# Patient Record
Sex: MALE | Race: WHITE | Employment: OTHER | URBAN - METROPOLITAN AREA
[De-identification: names, ages, dates, MRNs, and addresses within clinical notes are randomized per-mention and may not be internally consistent; named-entity substitution may affect disease eponyms.]

---

## 2017-12-24 ENCOUNTER — APPOINTMENT (OUTPATIENT)
Dept: GENERAL RADIOLOGY | Age: 72
DRG: 637 | End: 2017-12-24
Attending: EMERGENCY MEDICINE
Payer: MEDICARE

## 2017-12-24 ENCOUNTER — APPOINTMENT (OUTPATIENT)
Dept: CT IMAGING | Age: 72
DRG: 637 | End: 2017-12-24
Attending: EMERGENCY MEDICINE
Payer: MEDICARE

## 2017-12-24 ENCOUNTER — HOSPITAL ENCOUNTER (INPATIENT)
Age: 72
LOS: 1 days | Discharge: HOME | DRG: 637 | End: 2017-12-24
Attending: EMERGENCY MEDICINE | Admitting: INTERNAL MEDICINE
Payer: MEDICARE

## 2017-12-24 VITALS
TEMPERATURE: 97.8 F | DIASTOLIC BLOOD PRESSURE: 61 MMHG | OXYGEN SATURATION: 98 % | SYSTOLIC BLOOD PRESSURE: 129 MMHG | HEIGHT: 71 IN | HEART RATE: 81 BPM | BODY MASS INDEX: 28.7 KG/M2 | WEIGHT: 205 LBS | RESPIRATION RATE: 18 BRPM

## 2017-12-24 DIAGNOSIS — E16.2 HYPOGLYCEMIA: Primary | ICD-10-CM
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
## 2017-12-24 PROBLEM — N18.30 CKD (CHRONIC KIDNEY DISEASE) STAGE 3, GFR 30-59 ML/MIN (HCC): Status: ACTIVE | Noted: 2017-12-24

## 2017-12-24 LAB
ALBUMIN SERPL-MCNC: 3.5 G/DL (ref 3.5–5)
ALBUMIN/GLOB SERPL: 0.9 {RATIO} (ref 1.1–2.2)
ALP SERPL-CCNC: 59 U/L (ref 45–117)
ALT SERPL-CCNC: 20 U/L (ref 12–78)
AMPHET UR QL SCN: NEGATIVE
ANION GAP SERPL CALC-SCNC: 10 MMOL/L (ref 5–15)
APPEARANCE UR: CLEAR
AST SERPL-CCNC: 18 U/L (ref 15–37)
ATRIAL RATE: 67 BPM
BACTERIA URNS QL MICRO: NEGATIVE /HPF
BARBITURATES UR QL SCN: NEGATIVE
BASOPHILS # BLD: 0 K/UL (ref 0–0.1)
BASOPHILS NFR BLD: 0 % (ref 0–1)
BENZODIAZ UR QL: NEGATIVE
BILIRUB SERPL-MCNC: 0.5 MG/DL (ref 0.2–1)
BILIRUB UR QL: NEGATIVE
BUN SERPL-MCNC: 17 MG/DL (ref 6–20)
BUN/CREAT SERPL: 12 (ref 12–20)
CALCIUM SERPL-MCNC: 8.6 MG/DL (ref 8.5–10.1)
CALCULATED P AXIS, ECG09: 46 DEGREES
CALCULATED R AXIS, ECG10: 11 DEGREES
CALCULATED T AXIS, ECG11: 65 DEGREES
CANNABINOIDS UR QL SCN: NEGATIVE
CHLORIDE SERPL-SCNC: 108 MMOL/L (ref 97–108)
CO2 SERPL-SCNC: 23 MMOL/L (ref 21–32)
COCAINE UR QL SCN: NEGATIVE
COLOR UR: ABNORMAL
CREAT SERPL-MCNC: 1.39 MG/DL (ref 0.7–1.3)
DIAGNOSIS, 93000: NORMAL
DRUG SCRN COMMENT,DRGCM: NORMAL
EOSINOPHIL # BLD: 0.1 K/UL (ref 0–0.4)
EOSINOPHIL NFR BLD: 1 % (ref 0–7)
EPITH CASTS URNS QL MICRO: ABNORMAL /LPF
ERYTHROCYTE [DISTWIDTH] IN BLOOD BY AUTOMATED COUNT: 12.9 % (ref 11.5–14.5)
GLOBULIN SER CALC-MCNC: 3.9 G/DL (ref 2–4)
GLUCOSE BLD STRIP.AUTO-MCNC: 100 MG/DL (ref 65–100)
GLUCOSE BLD STRIP.AUTO-MCNC: 168 MG/DL (ref 65–100)
GLUCOSE BLD STRIP.AUTO-MCNC: 212 MG/DL (ref 65–100)
GLUCOSE BLD STRIP.AUTO-MCNC: 216 MG/DL (ref 65–100)
GLUCOSE BLD STRIP.AUTO-MCNC: 231 MG/DL (ref 65–100)
GLUCOSE BLD STRIP.AUTO-MCNC: 238 MG/DL (ref 65–100)
GLUCOSE BLD STRIP.AUTO-MCNC: 61 MG/DL (ref 65–100)
GLUCOSE BLD STRIP.AUTO-MCNC: 64 MG/DL (ref 65–100)
GLUCOSE BLD STRIP.AUTO-MCNC: 87 MG/DL (ref 65–100)
GLUCOSE SERPL-MCNC: 117 MG/DL (ref 65–100)
GLUCOSE UR STRIP.AUTO-MCNC: 100 MG/DL
HCT VFR BLD AUTO: 39.5 % (ref 36.6–50.3)
HGB BLD-MCNC: 13.6 G/DL (ref 12.1–17)
HGB UR QL STRIP: NEGATIVE
HYALINE CASTS URNS QL MICRO: ABNORMAL /LPF (ref 0–5)
KETONES UR QL STRIP.AUTO: NEGATIVE MG/DL
LEUKOCYTE ESTERASE UR QL STRIP.AUTO: NEGATIVE
LYMPHOCYTES # BLD: 1.5 K/UL (ref 0.8–3.5)
LYMPHOCYTES NFR BLD: 14 % (ref 12–49)
MCH RBC QN AUTO: 34.3 PG (ref 26–34)
MCHC RBC AUTO-ENTMCNC: 34.4 G/DL (ref 30–36.5)
MCV RBC AUTO: 99.5 FL (ref 80–99)
METHADONE UR QL: NEGATIVE
MONOCYTES # BLD: 0.7 K/UL (ref 0–1)
MONOCYTES NFR BLD: 7 % (ref 5–13)
NEUTS SEG # BLD: 8.1 K/UL (ref 1.8–8)
NEUTS SEG NFR BLD: 78 % (ref 32–75)
NITRITE UR QL STRIP.AUTO: NEGATIVE
OPIATES UR QL: NEGATIVE
P-R INTERVAL, ECG05: 194 MS
PCP UR QL: NEGATIVE
PH UR STRIP: 6.5 [PH] (ref 5–8)
PLATELET # BLD AUTO: 249 K/UL (ref 150–400)
POTASSIUM SERPL-SCNC: 3.1 MMOL/L (ref 3.5–5.1)
PROT SERPL-MCNC: 7.4 G/DL (ref 6.4–8.2)
PROT UR STRIP-MCNC: ABNORMAL MG/DL
Q-T INTERVAL, ECG07: 440 MS
QRS DURATION, ECG06: 82 MS
QTC CALCULATION (BEZET), ECG08: 464 MS
RBC # BLD AUTO: 3.97 M/UL (ref 4.1–5.7)
RBC #/AREA URNS HPF: ABNORMAL /HPF (ref 0–5)
SERVICE CMNT-IMP: ABNORMAL
SERVICE CMNT-IMP: NORMAL
SERVICE CMNT-IMP: NORMAL
SODIUM SERPL-SCNC: 141 MMOL/L (ref 136–145)
SP GR UR REFRACTOMETRY: 1.02 (ref 1–1.03)
TROPONIN I SERPL-MCNC: <0.04 NG/ML
UA: UC IF INDICATED,UAUC: ABNORMAL
UROBILINOGEN UR QL STRIP.AUTO: 2 EU/DL (ref 0.2–1)
VENTRICULAR RATE, ECG03: 67 BPM
WBC # BLD AUTO: 10.5 K/UL (ref 4.1–11.1)
WBC URNS QL MICRO: ABNORMAL /HPF (ref 0–4)

## 2017-12-24 PROCEDURE — 84484 ASSAY OF TROPONIN QUANT: CPT | Performed by: EMERGENCY MEDICINE

## 2017-12-24 PROCEDURE — 94762 N-INVAS EAR/PLS OXIMTRY CONT: CPT

## 2017-12-24 PROCEDURE — 81001 URINALYSIS AUTO W/SCOPE: CPT | Performed by: EMERGENCY MEDICINE

## 2017-12-24 PROCEDURE — 71010 XR CHEST PORT: CPT

## 2017-12-24 PROCEDURE — 82962 GLUCOSE BLOOD TEST: CPT

## 2017-12-24 PROCEDURE — 36415 COLL VENOUS BLD VENIPUNCTURE: CPT | Performed by: EMERGENCY MEDICINE

## 2017-12-24 PROCEDURE — 65660000000 HC RM CCU STEPDOWN

## 2017-12-24 PROCEDURE — 99285 EMERGENCY DEPT VISIT HI MDM: CPT

## 2017-12-24 PROCEDURE — 74011250637 HC RX REV CODE- 250/637: Performed by: INTERNAL MEDICINE

## 2017-12-24 PROCEDURE — 72125 CT NECK SPINE W/O DYE: CPT

## 2017-12-24 PROCEDURE — 70450 CT HEAD/BRAIN W/O DYE: CPT

## 2017-12-24 PROCEDURE — 80053 COMPREHEN METABOLIC PANEL: CPT | Performed by: EMERGENCY MEDICINE

## 2017-12-24 PROCEDURE — 74011250636 HC RX REV CODE- 250/636: Performed by: INTERNAL MEDICINE

## 2017-12-24 PROCEDURE — 85025 COMPLETE CBC W/AUTO DIFF WBC: CPT | Performed by: EMERGENCY MEDICINE

## 2017-12-24 PROCEDURE — 74011636637 HC RX REV CODE- 636/637: Performed by: INTERNAL MEDICINE

## 2017-12-24 PROCEDURE — 74011000250 HC RX REV CODE- 250: Performed by: EMERGENCY MEDICINE

## 2017-12-24 PROCEDURE — 74011000258 HC RX REV CODE- 258: Performed by: INTERNAL MEDICINE

## 2017-12-24 PROCEDURE — 96374 THER/PROPH/DIAG INJ IV PUSH: CPT

## 2017-12-24 PROCEDURE — 93005 ELECTROCARDIOGRAM TRACING: CPT

## 2017-12-24 PROCEDURE — 80307 DRUG TEST PRSMV CHEM ANLYZR: CPT | Performed by: EMERGENCY MEDICINE

## 2017-12-24 RX ORDER — FENOFIBRATE 145 MG/1
145 TABLET, COATED ORAL DAILY
Status: DISCONTINUED | OUTPATIENT
Start: 2017-12-24 | End: 2017-12-24 | Stop reason: HOSPADM

## 2017-12-24 RX ORDER — POTASSIUM CHLORIDE 750 MG/1
40 TABLET, FILM COATED, EXTENDED RELEASE ORAL
Status: COMPLETED | OUTPATIENT
Start: 2017-12-24 | End: 2017-12-24

## 2017-12-24 RX ORDER — ACETAMINOPHEN 325 MG/1
650 TABLET ORAL
Status: DISCONTINUED | OUTPATIENT
Start: 2017-12-24 | End: 2017-12-24 | Stop reason: HOSPADM

## 2017-12-24 RX ORDER — SODIUM CHLORIDE 0.9 % (FLUSH) 0.9 %
5-10 SYRINGE (ML) INJECTION AS NEEDED
Status: DISCONTINUED | OUTPATIENT
Start: 2017-12-24 | End: 2017-12-24 | Stop reason: HOSPADM

## 2017-12-24 RX ORDER — INSULIN ASPART 100 [IU]/ML
30 INJECTION, SUSPENSION SUBCUTANEOUS
Qty: 1 PEN | Refills: 1 | Status: SHIPPED | OUTPATIENT
Start: 2017-12-24 | End: 2021-05-18

## 2017-12-24 RX ORDER — MAGNESIUM SULFATE 100 %
4 CRYSTALS MISCELLANEOUS AS NEEDED
Status: DISCONTINUED | OUTPATIENT
Start: 2017-12-24 | End: 2017-12-24 | Stop reason: HOSPADM

## 2017-12-24 RX ORDER — ENOXAPARIN SODIUM 100 MG/ML
40 INJECTION SUBCUTANEOUS EVERY 24 HOURS
Status: DISCONTINUED | OUTPATIENT
Start: 2017-12-24 | End: 2017-12-24 | Stop reason: HOSPADM

## 2017-12-24 RX ORDER — METFORMIN HYDROCHLORIDE 500 MG/1
1000 TABLET, EXTENDED RELEASE ORAL
COMMUNITY
End: 2017-12-24

## 2017-12-24 RX ORDER — ONDANSETRON 2 MG/ML
4 INJECTION INTRAMUSCULAR; INTRAVENOUS
Status: DISCONTINUED | OUTPATIENT
Start: 2017-12-24 | End: 2017-12-24 | Stop reason: HOSPADM

## 2017-12-24 RX ORDER — BENZONATATE 100 MG/1
200 CAPSULE ORAL
Status: DISCONTINUED | OUTPATIENT
Start: 2017-12-24 | End: 2017-12-24 | Stop reason: HOSPADM

## 2017-12-24 RX ORDER — DEXTROSE 50 % IN WATER (D50W) INTRAVENOUS SYRINGE
50 AS NEEDED
Status: DISCONTINUED | OUTPATIENT
Start: 2017-12-24 | End: 2017-12-24 | Stop reason: HOSPADM

## 2017-12-24 RX ORDER — DEXTROSE 50 % IN WATER (D50W) INTRAVENOUS SYRINGE
12.5-25 AS NEEDED
Status: DISCONTINUED | OUTPATIENT
Start: 2017-12-24 | End: 2017-12-24 | Stop reason: HOSPADM

## 2017-12-24 RX ORDER — SODIUM CHLORIDE 0.9 % (FLUSH) 0.9 %
5-10 SYRINGE (ML) INJECTION EVERY 8 HOURS
Status: DISCONTINUED | OUTPATIENT
Start: 2017-12-24 | End: 2017-12-24 | Stop reason: HOSPADM

## 2017-12-24 RX ORDER — INSULIN LISPRO 100 [IU]/ML
INJECTION, SOLUTION INTRAVENOUS; SUBCUTANEOUS
Status: DISCONTINUED | OUTPATIENT
Start: 2017-12-24 | End: 2017-12-24 | Stop reason: HOSPADM

## 2017-12-24 RX ORDER — GUAIFENESIN 600 MG/1
600 TABLET, EXTENDED RELEASE ORAL EVERY 12 HOURS
Status: DISCONTINUED | OUTPATIENT
Start: 2017-12-24 | End: 2017-12-24 | Stop reason: HOSPADM

## 2017-12-24 RX ADMIN — SODIUM CHLORIDE: 234 INJECTION, SOLUTION, CONCENTRATE INTRAVENOUS; SUBCUTANEOUS at 06:31

## 2017-12-24 RX ADMIN — Medication 10 ML: at 09:53

## 2017-12-24 RX ADMIN — BENZONATATE 200 MG: 100 CAPSULE ORAL at 09:53

## 2017-12-24 RX ADMIN — GUAIFENESIN 600 MG: 600 TABLET, EXTENDED RELEASE ORAL at 09:53

## 2017-12-24 RX ADMIN — INSULIN LISPRO 2 UNITS: 100 INJECTION, SOLUTION INTRAVENOUS; SUBCUTANEOUS at 12:23

## 2017-12-24 RX ADMIN — DEXTROSE MONOHYDRATE 25 G: 500 INJECTION PARENTERAL at 06:23

## 2017-12-24 RX ADMIN — FENOFIBRATE 145 MG: 145 TABLET ORAL at 09:53

## 2017-12-24 RX ADMIN — ENOXAPARIN SODIUM 40 MG: 40 INJECTION SUBCUTANEOUS at 09:53

## 2017-12-24 RX ADMIN — Medication 10 ML: at 10:34

## 2017-12-24 RX ADMIN — POTASSIUM CHLORIDE 40 MEQ: 750 TABLET, FILM COATED, EXTENDED RELEASE ORAL at 06:45

## 2017-12-24 NOTE — ROUTINE PROCESS
TRANSFER - OUT REPORT:    Verbal report given to Ana M RN(name) on Mirian Dus  being transferred to [facility]) for routine progression of care       Report consisted of patients Situation, Background, Assessment and   Recommendations(SBAR). Information from the following report(s) SBAR, Kardex, ED Summary, [facility] and Recent Results was reviewed with the receiving nurse. Lines:   Peripheral IV 12/24/17 Left Antecubital (Active)   Site Assessment Clean, dry, & intact 12/24/2017  3:21 AM   Phlebitis Assessment 0 12/24/2017  3:21 AM   Infiltration Assessment 0 12/24/2017  3:21 AM   Dressing Status Clean, dry, & intact 12/24/2017  3:21 AM   Dressing Type Tape;Transparent 12/24/2017  3:21 AM   Hub Color/Line Status Pink;Flushed;Patent 12/24/2017  3:21 AM        Opportunity for questions and clarification was provided.

## 2017-12-24 NOTE — PROGRESS NOTES
Problem: Falls - Risk of  Goal: *Absence of Falls  Document Araceli Fall Risk and appropriate interventions in the flowsheet.   Outcome: Progressing Towards Goal  Fall Risk Interventions:  Mobility Interventions: Utilize walker, cane, or other assitive device         Medication Interventions: Teach patient to arise slowly

## 2017-12-24 NOTE — DISCHARGE SUMMARY
Hospitalist Discharge Summary     Patient ID:  Telma Hernandez  [id number]  71 y.o.  1945    PCP on record: Pierce Vale MD    Admit date: 12/24/2017  Discharge date and time: 12/24/2017      DISCHARGE DIAGNOSIS:  Hypoglycemia with encephalopathy POA, now resolved and back at baseline  In settings of DM (diabetes mellitus) type 2   Mild non productive for 2 weeks  Hyperlipidemia   CKD (chronic kidney disease) stage 3, GFR 30-59 ml/min     CONSULTATIONS:  None    Excerpted HPI from H&P of Breanne Clancy MD:  Natalio Preston is a 71 y.o.  male who presents after the episode of unresponsiveness at home. Pt doesn't remember what happened at home. As per family, pt fell from bed when they found him unresponsive and called EMS who found him hypoglycemia, he was treated and brought to the ED, Pt remain hypoglycemic despite aggressive treatment in ED. Pt mental stats came back to baseline. Pt claims to have mild non productive cough for past couple of weeks. Pt also reports his blood sugars had been running low in morning for past several days and he is been taking his 70/30 60 units at night and nothing in morning. His blood sugar runs in 200s during the day.   ______________________________________________________________________  DISCHARGE SUMMARY/HOSPITAL COURSE:  for full details see H&P, daily progress notes, labs, consult notes. Hypoglycemia with encephalopathy POA, now resolved and back at baseline  In settings of DM (diabetes mellitus) type 2   -Pt was initially admitted to [facility] and placed on D10 drip. FS were monitored q1hr and D10 was slowly titrated off  -Pt's mental status continued to improve and with family at bedside, confirmed that pt is back at baseline  -Had extensive discussion with pt and his son Senthil Lira in regards to insulin dosing. It appears pt has been hypoglycemic on a near daily basis with his current regimen of Novolog 70/30 60 units qHs.  Pt states that he frequently has readings in the 40's and believes that \"97\" is a high reading  -Educated both the pt and his son in regards to DM management and told them the goal should be approx 120-150.  -Advised pt to take his Novolog in the AM instead as opposed to the PM, therefore hypogylcemia would be easier to avert. Also advised to only take 30 units for now till he sees his PCP. Family to record FS in a log to bring with them to PCP office.  -Also advised to discontinue Metformin due to CKD history.  -Novolog, Metformin and Januvia half life's have already passed - therefore no more concern for hypogylcemia moving forward - and most recent FS is 231  -CT Head was negative for acute changes     Mild non productive for 2 weeks  CXR negative, no wheezing  F/UP with PCP     Hyperlipidemia   Continue Tricor     CKD (chronic kidney disease) stage 3, GFR 30-59 ml/min   Cr seems stable      _______________________________________________________________________  Patient seen and examined by me on discharge day. Pertinent Findings:  Gen:    Not in distress  Chest: Clear lungs  CVS:   Regular rhythm. No edema  Abd:  Soft, not distended, not tender  Neuro:  Alert, oriented x3  _______________________________________________________________________  DISCHARGE MEDICATIONS:   Current Discharge Medication List      CONTINUE these medications which have CHANGED    Details   insulin aspart protamine/insulin aspart (NOVOLOG MIX 70-30 FLEXPEN) 100 unit/mL (70-30) inpn 30 Units by SubCUTAneous route every morning. Indications: Take in the morning with breakfast - not necessarily strictly at 7am.  Qty: 1 Pen, Refills: 1         CONTINUE these medications which have NOT CHANGED    Details   SITagliptin (JANUVIA) 50 mg tablet Take 50 mg by mouth daily. fenofibrate (TRICOR) 160 mg tablet Take 160 mg by mouth daily.          STOP taking these medications       metFORMIN ER (GLUCOPHAGE XR) 500 mg tablet Comments:   Reason for Stopping: insulin (NOVOLIN 70/30) 100 unit/mL (70-30) injection Comments:   Reason for Stopping:               My Recommended Diet, Activity, Wound Care, and follow-up labs are listed in the patient's Discharge Insturctions which I have personally completed and reviewed.     ______________________________________________________________________    Risk of deterioration: Low    Condition at Discharge:  Stable  ______________________________________________________________________    Disposition  Home with family, no needs  ______________________________________________________________________    Care Plan discussed with:   Patient, Family, RN, Care Manager    Comment:   ______________________________________________________________________    Code Status: Full Code  ______________________________________________________________________      Follow up with:   PCP : Nitin Horton MD  Follow-up Information     Follow up With Details Comments Contact [address] [facility] [address] [place] [address]  [facility]  [phone]                Total time in minutes spent coordinating this discharge (includes going over instructions, follow-up, prescriptions, and preparing report for sign off to her PCP) :  45 minutes    Signed:  Patsy Carolina MD

## 2017-12-24 NOTE — IP AVS SNAPSHOT
[address] [facility] 
[phone] Patient: Christina Morales MRN: [id number] :1945 About your hospitalization You were admitted on:  2017 You last received care in the:  [facility] 2 PROGRESSIVE CARE You were discharged on:  2017 Why you were hospitalized Your primary diagnosis was:  Not on File Your diagnoses also included:  Hypoglycemia, Hyperlipidemia, Dm (Diabetes Mellitus) (Hcc), Ckd (Chronic Kidney Disease) Stage 3, Gfr 30-59 Ml/Min Things You Need To Do (next 8 weeks) Follow up with Clifm Libman, MD  
  
Phone:  [phone] Where:  [address] [place] [address], [address] [phone] Discharge Orders None A check la indicates which time of day the medication should be taken. My Medications STOP taking these medications   
 insulin NPH/insulin regular 100 unit/mL (70-30) injection Commonly known as:  NovoLIN 70/30  
   
  
 metFORMIN  mg tablet Commonly known as:  GLUCOPHAGE XR  
   
  
  
TAKE these medications as instructed Instructions Each Dose to Equal  
 Morning Noon Evening Bedtime  
 fenofibrate 160 mg tablet Commonly known as:  LOFIBRA Take 160 mg by mouth daily. 160 mg  
    
   
   
   
  
  
 insulin aspart protamine/insulin aspart 100 unit/mL (70-30) Inpn Commonly known as:  NovoLOG Mix 70-30 FlexPen 30 Units by SubCUTAneous route every morning. Indications: Take in the morning with breakfast - not necessarily strictly at 7am.  
 30 Units JANUVIA 50 mg tablet Generic drug:  SITagliptin Take 50 mg by mouth daily. 50 mg Where to Get Your Medications These medications were sent to [facility], [address] [place] [address] Junie Richards AT [facility] [address] [facility], [address] [facility] Hours:  24-hours Phone:  [phone] insulin aspart protamine/insulin aspart 100 unit/mL (70-30) Inpn Discharge Instructions Learning About Low Blood Sugar (Hypoglycemia) in Diabetes What is low blood sugar (hypoglycemia)? Hypoglycemia means that your blood sugar is low and your body (especially your brain) is not getting enough fuel. If you have diabetes, your blood sugar can go too low if you take too much of some diabetes medicines. It can also go too low if you miss a meal. And it can happen if you exercise too hard without eating enough food. Some medicines used to treat other health problems can cause low blood sugar too. What are the symptoms? Symptoms of low blood sugar can start quickly. It may take just 10 to 15 minutes. If you have had diabetes for many years, you may not realize that your blood sugar is low until it drops very low. · If your blood sugar level drops below 70 (mild low blood sugar), you may feel tired, anxious, dizzy, weak, shaky, or sweaty. You may have a fast heartbeat or blurry vision. · If your blood sugar level continues to drop (usually below 40), your behavior may change. You may feel more irritable. You may find it hard to concentrate or talk. And you may feel unsteady when you stand or walk. You may become too weak or confused to eat something with sugar to raise your blood sugar level. · If your blood sugar level drops very low (usually below 20), you may pass out (lose consciousness). Or you may have a seizure or stroke. If you have symptoms of severe low blood sugar, you need to get medical care right away. If you had a low blood sugar level during the night, you may wake up tired or with a headache. Or you may sweat so much during the night that your pajamas or sheets are damp when you wake up. How is low blood sugar treated? You can treat low blood sugar by eating or drinking something that has 15 grams of carbohydrate. These should be quick-sugar foods. Check your blood sugar level again 15 minutes after having a quick-sugar food to make sure your level is getting back to your target range. Here are examples of quick-sugar foods that have 15 grams of carbohydrate: · 3 to 4 glucose tablets · 1 tube of glucose gel · Hard candy (such as 3 Jolly Ranchers or 5 to [employer]) · 1 tablespoon honey · 2 tablespoons of raisins · ½ cup to ¾ cup (4 to 6 ounces) of fruit juice or regular (not diet) soda · 1 tablespoon of sugar · 1 cup of fat-free milk If you have problems with severe low blood sugar, someone else may have to give you a shot of glucagon. This is a hormone that raises blood sugar levels quickly. How can you prevent low blood sugar? You can take steps to prevent low blood sugar. · Follow your treatment plan. Take your insulin or other diabetes medicine exactly as your doctor prescribed it. Talk with your doctor if you're having low blood sugar often. Your medicine may need to be adjusted if it's causing your low blood sugar. · Check your blood sugar levels often. This helps you find early changes before an emergency happens. · Keep a quick-sugar food with you in case your blood sugar level drops low. · Eat small meals more often so that you don't get too hungry between meals. Don't skip meals. · Balance extra exercise with eating more. Check your blood sugar and learn how it changes after exercise. If your blood sugar stays at a normal level, you may not need to eat after you exercise. · Limit how much alcohol you drink. Alcohol can make low blood sugar go even lower. Don't drink alcohol if you have problems recognizing the early signs of low blood sugar. · Keep a diary of your symptoms. This helps you learn when changes in your body may signal low blood sugar.  And keep track of how often you have low blood sugar, including when you last ate and what you ate. This will help you learn what causes your blood sugar to drop. · Learn about diabetes and low blood sugar. Support groups or a diabetes education center can help you understand how medicines, diet, and exercise affect your blood sugar levels. Since low blood sugar levels can quickly become an emergency, be sure to wear medical alert jewelry, such as a medical alert bracelet. This is to let people know you have diabetes so they can get help for you. You can buy this at most drugstores. And make sure your family, friends, and coworkers know the symptoms of low blood sugar. Teach them what to do to get your sugar level up. Follow-up care is a key part of your treatment and safety. Be sure to make and go to all appointments, and call your doctor if you are having problems. It's also a good idea to know your test results and keep a list of the medicines you take. Where can you learn more? Go to http://shashank-maurilio.info/. Enter S529 in the search box to learn more about \"Learning About Low Blood Sugar (Hypoglycemia) in Diabetes. \" Current as of: March 13, 2017 Content Version: 11.4 © 2199-0380 [employer]. Care instructions adapted under license by [employer] (which disclaims liability or warranty for this information). If you have questions about a medical condition or this instruction, always ask your healthcare professional. [place] [address] any warranty or liability for your use of this information. [employer] Announcement We are excited to announce that we are making your provider's discharge notes available to you in [employer]. You will see these notes when they are completed and signed by the physician that discharged you from your recent hospital stay.   If you have any questions or concerns about any information you see in [employer], please call the Before the Call Department where you were seen or reach out to your Primary Care Provider for more information about your plan of care. Introducing [facility]! [employer] introduces "[employer] patient portal. Now you can access parts of your medical record, email your doctor's office, and request medication refills online. 1. In your internet browser, go to [email or web address] 2. Click on the First Time User? Click Here link in the Sign In box. You will see the New Member Sign Up page. 3. Enter your "[employer] Access Code exactly as it appears below. You will not need to use this code after youve completed the sign-up process. If you do not sign up before the expiration date, you must request a new code. · "[employer] Access Code: [email or web address] Expires: 3/24/2018  2:38 PM 
 
4. Enter the last four digits of your Social Security Number (xxxx) and Date of Birth (mm/dd/yyyy) as indicated and click Submit. You will be taken to the next sign-up page. 5. Create a "[employer] ID. This will be your "[employer] login ID and cannot be changed, so think of one that is secure and easy to remember. 6. Create a "[employer] password. You can change your password at any time. 7. Enter your Password Reset Question and Answer. This can be used at a later time if you forget your password. 8. Enter your e-mail address. You will receive e-mail notification when new information is available in [address] 9. Click Sign Up. You can now view and download portions of your medical record. 10. Click the Download Summary menu link to download a portable copy of your medical information. If you have questions, please visit the Frequently Asked Questions section of the "[employer] website. Remember, "[employer] is NOT to be used for urgent needs. For medical emergencies, dial 911. Now available from your iPhone and Android! Providers Seen During Your Hospitalization Provider Specialty Primary office phone Mirna Tong MD Emergency Medicine [phone] Anna Cesar MD Internal Medicine [phone] Your Primary Care Physician (PCP) Primary Care Physician Office Phone Office Fax Lina Smith [phone] You are allergic to the following No active allergies Recent Documentation Height Weight BMI Smoking Status 1.803 m 93 kg 28.59 kg/m2 Current Every Day Smoker Emergency Contacts Name Discharge Info Relation Home Work Mobile Selena Chen DISCHARGE CAREGIVER [3] Spouse [3] [phone] Patient Belongings The following personal items are in your possession at time of discharge: 
  Dental Appliances: None  Visual Aid: None      Home Medications: None      Clothing: None Please provide this summary of care documentation to your next provider. Signatures-by signing, you are acknowledging that this After Visit Summary has been reviewed with you and you have received a copy. Patient Signature:  ____________________________________________________________ Date:  ____________________________________________________________  
  
[employer] Provider Signature:  ____________________________________________________________ Date:  ____________________________________________________________

## 2017-12-24 NOTE — PROGRESS NOTES
Pharmacy Medication History    The patient and patient's son Lucy Asif were  interviewed regarding current PTA medication list.    Recommendations/Findings: The following amendments were made to the patient's active medication list on file at [facility]:     1)  Additions:       none    2)  Deletions:       Cyanocobalamin    3)  Changes:       Metformin ER changed to 1000mg daily with breakfast       Novolog 70/30: 60 Units SC qhs      Prior to Admission Medications   Prescriptions Last Dose Informant Patient Reported? Taking? INSULIN ASPART PROT/INSULN ASP (NOVOLOG MIX 70-30 FLEXPEN SC)   Yes Yes   Si Units by SubCUTAneous route nightly. SITagliptin (JANUVIA) 50 mg tablet 2017 at 0730  Yes Yes   Sig: Take 50 mg by mouth daily. fenofibrate (TRICOR) 160 mg tablet 2017 at 0730  Yes Yes   Sig: Take 160 mg by mouth daily. metFORMIN ER (GLUCOPHAGE XR) 500 mg tablet   Yes Yes   Sig: Take 1,000 mg by mouth daily (with breakfast).       Facility-Administered Medications: None          Thank you,  Jaden Kennedy, [facility]
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
## 2017-12-24 NOTE — ED TRIAGE NOTES
Patient arrived via EMS with complaints of generalized illness. Per EMS, patient was called for an unresponsive patient, and EMS treated hypoglycemia with D50 leading to patient waking up. Patient is confused, but EMS unsure of whether it is baseline for patient. Per EMS, patient's blood sugar has been in the 130's since treating patient. Patient has no complaints at this time. Awaiting family to arrive.

## 2017-12-24 NOTE — PROGRESS NOTES
PCU SHIFT NURSING NOTE          Shift Summary:   0715 Report received from [facility] EMMANUEL FLORES. SBAR, Kardex, Intake/Output, MAR, Recent Results or Cardiac Rhythm NSR were discussed. Dilia Hill RN will assume care of the pt upon adm. [id number] received from ED with son at bedside. IVF stopped r/t gluc >200 x2.  1030 Dr Thurman Holes in to see pt with new orders received. Plans to d/c to home after 2pm if pt remains stable. Son at bedside. 1530 pt remains stable. Reviewed d/c and f/u instructions in great detail. Pt and both sons voice good understanding and compliance. Aware to check glucose more often, at the very least bid before breakfast and bedtime. Pt aware to take insulin in the am, not at bedtime. Reviewed medication changes- no metformin anymore. Pt and family voice good understanding and compliance. Pt d/c'd to home with his family    Admission Date 12/24/2017   Admission Diagnosis Hypoglycemia   Consults None        Consults   []PT   []OT   []Speech   []Case Management      [] Palliative      Cardiac Monitoring Order   [x]Yes   []No     IV drips   []Yes    Drip:                            Dose:  Drip:                            Dose:  Drip:                            Dose:   [x]No     GI Prophylaxis   []Yes   []No         DVT Prophylaxis   SCDs:             Giovanni stockings:         [x] Medication   []Contraindicated   []None      Activity Level Activity Level: Up with Assistance     Activity Assistance: Partial (one person)   Purposeful Rounding every 1-2 hour? [x]Yes   Barron Score  Total Score: 3   Bed Alarm (If score 3 or >)   [x]Yes   [] Refused (See signed refusal form in chart)   Dwayne Score  Dwayne Score: 19   Dwayne Score (if score 14 or less)   []PMT consult   []Wound Care consult      []Specialty bed   [] Nutrition consult          Needs prior to discharge:   Home O2 required:    []Yes   [x]No    If yes, how much O2 required?     Other:    Last Bowel Movement: Last Bowel Movement Date: 12/24/17      Influenza Vaccine Received Flu Vaccine for Current Season (usually Sept-March): Yes        Pneumonia Vaccine           Diet Active Orders   Diet    DIET DIABETIC CONSISTENT CARB Regular      LDAs                                 Urinary Catheter      Intake & Output        Readmission Risk Assessment Tool Score Medium Risk            17       Total Score        3 Has Seen PCP in Last 6 Months (Yes=3, No=0)    5 Pt. Coverage (Medicare=5 , Medicaid, or Self-Pay=4)    9 Charlson Comorbidity Score (Age + Comorbid Conditions)        Criteria that do not apply:    . Living with Significant Other. Assisted Living. LTAC. SNF.  or   Rehab    Patient Length of Stay (>5 days = 3)    IP Visits Last 12 Months (1-3=4, 4=9, >4=11)       Expected Length of Stay - - -   Actual Length of Stay 0              Cierra Sanders RN
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
## 2017-12-24 NOTE — DISCHARGE INSTRUCTIONS
Learning About Low Blood Sugar (Hypoglycemia) in Diabetes  What is low blood sugar (hypoglycemia)? Hypoglycemia means that your blood sugar is low and your body (especially your brain) is not getting enough fuel. If you have diabetes, your blood sugar can go too low if you take too much of some diabetes medicines. It can also go too low if you miss a meal. And it can happen if you exercise too hard without eating enough food. Some medicines used to treat other health problems can cause low blood sugar too. What are the symptoms? Symptoms of low blood sugar can start quickly. It may take just 10 to 15 minutes. If you have had diabetes for many years, you may not realize that your blood sugar is low until it drops very low. · If your blood sugar level drops below 70 (mild low blood sugar), you may feel tired, anxious, dizzy, weak, shaky, or sweaty. You may have a fast heartbeat or blurry vision. · If your blood sugar level continues to drop (usually below 40), your behavior may change. You may feel more irritable. You may find it hard to concentrate or talk. And you may feel unsteady when you stand or walk. You may become too weak or confused to eat something with sugar to raise your blood sugar level. · If your blood sugar level drops very low (usually below 20), you may pass out (lose consciousness). Or you may have a seizure or stroke. If you have symptoms of severe low blood sugar, you need to get medical care right away. If you had a low blood sugar level during the night, you may wake up tired or with a headache. Or you may sweat so much during the night that your pajamas or sheets are damp when you wake up. How is low blood sugar treated? You can treat low blood sugar by eating or drinking something that has 15 grams of carbohydrate. These should be quick-sugar foods.  Check your blood sugar level again 15 minutes after having a quick-sugar food to make sure your level is getting back to your target range. Here are examples of quick-sugar foods that have 15 grams of carbohydrate:  · 3 to 4 glucose tablets  · 1 tube of glucose gel  · Hard candy (such as 3 Jolly Ranchers or 5 to 7 Life Savers)  · 1 tablespoon honey  · 2 tablespoons of raisins  · ½ cup to ¾ cup (4 to 6 ounces) of fruit juice or regular (not diet) soda  · 1 tablespoon of sugar  · 1 cup of fat-free milk  If you have problems with severe low blood sugar, someone else may have to give you a shot of glucagon. This is a hormone that raises blood sugar levels quickly. How can you prevent low blood sugar? You can take steps to prevent low blood sugar. · Follow your treatment plan. Take your insulin or other diabetes medicine exactly as your doctor prescribed it. Talk with your doctor if you're having low blood sugar often. Your medicine may need to be adjusted if it's causing your low blood sugar. · Check your blood sugar levels often. This helps you find early changes before an emergency happens. · Keep a quick-sugar food with you in case your blood sugar level drops low. · Eat small meals more often so that you don't get too hungry between meals. Don't skip meals. · Balance extra exercise with eating more. Check your blood sugar and learn how it changes after exercise. If your blood sugar stays at a normal level, you may not need to eat after you exercise. · Limit how much alcohol you drink. Alcohol can make low blood sugar go even lower. Don't drink alcohol if you have problems recognizing the early signs of low blood sugar. · Keep a diary of your symptoms. This helps you learn when changes in your body may signal low blood sugar. And keep track of how often you have low blood sugar, including when you last ate and what you ate. This will help you learn what causes your blood sugar to drop. · Learn about diabetes and low blood sugar.  Support groups or a diabetes education center can help you understand how medicines, diet, and exercise affect your blood sugar levels. Since low blood sugar levels can quickly become an emergency, be sure to wear medical alert jewelry, such as a medical alert bracelet. This is to let people know you have diabetes so they can get help for you. You can buy this at most drugstores. And make sure your family, friends, and coworkers know the symptoms of low blood sugar. Teach them what to do to get your sugar level up. Follow-up care is a key part of your treatment and safety. Be sure to make and go to all appointments, and call your doctor if you are having problems. It's also a good idea to know your test results and keep a list of the medicines you take. Where can you learn more? Go to http://shashank-maurilio.info/. Enter O404 in the search box to learn more about \"Learning About Low Blood Sugar (Hypoglycemia) in Diabetes. \"  Current as of: March 13, 2017  Content Version: 11.4  © 0191-3482 [employer]. Care instructions adapted under license by [employer] (which disclaims liability or warranty for this information). If you have questions about a medical condition or this instruction, always ask your healthcare professional. [address] any warranty or liability for your use of this information.

## 2021-05-18 ENCOUNTER — HOSPITAL ENCOUNTER (INPATIENT)
Age: 76
LOS: 8 days | Discharge: NURSING, REHAB OR LONG-TERM CARE | DRG: 871 | End: 2021-05-26
Attending: EMERGENCY MEDICINE | Admitting: STUDENT IN AN ORGANIZED HEALTH CARE EDUCATION/TRAINING PROGRAM
Payer: MEDICARE

## 2021-05-18 ENCOUNTER — APPOINTMENT (OUTPATIENT)
Dept: CT IMAGING | Age: 76
DRG: 871 | End: 2021-05-18
Attending: EMERGENCY MEDICINE
Payer: MEDICARE

## 2021-05-18 ENCOUNTER — APPOINTMENT (OUTPATIENT)
Dept: GENERAL RADIOLOGY | Age: 76
DRG: 871 | End: 2021-05-18
Attending: EMERGENCY MEDICINE
Payer: MEDICARE

## 2021-05-18 DIAGNOSIS — E87.20 LACTIC ACIDOSIS: Primary | ICD-10-CM

## 2021-05-18 DIAGNOSIS — R65.10 SIRS (SYSTEMIC INFLAMMATORY RESPONSE SYNDROME) (HCC): ICD-10-CM

## 2021-05-18 DIAGNOSIS — N17.9 ACUTE KIDNEY INJURY (HCC): ICD-10-CM

## 2021-05-18 PROBLEM — J96.01 ACUTE HYPOXEMIC RESPIRATORY FAILURE (HCC): Status: ACTIVE | Noted: 2021-05-18

## 2021-05-18 PROBLEM — A41.9 SEPSIS (HCC): Status: ACTIVE | Noted: 2021-05-18

## 2021-05-18 LAB
ALBUMIN SERPL-MCNC: 3.9 G/DL (ref 3.5–5)
ALBUMIN/GLOB SERPL: 1 {RATIO} (ref 1.1–2.2)
ALP SERPL-CCNC: 50 U/L (ref 45–117)
ALT SERPL-CCNC: 41 U/L (ref 12–78)
ANION GAP SERPL CALC-SCNC: 8 MMOL/L (ref 5–15)
ANION GAP SERPL CALC-SCNC: 8 MMOL/L (ref 5–15)
APPEARANCE UR: CLEAR
AST SERPL-CCNC: 34 U/L (ref 15–37)
BACTERIA URNS QL MICRO: NEGATIVE /HPF
BASE DEFICIT BLD-SCNC: 2.8 MMOL/L
BASOPHILS # BLD: 0 K/UL (ref 0–0.1)
BASOPHILS NFR BLD: 0 % (ref 0–1)
BILIRUB SERPL-MCNC: 1 MG/DL (ref 0.2–1)
BILIRUB UR QL: NEGATIVE
BNP SERPL-MCNC: 177 PG/ML
BUN SERPL-MCNC: 39 MG/DL (ref 6–20)
BUN SERPL-MCNC: 41 MG/DL (ref 6–20)
BUN/CREAT SERPL: 21 (ref 12–20)
BUN/CREAT SERPL: 23 (ref 12–20)
CALCIUM SERPL-MCNC: 7.7 MG/DL (ref 8.5–10.1)
CALCIUM SERPL-MCNC: 8.8 MG/DL (ref 8.5–10.1)
CHLORIDE SERPL-SCNC: 105 MMOL/L (ref 97–108)
CHLORIDE SERPL-SCNC: 107 MMOL/L (ref 97–108)
CO2 SERPL-SCNC: 22 MMOL/L (ref 21–32)
CO2 SERPL-SCNC: 22 MMOL/L (ref 21–32)
COLOR UR: ABNORMAL
COMMENT, HOLDF: NORMAL
CREAT SERPL-MCNC: 1.72 MG/DL (ref 0.7–1.3)
CREAT SERPL-MCNC: 1.98 MG/DL (ref 0.7–1.3)
DIFFERENTIAL METHOD BLD: ABNORMAL
EOSINOPHIL # BLD: 0 K/UL (ref 0–0.4)
EOSINOPHIL NFR BLD: 0 % (ref 0–7)
EPITH CASTS URNS QL MICRO: ABNORMAL /LPF
ERYTHROCYTE [DISTWIDTH] IN BLOOD BY AUTOMATED COUNT: 12.9 % (ref 11.5–14.5)
GLOBULIN SER CALC-MCNC: 3.9 G/DL (ref 2–4)
GLUCOSE BLD STRIP.AUTO-MCNC: 418 MG/DL (ref 65–117)
GLUCOSE SERPL-MCNC: 361 MG/DL (ref 65–100)
GLUCOSE SERPL-MCNC: 399 MG/DL (ref 65–100)
GLUCOSE UR STRIP.AUTO-MCNC: >1000 MG/DL
HCO3 BLD-SCNC: 22.9 MMOL/L (ref 22–26)
HCT VFR BLD AUTO: 45.4 % (ref 36.6–50.3)
HGB BLD-MCNC: 15 G/DL (ref 12.1–17)
HGB UR QL STRIP: ABNORMAL
HYALINE CASTS URNS QL MICRO: ABNORMAL /LPF (ref 0–5)
IMM GRANULOCYTES # BLD AUTO: 0 K/UL (ref 0–0.04)
IMM GRANULOCYTES NFR BLD AUTO: 0 % (ref 0–0.5)
KETONES SERPL QL: NEGATIVE
KETONES UR QL STRIP.AUTO: ABNORMAL MG/DL
LACTATE BLD-SCNC: 2.29 MMOL/L (ref 0.4–2)
LACTATE BLD-SCNC: 4.79 MMOL/L (ref 0.4–2)
LEUKOCYTE ESTERASE UR QL STRIP.AUTO: NEGATIVE
LIPASE SERPL-CCNC: 49 U/L (ref 73–393)
LYMPHOCYTES # BLD: 0.5 K/UL (ref 0.8–3.5)
LYMPHOCYTES NFR BLD: 10 % (ref 12–49)
MCH RBC QN AUTO: 35.8 PG (ref 26–34)
MCHC RBC AUTO-ENTMCNC: 33 G/DL (ref 30–36.5)
MCV RBC AUTO: 108.4 FL (ref 80–99)
MONOCYTES # BLD: 0.3 K/UL (ref 0–1)
MONOCYTES NFR BLD: 6 % (ref 5–13)
NEUTS SEG # BLD: 4.1 K/UL (ref 1.8–8)
NEUTS SEG NFR BLD: 84 % (ref 32–75)
NITRITE UR QL STRIP.AUTO: NEGATIVE
NRBC # BLD: 0 K/UL (ref 0–0.01)
NRBC BLD-RTO: 0 PER 100 WBC
PCO2 BLD: 41.9 MMHG (ref 35–45)
PH BLD: 7.35 [PH] (ref 7.35–7.45)
PH UR STRIP: 5.5 [PH] (ref 5–8)
PLATELET # BLD AUTO: 159 K/UL (ref 150–400)
PMV BLD AUTO: 11.1 FL (ref 8.9–12.9)
PO2 BLD: 26 MMHG (ref 80–100)
POTASSIUM SERPL-SCNC: 3.9 MMOL/L (ref 3.5–5.1)
POTASSIUM SERPL-SCNC: 4.6 MMOL/L (ref 3.5–5.1)
PROCALCITONIN SERPL-MCNC: 0.71 NG/ML
PROT SERPL-MCNC: 7.8 G/DL (ref 6.4–8.2)
PROT UR STRIP-MCNC: ABNORMAL MG/DL
RBC # BLD AUTO: 4.19 M/UL (ref 4.1–5.7)
RBC #/AREA URNS HPF: ABNORMAL /HPF (ref 0–5)
RBC MORPH BLD: ABNORMAL
SAMPLES BEING HELD,HOLD: NORMAL
SAO2 % BLD: 43.5 % (ref 92–97)
SERVICE CMNT-IMP: ABNORMAL
SODIUM SERPL-SCNC: 135 MMOL/L (ref 136–145)
SODIUM SERPL-SCNC: 137 MMOL/L (ref 136–145)
SP GR UR REFRACTOMETRY: 1.03 (ref 1–1.03)
SPECIMEN TYPE: ABNORMAL
TROPONIN I SERPL-MCNC: <0.05 NG/ML
UA: UC IF INDICATED,UAUC: ABNORMAL
UROBILINOGEN UR QL STRIP.AUTO: 0.2 EU/DL (ref 0.2–1)
WBC # BLD AUTO: 4.9 K/UL (ref 4.1–11.1)
WBC URNS QL MICRO: ABNORMAL /HPF (ref 0–4)

## 2021-05-18 PROCEDURE — 74011636637 HC RX REV CODE- 636/637: Performed by: EMERGENCY MEDICINE

## 2021-05-18 PROCEDURE — 99285 EMERGENCY DEPT VISIT HI MDM: CPT

## 2021-05-18 PROCEDURE — 84484 ASSAY OF TROPONIN QUANT: CPT

## 2021-05-18 PROCEDURE — 84145 PROCALCITONIN (PCT): CPT

## 2021-05-18 PROCEDURE — 71045 X-RAY EXAM CHEST 1 VIEW: CPT

## 2021-05-18 PROCEDURE — 96361 HYDRATE IV INFUSION ADD-ON: CPT

## 2021-05-18 PROCEDURE — 74011000258 HC RX REV CODE- 258: Performed by: EMERGENCY MEDICINE

## 2021-05-18 PROCEDURE — 83690 ASSAY OF LIPASE: CPT

## 2021-05-18 PROCEDURE — 87635 SARS-COV-2 COVID-19 AMP PRB: CPT

## 2021-05-18 PROCEDURE — 87040 BLOOD CULTURE FOR BACTERIA: CPT

## 2021-05-18 PROCEDURE — 96360 HYDRATION IV INFUSION INIT: CPT

## 2021-05-18 PROCEDURE — 82009 KETONE BODYS QUAL: CPT

## 2021-05-18 PROCEDURE — 80053 COMPREHEN METABOLIC PANEL: CPT

## 2021-05-18 PROCEDURE — 83605 ASSAY OF LACTIC ACID: CPT

## 2021-05-18 PROCEDURE — 70450 CT HEAD/BRAIN W/O DYE: CPT

## 2021-05-18 PROCEDURE — 83880 ASSAY OF NATRIURETIC PEPTIDE: CPT

## 2021-05-18 PROCEDURE — 65270000029 HC RM PRIVATE

## 2021-05-18 PROCEDURE — 81001 URINALYSIS AUTO W/SCOPE: CPT

## 2021-05-18 PROCEDURE — 36415 COLL VENOUS BLD VENIPUNCTURE: CPT

## 2021-05-18 PROCEDURE — 85025 COMPLETE CBC W/AUTO DIFF WBC: CPT

## 2021-05-18 PROCEDURE — 74176 CT ABD & PELVIS W/O CONTRAST: CPT

## 2021-05-18 PROCEDURE — 93005 ELECTROCARDIOGRAM TRACING: CPT

## 2021-05-18 PROCEDURE — 85379 FIBRIN DEGRADATION QUANT: CPT

## 2021-05-18 PROCEDURE — 74011250636 HC RX REV CODE- 250/636: Performed by: EMERGENCY MEDICINE

## 2021-05-18 PROCEDURE — 82962 GLUCOSE BLOOD TEST: CPT

## 2021-05-18 RX ORDER — INSULIN ASPART 100 [IU]/ML
40 INJECTION, SUSPENSION SUBCUTANEOUS 2 TIMES DAILY
COMMUNITY

## 2021-05-18 RX ORDER — METRONIDAZOLE 500 MG/100ML
500 INJECTION, SOLUTION INTRAVENOUS EVERY 12 HOURS
Status: DISCONTINUED | OUTPATIENT
Start: 2021-05-18 | End: 2021-05-20

## 2021-05-18 RX ORDER — IPRATROPIUM BROMIDE AND ALBUTEROL SULFATE 2.5; .5 MG/3ML; MG/3ML
3 SOLUTION RESPIRATORY (INHALATION)
Status: DISCONTINUED | OUTPATIENT
Start: 2021-05-19 | End: 2021-05-19

## 2021-05-18 RX ORDER — LEVOFLOXACIN 5 MG/ML
750 INJECTION, SOLUTION INTRAVENOUS EVERY 24 HOURS
Status: DISCONTINUED | OUTPATIENT
Start: 2021-05-18 | End: 2021-05-18

## 2021-05-18 RX ORDER — INSULIN LISPRO 100 [IU]/ML
INJECTION, SOLUTION INTRAVENOUS; SUBCUTANEOUS
Status: DISCONTINUED | OUTPATIENT
Start: 2021-05-19 | End: 2021-05-26 | Stop reason: HOSPADM

## 2021-05-18 RX ORDER — VANCOMYCIN/0.9 % SOD CHLORIDE 1.5G/250ML
1500 PLASTIC BAG, INJECTION (ML) INTRAVENOUS EVERY 12 HOURS
Status: DISCONTINUED | OUTPATIENT
Start: 2021-05-19 | End: 2021-05-21

## 2021-05-18 RX ORDER — DEXTROSE 50 % IN WATER (D50W) INTRAVENOUS SYRINGE
12.5-25 AS NEEDED
Status: DISCONTINUED | OUTPATIENT
Start: 2021-05-18 | End: 2021-05-26 | Stop reason: HOSPADM

## 2021-05-18 RX ORDER — INSULIN GLARGINE 100 [IU]/ML
33 INJECTION, SOLUTION SUBCUTANEOUS DAILY
Status: DISCONTINUED | OUTPATIENT
Start: 2021-05-19 | End: 2021-05-26 | Stop reason: HOSPADM

## 2021-05-18 RX ORDER — ACETAMINOPHEN 325 MG/1
650 TABLET ORAL
Status: DISCONTINUED | OUTPATIENT
Start: 2021-05-18 | End: 2021-05-19 | Stop reason: SDUPTHER

## 2021-05-18 RX ORDER — SODIUM CHLORIDE 0.9 % (FLUSH) 0.9 %
5-10 SYRINGE (ML) INJECTION AS NEEDED
Status: DISCONTINUED | OUTPATIENT
Start: 2021-05-18 | End: 2021-05-26 | Stop reason: HOSPADM

## 2021-05-18 RX ORDER — VANCOMYCIN 2 GRAM/500 ML IN 0.9 % SODIUM CHLORIDE INTRAVENOUS
2000 ONCE
Status: COMPLETED | OUTPATIENT
Start: 2021-05-18 | End: 2021-05-19

## 2021-05-18 RX ORDER — MAGNESIUM SULFATE 100 %
4 CRYSTALS MISCELLANEOUS AS NEEDED
Status: DISCONTINUED | OUTPATIENT
Start: 2021-05-18 | End: 2021-05-26 | Stop reason: HOSPADM

## 2021-05-18 RX ORDER — INSULIN LISPRO 100 [IU]/ML
6 INJECTION, SOLUTION INTRAVENOUS; SUBCUTANEOUS
Status: DISCONTINUED | OUTPATIENT
Start: 2021-05-19 | End: 2021-05-26 | Stop reason: HOSPADM

## 2021-05-18 RX ORDER — ONDANSETRON 2 MG/ML
4 INJECTION INTRAMUSCULAR; INTRAVENOUS
Status: DISCONTINUED | OUTPATIENT
Start: 2021-05-18 | End: 2021-05-19 | Stop reason: SDUPTHER

## 2021-05-18 RX ADMIN — CEFEPIME HYDROCHLORIDE 2 G: 2 INJECTION, POWDER, FOR SOLUTION INTRAVENOUS at 20:57

## 2021-05-18 RX ADMIN — SODIUM CHLORIDE 1000 ML: 9 INJECTION, SOLUTION INTRAVENOUS at 19:17

## 2021-05-18 RX ADMIN — SODIUM CHLORIDE 259 ML: 9 INJECTION, SOLUTION INTRAVENOUS at 23:00

## 2021-05-18 RX ADMIN — LEVOFLOXACIN 750 MG: 5 INJECTION, SOLUTION INTRAVENOUS at 20:58

## 2021-05-18 RX ADMIN — HUMAN INSULIN 5 UNITS: 100 INJECTION, SOLUTION SUBCUTANEOUS at 22:30

## 2021-05-18 RX ADMIN — SODIUM CHLORIDE 1000 ML: 9 INJECTION, SOLUTION INTRAVENOUS at 22:10

## 2021-05-18 NOTE — ED NOTES
1915: This nurse assumed care of pt at this time. Pt in ct at this time    2030: pt taken off 2 L NC and sating around  92% at this time. Pt denies any sob at this time. Per [facility] MD, do VBG and Lactic acid together when it is time for LA    2045: pts IV unable to pull blood or flow with fluids and meds, tech at bedside attempting another line. 2150Delilah Rousseau MD at bedside for re-eval at this time.

## 2021-05-19 LAB
ANION GAP SERPL CALC-SCNC: 7 MMOL/L (ref 5–15)
ATRIAL RATE: 100 BPM
BUN SERPL-MCNC: 37 MG/DL (ref 6–20)
BUN/CREAT SERPL: 25 (ref 12–20)
CALCIUM SERPL-MCNC: 8 MG/DL (ref 8.5–10.1)
CALCULATED P AXIS, ECG09: 63 DEGREES
CALCULATED R AXIS, ECG10: -26 DEGREES
CALCULATED T AXIS, ECG11: 56 DEGREES
CHLORIDE SERPL-SCNC: 108 MMOL/L (ref 97–108)
CO2 SERPL-SCNC: 22 MMOL/L (ref 21–32)
COVID-19 RAPID TEST, COVR: NOT DETECTED
CREAT SERPL-MCNC: 1.46 MG/DL (ref 0.7–1.3)
D DIMER PPP FEU-MCNC: 1.29 MG/L FEU (ref 0–0.65)
DIAGNOSIS, 93000: NORMAL
GLUCOSE BLD STRIP.AUTO-MCNC: 140 MG/DL (ref 65–117)
GLUCOSE BLD STRIP.AUTO-MCNC: 153 MG/DL (ref 65–117)
GLUCOSE BLD STRIP.AUTO-MCNC: 183 MG/DL (ref 65–117)
GLUCOSE BLD STRIP.AUTO-MCNC: 264 MG/DL (ref 65–117)
GLUCOSE SERPL-MCNC: 273 MG/DL (ref 65–100)
LACTATE SERPL-SCNC: 1.9 MMOL/L (ref 0.4–2)
P-R INTERVAL, ECG05: 180 MS
POTASSIUM SERPL-SCNC: 3.7 MMOL/L (ref 3.5–5.1)
Q-T INTERVAL, ECG07: 362 MS
QRS DURATION, ECG06: 68 MS
QTC CALCULATION (BEZET), ECG08: 466 MS
SERVICE CMNT-IMP: ABNORMAL
SODIUM SERPL-SCNC: 137 MMOL/L (ref 136–145)
SOURCE, COVRS: NORMAL
TSH SERPL DL<=0.05 MIU/L-ACNC: 5.23 UIU/ML (ref 0.36–3.74)
VENTRICULAR RATE, ECG03: 100 BPM
VIT B12 SERPL-MCNC: 186 PG/ML (ref 193–986)

## 2021-05-19 PROCEDURE — 94640 AIRWAY INHALATION TREATMENT: CPT

## 2021-05-19 PROCEDURE — 74011636637 HC RX REV CODE- 636/637: Performed by: STUDENT IN AN ORGANIZED HEALTH CARE EDUCATION/TRAINING PROGRAM

## 2021-05-19 PROCEDURE — 65660000000 HC RM CCU STEPDOWN

## 2021-05-19 PROCEDURE — 74011250636 HC RX REV CODE- 250/636: Performed by: EMERGENCY MEDICINE

## 2021-05-19 PROCEDURE — 83605 ASSAY OF LACTIC ACID: CPT

## 2021-05-19 PROCEDURE — 82607 VITAMIN B-12: CPT

## 2021-05-19 PROCEDURE — 82962 GLUCOSE BLOOD TEST: CPT

## 2021-05-19 PROCEDURE — 80048 BASIC METABOLIC PNL TOTAL CA: CPT

## 2021-05-19 PROCEDURE — 74011250636 HC RX REV CODE- 250/636: Performed by: STUDENT IN AN ORGANIZED HEALTH CARE EDUCATION/TRAINING PROGRAM

## 2021-05-19 PROCEDURE — 74011000258 HC RX REV CODE- 258: Performed by: EMERGENCY MEDICINE

## 2021-05-19 PROCEDURE — 84443 ASSAY THYROID STIM HORMONE: CPT

## 2021-05-19 PROCEDURE — 74011000250 HC RX REV CODE- 250: Performed by: GENERAL ACUTE CARE HOSPITAL

## 2021-05-19 PROCEDURE — 36415 COLL VENOUS BLD VENIPUNCTURE: CPT

## 2021-05-19 PROCEDURE — 82747 ASSAY OF FOLIC ACID RBC: CPT

## 2021-05-19 PROCEDURE — 74011250636 HC RX REV CODE- 250/636: Performed by: INTERNAL MEDICINE

## 2021-05-19 RX ORDER — ONDANSETRON 4 MG/1
4 TABLET, ORALLY DISINTEGRATING ORAL
Status: DISCONTINUED | OUTPATIENT
Start: 2021-05-19 | End: 2021-05-26 | Stop reason: HOSPADM

## 2021-05-19 RX ORDER — IPRATROPIUM BROMIDE AND ALBUTEROL SULFATE 2.5; .5 MG/3ML; MG/3ML
3 SOLUTION RESPIRATORY (INHALATION)
Status: DISCONTINUED | OUTPATIENT
Start: 2021-05-19 | End: 2021-05-20

## 2021-05-19 RX ORDER — ENOXAPARIN SODIUM 100 MG/ML
40 INJECTION SUBCUTANEOUS DAILY
Status: DISCONTINUED | OUTPATIENT
Start: 2021-05-19 | End: 2021-05-26 | Stop reason: HOSPADM

## 2021-05-19 RX ORDER — POLYETHYLENE GLYCOL 3350 17 G/17G
17 POWDER, FOR SOLUTION ORAL DAILY PRN
Status: DISCONTINUED | OUTPATIENT
Start: 2021-05-19 | End: 2021-05-26 | Stop reason: HOSPADM

## 2021-05-19 RX ORDER — SODIUM CHLORIDE 0.9 % (FLUSH) 0.9 %
5-40 SYRINGE (ML) INJECTION AS NEEDED
Status: DISCONTINUED | OUTPATIENT
Start: 2021-05-19 | End: 2021-05-26 | Stop reason: HOSPADM

## 2021-05-19 RX ORDER — ACETAMINOPHEN 325 MG/1
650 TABLET ORAL
Status: DISCONTINUED | OUTPATIENT
Start: 2021-05-19 | End: 2021-05-26 | Stop reason: HOSPADM

## 2021-05-19 RX ORDER — ONDANSETRON 2 MG/ML
4 INJECTION INTRAMUSCULAR; INTRAVENOUS
Status: DISCONTINUED | OUTPATIENT
Start: 2021-05-19 | End: 2021-05-26 | Stop reason: HOSPADM

## 2021-05-19 RX ORDER — ACETAMINOPHEN 650 MG/1
650 SUPPOSITORY RECTAL
Status: DISCONTINUED | OUTPATIENT
Start: 2021-05-19 | End: 2021-05-26 | Stop reason: HOSPADM

## 2021-05-19 RX ORDER — SODIUM CHLORIDE 0.9 % (FLUSH) 0.9 %
5-40 SYRINGE (ML) INJECTION EVERY 8 HOURS
Status: DISCONTINUED | OUTPATIENT
Start: 2021-05-19 | End: 2021-05-26 | Stop reason: HOSPADM

## 2021-05-19 RX ADMIN — CEFEPIME HYDROCHLORIDE 2 G: 2 INJECTION, POWDER, FOR SOLUTION INTRAVENOUS at 19:59

## 2021-05-19 RX ADMIN — VANCOMYCIN HYDROCHLORIDE 2000 MG: 10 INJECTION, POWDER, LYOPHILIZED, FOR SOLUTION INTRAVENOUS at 00:18

## 2021-05-19 RX ADMIN — ENOXAPARIN SODIUM 40 MG: 40 INJECTION SUBCUTANEOUS at 09:02

## 2021-05-19 RX ADMIN — METRONIDAZOLE 500 MG: 500 INJECTION, SOLUTION INTRAVENOUS at 00:18

## 2021-05-19 RX ADMIN — INSULIN LISPRO 6 UNITS: 100 INJECTION, SOLUTION INTRAVENOUS; SUBCUTANEOUS at 09:03

## 2021-05-19 RX ADMIN — CEFEPIME HYDROCHLORIDE 2 G: 2 INJECTION, POWDER, FOR SOLUTION INTRAVENOUS at 13:09

## 2021-05-19 RX ADMIN — Medication 10 ML: at 22:56

## 2021-05-19 RX ADMIN — INSULIN LISPRO 6 UNITS: 100 INJECTION, SOLUTION INTRAVENOUS; SUBCUTANEOUS at 13:17

## 2021-05-19 RX ADMIN — Medication 10 ML: at 13:18

## 2021-05-19 RX ADMIN — INSULIN LISPRO 2 UNITS: 100 INJECTION, SOLUTION INTRAVENOUS; SUBCUTANEOUS at 13:17

## 2021-05-19 RX ADMIN — INSULIN LISPRO 5 UNITS: 100 INJECTION, SOLUTION INTRAVENOUS; SUBCUTANEOUS at 09:02

## 2021-05-19 RX ADMIN — CEFEPIME HYDROCHLORIDE 2 G: 2 INJECTION, POWDER, FOR SOLUTION INTRAVENOUS at 04:31

## 2021-05-19 RX ADMIN — INSULIN LISPRO 2 UNITS: 100 INJECTION, SOLUTION INTRAVENOUS; SUBCUTANEOUS at 16:28

## 2021-05-19 RX ADMIN — METRONIDAZOLE 500 MG: 500 INJECTION, SOLUTION INTRAVENOUS at 22:53

## 2021-05-19 RX ADMIN — IPRATROPIUM BROMIDE AND ALBUTEROL SULFATE 3 ML: .5; 3 SOLUTION RESPIRATORY (INHALATION) at 19:29

## 2021-05-19 RX ADMIN — INSULIN LISPRO 6 UNITS: 100 INJECTION, SOLUTION INTRAVENOUS; SUBCUTANEOUS at 16:28

## 2021-05-19 RX ADMIN — VANCOMYCIN HYDROCHLORIDE 1500 MG: 10 INJECTION, POWDER, LYOPHILIZED, FOR SOLUTION INTRAVENOUS at 15:00

## 2021-05-19 RX ADMIN — METRONIDAZOLE 500 MG: 500 INJECTION, SOLUTION INTRAVENOUS at 13:11

## 2021-05-19 RX ADMIN — INSULIN GLARGINE 33 UNITS: 100 INJECTION, SOLUTION SUBCUTANEOUS at 09:00

## 2021-05-19 NOTE — PROGRESS NOTES
Transition of Care Plan:    RUR:14%    Disposition: Home with family,possible hh vs short term rehab. Follow up appointments: To be made prior to discharge. DME needed: To be determined. Transportation at Discharge: Family to transport    [place] or means to access home:  Wife and son has keys. IM Medicare letter: To be given prior to discharge. Caregiver Contact:  Wife    Discharge Caregiver contacted prior to discharge? Caregiver to be contacted prior to discharge. Reason for Admission:  Patient came to ed for recurrent falls over last 24 hours, weakness, vomiting and diarrhea. Confirmed demographics and pcp with the son. He was at a wedding of his son 2 days ago and multiple people got sick with gi symptoms in addition to his son. PMHX significant for pad, htn, hyperlipedemia. RUR Score: 14%                      Plan for utilizing home health:  He has [facility] in the past.         PCP: First and Last name:  Estella Osuna MD     Name of Practice:  [facility]. Are you a current patient: Yes/No:  Yes     Approximate date of last visit:  One month ago. Can you participate in a virtual visit with your PCP:  Yes                      Current Advanced Directive/Advance Care Plan: Full Code  Advance Care Planning     General Advance Care Planning (ACP) Conversation      Date of Conversation: 5/19/21  Conducted with: Patient with Decision Making Capacity and Healthcare Decision Maker: Next of Kin by law (only applies in absence of a Healthcare Power of  or Legal Guardian)    Healthcare Decision Maker:     Click here to [address] including selection of the Healthcare Decision Maker Relationship (ie \"Primary\")  Today we documented Decision Maker(s) consistent with Legal Next of Kin hierarchy.     Content/Action Overview:   Has NO ACP documents/care preferences - information provided, considering goals and options, Reviewed DNR/DNI and patient elects Full Code (Attempt Resuscitation)         Length of Voluntary ACP Conversation in minutes:  <16 minutes (Non-Billable)    Jacqueline Patterson RN               Healthcare Decision Maker:   Click here to complete [address] including selection of the Healthcare Decision Maker Relationship (ie \"Primary\")             Patient lives in one story home with his wife and son. His son works. Per son patient was independent prior to coming to the hospital. He uses a cane occasionally to ambulate. The son takes his father to appointments or patient's wife will use the [facility] [employer] transportation. Patient does not use home oxygen or cpap. He has a wheelchair and walker at home. He has two other sons who live in the area. The plan is to go home with family vs home health vs short term rehab. Will see how he does. Yolanda Roberts RN BSN CRM        [phone]

## 2021-05-19 NOTE — REMOTE MONITORING
Called and spoke to [facility] regarding Sepsis bundle. For any questions or concerns, please feel free to call [phone]. Thank you! [facility]. Eric Christian RN, BSN.

## 2021-05-19 NOTE — PROGRESS NOTES
5936 .Bedside and Verbal shift change report given to Marva Yeboah (oncoming nurse) by [place] (offgoing nurse). Report included the following information SBAR, Kardex, Intake/Output and MAR. .End of Shift Note    Bedside shift change report given to [place] (oncoming nurse) by Mihir Holt (offgoing nurse). Report included the following information SBAR, Kardex, Intake/Output and MAR    Shift worked:  0700 - 1900     Shift summary and any significant changes:     Lactic acid norm     Concerns for physician to address:       Zone phone for oncoming shift:          Activity:  Activity Level: Bed Rest  Number times ambulated in hallways past shift: 0  Number of times OOB to chair past shift: 0    Cardiac:   Cardiac Monitoring: Yes      Cardiac Rhythm: Sinus Rhythm    Access:   Current line(s): PIV     Genitourinary:   Urinary status: voiding and incontinent    Respiratory:   O2 Device: None (Room air)  Chronic home O2 use?: NO  Incentive spirometer at bedside: NO     GI:  Last Bowel Movement Date: 05/19/21  Current diet:  DIET DIABETIC CONSISTENT CARB Regular  Passing flatus: YES  Tolerating current diet: YES       Pain Management:   Patient states pain is manageable on current regimen: YES    Skin:  Dwayne Score: 16  Interventions: increase time out of bed and PT/OT consult    Patient Safety:  Fall Score:  Total Score: 4  Interventions: bed/chair alarm and gripper socks  High Fall Risk: Yes    Length of Stay:  Expected LOS: 4d 19h  Actual LOS: 1      Mihir Holt
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
## 2021-05-19 NOTE — PROGRESS NOTES
Pharmacy Conversion of Mixed Insulin/Concentrated Insulin Products to Basal/Bolus Insulin regimen    PTA Mixed Insulin Regimen:  Insulin Type: Novolog 70/30  Insulin Dose: 60 units daily    Conversion Protocol:   Total PTA Daily Insulin requirement: 60 Units/24 hours  Basal Insulin Conversion: (60 Units x 0.7 )= 42 Units x 0.80 = 33 units of insulin glargine daily   Prandial Insulin Conversion: (60 Units x 0.3) = 18 Units/ 3 meals = 6 units of insulin lispro with meals    Thanks,  Patrick Mock, PHARMD

## 2021-05-19 NOTE — ED NOTES
TRANSFER - OUT REPORT:    Verbal report given to Jayde RN(name) on Wai Aleman  being transferred to [facility] for routine progression of care       Report consisted of patients Situation, Background, Assessment and   Recommendations(SBAR). Information from the following report(s) SBAR, ED Summary, [facility] and Recent Results was reviewed with the receiving nurse. Lines:   Peripheral IV 05/18/21 Right Wrist (Active)   Site Assessment Clean, dry, & intact 05/18/21 1851   Phlebitis Assessment 0 05/18/21 1851   Infiltration Assessment 0 05/18/21 1851   Dressing Status Clean, dry, & intact 05/18/21 1851   Dressing Type Transparent 05/18/21 1851   Hub Color/Line Status Pink;Flushed 05/18/21 1851   Action Taken Blood drawn 05/18/21 1851       Peripheral IV 05/18/21 Left Antecubital (Active)        Opportunity for questions and clarification was provided.       Patient transported with:   Monitor  Registered Nurse

## 2021-05-20 LAB
ANION GAP SERPL CALC-SCNC: 4 MMOL/L (ref 5–15)
BACTERIA SPEC CULT: ABNORMAL
BACTERIA SPEC CULT: ABNORMAL
BUN SERPL-MCNC: 27 MG/DL (ref 6–20)
BUN/CREAT SERPL: 23 (ref 12–20)
CALCIUM SERPL-MCNC: 7.8 MG/DL (ref 8.5–10.1)
CHLORIDE SERPL-SCNC: 110 MMOL/L (ref 97–108)
CO2 SERPL-SCNC: 26 MMOL/L (ref 21–32)
CREAT SERPL-MCNC: 1.18 MG/DL (ref 0.7–1.3)
DATE LAST DOSE: ABNORMAL
FOLATE BLD-MCNC: 323 NG/ML
FOLATE RBC-MCNC: 800 NG/ML
GLUCOSE BLD STRIP.AUTO-MCNC: 178 MG/DL (ref 65–117)
GLUCOSE BLD STRIP.AUTO-MCNC: 187 MG/DL (ref 65–117)
GLUCOSE BLD STRIP.AUTO-MCNC: 207 MG/DL (ref 65–117)
GLUCOSE BLD STRIP.AUTO-MCNC: 248 MG/DL (ref 65–117)
GLUCOSE SERPL-MCNC: 166 MG/DL (ref 65–100)
HCT VFR BLD AUTO: 40.4 % (ref 37.5–51)
MAGNESIUM SERPL-MCNC: 2 MG/DL (ref 1.6–2.4)
POTASSIUM SERPL-SCNC: 3.9 MMOL/L (ref 3.5–5.1)
REPORTED DOSE,DOSE: ABNORMAL UNITS
REPORTED DOSE/TIME,TMG: ABNORMAL
SERVICE CMNT-IMP: ABNORMAL
SODIUM SERPL-SCNC: 140 MMOL/L (ref 136–145)
VANCOMYCIN TROUGH SERPL-MCNC: 15.3 UG/ML (ref 5–10)

## 2021-05-20 PROCEDURE — 87040 BLOOD CULTURE FOR BACTERIA: CPT

## 2021-05-20 PROCEDURE — 36415 COLL VENOUS BLD VENIPUNCTURE: CPT

## 2021-05-20 PROCEDURE — 80202 ASSAY OF VANCOMYCIN: CPT

## 2021-05-20 PROCEDURE — 74011250636 HC RX REV CODE- 250/636: Performed by: EMERGENCY MEDICINE

## 2021-05-20 PROCEDURE — 83735 ASSAY OF MAGNESIUM: CPT

## 2021-05-20 PROCEDURE — 74011000258 HC RX REV CODE- 258: Performed by: EMERGENCY MEDICINE

## 2021-05-20 PROCEDURE — 94640 AIRWAY INHALATION TREATMENT: CPT

## 2021-05-20 PROCEDURE — 74011250636 HC RX REV CODE- 250/636: Performed by: STUDENT IN AN ORGANIZED HEALTH CARE EDUCATION/TRAINING PROGRAM

## 2021-05-20 PROCEDURE — 74011250636 HC RX REV CODE- 250/636: Performed by: INTERNAL MEDICINE

## 2021-05-20 PROCEDURE — 65660000000 HC RM CCU STEPDOWN

## 2021-05-20 PROCEDURE — 80048 BASIC METABOLIC PNL TOTAL CA: CPT

## 2021-05-20 PROCEDURE — 2709999900 HC NON-CHARGEABLE SUPPLY

## 2021-05-20 PROCEDURE — 82962 GLUCOSE BLOOD TEST: CPT

## 2021-05-20 PROCEDURE — 74011636637 HC RX REV CODE- 636/637: Performed by: STUDENT IN AN ORGANIZED HEALTH CARE EDUCATION/TRAINING PROGRAM

## 2021-05-20 PROCEDURE — 74011000250 HC RX REV CODE- 250: Performed by: GENERAL ACUTE CARE HOSPITAL

## 2021-05-20 RX ORDER — IPRATROPIUM BROMIDE AND ALBUTEROL SULFATE 2.5; .5 MG/3ML; MG/3ML
3 SOLUTION RESPIRATORY (INHALATION)
Status: DISCONTINUED | OUTPATIENT
Start: 2021-05-20 | End: 2021-05-26 | Stop reason: HOSPADM

## 2021-05-20 RX ORDER — IPRATROPIUM BROMIDE AND ALBUTEROL SULFATE 2.5; .5 MG/3ML; MG/3ML
3 SOLUTION RESPIRATORY (INHALATION)
Status: DISCONTINUED | OUTPATIENT
Start: 2021-05-20 | End: 2021-05-20

## 2021-05-20 RX ADMIN — VANCOMYCIN HYDROCHLORIDE 1500 MG: 10 INJECTION, POWDER, LYOPHILIZED, FOR SOLUTION INTRAVENOUS at 03:11

## 2021-05-20 RX ADMIN — INSULIN LISPRO 6 UNITS: 100 INJECTION, SOLUTION INTRAVENOUS; SUBCUTANEOUS at 18:35

## 2021-05-20 RX ADMIN — Medication 10 ML: at 05:21

## 2021-05-20 RX ADMIN — IPRATROPIUM BROMIDE AND ALBUTEROL SULFATE 3 ML: .5; 3 SOLUTION RESPIRATORY (INHALATION) at 13:37

## 2021-05-20 RX ADMIN — INSULIN LISPRO 6 UNITS: 100 INJECTION, SOLUTION INTRAVENOUS; SUBCUTANEOUS at 08:41

## 2021-05-20 RX ADMIN — INSULIN LISPRO 2 UNITS: 100 INJECTION, SOLUTION INTRAVENOUS; SUBCUTANEOUS at 08:41

## 2021-05-20 RX ADMIN — CEFEPIME HYDROCHLORIDE 2 G: 2 INJECTION, POWDER, FOR SOLUTION INTRAVENOUS at 11:58

## 2021-05-20 RX ADMIN — Medication 10 ML: at 22:57

## 2021-05-20 RX ADMIN — IPRATROPIUM BROMIDE AND ALBUTEROL SULFATE 3 ML: .5; 3 SOLUTION RESPIRATORY (INHALATION) at 07:39

## 2021-05-20 RX ADMIN — VANCOMYCIN HYDROCHLORIDE 1500 MG: 10 INJECTION, POWDER, LYOPHILIZED, FOR SOLUTION INTRAVENOUS at 15:31

## 2021-05-20 RX ADMIN — ENOXAPARIN SODIUM 40 MG: 40 INJECTION SUBCUTANEOUS at 08:41

## 2021-05-20 RX ADMIN — INSULIN LISPRO 6 UNITS: 100 INJECTION, SOLUTION INTRAVENOUS; SUBCUTANEOUS at 11:30

## 2021-05-20 RX ADMIN — CEFEPIME HYDROCHLORIDE 2 G: 2 INJECTION, POWDER, FOR SOLUTION INTRAVENOUS at 05:19

## 2021-05-20 RX ADMIN — INSULIN GLARGINE 33 UNITS: 100 INJECTION, SOLUTION SUBCUTANEOUS at 09:00

## 2021-05-20 RX ADMIN — INSULIN LISPRO 3 UNITS: 100 INJECTION, SOLUTION INTRAVENOUS; SUBCUTANEOUS at 18:36

## 2021-05-20 RX ADMIN — Medication 10 ML: at 18:37

## 2021-05-20 RX ADMIN — METRONIDAZOLE 500 MG: 500 INJECTION, SOLUTION INTRAVENOUS at 11:43

## 2021-05-20 RX ADMIN — INSULIN LISPRO 4 UNITS: 100 INJECTION, SOLUTION INTRAVENOUS; SUBCUTANEOUS at 11:30

## 2021-05-20 NOTE — PROGRESS NOTES
Hospitalist Progress Note    NAME: Quan Shell   :  1945   MRN:  [id number]       Assessment / Plan:    Sepsis of unknown origin  Metabolic encephalopathy  Acute hypoxic respiratory failure  Fall  Sepsis indicatedlactic acidosis, tachycardia, tachypnea  WBC 4.9, procalcitonin 0.71. VBG7.35/41.9/26/22. 9. On vancomycin, cefepime, Flagyl. CT abdomen/pelvis1. New evidence of hepatic steatosis. 2. Cholelithiasis is new. No acute cholecystitis or biliary obstruction. 3. Chronic pancreatitis. No evidence of acute pancreatitis. 4. Subacute or chronic L1 partial compression fracture. 5. Nonobstructing bilateral nephrolithiasis. CT head without contrastno acute findings. Chest x-ray Limited by low lung volumes. Possible mild interstitial pulmonary edema. Consider PA and lateral chest views when the patient can better tolerate. . Lactic acid 4.79 on admission, last check 2.29. Volume resuscitated as per sepsis protocol. D-dimer not that high and sat [facility]. DuoNebs 4 times daily. Enteric bacterial panel pending, lipase 49. UA within normal limits. Blood culture pending.     Diabetes type 2 with hyperglycemia  Sliding scale insulin plus Lantus 23 units, lispro 6 units with meals     Acute kidney injury  BUN/creatinine39/1.72, hydrating the patient. BMP in a.m.     Code Status: Full code  Surrogate Decision Maker: Wife  DVT Prophylaxis: Lovenox  GI Prophylaxis: not indicated  Baseline: Ambulatory with assistance at home       Subjective:     Discussed with RN events overnight.    Looks confused   Denies specific symptoms   Right hand swelling     Review of Systems:  Symptom Y/N Comments  Symptom Y/N Comments   Fever/Chills    Chest Pain     Poor Appetite    Edema     Cough    Abdominal Pain     Sputum    Joint Pain     SOB/YOUNG    Pruritis/Rash     Nausea/vomit    Tolerating PT/OT     Diarrhea    Tolerating Diet     Constipation    Other       PO intake: No data found. Wt Readings from Last 10 Encounters:   05/19/21 105 kg (231 lb 7.7 oz)   12/24/17 93 kg (205 lb)   06/30/14 103 kg (227 lb)   06/26/14 105.2 kg (232 lb)   06/23/14 105.2 kg (232 lb)   06/18/14 108 kg (238 lb)   06/16/14 108.9 kg (240 lb)   06/16/14 109.8 kg (242 lb)   09/13/12 98.9 kg (218 lb)   05/17/12 97.1 kg (214 lb)       Objective:     VITALS:   Last 24hrs VS reviewed since prior progress note. Most recent are:  Patient Vitals for the past 24 hrs:   Temp Pulse Resp BP SpO2   05/20/21 0000  91      05/19/21 2258 98.7 °F (37.1 °C) 91 23 (!) 141/81 93 %   05/19/21 2000 98.5 °F (36.9 °C) 92 (!) 32 130/74 92 %   05/19/21 1930     97 %   05/19/21 1535 97.6 °F (36.4 °C) 84 (!) 31 130/65 98 %   05/19/21 1322 97.9 °F (36.6 °C) 84 (!) 33 (!) 141/73 95 %   05/19/21 1233 97.9 °F (36.6 °C) 89 (!) 32  94 %   05/19/21 0759 97.8 °F (36.6 °C) 84 (!) 32 121/61 95 %       Intake/Output Summary (Last 24 hours) at 5/20/2021 0347  Last data filed at 5/19/2021 2258  Gross per 24 hour   Intake 950 ml   Output 500 ml   Net 450 ml        I had a face to face encounter, and independently examined this patient on 5/20/2021, as outlined below:    PHYSICAL EXAM:  General:    Alert, cooperative, no distress, appears stated age. HEENT: Atraumatic, anicteric sclerae, pink conjunctivae, MMM  Neck:  Supple, symmetrical  Lungs:   CTA. No Wheezing/Rhonchi. No rales. No tenderness  No Accessory muscle use. Heart:   Regular rhythm. No murmur. No JVD   Abdomen:   Soft, NT. ND.  BS normal  Extremities: No edema. No cyanosis. No clubbing. Rt hand swelling  Skin:     Not pale. Not Jaundiced. No rashes   Psych:  Poor insight. Not depressed. Not anxious or agitated. Neurologic: Alert and oriented X2. EOMs intact. No facial asymmetry. No slurred speech. Symmetrical strength, Sensation grossly intact.        Labs     I reviewed today's most current labs and imaging studies. Pertinent labs include:  Recent Labs     05/18/21 1852   WBC 4.9   HGB 15.0   HCT 45.4        Recent Labs     05/19/21  0431 05/18/21 2006 05/18/21 1852    137 135*   K 3.7 3.9 4.6    107 105   CO2 22 22 22   * 361* 399*   BUN 37* 39* 41*   CREA 1.46* 1.72* 1.98*   CA 8.0* 7.7* 8.8   ALB  --   --  3.9   TBILI  --   --  1.0   ALT  --   --  41     CT HEAD WO CONT    Result Date: 5/18/2021  No acute intracranial abnormality on this noncontrast head CT. No change. CT ABD PELV WO CONT    Result Date: 5/18/2021  1. New evidence of hepatic steatosis. 2. Cholelithiasis is new. No acute cholecystitis or biliary obstruction. 3. Chronic pancreatitis. No evidence of acute pancreatitis. 4. Subacute or chronic L1 partial compression fracture. 5. Nonobstructing bilateral nephrolithiasis. XR CHEST PORT    Result Date: 5/18/2021  Limited by low lung volumes. Possible mild interstitial pulmonary edema. Consider PA and lateral chest views when the patient can better tolerate. No results found.     Current Medications:     Current Facility-Administered Medications:     sodium chloride (NS) flush 5-40 mL, 5-40 mL, IntraVENous, Q8H, Bull Das MD, 10 mL at 05/19/21 2256    sodium chloride (NS) flush 5-40 mL, 5-40 mL, IntraVENous, PRN, Noelle Preston MD    acetaminophen (TYLENOL) tablet 650 mg, 650 mg, Oral, Q6H PRN **OR** acetaminophen (TYLENOL) suppository 650 mg, 650 mg, Rectal, Q6H PRN, Noelle Preston MD    polyethylene glycol (MIRALAX) packet 17 g, 17 g, Oral, DAILY PRN, Noelle Preston MD    enoxaparin (LOVENOX) injection 40 mg, 40 mg, SubCUTAneous, DAILY, Noelle Preston MD, 40 mg at 05/19/21 0902    ondansetron (ZOFRAN ODT) tablet 4 mg, 4 mg, Oral, Q8H PRN **OR** ondansetron (ZOFRAN) injection 4 mg, 4 mg, IntraVENous, Q6H PRN, Noelle Preston MD    albuterol-ipratropium (DUO-NEB) 2.5 MG-0.5 MG/3 ML, 3 mL, Nebulization, QID RT, Laila Abbasi MD, 3 mL at 05/19/21 1929    sodium chloride (NS) flush 5-10 mL, 5-10 mL, IntraVENous, PRN, Vickie Fuentes MD    cefepime (MAXIPIME) 2 g in 0.9% sodium chloride (MBP/ADV) 100 mL MBP, 2 g, IntraVENous, Q8H, Wei Braswell MD, Last Rate: 200 mL/hr at 05/19/21 1959, 2 g at 05/19/21 1959    metroNIDAZOLE (FLAGYL) IVPB premix 500 mg, 500 mg, IntraVENous, Q12H, Sandy Ovalles MD, Last Rate: 100 mL/hr at 05/19/21 2253, 500 mg at 05/19/21 2253    insulin lispro (HUMALOG) injection, , SubCUTAneous, AC&HS, Sandy Ovalles MD, 2 Units at 05/19/21 1628    glucose chewable tablet 16 g, 4 Tablet, Oral, PRN, Sandy Ovalles MD    dextrose (D50W) injection syrg 12.5-25 g, 12.5-25 g, IntraVENous, PRN, Sandy Ovalles MD    glucagon (GLUCAGEN) injection 1 mg, 1 mg, IntraMUSCular, PRN, Sandy Ovalles MD    vancomycin (VANCOCIN) 1500 mg in  ml infusion, 1,500 mg, IntraVENous, Q12H, Isabel Lyle MD, Last Rate: 250 mL/hr at 05/20/21 0311, 1,500 mg at 05/20/21 0311    insulin glargine (LANTUS) injection 33 Units, 33 Units, SubCUTAneous, DAILY, Sandy Ovalles MD, 33 Units at 05/19/21 0900    insulin lispro (HUMALOG) injection 6 Units, 6 Units, SubCUTAneous, TIDAC, Sandy Ovalles MD, 6 Units at 05/19/21 1628     Procedures: see electronic medical records for all procedures/Xrays and details which were not copied into this note but were reviewed prior to creation of Plan.     Reviewed most current lab test results and cultures  YES  Reviewed most current radiology test results   YES  Review and summation of old records today    NO  Reviewed patient's current orders and MAR    YES  PMH/SH reviewed - no change compared to H&P  ________________________________________________________________________  Care Plan discussed with:    Comments   Patient x    Family      RN x    Care Manager     Consultant                        Multidiciplinary team rounds were held today with , nursing, pharmacist and clinical coordinator. Patient's plan of care was discussed; medications were reviewed and discharge planning was addressed.      ________________________________________________________________________  Total NON critical care TIME:   37  Minutes    Total CRITICAL CARE TIME Spent:   Minutes non procedure based      Comments   >50% of visit spent in counseling and coordination of care x     This includes time during multidisciplinary rounds if indicated above   ________________________________________________________________________  Elijah Chirinos MD

## 2021-05-20 NOTE — PROGRESS NOTES
1953: Bedside shift change report given to Rolando Hernández RN (oncoming nurse) by Lorie Cabrera RN (offgoing nurse). Report included the following information SBAR, Kardex, Intake/Output, MAR, Recent Results and Med Rec Status. 2358: Notified by microbiology lab that the blood cultures drawn yesterday were growing gram positive cocci in clusters in 1 bottle. Notified NP Sejalor, no new orders received. 0700: End of Shift Note    Bedside shift change report given to Lorie Cabrera RN (oncoming nurse) by Ra Patterson (offgoing nurse). Report included the following information SBAR, Kardex, Intake/Output, MAR, Recent Results and Med Rec Status    Shift worked:  7p-7a     Shift summary and any significant changes:     No significant changes     Concerns for physician to address:  None     Zone phone for oncoming shift:   [phone]       Activity:  Activity Level: Up with Assistance  Number times ambulated in hallways past shift: 0  Number of times OOB to chair past shift: 0    Cardiac:   Cardiac Monitoring: Yes      Cardiac Rhythm: Sinus Rhythm    Access:   Current line(s): PIV     Genitourinary:   Urinary status: voiding    Respiratory:   O2 Device: None (Room air)  Chronic home O2 use?: NO  Incentive spirometer at bedside: NO     GI:  Last Bowel Movement Date: 05/19/21  Current diet:  DIET DIABETIC CONSISTENT CARB Regular  Passing flatus: YES  Tolerating current diet: YES       Pain Management:   Patient states pain is manageable on current regimen: YES    Skin:  Dwayne Score: 17  Interventions: increase time out of bed    Patient Safety:  Fall Score:  Total Score: 4  Interventions: bed/chair alarm, gripper socks and pt to call before getting OOB  High Fall Risk: Yes    Length of Stay:  Expected LOS: 4d 19h  Actual LOS: 2      Ra Patterson

## 2021-05-20 NOTE — PROGRESS NOTES
Hospitalist Progress Note    NAME: Rach Goins   :  1945   MRN:  [id number]       Assessment / Plan:    Sepsis of unknown origin  Metabolic encephalopathy  Acute hypoxic respiratory failure  Fall  Sepsis indicatedlactic acidosis, tachycardia, tachypnea  WBC 4.9, procalcitonin 0.71. VBG7.35/41.9/26/22. 9. On vancomycin, cefepime, Flagyl. CT abdomen/pelvis1. New evidence of hepatic steatosis. 2. Cholelithiasis is new. No acute cholecystitis or biliary obstruction. 3. Chronic pancreatitis. No evidence of acute pancreatitis. 4. Subacute or chronic L1 partial compression fracture. 5. Nonobstructing bilateral nephrolithiasis. CT head without contrastno acute findings. Chest x-ray Limited by low lung volumes. Possible mild interstitial pulmonary edema. Consider PA and lateral chest views when the patient can better tolerate. . Lactic acid 4.79 on admission, last check 2.29. Volume resuscitated as per sepsis protocol. D-dimer not that high and sat [facility]. DuoNebs 4 times daily. Enteric bacterial panel pending, lipase 49. UA within normal limits. Blood culture positive for GPC, repeat ordered   --PT eval     Diabetes type 2 with hyperglycemia  Sliding scale insulin plus Lantus 23 units, lispro 6 units with meals     Acute kidney injury  BUN/creatinine39/1.72, hydrating the patient. BMP in a.m.     Code Status: Full code  Surrogate Decision Maker: Wife  DVT Prophylaxis: Lovenox  GI Prophylaxis: not indicated  Baseline: Ambulatory with assistance at home       Subjective:     Discussed with RN events overnight.    Looks more with it today   Denies specific symptoms   Right hand swelling much improved     Review of Systems:  Symptom Y/N Comments  Symptom Y/N Comments   Fever/Chills    Chest Pain     Poor Appetite    Edema     Cough    Abdominal Pain     Sputum    Joint Pain SOB/YOUNG    Pruritis/Rash     Nausea/vomit    Tolerating PT/OT     Diarrhea    Tolerating Diet     Constipation    Other       PO intake: No data found. Wt Readings from Last 10 Encounters:   05/19/21 105 kg (231 lb 7.7 oz)   12/24/17 93 kg (205 lb)   06/30/14 103 kg (227 lb)   06/26/14 105.2 kg (232 lb)   06/23/14 105.2 kg (232 lb)   06/18/14 108 kg (238 lb)   06/16/14 108.9 kg (240 lb)   06/16/14 109.8 kg (242 lb)   09/13/12 98.9 kg (218 lb)   05/17/12 97.1 kg (214 lb)       Objective:     VITALS:   Last 24hrs VS reviewed since prior progress note. Most recent are:  Patient Vitals for the past 24 hrs:   Temp Pulse Resp BP SpO2   05/20/21 0000  91      05/19/21 2258 98.7 °F (37.1 °C) 91 23 (!) 141/81 93 %   05/19/21 2000 98.5 °F (36.9 °C) 92 (!) 32 130/74 92 %   05/19/21 1930     97 %   05/19/21 1535 97.6 °F (36.4 °C) 84 (!) 31 130/65 98 %   05/19/21 1322 97.9 °F (36.6 °C) 84 (!) 33 (!) 141/73 95 %   05/19/21 1233 97.9 °F (36.6 °C) 89 (!) 32  94 %   05/19/21 0759 97.8 °F (36.6 °C) 84 (!) 32 121/61 95 %       Intake/Output Summary (Last 24 hours) at 5/20/2021 0350  Last data filed at 5/19/2021 2258  Gross per 24 hour   Intake 950 ml   Output 500 ml   Net 450 ml        I had a face to face encounter, and independently examined this patient on 5/20/2021, as outlined below:    PHYSICAL EXAM:  General:    Alert, cooperative, no distress, appears stated age. HEENT: Atraumatic, anicteric sclerae, pink conjunctivae, MMM  Neck:  Supple, symmetrical  Lungs:   CTA. No Wheezing/Rhonchi. No rales. No tenderness  No Accessory muscle use. Heart:   Regular rhythm. No murmur. No JVD   Abdomen:   Soft, NT. ND.  BS normal  Extremities: No edema. No cyanosis. No clubbing. Rt hand swelling improving   Skin:     Not pale. Not Jaundiced. No rashes   Psych:  Poor insight. Not depressed. Not anxious or agitated. Neurologic: Alert and oriented X2. EOMs intact. No facial asymmetry. No slurred speech.  Symmetrical strength, Sensation grossly intact. Labs     I reviewed today's most current labs and imaging studies. Pertinent labs include:  Recent Labs     05/18/21 1852   WBC 4.9   HGB 15.0   HCT 45.4        Recent Labs     05/19/21  0431 05/18/21 2006 05/18/21 1852    137 135*   K 3.7 3.9 4.6    107 105   CO2 22 22 22   * 361* 399*   BUN 37* 39* 41*   CREA 1.46* 1.72* 1.98*   CA 8.0* 7.7* 8.8   ALB  --   --  3.9   TBILI  --   --  1.0   ALT  --   --  41     CT HEAD WO CONT    Result Date: 5/18/2021  No acute intracranial abnormality on this noncontrast head CT. No change. CT ABD PELV WO CONT    Result Date: 5/18/2021  1. New evidence of hepatic steatosis. 2. Cholelithiasis is new. No acute cholecystitis or biliary obstruction. 3. Chronic pancreatitis. No evidence of acute pancreatitis. 4. Subacute or chronic L1 partial compression fracture. 5. Nonobstructing bilateral nephrolithiasis. XR CHEST PORT    Result Date: 5/18/2021  Limited by low lung volumes. Possible mild interstitial pulmonary edema. Consider PA and lateral chest views when the patient can better tolerate. No results found.     Current Medications:     Current Facility-Administered Medications:     sodium chloride (NS) flush 5-40 mL, 5-40 mL, IntraVENous, Q8H, Bull Das MD, 10 mL at 05/19/21 0077    sodium chloride (NS) flush 5-40 mL, 5-40 mL, IntraVENous, PRN, Audra Sarabia MD    acetaminophen (TYLENOL) tablet 650 mg, 650 mg, Oral, Q6H PRN **OR** acetaminophen (TYLENOL) suppository 650 mg, 650 mg, Rectal, Q6H PRN, Audra Sarabia MD    polyethylene glycol (MIRALAX) packet 17 g, 17 g, Oral, DAILY PRN, Audra Sarabia MD    enoxaparin (LOVENOX) injection 40 mg, 40 mg, SubCUTAneous, DAILY, Audra Sarabia MD, 40 mg at 05/19/21 0902    ondansetron (ZOFRAN ODT) tablet 4 mg, 4 mg, Oral, Q8H PRN **OR** ondansetron (ZOFRAN) injection 4 mg, 4 mg, IntraVENous, Q6H PRN, MD Erum Herring albuterol-ipratropium (DUO-NEB) 2.5 MG-0.5 MG/3 ML, 3 mL, Nebulization, QID RT, Cathie Lee MD, 3 mL at 05/19/21 1929    sodium chloride (NS) flush 5-10 mL, 5-10 mL, IntraVENous, PRN, Yokasta Karimi MD    cefepime (MAXIPIME) 2 g in 0.9% sodium chloride (MBP/ADV) 100 mL MBP, 2 g, IntraVENous, Q8H, Wei Braswell MD, Last Rate: 200 mL/hr at 05/19/21 1959, 2 g at 05/19/21 1959    metroNIDAZOLE (FLAGYL) IVPB premix 500 mg, 500 mg, IntraVENous, Q12H, Kennth Chain, MD, Last Rate: 100 mL/hr at 05/19/21 2253, 500 mg at 05/19/21 2253    insulin lispro (HUMALOG) injection, , SubCUTAneous, AC&HS, Marco Antonio Teran MD, 2 Units at 05/19/21 1628    glucose chewable tablet 16 g, 4 Tablet, Oral, PRN, Marco Antonio Teran MD    dextrose (D50W) injection syrg 12.5-25 g, 12.5-25 g, IntraVENous, PRN, Marco Antonio Teran MD    glucagon (GLUCAGEN) injection 1 mg, 1 mg, IntraMUSCular, PRN, Marco Antonio Teran MD    vancomycin (VANCOCIN) 1500 mg in  ml infusion, 1,500 mg, IntraVENous, Q12H, Isabel Lyle MD, Last Rate: 250 mL/hr at 05/20/21 0311, 1,500 mg at 05/20/21 0311    insulin glargine (LANTUS) injection 33 Units, 33 Units, SubCUTAneous, DAILY, Marco Antonio Teran MD, 33 Units at 05/19/21 0900    insulin lispro (HUMALOG) injection 6 Units, 6 Units, SubCUTAneous, TIDAC, Marco Antonio Teran MD, 6 Units at 05/19/21 1628     Procedures: see electronic medical records for all procedures/Xrays and details which were not copied into this note but were reviewed prior to creation of Plan.     Reviewed most current lab test results and cultures  YES  Reviewed most current radiology test results   YES  Review and summation of old records today    NO  Reviewed patient's current orders and MAR    YES  PMH/SH reviewed - no change compared to H&P  ________________________________________________________________________  Care Plan discussed with:    Comments   Patient x    Family  x son   RN x    Care Manager     Consultant                       x Multidiciplinary team rounds were held today with , nursing, pharmacist and clinical coordinator. Patient's plan of care was discussed; medications were reviewed and discharge planning was addressed.      ________________________________________________________________________  Total NON critical care TIME:   37  Minutes    Total CRITICAL CARE TIME Spent:   Minutes non procedure based      Comments   >50% of visit spent in counseling and coordination of care x     This includes time during multidisciplinary rounds if indicated above   ________________________________________________________________________  Elijah Chirinos MD

## 2021-05-20 NOTE — PROGRESS NOTES
End of Shift Note    Bedside shift change report given to Jayde (oncoming nurse) by Jesika Bowen (offgoing nurse). Report included the following information SBAR, Kardex, Intake/Output and MAR    Shift worked:  0700 - 1900     Shift summary and any significant changes:     . .. Concerns for physician to address:  . .. Zone phone for oncnaresh shift:   [phone]        Activity:  Activity Level: Up with Assistance  Number times ambulated in hallways past shift: 0  Number of times OOB to chair past shift: 0    Cardiac:   Cardiac Monitoring: Yes      Cardiac Rhythm: Sinus Rhythm    Access:   Current line(s): PIV     Genitourinary:   Urinary status: voiding    Respiratory:   O2 Device: None (Room air)  Chronic home O2 use?: NO  Incentive spirometer at bedside: YES     GI:  Last Bowel Movement Date: 05/19/21  Current diet:  DIET DIABETIC CONSISTENT CARB Regular  Passing flatus: YES  Tolerating current diet: YES       Pain Management:   Patient states pain is manageable on current regimen: YES    Skin:  Dwayne Score: 18  Interventions: foam dressing and PT/OT consult    Patient Safety:  Fall Score:  Total Score: 4  Interventions: gripper socks and pt to call before getting OOB  High Fall Risk: Yes    Length of Stay:  Expected LOS: 4d 19h  Actual LOS: 2      Jesika Bowen

## 2021-05-20 NOTE — PROGRESS NOTES
ADULT PROTOCOL: JET AEROSOL ASSESSMENT    Patient  Jordan Angel     77 y.o.   male     5/19/2021  8:25 PM    Breath Sounds Pre Procedure: Right Breath Sounds: Diminished                               Left Breath Sounds: Diminished    Breath Sounds Post Procedure: Right Breath Sounds: Diminished                                 Left Breath Sounds: Diminished    Breathing pattern: Pre procedure Breathing Pattern: Regular          Post procedure Breathing Pattern: Regular    Heart Rate: Pre procedure Pulse: 84           Post procedure Pulse: 87    Resp Rate: Pre procedure Respirations: 20           Post procedure Respirations: 20      Cough: Pre procedure Cough: Non-productive               Post procedure Cough: Non-productive    Oxygen: O2 Device: None (Room air)       Changed: NO    SpO2: Pre procedure SpO2: 97 %   WITHOUT oxygen              Post procedure SpO2: 98 %  WITHOUT oxygen    Nebulizer Therapy: Current medications Aerosolized Medications: DuoNeb      Changed: NO    Problem List:   Patient Active Problem List   Diagnosis Code    PAD (peripheral artery disease) ([facility]) I73.9    DM (diabetes mellitus) ([facility]) E11.9    Hyperlipidemia E78.5    HTN (hypertension) I10    Tobacco abuse Z72.0    Hypoglycemia E16.2    Abscess of back L02.212    CKD (chronic kidney disease) stage 3, GFR 30-59 ml/min ([facility]) N18.30    Lactic acid acidosis E87.2    Sepsis ([facility]) A41.9    KENNA (acute kidney injury) ([facility] [address] N17.9    Acute hypoxemic respiratory failure ([facility]) J96.01       Respiratory Therapist: Olesya Mckeon

## 2021-05-21 LAB
ALBUMIN SERPL-MCNC: 3.2 G/DL (ref 3.5–5)
ALBUMIN/GLOB SERPL: 0.9 {RATIO} (ref 1.1–2.2)
ALP SERPL-CCNC: 60 U/L (ref 45–117)
ALT SERPL-CCNC: 43 U/L (ref 12–78)
ANION GAP SERPL CALC-SCNC: 4 MMOL/L (ref 5–15)
AST SERPL-CCNC: 60 U/L (ref 15–37)
BASOPHILS # BLD: 0 K/UL (ref 0–0.1)
BASOPHILS NFR BLD: 1 % (ref 0–1)
BILIRUB SERPL-MCNC: 0.8 MG/DL (ref 0.2–1)
BUN SERPL-MCNC: 17 MG/DL (ref 6–20)
BUN/CREAT SERPL: 17 (ref 12–20)
CALCIUM SERPL-MCNC: 8 MG/DL (ref 8.5–10.1)
CHLORIDE SERPL-SCNC: 109 MMOL/L (ref 97–108)
CO2 SERPL-SCNC: 25 MMOL/L (ref 21–32)
CREAT SERPL-MCNC: 0.98 MG/DL (ref 0.7–1.3)
DIFFERENTIAL METHOD BLD: ABNORMAL
EOSINOPHIL # BLD: 0.2 K/UL (ref 0–0.4)
EOSINOPHIL NFR BLD: 5 % (ref 0–7)
ERYTHROCYTE [DISTWIDTH] IN BLOOD BY AUTOMATED COUNT: 12.8 % (ref 11.5–14.5)
GLOBULIN SER CALC-MCNC: 3.7 G/DL (ref 2–4)
GLUCOSE BLD STRIP.AUTO-MCNC: 143 MG/DL (ref 65–117)
GLUCOSE BLD STRIP.AUTO-MCNC: 152 MG/DL (ref 65–117)
GLUCOSE BLD STRIP.AUTO-MCNC: 158 MG/DL (ref 65–117)
GLUCOSE BLD STRIP.AUTO-MCNC: 216 MG/DL (ref 65–117)
GLUCOSE SERPL-MCNC: 135 MG/DL (ref 65–100)
HCT VFR BLD AUTO: 39.5 % (ref 36.6–50.3)
HGB BLD-MCNC: 13.4 G/DL (ref 12.1–17)
IMM GRANULOCYTES # BLD AUTO: 0 K/UL (ref 0–0.04)
IMM GRANULOCYTES NFR BLD AUTO: 1 % (ref 0–0.5)
LYMPHOCYTES # BLD: 1.5 K/UL (ref 0.8–3.5)
LYMPHOCYTES NFR BLD: 35 % (ref 12–49)
MCH RBC QN AUTO: 35.6 PG (ref 26–34)
MCHC RBC AUTO-ENTMCNC: 33.9 G/DL (ref 30–36.5)
MCV RBC AUTO: 105.1 FL (ref 80–99)
MONOCYTES # BLD: 0.5 K/UL (ref 0–1)
MONOCYTES NFR BLD: 11 % (ref 5–13)
NEUTS SEG # BLD: 2 K/UL (ref 1.8–8)
NEUTS SEG NFR BLD: 48 % (ref 32–75)
NRBC # BLD: 0 K/UL (ref 0–0.01)
NRBC BLD-RTO: 0 PER 100 WBC
PLATELET # BLD AUTO: 132 K/UL (ref 150–400)
PMV BLD AUTO: 11.2 FL (ref 8.9–12.9)
POTASSIUM SERPL-SCNC: 3.9 MMOL/L (ref 3.5–5.1)
PROT SERPL-MCNC: 6.9 G/DL (ref 6.4–8.2)
RBC # BLD AUTO: 3.76 M/UL (ref 4.1–5.7)
SERVICE CMNT-IMP: ABNORMAL
SODIUM SERPL-SCNC: 138 MMOL/L (ref 136–145)
WBC # BLD AUTO: 4.1 K/UL (ref 4.1–11.1)

## 2021-05-21 PROCEDURE — 74011250636 HC RX REV CODE- 250/636: Performed by: STUDENT IN AN ORGANIZED HEALTH CARE EDUCATION/TRAINING PROGRAM

## 2021-05-21 PROCEDURE — 82962 GLUCOSE BLOOD TEST: CPT

## 2021-05-21 PROCEDURE — 74011250636 HC RX REV CODE- 250/636: Performed by: INTERNAL MEDICINE

## 2021-05-21 PROCEDURE — 74011250636 HC RX REV CODE- 250/636: Performed by: GENERAL ACUTE CARE HOSPITAL

## 2021-05-21 PROCEDURE — 65660000000 HC RM CCU STEPDOWN

## 2021-05-21 PROCEDURE — 74011636637 HC RX REV CODE- 636/637: Performed by: STUDENT IN AN ORGANIZED HEALTH CARE EDUCATION/TRAINING PROGRAM

## 2021-05-21 PROCEDURE — 80053 COMPREHEN METABOLIC PANEL: CPT

## 2021-05-21 PROCEDURE — 97116 GAIT TRAINING THERAPY: CPT

## 2021-05-21 PROCEDURE — 36415 COLL VENOUS BLD VENIPUNCTURE: CPT

## 2021-05-21 PROCEDURE — 97161 PT EVAL LOW COMPLEX 20 MIN: CPT

## 2021-05-21 PROCEDURE — 85025 COMPLETE CBC W/AUTO DIFF WBC: CPT

## 2021-05-21 RX ORDER — CYANOCOBALAMIN 1000 UG/ML
1000 INJECTION, SOLUTION INTRAMUSCULAR; SUBCUTANEOUS DAILY
Status: COMPLETED | OUTPATIENT
Start: 2021-05-21 | End: 2021-05-25

## 2021-05-21 RX ADMIN — INSULIN GLARGINE 33 UNITS: 100 INJECTION, SOLUTION SUBCUTANEOUS at 08:24

## 2021-05-21 RX ADMIN — INSULIN LISPRO 6 UNITS: 100 INJECTION, SOLUTION INTRAVENOUS; SUBCUTANEOUS at 16:58

## 2021-05-21 RX ADMIN — INSULIN LISPRO 6 UNITS: 100 INJECTION, SOLUTION INTRAVENOUS; SUBCUTANEOUS at 12:09

## 2021-05-21 RX ADMIN — INSULIN LISPRO 6 UNITS: 100 INJECTION, SOLUTION INTRAVENOUS; SUBCUTANEOUS at 08:24

## 2021-05-21 RX ADMIN — CYANOCOBALAMIN 1000 MCG: 1000 INJECTION, SOLUTION INTRAMUSCULAR; SUBCUTANEOUS at 12:44

## 2021-05-21 RX ADMIN — Medication 10 ML: at 14:22

## 2021-05-21 RX ADMIN — Medication 10 ML: at 22:00

## 2021-05-21 RX ADMIN — ENOXAPARIN SODIUM 40 MG: 40 INJECTION SUBCUTANEOUS at 08:24

## 2021-05-21 RX ADMIN — INSULIN LISPRO 2 UNITS: 100 INJECTION, SOLUTION INTRAVENOUS; SUBCUTANEOUS at 16:58

## 2021-05-21 RX ADMIN — VANCOMYCIN HYDROCHLORIDE 1500 MG: 10 INJECTION, POWDER, LYOPHILIZED, FOR SOLUTION INTRAVENOUS at 04:48

## 2021-05-21 RX ADMIN — Medication 10 ML: at 06:35

## 2021-05-21 RX ADMIN — INSULIN LISPRO 2 UNITS: 100 INJECTION, SOLUTION INTRAVENOUS; SUBCUTANEOUS at 08:24

## 2021-05-21 RX ADMIN — INSULIN LISPRO 3 UNITS: 100 INJECTION, SOLUTION INTRAVENOUS; SUBCUTANEOUS at 12:09

## 2021-05-21 NOTE — PROGRESS NOTES
Pharmacy Automatic Renal Dosing Protocol - Antimicrobials    Indication for Antimicrobials: Sepsis    Current Regimen of Each Antimicrobial:  Vancomycin 2 gm X 1, Then 1.5 gm every 12 hrs (Start Date ; Day # 3    Previous abx:  LVQ X1  Metronidazole 500 mg q 12 H (; day #3  Cefepime 2g IV q 8 H (; day #3    Goal Level: VANCOMYCIN TROUGH GOAL RANGE    Vancomycin Trough: 15 - 20 mcg/mL  (AUC: 400 - 600 mg/hr/Liter/day)     Date Dose & Interval Measured (mcg/mL) Extrapolated (mcg/mL)    1507 1500mg q12h 15.3 15.2                 Date & time of next level:  at 1400    Significant Cultures:   : Blood cx: coag neg staph 1/ - pending   bcx -     Radiology / Imaging results: (X-ray, CT scan or MRI):   : CXR: Limited by low lung volumes. Possible mild interstitial pulmonary edema. Consider PA and lateral chest views when the patient can better tolerate     CT of abdomen:  1. New evidence of hepatic steatosis. 2. Cholelithiasis is new. No acute cholecystitis or biliary obstruction. 3. Chronic pancreatitis. No evidence of acute pancreatitis. 4. Subacute or chronic L1 partial compression fracture. 5. Nonobstructing bilateral nephrolithiasis    Paralysis, amputations, malnutrition:     Labs:  Recent Labs     21  0517 21  0431 21  1852   CREA 1.18 1.46* 1.72* 1.98*   BUN 27* 37* 39* 41*   WBC  --   --   --  4.9     Temp (24hrs), Av.5 °F (36.9 °C), Min:98.4 °F (36.9 °C), Max:98.7 °F (37.1 °C)      Is the Patient on Dialysis? Creatinine Clearance (mL/min):   CrCl (Actual Body Weight): 79.8  CrCl (Adjusted Body Weight): 66.5  CrCl (Ideal Body Weight): 57.6    Impression/Plan:   Extrapolated vancomycin trough within goal  - continue vancomycin 1500mg IV q12h  CMP in AM  Antimicrobial stop date     Pharmacy will follow daily and adjust medications as appropriate for renal function and/or serum levels.     Thank you,  Dena Martínez, PHARMD

## 2021-05-21 NOTE — PROGRESS NOTES
Hospitalist Progress Note    NAME: Emigdio Ricketts   :  1945   MRN:  [id number]       Assessment / Plan:    Sepsis of unknown origin likely from food poisoning/viral gastroenteritis   Metabolic encephalopathy  Acute hypoxic respiratory failure  Fall  Sepsis indicatedlactic acidosis, tachycardia, tachypnea  WBC 4.9, procalcitonin 0.71. VBG7.35/41.9/26/22.9.  --B Cx w CNS likely contaminant , repeat neg  DC ABx  CT abdomen/pelvis1. New evidence of hepatic steatosis. 2. Cholelithiasis is new. No acute cholecystitis or biliary obstruction. 3. Chronic pancreatitis. No evidence of acute pancreatitis. 4. Subacute or chronic L1 partial compression fracture. 5. Nonobstructing bilateral nephrolithiasis. CT head without contrastno acute findings. Chest x-ray Limited by low lung volumes. Possible mild interstitial pulmonary edema. Consider PA and lateral chest views when the patient can better tolerate. . Volume resuscitated as per sepsis protocol. D-dimer not that high and sat [facility]. Enteric bacterial panel not done and diarrhea resolved, lipase 49. UA within normal limits. --PT eval recs, home w 24 care or SNF, pt lives with son who is not there . CM consult ordered      B12 deficiency  Start B12 injections     Diabetes type 2 with hyperglycemia  Sliding scale insulin plus Lantus 23 units, lispro 6 units with meals     Acute kidney injury  BUN/creatinine39/1.72, hydrating the patient.   BMP in a.m.     Code Status: Full code  Surrogate Decision Maker: Wife  DVT Prophylaxis: Lovenox  GI Prophylaxis: not indicated  Baseline: Ambulatory with assistance at home       Subjective:     Looks more with it today   Denies specific symptoms   Right hand swelling much improved   Unsteady gait     Review of Systems:  Symptom Y/N Comments  Symptom Y/N Comments   Fever/Chills    Chest Pain     Poor Appetite    Edema     Cough    Abdominal Pain     Sputum    Joint Pain     SOB/YOUNG    Pruritis/Rash     Nausea/vomit    Tolerating PT/OT     Diarrhea    Tolerating Diet     Constipation    Other       PO intake: No data found. Wt Readings from Last 10 Encounters:   05/21/21 102.7 kg (226 lb 6.6 oz)   12/24/17 93 kg (205 lb)   06/30/14 103 kg (227 lb)   06/26/14 105.2 kg (232 lb)   06/23/14 105.2 kg (232 lb)   06/18/14 108 kg (238 lb)   06/16/14 108.9 kg (240 lb)   06/16/14 109.8 kg (242 lb)   09/13/12 98.9 kg (218 lb)   05/17/12 97.1 kg (214 lb)       Objective:     VITALS:   Last 24hrs VS reviewed since prior progress note. Most recent are:  Patient Vitals for the past 24 hrs:   Temp Pulse Resp BP SpO2   05/21/21 1057 98.7 °F (37.1 °C) 83 26 (!) 146/78 94 %   05/21/21 0901  82 26  93 %   05/21/21 0900 98.4 °F (36.9 °C) 83 (!) 35 (!) 149/82 93 %   05/21/21 0300 97 °F (36.1 °C) 67 23 (!) 97/51 96 %   05/21/21 0000  72      05/20/21 2256 98.9 °F (37.2 °C) 79 30 125/62 94 %   05/20/21 2000  80      05/20/21 1936 98.5 °F (36.9 °C) 81 (!) 32 (!) 148/69 95 %   05/20/21 1844 98.6 °F (37 °C) 78 29 (!) 141/60 96 %       Intake/Output Summary (Last 24 hours) at 5/21/2021 1454  Last data filed at 5/21/2021 0849  Gross per 24 hour   Intake 0 ml   Output 640 ml   Net -640 ml        I had a face to face encounter, and independently examined this patient on 5/21/2021, as outlined below:    PHYSICAL EXAM:  General:    Alert, cooperative, no distress, appears stated age. HEENT: Atraumatic, anicteric sclerae, pink conjunctivae, MMM  Neck:  Supple, symmetrical  Lungs:   CTA. No Wheezing/Rhonchi. No rales. No tenderness  No Accessory muscle use. Heart:   Regular rhythm. No murmur. No JVD   Abdomen:   Soft, NT. ND.  BS normal  Extremities: No edema. No cyanosis. No clubbing. Rt hand swelling improving   Skin:     Not pale. Not Jaundiced. No rashes. Large upper back lump likely lipoma   Psych:  Poor insight.  Not depressed. Not anxious or agitated. Neurologic: Alert and oriented X2-3. EOMs intact. No facial asymmetry. No slurred speech. Symmetrical strength, Sensation grossly intact. Labs     I reviewed today's most current labs and imaging studies. Pertinent labs include:  Recent Labs     05/21/21  0546 05/19/21  1128 05/18/21  1852   WBC 4.1  --  4.9   HGB 13.4  --  15.0   HCT 39.5 40.4 45.4   *  --  159     Recent Labs     05/21/21  0546 05/20/21  0517 05/19/21  0431 05/18/21  1852    140 137 135*   K 3.9 3.9 3.7 4.6   * 110* 108 105   CO2 25 26 22 22   * 166* 273* 399*   BUN 17 27* 37* 41*   CREA 0.98 1.18 1.46* 1.98*   CA 8.0* 7.8* 8.0* 8.8   MG  --  2.0  --   --    ALB 3.2*  --   --  3.9   TBILI 0.8  --   --  1.0   ALT 43  --   --  41     CT HEAD WO CONT    Result Date: 5/18/2021  No acute intracranial abnormality on this noncontrast head CT. No change. CT ABD PELV WO CONT    Result Date: 5/18/2021  1. New evidence of hepatic steatosis. 2. Cholelithiasis is new. No acute cholecystitis or biliary obstruction. 3. Chronic pancreatitis. No evidence of acute pancreatitis. 4. Subacute or chronic L1 partial compression fracture. 5. Nonobstructing bilateral nephrolithiasis. XR CHEST PORT    Result Date: 5/18/2021  Limited by low lung volumes. Possible mild interstitial pulmonary edema. Consider PA and lateral chest views when the patient can better tolerate. No results found.     Current Medications:     Current Facility-Administered Medications:     cyanocobalamin (VITAMIN B12) injection 1,000 mcg, 1,000 mcg, IntraMUSCular, DAILY, Cathie Lee MD, 1,000 mcg at 05/21/21 1244    albuterol-ipratropium (DUO-NEB) 2.5 MG-0.5 MG/3 ML, 3 mL, Nebulization, Q6H PRN, Jordon Lee MD    sodium chloride (NS) flush 5-40 mL, 5-40 mL, IntraVENous, Q8H, Bull Das MD, 10 mL at 05/21/21 1422    sodium chloride (NS) flush 5-40 mL, 5-40 mL, IntraVENous, PRN, Fernandez Mccarty, MD Bull    acetaminophen (TYLENOL) tablet 650 mg, 650 mg, Oral, Q6H PRN **OR** acetaminophen (TYLENOL) suppository 650 mg, 650 mg, Rectal, Q6H PRN, George Maier MD    polyethylene glycol (MIRALAX) packet 17 g, 17 g, Oral, DAILY PRN, George Maier MD    enoxaparin (LOVENOX) injection 40 mg, 40 mg, SubCUTAneous, DAILY, George Maier MD, 40 mg at 05/21/21 0824    ondansetron (ZOFRAN ODT) tablet 4 mg, 4 mg, Oral, Q8H PRN **OR** ondansetron (ZOFRAN) injection 4 mg, 4 mg, IntraVENous, Q6H PRN, George Maier MD    sodium chloride (NS) flush 5-10 mL, 5-10 mL, IntraVENous, PRN, Madeline Govea MD    insulin lispro (HUMALOG) injection, , SubCUTAneous, AC&HS, George Maier MD, 3 Units at 05/21/21 1209    glucose chewable tablet 16 g, 4 Tablet, Oral, PRN, George Maier MD    dextrose (D50W) injection syrg 12.5-25 g, 12.5-25 g, IntraVENous, PRN, George Maier MD    glucagon (GLUCAGEN) injection 1 mg, 1 mg, IntraMUSCular, PRN, George Maier MD    insulin glargine (LANTUS) injection 33 Units, 33 Units, SubCUTAneous, DAILY, George Maier MD, 33 Units at 05/21/21 0824    insulin lispro (HUMALOG) injection 6 Units, 6 Units, SubCUTAneous, TIDAC, George Maier MD, 6 Units at 05/21/21 1209     Procedures: see electronic medical records for all procedures/Xrays and details which were not copied into this note but were reviewed prior to creation of Plan.     Reviewed most current lab test results and cultures  YES  Reviewed most current radiology test results   YES  Review and summation of old records today    NO  Reviewed patient's current orders and MAR    YES  PMH/SH reviewed - no change compared to H&P  ________________________________________________________________________  Care Plan discussed with:    Comments   Patient x    Family      RN x    Care Manager     Consultant                        Multidiciplinary team rounds were held today with , nursing, pharmacist and clinical coordinator. Patient's plan of care was discussed; medications were reviewed and discharge planning was addressed.      ________________________________________________________________________  Total NON critical care TIME:   37  Minutes    Total CRITICAL CARE TIME Spent:   Minutes non procedure based      Comments   >50% of visit spent in counseling and coordination of care x     This includes time during multidisciplinary rounds if indicated above   ________________________________________________________________________  Tony Duenas MD

## 2021-05-21 NOTE — PROGRESS NOTES
1900: Bedside shift change report given to Gerry Vallejo, RN (oncoming nurse) by Lisa Hartmann RN (offgoing nurse). Report included the following information SBAR, Kardex, Intake/Output, MAR, Recent Results and Med Rec Status. 0700: End of Shift Note    Bedside shift change report given to Kobi Juarez, RN (oncoming nurse) by Neil Snyder (offgoing nurse). Report included the following information SBAR, Kardex, Intake/Output, MAR, Recent Results, Med Rec Status and Cardiac Rhythm NSR    Shift worked:  7p-7a     Shift summary and any significant changes:     No significant changes     Concerns for physician to address:  None     Zone phone for oncoming shift:   [phone]       Activity:  Activity Level: Up with Assistance  Number times ambulated in hallways past shift: 0  Number of times OOB to chair past shift: 0    Cardiac:   Cardiac Monitoring: Yes      Cardiac Rhythm: Sinus Rhythm    Access:   Current line(s): PIV     Genitourinary:   Urinary status: voiding    Respiratory:   O2 Device: None (Room air)  Chronic home O2 use?: NO  Incentive spirometer at bedside: NO     GI:  Last Bowel Movement Date: 05/19/21  Current diet:  DIET DIABETIC CONSISTENT CARB Regular  Passing flatus: YES  Tolerating current diet: YES       Pain Management:   Patient states pain is manageable on current regimen: YES    Skin:  Dwayne Score: 17  Interventions: increase time out of bed and limit briefs    Patient Safety:  Fall Score:  Total Score: 4  Interventions: bed/chair alarm, gripper socks and pt to call before getting OOB  High Fall Risk: Yes    Length of Stay:  Expected LOS: 4d 19h  Actual LOS: [address]

## 2021-05-21 NOTE — PROGRESS NOTES
Problem: Mobility Impaired (Adult and Pediatric)  Goal: *Acute Goals and Plan of Care (Insert Text)  Outcome: Not Met  Note: FUNCTIONAL STATUS PRIOR TO ADMISSION: Pt reports he lives at home with his wife who has some disability/health issues and he helps her, normally walks without any device independently and manages his own ADLS and some IADLs. Son lives with them but goes out of the home to work. HOME SUPPORT PRIOR TO ADMISSION: The patient lived with family but did not require assist.    Physical Therapy Goals  Initiated 5/21/2021  1. Patient will move from supine to sit and sit to supine , scoot up and down, and roll side to side in bed with independence within 7 day(s). 2.  Patient will transfer from bed to chair and chair to bed with modified independence using the least restrictive device within 7 day(s). 3.  Patient will perform sit to stand with modified independence within 7 day(s). 4.  Patient will ambulate with modified independence for 150 feet with the least restrictive device within 7 day(s). PHYSICAL THERAPY EVALUATION  Patient: Lexis Proctor (54 y.o. male)  Date: 5/21/2021  Primary Diagnosis: Sepsis ([facility] [address] [A41.9]  KENNA (acute kidney injury) ([facility] [address] [N17.9]  Lactic acid acidosis [E87.2]  Acute hypoxemic respiratory failure ([facility] [address] [J96.01]        Precautions: fall      ASSESSMENT  Based on the objective data described below, the patient presents well below his independent baseline. He reports he normally is able to walk on his own without any device and takes care of the IADLs and does his own ADLs as well as assisting the care of his wife. Pt requires min A for transfers and is unsteady reaching for objects if not using RW. Pt is currently very unsteady in gait even with use of RW and requires min A of 1 with support at trunk. He c/o L foot/ankle feeling swollen although does not appear so.  He does have more difficulty with the LLE in gait with some decreased control and delayed heel strike. He does state that the LLE is his \"bad leg\" at baseline - unclear what this means. He gets moderately SOB with amb but sats remain above 95%. Pt would be unsafe to go home to previous situation at this time as he is a high fall risk and now requires a device and assist to ambulate. Pt states son works and is not home at all times to assist. Pt at this time would need 24 hr assist for safety. If he does not improve and family is not available to assist, he may need SNF rehab prior to return home. Current Level of Function Impacting Discharge (mobility/balance): min A of 1; high fall risk with unsteady gait    Functional Outcome Measure: The patient scored 9/28 on the tinetti outcome measure which is indicative of high fall risk. Other factors to consider for discharge: cares for wife; son out of home for work; high fall risk with hx of fall prior to admission     Patient will benefit from skilled therapy intervention to address the above noted impairments. PLAN :  Recommendations and Planned Interventions: bed mobility training, transfer training, gait training, therapeutic exercises, patient and family training/education, and therapeutic activities      Frequency/Duration: Patient will be followed by physical therapy:  4 times a week to address goals.     Recommendation for discharge: (in order for the patient to meet his/her long term goals)  To be determined: pending progress: if pt d/c'd to home at this time, would need 24 hr Assist/supervision , would need help in caregiving for wife as well and would need HHPT/OT; if pt continues with gait issues and high fall risk and family is unable to be with him to assist, pt would benefit from SNF rehab     This discharge recommendation:  A follow-up discussion with the attending provider and/or case management is planned    IF patient discharges home will need the following DME: rolling walker         SUBJECTIVE:   Patient stated Will I be able to go home? Li Damon    OBJECTIVE DATA SUMMARY:   HISTORY:    Past Medical History:   Diagnosis Date    Diabetes ([address]     Ill-defined condition     perpherial artery disease    Ill-defined condition     hyperlidemia     Past Surgical History:   Procedure Laterality Date    HX OTHER SURGICAL  2011    right femoral tibial bypass    HX OTHER SURGICAL      drained times two in back    VASCULAR SURGERY PROCEDURE UNLIST  2012    blood clot. right leg       Personal factors and/or comorbidities impacting plan of care: cares for wife    Home Situation  Home Environment: Private residence  Wheelchair Ramp: Yes  One/Two Story Residence: One story  Living Alone: No  Support Systems: Child(joe), Spouse/Significant Other/Partner (wife with disability)  Current DME Used/Available at Home: Shower chair, Other (comment) (there is a RW/SPC/w/c in the home for wife's use)    EXAMINATION/PRESENTATION/DECISION MAKING:   Critical Behavior:  Neurologic State: Alert  Orientation Level: Oriented X4  Cognition: Follows commands     Hearing: Auditory  Auditory Impairment: None    Range Of Motion:  AROM: Generally decreased, functional                       Strength:    Strength: Generally decreased, functional                    Tone & Sensation:   Tone: Normal              Sensation: Impaired (decr distal BLE with neuropathy)               Coordination:  Coordination: Generally decreased, functional       Functional Mobility:  Bed Mobility:  Rolling:  (seated on EOB at arrival)        Scooting: Contact guard assistance  Transfers:  Sit to Stand: Minimum assistance; Other (comment) (cues for hands)  Stand to Sit: Minimum assistance        Bed to Chair: Minimum assistance              Balance:   Sitting: Intact  Standing: Impaired  Standing - Static: Constant support;Fair; Other (comment) (with RW)  Standing - Dynamic : Constant support;Fair;Poor; Other (comment) (poor without device; fair with short distance RW)  Ambulation/Gait Training:  Distance (ft): 12 Feet (ft) (x3)  Assistive Device: Gait belt;Walker, rolling  Ambulation - Level of Assistance: Minimal assistance;Assist x1        Gait Abnormalities: Decreased step clearance; Path deviations;Trunk sway increased; Other        Base of Support: Widened     Speed/Teagan: Slow;Pace decreased (<100 feet/min)  Step Length: Right shortened;Left shortened  Swing Pattern: Left asymmetrical (delayed heel strike)          Functional Measure:  Tinetti test:    Sitting Balance: 1  Arises: 0  Attempts to Rise: 0  Immediate Standing Balance: 0  Standing Balance: 0  Nudged: 0  Eyes Closed: 0  Turn 360 Degrees - Continuous/Discontinuous: 0  Turn 360 Degrees - Steady/Unsteady: 0  Sitting Down: 1  Balance Score: 2 Balance total score  Indication of Gait: 1  R Step Length/Height: 1  L Step Length/Height: 1  R Foot Clearance: 1  L Foot Clearance: 1  Step Symmetry: 1  Step Continuity: 1  Path: 0  Trunk: 0  Walking Time: 0  Gait Score: 7 Gait total score  Total Score: 9/28 Overall total score         Tinetti Tool Score Risk of Falls  <19 = High Fall Risk  19-24 = Moderate Fall Risk  25-28 = Low Fall Risk  Tinetti ME. Performance-Oriented Assessment of Mobility Problems in Elderly Patients. [address]; [id number].  (Scoring Description: PT Bulletin Feb. 10, 1993)    Older adults: Eugene Silver et al, 2009; n = 1000 [facility] elderly evaluated with ABC, ENID, ADL, and IADL)  · Mean ENID score for males aged 69-68 years = 26.21(3.40)  · Mean ENID score for females age 69-68 years = 25.16(4.30)  · Mean ENID score for males over 80 years = 23.29(6.02)  · Mean ENID score for females over 80 years = 17.20(8.32)           Physical Therapy Evaluation Charge Determination   History Examination Presentation Decision-Making   MEDIUM  Complexity : 1-2 comorbidities / personal factors will impact the outcome/ POC  HIGH Complexity : 4+ Standardized tests and measures addressing body structure, function, activity limitation and / or participation in recreation  MEDIUM Complexity : Evolving with changing characteristics  LOW Complexity : FOTO score of       Based on the above components, the patient evaluation is determined to be of the following complexity level: LOW         Activity Tolerance:   Fair    After treatment patient left in no apparent distress:   Sitting in chair, Call bell within reach, and Caregiver / family present    COMMUNICATION/EDUCATION:   The patients plan of care was discussed with: Registered nurse. Fall prevention education was provided and the patient/caregiver indicated understanding., Patient/family have participated as able in goal setting and plan of care. , and Patient/family agree to work toward stated goals and plan of care.     Thank you for this referral.  Gerhardt Richards, PT   Time Calculation: 27 mins

## 2021-05-21 NOTE — PROGRESS NOTES
Physical Therapy    Pt seen for eval. He presents well below his independent baseline. He reports he normally is able to walk on his own without any device and takes care of the IADLs and does his own ADLs as well as assisting the care of his wife. Pt is currently very unsteady in gait even with use of RW and requires A of 1. He c/o L foot/ankle feeling swollen although does not appear so. He does have more difficulty with the LLE in gait with some decreased control. He does state that the LLE is his \"bad leg\" at baseline - unclear what this means. He gets moderately SOB with amb but sats remain above 95%. Pt would be unsafe to go home to previous situation at this time as he is a high fall risk and now requires a device and assist to ambulate. Pt states son works and is not home at all times to assist. Pt at this time would need 24 hr assist for safety. If he does not improve and family is not available to assist, he may need SNF rehab prior to return home. Full note to follow.     China Cuevas, PT

## 2021-05-21 NOTE — PROGRESS NOTES
"[facility] ambulatory encounter  [address]    Chief Complaint   Patient presents with   â¢ Physical     w/ pap         HISTORY OF PRESENT ILLNESS     Concerns:   - Heavy menses: has had heavy menses and severe cramping her entire life. Pain only occurs during her menses and is located in bilateral lower abdomen. Pain does not radiate. Feels like strong cramping. Hasn't tried anything for her symptoms. Her mother also has very painful and heavy periods. Hasn't noticed any changes in her menses recently. Gets menses monthly. Wants to make sure she doesn't have endometriosis. - Weakness: feels her bilateral upper extremities are very weak. When she tries to wash her hair she feels exhausted and her arms feel weak. She has slowly been feeling weak over the past year. She stopped working about a year ago and feels ""she let herself go\"". She is interested in trying physical therapy to regain her strength. Denies pain or swelling in arms.     Diet: wants to work on weight loss; eats out a lot and wants to decrease this  Exercise: doesn't do anything right now; has bilateral arm weakness, feels exhausted after taking showers; has been out of work for last year and has \""let herself go\""  Smoking/Alcohol/Drugs:   STD history: no history; doesn't want testing  Contraception: not sexually active  Last pap: November of 2016, negative  Menses: monthly, heavy bleeding and cramping as above  Breast concerns: no concerns; had mammogram; both grandmothers with history of breast cancer  Advance directive: doesn't have one    Depression Screen: Has diagnosis of depression      MEDICATIONS, ALLERGIES, PAST MEDICAL, SURGICAL, FAMILY AND SOCIAL HISTORY     I have reviewed the past medical history, surgical history, family history, social history, medications and allergies as obtained by my nursing staff and support staff and agree with their documentation as noted in the medical records on 12/22/2017    Current " MRI Pending:    Need completed MRI screening form. Sign and fax to [phone]. Call [phone] once faxed. Outpatient Prescriptions   Medication Sig Dispense Refill   â¢ Atomoxetine HCl (STRATTERA PO)      â¢ Nebivolol HCl (BYSTOLIC PO) Take 5 mg by mouth daily. â¢ pantoprazole (PROTONIX) 40 MG tablet Take 1 tablet by mouth daily (before breakfast). 30 tablet 5   â¢ ranitidine (ZANTAC) 150 MG tablet Take 1 tablet by mouth 2 times daily. 60 tablet 5   â¢ fluticasone (FLONASE) 50 MCG/ACT nasal spray Spray 2 sprays in each nostril daily. â¢ gabapentin (NEURONTIN) 600 MG tablet Take 2 tablets by mouth 2 times daily. 120 tablet 3   â¢ topiramate (TOPAMAX) 25 MG tablet Take 25mg bid x 2 wk, 25mg tid 2 x wk, 50mg bid 2 x wk, then 50mg tid  #180 180 tablet 3   â¢ QUETIAPINE FUMARATE PO Take by mouth 1 time. â¢ ergocalciferol (DRISDOL) 46093 units capsule Take 50,000 Units by mouth once a week. â¢ ARIPiprazole Lauroxil ER (ARISTADA) 1064 MG/3.9ML Prefilled Syringe Inject 1,064 mg into the muscle. â¢ loratadine (CLARITIN) 10 MG tablet Take 1 tablet by mouth daily. 30 tablet 3   â¢ Multiple Vitamins-Minerals (MULTIVITAMIN PO) Take  by mouth. â¢ benztropine (COGENTIN) 1 MG tablet Take 1 mg by mouth 2 times daily. No current facility-administered medications for this visit. ALLERGIES:   Allergen Reactions   â¢ Biaxin [Clarithromycin] NAUSEA and DIARRHEA   â¢ Cat Dander    â¢ Dander [Dog Dander] Other (See Comments)   â¢ Dust    â¢ Latex HIVES   â¢ Opioid Analgesics Other (See Comments)     Patient should not take any opiods due to depression and chronic pain and increased risk of suicide in this population (Dr. Salvatore Stephen, 11/18/2015).    â¢ Pollen RASH   â¢ Ragweed RASH   â¢ Trees Cough       Past Medical History:   Diagnosis Date   â¢ Anxiety    â¢ Chickenpox    â¢ Chondromalacia of patella     bilateral   â¢ Constipation    â¢ Depression    â¢ Dysphagia    â¢ Environmental allergies    â¢ Fibromyalgia    â¢ GERD (gastroesophageal reflux disease)    â¢ Insomnia    â¢ Iron deficiency anemia    â¢ Knee fracture 02-11    Dr. Hercules Bumpers Low back pain, episodic    â¢ Rosacea    â¢ Sleep apnea    â¢ Vitamin B12 deficiency    â¢ Vitamin D deficiency        Past Surgical History:   Procedure Laterality Date   â¢ CERVICAL POLYPECTOMY  2007    Dr. Grace Harvey, inflamed benign polyp   â¢ DESTRUCTION BENIGN LESIONS 1-14 LESIONS  2006    Dr. Nino Rojo, laser treatment of right foot plantar wart   â¢ ESOPHAGOGASTRODUODENOSCOPY  2017    gastritis and esophagitis   â¢ NAIL REMOVAL      Dr. Chelo Baer ORAL SURGERY PROCEDURE      left molar removal       Family History   Problem Relation Age of Onset   â¢ Depression Mother    â¢ Cancer Father 45      age 44 Hodgkin's diease   â¢ Depression Brother    â¢ Breast cancer Maternal Grandmother    â¢ Breast cancer Paternal Grandmother    â¢ Breast cancer Maternal Aunt    â¢ Heart attack Maternal Grandfather    â¢ Alcohol/Drug Paternal Grandfather    â¢ Colon cancer Neg Hx        Social History     Social History   â¢ Marital status:      Spouse name: donald schoen   â¢ Number of children: 0   â¢ Years of education: 15     Occupational History   â¢ unemployed      Social History Main Topics   â¢ Smoking status: Never Smoker   â¢ Smokeless tobacco: Never Used   â¢ Alcohol use 0.0 oz/week      Comment: once a month   â¢ Drug use: No   â¢ Sexual activity: Not Currently     Partners: Male      Comment:  July 15, 2015     Other Topics Concern   â¢  Service No   â¢ Blood Transfusions No   â¢ Caffeine Concern Yes     tea couple per day   â¢ Occupational Exposure No   â¢ Hobby Hazards No   â¢ Sleep Concern Yes     insomnia   â¢ Stress Concern Yes     divorce, generalized anxiety and health concerns   â¢ Weight Concern Yes     on Weight Watchers x 1 year. Has lost 30 pounds.     â¢ Special Diet Yes     Weight Watchers   â¢ Back Care No   â¢ Exercise No     no regular exercise, joined health club   â¢ Bike Helmet No     no helmet now, needs to purchase   â¢ Seat Belt Yes     wears seat belt in car   â¢ Self-Exams No "    Social History Narrative    8/24/15: Lives alone in an apartment, recently  7/15/15. Had been  for 9 years. No children ever. 2 cats and 2 fish in the home. Hobbies include crafting. Health Maintenance   Topic Date Due   â¢ Cervical Cancer Screening HPV CO-Testing  04/23/2018   â¢ DTaP/Tdap/Td Vaccine (2 - Td) 12/08/2018   â¢ Influenza Vaccine  Completed         REVIEW OF SYSTEMS     Constitutional: Negative for fever, chills  Skin: Negative for rash or wounds; Negative for breast concerns  Eyes: Negative for visual loss, blurry vision  Respiratory: Negative for cough, wheezing or shortness of breath  Cardiovascular: Negative for chest pain, chest pressure, palpitations or leg edema  Gastrointestinal: Negative for nausea, vomiting, diarrhea, constipation, abdominal pain, or blood in stools  Genitourinary: Negative for dysuria, urgency, frequency, hematuria; POSITIVE FOR HEAVY AND PAINFUL MENSES  Musculoskeletal: Negative for swelling or pain in bilateral arms; POSITIVE FOR WEAKNESS IN BILATERAL ARMS  Neurologic: Negative for headaches, numbness/tingling or abnormal function of extremities  Psychiatric: Negative for change in mood, mentation or sleep disturbance  Allergy/Immunology: as noted in patient's chart    PHYSICAL EXAM     Visit Vitals  /68   Pulse 76   Temp 97.8 Â°F (36.6 Â°C) (Oral)   Ht 5' 5"" (1.651 m)   Wt 96.2 kg   LMP 12/06/2017 (Exact Date)   SpO2 98%   BMI 35.28 kg/mÂ²     Constitutional:  Appears well kept and in no acute distress  Eyes: normal conjunctiva and sclera  Neck: The trachea is midline. No thyroid mass or thyromegaly. Lymphatic: no cervical or supraclavicular lymphadenopathy   Skin: no visible rashes or lesions; skin is warm and dry  Respiratory: Normal respiratory effort  Bilaterally clear to auscultation   Cardiovascular: no edema in legs; Regular rate and rhythm. No murmur. Normal S1 and S2. Gastrointestinal: Abdomen: soft, non-tender, non-distended.  " Positive bowel sounds all quadrants. No guarding or rebound. No hepatomegaly or splenomegaly. Musculoskeletal: normal gait; normal range of motion of bilateral arms though states they feel weak when she moves them  Neurologic: cranial nerves 2-12 grossly intact with no focal deficits  Psychiatric:  Normal mood and affect; normal judgement; alert and oriented x3    ASSESSMENT/PLAN     Ronelle Gosselin was seen today for physical.    Diagnoses and all orders for this visit:    Annual physical exam  -     LIPID PANEL WITH REFLEX; Future  - Overall feeling well. Provided preventative care and routine anticipatory guidance including advanced directives, diet, exercise, and screenings. All questions answered. Immunizations reviewed. Patient to follow up annually. Bilateral arm weakness: likely deconditioning from staying at home most of last year and not working; will benefit from PT  -     SERVICE TO PHYSICAL THERAPY    Menorrhagia with regular cycle: has remained stable  - suggested tying scheduled ibuprofen for the next few cycles; 400mg q6h for first 3 days of menses; take medication with food  - if no improvement in bleeding or pain after 2 more cycles, or if worsened symptoms, advised to return to clinic for further work up    Vitamin D insufficiency  -     VITAMIN D -25 HYDROXY; Future    Return if symptoms worsen or fail to improve.     Claudia Waggoner, DO  12/22/2017

## 2021-05-21 NOTE — ROUTINE PROCESS
1600- Report received per Christina for care. Assess completed. 1850- MRI checklist completed. Pt in chair. Beth PO. 
1900- Report to [facility] primary nurse for HS.

## 2021-05-22 ENCOUNTER — APPOINTMENT (OUTPATIENT)
Dept: MRI IMAGING | Age: 76
DRG: 871 | End: 2021-05-22
Attending: GENERAL ACUTE CARE HOSPITAL
Payer: MEDICARE

## 2021-05-22 LAB
GLUCOSE BLD STRIP.AUTO-MCNC: 133 MG/DL (ref 65–117)
GLUCOSE BLD STRIP.AUTO-MCNC: 135 MG/DL (ref 65–117)
GLUCOSE BLD STRIP.AUTO-MCNC: 190 MG/DL (ref 65–117)
GLUCOSE BLD STRIP.AUTO-MCNC: 192 MG/DL (ref 65–117)
SERVICE CMNT-IMP: ABNORMAL

## 2021-05-22 PROCEDURE — 70551 MRI BRAIN STEM W/O DYE: CPT

## 2021-05-22 PROCEDURE — 97110 THERAPEUTIC EXERCISES: CPT

## 2021-05-22 PROCEDURE — 97116 GAIT TRAINING THERAPY: CPT

## 2021-05-22 PROCEDURE — 82962 GLUCOSE BLOOD TEST: CPT

## 2021-05-22 PROCEDURE — 74011250636 HC RX REV CODE- 250/636: Performed by: STUDENT IN AN ORGANIZED HEALTH CARE EDUCATION/TRAINING PROGRAM

## 2021-05-22 PROCEDURE — 74011250636 HC RX REV CODE- 250/636: Performed by: GENERAL ACUTE CARE HOSPITAL

## 2021-05-22 PROCEDURE — 74011636637 HC RX REV CODE- 636/637: Performed by: STUDENT IN AN ORGANIZED HEALTH CARE EDUCATION/TRAINING PROGRAM

## 2021-05-22 PROCEDURE — 65270000029 HC RM PRIVATE

## 2021-05-22 RX ADMIN — CYANOCOBALAMIN 1000 MCG: 1000 INJECTION, SOLUTION INTRAMUSCULAR; SUBCUTANEOUS at 09:51

## 2021-05-22 RX ADMIN — INSULIN LISPRO 6 UNITS: 100 INJECTION, SOLUTION INTRAVENOUS; SUBCUTANEOUS at 08:12

## 2021-05-22 RX ADMIN — INSULIN LISPRO 2 UNITS: 100 INJECTION, SOLUTION INTRAVENOUS; SUBCUTANEOUS at 17:36

## 2021-05-22 RX ADMIN — INSULIN GLARGINE 33 UNITS: 100 INJECTION, SOLUTION SUBCUTANEOUS at 08:12

## 2021-05-22 RX ADMIN — INSULIN LISPRO 6 UNITS: 100 INJECTION, SOLUTION INTRAVENOUS; SUBCUTANEOUS at 17:36

## 2021-05-22 RX ADMIN — Medication 10 ML: at 15:53

## 2021-05-22 RX ADMIN — ENOXAPARIN SODIUM 40 MG: 40 INJECTION SUBCUTANEOUS at 09:52

## 2021-05-22 RX ADMIN — Medication 10 ML: at 05:26

## 2021-05-22 RX ADMIN — INSULIN LISPRO 2 UNITS: 100 INJECTION, SOLUTION INTRAVENOUS; SUBCUTANEOUS at 13:21

## 2021-05-22 RX ADMIN — INSULIN LISPRO 6 UNITS: 100 INJECTION, SOLUTION INTRAVENOUS; SUBCUTANEOUS at 13:21

## 2021-05-22 NOTE — PROGRESS NOTES
End of Shift Note    Bedside shift change report given to Manus Dubin (oncoming nurse) by Steen Hatchet, RN (offgoing nurse). Report included the following information SBAR    Shift worked:  7a+7p     Shift summary and any significant changes:     no     Concerns for physician to address:  no     Zone phone for oncoming shift:   [phone]       Activity:  Activity Level: Up with Assistance  Number times ambulated in hallways past shift: 0  Number of times OOB to chair past shift: 0    Cardiac:   Cardiac Monitoring: Yes      Cardiac Rhythm: Sinus Rhythm    Access:   Current line(s): PIV     Genitourinary:   Urinary status: voiding    Respiratory:   O2 Device: None (Room air)  Chronic home O2 use?: NO  Incentive spirometer at bedside: NO     GI:  Last Bowel Movement Date: 05/19/21  Current diet:  DIET DIABETIC CONSISTENT CARB Regular  Passing flatus: YES  Tolerating current diet: YES       Pain Management:   Patient states pain is manageable on current regimen: N/A    Skin:  Dwayne Score: 19  Interventions: float heels and foam dressing    Patient Safety:  Fall Score:  Total Score: 4  Interventions: bed/chair alarm and stay with me (per policy)  High Fall Risk: Yes    Length of Stay:  Expected LOS: 4d 19h  Actual LOS: [address], RN

## 2021-05-22 NOTE — PROGRESS NOTES
Problem: Mobility Impaired (Adult and Pediatric)  Goal: *Acute Goals and Plan of Care (Insert Text)  Outcome: Progressing Towards Goal   PHYSICAL THERAPY TREATMENT  Patient: Joon Romero (59 y.o. male)  Date: 5/22/2021  Diagnosis: Sepsis ([facility] [address] [A41.9]        KENNA (acute kidney injury) ([facility] [address] [N17.9]        Lactic acid acidosis [E87.2]        Acute hypoxemic respiratory failure ([facility] [address] [J96.01]     Precautions: FALLS!!  Chart, physical therapy assessment, plan of care and goals were reviewed. ASSESSMENT: Patient continues with skilled PT services and is progressing slowly towards goals, major LOB 3-4 x needing therapist to correct, reaching off RW for support, no SOB, did well with transfers and ther-ex, pt is [address] [place] [address] at this time and not safe to go home and care for his wife (who is non ambulatory), max vc's for safety and proper RW use. Current Level of Function Impacting Discharge (mobility/balance): min assist x2         PLAN : Patient continues to benefit from skilled intervention to address the above impairments. Continue treatment per established plan of care  to address goals. Recommendation for discharge: (in order for the patient to meet his/her long term goals) Therapy up to 5 days/week in SNF setting    This discharge recommendation: Has not yet been discussed the attending provider and/or case management    IF patient discharges home will need the following DME: bedside commode, rolling walker, and wheelchair pending progress. OBJECTIVE DATA SUMMARY:     Critical Behavior:  Neurologic State: Alert  Orientation Level: Oriented X4  Cognition: Appropriate safety awareness, Follows commands ([place] vs confusion? )     Functional Mobility Training:  Bed Mobility: Pt sitting in chair on arrival.    Transfers:  Sit to Stand: Contact guard assistance;Assist x1  Stand to Sit: Contact guard assistance;Assist x1  Interventions: Tactile cues; Verbal cues  Level of Assistance: Contact guard assistance    Balance:  Sitting: Intact; Without support  Standing: Impaired; With support  Standing - Static: Fair;Constant support  Standing - Dynamic : Poor;Constant support    Ambulation/Gait Training:  Distance (ft): 20 Feet (ft)  Assistive Device: Gait belt;Walker, rolling  Ambulation - Level of Assistance: Minimal assistance;Assist x2; Additional time  Gait Abnormalities: Ataxic;Decreased step clearance; Path deviations  Right Side Weight Bearing: Full  Left Side Weight Bearing: Full  Base of Support: Widened  Speed/Teagan: Slow  Step Length: Left shortened;Right shortened    Therapeutic Exercises:   sitting  EXERCISE   Sets   Reps   Active Active Assist   Passive   Comments   Ankle pumps 1 10 [x] [] [] bilat   Heel raises 1 10 [x] [] [] \"   Toe tap 1 10 [x] [] [] \"   Knee ext 1 10 [x] [] [] \"   Hip flex 1 10 [x] [] [] \"     Pain Ratin    Activity Tolerance: Poor    After treatment patient left in no apparent distress: Sitting in chair and Call bell within reach    COMMUNICATION/COLLABORATION:   The patients plan of care was discussed with: Registered nurse.      Karlie Torres PTA   Time Calculation: 26 mins

## 2021-05-22 NOTE — PROGRESS NOTES
1000: MRI screening form signed by patient and faxed to MRI. 1050: Dr. Marsha Haas at bedside. Patient awaiting CM to set up 24/7 supervision upon discharge home. Awaiting MRI to be done, patient can be transferred to [facility] pending MRI results. 1200: Transport here to take patient to MRI     1305: Patient back from MRI. In chair eating lunch    1415: Physical therapy working with patient. End of Shift Note    Bedside shift change report given to Ang Whyte (oncoming nurse) by Mehul Tabor RN (offgoing nurse). Report included the following information SBAR, Kardex, Intake/Output, MAR and Recent Results    Shift worked:  7a-7p     Shift summary and any significant changes:    Patient sat up in chair all shift. MRI done, no acute findings. Patient worked with PT. CM consulted for dc planning to SNF. Transfer orders to medical floor. Concerns for physician to address:       Zone phone for oncoming shift:   [phone]     Activity:  Activity Level: Up with Assistance  Number times ambulated in hallways past shift: 0  Number of times OOB to chair past shift: 3    Cardiac:   Cardiac Monitoring: Yes      Cardiac Rhythm: Sinus Rhythm    Access:   Current line(s): PIV     Genitourinary:   Urinary status: voiding    Respiratory:   O2 Device: None (Room air)  Chronic home O2 use?: NO  Incentive spirometer at bedside: YES     GI:  Last Bowel Movement Date: 05/19/21  Current diet:  DIET DIABETIC CONSISTENT CARB Regular  Passing flatus: YES  Tolerating current diet: YES       Pain Management:   Patient states pain is manageable on current regimen: YES    Skin:  Dwayne Score: 19  Interventions: increase time out of bed and PT/OT consult    Patient Safety:  Fall Score:  Total Score: 4  Interventions: bed/chair alarm, assistive device (walker, cane, etc), gripper socks and pt to call before getting OOB  High Fall Risk: Yes    Length of Stay:  Expected LOS: 4d 19h  Actual LOS: [facility] RN

## 2021-05-22 NOTE — PROGRESS NOTES
Transition of Care Plan:     RUR:14%  Disposition: SNF  Follow up appointments: To be made prior to discharge. DME needed: To be determined. Transportation at Discharge: Family to transport  [address] or means to access home:  Wife and son has keys. IM Medicare letter: To be given prior to discharge. Caregiver Contact:  Nelson Singer, Spouse ([phone])  Discharge Caregiver contacted prior to discharge? Caregiver to be contacted prior to discharge. 4:00PM - CM notified by PT of recommendation for SNF at this time due to limited assistance at home from family as they recover from illnesses and high fall risk for pt. Due to pt's metabolic encephalopathy, attempted to call pt's spouse/NOK Riki Green, [phone]) and left confidential VM requesting call back to discuss SNF recommendation. CM sent message to MD via [employer] requesting OT consult placed as evaluation will be needed for [facility] SNF authorization.       Rosy Suarez [address] Manager  [phone]

## 2021-05-23 LAB
GLUCOSE BLD STRIP.AUTO-MCNC: 134 MG/DL (ref 65–117)
GLUCOSE BLD STRIP.AUTO-MCNC: 140 MG/DL (ref 65–117)
GLUCOSE BLD STRIP.AUTO-MCNC: 225 MG/DL (ref 65–117)
GLUCOSE BLD STRIP.AUTO-MCNC: 232 MG/DL (ref 65–117)
SERVICE CMNT-IMP: ABNORMAL

## 2021-05-23 PROCEDURE — 74011250636 HC RX REV CODE- 250/636: Performed by: GENERAL ACUTE CARE HOSPITAL

## 2021-05-23 PROCEDURE — 74011250636 HC RX REV CODE- 250/636: Performed by: STUDENT IN AN ORGANIZED HEALTH CARE EDUCATION/TRAINING PROGRAM

## 2021-05-23 PROCEDURE — 82962 GLUCOSE BLOOD TEST: CPT

## 2021-05-23 PROCEDURE — 65270000029 HC RM PRIVATE

## 2021-05-23 PROCEDURE — 74011636637 HC RX REV CODE- 636/637: Performed by: STUDENT IN AN ORGANIZED HEALTH CARE EDUCATION/TRAINING PROGRAM

## 2021-05-23 RX ADMIN — INSULIN LISPRO 3 UNITS: 100 INJECTION, SOLUTION INTRAVENOUS; SUBCUTANEOUS at 12:14

## 2021-05-23 RX ADMIN — Medication 10 ML: at 17:34

## 2021-05-23 RX ADMIN — INSULIN GLARGINE 33 UNITS: 100 INJECTION, SOLUTION SUBCUTANEOUS at 09:00

## 2021-05-23 RX ADMIN — INSULIN LISPRO 6 UNITS: 100 INJECTION, SOLUTION INTRAVENOUS; SUBCUTANEOUS at 12:14

## 2021-05-23 RX ADMIN — CYANOCOBALAMIN 1000 MCG: 1000 INJECTION, SOLUTION INTRAMUSCULAR; SUBCUTANEOUS at 08:19

## 2021-05-23 RX ADMIN — INSULIN LISPRO 2 UNITS: 100 INJECTION, SOLUTION INTRAVENOUS; SUBCUTANEOUS at 08:21

## 2021-05-23 RX ADMIN — INSULIN LISPRO 3 UNITS: 100 INJECTION, SOLUTION INTRAVENOUS; SUBCUTANEOUS at 17:34

## 2021-05-23 RX ADMIN — Medication 10 ML: at 22:14

## 2021-05-23 RX ADMIN — INSULIN LISPRO 6 UNITS: 100 INJECTION, SOLUTION INTRAVENOUS; SUBCUTANEOUS at 17:34

## 2021-05-23 RX ADMIN — ENOXAPARIN SODIUM 40 MG: 40 INJECTION SUBCUTANEOUS at 08:21

## 2021-05-23 RX ADMIN — INSULIN LISPRO 6 UNITS: 100 INJECTION, SOLUTION INTRAVENOUS; SUBCUTANEOUS at 08:20

## 2021-05-23 RX ADMIN — Medication 10 ML: at 06:03

## 2021-05-23 NOTE — ROUTINE PROCESS
TRANSFER - OUT REPORT: 
 
2125: Shaggy Padilla attempting to call to take telephone report on patient while this RN in isolation room managing patient with acute desaturation. 2130: Attempted to call report. Tried dialing 3 different extensions for unit and [employer] phone provided by receiving RN. No answer. Will try again. 2149Dorina Benedict RN currently administering medications, but will call back for report afterward. 2155: Verbal telephone report given to [facility] (name) on Dustin Greer  being transferred to Ortho (unit) for routine progression of care Report consisted of patients Situation, Background, Assessment and  
Recommendations(SBAR). Information from the following report(s) SBAR was reviewed with the receiving nurse. Lines:  
Peripheral IV 05/18/21 Left Antecubital (Active) Site Assessment Clean, dry, & intact 05/22/21 1550 Phlebitis Assessment 0 05/22/21 1550 Infiltration Assessment 0 05/22/21 1550 Dressing Status Clean, dry, & intact 05/22/21 1550 Dressing Type Transparent;Tape 05/22/21 1550 Hub Color/Line Status Pink;Flushed;Capped 05/22/21 1550 Action Taken Open ports on tubing capped 05/22/21 1550 Alcohol Cap Used Yes 05/22/21 1550 Peripheral IV 05/20/21 Anterior; Left Forearm (Active) Site Assessment Clean, dry, & intact 05/22/21 1550 Phlebitis Assessment 0 05/22/21 1550 Infiltration Assessment 0 05/22/21 1550 Dressing Status Clean, dry, & intact 05/22/21 1550 Dressing Type Transparent;Tape 05/22/21 1550 Hub Color/Line Status Pink;Capped;Flushed 05/22/21 1550 Action Taken Open ports on tubing capped 05/22/21 1550 Alcohol Cap Used Yes 05/22/21 1550 Opportunity for questions and clarification was provided. Patient transported with: 
 [employer]

## 2021-05-23 NOTE — PROGRESS NOTES
Hospitalist Progress Note    NAME: Yeison Glover   :  1945   MRN:  [id number]       Assessment / Plan:    Sepsis of unknown origin likely from food poisoning/viral gastroenteritis   Metabolic encephalopathy  Acute hypoxic respiratory failure  Fall  Sepsis indicatedlactic acidosis, tachycardia, tachypnea  WBC 4.9, procalcitonin 0.71. VBG7.35/41.9/26/22.9.  --B Cx w CNS likely contaminant , repeat neg  DC ABx  CT abdomen/pelvis1. New evidence of hepatic steatosis. 2. Cholelithiasis is new. No acute cholecystitis or biliary obstruction. 3. Chronic pancreatitis. No evidence of acute pancreatitis. 4. Subacute or chronic L1 partial compression fracture. 5. Nonobstructing bilateral nephrolithiasis. CT head without contrastno acute findings. Chest x-ray Limited by low lung volumes. Possible mild interstitial pulmonary edema. Consider PA and lateral chest views when the patient can better tolerate. . Volume resuscitated as per sepsis protocol. D-dimer not that high and sat 56[address] Lis Barrios Enteric bacterial panel not done and diarrhea resolved, lipase 49. UA within normal limits. --PT eval recs, home w 24 care or SNF, pt lives with son who is not there . CM consult ordered      B12 deficiency  Start B12 injections     Diabetes type 2 with hyperglycemia  Sliding scale insulin plus Lantus 23 units, lispro 6 units with meals     Acute kidney injury  BUN/creatinine39/1.72, hydrating the patient.   BMP in a.m.     Code Status: Full code  Surrogate Decision Maker: Wife  DVT Prophylaxis: Lovenox  GI Prophylaxis: not indicated  Baseline: Ambulatory with assistance at home       Subjective:     No acute events overnight   Right hand swelling much improved       Review of Systems:  Symptom Y/N Comments  Symptom Y/N Comments   Fever/Chills n   Chest Pain     Poor Appetite    Edema     Cough n Abdominal Pain n    Sputum    Joint Pain     SOB/YOUNG n   Pruritis/Rash     Nausea/vomit    Tolerating PT/OT     Diarrhea    Tolerating Diet     Constipation    Other       PO intake: No data found. Wt Readings from Last 10 Encounters:   05/22/21 104.7 kg (230 lb 13.2 oz)   05/21/21 104.3 kg (230 lb)   12/24/17 93 kg (205 lb)   06/30/14 103 kg (227 lb)   06/26/14 105.2 kg (232 lb)   06/23/14 105.2 kg (232 lb)   06/18/14 108 kg (238 lb)   06/16/14 108.9 kg (240 lb)   06/16/14 109.8 kg (242 lb)   09/13/12 98.9 kg (218 lb)       Objective:     VITALS:   Last 24hrs VS reviewed since prior progress note. Most recent are:  Patient Vitals for the past 24 hrs:   Temp Pulse Resp BP SpO2   05/22/21 2337 98.4 °F (36.9 °C) 68 18 135/69 95 %   05/22/21 2229 97.6 °F (36.4 °C) 75 18 (!) 145/80    05/22/21 1933 98.2 °F (36.8 °C) 79 20 (!) 129/59 94 %   05/22/21 1550 98.9 °F (37.2 °C) 74 22 136/67 96 %   05/22/21 1141 98.5 °F (36.9 °C) 79 18 131/65 94 %   05/22/21 0731 98.5 °F (36.9 °C) 73 20 118/82 100 %   05/22/21 0316 98.2 °F (36.8 °C)  20 (!) 152/80 96 %       Intake/Output Summary (Last 24 hours) at 5/23/2021 0017  Last data filed at 5/22/2021 1858  Gross per 24 hour   Intake 1200 ml   Output 1275 ml   Net -75 ml        I had a face to face encounter, and independently examined this patient on 5/23/2021, as outlined below:    PHYSICAL EXAM:  General:    Alert, cooperative, no distress, appears stated age. HEENT: Atraumatic, anicteric sclerae, pink conjunctivae, MMM  Neck:  Supple, symmetrical  Lungs:   CTA. No Wheezing/Rhonchi. No rales. No tenderness  No Accessory muscle use. Heart:   Regular rhythm. No murmur. No JVD   Abdomen:   Soft, NT. ND.  BS normal  Extremities: No edema. No cyanosis. No clubbing. Rt hand swelling improving   Skin:     Not pale. Not Jaundiced. No rashes. Large upper back lump likely lipoma   Psych:  Poor insight. Not depressed. Not anxious or agitated. Neurologic: Alert and oriented X2-3.  EOMs intact. No facial asymmetry. No slurred speech. Symmetrical strength, Sensation grossly intact. Labs     I reviewed today's most current labs and imaging studies. Pertinent labs include:  Recent Labs     05/21/21  0546   WBC 4.1   HGB 13.4   HCT 39.5   *     Recent Labs     05/21/21  0546 05/20/21  0517    140   K 3.9 3.9   * 110*   CO2 25 26   * 166*   BUN 17 27*   CREA 0.98 1.18   CA 8.0* 7.8*   MG  --  2.0   ALB 3.2*  --    TBILI 0.8  --    ALT 43  --      CT HEAD WO CONT    Result Date: 5/18/2021  No acute intracranial abnormality on this noncontrast head CT. No change. CT ABD PELV WO CONT    Result Date: 5/18/2021  1. New evidence of hepatic steatosis. 2. Cholelithiasis is new. No acute cholecystitis or biliary obstruction. 3. Chronic pancreatitis. No evidence of acute pancreatitis. 4. Subacute or chronic L1 partial compression fracture. 5. Nonobstructing bilateral nephrolithiasis. XR CHEST PORT    Result Date: 5/18/2021  Limited by low lung volumes. Possible mild interstitial pulmonary edema. Consider PA and lateral chest views when the patient can better tolerate. No results found.     Current Medications:     Current Facility-Administered Medications:     cyanocobalamin (VITAMIN B12) injection 1,000 mcg, 1,000 mcg, IntraMUSCular, DAILY, Jesu Lee MD, 1,000 mcg at 05/22/21 0951    albuterol-ipratropium (DUO-NEB) 2.5 MG-0.5 MG/3 ML, 3 mL, Nebulization, Q6H PRN, Jesu Lee MD    sodium chloride (NS) flush 5-40 mL, 5-40 mL, IntraVENous, Q8H, Bull Das MD, 10 mL at 05/22/21 1553    sodium chloride (NS) flush 5-40 mL, 5-40 mL, IntraVENous, PRN, Lexie Delcid MD    acetaminophen (TYLENOL) tablet 650 mg, 650 mg, Oral, Q6H PRN **OR** acetaminophen (TYLENOL) suppository 650 mg, 650 mg, Rectal, Q6H PRN, Lexie Delcid MD    polyethylene glycol (MIRALAX) packet 17 g, 17 g, Oral, DAILY PRN, Lexie Delcid MD    enoxaparin (LOVENOX) injection 40 mg, 40 mg, SubCUTAneous, DAILY, Rina Rodriguez MD, 40 mg at 05/22/21 0952    ondansetron (ZOFRAN ODT) tablet 4 mg, 4 mg, Oral, Q8H PRN **OR** ondansetron (ZOFRAN) injection 4 mg, 4 mg, IntraVENous, Q6H PRN, Rina Rodriguez MD    sodium chloride (NS) flush 5-10 mL, 5-10 mL, IntraVENous, PRN, Aguila Heath MD    insulin lispro (HUMALOG) injection, , SubCUTAneous, AC&HS, Rina Rodriguez MD, 2 Units at 05/22/21 1736    glucose chewable tablet 16 g, 4 Tablet, Oral, PRN, Rina Rodriguez MD    dextrose (D50W) injection syrg 12.5-25 g, 12.5-25 g, IntraVENous, PRN, Rina Rodriguez MD    glucagon (GLUCAGEN) injection 1 mg, 1 mg, IntraMUSCular, PRN, Rina Rodriguez MD    insulin glargine (LANTUS) injection 33 Units, 33 Units, SubCUTAneous, DAILY, Rina Rodriguez MD, 33 Units at 05/22/21 2551    insulin lispro (HUMALOG) injection 6 Units, 6 Units, SubCUTAneous, TIDAC, Rina Rodriguez MD, 6 Units at 05/22/21 1736     Procedures: see electronic medical records for all procedures/Xrays and details which were not copied into this note but were reviewed prior to creation of Plan. Reviewed most current lab test results and cultures  YES  Reviewed most current radiology test results   YES  Review and summation of old records today    NO  Reviewed patient's current orders and MAR    YES  PMH/ reviewed - no change compared to H&P  ________________________________________________________________________  Care Plan discussed with:    Comments   Patient x    Family      RN x    Care Manager     Consultant                        Multidiciplinary team rounds were held today with , nursing, pharmacist and clinical coordinator. Patient's plan of care was discussed; medications were reviewed and discharge planning was addressed.      ________________________________________________________________________  Total NON critical care TIME:   30  Minutes    Total CRITICAL CARE TIME Spent:   Minutes non procedure based      Comments   >50% of visit spent in counseling and coordination of care x     This includes time during multidisciplinary rounds if indicated above   ________________________________________________________________________  Denver Dickinson, MD

## 2021-05-23 NOTE — PROGRESS NOTES
Hospitalist Progress Note    NAME: April Villeda   :  1945   MRN:  [id number]       Assessment / Plan:    Sepsis of unknown origin likely from food poisoning/viral gastroenteritis   Metabolic encephalopathy  Acute hypoxic respiratory failure  Fall  Sepsis indicatedlactic acidosis, tachycardia, tachypnea  WBC 4.9, procalcitonin 0.71. VBG7.35/41.9/26/22.9.  --B Cx w CNS likely contaminant , repeat neg  DC ABx  CT abdomen/pelvis1. New evidence of hepatic steatosis. 2. Cholelithiasis is new. No acute cholecystitis or biliary obstruction. 3. Chronic pancreatitis. No evidence of acute pancreatitis. 4. Subacute or chronic L1 partial compression fracture. 5. Nonobstructing bilateral nephrolithiasis. CT head without contrastno acute findings. Chest x-ray Limited by low lung volumes. Possible mild interstitial pulmonary edema. Consider PA and lateral chest views when the patient can better tolerate. . Volume resuscitated as per sepsis protocol. D-dimer not that high and sat [id number] Lis Barrios Enteric bacterial panel not done and diarrhea resolved, lipase 49. UA within normal limits. --PT eval recs, home w 24 care or SNF, pt lives with son who is not there . CM consult ordered      B12 deficiency  Start B12 injections     Diabetes type 2 with hyperglycemia  Sliding scale insulin plus Lantus 23 units, lispro 6 units with meals     Acute kidney injury  BUN/creatinine39/1.72, hydrating the patient.   BMP in a.m.     Code Status: Full code  Surrogate Decision Maker: Wife  DVT Prophylaxis: Lovenox  GI Prophylaxis: not indicated  Baseline: Ambulatory with assistance at home       Subjective:     No acute events overnight   Denies specific symptoms     Review of Systems:  Symptom Y/N Comments  Symptom Y/N Comments   Fever/Chills    Chest Pain     Poor Appetite    Edema     Cough    Abdominal Pain Sputum    Joint Pain     SOB/YOUNG    Pruritis/Rash     Nausea/vomit    Tolerating PT/OT     Diarrhea    Tolerating Diet     Constipation    Other       PO intake: No data found. Wt Readings from Last 10 Encounters:   05/22/21 104.7 kg (230 lb 13.2 oz)   05/21/21 104.3 kg (230 lb)   12/24/17 93 kg (205 lb)   06/30/14 103 kg (227 lb)   06/26/14 105.2 kg (232 lb)   06/23/14 105.2 kg (232 lb)   06/18/14 108 kg (238 lb)   06/16/14 108.9 kg (240 lb)   06/16/14 109.8 kg (242 lb)   09/13/12 98.9 kg (218 lb)       Objective:     VITALS:   Last 24hrs VS reviewed since prior progress note. Most recent are:  Patient Vitals for the past 24 hrs:   Temp Pulse Resp BP SpO2   05/22/21 2337 98.4 °F (36.9 °C) 68 18 135/69 95 %   05/22/21 2229 97.6 °F (36.4 °C) 75 18 (!) 145/80    05/22/21 1933 98.2 °F (36.8 °C) 79 20 (!) 129/59 94 %   05/22/21 1550 98.9 °F (37.2 °C) 74 22 136/67 96 %   05/22/21 1141 98.5 °F (36.9 °C) 79 18 131/65 94 %   05/22/21 0731 98.5 °F (36.9 °C) 73 20 118/82 100 %   05/22/21 0316 98.2 °F (36.8 °C)  20 (!) 152/80 96 %       Intake/Output Summary (Last 24 hours) at 5/23/2021 0017  Last data filed at 5/22/2021 1858  Gross per 24 hour   Intake 1200 ml   Output 1275 ml   Net -75 ml        I had a face to face encounter, and independently examined this patient on 5/23/2021, as outlined below:    PHYSICAL EXAM:  General:    Alert, cooperative, no distress, appears stated age. HEENT: Atraumatic, anicteric sclerae, pink conjunctivae, MMM  Neck:  Supple, symmetrical  Lungs:   CTA. No Wheezing/Rhonchi. No rales. No tenderness  No Accessory muscle use. Heart:   Regular rhythm. No murmur. No JVD   Abdomen:   Soft, NT. ND.  BS normal  Extremities: No edema. No cyanosis. No clubbing. Rt hand swelling improving   Skin:     Not pale. Not Jaundiced. No rashes. Large upper back lump likely lipoma   Psych:  Poor insight. Not depressed. Not anxious or agitated. Neurologic: Alert and oriented X2-3. EOMs intact.  No facial asymmetry. No slurred speech. Symmetrical strength, Sensation grossly intact. Labs     I reviewed today's most current labs and imaging studies. Pertinent labs include:  Recent Labs     05/21/21  0546   WBC 4.1   HGB 13.4   HCT 39.5   *     Recent Labs     05/21/21  0546 05/20/21  0517    140   K 3.9 3.9   * 110*   CO2 25 26   * 166*   BUN 17 27*   CREA 0.98 1.18   CA 8.0* 7.8*   MG  --  2.0   ALB 3.2*  --    TBILI 0.8  --    ALT 43  --      CT HEAD WO CONT    Result Date: 5/18/2021  No acute intracranial abnormality on this noncontrast head CT. No change. CT ABD PELV WO CONT    Result Date: 5/18/2021  1. New evidence of hepatic steatosis. 2. Cholelithiasis is new. No acute cholecystitis or biliary obstruction. 3. Chronic pancreatitis. No evidence of acute pancreatitis. 4. Subacute or chronic L1 partial compression fracture. 5. Nonobstructing bilateral nephrolithiasis. XR CHEST PORT    Result Date: 5/18/2021  Limited by low lung volumes. Possible mild interstitial pulmonary edema. Consider PA and lateral chest views when the patient can better tolerate. No results found.     Current Medications:     Current Facility-Administered Medications:     cyanocobalamin (VITAMIN B12) injection 1,000 mcg, 1,000 mcg, IntraMUSCular, DAILY, Megha Lee MD, 1,000 mcg at 05/22/21 0951    albuterol-ipratropium (DUO-NEB) 2.5 MG-0.5 MG/3 ML, 3 mL, Nebulization, Q6H PRN, Megha Lee MD    sodium chloride (NS) flush 5-40 mL, 5-40 mL, IntraVENous, Q8H, Bull Das MD, 10 mL at 05/22/21 1553    sodium chloride (NS) flush 5-40 mL, 5-40 mL, IntraVENous, PRN, Yessenia Singleton MD    acetaminophen (TYLENOL) tablet 650 mg, 650 mg, Oral, Q6H PRN **OR** acetaminophen (TYLENOL) suppository 650 mg, 650 mg, Rectal, Q6H PRN, Yessenia Singleton MD    polyethylene glycol (MIRALAX) packet 17 g, 17 g, Oral, DAILY PRN, Yessenia Singleton MD    enoxaparin (LOVENOX) injection 40 mg, 40 mg, SubCUTAneous, DAILY, Danielle Nails MD, 40 mg at 05/22/21 0952    ondansetron (ZOFRAN ODT) tablet 4 mg, 4 mg, Oral, Q8H PRN **OR** ondansetron (ZOFRAN) injection 4 mg, 4 mg, IntraVENous, Q6H PRN, Danielle Nails MD    sodium chloride (NS) flush 5-10 mL, 5-10 mL, IntraVENous, PRN, Harmeet Bonilla MD    insulin lispro (HUMALOG) injection, , SubCUTAneous, AC&HS, Danielle Nails MD, 2 Units at 05/22/21 1736    glucose chewable tablet 16 g, 4 Tablet, Oral, PRN, Danielle Nails MD    dextrose (D50W) injection syrg 12.5-25 g, 12.5-25 g, IntraVENous, PRN, Danielle Nails MD    glucagon (GLUCAGEN) injection 1 mg, 1 mg, IntraMUSCular, PRN, Danielle Nails MD    insulin glargine (LANTUS) injection 33 Units, 33 Units, SubCUTAneous, DAILY, Danielle Nails MD, 33 Units at 05/22/21 1629    insulin lispro (HUMALOG) injection 6 Units, 6 Units, SubCUTAneous, TIDAC, Danielle Nails MD, 6 Units at 05/22/21 1736     Procedures: see electronic medical records for all procedures/Xrays and details which were not copied into this note but were reviewed prior to creation of Plan. Reviewed most current lab test results and cultures  YES  Reviewed most current radiology test results   YES  Review and summation of old records today    NO  Reviewed patient's current orders and MAR    YES  PMH/ reviewed - no change compared to H&P  ________________________________________________________________________  Care Plan discussed with:    Comments   Patient x    Family      RN     Care Manager     Consultant                        Multidiciplinary team rounds were held today with , nursing, pharmacist and clinical coordinator. Patient's plan of care was discussed; medications were reviewed and discharge planning was addressed.      ________________________________________________________________________  Total NON critical care TIME:   25  Minutes    Total CRITICAL CARE TIME Spent:   Minutes non procedure based      Comments   >50% of visit spent in counseling and coordination of care x     This includes time during multidisciplinary rounds if indicated above   ________________________________________________________________________  Samson Campbell MD

## 2021-05-23 NOTE — PROGRESS NOTES
End of Shift Note    Bedside shift change report given to (oncoming nurse) by Bennetta Buerger (offgoing nurse). Report included the following information SBAR    Shift worked:  days   Shift summary and any significant changes:    Pt sat in chair almost all my shift, uneventful day   Concerns for physician to address:  no     Zone phone for oncoming shift:       Activity:  Activity Level: Up with Assistance  Number times ambulated in hallways past shift: 0  Number of times OOB to chair past shift: 1    Cardiac:   Cardiac Monitoring: Yes      Cardiac Rhythm: Sinus Rhythm    Access:   Current line(s): PIV     Genitourinary:   Urinary status: voiding    Respiratory:   O2 Device: None (Room air)  Chronic home O2 use?: NO  Incentive spirometer at bedside: NO     GI:  Last Bowel Movement Date: 05/19/21  Current diet:  DIET DIABETIC CONSISTENT CARB Regular  Passing flatus: YES  Tolerating current diet: YES       Pain Management:   Patient states pain is manageable on current regimen: N/A    Skin:  Dwayne Score: 19  Interventions: float heels and foam dressing    Patient Safety:  Fall Score:  Total Score: 4  Interventions: bed/chair alarm and stay with me (per policy)  High Fall Risk: Yes    Length of Stay:  Expected LOS: 4d 19h  Actual LOS: [address]

## 2021-05-24 LAB
BACTERIA SPEC CULT: NORMAL
COVID-19 RAPID TEST, COVR: NOT DETECTED
GLUCOSE BLD STRIP.AUTO-MCNC: 109 MG/DL (ref 65–117)
GLUCOSE BLD STRIP.AUTO-MCNC: 172 MG/DL (ref 65–117)
GLUCOSE BLD STRIP.AUTO-MCNC: 212 MG/DL (ref 65–117)
GLUCOSE BLD STRIP.AUTO-MCNC: 312 MG/DL (ref 65–117)
SERVICE CMNT-IMP: ABNORMAL
SERVICE CMNT-IMP: NORMAL
SERVICE CMNT-IMP: NORMAL
SOURCE, COVRS: NORMAL

## 2021-05-24 PROCEDURE — 97165 OT EVAL LOW COMPLEX 30 MIN: CPT | Performed by: OCCUPATIONAL THERAPIST

## 2021-05-24 PROCEDURE — 82962 GLUCOSE BLOOD TEST: CPT

## 2021-05-24 PROCEDURE — 74011250636 HC RX REV CODE- 250/636: Performed by: STUDENT IN AN ORGANIZED HEALTH CARE EDUCATION/TRAINING PROGRAM

## 2021-05-24 PROCEDURE — 74011250636 HC RX REV CODE- 250/636: Performed by: GENERAL ACUTE CARE HOSPITAL

## 2021-05-24 PROCEDURE — 97535 SELF CARE MNGMENT TRAINING: CPT | Performed by: OCCUPATIONAL THERAPIST

## 2021-05-24 PROCEDURE — 65270000029 HC RM PRIVATE

## 2021-05-24 PROCEDURE — 74011636637 HC RX REV CODE- 636/637: Performed by: STUDENT IN AN ORGANIZED HEALTH CARE EDUCATION/TRAINING PROGRAM

## 2021-05-24 PROCEDURE — 97116 GAIT TRAINING THERAPY: CPT

## 2021-05-24 PROCEDURE — 87635 SARS-COV-2 COVID-19 AMP PRB: CPT

## 2021-05-24 RX ORDER — LISINOPRIL 10 MG/1
10 TABLET ORAL DAILY
Status: DISCONTINUED | OUTPATIENT
Start: 2021-05-25 | End: 2021-05-26 | Stop reason: HOSPADM

## 2021-05-24 RX ADMIN — Medication 10 ML: at 06:57

## 2021-05-24 RX ADMIN — ENOXAPARIN SODIUM 40 MG: 40 INJECTION SUBCUTANEOUS at 09:08

## 2021-05-24 RX ADMIN — CYANOCOBALAMIN 1000 MCG: 1000 INJECTION, SOLUTION INTRAMUSCULAR; SUBCUTANEOUS at 09:07

## 2021-05-24 RX ADMIN — INSULIN LISPRO 3 UNITS: 100 INJECTION, SOLUTION INTRAVENOUS; SUBCUTANEOUS at 17:27

## 2021-05-24 RX ADMIN — INSULIN LISPRO 2 UNITS: 100 INJECTION, SOLUTION INTRAVENOUS; SUBCUTANEOUS at 09:09

## 2021-05-24 RX ADMIN — INSULIN LISPRO 6 UNITS: 100 INJECTION, SOLUTION INTRAVENOUS; SUBCUTANEOUS at 17:27

## 2021-05-24 RX ADMIN — INSULIN LISPRO 7 UNITS: 100 INJECTION, SOLUTION INTRAVENOUS; SUBCUTANEOUS at 12:00

## 2021-05-24 RX ADMIN — INSULIN LISPRO 6 UNITS: 100 INJECTION, SOLUTION INTRAVENOUS; SUBCUTANEOUS at 09:08

## 2021-05-24 RX ADMIN — Medication 10 ML: at 17:27

## 2021-05-24 RX ADMIN — Medication 10 ML: at 21:42

## 2021-05-24 RX ADMIN — INSULIN LISPRO 6 UNITS: 100 INJECTION, SOLUTION INTRAVENOUS; SUBCUTANEOUS at 11:59

## 2021-05-24 RX ADMIN — INSULIN GLARGINE 33 UNITS: 100 INJECTION, SOLUTION SUBCUTANEOUS at 09:08

## 2021-05-24 NOTE — PROGRESS NOTES
Hospitalist Progress Note    NAME: Jono Romero   :  1945   MRN:  [id number]       Assessment / Plan:    Sepsis of unknown origin likely from food poisoning/viral gastroenteritis   Metabolic encephalopathy  Acute hypoxic respiratory failure  Fall  Sepsis indicatedlactic acidosis, tachycardia, tachypnea  WBC 4.9, procalcitonin 0.71. VBG7.35/41.9/26/22.9.  --B Cx w CNS likely contaminant , repeat neg  DC ABx  CT abdomen/pelvis1. New evidence of hepatic steatosis. 2. Cholelithiasis is new. No acute cholecystitis or biliary obstruction. 3. Chronic pancreatitis. No evidence of acute pancreatitis. 4. Subacute or chronic L1 partial compression fracture. 5. Nonobstructing bilateral nephrolithiasis. CT head and MRI no acute findings. Chest x-ray Limited by low lung volumes. Possible mild interstitial pulmonary edema. Consider PA and lateral chest views when the patient can better tolerate. . Volume resuscitated as per sepsis protocol. D-dimer not that high and sat 50[address] Lis Barrios Enteric bacterial panel not done and diarrhea resolved, lipase 49. UA within normal limits.   --PT eval recs, home w 24 care or SNF, pt lives with son who is not there .   --CM working on placement, pt stable for DC     B12 deficiency  --Start B12 injections X5, switch to po on DC     Diabetes type 2 with hyperglycemia  Sliding scale insulin plus Lantus 23 units, lispro 6 units with meals  --stat low dose lisinopril for nephro protection      Acute kidney injury  resolved      Code Status: Full code  Surrogate Decision Maker: Wife  DVT Prophylaxis: Lovenox  GI Prophylaxis: not indicated  Baseline: Ambulatory with assistance at home   Disposition: Rehab. pt stable for DC       Subjective:     No acute events overnight   Denies specific symptoms     Review of Systems:  Symptom Y/N Comments  Symptom Y/N Comments Fever/Chills    Chest Pain     Poor Appetite    Edema     Cough    Abdominal Pain     Sputum    Joint Pain     SOB/YOUNG    Pruritis/Rash     Nausea/vomit    Tolerating PT/OT     Diarrhea    Tolerating Diet     Constipation    Other       PO intake: No data found. Wt Readings from Last 10 Encounters:   05/22/21 104.7 kg (230 lb 13.2 oz)   05/21/21 104.3 kg (230 lb)   12/24/17 93 kg (205 lb)   06/30/14 103 kg (227 lb)   06/26/14 105.2 kg (232 lb)   06/23/14 105.2 kg (232 lb)   06/18/14 108 kg (238 lb)   06/16/14 108.9 kg (240 lb)   06/16/14 109.8 kg (242 lb)   09/13/12 98.9 kg (218 lb)       Objective:     VITALS:   Last 24hrs VS reviewed since prior progress note. Most recent are:  Patient Vitals for the past 24 hrs:   Temp Pulse Resp BP SpO2   05/24/21 1624 97.3 °F (36.3 °C) 72 18 (!) 120/59 99 %   05/24/21 1231 98 °F (36.7 °C) 75 18 120/65 99 %   05/24/21 0743 97.7 °F (36.5 °C) 72 17 118/68 93 %   05/24/21 0404 97.6 °F (36.4 °C) 72 18     05/23/21 2354 98.4 °F (36.9 °C) 68 17 136/64 94 %   05/23/21 2016 98.6 °F (37 °C) 76 18 (!) 143/72 96 %       Intake/Output Summary (Last 24 hours) at 5/24/2021 1644  Last data filed at 5/24/2021 0911  Gross per 24 hour   Intake    Output 1075 ml   Net -1075 ml        I had a face to face encounter, and independently examined this patient on 5/24/2021, as outlined below:    PHYSICAL EXAM:  General:    Alert, cooperative, no distress, appears stated age. HEENT: Atraumatic, anicteric sclerae, pink conjunctivae, MMM, bruise around left eye  Neck:  Supple, symmetrical  Lungs:   CTA. No Wheezing/Rhonchi. No rales. No tenderness  No Accessory muscle use. Heart:   Regular rhythm. No murmur. No JVD   Abdomen:   Soft, NT. ND.  BS normal  Extremities: No edema. No cyanosis. No clubbing. Rt hand swelling resolved. Skin:     Not pale. Not Jaundiced. No rashes. Large upper back lump likely lipoma   Psych:  Poor insight. Not depressed. Not anxious or agitated.   Neurologic: Alert and oriented X3. EOMs intact. No facial asymmetry. No slurred speech. Symmetrical strength, Sensation grossly intact. Labs     I reviewed today's most current labs and imaging studies. Pertinent labs include:  No results for input(s): WBC, HGB, HCT, PLT, HGBEXT, HCTEXT, PLTEXT, HGBEXT, HCTEXT, PLTEXT in the last 72 hours. No results for input(s): NA, K, CL, CO2, GLU, BUN, CREA, CA, MG, PHOS, ALB, TBIL, TBILI, ALT, INR, INREXT, INREXT in the last 72 hours. No lab exists for component: SGOT  CT HEAD WO CONT    Result Date: 5/18/2021  No acute intracranial abnormality on this noncontrast head CT. No change. CT ABD PELV WO CONT    Result Date: 5/18/2021  1. New evidence of hepatic steatosis. 2. Cholelithiasis is new. No acute cholecystitis or biliary obstruction. 3. Chronic pancreatitis. No evidence of acute pancreatitis. 4. Subacute or chronic L1 partial compression fracture. 5. Nonobstructing bilateral nephrolithiasis. XR CHEST PORT    Result Date: 5/18/2021  Limited by low lung volumes. Possible mild interstitial pulmonary edema. Consider PA and lateral chest views when the patient can better tolerate. No results found.     Current Medications:     Current Facility-Administered Medications:     cyanocobalamin (VITAMIN B12) injection 1,000 mcg, 1,000 mcg, IntraMUSCular, DAILY, Layo Lee MD, 1,000 mcg at 05/24/21 0907    albuterol-ipratropium (DUO-NEB) 2.5 MG-0.5 MG/3 ML, 3 mL, Nebulization, Q6H PRN, Layo Lee MD    sodium chloride (NS) flush 5-40 mL, 5-40 mL, IntraVENous, Q8H, Bull Das MD, 10 mL at 05/24/21 0657    sodium chloride (NS) flush 5-40 mL, 5-40 mL, IntraVENous, PRN, Luz Marina Harvey MD    acetaminophen (TYLENOL) tablet 650 mg, 650 mg, Oral, Q6H PRN **OR** acetaminophen (TYLENOL) suppository 650 mg, 650 mg, Rectal, Q6H PRN, Luz Marina Harvey MD    polyethylene glycol (MIRALAX) packet 17 g, 17 g, Oral, DAILY PRN, Luz Marina Harvey MD    enoxaparin (LOVENOX) injection 40 mg, 40 mg, SubCUTAneous, DAILY, Danielle Nails MD, 40 mg at 05/24/21 0908    ondansetron (ZOFRAN ODT) tablet 4 mg, 4 mg, Oral, Q8H PRN **OR** ondansetron (ZOFRAN) injection 4 mg, 4 mg, IntraVENous, Q6H PRN, Danielle Nails MD    sodium chloride (NS) flush 5-10 mL, 5-10 mL, IntraVENous, PRN, Harmeet Bonilla MD    insulin lispro (HUMALOG) injection, , SubCUTAneous, AC&HS, Danielle Nails MD, 7 Units at 05/24/21 1200    glucose chewable tablet 16 g, 4 Tablet, Oral, PRN, Danielle Nails MD    dextrose (D50W) injection syrg 12.5-25 g, 12.5-25 g, IntraVENous, PRN, Danielle Nails MD    glucagon (GLUCAGEN) injection 1 mg, 1 mg, IntraMUSCular, PRN, Danielle Nails MD    insulin glargine (LANTUS) injection 33 Units, 33 Units, SubCUTAneous, DAILY, Danielle Nails MD, 33 Units at 05/24/21 0908    insulin lispro (HUMALOG) injection 6 Units, 6 Units, SubCUTAneous, TIDAC, Danielle Nails MD, 6 Units at 05/24/21 1159     Procedures: see electronic medical records for all procedures/Xrays and details which were not copied into this note but were reviewed prior to creation of Plan. Reviewed most current lab test results and cultures  YES  Reviewed most current radiology test results   YES  Review and summation of old records today    NO  Reviewed patient's current orders and MAR    YES  PMH/SH reviewed - no change compared to H&P  ________________________________________________________________________  Care Plan discussed with:    Comments   Patient x    Family      RN     Care Manager x    Consultant                        Multidiciplinary team rounds were held today with , nursing, pharmacist and clinical coordinator. Patient's plan of care was discussed; medications were reviewed and discharge planning was addressed.      ________________________________________________________________________  Total NON critical care TIME:   20  Minutes    Total CRITICAL CARE TIME Spent:   Minutes non procedure based      Comments   >50% of visit spent in counseling and coordination of care x     This includes time during multidisciplinary rounds if indicated above   ________________________________________________________________________  Jordan Orellana MD

## 2021-05-24 NOTE — PROGRESS NOTES
End of Shift Note    Bedside shift change report given to Liana Pastor (oncoming nurse) by Jennifer Ramirez (offgoing nurse). Report included the following information SBAR, Kardex, Intake/Output, MAR and Accordion    Shift worked:  7824-4950     Shift summary and any significant changes:     No changes overnight     Concerns for physician to address:       Zone phone for oncoming shift:   [phone]       Activity:  Activity Level: Up with Assistance  Number times ambulated in hallways past shift: 0  Number of times OOB to chair past shift: 1    Cardiac:   Cardiac Monitoring: No      Cardiac Rhythm: Sinus Rhythm    Access:   Current line(s): PIV     Genitourinary:   Urinary status: voiding    Respiratory:   O2 Device: None (Room air)  Chronic home O2 use?: NO  Incentive spirometer at bedside: NO     GI:  Last Bowel Movement Date: 05/19/21  Current diet:  DIET DIABETIC CONSISTENT CARB Regular  Passing flatus: YES  Tolerating current diet: YES       Pain Management:   Patient states pain is manageable on current regimen: YES    Skin:  Dwayne Score: 19  Interventions: float heels and increase time out of bed    Patient Safety:  Fall Score:  Total Score: 4  Interventions: bed/chair alarm, assistive device (walker, cane, etc), gripper socks and pt to call before getting OOB  High Fall Risk: Yes    Length of Stay:  Expected LOS: 4d 19h  Actual LOS: [facility]

## 2021-05-24 NOTE — PROGRESS NOTES
Transition of Care Plan:     RUR:11%  Disposition: SNF: [facility] has been sent to [address] [place] [address] for Alfonzo Kehr 5/24  Pt is needing a rapid COVID test   Follow up appointments: To be made prior to discharge.   DME needed: To be determined. Transportation at [facility] to [facility] or means to access home:  Wife and son has keys. IM Medicare letter: To be given prior to discharge.   Caregiver Contact:  Selena Chen, Spouse ([phone])  Discharge Caregiver contacted prior to discharge?  Caregiver to be contacted prior to discharge.         Update: Pt has been approved to discharge to [facility]. CM has sent clinicals to initiate [facility]     2:30pm: CM met with Pt to discuss discharge recommendation for SNF. Pt appeared alert and oriented x4. Pt was receptive stating that he would like to discharge to [facility]. Referral has been sent via cclink       CM attempted to contact Pt's wife to notify of recommendation as well. CM unable to reach wife at this time.          Mary Castro [place], ENMA Rodriguez

## 2021-05-24 NOTE — PROGRESS NOTES
Problem: Self Care Deficits Care Plan (Adult)  Goal: *Acute Goals and Plan of Care (Insert Text)  Description: FUNCTIONAL STATUS PRIOR TO ADMISSION: ambulated with rolling walker, performed ADLS on his own, does not drive, cooks/cleans and does laundry, is the caregiver for his wife whom he has to physically assist    HOME SUPPORT PRIOR TO ADMISSION: The patient lived with wife and son. Son works during the day. Occupational Therapy Goals:  Initiated 5/24/2021  1. Patient will perform grooming standing with modified independence within 7 days. 2. Patient will perform lower body dressing with modified independence within 7 days. 3. Patient will perform toileting with modified independence within 7 days. 4. Patient will perform one IADL task in standing with modified independence within 7 days. 5. Patient will transfer from toilet with modified independence using the least restrictive device and appropriate durable medical equipment within 7 days. Outcome: Not Met   OCCUPATIONAL THERAPY EVALUATION  Patient: Hina Sethi (23 y.o. 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 Pt was requesting to attempt to mobilize without RW but without assist devices pt needs min assist and was reaching out for objects. Upon arrival pt was ringing for assist to the bathroom. CGA for sit to stand from bedside chair but pt needed min assist from toilet. Pt was able to perform toileting with CGA overall. Pt stood at sink to wash and dry hands but needed to stabilize his hips against the surface of the sink for stability due to balance deficits.   He reports that he has to physically assist his wife at home and that his son is not home during the day to assist (pt works). He is at a high risk for falls and would benefit from SNF rehab at discharge. Current Level of Function Impacting Discharge (ADLs/self-care): CGA/min assist for mobility with RW and transfers  Feeding: Modified independent    Oral Facial Hygiene/Grooming: Contact guard assistance (standing, needs to lean against surface with hips for balanc)    Bathing: Minimum assistance    Upper Body Dressing: Setup    Lower Body Dressing: Minimum assistance    Toileting: Minimum assistance       Functional Outcome Measure: The patient scored 60/100 on the barthel outcome measure which is indicative of moderate decline in mobility and ADLS. Other factors to consider for discharge: fall risk     Patient will benefit from skilled therapy intervention to address the above noted impairments. PLAN :  Recommendations and Planned Interventions: self care training, functional mobility training, therapeutic exercise, balance training, therapeutic activities, patient education, home safety training and family training/education    Frequency/Duration: Patient will be followed by occupational therapy 4 times a week to address goals. Recommendation for discharge: (in order for the patient to meet his/her long term goals)  Therapy up to 5 days/week in SNF setting    This discharge recommendation:  Has been made in collaboration with the attending provider and/or case management    IF patient discharges home will need the following DME: rolling walker       SUBJECTIVE:   Patient stated I have gotten weak.     OBJECTIVE DATA SUMMARY:   HISTORY:   Past Medical History:   Diagnosis Date    Diabetes ([facility]     Ill-defined condition     perpherial artery disease    Ill-defined condition     hyperlidemia     Past Surgical History:   Procedure Laterality Date    HX OTHER SURGICAL  2011    right femoral tibial bypass    HX OTHER SURGICAL      drained times two in back    VASCULAR SURGERY PROCEDURE UNLIST  2012    blood clot. right leg       Expanded or extensive additional review of patient history:     Home Situation  Home Environment: Private residence  # Steps to Enter: 0  Wheelchair Ramp: Yes  One/Two Story Residence: One story  Living Alone: No  Support Systems: Child(joe), Spouse/Significant Other/Partner (wife with disability)  Current DME Used/Available at Home: [address] [employer] [address] chair, Other (comment) (there is a RW/SPC/w/c in the home for wife's use)  Tub or Shower Type: Tub/Shower combination    Hand dominance: Right    EXAMINATION OF PERFORMANCE DEFICITS:  Cognitive/Behavioral Status:           Perception: Appears intact  Perseveration: No perseveration noted  Safety/Judgement: Fall prevention        Hearing: Auditory  Auditory Impairment: None    Vision/Perceptual:                                Corrective Lenses: Glasses    Range of Motion:    AROM: Generally decreased, functional                         Strength:    Strength: Generally decreased, functional                Coordination:  Coordination: Generally decreased, functional (neuropathy in feet, intrinsic wasting in B hands L>R\)  Fine Motor Skills-Upper: Left Impaired;Right Impaired (but functional, intrinsic wasting)    Gross Motor Skills-Upper: Left Impaired;Right Impaired (but functional)    Tone & Sensation:    Tone: Normal  Sensation: Impaired (neuropahty in BLE)                      Balance:  Sitting: Intact  Standing: Impaired  Standing - Static: Fair  Standing - Dynamic : Fair    Functional Mobility and Transfers for ADLs:  Bed Mobility:  Rolling:  (seated at bedside chair upon arrival)  Scooting: Contact guard assistance    Transfers:  Sit to Stand: Contact guard assistance;Minimum assistance  Stand to Sit: Contact guard assistance  Bed to Chair: Contact guard assistance;Minimum assistance; Additional time (with RW)  Bathroom Mobility: Contact guard assistance;Minimum assistance (with RW)  Toilet Transfer : Contact guard assistance;Minimum assistance    ADL Assessment:  Feeding: Modified independent    Oral Facial Hygiene/Grooming: Contact guard assistance (standing, needs to lean against surface with hips for balanc)    Bathing: Minimum assistance    Upper Body Dressing: Setup    Lower Body Dressing: Minimum assistance    Toileting: Minimum assistance                ADL Intervention and task modifications:     See assessment  Cognitive Retraining  Safety/Judgement: Fall prevention       Functional Measure:  Barthel Index:    Bathin  Bladder: 5  Bowels: 10  Groomin  Dressin  Feeding: 10  Mobility: 10  Stairs: 0  Toilet Use: 5  Transfer (Bed to Chair and Back): 10  Total: 60/100        The Barthel ADL Index: Guidelines  1. The index should be used as a record of what a patient does, not as a record of what a patient could do. 2. The main aim is to establish degree of independence from any help, physical or verbal, however minor and for whatever reason. 3. The need for supervision renders the patient not independent. 4. A patient's performance should be established using the best available evidence. Asking the patient, friends/relatives and nurses are the usual sources, but direct observation and common sense are also important. However direct testing is not needed. 5. Usually the patient's performance over the preceding 24-48 hours is important, but occasionally longer periods will be relevant. 6. Middle categories imply that the patient supplies over 50 per cent of the effort. 7. Use of aids to be independent is allowed. Napoleon Clement., Barthel, D.W. (3070). Functional evaluation: the Barthel Index. [address] [facility] (14)2. ANSHU Baldwin, Apolinar Shankar., [facility], [facility] Morales Pastor ().  Measuring the change indisability after inpatient rehabilitation; comparison of the responsiveness of the Barthel Index and Functional [place] Measure. Journal of Neurology, Neurosurgery, and Psychiatry, 66(4), 262-039. KENNY Fernandez, HUNTER Beaver, & Eleanor Noel M.A. (2004.) Assessment of post-stroke quality of life in cost-effectiveness studies: The usefulness of the Barthel Index and the EuroQoL-5D. Quality of Life Research, 15, 860-33         Occupational Therapy Evaluation Charge Determination   History Examination Decision-Making   MEDIUM Complexity : Expanded review of history including physical, cognitive and psychosocial  history  MEDIUM Complexity : 3-5 performance deficits relating to physical, cognitive , or psychosocial skils that result in activity limitations and / or participation restrictions MEDIUM Complexity : Patient may present with comorbidities that affect occupational performnce. Miniml to moderate modification of tasks or assistance (eg, physical or verbal ) with assesment(s) is necessary to enable patient to complete evaluation       Based on the above components, the patient evaluation is determined to be of the following complexity level: MEDIUM  Pain Ratin/10    Activity Tolerance:   Fair    After treatment patient left in no apparent distress:    Sitting in chair, Call bell within reach and Bed / chair alarm activated    COMMUNICATION/EDUCATION:   The patients plan of care was discussed with: Physical therapist, Registered nurse and patient'. Patient/family have participated as able in goal setting and plan of care. and Patient/family agree to work toward stated goals and plan of care. This patients plan of care is appropriate for delegation to [facility].     Thank you for this referral.  Cheri Grace OTR/L  Time Calculation: 27 mins

## 2021-05-24 NOTE — PROGRESS NOTES
Spiritual Care Assessment/Progress Note  [facility]      NAME: Martina Wan      MRN: [id number]  AGE: 77 y.o.  SEX: male  Confucianist Affiliation: Mormon   Language: English     5/24/2021     Total Time (in minutes): 10     Spiritual Assessment begun in [facility] through conversation with:         [x]Patient        [] Family    [] Friend(s)        Reason for Consult: Initial/Spiritual assessment, patient floor     Spiritual beliefs: (Please include comment if needed)     [x] Identifies with a steven tradition: Mormon        [] Supported by a steven community:            [] Claims no spiritual orientation:           [] Seeking spiritual identity:                [] Adheres to an individual form of spirituality:           [] Not able to assess:                           Identified resources for coping:      [] Prayer                               [] Music                  [] Guided Imagery     [x] Family/friends                 [] Pet visits     [] Devotional reading                         [] Unknown     [] Other:                                             Interventions offered during this visit: (See comments for more details)    Patient Interventions: Affirmation of emotions/emotional suffering, Coping skills reviewed/reinforced, Life review/legacy, Initial visit, Initial/Spiritual assessment, patient floor, Catharsis/review of pertinent events in supportive environment           Plan of Care:     [] Support spiritual and/or cultural needs    [] Support AMD and/or advance care planning process      [] Support grieving process   [] Coordinate Rites and/or Rituals    [] Coordination with community clergy   [] No spiritual needs identified at this time   [] Detailed Plan of Care below (See Comments)  [] Make referral to Music Therapy  [] Make referral to Pet Therapy     [] Make referral to Addiction services  [] Make referral to [facility]  [] Make referral to Spiritual Care Partner  [] No future visits requested        [x] Follow up upon further referrals     Comments:     Initial Spiritual Care Assessment visit for Mr. Chen in 3242. Patient was alert, no family was presnet during the visit. Patient shared about his medical journey that he is feeling better and making daily progress. He had to lie down whole day at first he admitted, not he can sit up and hoping he can walk with his walker. He lives in with his wife and a son, two other sons are  living with their spouses.  wished him walk better soon. He was waiting for the doctor wondering how is his medical test result is going.  provided attentive listening and supportive presence. Advised of  availability.       [id number] [facility] Brayden Esparza., M.S., Th.M.  Spiritual Care Provider   Paging Service [phone])

## 2021-05-24 NOTE — PROGRESS NOTES
Problem: Mobility Impaired (Adult and Pediatric)  Goal: *Acute Goals and Plan of Care (Insert Text)  Description: Physical Therapy Goals  Initiated 5/21/2021  1. Patient will move from supine to sit and sit to supine , scoot up and down, and roll side to side in bed with independence within 7 day(s). 2.  Patient will transfer from bed to chair and chair to bed with modified independence using the least restrictive device within 7 day(s). 3.  Patient will perform sit to stand with modified independence within 7 day(s). 4.  Patient will ambulate with modified independence for 150 feet with the least restrictive device within 7 day(s). Outcome: Progressing Towards Goal    PHYSICAL THERAPY TREATMENT  Patient: Chester White (55 y.o. male)  Date: 5/24/2021  Diagnosis: Sepsis ([facility] [address] [A41.9]  KENNA (acute kidney injury) ([facility] [address] [N17.9]  Lactic acid acidosis [E87.2]  Acute hypoxemic respiratory failure ([facility] [address] [J96.01] <principal problem not specified>       Precautions: Bed Alarm, Fall  Chart, physical therapy assessment, plan of care and goals were reviewed. ASSESSMENT  Patient continues with skilled PT services and is slowly progressing towards goals. Patient continues to demonstrate limited functional mobility and decreased independence secondary to impaired standing balance, generalized weakness, impaired coordination, impaired gait mechanics, and decreased activity tolerance. Received sitting in bedside chair and agreeable to PT intervention. Exhibits decreased safety awareness and lacks insight into current functional deficits, requiring increased education and cueing to improve safety and environmental awareness. Needed VCs for safe hand placement during sit<>stand transfers with use of RW. Exhibits increased trunk flexion, narrowed RODGER with occasional scissoring, and mild path deviations during gait training with RW support. Needed min A for RW management and positioning body inside of RW during gait.  Dairl Handsome tolerance for increased gait distance this date. Pt was left sitting in bedside chair with all needs met, RN aware, and chair alarm on following therapy session. Continue to recommend patient discharge to SNF rehab to address functional impairments as noted above as patient with decline from baseline mobility and is at an increased risk for falls. Current Level of Function Impacting Discharge (mobility/balance): min/CGA    Other factors to consider for discharge: increased risk for falls; decline from baseline mobility; decreased safety awareness         PLAN :  Patient continues to benefit from skilled intervention to address the above impairments. Continue treatment per established plan of care. to address goals. Recommendation for discharge: (in order for the patient to meet his/her long term goals)  Therapy up to 5 days/week in SNF setting    This discharge recommendation:  Has been made in collaboration with the attending provider and/or case management    IF patient discharges home will need the following DME: to be determined (TBD)       SUBJECTIVE:   Patient stated I need to have a bowel movement.     OBJECTIVE DATA SUMMARY:   Critical Behavior:  Neurologic State: Alert  Orientation Level: Oriented X4  Cognition: Follows commands  Safety/Judgement: Fall prevention  Functional Mobility Training:  Bed Mobility:  Rolling:  (seated at bedside chair upon arrival)        Scooting: Contact guard assistance        Transfers:  Sit to Stand: Contact guard assistance;Minimum assistance  Stand to Sit: Contact guard assistance        Bed to Chair: Contact guard assistance;Minimum assistance; Additional time (with RW)                    Balance:  Sitting: Intact  Standing: Impaired; With support  Standing - Static: Fair  Standing - Dynamic : Fair  Ambulation/Gait Training:  Distance (ft): 15 Feet (ft) (additional 80 ft after seated rest break)  Assistive Device: Gait belt;Walker, rolling  Ambulation - Level of Assistance: Minimal assistance;Contact guard assistance;Assist x1;Additional time; Adaptive equipment        Gait Abnormalities: Ataxic;Decreased step clearance; Path deviations;Scissoring        Base of Support: Widened     Speed/Teagan: Fluctuations; Slow  Step Length: Left shortened;Right shortened       Pain Rating:  No pain complaints    Activity Tolerance:   Fair and requires rest breaks    After treatment patient left in no apparent distress:   Sitting in chair, Call bell within reach, and Bed / chair alarm activated    COMMUNICATION/COLLABORATION:   The patients plan of care was discussed with: Physical therapist, Occupational therapist, and Registered nurse.      Meaghan Reyes PT, DPT   Time Calculation: 22 mins

## 2021-05-24 NOTE — PROGRESS NOTES
End of Shift Note    Bedside shift change report given to Ayaka Foster RN (oncoming nurse) by Jomar Sanchez (offgoing nurse). Report included the following information SBAR, Kardex, Intake/Output, MAR and Accordion    Shift worked:  Day     Shift summary and any significant changes:     Patient worked with PT and OT. Plan to discharge tomorrow. Rapid COVID test sent. Concerns for physician to address:  discharge     Zone phone for oncoming shift:   [phone]       Activity:  Activity Level: Up with Assistance  Number times ambulated in hallways past shift: 0  Number of times OOB to chair past shift: 1    Cardiac:   Cardiac Monitoring: No      Cardiac Rhythm: Sinus Rhythm    Access:   Current line(s): PIV     Genitourinary:   Urinary status: voiding    Respiratory:   O2 Device: None (Room air)  Chronic home O2 use?: NO  Incentive spirometer at bedside: NO     GI:  Last Bowel Movement Date: 05/24/21  Current diet:  DIET DIABETIC CONSISTENT CARB Regular  Passing flatus: YES  Tolerating current diet: YES       Pain Management:   Patient states pain is manageable on current regimen: YES    Skin:  Dwayne Score: 19  Interventions: float heels and increase time out of bed    Patient Safety:  Fall Score:  Total Score: 4  Interventions: bed/chair alarm, assistive device (walker, cane, etc), gripper socks and pt to call before getting OOB  High Fall Risk: Yes    Length of Stay:  Expected LOS: 4d 19h  Actual LOS: [address] [facility]

## 2021-05-24 NOTE — PROGRESS NOTES
Problem: Falls - Risk of  Goal: *Absence of Falls  Description: Document Onalee Gum Fall Risk and appropriate interventions in the flowsheet.   Outcome: Progressing Towards Goal  Note: Fall Risk Interventions:  Mobility Interventions: Communicate number of staff needed for ambulation/transfer, Bed/chair exit alarm, Patient to call before getting OOB, Utilize walker, cane, or other assistive device         Medication Interventions: Bed/chair exit alarm, Evaluate medications/consider consulting pharmacy, Patient to call before getting OOB, Teach patient to arise slowly    Elimination Interventions: Call light in reach, Patient to call for help with toileting needs    History of Falls Interventions: Bed/chair exit alarm, Consult care management for discharge planning, Door open when patient unattended, Room close to nurse's station         Problem: Patient Education: Go to Patient Education Activity  Goal: Patient/Family Education  Outcome: Progressing Towards Goal

## 2021-05-25 LAB
GLUCOSE BLD STRIP.AUTO-MCNC: 123 MG/DL (ref 65–117)
GLUCOSE BLD STRIP.AUTO-MCNC: 140 MG/DL (ref 65–117)
GLUCOSE BLD STRIP.AUTO-MCNC: 227 MG/DL (ref 65–117)
GLUCOSE BLD STRIP.AUTO-MCNC: 257 MG/DL (ref 65–117)
SERVICE CMNT-IMP: ABNORMAL

## 2021-05-25 PROCEDURE — 74011250637 HC RX REV CODE- 250/637: Performed by: GENERAL ACUTE CARE HOSPITAL

## 2021-05-25 PROCEDURE — 97110 THERAPEUTIC EXERCISES: CPT

## 2021-05-25 PROCEDURE — 82962 GLUCOSE BLOOD TEST: CPT

## 2021-05-25 PROCEDURE — 74011250636 HC RX REV CODE- 250/636: Performed by: GENERAL ACUTE CARE HOSPITAL

## 2021-05-25 PROCEDURE — 74011250636 HC RX REV CODE- 250/636: Performed by: STUDENT IN AN ORGANIZED HEALTH CARE EDUCATION/TRAINING PROGRAM

## 2021-05-25 PROCEDURE — 97535 SELF CARE MNGMENT TRAINING: CPT | Performed by: OCCUPATIONAL THERAPIST

## 2021-05-25 PROCEDURE — 74011636637 HC RX REV CODE- 636/637: Performed by: STUDENT IN AN ORGANIZED HEALTH CARE EDUCATION/TRAINING PROGRAM

## 2021-05-25 PROCEDURE — 65270000029 HC RM PRIVATE

## 2021-05-25 PROCEDURE — 97116 GAIT TRAINING THERAPY: CPT

## 2021-05-25 RX ORDER — LANOLIN ALCOHOL/MO/W.PET/CERES
1000 CREAM (GRAM) TOPICAL DAILY
Status: DISCONTINUED | OUTPATIENT
Start: 2021-05-26 | End: 2021-05-26 | Stop reason: HOSPADM

## 2021-05-25 RX ADMIN — INSULIN LISPRO 6 UNITS: 100 INJECTION, SOLUTION INTRAVENOUS; SUBCUTANEOUS at 17:19

## 2021-05-25 RX ADMIN — INSULIN GLARGINE 33 UNITS: 100 INJECTION, SOLUTION SUBCUTANEOUS at 09:39

## 2021-05-25 RX ADMIN — LISINOPRIL 10 MG: 10 TABLET ORAL at 09:35

## 2021-05-25 RX ADMIN — Medication 10 ML: at 14:00

## 2021-05-25 RX ADMIN — Medication 10 ML: at 05:58

## 2021-05-25 RX ADMIN — Medication 10 ML: at 22:18

## 2021-05-25 RX ADMIN — INSULIN LISPRO 5 UNITS: 100 INJECTION, SOLUTION INTRAVENOUS; SUBCUTANEOUS at 17:19

## 2021-05-25 RX ADMIN — INSULIN LISPRO 6 UNITS: 100 INJECTION, SOLUTION INTRAVENOUS; SUBCUTANEOUS at 11:30

## 2021-05-25 RX ADMIN — INSULIN LISPRO 3 UNITS: 100 INJECTION, SOLUTION INTRAVENOUS; SUBCUTANEOUS at 11:30

## 2021-05-25 RX ADMIN — ENOXAPARIN SODIUM 40 MG: 40 INJECTION SUBCUTANEOUS at 09:35

## 2021-05-25 RX ADMIN — CYANOCOBALAMIN 1000 MCG: 1000 INJECTION, SOLUTION INTRAMUSCULAR; SUBCUTANEOUS at 09:35

## 2021-05-25 NOTE — PROGRESS NOTES
End of Shift Note    Bedside shift change report given to Kana Martinez (oncoming nurse) by May Santos (offgoing nurse). Report included the following information SBAR, Kardex, Intake/Output, MAR and Accordion    Shift worked:  [id number]     Shift summary and any significant changes:     Patient slept through the night. Patient is currently sitting up in chair     Concerns for physician to address:       Zone phone for oncoming shift:   [phone]       Activity:  Activity Level: Up with Assistance  Number times ambulated in hallways past shift: 0  Number of times OOB to chair past shift: 0    Cardiac:   Cardiac Monitoring: No      Cardiac Rhythm: Sinus Rhythm    Access:   Current line(s): PIV     Genitourinary:   Urinary status: voiding    Respiratory:   O2 Device: None (Room air)  Chronic home O2 use?: NO  Incentive spirometer at bedside: NO     GI:  Last Bowel Movement Date: 05/24/21  Current diet:  DIET DIABETIC CONSISTENT CARB Regular  Passing flatus: YES  Tolerating current diet: YES       Pain Management:   Patient states pain is manageable on current regimen: YES    Skin:  Dwayne Score: 19  Interventions: increase time out of bed and PT/OT consult    Patient Safety:  Fall Score:  Total Score: 4  Interventions: bed/chair alarm, assistive device (walker, cane, etc), gripper socks and pt to call before getting OOB  High Fall Risk: Yes    Length of Stay:  Expected LOS: 4d 19h  Actual LOS: [address] [facility]

## 2021-05-25 NOTE — PROGRESS NOTES
Problem: Self Care Deficits Care Plan (Adult)  Goal: *Acute Goals and Plan of Care (Insert Text)  Description: FUNCTIONAL STATUS PRIOR TO ADMISSION: ambulated with rolling walker, performed ADLS on his own, does not drive, cooks/cleans and does laundry, is the caregiver for his wife whom he has to physically assist    HOME SUPPORT PRIOR TO ADMISSION: The patient lived with wife and son. Son works during the day. Occupational Therapy Goals:  Initiated 5/24/2021  1. Patient will perform grooming standing with modified independence within 7 days. 2. Patient will perform lower body dressing with modified independence within 7 days. 3. Patient will perform toileting with modified independence within 7 days. 4. Patient will perform one IADL task in standing with modified independence within 7 days. 5. Patient will transfer from toilet with modified independence using the least restrictive device and appropriate durable medical equipment within 7 days. Outcome: Progressing Towards Goal   OCCUPATIONAL THERAPY TREATMENT  Patient: Esther Maciel (36 y.o. male)  Date: 5/25/2021  Diagnosis: Sepsis ([facility] [address] [A41.9]  KENNA (acute kidney injury) ([facility] [address] [N17.9]  Lactic acid acidosis [E87.2]  Acute hypoxemic respiratory failure ([facility] [address] [J96.01] <principal problem not specified>       Precautions: Bed Alarm, Fall  Chart, occupational therapy assessment, plan of care, and goals were reviewed. ASSESSMENT  Patient continues with skilled OT services and is progressing towards goals. Pt was seated at bedside chair asleep. Offered to assist pt back to bed but pt preferred to sit up in the chair. He remains very motivated to participate. CGA for sit to stand from bedside chair and for mobility to bathroom with RW.  Verbal cues needed for walker safety. Improved dynamic standing balance at sink for grooming and pt perform these tasks with CGA.   With returning from bathroom pt stepped on his own sock and foot and had one major loss of balance with min assist to correct. Pt needed to look down at his feet to see why he lost his balance due to decreased sensation in his feet. Continue to recommend SNF for rehab at discharge. Current Level of Function Impacting Discharge (ADLs): CGA grooming standing at sink, CGA to min assist for mobility with RW    Other factors to consider for discharge: fall risk         PLAN :  Patient continues to benefit from skilled intervention to address the above impairments. Continue treatment per established plan of care to address goals. Recommend with staff: OOB to chair with assist and to bathroom with RW    Recommend next OT session: standing balance, ADLS    Recommendation for discharge: (in order for the patient to meet his/her long term goals)  Therapy up to 5 days/week in SNF setting    This discharge recommendation:  Has been made in collaboration with the attending provider and/or case management    IF patient discharges home will need the following DME: walker: rolling       SUBJECTIVE:   Patient stated I got up last night to go to the bathroom with the nurse and my left foot gave out and I almost fell. If that nurse was not with me I would have been on the floor.     OBJECTIVE DATA SUMMARY:   Cognitive/Behavioral Status:  Neurologic State: Alert  Orientation Level: Oriented X4  Cognition: Follows commands  Perception: Appears intact  Perseveration: No perseveration noted  Safety/Judgement: Awareness of environment; Fall prevention;Home safety    Functional Mobility and Transfers for ADLs:  Bed Mobility:  Scooting: Supervision    Transfers:  Sit to Stand: Stand-by assistance;Contact guard assistance  Functional Transfers  Bathroom Mobility: Contact guard assistance;Minimum assistance (with RW)  Bed to Chair: Contact guard assistance    Balance:  Sitting: Intact  Standing: Impaired; With support  Standing - Static: Good;Fair  Standing - Dynamic : Fair    ADL Intervention: Grooming  Grooming Assistance:  (grooming performed standing at sink)  Brushing Teeth: Contact guard assistance (using mouthwash, pt does not have teeth)  Brushing/Combing Hair: Contact guard assistance       Lower Body Dressing Assistance  Socks: Contact guard assistance (seated crossed leg technique )         Cognitive Retraining  Safety/Judgement: Awareness of environment; Fall prevention;Home safety        Pain:  0/10    Activity Tolerance:   Fair and requires rest breaks    After treatment patient left in no apparent distress:   Sitting in chair, Call bell within reach, and Bed / chair alarm activated    COMMUNICATION/COLLABORATION:   The patients plan of care was discussed with:  patient .      Jazmin Nelson, OTR/L  Time Calculation: 9 mins

## 2021-05-25 NOTE — PROGRESS NOTES
Comprehensive Nutrition Assessment    Type and Reason for Visit: Initial, RD nutrition re-screen/LOS    Nutrition Recommendations/Plan:   Continue CCD  Please document % meals and supplements consumed in flowsheet I/O's under intake     Nutrition Assessment:      Chart reviewed for LOS. Pt noted for sepsis of unknown origin, metabolic encephalopathy, acute hypoxic respiratory failure, fall, B12 deficiency, DM2, KENNA. Good appetite per documentation. MST negative for risk factors. D/c planning for rehab. No acute nutrition concerns at this time. Patient Vitals for the past 168 hrs:   % Diet Eaten   05/22/21 1739 76 - 100%   05/22/21 1347 76 - 100%   05/22/21 0813 76 - 100%   05/20/21 1531 76 - 100%   05/20/21 1130 76 - 100%   05/20/21 0835 76 - 100%   05/19/21 1629 26 - 50%   05/19/21 1311 76 - 100%   05/19/21 0900 76 - 100%   ]  Wt Readings from Last 5 Encounters:   05/22/21 104.7 kg (230 lb 13.2 oz)   05/21/21 104.3 kg (230 lb)   12/24/17 93 kg (205 lb)   06/30/14 103 kg (227 lb)   06/26/14 105.2 kg (232 lb)   ]    Estimated Daily Nutrient Needs:  Energy (kcal): 2095 kcal (BMR 1804 x 1. 3AF - 250); Weight Used for Energy Requirements: Current  Protein (g): 84-105g (0.8-1g/kg); Weight Used for Protein Requirements: Current  Fluid (ml/day): 2100mL; Method Used for Fluid Requirements: 1 ml/kcal      Nutrition Related Findings:  Labs: -227. Meds: lantus, humalog. BM 5/24. Wounds:    None       Current Nutrition Therapies:  DIET DIABETIC CONSISTENT CARB Regular    Anthropometric Measures:  · Height:  5' 11\" (180.3 cm)  · Current Body Wt:  104.7 kg (230 lb 13.2 oz)    · Ideal Body Wt:  172 lbs:  134.2 %   · BMI Category:  Obese class 1 (BMI 30.0-34. 9)       Nutrition Diagnosis:   No nutrition diagnosis at this time     Nutrition Interventions:   Food and/or Nutrient Delivery: Continue current diet  Nutrition Education and Counseling: No recommendations at this time  Coordination of Nutrition Care:  No recommendation at this time    Goals:  Continue PO intake >75% meals next 5-7 days       Nutrition Monitoring and Evaluation:   Behavioral-Environmental Outcomes: None identified  Food/Nutrient Intake Outcomes: Food and nutrient intake  Physical Signs/Symptoms Outcomes: Biochemical data, Weight, GI status    Discharge Planning:    Continue current diet     Electronically signed by Inez Vega RD on 5/25/2021 at 3:19 PM    Contact: [id number]

## 2021-05-25 NOTE — PROGRESS NOTES
Problem: Mobility Impaired (Adult and Pediatric)  Goal: *Acute Goals and Plan of Care (Insert Text)   FUNCTIONAL STATUS PRIOR TO ADMISSION: Pt reports he lives at home with his wife who has some disability/health issues and he helps her, normally walks without any device independently and manages his own ADLS and some IADLs. Son lives with them but goes out of the home to work.     HOME SUPPORT PRIOR TO ADMISSION: The patient lived with family but did not require assist.     Description: Physical Therapy Goals  Initiated 5/21/2021  1. Patient will move from supine to sit and sit to supine , scoot up and down, and roll side to side in bed with independence within 7 day(s). 2.  Patient will transfer from bed to chair and chair to bed with modified independence using the least restrictive device within 7 day(s). 3.  Patient will perform sit to stand with modified independence within 7 day(s). 4.  Patient will ambulate with modified independence for 150 feet with the least restrictive device within 7 day(s). Outcome: Progressing Towards Goal   PHYSICAL THERAPY TREATMENT  Patient: Dayna Abbasi (7 y.o. male)  Date: 5/25/2021  Diagnosis: Sepsis ([facility] [address] [A41.9]  KENNA (acute kidney injury) ([facility] [address] [N17.9]  Lactic acid acidosis [E87.2]  Acute hypoxemic respiratory failure ([facility] [address] [J96.01] <principal problem not specified>       Precautions: Bed Alarm, Fall  Chart, physical therapy assessment, plan of care and goals were reviewed. ASSESSMENT  Patient continues with skilled PT services and is progressing towards goals. Pt received seated in bedside chair, agreeable to PT session. Pt reporting that he has been going to the bathroom with the nursing staff, but almost fell last night. Overall he is moving better this session and inquiring about when he is leaving to go to rehab. Gait distance tolerance approx 75ft with RW, with improved gait quality.   Pt also participated in seated BLE exercises and instructed to perform throughout the day. SNF rehab remains appropriate at discharge. Current Level of Function Impacting Discharge (mobility/balance): cga to sba    Other factors to consider for discharge: limited assistance at home         PLAN :  Patient continues to benefit from skilled intervention to address the above impairments. Continue treatment per established plan of care. to address goals. Recommendation for discharge: (in order for the patient to meet his/her long term goals)  Therapy up to 5 days/week in SNF setting    This discharge recommendation:  Has been made in collaboration with the attending provider and/or case management    IF patient discharges home will need the following DME: rolling walker       SUBJECTIVE:   Patient stated my wife has a walker    OBJECTIVE DATA SUMMARY:   Critical Behavior:  Neurologic State: Alert  Orientation Level: Oriented X4  Cognition: Follows commands  Safety/Judgement: Fall prevention  Functional Mobility Training:  Bed Mobility:           Scooting: Supervision        Transfers:  Sit to Stand: Stand-by assistance;Contact guard assistance  Stand to Sit: Stand-by assistance        Bed to Chair: Contact guard assistance                    Balance:  Standing: Impaired; With support  Standing - Static: Good;Fair  Standing - Dynamic : Fair  Ambulation/Gait Training:  Distance (ft): 75 Feet (ft) (RW)  Assistive Device: Gait belt;Walker, rolling  Ambulation - Level of Assistance: Contact guard assistance;Minimal assistance        Gait Abnormalities: Decreased step clearance        Base of Support: Widened     Speed/Teagan: Fluctuations; Pace decreased (<100 feet/min)  Step Length: Left shortened;Right shortened        Therapeutic Exercises:   Seated BLE:  toe raises, heel raises, laq, marches  1 set x 10 reps    Pain Rating:  None reported    Activity Tolerance:   Fair and SpO2 stable on RA    After treatment patient left in no apparent distress:   Sitting in chair, Call bell within reach, and Bed / chair alarm activated    COMMUNICATION/COLLABORATION:   The patients plan of care was discussed with: Registered nurse.      Rebeca Anderson, PT

## 2021-05-25 NOTE — PROGRESS NOTES
Problem: Falls - Risk of  Goal: *Absence of Falls  Description: Document Taisha Gomez Fall Risk and appropriate interventions in the flowsheet.   Outcome: Progressing Towards Goal  Note: Fall Risk Interventions:  Mobility Interventions: Communicate number of staff needed for ambulation/transfer, Patient to call before getting OOB, Utilize walker, cane, or other assistive device, Utilize gait belt for transfers/ambulation         Medication Interventions: Patient to call before getting OOB, Teach patient to arise slowly, Utilize gait belt for transfers/ambulation    Elimination Interventions: Call light in reach, Patient to call for help with toileting needs    History of Falls Interventions: Bed/chair exit alarm, Door open when patient unattended, Investigate reason for fall         Problem: Patient Education: Go to Patient Education Activity  Goal: Patient/Family Education  Outcome: Progressing Towards Goal

## 2021-05-25 NOTE — PROGRESS NOTES
Hospitalist Progress Note    NAME: Jono Romero   :  1945   MRN:  [id number]       Assessment / Plan:  Sepsis of unknown origin likely from viral gastroenteritis   Metabolic encephalopathy  Acute hypoxic respiratory failure  Fall    Sepsis indicatedlactic acidosis, tachycardia, tachypnea  WBC 4.9, procalcitonin 0.71. B Cx w CNS likely contaminant , repeat neg  Off Anbitiotics now  CT abdomen/pelvis1. New evidence of hepatic steatosis. 2. Cholelithiasis is new. No acute cholecystitis or biliary obstruction. 3. Chronic pancreatitis. No evidence of acute pancreatitis. 4. Subacute or chronic L1 partial compression fracture. 5. Nonobstructing bilateral nephrolithiasis. CT head and MRI no acute findings. Chest x-ray Limited by low lung volumes. Possible mild interstitial pulmonary edema. Enteric bacterial panel not done and diarrhea resolved, lipase 49. UA within normal limits. PT eval recs, home w 24 care or SNF, pt lives with son who is not there . CM working on placement, pt stable for DC     B12 deficiency  PO vitamin B12 supplements.     Diabetes type 2 with hyperglycemia  Insulin regimen, f/u finger sticks  Started low dose lisinopril for nephro protection      Acute kidney injury  resolved      Code Status: Full code  Surrogate Decision Maker: Wife  DVT Prophylaxis: Lovenox  GI Prophylaxis: not indicated  Baseline: Ambulatory with assistance at home   Disposition: Rehab. pt stable for DC     Estimated discharge date: MEDICALLY ready, waiting SNF placment. Barriers: SNF placement.      Subjective:     Chief Complaint / Reason for Physician Visit  Follow up for Sepsis d/t gastroenteritis  He is eager to go to rehab facility    Review of Systems:  Symptom Y/N Comments  Symptom Y/N Comments   Fever/Chills n   Chest Pain n    Poor Appetite n   Edema n    Cough    Abdominal Pain     Sputum    Joint Pain     SOB/YOUNG    Pruritis/Rash     Nausea/vomit    Tolerating PT/OT     Diarrhea    Tolerating Diet     Constipation    Other       Could NOT obtain due to:      Objective:     VITALS:   Last 24hrs VS reviewed since prior progress note. Most recent are:  Patient Vitals for the past 24 hrs:   Temp Pulse Resp BP SpO2   05/25/21 1128 97.5 °F (36.4 °C) 80 18 121/64 96 %   05/25/21 0818 98.4 °F (36.9 °C) 78 18 119/60 95 %   05/25/21 0343 98 °F (36.7 °C) 75 18 120/63 96 %   05/24/21 2320 97.8 °F (36.6 °C) 72 18 113/60 95 %   05/24/21 1926 97.4 °F (36.3 °C) 73 18 (!) 125/56 94 %   05/24/21 1624 97.3 °F (36.3 °C) 72 18 (!) 120/59 99 %       Intake/Output Summary (Last 24 hours) at 5/25/2021 1544  Last data filed at 5/25/2021 0645  Gross per 24 hour   Intake    Output 310 ml   Net -310 ml        I had a face to face encounter and independently examined this patient on 5/25/2021, as outlined below:  PHYSICAL EXAM:  General: WD, WN. Alert, cooperative, no acute distress    EENT:  EOMI. Anicteric sclerae. MMM  Resp:  CTA bilaterally, no wheezing or rales. No accessory muscle use  CV:  Regular  rhythm,  No edema  GI:  Soft, Non distended, Non tender. +Bowel sounds  Neurologic:  Alert and oriented X 3, normal speech,   Psych:   Good insight. Not anxious nor agitated  Skin:  No rashes. No jaundice    Reviewed most current lab test results and cultures  YES  Reviewed most current radiology test results   YES  Review and summation of old records today    NO  Reviewed patient's current orders and MAR    YES  PMH/SH reviewed - no change compared to H&P  ________________________________________________________________________  Care Plan discussed with:    Comments   Patient y    Family      RN y    Care Manager     Consultant                        Multidiciplinary team rounds were held today with , nursing, pharmacist and clinical coordinator. Patient's plan of care was discussed; medications were reviewed and discharge planning was addressed. ________________________________________________________________________  Total NON critical care TIME:  35  Minutes    Total CRITICAL CARE TIME Spent:   Minutes non procedure based      Comments   >50% of visit spent in counseling and coordination of care     ________________________________________________________________________  Shalonda Maddox MD     Procedures: see electronic medical records for all procedures/Xrays and details which were not copied into this note but were reviewed prior to creation of Plan. LABS:  I reviewed today's most current labs and imaging studies. Pertinent labs include:  No results for input(s): WBC, HGB, HCT, PLT, HGBEXT, HCTEXT, PLTEXT in the last 72 hours. No results for input(s): NA, K, CL, CO2, GLU, BUN, CREA, CA, MG, PHOS, ALB, TBIL, TBILI, ALT, INR, INREXT in the last 72 hours.     No lab exists for component: SGOT    Signed: Shalonda Maddox MD

## 2021-05-25 NOTE — PROGRESS NOTES
Transition of Care Plan:     RUR:11%  Disposition: SNF: [facility] has been sent to [address] [place] [address] for Sary Skaggs 5/24  Pt is needing a rapid COVID test   Follow up appointments: To be made prior to discharge.   DME needed: To be determined. Transportation at [facility] to [facility] or means to access home:  Wife and son has keys. IM Medicare letter: To be given prior to discharge.   Caregiver Contact:  Selena Caballero, Spouse ([phone])  Discharge Caregiver contacted prior to discharge?  Caregiver to be contacted prior to discharge.     CM received a call from Komal Salinas, patient's son, regarding wanting pt to go home with [facility]. CM spoke with son at pt's bedside to discuss options. Son wanted info sent to [address] [place] [address],[address] [facility]; however, pt stated that he wanted to stick with [facility]. CM informed son that pt has the right to self-determination.     Amada Cmapoverde,   Care Management, [address]

## 2021-05-26 VITALS
SYSTOLIC BLOOD PRESSURE: 111 MMHG | HEART RATE: 71 BPM | WEIGHT: 230.82 LBS | TEMPERATURE: 98.6 F | HEIGHT: 71 IN | RESPIRATION RATE: 18 BRPM | OXYGEN SATURATION: 96 % | BODY MASS INDEX: 32.31 KG/M2 | DIASTOLIC BLOOD PRESSURE: 53 MMHG

## 2021-05-26 LAB
BACTERIA SPEC CULT: NORMAL
GLUCOSE BLD STRIP.AUTO-MCNC: 108 MG/DL (ref 65–117)
GLUCOSE BLD STRIP.AUTO-MCNC: 225 MG/DL (ref 65–117)
SERVICE CMNT-IMP: ABNORMAL
SERVICE CMNT-IMP: NORMAL
SERVICE CMNT-IMP: NORMAL

## 2021-05-26 PROCEDURE — 74011636637 HC RX REV CODE- 636/637: Performed by: STUDENT IN AN ORGANIZED HEALTH CARE EDUCATION/TRAINING PROGRAM

## 2021-05-26 PROCEDURE — 74011250636 HC RX REV CODE- 250/636: Performed by: STUDENT IN AN ORGANIZED HEALTH CARE EDUCATION/TRAINING PROGRAM

## 2021-05-26 PROCEDURE — 97116 GAIT TRAINING THERAPY: CPT

## 2021-05-26 PROCEDURE — 82962 GLUCOSE BLOOD TEST: CPT

## 2021-05-26 PROCEDURE — 97110 THERAPEUTIC EXERCISES: CPT

## 2021-05-26 PROCEDURE — 74011250637 HC RX REV CODE- 250/637: Performed by: GENERAL ACUTE CARE HOSPITAL

## 2021-05-26 PROCEDURE — 74011250637 HC RX REV CODE- 250/637: Performed by: STUDENT IN AN ORGANIZED HEALTH CARE EDUCATION/TRAINING PROGRAM

## 2021-05-26 RX ORDER — LANOLIN ALCOHOL/MO/W.PET/CERES
1000 CREAM (GRAM) TOPICAL DAILY
Qty: 30 TABLET | Refills: 0 | Status: SHIPPED | OUTPATIENT
Start: 2021-05-27

## 2021-05-26 RX ORDER — LISINOPRIL 10 MG/1
10 TABLET ORAL DAILY
Qty: 30 TABLET | Refills: 1 | Status: SHIPPED | OUTPATIENT
Start: 2021-05-27

## 2021-05-26 RX ADMIN — CYANOCOBALAMIN TAB 500 MCG 1000 MCG: 500 TAB at 08:45

## 2021-05-26 RX ADMIN — Medication 10 ML: at 05:31

## 2021-05-26 RX ADMIN — INSULIN LISPRO 6 UNITS: 100 INJECTION, SOLUTION INTRAVENOUS; SUBCUTANEOUS at 12:15

## 2021-05-26 RX ADMIN — INSULIN LISPRO 6 UNITS: 100 INJECTION, SOLUTION INTRAVENOUS; SUBCUTANEOUS at 08:46

## 2021-05-26 RX ADMIN — ENOXAPARIN SODIUM 40 MG: 40 INJECTION SUBCUTANEOUS at 08:45

## 2021-05-26 RX ADMIN — INSULIN GLARGINE 33 UNITS: 100 INJECTION, SOLUTION SUBCUTANEOUS at 08:46

## 2021-05-26 RX ADMIN — LISINOPRIL 10 MG: 10 TABLET ORAL at 08:45

## 2021-05-26 RX ADMIN — INSULIN LISPRO 3 UNITS: 100 INJECTION, SOLUTION INTRAVENOUS; SUBCUTANEOUS at 12:15

## 2021-05-26 NOTE — PROGRESS NOTES
Problem: Mobility Impaired (Adult and Pediatric)  Goal: *Acute Goals and Plan of Care (Insert Text)  Description: Physical Therapy Goals  Initiated 5/21/2021  1. Patient will move from supine to sit and sit to supine , scoot up and down, and roll side to side in bed with independence within 7 day(s). 2.  Patient will transfer from bed to chair and chair to bed with modified independence using the least restrictive device within 7 day(s). 3.  Patient will perform sit to stand with modified independence within 7 day(s). 4.  Patient will ambulate with modified independence for 150 feet with the least restrictive device within 7 day(s). Outcome: Progressing Towards Goal   PHYSICAL THERAPY TREATMENT  Patient: Esther Maciel (51 y.o. male)  Date: 5/26/2021  Diagnosis: Sepsis ([facility] [A41.9]  KENNA (acute kidney injury) ([facility] [N17.9]  Lactic acid acidosis [E87.2]  Acute hypoxemic respiratory failure ([address] [J96.01] <principal problem not specified>       Precautions: Bed Alarm, Fall  Chart, physical therapy assessment, plan of care and goals were reviewed. ASSESSMENT  Patient continues with skilled PT services and is progressing towards goals. Pt seated in bedside chair, agreeable to PT session. Pt demonstrates mobility at mod I to cga/sba. He continues to require verbal cues for safety, RW management,  body position in RW. Gait distance tolerance increased, as well as gait quality this session. His safety awareness is decreased and he requires verbal instruction re compensating for his foot neuropathy with his vision. SNF rehab remains appropriate at discharge. Current Level of Function Impacting Discharge (mobility/balance): cga to mod I    Other factors to consider for discharge: functioning below his baseline, decreased safety awareness/judgement, fall risk         PLAN :  Patient continues to benefit from skilled intervention to address the above impairments.   Continue treatment per established plan of care. to address goals. Recommendation for discharge: (in order for the patient to meet his/her long term goals)  Therapy up to 5 days/week in SNF setting    This discharge recommendation:  Has been made in collaboration with the attending provider and/or case management    IF patient discharges home will need the following DME: rolling walker       SUBJECTIVE:   Patient stated this left ankle swells at night and when I get up to try and go to the bathroom, I can't walk on it    OBJECTIVE DATA SUMMARY:   Critical Behavior:  Neurologic State: Alert  Orientation Level: Oriented X4  Cognition: Follows commands  Safety/Judgement: Awareness of environment, Fall prevention, Home safety  Functional Mobility Training:  Bed Mobility:           Scooting: Modified independent        Transfers:  Sit to Stand: Stand-by assistance (vc for hand placement)  Stand to Sit: Stand-by assistance (verbal safety cues for RW position and hand placement)        Bed to Chair: Stand-by assistance (verbal safety cues)                    Balance:  Sitting: Intact  Standing: Impaired; With support  Standing - Static: Good;Fair  Standing - Dynamic : Good;Fair  Ambulation/Gait Training:  Distance (ft): 100 Feet (ft)  Assistive Device: Gait belt;Walker, rolling  Ambulation - Level of Assistance: Contact guard assistance        Gait Abnormalities: Decreased step clearance; Path deviations        Base of Support: Narrowed     Speed/Teagan: Pace decreased (<100 feet/min)  Step Length: Left shortened;Right shortened  Swing Pattern: Left asymmetrical (slightly improved this session)               Therapeutic Exercises:   Seated BLE:  toe raises, heel raises, laq, marches   x 10 reps    Pain Rating:  None reported    Activity Tolerance:   Good and Fair    After treatment patient left in no apparent distress:   Sitting in chair, Call bell within reach, and Bed / chair alarm activated    COMMUNICATION/COLLABORATION:   The patients plan of care was discussed with: Registered nurse.      Gregg Phillips, PT   Time Calculation: 25 mins

## 2021-05-26 NOTE — PROGRESS NOTES
Transition of Care Plan:     RUR:12%  Disposition: [facility] [address] [place] [address] 5/24  Follow up appointments: To be made prior to discharge.   DME needed: To be determined. Transportation at [facility] to transport. [place] or means to access home:  Wife and son has keys. IM Medicare letter:recieved   Caregiver Contact:  Selena Rosas, Spouse ([phone])  Discharge Caregiver contacted prior to discharge? Pt's wife and son were notified of discharge. Transition of Care Plan to SNF/Rehab    SNF/Rehab Transition:  Patient has been accepted to [facility]  and meets criteria for admission. Patient will transported by  family and expected to leave at 2pm.    Communication to Patient/Family:  Met with patient and his son Tamar Leaf and they are agreeable to the transition plan. Medicare pt has received, reviewed, and signed 2nd  letter informing them of their right to appeal the discharge. Signed copy has been placed on pt bedside chart. Communication to SNF/Rehab:  Bedside RN, Lucia Bowen, has been notified to update the transition plan to the facility and   call report [phone]  Room: [address] [place] [address]        Nursing Please include all hard scripts for controlled substances, med rec and dc summary, and AVS in packet.      Reviewed and confirmed with facility, Kayden can manage the patient care needs for the following:     Cresenciano Kofi with (X) only those applicable:    Medication:  [x]  Medications will be available at the facility  []  IV Antibiotics   []  Controlled Substance - hard copy to be sent with patient   []  Weekly Labs   Documents:  [] Hard RX  [x] MAR  [x] Kardex  [x] AVS  []Transfer Summary  []Discharge   Equipment:  []  CPAP/BiPAP  []  Wound Vacuum  []  Gonzales or Urinary Device  []  PICC/Central Line  []  Nebulizer  []  Ventilator   Treatment:  []Isolation (for MRSA, VRE, etc.)  []Surgical Drain Management  []Tracheostomy Care  []Dressing Changes  []Dialysis with transportation and chair time . []PEG Care  []Oxygen  []Daily Weights for Heart Failure   Dietary:  [x]Any diet limitations Diabetic   []Tube Feedings   []Total Parenteral Management (TPN)   Eligible for Medicaid Long Term Services and Supports  Yes:  [] Eligible for medical assistance or will become eligible within 180 days and UAI completed. [] Provider/Patient and/or support system has requested screening. [] UAI copy provided to patient or responsible party. [] UAI unavailable at discharge will send once processed to SNF provider. [] UAI unavailable at discharged mailed to patient  No:   [x] Private pay and is not financially eligible for Medicaid within the next 180 days. [] Reside out-of-state. [] A residents of a state owned/operated facility that is licensed  by [place] [address] [facility] or [facility]  [] Enrollment in [facility] hospice services  [] [address]  [] Patient /Family declines to have screening completed or provide financial information for screening     Financial Resources:  Medicaid    [] Initiated and application pending   [] Full coverage     Advanced Care Plan:  []Surrogate Decision Maker of Care  []POA  [x]Communicated Code Status FULL    Other     Care Management Interventions  PCP Verified by CM:  Yes  Transition of Care Consult (CM Consult): SNF  Partner SNF: Yes  Discharge Durable Medical Equipment:  (Patient has cane at home. )  Physical Therapy Consult: Yes  Occupational Therapy Consult: Yes  Current Support Network: Lives with Spouse  Confirm Follow Up Transport: Family  Discharge Location  Discharge Placement: Skilled nursing facility [facility] and [address] [place] [address]          [facility], [address] [place] [address]

## 2021-05-26 NOTE — DISCHARGE INSTRUCTIONS
HOSPITALIST DISCHARGE INSTRUCTIONS    NAME: Dona Kussmaul   :  1945   MRN:  [id number]     Date/Time:  2021 10:47 AM    ADMIT DATE: 2021   DISCHARGE DATE: 2021     Attending Physician: Tracey Marina MD    DISCHARGE DIAGNOSIS:  Sepsis d/t gastroenteritis  Vitamin B12 deficiency  DM      Medications: Per above medication reconciliation. Pain Management: per above medications    Recommended diet: Diabetic Diet    Recommended activity: Activity as tolerated    Wound care: None    Indwelling devices:  None    Supplemental Oxygen: None    Required Lab work: Per SNF routine    Glucose management:  Accucheck ACHS with sliding scale per SNF protocol    Code status: Full        Outside physician follow up: Follow-up Information     Follow up With Specialties Details Why [place] FLOOD [facility]  [phone]    [facility], [address] [place] [address]  [phone]                 Skilled nursing facility/ SNF MD responsible for above on discharge. Information obtained by :  I understand that if any problems occur once I am at home I am to contact my physician. I understand and acknowledge receipt of the instructions indicated above.                                                                                                                                            Physician's or R.N.'s Signature                                                                  Date/Time                                                                                                                                              Patient or Repres

## 2021-05-26 NOTE — PROGRESS NOTES
Problem: Falls - Risk of  Goal: *Absence of Falls  Description: Document Franklyn Pompeii Fall Risk and appropriate interventions in the flowsheet.   Outcome: Progressing Towards Goal  Note: Fall Risk Interventions:  Mobility Interventions: Patient to call before getting OOB         Medication Interventions: Patient to call before getting OOB, Teach patient to arise slowly    Elimination Interventions: Call light in reach    History of Falls Interventions: Consult care management for discharge planning         Problem: Patient Education: Go to Patient Education Activity  Goal: Patient/Family Education  Outcome: Progressing Towards Goal     Problem: Breathing Pattern - Ineffective  Goal: *Absence of hypoxia  Outcome: Progressing Towards Goal  Goal: *Use of effective breathing techniques  Outcome: Progressing Towards Goal  Goal: *PALLIATIVE CARE:  Alleviation of Dyspnea  Outcome: Progressing Towards Goal     Problem: Patient Education: Go to Patient Education Activity  Goal: Patient/Family Education  Outcome: Progressing Towards Goal     Problem: Patient Education: Go to Patient Education Activity  Goal: Patient/Family Education  Outcome: Progressing Towards Goal     Problem: Patient Education: Go to Patient Education Activity  Goal: Patient/Family Education  Outcome: Progressing Towards Goal
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
## 2021-05-26 NOTE — DISCHARGE SUMMARY
Hospitalist Discharge Summary     Patient ID:  Emigdio Ricketts  [id number]  77 y.o.  1945 5/18/2021    PCP on record: Rachel Dodd MD    Admit date: 5/18/2021  Discharge date and time: 5/26/2021    DISCHARGE DIAGNOSIS:  Sepsis d/t gastroenteritis  Vitamin B 12 deficiency  DM    CONSULTATIONS:  IP CONSULT TO HOSPITALIST    Excerpted HPI from H&P of Richie Lawrence MD:  Carmella Ceballos is a 77 y.o.  male who presents with generalized weakness, fall, confusion. Patient's past medical history of peripheral arterial disease, hypertension, hyperlipidemia. Patient agrees awake but slightly confused. Patient states that he is feeling weak and dizzy over the last couple of days and today he fell out of the wheelchair and hit the head on the floor. Patient has small abrasion below the eye. Patient is alert and oriented to place and self but not to time. Denies any chest pain but does report of shortness of breath. No cough, no fever and chills, no other complaints    ______________________________________________________________________  DISCHARGE SUMMARY/HOSPITAL COURSE:  for full details see H&P, daily progress notes, labs, consult notes. Sepsis of d/t gastroenteritis resolved  Metabolic encephalopathy resolved  Acute hypoxic respiratory failure resolved. Fall     Off Anbitiotics now  CT abdomen/pelvis1. New evidence of hepatic steatosis. 2. Cholelithiasis is new. No acute cholecystitis or biliary obstruction. 3. Chronic pancreatitis. No evidence of acute pancreatitis. 4. Subacute or chronic L1 partial compression fracture. 5. Nonobstructing bilateral nephrolithiasis. CT head and MRI no acute findings. Chest x-ray Limited by low lung volumes. Possible mild interstitial pulmonary edema. Enteric bacterial panel not done and diarrhea resolved, lipase 49. UA within normal limits.   Will discharge to SNF today     B12 deficiency  PO vitamin B12 supplements.     Diabetes type 2 with hyperglycemia  Insulin on discharge     Acute kidney injury  Drew Bruce     Being discharged to [facility] today      _______________________________________________________________________  Patient seen and examined by me on discharge day. Pertinent Findings:  Gen:    Not in distress  Chest: Clear lungs  CVS:   Regular rhythm. No edema  Abd:  Soft, not distended, not tender  Neuro:  Alert,   _______________________________________________________________________  DISCHARGE MEDICATIONS:   Current Discharge Medication List      START taking these medications    Details   cyanocobalamin 1,000 mcg tablet Take 1 Tablet by mouth daily. Qty: 30 Tablet, Refills: 0  Start date: 5/27/2021      lisinopriL (PRINIVIL, ZESTRIL) 10 mg tablet Take 1 Tablet by mouth daily. Qty: 30 Tablet, Refills: 1  Start date: 5/27/2021         CONTINUE these medications which have NOT CHANGED    Details   insulin aspart protamine/insulin aspart (NovoLOG Mix 70-30FlexPen U-100) 100 unit/mL (70-30) inpn 60 Units by SubCUTAneous route Every morning. SITagliptin (JANUVIA) 50 mg tablet Take 50 mg by mouth daily. fenofibrate (TRICOR) 160 mg tablet Take 160 mg by mouth daily. Patient Follow Up Instructions:    Activity: Activity as tolerated  Diet: Diabetic Diet  Wound Care: None needed    Follow-up with your PMD    Follow-up Information     Follow up With Specialties Details Why [place] FLOOD [facility] Gary Dolan [id number] [phone]    Estella Osuna, [address]  [address] [place] [address]  [phone]          ________________________________________________________________    Risk of deterioration: Moderate    Condition at Discharge: Stable  __________________________________________________________________    Disposition  SNF/LTC    ____________________________________________________________________    Code Status: Full Code  ___________________________________________________________________      Total time in minutes spent coordinating this discharge (includes going over instructions, follow-up, prescriptions, and preparing report for sign off to her PCP) :  >30 minutes    Signed:  Juan Langston MD

## 2021-05-26 NOTE — PROGRESS NOTES
91 21 06 - Was on hold with [facility] waiting for report for 12 minutes, called back and left number for nurse to call for report. [address] [facility] again. 1426 -     DISCHARGE NOTE FROM ORTHO NURSE    Patient determined to be stable for discharge by attending provider. I have reviewed the discharge instructions with the patient, caregiver and son. They verbalized understanding and all questions were answered to their satisfaction. No complaints or further questions were expressed. No new medication scripts. Appropriate educational materials and medication side effect teaching were provided. PIV were removed prior to discharge. Patient did not discharge with any line, reyes, or drain. Personal items and valuables accounted for at discharge: YES    Post-op patient: No      Tray Roach, EMMANUEL    Patient's son is taking patient to [facility], report has been called.

## 2021-05-26 NOTE — PROGRESS NOTES
CM contacted [facility] to check on status of [employer] auth. CM was informed that Pt's auth had not been assigned but they would try to expedite the auth since it was initiated on the 24th. CM was notified that Pt has been approved for SNF at this time. [facility] #:[id number]  Start date 5/26/2021  Review date: 5/28/2021  Care coordinator: Naz Clark  Fax clinicals to: [phone]      CM contacted facility, they are able to accept Pt today. SNF transition note to follow.                Frankie Bowen, [facility], ENMA Rodriguez

## 2021-05-26 NOTE — PROGRESS NOTES
End of Shift Note     Bedside shift change report given to Andrew (oncoming nurse) by Scottie Boles (offgoing nurse). Report included the following information SBAR, Kardex, Intake/Output, MAR and [employer]     Shift worked:  [id number]      Shift summary and any significant changes:     none      Concerns for physician to address:  none      Zone phone for oncoming shift:           Activity:  Activity Level: Up with Assistance  Number times ambulated in hallways past shift: 0  Number of times OOB to chair past shift: 0     Cardiac:   Cardiac Monitoring: No      Cardiac Rhythm: Sinus Rhythm     Access:   Current line(s): PIV      Genitourinary:   Urinary status: voiding     Respiratory:   O2 Device: None (Room air)  Chronic home O2 use?: NO  Incentive spirometer at bedside: NO  GI:  Last Bowel Movement Date: 05/24/21  Current diet:  DIET DIABETIC CONSISTENT CARB Regular  Passing flatus: YES  Tolerating current diet: YES     Pain Management:   Patient states pain is manageable on current regimen: YES     Skin:  Dwayne Score: 19  Interventions: increase time out of bed and PT/OT consult    Patient Safety:  Fall Score: Total Score: 4  Interventions: bed/chair alarm, assistive device (walker, cane, etc), gripper socks and pt to call before getting OOB  High Fall Risk:  Yes     Length of Stay:  Expected LOS: 4d 19h  Actual LOS: [facility]

## 2021-05-27 ENCOUNTER — TELEPHONE (OUTPATIENT)
Dept: GERIATRIC MEDICINE | Age: 76
End: 2021-05-27

## 2021-06-01 NOTE — TELEPHONE ENCOUNTER
Accessed residents chart to review documentation for current visit at [facility].

## 2021-06-28 ENCOUNTER — APPOINTMENT (OUTPATIENT)
Dept: GENERAL RADIOLOGY | Age: 76
End: 2021-06-28
Attending: EMERGENCY MEDICINE
Payer: MEDICARE

## 2021-06-28 ENCOUNTER — HOSPITAL ENCOUNTER (EMERGENCY)
Age: 76
Discharge: HOME | End: 2021-06-28
Attending: EMERGENCY MEDICINE
Payer: MEDICARE

## 2021-06-28 VITALS
WEIGHT: 200.62 LBS | BODY MASS INDEX: 28.09 KG/M2 | HEART RATE: 92 BPM | HEIGHT: 71 IN | OXYGEN SATURATION: 95 % | DIASTOLIC BLOOD PRESSURE: 53 MMHG | SYSTOLIC BLOOD PRESSURE: 112 MMHG | TEMPERATURE: 100.4 F | RESPIRATION RATE: 26 BRPM

## 2021-06-28 DIAGNOSIS — J20.9 ACUTE BRONCHITIS, UNSPECIFIED ORGANISM: ICD-10-CM
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
## 2021-06-28 DIAGNOSIS — R50.9 FEVER, UNSPECIFIED FEVER CAUSE: Primary | ICD-10-CM

## 2021-06-28 DIAGNOSIS — R05.9 COUGH: ICD-10-CM

## 2021-06-28 LAB
ALBUMIN SERPL-MCNC: 3.3 G/DL (ref 3.5–5)
ALBUMIN/GLOB SERPL: 0.8 {RATIO} (ref 1.1–2.2)
ALP SERPL-CCNC: 48 U/L (ref 45–117)
ALT SERPL-CCNC: 17 U/L (ref 12–78)
ANION GAP SERPL CALC-SCNC: 6 MMOL/L (ref 5–15)
APPEARANCE UR: CLEAR
AST SERPL-CCNC: 17 U/L (ref 15–37)
ATRIAL RATE: 100 BPM
BACTERIA URNS QL MICRO: NEGATIVE /HPF
BASE DEFICIT BLD-SCNC: 0.7 MMOL/L
BASOPHILS # BLD: 0 K/UL (ref 0–0.1)
BASOPHILS NFR BLD: 0 % (ref 0–1)
BILIRUB SERPL-MCNC: 0.8 MG/DL (ref 0.2–1)
BILIRUB UR QL: NEGATIVE
BUN SERPL-MCNC: 30 MG/DL (ref 6–20)
BUN/CREAT SERPL: 19 (ref 12–20)
CA-I BLD-MCNC: 1.24 MMOL/L (ref 1.12–1.32)
CALCIUM SERPL-MCNC: 9.2 MG/DL (ref 8.5–10.1)
CALCULATED P AXIS, ECG09: -151 DEGREES
CALCULATED R AXIS, ECG10: -161 DEGREES
CALCULATED T AXIS, ECG11: 147 DEGREES
CHLORIDE BLD-SCNC: 106 MMOL/L (ref 100–108)
CHLORIDE SERPL-SCNC: 108 MMOL/L (ref 97–108)
CO2 BLD-SCNC: 24 MMOL/L (ref 19–24)
CO2 SERPL-SCNC: 25 MMOL/L (ref 21–32)
COLOR UR: ABNORMAL
COVID-19 RAPID TEST, COVR: NOT DETECTED
CREAT SERPL-MCNC: 1.59 MG/DL (ref 0.7–1.3)
CREAT UR-MCNC: 1.5 MG/DL (ref 0.6–1.3)
DIAGNOSIS, 93000: NORMAL
DIFFERENTIAL METHOD BLD: ABNORMAL
EOSINOPHIL # BLD: 0 K/UL (ref 0–0.4)
EOSINOPHIL NFR BLD: 0 % (ref 0–7)
EPITH CASTS URNS QL MICRO: ABNORMAL /LPF
ERYTHROCYTE [DISTWIDTH] IN BLOOD BY AUTOMATED COUNT: 12.4 % (ref 11.5–14.5)
GLOBULIN SER CALC-MCNC: 4.1 G/DL (ref 2–4)
GLUCOSE BLD STRIP.AUTO-MCNC: 205 MG/DL (ref 74–106)
GLUCOSE SERPL-MCNC: 189 MG/DL (ref 65–100)
GLUCOSE UR STRIP.AUTO-MCNC: 250 MG/DL
HCO3 BLDA-SCNC: 23 MMOL/L
HCT VFR BLD AUTO: 38.5 % (ref 36.6–50.3)
HGB BLD-MCNC: 13 G/DL (ref 12.1–17)
HGB UR QL STRIP: NEGATIVE
HYALINE CASTS URNS QL MICRO: ABNORMAL /LPF (ref 0–5)
IMM GRANULOCYTES # BLD AUTO: 0.1 K/UL (ref 0–0.04)
IMM GRANULOCYTES NFR BLD AUTO: 1 % (ref 0–0.5)
KETONES UR QL STRIP.AUTO: NEGATIVE MG/DL
LACTATE BLD-SCNC: 0.98 MMOL/L (ref 0.4–2)
LEUKOCYTE ESTERASE UR QL STRIP.AUTO: NEGATIVE
LYMPHOCYTES # BLD: 1 K/UL (ref 0.8–3.5)
LYMPHOCYTES NFR BLD: 11 % (ref 12–49)
MCH RBC QN AUTO: 35.6 PG (ref 26–34)
MCHC RBC AUTO-ENTMCNC: 33.8 G/DL (ref 30–36.5)
MCV RBC AUTO: 105.5 FL (ref 80–99)
MONOCYTES # BLD: 1 K/UL (ref 0–1)
MONOCYTES NFR BLD: 12 % (ref 5–13)
NEUTS SEG # BLD: 6.8 K/UL (ref 1.8–8)
NEUTS SEG NFR BLD: 76 % (ref 32–75)
NITRITE UR QL STRIP.AUTO: NEGATIVE
NRBC # BLD: 0 K/UL (ref 0–0.01)
NRBC BLD-RTO: 0 PER 100 WBC
P-R INTERVAL, ECG05: 170 MS
PCO2 BLDV: 35.2 MMHG (ref 41–51)
PH BLDV: 7.42 [PH] (ref 7.32–7.42)
PH UR STRIP: 6 [PH] (ref 5–8)
PLATELET # BLD AUTO: 179 K/UL (ref 150–400)
PMV BLD AUTO: 11.7 FL (ref 8.9–12.9)
PO2 BLDV: 59 MMHG (ref 25–40)
POTASSIUM BLD-SCNC: 4.1 MMOL/L (ref 3.5–5.5)
POTASSIUM SERPL-SCNC: 4.1 MMOL/L (ref 3.5–5.1)
PROT SERPL-MCNC: 7.4 G/DL (ref 6.4–8.2)
PROT UR STRIP-MCNC: ABNORMAL MG/DL
Q-T INTERVAL, ECG07: 332 MS
QRS DURATION, ECG06: 74 MS
QTC CALCULATION (BEZET), ECG08: 428 MS
RBC # BLD AUTO: 3.65 M/UL (ref 4.1–5.7)
RBC #/AREA URNS HPF: ABNORMAL /HPF (ref 0–5)
SODIUM BLD-SCNC: 141 MMOL/L (ref 136–145)
SODIUM SERPL-SCNC: 139 MMOL/L (ref 136–145)
SOURCE, COVRS: NORMAL
SP GR UR REFRACTOMETRY: 1.02 (ref 1–1.03)
SPECIMEN SITE: ABNORMAL
TROPONIN I SERPL-MCNC: <0.05 NG/ML
UA: UC IF INDICATED,UAUC: ABNORMAL
UROBILINOGEN UR QL STRIP.AUTO: 1 EU/DL (ref 0.2–1)
VENTRICULAR RATE, ECG03: 100 BPM
WBC # BLD AUTO: 8.9 K/UL (ref 4.1–11.1)
WBC URNS QL MICRO: ABNORMAL /HPF (ref 0–4)

## 2021-06-28 PROCEDURE — 74011250637 HC RX REV CODE- 250/637: Performed by: EMERGENCY MEDICINE

## 2021-06-28 PROCEDURE — 81001 URINALYSIS AUTO W/SCOPE: CPT

## 2021-06-28 PROCEDURE — 93005 ELECTROCARDIOGRAM TRACING: CPT

## 2021-06-28 PROCEDURE — 74011250636 HC RX REV CODE- 250/636: Performed by: EMERGENCY MEDICINE

## 2021-06-28 PROCEDURE — 84132 ASSAY OF SERUM POTASSIUM: CPT

## 2021-06-28 PROCEDURE — 87040 BLOOD CULTURE FOR BACTERIA: CPT

## 2021-06-28 PROCEDURE — 77030019905 HC CATH URETH INTMIT MDII -A

## 2021-06-28 PROCEDURE — 71045 X-RAY EXAM CHEST 1 VIEW: CPT

## 2021-06-28 PROCEDURE — 74011000258 HC RX REV CODE- 258: Performed by: EMERGENCY MEDICINE

## 2021-06-28 PROCEDURE — 84484 ASSAY OF TROPONIN QUANT: CPT

## 2021-06-28 PROCEDURE — 96365 THER/PROPH/DIAG IV INF INIT: CPT

## 2021-06-28 PROCEDURE — 87635 SARS-COV-2 COVID-19 AMP PRB: CPT

## 2021-06-28 PROCEDURE — 80053 COMPREHEN METABOLIC PANEL: CPT

## 2021-06-28 PROCEDURE — 99285 EMERGENCY DEPT VISIT HI MDM: CPT

## 2021-06-28 PROCEDURE — 36415 COLL VENOUS BLD VENIPUNCTURE: CPT

## 2021-06-28 PROCEDURE — 85025 COMPLETE CBC W/AUTO DIFF WBC: CPT

## 2021-06-28 RX ORDER — SODIUM CHLORIDE 0.9 % (FLUSH) 0.9 %
5-10 SYRINGE (ML) INJECTION AS NEEDED
Status: DISCONTINUED | OUTPATIENT
Start: 2021-06-28 | End: 2021-06-28 | Stop reason: HOSPADM

## 2021-06-28 RX ORDER — ACETAMINOPHEN 500 MG
1000 TABLET ORAL
Status: COMPLETED | OUTPATIENT
Start: 2021-06-28 | End: 2021-06-28

## 2021-06-28 RX ORDER — AZITHROMYCIN 250 MG/1
TABLET, FILM COATED ORAL
Qty: 6 TABLET | Refills: 0 | Status: SHIPPED | OUTPATIENT
Start: 2021-06-28 | End: 2022-04-27

## 2021-06-28 RX ADMIN — ACETAMINOPHEN 1000 MG: 500 TABLET ORAL at 14:12

## 2021-06-28 RX ADMIN — CEFTRIAXONE 1 G: 1 INJECTION, POWDER, FOR SOLUTION INTRAMUSCULAR; INTRAVENOUS at 14:12

## 2021-06-28 RX ADMIN — SODIUM CHLORIDE 1000 ML: 9 INJECTION, SOLUTION INTRAVENOUS at 14:11

## 2021-06-28 NOTE — ED NOTES
EMMANUEL Kaminski reviewed discharge instructions with the patient. The patient verbalized understanding. All questions and concerns were addressed. The patient is discharged via wheelchair in the care of family members with instructions and prescriptions in hand. Pt is alert and oriented x 4. Respirations are clear and unlabored. Pt in wheelchair and placed in H2 while waiting for son to pick him up.

## 2021-06-28 NOTE — ED PROVIDER NOTES
EMERGENCY DEPARTMENT HISTORY AND PHYSICAL EXAM      Date: 6/28/2021  Patient Name: Sunny Munoz  Patient Age and Sex: 77 y.o. male     History of Presenting Illness     Chief Complaint   Patient presents with    Fall    Extremity Weakness       History Provided By: Patient, ems    HPI: Sunny Munoz is a 71-year-old male with a history of hypertension, diabetes, arthritis, presenting with ground-level fall and weakness. According to EMS and patient patient was in the kitchen today when his right leg gave out and he fell to the floor. Denies any head injury or loss of consciousness but he could not stand up which is why EMS was called. Glucose was normal in route. According to patient, his right leg has been giving out on him for a while. Patient states that he has been having cold symptoms for a couple of weeks with a cough as well as some rhinorrhea and nasal congestion. He has been fully vaccinated against COVID-19. Denies any chest pain, abdominal pain, diarrhea, dysuria or hematuria. He lives with his wife and son. There are no other complaints, changes, or physical findings at this time. PCP: Candace Johnson MD    No current facility-administered medications on file prior to encounter. Current Outpatient Medications on File Prior to Encounter   Medication Sig Dispense Refill    cyanocobalamin 1,000 mcg tablet Take 1 Tablet by mouth daily. 30 Tablet 0    lisinopriL (PRINIVIL, ZESTRIL) 10 mg tablet Take 1 Tablet by mouth daily. 30 Tablet 1    insulin aspart protamine/insulin aspart (NovoLOG Mix 70-30FlexPen U-100) 100 unit/mL (70-30) inpn 60 Units by SubCUTAneous route Every morning.  SITagliptin (JANUVIA) 50 mg tablet Take 50 mg by mouth daily.  fenofibrate (TRICOR) 160 mg tablet Take 160 mg by mouth daily.          Past History     Past Medical History:  Past Medical History:   Diagnosis Date    Diabetes ([facility] [address]     Ill-defined condition     perpherial artery disease    Ill-defined condition     hyperlidemia       Past Surgical History:  Past Surgical History:   Procedure Laterality Date    HX OTHER SURGICAL  2011    right femoral tibial bypass    HX OTHER SURGICAL      drained times two in back    VASCULAR SURGERY PROCEDURE UNLIST  2012    blood clot. right leg       Family History:  History reviewed. No pertinent family history. Social History:  Social History     Tobacco Use    Smoking status: Current Every Day Smoker     Packs/day: 1.00     Years: 40.00     Pack years: 40.00    Smokeless tobacco: Never Used   Substance Use Topics    Alcohol use: No    Drug use: Not on file       Allergies:  No Known Allergies      Review of Systems   Review of Systems   Constitutional: Positive for fatigue. Negative for chills and fever. HENT: Positive for congestion and rhinorrhea. Respiratory: Positive for cough. Negative for shortness of breath. Cardiovascular: Negative for chest pain. Gastrointestinal: Negative for abdominal pain, constipation, diarrhea, nausea and vomiting. Genitourinary: Negative for dysuria, frequency and hematuria. Neurological: Positive for weakness (generalized, right leg). Negative for numbness. All other systems reviewed and are negative. Physical Exam   Physical Exam  Vitals and nursing note reviewed. Constitutional:       Appearance: He is well-developed. HENT:      Head: Normocephalic and atraumatic. Nose: Nose normal.      Mouth/Throat:      Mouth: Mucous membranes are moist.   Eyes:      Extraocular Movements: Extraocular movements intact. Conjunctiva/sclera: Conjunctivae normal.   Cardiovascular:      Rate and Rhythm: Normal rate and regular rhythm. Pulmonary:      Effort: Pulmonary effort is normal. No respiratory distress. Breath sounds: Normal breath sounds. Comments: Has a productive white cough and coughing up some white sputum. Saturating 93% on the monitor.   Abdominal:      General: There is no distension. Palpations: Abdomen is soft. Tenderness: There is no abdominal tenderness. Musculoskeletal:         General: Normal range of motion. Cervical back: Normal range of motion and neck supple. Comments: In the bed, patient has 4-5 strength in bilateral legs. Skin:     General: Skin is warm and dry. Neurological:      General: No focal deficit present. Mental Status: He is alert and oriented to person, place, and time. Mental status is at baseline. Psychiatric:         Mood and Affect: Mood normal.          Diagnostic Study Results     Labs -     Recent Results (from the past 12 hour(s))   CBC WITH AUTOMATED DIFF    Collection Time: 06/28/21  1:31 PM   Result Value Ref Range    WBC 8.9 4.1 - 11.1 K/uL    RBC 3.65 (L) 4.10 - 5.70 M/uL    HGB 13.0 12.1 - 17.0 g/dL    HCT 38.5 36.6 - 50.3 %    .5 (H) 80.0 - 99.0 FL    MCH 35.6 (H) 26.0 - 34.0 PG    MCHC 33.8 30.0 - 36.5 g/dL    RDW 12.4 11.5 - 14.5 %    PLATELET 922 723 - 587 K/uL    MPV 11.7 8.9 - 12.9 FL    NRBC 0.0 0  WBC    ABSOLUTE NRBC 0.00 0.00 - 0.01 K/uL    NEUTROPHILS 76 (H) 32 - 75 %    LYMPHOCYTES 11 (L) 12 - 49 %    MONOCYTES 12 5 - 13 %    EOSINOPHILS 0 0 - 7 %    BASOPHILS 0 0 - 1 %    IMMATURE GRANULOCYTES 1 (H) 0.0 - 0.5 %    ABS. NEUTROPHILS 6.8 1.8 - 8.0 K/UL    ABS. LYMPHOCYTES 1.0 0.8 - 3.5 K/UL    ABS. MONOCYTES 1.0 0.0 - 1.0 K/UL    ABS. EOSINOPHILS 0.0 0.0 - 0.4 K/UL    ABS. BASOPHILS 0.0 0.0 - 0.1 K/UL    ABS. IMM.  GRANS. 0.1 (H) 0.00 - 0.04 K/UL    DF AUTOMATED     METABOLIC PANEL, COMPREHENSIVE    Collection Time: 06/28/21  1:31 PM   Result Value Ref Range    Sodium 139 136 - 145 mmol/L    Potassium 4.1 3.5 - 5.1 mmol/L    Chloride 108 97 - 108 mmol/L    CO2 25 21 - 32 mmol/L    Anion gap 6 5 - 15 mmol/L    Glucose 189 (H) 65 - 100 mg/dL    BUN 30 (H) 6 - 20 MG/DL    Creatinine 1.59 (H) 0.70 - 1.30 MG/DL    BUN/Creatinine ratio 19 12 - 20      GFR est AA 52 (L) >60 ml/min/1.73m2    GFR est non-AA 43 (L) >60 ml/min/1.73m2    Calcium 9.2 8.5 - 10.1 MG/DL    Bilirubin, total 0.8 0.2 - 1.0 MG/DL    ALT (SGPT) 17 12 - 78 U/L    AST (SGOT) 17 15 - 37 U/L    Alk.  phosphatase 48 45 - 117 U/L    Protein, total 7.4 6.4 - 8.2 g/dL    Albumin 3.3 (L) 3.5 - 5.0 g/dL    Globulin 4.1 (H) 2.0 - 4.0 g/dL    A-G Ratio 0.8 (L) 1.1 - 2.2     TROPONIN I    Collection Time: 06/28/21  1:31 PM   Result Value Ref Range    Troponin-I, Qt. <0.05 <0.05 ng/mL   COVID-19 RAPID TEST    Collection Time: 06/28/21  1:31 PM   Result Value Ref Range    Specimen source Nasopharyngeal      COVID-19 rapid test Not detected NOTD     BLOOD GAS,CHEM8,LACTIC ACID POC    Collection Time: 06/28/21  1:50 PM   Result Value Ref Range    Calcium, ionized (POC) 1.24 1.12 - 1.32 mmol/L    BICARBONATE 23 mmol/L    Base deficit (POC) 0.7 mmol/L    Sample source VENOUS BLOOD      CO2, POC 24 19 - 24 MMOL/L    Sodium,  136 - 145 MMOL/L    Potassium, POC 4.1 3.5 - 5.5 MMOL/L    Chloride,  100 - 108 MMOL/L    Glucose,  (H) 74 - 106 MG/DL    Creatinine, POC 1.5 (H) 0.6 - 1.3 MG/DL    Lactic Acid (POC) 0.98 0.40 - 2.00 mmol/L    pH, venous (POC) 7.42 7.32 - 7.42      pCO2, venous (POC) 35.2 (L) 41 - 51 MMHG    pO2, venous (POC) 59 (H) 25 - 40 mmHg   URINALYSIS W/ REFLEX CULTURE    Collection Time: 06/28/21  2:05 PM    Specimen: Urine   Result Value Ref Range    Color YELLOW/STRAW      Appearance CLEAR CLEAR      Specific gravity 1.022 1.003 - 1.030      pH (UA) 6.0 5.0 - 8.0      Protein TRACE (A) NEG mg/dL    Glucose 250 (A) NEG mg/dL    Ketone Negative NEG mg/dL    Bilirubin Negative NEG      Blood Negative NEG      Urobilinogen 1.0 0.2 - 1.0 EU/dL    Nitrites Negative NEG      Leukocyte Esterase Negative NEG      WBC 0-4 0 - 4 /hpf    RBC 0-5 0 - 5 /hpf    Epithelial cells FEW FEW /lpf    Bacteria Negative NEG /hpf    UA:UC IF INDICATED CULTURE NOT INDICATED BY UA RESULT CNI      Hyaline cast 0-2 0 - 5 /lpf       Radiologic Studies - XR CHEST PORT   Final Result   1. No acute process        CT Results  (Last 48 hours)    None        CXR Results  (Last 48 hours)               06/28/21 1348  XR CHEST PORT Final result    Impression:  1. No acute process       Narrative:  EXAM: XR CHEST PORT       INDICATION: Eval for Infiltrate, fall, extremity weakness       COMPARISON: 5/18/2021       FINDINGS: A portable AP radiograph of the chest was obtained at 1333 hours. The   patient is on a cardiac monitor. Heart size is normal. Lungs demonstrate a   shallow depth inspiration. Lung bases are hypoaerated. The lungs are clear no   pneumonia. Osseous structures are unremarkable. Medical Decision Making   I am the first provider for this patient. I reviewed the vital signs, available nursing notes, past medical history, past surgical history, family history and social history. Vital Signs-Reviewed the patient's vital signs. Patient Vitals for the past 12 hrs:   Temp Pulse Resp BP SpO2   06/28/21 1445  92 26 (!) 112/53 95 %   06/28/21 1430  93 23 (!) 113/48 94 %   06/28/21 1400  98 22 126/64 93 %   06/28/21 1345  100 27 (!) 117/54 93 %   06/28/21 1335  99 30 (!) 121/57 92 %   06/28/21 1328     92 %   06/28/21 1313 100.4 °F (38 °C) 98 19 (!) 107/56 93 %       Records Reviewed: Nursing Notes and Old Medical Records    Provider Notes (Medical Decision Making):   Patient presenting with fever, cough, congestion, ground-level fall. Regarding the fall to the ground, could all be related to the fact that he might be septic or dehydrated, could also be related to longstanding arthritis seems like that weakness in that right leg has been a chronic problem. Possibly precipitated by infection. He has a fever here of 100.4 so we will go ahead and get sepsis labs and Covid test.    ED Course:   Initial assessment performed.  The patients presenting problems have been discussed, and they are in agreement with the care plan formulated and outlined with them. I have encouraged them to ask questions as they arise throughout their visit. ED Course as of Jun 28 1607   Mon Jun 28, 2021   1349 EKG interpreted by me. Shows normal sinus rhythm with a heart rate of 100. No ST elevations depressions concerning for ischemia. [JS]   1436 Patient's lactate here normal as well as white count. Vitals continue to be normal.  Chest x-ray negative. We will give him a liter of fluid now that I know he is not fluid overloaded. Rapid Covid also negative. Waiting on urinalysis. [JS]   0078 Patient continues to do well, saturating 94% on room air. Chest x-ray clear and labs unremarkable. Given this low-grade fever with the cough, may be acute bronchitis. We will treat him with Z-Patric. Patient also noted to have a small little blood blister on his left toe so told him to follow-up with podiatry. [JS]      ED Course User Index  [JS] Mary Cheema MD     Critical Care Time:   0    Disposition:    Discharge Note:  The patient has been re-evaluated and is ready for discharge. Reviewed available results with patient. Counseled patient on diagnosis and care plan. Patient has expressed understanding, and all questions have been answered. Patient agrees with plan and agrees to follow up as recommended, or to return to the ED if their symptoms worsen. Discharge instructions have been provided and explained to the patient, along with reasons to return to the ED. PLAN:  Discharge Medication List as of 6/28/2021  2:50 PM      START taking these medications    Details   azithromycin (Zithromax Z-Patric) 250 mg tablet Take 2 tablets on first day, and then 1 tablet daily for next 4 days. , Normal, Disp-6 Tablet, R-0         CONTINUE these medications which have NOT CHANGED    Details   cyanocobalamin 1,000 mcg tablet Take 1 Tablet by mouth daily. , Normal, Disp-30 Tablet, R-0      lisinopriL (PRINIVIL, ZESTRIL) 10 mg tablet Take 1 Tablet by mouth daily., Normal, Disp-30 Tablet, R-1      insulin aspart protamine/insulin aspart (NovoLOG Mix 70-30FlexPen U-100) 100 unit/mL (70-30) inpn 60 Units by SubCUTAneous route Every morning., Historical Med      SITagliptin (JANUVIA) 50 mg tablet Take 50 mg by mouth daily. , Historical Med      fenofibrate (TRICOR) 160 mg tablet Take 160 mg by mouth daily. , Historical Med         1.   2.   Follow-up Information     Follow up With Specialties Details Why Contact Info    Astrid Stacy MD Family Medicine Schedule an appointment as soon as possible for a visit   [facility] [place] [address]   [phone]          3. Return to ED if worse       Diagnosis     Clinical Impression:   1. Fever, unspecified fever cause    2. Acute bronchitis, unspecified organism    3. Cough    4. Fall from ground level        Attestations:  Connor Hdz M.D. Please note that this dictation was completed with [employer], the computer voice recognition software. Quite often unanticipated grammatical, syntax, homophones, and other interpretive errors are inadvertently transcribed by the computer software. Please disregard these errors. Please excuse any errors that have escaped final proofreading. Thank you.

## 2021-06-28 NOTE — ED NOTES
Pt arrives from home via squad. Per EMS:  Pt fell in the kitchen and was too weak to get himself off the floor. Pt denies injury, LOC, dizziness. Pt says his right leg \"gave out of me\". Pt does no take anti-coags. Pt denies cardiac hx.

## 2021-07-04 LAB
BACTERIA SPEC CULT: NORMAL
BACTERIA SPEC CULT: NORMAL
SERVICE CMNT-IMP: NORMAL
SERVICE CMNT-IMP: NORMAL

## 2022-01-13 ENCOUNTER — TRANSCRIBE ORDER (OUTPATIENT)
Dept: SCHEDULING | Age: 77
End: 2022-01-13

## 2022-01-13 DIAGNOSIS — N18.31 CHRONIC KIDNEY DISEASE, STAGE 3A (HCC): Primary | ICD-10-CM

## 2022-03-18 PROBLEM — A41.9 SEPSIS (HCC): Status: ACTIVE | Noted: 2021-05-18

## 2022-03-19 PROBLEM — N18.30 CKD (CHRONIC KIDNEY DISEASE) STAGE 3, GFR 30-59 ML/MIN (HCC): Status: ACTIVE | Noted: 2017-12-24

## 2022-03-19 PROBLEM — N17.9 AKI (ACUTE KIDNEY INJURY) (HCC): Status: ACTIVE | Noted: 2021-05-18

## 2022-03-20 PROBLEM — E87.20 LACTIC ACID ACIDOSIS: Status: ACTIVE | Noted: 2021-05-18

## 2022-03-20 PROBLEM — J96.01 ACUTE HYPOXEMIC RESPIRATORY FAILURE (HCC): Status: ACTIVE | Noted: 2021-05-18

## 2022-04-07 ENCOUNTER — HOSPITAL ENCOUNTER (OUTPATIENT)
Dept: ULTRASOUND IMAGING | Age: 77
Discharge: HOME | End: 2022-04-07
Attending: INTERNAL MEDICINE
Payer: MEDICARE

## 2022-04-07 DIAGNOSIS — N18.31 CHRONIC KIDNEY DISEASE, STAGE 3A (HCC): ICD-10-CM

## 2022-04-07 PROCEDURE — 76770 US EXAM ABDO BACK WALL COMP: CPT

## 2022-04-26 ENCOUNTER — HOSPITAL ENCOUNTER (INPATIENT)
Age: 77
LOS: 1 days | Discharge: HOME | DRG: 395 | End: 2022-04-27
Attending: SURGERY | Admitting: SURGERY
Payer: MEDICARE

## 2022-04-26 PROBLEM — Z12.11 ENCOUNTER FOR SCREENING COLONOSCOPY: Status: ACTIVE | Noted: 2022-04-26

## 2022-04-26 LAB
GLUCOSE BLD STRIP.AUTO-MCNC: 113 MG/DL (ref 65–117)
GLUCOSE BLD STRIP.AUTO-MCNC: 118 MG/DL (ref 65–117)
GLUCOSE BLD STRIP.AUTO-MCNC: 81 MG/DL (ref 65–117)
SERVICE CMNT-IMP: ABNORMAL
SERVICE CMNT-IMP: NORMAL
SERVICE CMNT-IMP: NORMAL

## 2022-04-26 PROCEDURE — G0378 HOSPITAL OBSERVATION PER HR: HCPCS

## 2022-04-26 PROCEDURE — 74011250636 HC RX REV CODE- 250/636: Performed by: NURSE PRACTITIONER

## 2022-04-26 PROCEDURE — 74011000250 HC RX REV CODE- 250: Performed by: NURSE PRACTITIONER

## 2022-04-26 PROCEDURE — 74011636637 HC RX REV CODE- 636/637: Performed by: NURSE PRACTITIONER

## 2022-04-26 PROCEDURE — 82962 GLUCOSE BLOOD TEST: CPT

## 2022-04-26 PROCEDURE — 65270000029 HC RM PRIVATE

## 2022-04-26 RX ORDER — INSULIN LISPRO 100 [IU]/ML
4 INJECTION, SOLUTION INTRAVENOUS; SUBCUTANEOUS
Status: DISCONTINUED | OUTPATIENT
Start: 2022-04-26 | End: 2022-04-27 | Stop reason: HOSPADM

## 2022-04-26 RX ORDER — DEXTROSE MONOHYDRATE 100 MG/ML
0-250 INJECTION, SOLUTION INTRAVENOUS AS NEEDED
Status: DISCONTINUED | OUTPATIENT
Start: 2022-04-26 | End: 2022-04-27 | Stop reason: HOSPADM

## 2022-04-26 RX ORDER — LISINOPRIL 5 MG/1
10 TABLET ORAL DAILY
Status: DISCONTINUED | OUTPATIENT
Start: 2022-04-26 | End: 2022-04-27 | Stop reason: HOSPADM

## 2022-04-26 RX ORDER — INSULIN GLARGINE 100 [IU]/ML
22 INJECTION, SOLUTION SUBCUTANEOUS
Status: DISCONTINUED | OUTPATIENT
Start: 2022-04-26 | End: 2022-04-27 | Stop reason: HOSPADM

## 2022-04-26 RX ORDER — HYDROMORPHONE HYDROCHLORIDE 1 MG/ML
0.5 INJECTION, SOLUTION INTRAMUSCULAR; INTRAVENOUS; SUBCUTANEOUS
Status: DISCONTINUED | OUTPATIENT
Start: 2022-04-26 | End: 2022-04-27 | Stop reason: HOSPADM

## 2022-04-26 RX ORDER — SODIUM CHLORIDE 9 MG/ML
125 INJECTION, SOLUTION INTRAVENOUS CONTINUOUS
Status: DISCONTINUED | OUTPATIENT
Start: 2022-04-26 | End: 2022-04-27 | Stop reason: HOSPADM

## 2022-04-26 RX ORDER — ONDANSETRON 2 MG/ML
4 INJECTION INTRAMUSCULAR; INTRAVENOUS
Status: DISCONTINUED | OUTPATIENT
Start: 2022-04-26 | End: 2022-04-27 | Stop reason: HOSPADM

## 2022-04-26 RX ORDER — MAGNESIUM SULFATE 100 %
4 CRYSTALS MISCELLANEOUS AS NEEDED
Status: DISCONTINUED | OUTPATIENT
Start: 2022-04-26 | End: 2022-04-27 | Stop reason: HOSPADM

## 2022-04-26 RX ADMIN — Medication 4 UNITS: at 12:06

## 2022-04-26 RX ADMIN — SODIUM CHLORIDE 125 ML/HR: 9 INJECTION, SOLUTION INTRAVENOUS at 13:16

## 2022-04-26 RX ADMIN — Medication 4 UNITS: at 17:18

## 2022-04-26 RX ADMIN — POLYETHYLENE GLYCOL 3350, SODIUM SULFATE ANHYDROUS, SODIUM BICARBONATE, SODIUM CHLORIDE, POTASSIUM CHLORIDE 4000 ML: 236; 22.74; 6.74; 5.86; 2.97 POWDER, FOR SOLUTION ORAL at 14:16

## 2022-04-26 NOTE — PROGRESS NOTES
End of Shift Note    Bedside shift change report given to Kiana Alejo (oncoming nurse) by Mak Gregg RN (offgoing nurse). Report included the following information SBAR, Kardex, MAR and Recent Results    Shift worked: Days     Shift summary and any significant changes:    Direct admit to [facility] - awaiting bed on gen surg    Scheduled for colonoscopy tomorrow - prep has been done    Pt to be NPO at midnight tonight for colonoscopy   Concerns for physician to address: None   Zone phone for oncoming shift:  0849     Patient Information  Graham Moran  69 y.o.  4/26/2022  9:25 AM by Susana Ferguson MD. Graham Moran was admitted from Home    Problem List  Patient Active Problem List    Diagnosis Date Noted    Encounter for screening colonoscopy 04/26/2022    Lactic acid acidosis 05/18/2021    Sepsis ([address] 05/18/2021    KENNA (acute kidney injury) ([address] 05/18/2021    Acute hypoxemic respiratory failure ([address] 05/18/2021    CKD (chronic kidney disease) stage 3, GFR 30-59 ml/min ([address] 12/24/2017    Abscess of back 06/16/2014    Hypoglycemia 11/20/2011    Tobacco abuse 11/18/2011    PAD (peripheral artery disease) ([address] 11/10/2011    DM (diabetes mellitus) ([address] 11/10/2011    Hyperlipidemia 11/10/2011    HTN (hypertension) 11/10/2011     Past Medical History:   Diagnosis Date    Diabetes ([address]     Ill-defined condition     perpherial artery disease    Ill-defined condition     hyperlidemia       Core Measures:  CVA: No No  CHF:No No  PNA:No No    Activity:  Activity Level: Up with Assistance  Number times ambulated in hallways past shift: 0  Number of times OOB to chair past shift: 0    Cardiac:   Cardiac Monitoring: No           Access:   Current line(s): PIV   Central Line? No   PICC LINE? No    Genitourinary:   Urinary status: voiding  Urinary Catheter?  No     Respiratory:   O2 Device: None (Room air)  Chronic home O2 use?: NO  Incentive spirometer at bedside: NO       GI:     Current diet:  ADULT DIET Clear Liquid  DIET NPO  Passing flatus: YES  Tolerating current diet: YES       Pain Management:   Patient states pain is manageable on current regimen: YES    Skin:  Dwayne Score: 21  Interventions: increase time out of bed    Patient Safety:  Fall Score:  Total Score: 2  Interventions: bed/chair alarm     @Rollbelt  @dexterity to release roll belt  Yes/No ( must document dexterity  here by stating Yes or No here, otherwise this is a restraint and must follow restraint documentation policy.)    DVT prophylaxis:  DVT prophylaxis Med- No  DVT prophylaxis SCD or - Refused     Wounds: (If Applicable)  Wounds- No  Location     Active Consults:  None    Length of Stay:  Expected LOS: - - -  Actual LOS: 0  Discharge Plan: No       Alea Oscar RN

## 2022-04-26 NOTE — PROGRESS NOTES
Transition of Care Plan:    RUR: 6%  Disposition: home with spouse   Follow up appointments: PCP, specialists as indicated   DME needed: uses a cane   Transportation at Discharge: [facility]   [place] or means to access home: yes         Medicare Letter: 2nd [facility] letter to be reviewed prior to d/c   Is patient a BCPI-A Bundle: No           If yes, was Bundle Letter given?:    Is patient a [place] and connected with the [place]? No            If yes, was [place] transfer form completed and [facility] notified? Caregiver Contact: Catrina Saleem (spouse) [phone]  Discharge Caregiver contacted prior to discharge? To be contacted prior to d/c   Care Conference needed?:  No    Reason for Admission:  Direct admit- colonoscopy prep                      RUR Score: 6%                   Plan for utilizing home health: No        PCP: First and Last name:  Rickey Ricketts MD     Name of Practice:    Are you a current patient: Yes/No: yes    Approximate date of last visit:  3 months ago   Can you participate in a virtual visit with your PCP: No                    Current Advanced Directive/Advance Care Plan: Full Code    Advance Care Planning   Healthcare Decision Maker: Catrina Saleem (spouse)    Today we documented Decision Maker(s) consistent with Legal Next of Kin hierarchy. Transition of Care Plan:   Home with spouse, OP follow up appointments as needed    Chart reviewed. CM met with pt at bedside to introduce self and role. Verified contact information and demographics. Pt resides with spouse in a one level home with 2 PARAMJIT, but has a ramp. Pt has three adult sons who live nearby. Prior to admission, pt was independent with ADLs and ambulatory with a cane. He does not drive. PCP is Dr. Owusu  with last visit about 3 months ago. Preferred pharmacy is [facility] on [facility] and [employer]. No ACP on file. Pt is full code. Spouse is legal NOK. Pt with hx of SNF stay at [facility] and [address] [place] [address]. Pt states that he will contact [facility] for transportation at discharge. 1st IMM letter delivered at bedside. Signed copy placed into chart. Please see scanned documents. Will continue to follow. Care Management Interventions  PCP Verified by CM: Yes  Palliative Care Criteria Met (RRAT>21 & CHF Dx)?: No  Mode of Transport at Discharge:  Other (see comment) ([facility] )  Transition of Care Consult (CM Consult): Discharge Planning  Discharge Durable Medical Equipment: No  Physical Therapy Consult: No  Occupational Therapy Consult: No  Speech Therapy Consult: No  Support Systems: Spouse/Significant Other,Child(joe)  Confirm Follow Up Transport: Family  The Patient and/or Patient Representative was Provided with a Choice of Provider and Agrees with the Discharge Plan?: Yes  Freedom of Choice List was Provided with Basic Dialogue that Supports the Patient's Individualized Plan of Care/Goals, Treatment Preferences and Shares the Quality Data Associated with the Providers?: No  [place] Resource Information Provided?: No  Discharge Location  Patient Expects to be Discharged to[de-identified] [address] [place] [address], [facility]   Care Manager, [facility]  [phone]

## 2022-04-27 ENCOUNTER — ANESTHESIA EVENT (OUTPATIENT)
Dept: ENDOSCOPY | Age: 77
DRG: 395 | End: 2022-04-27
Payer: MEDICARE

## 2022-04-27 ENCOUNTER — ANESTHESIA (OUTPATIENT)
Dept: ENDOSCOPY | Age: 77
DRG: 395 | End: 2022-04-27
Payer: MEDICARE

## 2022-04-27 VITALS
SYSTOLIC BLOOD PRESSURE: 140 MMHG | HEART RATE: 73 BPM | OXYGEN SATURATION: 97 % | TEMPERATURE: 97.8 F | WEIGHT: 226.19 LBS | BODY MASS INDEX: 31.55 KG/M2 | RESPIRATION RATE: 16 BRPM | DIASTOLIC BLOOD PRESSURE: 73 MMHG

## 2022-04-27 LAB
ANION GAP SERPL CALC-SCNC: 7 MMOL/L (ref 5–15)
BASOPHILS # BLD: 0 K/UL (ref 0–0.1)
BASOPHILS NFR BLD: 1 % (ref 0–1)
BUN SERPL-MCNC: 19 MG/DL (ref 6–20)
BUN/CREAT SERPL: 18 (ref 12–20)
CALCIUM SERPL-MCNC: 9.3 MG/DL (ref 8.5–10.1)
CHLORIDE SERPL-SCNC: 106 MMOL/L (ref 97–108)
CO2 SERPL-SCNC: 27 MMOL/L (ref 21–32)
CREAT SERPL-MCNC: 1.07 MG/DL (ref 0.7–1.3)
DIFFERENTIAL METHOD BLD: ABNORMAL
EOSINOPHIL # BLD: 0.2 K/UL (ref 0–0.4)
EOSINOPHIL NFR BLD: 4 % (ref 0–7)
ERYTHROCYTE [DISTWIDTH] IN BLOOD BY AUTOMATED COUNT: 12.4 % (ref 11.5–14.5)
GLUCOSE BLD STRIP.AUTO-MCNC: 127 MG/DL (ref 65–117)
GLUCOSE SERPL-MCNC: 124 MG/DL (ref 65–100)
HCT VFR BLD AUTO: 43.3 % (ref 36.6–50.3)
HGB BLD-MCNC: 15 G/DL (ref 12.1–17)
IMM GRANULOCYTES # BLD AUTO: 0 K/UL (ref 0–0.04)
IMM GRANULOCYTES NFR BLD AUTO: 0 % (ref 0–0.5)
LYMPHOCYTES # BLD: 2 K/UL (ref 0.8–3.5)
LYMPHOCYTES NFR BLD: 37 % (ref 12–49)
MCH RBC QN AUTO: 36 PG (ref 26–34)
MCHC RBC AUTO-ENTMCNC: 34.6 G/DL (ref 30–36.5)
MCV RBC AUTO: 103.8 FL (ref 80–99)
MONOCYTES # BLD: 0.6 K/UL (ref 0–1)
MONOCYTES NFR BLD: 11 % (ref 5–13)
NEUTS SEG # BLD: 2.6 K/UL (ref 1.8–8)
NEUTS SEG NFR BLD: 47 % (ref 32–75)
NRBC # BLD: 0 K/UL (ref 0–0.01)
NRBC BLD-RTO: 0 PER 100 WBC
PLATELET # BLD AUTO: 165 K/UL (ref 150–400)
PMV BLD AUTO: 10.2 FL (ref 8.9–12.9)
POTASSIUM SERPL-SCNC: 4 MMOL/L (ref 3.5–5.1)
RBC # BLD AUTO: 4.17 M/UL (ref 4.1–5.7)
SERVICE CMNT-IMP: ABNORMAL
SODIUM SERPL-SCNC: 140 MMOL/L (ref 136–145)
WBC # BLD AUTO: 5.4 K/UL (ref 4.1–11.1)

## 2022-04-27 PROCEDURE — 0DBL8ZZ EXCISION OF TRANSVERSE COLON, VIA NATURAL OR ARTIFICIAL OPENING ENDOSCOPIC: ICD-10-PCS | Performed by: SURGERY

## 2022-04-27 PROCEDURE — 82962 GLUCOSE BLOOD TEST: CPT

## 2022-04-27 PROCEDURE — 74011250636 HC RX REV CODE- 250/636: Performed by: SURGERY

## 2022-04-27 PROCEDURE — 74011000250 HC RX REV CODE- 250: Performed by: NURSE ANESTHETIST, CERTIFIED REGISTERED

## 2022-04-27 PROCEDURE — 88305 TISSUE EXAM BY PATHOLOGIST: CPT

## 2022-04-27 PROCEDURE — 77030013992 HC SNR POLYP ENDOSC BSC -B: Performed by: SURGERY

## 2022-04-27 PROCEDURE — 85025 COMPLETE CBC W/AUTO DIFF WBC: CPT

## 2022-04-27 PROCEDURE — 36415 COLL VENOUS BLD VENIPUNCTURE: CPT

## 2022-04-27 PROCEDURE — 80048 BASIC METABOLIC PNL TOTAL CA: CPT

## 2022-04-27 PROCEDURE — 76060000032 HC ANESTHESIA 0.5 TO 1 HR: Performed by: SURGERY

## 2022-04-27 PROCEDURE — 76040000007: Performed by: SURGERY

## 2022-04-27 PROCEDURE — 2709999900 HC NON-CHARGEABLE SUPPLY: Performed by: SURGERY

## 2022-04-27 PROCEDURE — 74011250636 HC RX REV CODE- 250/636: Performed by: NURSE ANESTHETIST, CERTIFIED REGISTERED

## 2022-04-27 RX ORDER — PHENYLEPHRINE HCL IN 0.9% NACL 0.4MG/10ML
SYRINGE (ML) INTRAVENOUS AS NEEDED
Status: DISCONTINUED | OUTPATIENT
Start: 2022-04-27 | End: 2022-04-27 | Stop reason: HOSPADM

## 2022-04-27 RX ORDER — PROPOFOL 10 MG/ML
INJECTION, EMULSION INTRAVENOUS AS NEEDED
Status: DISCONTINUED | OUTPATIENT
Start: 2022-04-27 | End: 2022-04-27 | Stop reason: HOSPADM

## 2022-04-27 RX ORDER — LIDOCAINE HYDROCHLORIDE 20 MG/ML
INJECTION, SOLUTION EPIDURAL; INFILTRATION; INTRACAUDAL; PERINEURAL AS NEEDED
Status: DISCONTINUED | OUTPATIENT
Start: 2022-04-27 | End: 2022-04-27 | Stop reason: HOSPADM

## 2022-04-27 RX ORDER — SODIUM CHLORIDE 9 MG/ML
25 INJECTION, SOLUTION INTRAVENOUS CONTINUOUS
Status: DISCONTINUED | OUTPATIENT
Start: 2022-04-27 | End: 2022-04-27 | Stop reason: HOSPADM

## 2022-04-27 RX ADMIN — LIDOCAINE HYDROCHLORIDE 50 MG: 20 INJECTION, SOLUTION EPIDURAL; INFILTRATION; INTRACAUDAL; PERINEURAL at 12:30

## 2022-04-27 RX ADMIN — Medication 80 MCG: at 12:46

## 2022-04-27 RX ADMIN — Medication 80 MCG: at 12:41

## 2022-04-27 RX ADMIN — PROPOFOL 50 MG: 10 INJECTION, EMULSION INTRAVENOUS at 12:33

## 2022-04-27 RX ADMIN — PROPOFOL 50 MG: 10 INJECTION, EMULSION INTRAVENOUS at 12:38

## 2022-04-27 RX ADMIN — PROPOFOL 50 MG: 10 INJECTION, EMULSION INTRAVENOUS at 12:30

## 2022-04-27 RX ADMIN — SODIUM CHLORIDE 25 ML/HR: 9 INJECTION, SOLUTION INTRAVENOUS at 11:19

## 2022-04-27 NOTE — PERIOP NOTES
Endoscope was pre-cleaned at bedside immediately following procedure by DILLON Ochoa     Medications     lidocaine (PF) 2% (mg)     Date/Time Rate/Dose/Volume Action Route Admin User Audit       04/27/22  1230 50 mg Given IntraVENous Angela Rupesh, CRNA            propofol 10 mg/mL (mg)     Date/Time Rate/Dose/Volume Action Route Admin User Audit       04/27/22  1230 50 mg Given IntraVENous Angela Bride, CRNA       4116 50 mg Given IntraVENous Anegla Bride, CRNA       5766 50 mg Given IntraVENous Angela Bride, CRNA            PHENYLephrine (NEOSYNEPHRINE) in NS syringe (mcg)     Date/Time Rate/Dose/Volume Action Route Admin User Audit       04/27/22  1241 80 mcg Given IntraVENous Angela Bride, CRNA       0609 80 mcg Given IntraVENous Angela Bride, CRNA            0.9% sodium chloride infusion (mL/hr) Dosing weight:  102.6    Date/Time Rate/Dose/Volume Action Route Admin User Audit       04/27/22  [facility]  Angela Goddard CRNA      Comment: Switch to gravity       1225  Restarted IntraVENous Angela MichaeleLETITIA edited

## 2022-04-27 NOTE — PROCEDURES
Colonoscopy Procedure Note    Indications: Routine screening    Anesthesia/Sedation: MAC anesthesia Propofol    Pre-Procedure Exam:  Airway: clear   Heart: normal S1and S2    Lungs: clear bilateral  Abdomen: soft, nontender, bowel sounds present and normal in all quadrants   Mental Status: awake, alert, and oriented to person, place, and time      Procedure in Detail:  Informed consent was obtained for the procedure, including sedation. Risks of perforation, hemorrhage, adverse drug reaction, and aspiration were discussed. The patient was placed in the left lateral decubitus position. Based on the pre-procedure assessment, including review of the patient's medical history, medications, allergies, and review of systems, he had been deemed to be an appropriate candidate for moderate sedation; he was therefore sedated with the medications listed above. The patient was monitored continuously with ECG tracing, pulse oximetry, blood pressure monitoring, and direct observations. A rectal examination was performed. The [id number] was inserted into the rectum and advanced under direct vision to the cecum, which was identified by the ileocecal valve and appendiceal orifice. The quality of the colonic preparation was fair. A careful inspection was made as the colonoscope was withdrawn, including a retroflexed view of the rectum; findings and interventions are described below. Appropriate photodocumentation was obtained. Findings:   Rectum:   Normal  Sigmoid:   Normal  Descending Colon:   Normal  Transverse Colon:     - 1 polyp. Removed and retrieved with hot snare  Ascending Colon:   Normal  Cecum:   Normal          Specimens: Specimens were collected and sent to pathology. EBL: None    Complications: None; patient tolerated the procedure well. Attending Attestation: I performed the procedure. Recommendations:   - Repeat colonoscopy in 5 years.

## 2022-04-27 NOTE — ROUTINE PROCESS
Cecilia Lainez  1945  [id number]    Situation:  Verbal report received from: Marcy Tobias  Procedure: Procedure(s):  COLONOSCOPY  ENDOSCOPIC POLYPECTOMY    Background:    Preoperative diagnosis: SCREENING  Postoperative diagnosis: Polyp    :  Dr. Smith Field  Assistant(s): Endoscopy Technician-1: Chhaya Holguin  Endoscopy RN-1: Liyah Lux    Specimens:   ID Type Source Tests Collected by Time Destination   1 : polyp Preservative Colon, Transverse  Jayden Henson MD 4/27/2022 1249 Pathology     H. Pylori  no    Assessment:  Intra-procedure medications     Anesthesia gave intra-procedure sedation and medications, see anesthesia flow sheet yes    Intravenous fluids: NS@ KVO     Vital signs stable     Abdominal assessment: round and soft     Recommendation:  Discharge patient per MD order.   Return to [address] [place] [address] 7 or Friend   Permission to share finding with family or friend yes

## 2022-04-27 NOTE — PROGRESS NOTES
TRANSFER - OUT REPORT:    Verbal report given to EMMANUEL Lee(name) on Carin Sotomayor  being transferred to  3305(unit) for ordered procedure       Report consisted of patients Situation, Background, Assessment and   Recommendations(SBAR). Information from the following report(s) SBAR, Procedure Summary, MAR, Accordion, Recent Results and Med Rec Status was reviewed with the receiving nurse. Lines:   Peripheral IV 04/26/22 Anterior;Left;Proximal Forearm (Active)   Site Assessment Clean, dry, & intact 04/27/22 0720   Phlebitis Assessment 0 04/27/22 0720   Infiltration Assessment 0 04/27/22 0720   Dressing Status Clean, dry, & intact 04/27/22 0720   Dressing Type Transparent 04/27/22 0720   Hub Color/Line Status Blue 04/27/22 0720   Action Taken Open ports on tubing capped 04/27/22 0720        Opportunity for questions and clarification was provided.

## 2022-04-27 NOTE — PROGRESS NOTES
Discharge paperwork reviewed with patient. Opportunity for questions and clarification provided. Patient verbalized understanding. [facility] transportation to transport home at 1600. No further needs identified.

## 2022-04-27 NOTE — PROGRESS NOTES
Hospital follow-up PCP transitional care appointment has been scheduled with Dr. Vesna Cadet on 5/4/22 at 1400. Pending patient discharge.   Heather Hurley, Care Management Assistant

## 2022-04-27 NOTE — ANESTHESIA POSTPROCEDURE EVALUATION
Procedure(s):  COLONOSCOPY  ENDOSCOPIC POLYPECTOMY. MAC    Anesthesia Post Evaluation      Multimodal analgesia: multimodal analgesia used between 6 hours prior to anesthesia start to PACU discharge  Patient location during evaluation: PACU  Patient participation: complete - patient participated  Level of consciousness: sleepy but conscious and responsive to verbal stimuli  Pain score: 2  Pain management: adequate  Airway patency: patent  Anesthetic complications: no  Cardiovascular status: acceptable  Respiratory status: acceptable  Hydration status: acceptable  Comments: +Post-Anesthesia Evaluation and Assessment    Patient: Juana Pollard MRN: [id number]  SSN: [id number]   YOB: 1945  Age: 69 y.o. Sex: male      Cardiovascular Function/Vital Signs    BP (!) 114/52   Pulse 70   Temp 36.6 °C (97.9 °F)   Resp 17   Wt 102.6 kg (226 lb 3.1 oz)   SpO2 96%   BMI 31.55 kg/m²     Patient is status post Procedure(s):  COLONOSCOPY  ENDOSCOPIC POLYPECTOMY. Nausea/Vomiting: Controlled. Postoperative hydration reviewed and adequate. Pain:  Pain Scale 1: Numeric (0 - 10) (04/27/22 1116)  Pain Intensity 1: 0 (04/27/22 1116)   Managed. Neurological Status: At baseline. Mental Status and Level of Consciousness: Arousable. Pulmonary Status:   O2 Device: None (04/27/22 1257)   Adequate oxygenation and airway patent. Complications related to anesthesia: None    Post-anesthesia assessment completed. No concerns. Signed By: Daniela Mcgee MD    4/27/2022  Post anesthesia nausea and vomiting:  controlled  Final Post Anesthesia Temperature Assessment:  Normothermia (36.0-37.5 degrees C)      INITIAL Post-op Vital signs:   Vitals Value Taken Time   /63 04/27/22 1303   Temp     Pulse 71 04/27/22 1305   Resp 16 04/27/22 1305   SpO2 93 % 04/27/22 1305   Vitals shown include unvalidated device data.

## 2022-04-27 NOTE — PROGRESS NOTES
Transition of Care Plan:     RUR: 8%  Disposition: home with spouse   Follow up appointments: PCP, specialists as indicated   DME needed: uses a cane   Transportation at Discharge: [facility], ETA 4PM    Keys or means to access home: yes         Medicare Letter: reviewed on 4/26/22  Is patient a BCPI-A Bundle: No                      If yes, was Bundle Letter given?:    Is patient a [place] and connected with the [place]? No            If yes, was [employer] transfer form completed and VA notified? Caregiver Contact: Merline Leigh (spouse) [phone]  Discharge Caregiver contacted prior to discharge? yes  Care Conference needed?:  No    Chart reviewed. CM aware of discharge order. Spoke with pt's spouse who has confirmed that [employer] will provide pt transportation home today at [address] letter reviewed on 4/26. PCP appointment scheduled by CM specialists. See AVS for details. No further CM needs identified at this time. Ready for d/c from CM standpoint. Care Management Interventions  PCP Verified by CM: Yes  Palliative Care Criteria Met (RRAT>21 & CHF Dx)?: No  Mode of Transport at Discharge:  Other (see comment) ([facility] )  Transition of Care Consult (CM Consult): Discharge Planning  Discharge Durable Medical Equipment: No  Physical Therapy Consult: No  Occupational Therapy Consult: No  Speech Therapy Consult: No  Support Systems: Spouse/Significant Other,Other Family Member(s)  Confirm Follow Up Transport: Family  The Patient and/or Patient Representative was Provided with a Choice of Provider and Agrees with the Discharge Plan?: Yes  Freedom of Choice List was Provided with Basic Dialogue that Supports the Patient's Individualized Plan of Care/Goals, Treatment Preferences and Shares the Quality Data Associated with the Providers?: No   Resource Information Provided?: No  Discharge Location  Patient Expects to be Discharged to[de-identified] Home    DUNG Doss   Care Manager, ED [facility]  [phone]

## 2022-04-27 NOTE — PROGRESS NOTES
End of Shift Note    Bedside shift change report given to  RN (oncoming nurse) by Charity Chinchilla RN (offgoing nurse). Report included the following information SBAR, Kardex, MAR and Recent Results    Shift worked: night   Shift summary and any significant changes:    Direct admit to NSTU - awaiting bed on gen surg    Scheduled for colonoscopy tomorrow - prep has been done    Pt to be NPO at midnight tonight for colonoscopy   Concerns for physician to address: None   Zone phone for oncoming shift:  [phone]     Patient Information  Demetrio Chapman  69 y.o.  4/26/2022  9:25 AM by Armandina Lennox, MD. Demetrio Chapman was admitted from Home    Problem List  Patient Active Problem List    Diagnosis Date Noted    Encounter for screening colonoscopy 04/26/2022    Lactic acid acidosis 05/18/2021    Sepsis ([address] 05/18/2021    KENNA (acute kidney injury) ([address] 05/18/2021    Acute hypoxemic respiratory failure ([address] 05/18/2021    CKD (chronic kidney disease) stage 3, GFR 30-59 ml/min ([address] 12/24/2017    Abscess of back 06/16/2014    Hypoglycemia 11/20/2011    Tobacco abuse 11/18/2011    PAD (peripheral artery disease) ([address] 11/10/2011    DM (diabetes mellitus) ([address] 11/10/2011    Hyperlipidemia 11/10/2011    HTN (hypertension) 11/10/2011     Past Medical History:   Diagnosis Date    Diabetes ([address]     Ill-defined condition     perpherial artery disease    Ill-defined condition     hyperlidemia       Core Measures:  CVA: No No  CHF:No No  PNA:No No    Activity:  Activity Level: Bath Room Privileges  Number times ambulated in hallways past shift: 0  Number of times OOB to chair past shift: 0    Cardiac:   Cardiac Monitoring: No           Access:   Current line(s): PIV   Central Line? No   PICC LINE? No    Genitourinary:   Urinary status: voiding  Urinary Catheter?  No     Respiratory:   O2 Device: None (Room air)  Chronic home O2 use?: NO  Incentive spirometer at bedside: NO       GI:  Last Bowel Movement Date: 04/27/22  Current diet:  DIET NPO  Passing flatus: YES  Tolerating current diet: YES       Pain Management:   Patient states pain is manageable on current regimen: YES    Skin:  Dwayne Score: 21  Interventions: increase time out of bed    Patient Safety:  Fall Score:  Total Score: 2  Interventions: bed/chair alarm     @Rollbelt  @dexterity to release roll belt  Yes/No ( must document dexterity  here by stating Yes or No here, otherwise this is a restraint and must follow restraint documentation policy.)    DVT prophylaxis:  DVT prophylaxis Med- No  DVT prophylaxis SCD or - Refused     Wounds: (If Applicable)  Wounds- No  Location     Active Consults:  None    Length of Stay:  Expected LOS: - - -  Actual LOS: 1  Discharge Plan: No       Ana Luisa Welsh RN
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
## 2022-04-27 NOTE — DISCHARGE INSTRUCTIONS
Anuel Aguilera  [id number]  1945    COLON DISCHARGE INSTRUCTIONS  Discomfort:  Redness at IV site- apply warm compress to area; if redness or soreness persist- contact your physician  There may be a slight amount of blood passed from the rectum  Gaseous discomfort- walking, belching will help relieve any discomfort  You may not operate a vehicle for 12 hours  You may not engage in an occupation involving machinery or appliances for rest of today  You may not drink alcoholic beverages for at least 12 hours  Avoid making any critical decisions for at least 24 hour  DIET:   Regular diet. - however -  remember your colon is empty and a heavy meal will produce gas. Avoid these foods:  vegetables, fried / greasy foods, carbonated drinks for today       ACTIVITY:  You may resume your normal daily activities it is recommended that you spend the remainder of the day resting -  avoid any strenuous activity. CALL M.D. ANY SIGN OF:   Increasing pain, nausea, vomiting  Abdominal distension (swelling)  New increased bleeding (oral or rectal)  Fever (chills)  Pain in chest area  Bloody discharge from nose or mouth  Shortness of breath     Follow-up Instructions:   Call Magaly Sanchez MD if any questions or problems. Telephone # [phone]  Biopsy results will be available in  7 to10 days  Should have a repeat colonoscopy in 5 years. COLONOSCOPY FINDINGS:  Your colonoscopy showed: 1 polyp (removed).

## 2022-04-27 NOTE — ANESTHESIA PREPROCEDURE EVALUATION
Relevant Problems   CARDIOVASCULAR   (+) HTN (hypertension)   (+) PAD (peripheral artery disease) ([facility])      RENAL FAILURE   (+) KENNA (acute kidney injury) ([facility])   (+) CKD (chronic kidney disease) stage 3, GFR 30-59 ml/min ([facility])      ENDOCRINE   (+) DM (diabetes mellitus) ([facility])       Anesthetic History   No history of anesthetic complications            Review of Systems / Medical History  Patient summary reviewed, nursing notes reviewed and pertinent labs reviewed    Pulmonary  Within defined limits                 Neuro/Psych   Within defined limits           Cardiovascular    Hypertension              Exercise tolerance: >4 METS     GI/Hepatic/Renal  Within defined limits              Endo/Other    Diabetes         Other Findings              Physical Exam    Airway  Mallampati: II  TM Distance: 4 - 6 cm  Neck ROM: normal range of motion   Mouth opening: Normal     Cardiovascular  Regular rate and rhythm,  S1 and S2 normal,  no murmur, click, rub, or gallop  Rhythm: regular  Rate: normal         Dental    Dentition: Edentulous     Pulmonary  Breath sounds clear to auscultation               Abdominal  GI exam deferred       Other Findings            Anesthetic Plan    ASA: 3  Anesthesia type: MAC          Induction: Intravenous  Anesthetic plan and risks discussed with: Patient

## 2022-05-26 PROBLEM — Z12.11 ENCOUNTER FOR SCREENING COLONOSCOPY: Status: RESOLVED | Noted: 2022-04-26 | Resolved: 2022-05-26

## 2023-12-20 ENCOUNTER — APPOINTMENT (OUTPATIENT)
Facility: HOSPITAL | Age: 78
DRG: 445 | End: 2023-12-20
Payer: MEDICARE

## 2023-12-20 ENCOUNTER — HOSPITAL ENCOUNTER (INPATIENT)
Facility: HOSPITAL | Age: 78
LOS: 2 days | Discharge: ANOTHER HOSPITAL | DRG: 445 | End: 2023-12-22
Attending: EMERGENCY MEDICINE | Admitting: INTERNAL MEDICINE
Payer: MEDICARE

## 2023-12-20 DIAGNOSIS — R79.89 ELEVATED LFTS: ICD-10-CM

## 2023-12-20 DIAGNOSIS — R17 JAUNDICE: Primary | ICD-10-CM

## 2023-12-20 PROBLEM — K83.1 OBSTRUCTIVE JAUNDICE: Status: ACTIVE | Noted: 2023-12-20

## 2023-12-20 LAB
ALBUMIN SERPL-MCNC: 3.3 G/DL (ref 3.5–5)
ALBUMIN/GLOB SERPL: 0.7 (ref 1.1–2.2)
ALP SERPL-CCNC: 436 U/L (ref 45–117)
ALT SERPL-CCNC: 143 U/L (ref 12–78)
AMMONIA PLAS-SCNC: <10 UMOL/L
ANION GAP SERPL CALC-SCNC: 4 MMOL/L (ref 5–15)
APAP SERPL-MCNC: <2 UG/ML (ref 10–30)
APPEARANCE UR: CLEAR
AST SERPL-CCNC: 102 U/L (ref 15–37)
BACTERIA URNS QL MICRO: NEGATIVE /HPF
BASOPHILS # BLD: 0 K/UL (ref 0–0.1)
BASOPHILS NFR BLD: 0 % (ref 0–1)
BILIRUB SERPL-MCNC: 14.4 MG/DL (ref 0.2–1)
BILIRUB UR QL CFM: NEGATIVE
BUN SERPL-MCNC: 22 MG/DL (ref 6–20)
BUN/CREAT SERPL: 22 (ref 12–20)
CALCIUM SERPL-MCNC: 9.9 MG/DL (ref 8.5–10.1)
CHLORIDE SERPL-SCNC: 105 MMOL/L (ref 97–108)
CO2 SERPL-SCNC: 28 MMOL/L (ref 21–32)
COLOR UR: ABNORMAL
CREAT SERPL-MCNC: 1.02 MG/DL (ref 0.7–1.3)
DIFFERENTIAL METHOD BLD: ABNORMAL
EOSINOPHIL # BLD: 0.3 K/UL (ref 0–0.4)
EOSINOPHIL NFR BLD: 7 % (ref 0–7)
EPITH CASTS URNS QL MICRO: ABNORMAL /LPF
ERYTHROCYTE [DISTWIDTH] IN BLOOD BY AUTOMATED COUNT: 16.9 % (ref 11.5–14.5)
GLOBULIN SER CALC-MCNC: 4.5 G/DL (ref 2–4)
GLUCOSE BLD STRIP.AUTO-MCNC: 181 MG/DL (ref 65–117)
GLUCOSE SERPL-MCNC: 183 MG/DL (ref 65–100)
GLUCOSE UR STRIP.AUTO-MCNC: >1000 MG/DL
HCT VFR BLD AUTO: 44.5 % (ref 36.6–50.3)
HGB BLD-MCNC: 15 G/DL (ref 12.1–17)
HGB UR QL STRIP: ABNORMAL
IMM GRANULOCYTES # BLD AUTO: 0 K/UL (ref 0–0.04)
IMM GRANULOCYTES NFR BLD AUTO: 0 % (ref 0–0.5)
INR PPP: 1.2 (ref 0.9–1.1)
KETONES UR QL STRIP.AUTO: NEGATIVE MG/DL
LEUKOCYTE ESTERASE UR QL STRIP.AUTO: NEGATIVE
LIPASE SERPL-CCNC: 503 U/L (ref 13–75)
LYMPHOCYTES # BLD: 1.5 K/UL (ref 0.8–3.5)
LYMPHOCYTES NFR BLD: 32 % (ref 12–49)
MCH RBC QN AUTO: 35.5 PG (ref 26–34)
MCHC RBC AUTO-ENTMCNC: 33.7 G/DL (ref 30–36.5)
MCV RBC AUTO: 105.5 FL (ref 80–99)
MONOCYTES # BLD: 0.5 K/UL (ref 0–1)
MONOCYTES NFR BLD: 11 % (ref 5–13)
NEUTS SEG # BLD: 2.2 K/UL (ref 1.8–8)
NEUTS SEG NFR BLD: 50 % (ref 32–75)
NITRITE UR QL STRIP.AUTO: NEGATIVE
NRBC # BLD: 0.02 K/UL (ref 0–0.01)
NRBC BLD-RTO: 0.4 PER 100 WBC
PH UR STRIP: 5.5 (ref 5–8)
PLATELET # BLD AUTO: 178 K/UL (ref 150–400)
PMV BLD AUTO: 11 FL (ref 8.9–12.9)
POTASSIUM SERPL-SCNC: 4.1 MMOL/L (ref 3.5–5.1)
PROT SERPL-MCNC: 7.8 G/DL (ref 6.4–8.2)
PROT UR STRIP-MCNC: 100 MG/DL
PROTHROMBIN TIME: 12.1 SEC (ref 9–11.1)
RBC # BLD AUTO: 4.22 M/UL (ref 4.1–5.7)
RBC #/AREA URNS HPF: ABNORMAL /HPF (ref 0–5)
SERVICE CMNT-IMP: ABNORMAL
SODIUM SERPL-SCNC: 137 MMOL/L (ref 136–145)
SP GR UR REFRACTOMETRY: 1.02 (ref 1–1.03)
SPECIMEN HOLD: NORMAL
UROBILINOGEN UR QL STRIP.AUTO: 1 EU/DL (ref 0.2–1)
WBC # BLD AUTO: 4.6 K/UL (ref 4.1–11.1)
WBC URNS QL MICRO: ABNORMAL /HPF (ref 0–4)

## 2023-12-20 PROCEDURE — 6360000002 HC RX W HCPCS: Performed by: INTERNAL MEDICINE

## 2023-12-20 PROCEDURE — 6370000000 HC RX 637 (ALT 250 FOR IP): Performed by: INTERNAL MEDICINE

## 2023-12-20 PROCEDURE — A9579 GAD-BASE MR CONTRAST NOS,1ML: HCPCS | Performed by: EMERGENCY MEDICINE

## 2023-12-20 PROCEDURE — 83690 ASSAY OF LIPASE: CPT

## 2023-12-20 PROCEDURE — 74177 CT ABD & PELVIS W/CONTRAST: CPT

## 2023-12-20 PROCEDURE — 6360000004 HC RX CONTRAST MEDICATION: Performed by: EMERGENCY MEDICINE

## 2023-12-20 PROCEDURE — 81001 URINALYSIS AUTO W/SCOPE: CPT

## 2023-12-20 PROCEDURE — 82962 GLUCOSE BLOOD TEST: CPT

## 2023-12-20 PROCEDURE — 1100000003 HC PRIVATE W/ TELEMETRY

## 2023-12-20 PROCEDURE — 36415 COLL VENOUS BLD VENIPUNCTURE: CPT

## 2023-12-20 PROCEDURE — 2580000003 HC RX 258: Performed by: INTERNAL MEDICINE

## 2023-12-20 PROCEDURE — 80143 DRUG ASSAY ACETAMINOPHEN: CPT

## 2023-12-20 PROCEDURE — 82140 ASSAY OF AMMONIA: CPT

## 2023-12-20 PROCEDURE — 85610 PROTHROMBIN TIME: CPT

## 2023-12-20 PROCEDURE — 80053 COMPREHEN METABOLIC PANEL: CPT

## 2023-12-20 PROCEDURE — 74183 MRI ABD W/O CNTR FLWD CNTR: CPT

## 2023-12-20 PROCEDURE — 80074 ACUTE HEPATITIS PANEL: CPT

## 2023-12-20 PROCEDURE — 85025 COMPLETE CBC W/AUTO DIFF WBC: CPT

## 2023-12-20 PROCEDURE — 86301 IMMUNOASSAY TUMOR CA 19-9: CPT

## 2023-12-20 PROCEDURE — 99285 EMERGENCY DEPT VISIT HI MDM: CPT

## 2023-12-20 RX ORDER — HYDROXYZINE HYDROCHLORIDE 10 MG/1
10 TABLET, FILM COATED ORAL 3 TIMES DAILY PRN
Status: DISCONTINUED | OUTPATIENT
Start: 2023-12-20 | End: 2023-12-22 | Stop reason: HOSPADM

## 2023-12-20 RX ORDER — ENOXAPARIN SODIUM 100 MG/ML
40 INJECTION SUBCUTANEOUS DAILY
Status: DISCONTINUED | OUTPATIENT
Start: 2023-12-20 | End: 2023-12-22 | Stop reason: HOSPADM

## 2023-12-20 RX ORDER — LEVOTHYROXINE SODIUM 0.1 MG/1
100 TABLET ORAL
COMMUNITY
Start: 2023-04-06

## 2023-12-20 RX ORDER — ONDANSETRON 2 MG/ML
4 INJECTION INTRAMUSCULAR; INTRAVENOUS EVERY 6 HOURS PRN
Status: DISCONTINUED | OUTPATIENT
Start: 2023-12-20 | End: 2023-12-22 | Stop reason: HOSPADM

## 2023-12-20 RX ORDER — ONDANSETRON 4 MG/1
4 TABLET, ORALLY DISINTEGRATING ORAL EVERY 8 HOURS PRN
Status: DISCONTINUED | OUTPATIENT
Start: 2023-12-20 | End: 2023-12-22 | Stop reason: HOSPADM

## 2023-12-20 RX ORDER — METFORMIN HYDROCHLORIDE 500 MG/1
1000 TABLET, EXTENDED RELEASE ORAL 2 TIMES DAILY
COMMUNITY

## 2023-12-20 RX ORDER — LEVOTHYROXINE SODIUM 0.1 MG/1
100 TABLET ORAL
Status: DISCONTINUED | OUTPATIENT
Start: 2023-12-21 | End: 2023-12-22 | Stop reason: HOSPADM

## 2023-12-20 RX ORDER — SODIUM CHLORIDE 9 MG/ML
INJECTION, SOLUTION INTRAVENOUS PRN
Status: DISCONTINUED | OUTPATIENT
Start: 2023-12-20 | End: 2023-12-22 | Stop reason: HOSPADM

## 2023-12-20 RX ORDER — ACETAMINOPHEN 325 MG/1
650 TABLET ORAL EVERY 6 HOURS PRN
Status: DISCONTINUED | OUTPATIENT
Start: 2023-12-20 | End: 2023-12-22 | Stop reason: HOSPADM

## 2023-12-20 RX ORDER — INSULIN LISPRO 100 [IU]/ML
0-8 INJECTION, SOLUTION INTRAVENOUS; SUBCUTANEOUS
Status: DISCONTINUED | OUTPATIENT
Start: 2023-12-20 | End: 2023-12-22 | Stop reason: HOSPADM

## 2023-12-20 RX ORDER — INSULIN LISPRO 100 [IU]/ML
0-4 INJECTION, SOLUTION INTRAVENOUS; SUBCUTANEOUS NIGHTLY
Status: DISCONTINUED | OUTPATIENT
Start: 2023-12-20 | End: 2023-12-22 | Stop reason: HOSPADM

## 2023-12-20 RX ORDER — SODIUM CHLORIDE 0.9 % (FLUSH) 0.9 %
5-40 SYRINGE (ML) INJECTION EVERY 12 HOURS SCHEDULED
Status: DISCONTINUED | OUTPATIENT
Start: 2023-12-20 | End: 2023-12-22 | Stop reason: HOSPADM

## 2023-12-20 RX ORDER — ACETAMINOPHEN 650 MG/1
650 SUPPOSITORY RECTAL EVERY 6 HOURS PRN
Status: DISCONTINUED | OUTPATIENT
Start: 2023-12-20 | End: 2023-12-22 | Stop reason: HOSPADM

## 2023-12-20 RX ORDER — POLYETHYLENE GLYCOL 3350 17 G/17G
17 POWDER, FOR SOLUTION ORAL DAILY PRN
Status: DISCONTINUED | OUTPATIENT
Start: 2023-12-20 | End: 2023-12-22 | Stop reason: HOSPADM

## 2023-12-20 RX ORDER — INSULIN GLARGINE 100 [IU]/ML
44 INJECTION, SOLUTION SUBCUTANEOUS NIGHTLY
Status: DISCONTINUED | OUTPATIENT
Start: 2023-12-20 | End: 2023-12-21

## 2023-12-20 RX ORDER — SODIUM CHLORIDE 0.9 % (FLUSH) 0.9 %
5-40 SYRINGE (ML) INJECTION PRN
Status: DISCONTINUED | OUTPATIENT
Start: 2023-12-20 | End: 2023-12-22 | Stop reason: HOSPADM

## 2023-12-20 RX ORDER — INSULIN LISPRO 100 [IU]/ML
4 INJECTION, SOLUTION INTRAVENOUS; SUBCUTANEOUS
Status: DISCONTINUED | OUTPATIENT
Start: 2023-12-20 | End: 2023-12-22 | Stop reason: HOSPADM

## 2023-12-20 RX ADMIN — SODIUM CHLORIDE, PRESERVATIVE FREE 5 ML: 5 INJECTION INTRAVENOUS at 22:14

## 2023-12-20 RX ADMIN — INSULIN GLARGINE 44 UNITS: 100 INJECTION, SOLUTION SUBCUTANEOUS at 22:10

## 2023-12-20 RX ADMIN — IOPAMIDOL 100 ML: 755 INJECTION, SOLUTION INTRAVENOUS at 15:22

## 2023-12-20 RX ADMIN — GADOTERIDOL 19 ML: 279.3 INJECTION, SOLUTION INTRAVENOUS at 18:59

## 2023-12-20 RX ADMIN — ENOXAPARIN SODIUM 40 MG: 100 INJECTION SUBCUTANEOUS at 20:27

## 2023-12-20 NOTE — ED NOTES
Pt to CT    Pt presents to ER via EMS w/ complaints of diarrhea and rectal pain. Pt is hard of hearing and A&Ox3 - disoriented to time. Pt reports he ia ambulatory w/ a walker at home. Pt is very jaundiced and a poor health historian.

## 2023-12-20 NOTE — ED PROVIDER NOTES
[facility] EMERGENCY DEPT  EMERGENCY DEPARTMENT ENCOUNTER       Pt Name: Heron Reid  MRN: [id number]  [address] [facility] [place] 1945  Date of evaluation: 12/20/2023  Provider: Shalini Tobar MD   PCP: Sugar Tan MD  Note Started: 2:52 PM EST 12/20/23     CHIEF COMPLAINT       Chief Complaint   Patient presents with    Diarrhea     Pt arrives via EMS from home. States he has had diarrhea for two weeks. Denies vomiting. Appears jaundiced. Pt not a good historian to obtain history. HISTORY OF PRESENT ILLNESS: 1 or more elements      History From: patient, son, History limited by: none     Heron Reid is a 69 y.o. male who presents with a cc or diarrhea and rectal pain       Please See MDM for Additional Details of the HPI/PMH  Nursing Notes were all reviewed and agreed with or any disagreements were addressed in the HPI. REVIEW OF SYSTEMS        Positives and Pertinent negatives as per HPI. PAST HISTORY     Past Medical History:  Past Medical History:   Diagnosis Date    Diabetes ([address])     Ill-defined condition     perpherial artery disease    Ill-defined condition     hyperlidemia       Past Surgical History:  Past Surgical History:   Procedure Laterality Date    COLONOSCOPY N/A 4/27/2022    COLONOSCOPY performed by Lyndsey Niño MD at [facility] ENDOSCOPY    OTHER SURGICAL HISTORY  2011    right femoral tibial bypass    OTHER SURGICAL HISTORY      drained times two in back    VASCULAR SURGERY  2012    blood clot. right leg       Family History:  No family history on file.     Social History:  Social History     Tobacco Use    Smoking status: Every Day     Current packs/day: 1.00     Types: Cigarettes    Smokeless tobacco: Never   Substance Use Topics    Alcohol use: No       Allergies:  No Known Allergies    CURRENT MEDICATIONS      Previous Medications    CYANOCOBALAMIN 1000 MCG TABLET    Take 1,000 mcg by mouth daily    FENOFIBRATE (TRIGLIDE) 160 MG TABLET    Take 160 mg by mouth daily    INSULIN ASPART none  Procedures     CRITICAL CARE TIME   none    EMERGENCY DEPARTMENT COURSE and DIFFERENTIAL DIAGNOSIS/MDM   Vitals:    Vitals:    12/20/23 1454 12/20/23 1459 12/20/23 1554 12/20/23 1729   BP: 139/80 (!) 142/71 (!) 157/82 137/87   Pulse: 66 68 71 65   Resp: 22 18 19 20   Temp:       SpO2: 96% 97% 98% 97%   Weight:       Height:            Patient was given the following medications:  Medications   insulin glargine (LANTUS) injection vial 44 Units (has no administration in time range)   levothyroxine (SYNTHROID) tablet 100 mcg (has no administration in time range)   insulin lispro (HUMALOG) injection vial 0-8 Units (has no administration in time range)   insulin lispro (HUMALOG) injection vial 0-4 Units (has no administration in time range)   sodium chloride flush 0.9 % injection 5-40 mL (has no administration in time range)   sodium chloride flush 0.9 % injection 5-40 mL (has no administration in time range)   0.9 % sodium chloride infusion (has no administration in time range)   enoxaparin (LOVENOX) injection 40 mg (has no administration in time range)   ondansetron (ZOFRAN-ODT) disintegrating tablet 4 mg (has no administration in time range)     Or   ondansetron (ZOFRAN) injection 4 mg (has no administration in time range)   polyethylene glycol (GLYCOLAX) packet 17 g (has no administration in time range)   acetaminophen (TYLENOL) tablet 650 mg (has no administration in time range)     Or   acetaminophen (TYLENOL) suppository 650 mg (has no administration in time range)   hydrOXYzine HCl (ATARAX) tablet 10 mg (has no administration in time range)   insulin lispro (HUMALOG) injection vial 4 Units (has no administration in time range)   iopamidol (ISOVUE-370) 76 % injection 100 mL (100 mLs IntraVENous Given 12/20/23 1522)       Medical Decision Making  69 male with history of diabetes, PAD, hyperlipidemia, peripheral neuropathy, chronic pancreatitis, who presents with chief complaint of 2 weeks of rectal pain and

## 2023-12-20 NOTE — PROGRESS NOTES
MRI PENDING    Completion of MRI Screening Sheet    Fax to [phone] when completed    Please call [phone]  When this is done    Thank You

## 2023-12-20 NOTE — CONSULTS
Anselmo Rascon, NP-C                       [phone] cell              Monday-Thursday 7:30 am-4:30 pm                     Friday 7:30 am-12:00 pm     Gastroenterology Consultation Note      Admit Date: 12/20/2023  Consult Date: 12/20/2023   I greatly appreciate your asking me to see Caitlin Denis, thank you very much for the opportunity to participate in his care. Narrative Assessment and Plan   GI consultation for elevated LFTs and diarrhea. 70 y/o male with PMH of T2DM with peripheral neuropathy, CKD3, HTN, PAD, and SAH. Brought to ER because of diarrhea for the past 2 weeks. History of chronic pancreatitis dating back to 2021 on CT but significantly worse on CT 5/2023 this year at [facility] when it showed PD dilation. He was to follow up with GI as OP but I cannot see that he did. He is very jaundiced, TB 14, , , . CT shows multiple pancreatic calcifications and PD dilation as well as significant intrahepatic and extrahepatic duct dilation, CBD 1.5 cm. He denies weight loss. He has lower abdominal pain. No nausea. He has never drank much alcohol. He takes Tylenol but unsure of amount. Impression:  Elevated LFTs  Abdominal pain  Diarrhea  T2DM with peripheral neuropathy  CKD3  HTN  PAD  SAH  Chronic pancreatitis  Abnormal CT     Concerning for double duct sign, will likely need ERCP for brushings/biopsy but may need additional procedure such as EUS as well. Will await results of MRCP for further recommendations. Check fecal elastase  Check hepatitis panel, INR, and CA 19-9  Check Tylenol level    Subjective:     Chief Complaint: Diarrhea, elevated LFTs    History of Present Illness:  GI consultation for elevated LFTs and diarrhea. 70 y/o male with PMH of Type 2 DM with peripheral neuropathy, CKD3, HTN, PAD, and SAH after GLF earlier this year. Brought to ER after having diarrhea for the past 2 weeks.  Also has

## 2023-12-20 NOTE — H&P
Hospitalist Admission Note    NAME:   Juani Mock   : 1945   MRN: [id number]     Date/Time: 2023 4:37 PM    Patient PCP: Yrn Molina MD    ______________________________________________________________________  Given the patient's current clinical presentation, I have a high level of concern for decompensation if discharged from the emergency department. Complex decision making was performed, which includes reviewing the patient's available past medical records, laboratory results, and x-ray films. My assessment of this patient's clinical condition and my plan of care is as follows. Assessment / Plan:    Obstructive jaundice POA  Pruritus POA  Transaminitis/elevated LFTs POA  Chronic pancreatitis POA  Diarrhea x 2 weeks POA-likely due to pancreatic insufficiency? Unintentional weight loss POA   Diabetes type 2 insulin-dependent uncontrolled with hyperglycemia  Peripheral arterial disease status post right fem-tib bypass surgery  Tobacco abuse disorder  Hypothyroidism  T. bili 14.4  LFTs 143/102/436  Ammonia less than 10  Lipase 503  Acetaminophen level less than 2  INR 1.2  UA dark with bilirubinuria, glycosuria  CT abdomen pelvis significant biliary dilatation noted with no calcified biliary stones or any extrinsic mass noted, MRCP recommended, chronic pancreatitis sequently noted    Admit to medical bed  Check MRI/MRCP  Inpatient GI consulted in ED-rule out biliary duct stricture versus lesion of ampulla versus primary malignancy of pancreas or biliary duct versus choledocholithiasis  Will likely need ERCP with brush biopsy and likely biliary stent placement.   Diabetic diet for now, n.p.o. after midnight for possible ERCP pending MRCP results  Fingersticks before every meal and at bedtime, continue insulin as at home and sliding scale lispro her with meals  Check hepatitis panel and CA 19-9  Check fecal elastase  Atarax as needed for itching  Continue home Synthroid      Medical (from the past 12 hour(s))   CBC with Diff    Collection Time: 12/20/23  2:14 PM   Result Value Ref Range    WBC 4.6 4.1 - 11.1 K/uL    RBC 4.22 4.10 - 5.70 M/uL    Hemoglobin 15.0 12.1 - 17.0 g/dL    Hematocrit 44.5 36.6 - 50.3 %    .5 (H) 80.0 - 99.0 FL    MCH 35.5 (H) 26.0 - 34.0 PG    MCHC 33.7 30.0 - 36.5 g/dL    RDW 16.9 (H) 11.5 - 14.5 %    Platelets 714 554 - 836 K/uL    MPV 11.0 8.9 - 12.9 FL    Nucleated RBCs 0.4 (H) 0  WBC    nRBC 0.02 (H) 0.00 - 0.01 K/uL    Neutrophils % 50 32 - 75 %    Lymphocytes % 32 12 - 49 %    Monocytes % 11 5 - 13 %    Eosinophils % 7 0 - 7 %    Basophils % 0 0 - 1 %    Immature Granulocytes 0 0.0 - 0.5 %    Neutrophils Absolute 2.2 1.8 - 8.0 K/UL    Lymphocytes Absolute 1.5 0.8 - 3.5 K/UL    Monocytes Absolute 0.5 0.0 - 1.0 K/UL    Eosinophils Absolute 0.3 0.0 - 0.4 K/UL    Basophils Absolute 0.0 0.0 - 0.1 K/UL    Absolute Immature Granulocyte 0.0 0.00 - 0.04 K/UL    Differential Type AUTOMATED     CMP    Collection Time: 12/20/23  2:14 PM   Result Value Ref Range    Sodium 137 136 - 145 mmol/L    Potassium 4.1 3.5 - 5.1 mmol/L    Chloride 105 97 - 108 mmol/L    CO2 28 21 - 32 mmol/L    Anion Gap 4 (L) 5 - 15 mmol/L    Glucose 183 (H) 65 - 100 mg/dL    BUN 22 (H) 6 - 20 MG/DL    Creatinine 1.02 0.70 - 1.30 MG/DL    Bun/Cre Ratio 22 (H) 12 - 20      Est, Glom Filt Rate >60 >60 ml/min/1.73m2    Calcium 9.9 8.5 - 10.1 MG/DL    Total Bilirubin 14.4 (H) 0.2 - 1.0 MG/DL     (H) 12 - 78 U/L     (H) 15 - 37 U/L    Alk Phosphatase 436 (H) 45 - 117 U/L    Total Protein 7.8 6.4 - 8.2 g/dL    Albumin 3.3 (L) 3.5 - 5.0 g/dL    Globulin 4.5 (H) 2.0 - 4.0 g/dL    Albumin/Globulin Ratio 0.7 (L) 1.1 - 2.2     Lipase    Collection Time: 12/20/23  2:14 PM   Result Value Ref Range    Lipase 503 (H) 13 - 75 U/L   Ammonia    Collection Time: 12/20/23  3:13 PM   Result Value Ref Range    Ammonia <10 <32 UMOL/L         CT ABDOMEN PELVIS W IV CONTRAST Additional Contrast?

## 2023-12-21 ENCOUNTER — APPOINTMENT (OUTPATIENT)
Facility: HOSPITAL | Age: 78
DRG: 445 | End: 2023-12-21
Payer: MEDICARE

## 2023-12-21 LAB
-: NORMAL
ALBUMIN SERPL-MCNC: 2.9 G/DL (ref 3.5–5)
ALBUMIN/GLOB SERPL: 0.7 (ref 1.1–2.2)
ALP SERPL-CCNC: 379 U/L (ref 45–117)
ALT SERPL-CCNC: 122 U/L (ref 12–78)
ANION GAP SERPL CALC-SCNC: 4 MMOL/L (ref 5–15)
AST SERPL-CCNC: 95 U/L (ref 15–37)
BILIRUB SERPL-MCNC: 12.8 MG/DL (ref 0.2–1)
BUN SERPL-MCNC: 18 MG/DL (ref 6–20)
BUN/CREAT SERPL: 20 (ref 12–20)
CALCIUM SERPL-MCNC: 9.1 MG/DL (ref 8.5–10.1)
CHLORIDE SERPL-SCNC: 106 MMOL/L (ref 97–108)
CO2 SERPL-SCNC: 27 MMOL/L (ref 21–32)
CREAT SERPL-MCNC: 0.92 MG/DL (ref 0.7–1.3)
ERYTHROCYTE [DISTWIDTH] IN BLOOD BY AUTOMATED COUNT: 16.9 % (ref 11.5–14.5)
GLOBULIN SER CALC-MCNC: 3.9 G/DL (ref 2–4)
GLUCOSE BLD STRIP.AUTO-MCNC: 111 MG/DL (ref 65–117)
GLUCOSE BLD STRIP.AUTO-MCNC: 118 MG/DL (ref 65–117)
GLUCOSE BLD STRIP.AUTO-MCNC: 124 MG/DL (ref 65–117)
GLUCOSE BLD STRIP.AUTO-MCNC: 57 MG/DL (ref 65–117)
GLUCOSE BLD STRIP.AUTO-MCNC: 61 MG/DL (ref 65–117)
GLUCOSE BLD STRIP.AUTO-MCNC: 68 MG/DL (ref 65–117)
GLUCOSE BLD STRIP.AUTO-MCNC: 69 MG/DL (ref 65–117)
GLUCOSE BLD STRIP.AUTO-MCNC: 71 MG/DL (ref 65–117)
GLUCOSE BLD STRIP.AUTO-MCNC: 82 MG/DL (ref 65–117)
GLUCOSE BLD STRIP.AUTO-MCNC: 82 MG/DL (ref 65–117)
GLUCOSE BLD STRIP.AUTO-MCNC: 87 MG/DL (ref 65–117)
GLUCOSE SERPL-MCNC: 127 MG/DL (ref 65–100)
HAV IGM SER QL: NONREACTIVE
HBV CORE IGM SER QL: NONREACTIVE
HBV SURFACE AG SER QL: <0.1 INDEX
HBV SURFACE AG SER QL: NEGATIVE
HCT VFR BLD AUTO: 41 % (ref 36.6–50.3)
HCV AB SER IA-ACNC: 0.07 INDEX
HCV AB SERPL QL IA: NONREACTIVE
HGB BLD-MCNC: 13.8 G/DL (ref 12.1–17)
INR PPP: 1.1 (ref 0.9–1.1)
MCH RBC QN AUTO: 35 PG (ref 26–34)
MCHC RBC AUTO-ENTMCNC: 33.7 G/DL (ref 30–36.5)
MCV RBC AUTO: 104.1 FL (ref 80–99)
NRBC # BLD: 0 K/UL (ref 0–0.01)
NRBC BLD-RTO: 0 PER 100 WBC
PLATELET # BLD AUTO: 188 K/UL (ref 150–400)
PMV BLD AUTO: 11.4 FL (ref 8.9–12.9)
POTASSIUM SERPL-SCNC: 3.4 MMOL/L (ref 3.5–5.1)
PROT SERPL-MCNC: 6.8 G/DL (ref 6.4–8.2)
PROTHROMBIN TIME: 11.8 SEC (ref 9–11.1)
RBC # BLD AUTO: 3.94 M/UL (ref 4.1–5.7)
SERVICE CMNT-IMP: ABNORMAL
SERVICE CMNT-IMP: NORMAL
SODIUM SERPL-SCNC: 137 MMOL/L (ref 136–145)
WBC # BLD AUTO: 3.2 K/UL (ref 4.1–11.1)

## 2023-12-21 PROCEDURE — 99223 1ST HOSP IP/OBS HIGH 75: CPT | Performed by: SURGERY

## 2023-12-21 PROCEDURE — 85027 COMPLETE CBC AUTOMATED: CPT

## 2023-12-21 PROCEDURE — 3600000013 HC SURGERY LEVEL 3 ADDTL 15MIN: Performed by: SPECIALIST

## 2023-12-21 PROCEDURE — 2709999900 HC NON-CHARGEABLE SUPPLY: Performed by: SPECIALIST

## 2023-12-21 PROCEDURE — 7100000001 HC PACU RECOVERY - ADDTL 15 MIN: Performed by: SPECIALIST

## 2023-12-21 PROCEDURE — C1769 GUIDE WIRE: HCPCS | Performed by: SPECIALIST

## 2023-12-21 PROCEDURE — 36415 COLL VENOUS BLD VENIPUNCTURE: CPT

## 2023-12-21 PROCEDURE — 2500000003 HC RX 250 WO HCPCS: Performed by: INTERNAL MEDICINE

## 2023-12-21 PROCEDURE — 3600000003 HC SURGERY LEVEL 3 BASE: Performed by: SPECIALIST

## 2023-12-21 PROCEDURE — 6370000000 HC RX 637 (ALT 250 FOR IP): Performed by: INTERNAL MEDICINE

## 2023-12-21 PROCEDURE — 80053 COMPREHEN METABOLIC PANEL: CPT

## 2023-12-21 PROCEDURE — 2580000003 HC RX 258: Performed by: SPECIALIST

## 2023-12-21 PROCEDURE — 3700000001 HC ADD 15 MINUTES (ANESTHESIA): Performed by: SPECIALIST

## 2023-12-21 PROCEDURE — 7100000000 HC PACU RECOVERY - FIRST 15 MIN: Performed by: SPECIALIST

## 2023-12-21 PROCEDURE — 6370000000 HC RX 637 (ALT 250 FOR IP): Performed by: SPECIALIST

## 2023-12-21 PROCEDURE — 2500000003 HC RX 250 WO HCPCS

## 2023-12-21 PROCEDURE — 1100000003 HC PRIVATE W/ TELEMETRY

## 2023-12-21 PROCEDURE — 2580000003 HC RX 258: Performed by: INTERNAL MEDICINE

## 2023-12-21 PROCEDURE — 82962 GLUCOSE BLOOD TEST: CPT

## 2023-12-21 PROCEDURE — 85610 PROTHROMBIN TIME: CPT

## 2023-12-21 PROCEDURE — 2720000010 HC SURG SUPPLY STERILE: Performed by: SPECIALIST

## 2023-12-21 PROCEDURE — 3700000000 HC ANESTHESIA ATTENDED CARE: Performed by: SPECIALIST

## 2023-12-21 PROCEDURE — 6360000002 HC RX W HCPCS: Performed by: SPECIALIST

## 2023-12-21 PROCEDURE — 2500000003 HC RX 250 WO HCPCS: Performed by: STUDENT IN AN ORGANIZED HEALTH CARE EDUCATION/TRAINING PROGRAM

## 2023-12-21 PROCEDURE — 0F798ZZ DILATION OF COMMON BILE DUCT, VIA NATURAL OR ARTIFICIAL OPENING ENDOSCOPIC: ICD-10-PCS | Performed by: SPECIALIST

## 2023-12-21 RX ORDER — CASTOR OIL AND BALSAM, PERU 788; 87 MG/G; MG/G
OINTMENT TOPICAL 2 TIMES DAILY
Status: DISCONTINUED | OUTPATIENT
Start: 2023-12-21 | End: 2023-12-22 | Stop reason: HOSPADM

## 2023-12-21 RX ORDER — DEXTROSE MONOHYDRATE 25 G/50ML
INJECTION, SOLUTION INTRAVENOUS
Status: COMPLETED
Start: 2023-12-21 | End: 2023-12-21

## 2023-12-21 RX ORDER — HYDROMORPHONE HYDROCHLORIDE 1 MG/ML
0.5 INJECTION, SOLUTION INTRAMUSCULAR; INTRAVENOUS; SUBCUTANEOUS EVERY 5 MIN PRN
Status: DISCONTINUED | OUTPATIENT
Start: 2023-12-21 | End: 2023-12-21 | Stop reason: HOSPADM

## 2023-12-21 RX ORDER — ONDANSETRON 2 MG/ML
4 INJECTION INTRAMUSCULAR; INTRAVENOUS
Status: DISCONTINUED | OUTPATIENT
Start: 2023-12-21 | End: 2023-12-21 | Stop reason: HOSPADM

## 2023-12-21 RX ORDER — SODIUM CHLORIDE 0.9 % (FLUSH) 0.9 %
5-40 SYRINGE (ML) INJECTION PRN
Status: DISCONTINUED | OUTPATIENT
Start: 2023-12-21 | End: 2023-12-21 | Stop reason: HOSPADM

## 2023-12-21 RX ORDER — SODIUM CHLORIDE 9 MG/ML
25 INJECTION, SOLUTION INTRAVENOUS PRN
Status: DISCONTINUED | OUTPATIENT
Start: 2023-12-21 | End: 2023-12-21 | Stop reason: HOSPADM

## 2023-12-21 RX ORDER — SODIUM CHLORIDE 9 MG/ML
INJECTION, SOLUTION INTRAVENOUS PRN
Status: DISCONTINUED | OUTPATIENT
Start: 2023-12-21 | End: 2023-12-21 | Stop reason: HOSPADM

## 2023-12-21 RX ORDER — FENTANYL CITRATE 50 UG/ML
25 INJECTION, SOLUTION INTRAMUSCULAR; INTRAVENOUS EVERY 5 MIN PRN
Status: DISCONTINUED | OUTPATIENT
Start: 2023-12-21 | End: 2023-12-21 | Stop reason: HOSPADM

## 2023-12-21 RX ORDER — INDOMETHACIN 50 MG/1
100 SUPPOSITORY RECTAL ONCE
Status: COMPLETED | OUTPATIENT
Start: 2023-12-21 | End: 2023-12-21

## 2023-12-21 RX ORDER — PROCHLORPERAZINE EDISYLATE 5 MG/ML
5 INJECTION INTRAMUSCULAR; INTRAVENOUS
Status: DISCONTINUED | OUTPATIENT
Start: 2023-12-21 | End: 2023-12-21 | Stop reason: HOSPADM

## 2023-12-21 RX ORDER — SODIUM CHLORIDE 0.9 % (FLUSH) 0.9 %
5-40 SYRINGE (ML) INJECTION EVERY 12 HOURS SCHEDULED
Status: DISCONTINUED | OUTPATIENT
Start: 2023-12-21 | End: 2023-12-21 | Stop reason: HOSPADM

## 2023-12-21 RX ORDER — INSULIN GLARGINE 100 [IU]/ML
30 INJECTION, SOLUTION SUBCUTANEOUS NIGHTLY
Status: DISCONTINUED | OUTPATIENT
Start: 2023-12-21 | End: 2023-12-22 | Stop reason: HOSPADM

## 2023-12-21 RX ORDER — DEXTROSE MONOHYDRATE, SODIUM CHLORIDE, AND POTASSIUM CHLORIDE 50; 1.49; 4.5 G/1000ML; G/1000ML; G/1000ML
INJECTION, SOLUTION INTRAVENOUS CONTINUOUS
Status: DISCONTINUED | OUTPATIENT
Start: 2023-12-21 | End: 2023-12-22 | Stop reason: HOSPADM

## 2023-12-21 RX ORDER — DEXTROSE MONOHYDRATE 25 G/50ML
12.5 INJECTION, SOLUTION INTRAVENOUS PRN
Status: DISCONTINUED | OUTPATIENT
Start: 2023-12-21 | End: 2023-12-22 | Stop reason: HOSPADM

## 2023-12-21 RX ORDER — DEXTROSE MONOHYDRATE 100 MG/ML
INJECTION, SOLUTION INTRAVENOUS CONTINUOUS PRN
Status: DISCONTINUED | OUTPATIENT
Start: 2023-12-21 | End: 2023-12-21 | Stop reason: HOSPADM

## 2023-12-21 RX ORDER — INDOMETHACIN 100 MG
100 SUPPOSITORY, RECTAL RECTAL ONCE
Status: DISCONTINUED | OUTPATIENT
Start: 2023-12-21 | End: 2023-12-21 | Stop reason: SDUPTHER

## 2023-12-21 RX ORDER — SODIUM CHLORIDE, SODIUM LACTATE, POTASSIUM CHLORIDE, CALCIUM CHLORIDE 600; 310; 30; 20 MG/100ML; MG/100ML; MG/100ML; MG/100ML
INJECTION, SOLUTION INTRAVENOUS CONTINUOUS
Status: DISCONTINUED | OUTPATIENT
Start: 2023-12-21 | End: 2023-12-21 | Stop reason: HOSPADM

## 2023-12-21 RX ORDER — DEXTROSE MONOHYDRATE 100 MG/ML
INJECTION, SOLUTION INTRAVENOUS CONTINUOUS PRN
Status: DISCONTINUED | OUTPATIENT
Start: 2023-12-21 | End: 2023-12-22 | Stop reason: HOSPADM

## 2023-12-21 RX ADMIN — DEXTROSE MONOHYDRATE 125 ML: 100 INJECTION, SOLUTION INTRAVENOUS at 08:33

## 2023-12-21 RX ADMIN — DEXTROSE MONOHYDRATE 12.5 G: 25 INJECTION, SOLUTION INTRAVENOUS at 15:54

## 2023-12-21 RX ADMIN — DEXTROSE MONOHYDRATE 12.5 G: 25 INJECTION, SOLUTION INTRAVENOUS at 17:02

## 2023-12-21 RX ADMIN — SODIUM CHLORIDE, PRESERVATIVE FREE 5 ML: 5 INJECTION INTRAVENOUS at 20:34

## 2023-12-21 RX ADMIN — SODIUM CHLORIDE, PRESERVATIVE FREE 10 ML: 5 INJECTION INTRAVENOUS at 08:16

## 2023-12-21 RX ADMIN — WATER 2000 MG: 1 INJECTION INTRAMUSCULAR; INTRAVENOUS; SUBCUTANEOUS at 14:30

## 2023-12-21 RX ADMIN — LEVOTHYROXINE SODIUM 100 MCG: 0.1 TABLET ORAL at 08:12

## 2023-12-21 RX ADMIN — DEXTROSE MONOHYDRATE 12.5 G: 25 INJECTION, SOLUTION INTRAVENOUS at 15:48

## 2023-12-21 RX ADMIN — POTASSIUM CHLORIDE, DEXTROSE MONOHYDRATE AND SODIUM CHLORIDE: 150; 5; 450 INJECTION, SOLUTION INTRAVENOUS at 09:22

## 2023-12-21 RX ADMIN — INDOMETHACIN 100 MG: 50 SUPPOSITORY RECTAL at 19:24

## 2023-12-21 RX ADMIN — Medication: at 22:34

## 2023-12-21 NOTE — ED NOTES
Bedside and Verbal shift change report given to Marthe Lundborg, RN (oncoming nurse) by Delvin Bolaños RN (offgoing nurse). Report included the following information Nurse Handoff Report, Index, ED Encounter Summary, ED SBAR, MAR, Recent Results, Med Rec Status, and Neuro Assessment.         Oncoming RN aware of need for medications once patient returns from MRI

## 2023-12-21 NOTE — PERIOP NOTE
TRANSFER - IN REPORT:    Verbal report received from Ngoc Lincoln, [address] on [address]  being received from [facility] for ordered procedure      Report consisted of patient's Situation, Background, Assessment and   Recommendations(SBAR). Information from the following report(s) Nurse Handoff Report, Intake/Output, MAR, and Recent Results was reviewed with the receiving nurse. Opportunity for questions and clarification was provided. Assessment completed upon patient's arrival to unit and care assumed.

## 2023-12-21 NOTE — PROGRESS NOTES
Hospitalist Progress Note    NAME:   Heron Reid   : 1945   MRN: [id number]     Date/Time: 2023 3:30 PM  Patient PCP: Sugar Tan MD    Estimated discharge date: ? Barriers: GI/Surgery clearance, ERCP , ? Cholecystectomy this admission? TBD      Assessment / Plan:    Obstructive jaundice due to Choledocholithiasis +/- biliary stricture POA  Pruritus POA- improved  Transaminitis/elevated LFTs POA- slight improvement  Chronic pancreatitis POA  Diarrhea x 2 weeks POA-likely due to pancreatic insufficiency? Unintentional weight loss POA   Diabetes type 2 insulin-dependent uncontrolled with hyperglycemia  Peripheral arterial disease status post right fem-tib bypass surgery  Tobacco abuse disorder  Hypothyroidism  T. bili 14.4-> 12.8  LFTs 143/102/436- > 122/95/379  Ammonia less than 10  Lipase 503  Acetaminophen level less than 2  INR 1.2->1.1  UA dark with bilirubinuria, glycosuria  CT abdomen pelvis significant biliary dilatation noted with no calcified biliary stones or any extrinsic mass noted, MRCP recommended, chronic pancreatitis sequently noted  MRCP=    1. Multiple ampullary and CBD stones. Cystic duct stones. Innumerable  gallstones. 2. Moderate to severe biliary ductal dilation. 3. Pancreatic parenchymal and ductal calcifications. 4. Moderate pancreatic duct dilation.  Diffuse parenchymal atrophy    NPO p MN   MRI/MRCP noted as above  Inpatient GI consulted in ED-rule out biliary duct stricture versus lesion of ampulla versus primary malignancy of pancreas or biliary duct versus choledocholithiasis  Plan for ERCP +/-  biliary stent placement today  Fingersticks before every meal and at bedtime, continue insulin as at home and sliding scale lispro her with meals  Check hepatitis panel negative  CA 19-9- ?pending  Cont Atarax as needed for itching  Continue home Synthroid        Medical Decision Making:   I personally reviewed labs: CBC, BMP, LFTs, lipase, acetaminophen level,

## 2023-12-21 NOTE — ED NOTES
Report given to [facility], RN. Report included the following information Nurse Handoff Report, ED Encounter Summary, ED SBAR, Recent Results, and Neuro Assessment. All questions and concerns addressed before transfer of care upstairs.

## 2023-12-21 NOTE — PROGRESS NOTES
Bedside and Verbal shift change report given to [address] [place] [address] Ne (oncoming nurse) by TUSHAR Shea RN (offgoing nurse). Report given with SBAR, Kardex, Intake/Output, MAR and Recent Results.

## 2023-12-21 NOTE — PROGRESS NOTES
parenchymal and ductal calcifications. 4. Moderate pancreatic duct dilation. Diffuse parenchymal atrophy.       Medications Reviewed    PMH/SH reviewed - no change compared to H&P  Date/Time:  12/21/2023

## 2023-12-21 NOTE — PERIOP NOTE
1540:  FSBS 61. Dr. Fraser Rather made aware. Orders given for D50%. Medicated per MAR.    0499:  Repeat FSBS 111    1700:  FSBS 68.   Medicated with 1/2 amp of D50% per MAR>

## 2023-12-21 NOTE — PROGRESS NOTES
End of Shift Note    Bedside shift change report given to Alana Pathak (oncoming nurse) by Hailey Prater RN (offgoing nurse). Report included the following information SBAR, Kardex, and MAR    Shift worked:  days     Shift summary and any significant changes:    Gen surgery consulted for discussion of cholecystectomy     MRCP showed multiple stones in biliary tree. Pt to receive ERCP with possible dilatation, biopsy, or intervention and laparoscopic with possible open cholecystectomy w/ cholangiogram at 1600. Consents signed. 2x small Jonatan BM.     0802:Pt's BG 57. Scheduled and sliding scale insulin's held. Pt NPO. Romelia Clamp MD [facility]. Lantus dosage modified. 1x 125 mL D10 bolus given. D5 and 0.45 % NaCl with KCl 20 mEq IVF ordered and initiated at 100 mL hour  0833: 125 mL bolus started  0919:Rechecked BG, BG was 87  1134-Pt's BG 71. No insulin given  1530-Pt left for procedure  1541-Note that pt's BG dropped to 61 in the OR. 2x 12.5 g of D50 was given  1604-BG was rechecked by OR nurse and BG was 111  1900-Pt still in the OR with no updates since pt left for procedure.  Will pass along to [facility] [place]   Concerns for physician to address:  -     Zone phone for oncoming shift:   -       Length of Stay:  Expected LOS: 3  Actual LOS: [address], RN

## 2023-12-22 ENCOUNTER — APPOINTMENT (OUTPATIENT)
Facility: HOSPITAL | Age: 78
DRG: 445 | End: 2023-12-22
Attending: INTERNAL MEDICINE
Payer: MEDICARE

## 2023-12-22 ENCOUNTER — HOSPITAL ENCOUNTER (INPATIENT)
Facility: HOSPITAL | Age: 78
LOS: 1 days | Discharge: HOME | DRG: 445 | End: 2023-12-23
Attending: INTERNAL MEDICINE | Admitting: HOSPITALIST
Payer: MEDICARE

## 2023-12-22 VITALS
HEART RATE: 57 BPM | SYSTOLIC BLOOD PRESSURE: 121 MMHG | BODY MASS INDEX: 28 KG/M2 | RESPIRATION RATE: 16 BRPM | TEMPERATURE: 97.4 F | WEIGHT: 200 LBS | OXYGEN SATURATION: 98 % | DIASTOLIC BLOOD PRESSURE: 68 MMHG | HEIGHT: 71 IN

## 2023-12-22 PROBLEM — K83.1 BILIARY STRICTURE: Status: ACTIVE | Noted: 2023-12-22

## 2023-12-22 LAB
ALBUMIN SERPL-MCNC: 3.1 G/DL (ref 3.5–5)
ALBUMIN/GLOB SERPL: 0.6 (ref 1.1–2.2)
ALP SERPL-CCNC: 493 U/L (ref 45–117)
ALT SERPL-CCNC: 124 U/L (ref 12–78)
AST SERPL-CCNC: 74 U/L (ref 15–37)
BILIRUB DIRECT SERPL-MCNC: 5.4 MG/DL (ref 0–0.2)
BILIRUB SERPL-MCNC: 7.5 MG/DL (ref 0.2–1)
CANCER AG19-9 SERPL-ACNC: 3612 U/ML (ref 0–35)
ERYTHROCYTE [DISTWIDTH] IN BLOOD BY AUTOMATED COUNT: 16.6 % (ref 11.5–14.5)
GLOBULIN SER CALC-MCNC: 5 G/DL (ref 2–4)
GLUCOSE BLD STRIP.AUTO-MCNC: 249 MG/DL (ref 65–117)
GLUCOSE BLD STRIP.AUTO-MCNC: 264 MG/DL (ref 65–117)
GLUCOSE BLD STRIP.AUTO-MCNC: 314 MG/DL (ref 65–117)
GLUCOSE BLD STRIP.AUTO-MCNC: 318 MG/DL (ref 65–117)
HCT VFR BLD AUTO: 48.4 % (ref 36.6–50.3)
HGB BLD-MCNC: 16 G/DL (ref 12.1–17)
MCH RBC QN AUTO: 34.9 PG (ref 26–34)
MCHC RBC AUTO-ENTMCNC: 33.1 G/DL (ref 30–36.5)
MCV RBC AUTO: 105.7 FL (ref 80–99)
NRBC # BLD: 0 K/UL (ref 0–0.01)
NRBC BLD-RTO: 0 PER 100 WBC
PLATELET # BLD AUTO: 179 K/UL (ref 150–400)
PMV BLD AUTO: 11.5 FL (ref 8.9–12.9)
PROT SERPL-MCNC: 8.1 G/DL (ref 6.4–8.2)
RBC # BLD AUTO: 4.58 M/UL (ref 4.1–5.7)
SERVICE CMNT-IMP: ABNORMAL
WBC # BLD AUTO: 3.4 K/UL (ref 4.1–11.1)

## 2023-12-22 PROCEDURE — 3600007503: Performed by: INTERNAL MEDICINE

## 2023-12-22 PROCEDURE — 2720000010 HC SURG SUPPLY STERILE: Performed by: INTERNAL MEDICINE

## 2023-12-22 PROCEDURE — 3600007513: Performed by: INTERNAL MEDICINE

## 2023-12-22 PROCEDURE — 6370000000 HC RX 637 (ALT 250 FOR IP): Performed by: HOSPITALIST

## 2023-12-22 PROCEDURE — 3700000001 HC ADD 15 MINUTES (ANESTHESIA): Performed by: INTERNAL MEDICINE

## 2023-12-22 PROCEDURE — 7100000010 HC PHASE II RECOVERY - FIRST 15 MIN: Performed by: INTERNAL MEDICINE

## 2023-12-22 PROCEDURE — 2700000000 HC OXYGEN THERAPY PER DAY

## 2023-12-22 PROCEDURE — 82962 GLUCOSE BLOOD TEST: CPT

## 2023-12-22 PROCEDURE — 85027 COMPLETE CBC AUTOMATED: CPT

## 2023-12-22 PROCEDURE — 0FC98ZZ EXTIRPATION OF MATTER FROM COMMON BILE DUCT, VIA NATURAL OR ARTIFICIAL OPENING ENDOSCOPIC: ICD-10-PCS | Performed by: INTERNAL MEDICINE

## 2023-12-22 PROCEDURE — 3700000000 HC ANESTHESIA ATTENDED CARE: Performed by: INTERNAL MEDICINE

## 2023-12-22 PROCEDURE — 1200000000 HC SEMI PRIVATE

## 2023-12-22 PROCEDURE — 2500000003 HC RX 250 WO HCPCS: Performed by: INTERNAL MEDICINE

## 2023-12-22 PROCEDURE — 0F798ZZ DILATION OF COMMON BILE DUCT, VIA NATURAL OR ARTIFICIAL OPENING ENDOSCOPIC: ICD-10-PCS | Performed by: INTERNAL MEDICINE

## 2023-12-22 PROCEDURE — C1769 GUIDE WIRE: HCPCS | Performed by: INTERNAL MEDICINE

## 2023-12-22 PROCEDURE — 80076 HEPATIC FUNCTION PANEL: CPT

## 2023-12-22 PROCEDURE — 2580000003 HC RX 258: Performed by: HOSPITALIST

## 2023-12-22 PROCEDURE — 6360000004 HC RX CONTRAST MEDICATION: Performed by: INTERNAL MEDICINE

## 2023-12-22 PROCEDURE — 2580000003 HC RX 258: Performed by: INTERNAL MEDICINE

## 2023-12-22 PROCEDURE — 99233 SBSQ HOSP IP/OBS HIGH 50: CPT | Performed by: SURGERY

## 2023-12-22 PROCEDURE — 36415 COLL VENOUS BLD VENIPUNCTURE: CPT

## 2023-12-22 PROCEDURE — 2709999900 HC NON-CHARGEABLE SUPPLY: Performed by: INTERNAL MEDICINE

## 2023-12-22 PROCEDURE — 6370000000 HC RX 637 (ALT 250 FOR IP): Performed by: ANESTHESIOLOGY

## 2023-12-22 RX ORDER — INSULIN LISPRO 100 [IU]/ML
10 INJECTION, SOLUTION INTRAVENOUS; SUBCUTANEOUS ONCE
Status: COMPLETED | OUTPATIENT
Start: 2023-12-22 | End: 2023-12-22

## 2023-12-22 RX ORDER — ACETAMINOPHEN 650 MG/1
650 SUPPOSITORY RECTAL EVERY 6 HOURS PRN
Status: DISCONTINUED | OUTPATIENT
Start: 2023-12-22 | End: 2023-12-23 | Stop reason: HOSPADM

## 2023-12-22 RX ORDER — 0.9 % SODIUM CHLORIDE 0.9 %
500 INTRAVENOUS SOLUTION INTRAVENOUS ONCE
Status: COMPLETED | OUTPATIENT
Start: 2023-12-22 | End: 2023-12-22

## 2023-12-22 RX ORDER — SODIUM CHLORIDE 0.9 % (FLUSH) 0.9 %
5-40 SYRINGE (ML) INJECTION EVERY 12 HOURS SCHEDULED
Status: DISCONTINUED | OUTPATIENT
Start: 2023-12-22 | End: 2023-12-22 | Stop reason: HOSPADM

## 2023-12-22 RX ORDER — ENOXAPARIN SODIUM 100 MG/ML
40 INJECTION SUBCUTANEOUS DAILY
Status: DISCONTINUED | OUTPATIENT
Start: 2023-12-23 | End: 2023-12-23 | Stop reason: HOSPADM

## 2023-12-22 RX ORDER — SODIUM CHLORIDE 9 MG/ML
INJECTION, SOLUTION INTRAVENOUS CONTINUOUS
Status: DISCONTINUED | OUTPATIENT
Start: 2023-12-22 | End: 2023-12-23 | Stop reason: HOSPADM

## 2023-12-22 RX ORDER — SODIUM CHLORIDE 9 MG/ML
25 INJECTION, SOLUTION INTRAVENOUS PRN
Status: DISCONTINUED | OUTPATIENT
Start: 2023-12-22 | End: 2023-12-22 | Stop reason: HOSPADM

## 2023-12-22 RX ORDER — ONDANSETRON 4 MG/1
4 TABLET, ORALLY DISINTEGRATING ORAL EVERY 8 HOURS PRN
Status: DISCONTINUED | OUTPATIENT
Start: 2023-12-22 | End: 2023-12-23 | Stop reason: HOSPADM

## 2023-12-22 RX ORDER — INSULIN LISPRO 100 [IU]/ML
0-4 INJECTION, SOLUTION INTRAVENOUS; SUBCUTANEOUS NIGHTLY
Status: DISCONTINUED | OUTPATIENT
Start: 2023-12-22 | End: 2023-12-23 | Stop reason: HOSPADM

## 2023-12-22 RX ORDER — POLYETHYLENE GLYCOL 3350 17 G/17G
17 POWDER, FOR SOLUTION ORAL DAILY PRN
Status: DISCONTINUED | OUTPATIENT
Start: 2023-12-22 | End: 2023-12-23 | Stop reason: HOSPADM

## 2023-12-22 RX ORDER — INSULIN LISPRO 100 [IU]/ML
0-8 INJECTION, SOLUTION INTRAVENOUS; SUBCUTANEOUS
Status: DISCONTINUED | OUTPATIENT
Start: 2023-12-22 | End: 2023-12-23 | Stop reason: HOSPADM

## 2023-12-22 RX ORDER — ONDANSETRON 2 MG/ML
4 INJECTION INTRAMUSCULAR; INTRAVENOUS EVERY 6 HOURS PRN
Status: DISCONTINUED | OUTPATIENT
Start: 2023-12-22 | End: 2023-12-23 | Stop reason: HOSPADM

## 2023-12-22 RX ORDER — SODIUM CHLORIDE 9 MG/ML
INJECTION, SOLUTION INTRAVENOUS CONTINUOUS
Status: DISCONTINUED | OUTPATIENT
Start: 2023-12-22 | End: 2023-12-22

## 2023-12-22 RX ORDER — ACETAMINOPHEN 325 MG/1
650 TABLET ORAL EVERY 6 HOURS PRN
Status: DISCONTINUED | OUTPATIENT
Start: 2023-12-22 | End: 2023-12-23 | Stop reason: HOSPADM

## 2023-12-22 RX ORDER — SODIUM CHLORIDE 0.9 % (FLUSH) 0.9 %
5-40 SYRINGE (ML) INJECTION PRN
Status: DISCONTINUED | OUTPATIENT
Start: 2023-12-22 | End: 2023-12-22 | Stop reason: HOSPADM

## 2023-12-22 RX ORDER — DEXTROSE MONOHYDRATE 100 MG/ML
INJECTION, SOLUTION INTRAVENOUS CONTINUOUS PRN
Status: DISCONTINUED | OUTPATIENT
Start: 2023-12-22 | End: 2023-12-23 | Stop reason: HOSPADM

## 2023-12-22 RX ADMIN — INSULIN LISPRO 4 UNITS: 100 INJECTION, SOLUTION INTRAVENOUS; SUBCUTANEOUS at 21:52

## 2023-12-22 RX ADMIN — INSULIN LISPRO 2 UNITS: 100 INJECTION, SOLUTION INTRAVENOUS; SUBCUTANEOUS at 18:01

## 2023-12-22 RX ADMIN — SODIUM CHLORIDE: 9 INJECTION, SOLUTION INTRAVENOUS at 17:55

## 2023-12-22 RX ADMIN — POTASSIUM CHLORIDE, DEXTROSE MONOHYDRATE AND SODIUM CHLORIDE: 150; 5; 450 INJECTION, SOLUTION INTRAVENOUS at 00:50

## 2023-12-22 RX ADMIN — INSULIN LISPRO 10 UNITS: 100 INJECTION, SOLUTION INTRAVENOUS; SUBCUTANEOUS at 12:18

## 2023-12-22 RX ADMIN — SODIUM CHLORIDE: 9 INJECTION, SOLUTION INTRAVENOUS at 22:27

## 2023-12-22 RX ADMIN — SODIUM CHLORIDE 500 ML: 9 INJECTION, SOLUTION INTRAVENOUS at 18:00

## 2023-12-22 RX ADMIN — SODIUM CHLORIDE, PRESERVATIVE FREE 10 ML: 5 INJECTION INTRAVENOUS at 12:20

## 2023-12-22 RX ADMIN — SODIUM CHLORIDE: 9 INJECTION, SOLUTION INTRAVENOUS at 12:09

## 2023-12-22 NOTE — ADT AUTH CERT
Albumin/Globulin Ratio 0.7 (L) 1.1 - 2.2     Protime-INR     Collection Time: 12/21/23  4:03 AM   Result Value Ref Range     INR 1.1 0.9 - 1.1       Protime 11.8 (H) 9.0 - 11.1 sec   CBC     Collection Time: 12/21/23  4:03 AM   Result Value Ref Range     WBC 3.2 (L) 4.1 - 11.1 K/uL     RBC 3.94 (L) 4.10 - 5.70 M/uL     Hemoglobin 13.8 12.1 - 17.0 g/dL     Hematocrit 41.0 36.6 - 50.3 %     .1 (H) 80.0 - 99.0 FL     MCH 35.0 (H) 26.0 - 34.0 PG     MCHC 33.7 30.0 - 36.5 g/dL     RDW 16.9 (H) 11.5 - 14.5 %     Platelets 226 258 - 217 K/uL     MPV 11.4 8.9 - 12.9 FL     Nucleated RBCs 0.0 0  WBC     nRBC 0.00 0.00 - 0.01 K/uL            Imaging MRI Result (most recent):  MRI ABDOMEN W WO CONTRAST MRCP 12/20/2023     Narrative  MRI ABDOMEN AND MRCP WITH AND WITHOUT CONTRAST. 12/20/2023 7:30 PM     INDICATION: Biliary ductal dilation, jaundice, elevated bilirubin. COMPARISON: CT 12/20/2023, 5/18/2021. TECHNIQUE: Multisequence and multiplanar MRI of the abdomen was performed after  the administration of 15 mL of gadoterate meglumine (Dotarem) IV contrast.  Images were obtained without contrast; and in late arterial, portal venous, and  delayed phases after the administration of contrast.     Heavily T2-weighted thick slab and thin slice images were obtained in the  oblique coronal plane through the biliary tree (MRCP). FINDINGS:  MRCP: Multiple stones in the ampulla cause it to protrude into the second  portion of the duodenum (8-22). There are multiple additional small stones in  the intrapancreatic CBD (8-21) and the distal cystic duct (8-21). Innumerable  small gallstones (as well as the biliary stones) were noncalcified on CT. Intrahepatic and extrahepatic biliary ductal dilation is moderate to severe. On CT, there were numerous pancreatic parenchymal and ductal calcifications,  likely sequela of old episodes of pancreatitis.  Intraductal calculi in the duct  of Artie (900-90 on Globulin 4.5 (H) 2.0 - 4.0 g/dL     Albumin/Globulin Ratio 0.7 (L) 1.1 - 2.2     Lipase     Collection Time: 12/20/23  2:14 PM   Result Value Ref Range     Lipase 503 (H) 13 - 75 U/L   Ammonia     Collection Time: 12/20/23  3:13 PM   Result Value Ref Range     Ammonia <10 <32 UMOL/L   Urinalysis     Collection Time: 12/20/23  4:42 PM   Result Value Ref Range     Color, UA DARK YELLOW       Appearance CLEAR CLEAR       Specific Gravity, UA 1.025 1.003 - 1.030       pH, Urine 5.5 5.0 - 8.0       Protein,  (A) NEG mg/dL     Glucose, UA >1000 (A) NEG mg/dL     Ketones, Urine Negative NEG mg/dL     Blood, Urine SMALL (A) NEG       Urobilinogen, Urine 1.0 0.2 - 1.0 EU/dL     Nitrite, Urine Negative NEG       Leukocyte Esterase, Urine Negative NEG       WBC, UA 0-4 0 - 4 /hpf     RBC, UA 0-5 0 - 5 /hpf     Epithelial Cells UA FEW FEW /lpf     BACTERIA, URINE Negative NEG /hpf   Urine Culture Hold Sample     Collection Time: 12/20/23  4:42 PM     Specimen: Urine   Result Value Ref Range     Specimen HOld           Urine on hold in Microbiology dept for 2 days. If unpreserved urine is submitted, it cannot be used for addtional testing after 24 hours, recollection will be required.    Bilirubin, Confirmatory     Collection Time: 12/20/23  4:42 PM   Result Value Ref Range     Bilirubin Confirmation, UA Negative NEG     Protime-INR     Collection Time: 12/20/23  5:26 PM   Result Value Ref Range     INR 1.2 (H) 0.9 - 1.1       Protime 12.1 (H) 9.0 - 11.1 sec   Hepatitis Panel, Acute     Collection Time: 12/20/23  5:26 PM   Result Value Ref Range     Hep A IgM NONREACTIVE NR       -         Hepatitis B Surface Ag <0.10 Index     Hep B S Ag Interp Negative NEG       -         Hep B Core Ab, IgM NONREACTIVE NR       -         Hepatitis C Ab 0.07 Index     Hep C Ab Interp NONREACTIVE NR     Acetaminophen Level     Collection Time: 12/20/23  5:26 PM   Result Value Ref Range     Acetaminophen Level <2 (L) 10 - 30 ug/mL   POCT

## 2023-12-22 NOTE — OP NOTE
[address]  [phone]                   ERCP NOTE    NAME:  Aron Gomez   :   1945   MRN:   [id number]     Date/Time:  2023 4:36 PM    Procedure Type:   ERCPwith biliary sphincterotomy, biliary stone removal     Indications: jaundice, common bile duct stone  Pre-operative Diagnosis: see indication above  Post-operative Diagnosis:  See findings below  : Eduardo Osei MD    Staff: Circulator: Sanju Wren RN; Malissa Rothman RN  Endoscopy Technician: Cris Raygoza     Implants: none    Referring Provider:    Tone Rubin MD, Marisol Kent MD    Sedation:  General anesthesia    Procedure Details:  After informed consent was obtained with all risks and benefits of procedure explained, the patient was taken to the fluoroscopy suite and placed in the prone position. Upon sequential sedation as per above, the Olympus duodenoscope [id number]   was inserted via the mouthpeice and carefully advanced to the second portion of the duodenum. The quality of visualization was good. The duodenoscope was withdrawn into a short position. Findings:   Esophagus:indirect visualization  Stomach: indirect visualization  Duodenum/jejunum: indirect visualization  Ampulla: Markedly enlarged and bulging. Previous access papillotomy noted adjacent to the papillary orifice. Cholangiogram: -Bile duct was selectively cannulated using dream wire. Contrast was injected. Multiple filling defects measuring 8 mm to 15 mm in size were noted in the distal common bile duct as well as impacted at the ampulla. Biliary sphincterotomy of about 15 mm was performed using E RBE. Balloon sweeps were performed starting at the ampulla and subsequently going to the biliary bifurcation. Multiple faceted stones were removed. No stone remained. Bile as well as contrast were draining promptly.   Hence it was decided not to place the

## 2023-12-22 NOTE — H&P
History and Physical    Date of Service:  12/22/2023  Primary Care Provider: Gauri Hawkins MD  Source of information: The patient and Chart review    Chief Complaint: No chief complaint on file. History of Presenting Illness:   Gibran Swanson is a 69 y.o. male presented to [facility] ED with with complaints of diarrhea x 2 weeks. He was found to have significantly elevated total bilirubin. Abdominal imaging with CT followed by MRI revealed multiple CBD stones and biliary stricture. He was found to have obstructive jaundice, unsuccessful ERCP on 12/21 by general surgery. He is now transferred to [facility] for attempt of repeat ERCP by GI Dr. Josefina Avilez. He underwent biliary sphincterotomy and bile duct stone extraction. I evaluated him on the floor after he came from the endoscopy suite. He denies abdominal pain, nausea or vomiting. BP is low 90/46, he is asymptomatic. Order 500 saline bolus, increase to maintenance IV fluid. Discussed with RN at the bedside. The patient denies any headache, blurry vision, sore throat, trouble swallowing, trouble with speech, chest pain, SOB, cough, fever, chills, N/V/D, abd pain, urinary symptoms, constipation, recent travels, sick contacts, focal or generalized neurological symptoms, falls, injuries, rashes, contact with COVID-19 diagnosed patients, hematemesis, melena, hemoptysis, hematuria, rashes, denies starting any new medications and denies any other concerns or problems besides as mentioned above. REVIEW OF SYSTEMS:  A comprehensive review of systems was negative except for that written in the History of Present Illness.      Past Medical History:   Diagnosis Date    Diabetes ([address])     Ill-defined condition     perpherial artery disease    Ill-defined condition     hyperlidemia      Past Surgical History:   Procedure Laterality Date    CHOLECYSTECTOMY, LAPAROSCOPIC N/A 12/21/2023    CHOLECYSTECTOMY LAPAROSCOPIC CHOLANGIOGRAM performed by

## 2023-12-22 NOTE — PROGRESS NOTES
Bedside and Verbal shift change report given to [address] [place] [address] (oncoming nurse) by TUSHAR Bianchi RN (offgoing nurse). Report given with SBAR, Kardex, Intake/Output, MAR and Recent Results.

## 2023-12-22 NOTE — PROGRESS NOTES
Marvin Inman, NP-C                       [phone] cell              Monday-Thursday 7:30 am-4:30 pm                     Friday 7:30 am-12:00 pm         GI PROGRESS NOTE        NAME:   Aron Gomez       :    1945       MRN:    [id number]     Assessment/Plan     GI consultation for elevated LFTs and diarrhea. 68 y/o male with PMH of T2DM with peripheral neuropathy, CKD3, HTN, PAD, and SAH. Brought to ER because of diarrhea for the past 2 weeks. History of chronic pancreatitis dating back to  on CT but significantly worse on CT 2023 this year at [facility] when it showed PD dilation. He was to follow up with GI as OP but I cannot see that he did. He is very jaundiced, TB 14, , , . CT shows multiple pancreatic calcifications and PD dilation as well as significant intrahepatic and extrahepatic duct dilation, CBD 1.5 cm. He denies weight loss. He has lower abdominal pain. No nausea. He has never drank much alcohol. He takes Tylenol but unsure of amount. Impression:  Choledocholithiasis   Elevated LFTs  Abdominal pain  Diarrhea  T2DM with peripheral neuropathy  CKD3  HTN  PAD  SAH  Chronic pancreatitis  Abnormal CT    2023: Hepatitis panel negative and Tylenol level ok. Fecal elastase and CA 19-9 pending. Calculated MELD/Na 17. INR 1.1. LFTs down slightly. MRCP showed multiple stones in biliary tree requiring ERCP, which is scheduled for today at 4 PM. Discussed the procedure with the patient and he is agreeable. Called the patient's son Nicholas Stairs at [phone] and discussed the results of the MRCP, the need for ERCP, and the benefits and risks of ERCP. Also discussed the presence of chronic pancreatitis on CT, as well as the need to consult general surgery to determine whether cholecystectomy is indicated.      MRI Result (most recent):  MRI ABDOMEN W WO CONTRAST MRCP

## 2023-12-22 NOTE — DISCHARGE INSTRUCTIONS
HOSPITALIST DISCHARGE INSTRUCTIONS    NAME: Garrett Dang   :  1945   MRN:  [id number]     Date/Time:  2023 9:07 AM    ADMIT DATE: 2023     DISCHARGE DATE: 2023     DISCHARGE DIAGNOSIS:  Obstructive jaundice due to Choledocholithiasis +/- biliary stricture POA- transferred to [facility] for Dr Dat Oquendo to do complex ERSP+ biliary stenting  Pruritus POA- improved  Transaminitis/elevated LFTs POA- slight improvement  Chronic pancreatitis POA  Diarrhea x 2 weeks POA-likely due to pancreatic insufficiency? Unintentional weight loss POA   Diabetes type 2 insulin-dependent uncontrolled with hyperglycemia  Peripheral arterial disease status post right fem-tib bypass surgery  Tobacco abuse disorder  Hypothyroidism  Full code    MEDICATIONS:  As per medication reconciliation  list  It is important that you take the medication exactly as they are prescribed. Keep your medication in the bottles provided by the pharmacist and keep a list of the medication names, dosages, and times to be taken in your wallet. Do not take other medications without consulting your doctor. Pain Management: per above medications    What to do at Home    Recommended diet:  NPO    Recommended activity: as tolerated    If you have questions regarding the hospital related prescriptions or hospital related issues please call at [phone]. Follow Up:  Dr Maritza Swift (hospitalist) at [facility] , Dr Dat Oquendo (GI) for ERCP with biliary stenting today as planned      Information obtained by :  I understand that if any problems occur once I am at home I am to contact my physician. I understand and acknowledge receipt of the instructions indicated above.                                                                                                                                            Physician's or R.N.'s Signature                                                                  Date/Time Patient or Representative Signature                                                          Date/Time

## 2023-12-22 NOTE — PROGRESS NOTES
1115: pt to endo holding from [facility] for ERCP    Called wife Bob Kam, #[phone], she stated to call son Rubén Tao with urgent matters [phone]    Verified patient name and date of birth, scheduled procedure, and informed consent. Assessed patient. Awake, alert, and oriented per baseline. Vital signs stable (see vital sign flowsheet). Respiratory status within defined limits, abdomen soft and non tender. Skin within defined limits. Initial RN admission and assessment performed and documented in Endoscopy navigator. Patient evaluated by anesthesia in pre-procedure holding. All procedural vital signs, airway assessment, and level of consciousness information monitored and recorded by anesthesia staff on the anesthesia record. Report received from CRNA post procedure. Patient transported to recovery area by RN. Endoscope was pre-cleaned at bedside immediately following procedure by Adelso Rice. TRANSFER - OUT REPORT:    Verbal report given to [facility] RN on [facility] [place]  being transferred to [facility] for routine progression of patient care       Report consisted of patient's Situation, Background, Assessment and   Recommendations(SBAR). Information from the following report(s) Nurse Handoff Report was reviewed with the receiving nurse. Lines:   Peripheral IV 12/22/23 Posterior;Right Hand (Active)        Opportunity for questions and clarification was provided. Patient transported with:  Tech    Endoscopy recovery  Patient returned to baseline, vital signs stable (see vital sign flowsheet). Patient offered liquids and tolerated well. Respiratory status within defined limits. Abdomen soft not tender. Skin with in defined limits.

## 2023-12-22 NOTE — DISCHARGE SUMMARY
Discharge Summary    Name: Catherine Wood  [id number]  YOB: 1945 (Age: 69 y.o.)   Date of Admission: 12/20/2023  Date of Discharge: 12/22/2023  Attending Physician: Tom Beavers MD    Discharge Diagnosis:   Obstructive jaundice due to Choledocholithiasis +/- biliary stricture POA- transferred to [facility] for Dr Aura Garcia to do complex ERSP+ biliary stenting  Pruritus POA- improved  Transaminitis/elevated LFTs POA- slight improvement  Chronic pancreatitis POA  Diarrhea x 2 weeks POA-likely due to pancreatic insufficiency? Unintentional weight loss POA   Diabetes type 2 insulin-dependent uncontrolled with hyperglycemia  Peripheral arterial disease status post right fem-tib bypass surgery  Tobacco abuse disorder  Hypothyroidism  Full code    Consultations:  IP CONSULT TO GI  IP CONSULT TO GENERAL SURGERY      Brief Admission History/Reason for Admission Per Tom Beavers MD:   \" 69 y.o.  male with PMHx significant for chronic pancreatitis, peripheral neuropathy with diabetes type 2 insulin-dependent, CKD, hypertension, hypothyroidism, peripheral artery disease status post right fem-tib bypass came in with chief complaint of diarrhea for 2 weeks  History of smoking but denies any history of alcoholism. Patient was found to have bilirubin of 14.4 with icterus on exam with evidence of diffuse biliary duct dilatation without any evidence of any mass or visible gallstones and CBD  We were asked to admit for work up and evaluation of the above problems\"    [address] Course by Main Problems:   Obstructive jaundice due to Choledocholithiasis +/- biliary stricture POA  Pruritus POA- improved  Transaminitis/elevated LFTs POA- slight improvement  Chronic pancreatitis POA  Diarrhea x 2 weeks POA-likely due to pancreatic insufficiency?   Unintentional weight loss POA   Diabetes type 2 insulin-dependent uncontrolled with hyperglycemia  Peripheral arterial disease status post

## 2023-12-22 NOTE — PROGRESS NOTES
TRANSFER - IN REPORT:    Verbal report received from [employer], RN on [facility] [place]  being received from [address] [place] [address] for ordered procedure      Report consisted of patient's Situation, Background, Assessment and   Recommendations(SBAR). Information from the following report(s) Pre Procedure Checklist was reviewed with the receiving nurse. Opportunity for questions and clarification was provided. Assessment completed upon patient's arrival to unit and care assumed.

## 2023-12-22 NOTE — FLOWSHEET NOTE
12/21/23 1945   Handoff   Communication Given Periop Handoff/Relief   Handoff phase Phase I receiving   Handoff Given To Nat PACU RN   Handoff Received From Jeannette HONG/ Wass CRNA   Handoff Communication Face to Face; At bedside   Time Handoff Given 1945 12/21/23 2002   Handoff   Communication Given Transfer Handoff   Handoff phase Phase I transferring   Handoff Given To Hiren Stein Received From [facility] Communication Telephone   Time Handoff Given 2003 2015: Sign out received from Dr. Lupe Stone

## 2023-12-22 NOTE — PROGRESS NOTES
Patient transferred to [facility] via EMS in stable condition. Report call to the charge nurse, Marc Jimenez, in the endoscopy department.

## 2023-12-22 NOTE — PROGRESS NOTES
Urine   Result Value Ref Range    Specimen HOld        Urine on hold in Microbiology dept for 2 days. If unpreserved urine is submitted, it cannot be used for addtional testing after 24 hours, recollection will be required.    Bilirubin, Confirmatory    Collection Time: 12/20/23  4:42 PM   Result Value Ref Range    Bilirubin Confirmation, UA Negative NEG     Protime-INR    Collection Time: 12/20/23  5:26 PM   Result Value Ref Range    INR 1.2 (H) 0.9 - 1.1      Protime 12.1 (H) 9.0 - 11.1 sec   Hepatitis Panel, Acute    Collection Time: 12/20/23  5:26 PM   Result Value Ref Range    Hep A IgM NONREACTIVE NR      -        Hepatitis B Surface Ag <0.10 Index    Hep B S Ag Interp Negative NEG      -        Hep B Core Ab, IgM NONREACTIVE NR      -        Hepatitis C Ab 0.07 Index    Hep C Ab Interp NONREACTIVE NR     Acetaminophen Level    Collection Time: 12/20/23  5:26 PM   Result Value Ref Range    Acetaminophen Level <2 (L) 10 - 30 ug/mL   POCT Glucose    Collection Time: 12/20/23  8:23 PM   Result Value Ref Range    POC Glucose 181 (H) 65 - 117 mg/dL    Performed by: Maida Rinne    Comprehensive Metabolic Panel    Collection Time: 12/21/23  4:03 AM   Result Value Ref Range    Sodium 137 136 - 145 mmol/L    Potassium 3.4 (L) 3.5 - 5.1 mmol/L    Chloride 106 97 - 108 mmol/L    CO2 27 21 - 32 mmol/L    Anion Gap 4 (L) 5 - 15 mmol/L    Glucose 127 (H) 65 - 100 mg/dL    BUN 18 6 - 20 MG/DL    Creatinine 0.92 0.70 - 1.30 MG/DL    Bun/Cre Ratio 20 12 - 20      Est, Glom Filt Rate >60 >60 ml/min/1.73m2    Calcium 9.1 8.5 - 10.1 MG/DL    Total Bilirubin 12.8 (H) 0.2 - 1.0 MG/DL     (H) 12 - 78 U/L    AST 95 (H) 15 - 37 U/L    Alk Phosphatase 379 (H) 45 - 117 U/L    Total Protein 6.8 6.4 - 8.2 g/dL    Albumin 2.9 (L) 3.5 - 5.0 g/dL    Globulin 3.9 2.0 - 4.0 g/dL    Albumin/Globulin Ratio 0.7 (L) 1.1 - 2.2     Protime-INR    Collection Time: 12/21/23  4:03 AM   Result Value Ref Range    INR 1.1 0.9 - 1.1      Protime 11. 8 (H) 9.0 - 11.1 sec   CBC    Collection Time: 12/21/23  4:03 AM   Result Value Ref Range    WBC 3.2 (L) 4.1 - 11.1 K/uL    RBC 3.94 (L) 4.10 - 5.70 M/uL    Hemoglobin 13.8 12.1 - 17.0 g/dL    Hematocrit 41.0 36.6 - 50.3 %    .1 (H) 80.0 - 99.0 FL    MCH 35.0 (H) 26.0 - 34.0 PG    MCHC 33.7 30.0 - 36.5 g/dL    RDW 16.9 (H) 11.5 - 14.5 %    Platelets 053 868 - 190 K/uL    MPV 11.4 8.9 - 12.9 FL    Nucleated RBCs 0.0 0  WBC    nRBC 0.00 0.00 - 0.01 K/uL   POCT Glucose    Collection Time: 12/21/23  8:02 AM   Result Value Ref Range    POC Glucose 57 (L) 65 - 117 mg/dL    Performed by: Emily Steve PCT    POCT Glucose    Collection Time: 12/21/23  9:19 AM   Result Value Ref Range    POC Glucose 87 65 - 117 mg/dL    Performed by: Nichole Salazar (PO RN)    POCT Glucose    Collection Time: 12/21/23 11:34 AM   Result Value Ref Range    POC Glucose 71 65 - 117 mg/dL    Performed by: Emily [employer] PCT    POCT Glucose    Collection Time: 12/21/23  3:41 PM   Result Value Ref Range    POC Glucose 61 (L) 65 - 117 mg/dL    Performed by: [employer]    POCT Glucose    Collection Time: 12/21/23  4:04 PM   Result Value Ref Range    POC Glucose 111 65 - 117 mg/dL    Performed by: [employer]    POCT Glucose    Collection Time: 12/21/23  4:34 PM   Result Value Ref Range    POC Glucose 82 65 - 117 mg/dL    Performed by: [employer]    POCT Glucose    Collection Time: 12/21/23  5:02 PM   Result Value Ref Range    POC Glucose 68 65 - 117 mg/dL    Performed by: [employer]    POCT Glucose    Collection Time: 12/21/23  6:14 PM   Result Value Ref Range    POC Glucose 82 65 - 117 mg/dL    Performed by: [employer]    POCT Glucose    Collection Time: 12/21/23  6:54 PM   Result Value Ref Range    POC Glucose 69 65 - 117 mg/dL    Performed by: Tarsha Terry CRNA    POCT Glucose    Collection Time: 12/21/23  7:19 PM   Result Value Ref Range    POC Glucose 118 (H) 65 - 117 mg/dL    Performed by:  Tarsha Terry CRNA

## 2023-12-23 VITALS
RESPIRATION RATE: 19 BRPM | OXYGEN SATURATION: 98 % | SYSTOLIC BLOOD PRESSURE: 121 MMHG | TEMPERATURE: 97.7 F | HEART RATE: 65 BPM | DIASTOLIC BLOOD PRESSURE: 55 MMHG

## 2023-12-23 LAB
ALBUMIN SERPL-MCNC: 2.4 G/DL (ref 3.5–5)
ALBUMIN/GLOB SERPL: 0.6 (ref 1.1–2.2)
ALP SERPL-CCNC: 337 U/L (ref 45–117)
ALT SERPL-CCNC: 83 U/L (ref 12–78)
ANION GAP SERPL CALC-SCNC: 5 MMOL/L (ref 5–15)
AST SERPL-CCNC: 57 U/L (ref 15–37)
BASOPHILS # BLD: 0 K/UL (ref 0–0.1)
BASOPHILS NFR BLD: 0 % (ref 0–1)
BILIRUB SERPL-MCNC: 5 MG/DL (ref 0.2–1)
BUN SERPL-MCNC: 20 MG/DL (ref 6–20)
BUN/CREAT SERPL: 23 (ref 12–20)
CALCIUM SERPL-MCNC: 8 MG/DL (ref 8.5–10.1)
CHLORIDE SERPL-SCNC: 109 MMOL/L (ref 97–108)
CO2 SERPL-SCNC: 23 MMOL/L (ref 21–32)
CREAT SERPL-MCNC: 0.86 MG/DL (ref 0.7–1.3)
DIFFERENTIAL METHOD BLD: ABNORMAL
EOSINOPHIL # BLD: 0.1 K/UL (ref 0–0.4)
EOSINOPHIL NFR BLD: 2 % (ref 0–7)
ERYTHROCYTE [DISTWIDTH] IN BLOOD BY AUTOMATED COUNT: 15.9 % (ref 11.5–14.5)
GLOBULIN SER CALC-MCNC: 3.7 G/DL (ref 2–4)
GLUCOSE BLD STRIP.AUTO-MCNC: 225 MG/DL (ref 65–117)
GLUCOSE SERPL-MCNC: 265 MG/DL (ref 65–100)
HCT VFR BLD AUTO: 39 % (ref 36.6–50.3)
HGB BLD-MCNC: 13.1 G/DL (ref 12.1–17)
IMM GRANULOCYTES # BLD AUTO: 0 K/UL (ref 0–0.04)
IMM GRANULOCYTES NFR BLD AUTO: 1 % (ref 0–0.5)
LIPASE SERPL-CCNC: 48 U/L (ref 13–75)
LYMPHOCYTES # BLD: 1.3 K/UL (ref 0.8–3.5)
LYMPHOCYTES NFR BLD: 30 % (ref 12–49)
MCH RBC QN AUTO: 35.1 PG (ref 26–34)
MCHC RBC AUTO-ENTMCNC: 33.6 G/DL (ref 30–36.5)
MCV RBC AUTO: 104.6 FL (ref 80–99)
MONOCYTES # BLD: 0.4 K/UL (ref 0–1)
MONOCYTES NFR BLD: 10 % (ref 5–13)
NEUTS SEG # BLD: 2.4 K/UL (ref 1.8–8)
NEUTS SEG NFR BLD: 57 % (ref 32–75)
NRBC # BLD: 0 K/UL (ref 0–0.01)
NRBC BLD-RTO: 0 PER 100 WBC
PLATELET # BLD AUTO: 157 K/UL (ref 150–400)
PMV BLD AUTO: 11.2 FL (ref 8.9–12.9)
POTASSIUM SERPL-SCNC: 4.3 MMOL/L (ref 3.5–5.1)
PROT SERPL-MCNC: 6.1 G/DL (ref 6.4–8.2)
RBC # BLD AUTO: 3.73 M/UL (ref 4.1–5.7)
SERVICE CMNT-IMP: ABNORMAL
SODIUM SERPL-SCNC: 137 MMOL/L (ref 136–145)
WBC # BLD AUTO: 4.2 K/UL (ref 4.1–11.1)

## 2023-12-23 PROCEDURE — 6360000002 HC RX W HCPCS: Performed by: HOSPITALIST

## 2023-12-23 PROCEDURE — 6370000000 HC RX 637 (ALT 250 FOR IP): Performed by: HOSPITALIST

## 2023-12-23 PROCEDURE — 83690 ASSAY OF LIPASE: CPT

## 2023-12-23 PROCEDURE — 80053 COMPREHEN METABOLIC PANEL: CPT

## 2023-12-23 PROCEDURE — 82962 GLUCOSE BLOOD TEST: CPT

## 2023-12-23 PROCEDURE — 85025 COMPLETE CBC W/AUTO DIFF WBC: CPT

## 2023-12-23 PROCEDURE — 36415 COLL VENOUS BLD VENIPUNCTURE: CPT

## 2023-12-23 RX ADMIN — INSULIN LISPRO 2 UNITS: 100 INJECTION, SOLUTION INTRAVENOUS; SUBCUTANEOUS at 12:20

## 2023-12-23 RX ADMIN — INSULIN LISPRO 2 UNITS: 100 INJECTION, SOLUTION INTRAVENOUS; SUBCUTANEOUS at 07:23

## 2023-12-23 RX ADMIN — ENOXAPARIN SODIUM 40 MG: 100 INJECTION SUBCUTANEOUS at 10:37

## 2023-12-23 NOTE — CARE COORDINATION
Care Management Initial Assessment       RUR: 16%  Readmission? No  1st IM letter given? Yes - 12/22/2023  1st  letter given: No    Chart reviewed. CM noted plan for discharge home today with OP follow-up. CM spoke with patient, spouse, and son to discuss disposition plan. Spouse confirms pt lives in a private, one-level home with a ramped entrance. Son, Brice Piña, to provide transportation at discharge. Spouse reports a FWW and shower chair for DME use at home and IADLs prior to admission. Insurance confirmed as [employer]. Pt/family report no questions or concerns for discharge at this time. 12/23/23 1103   Service Assessment   Patient Orientation Alert and Oriented;Person   Cognition Alert   History Provided By Spouse;Patient; Child/Family   Primary Caregiver Self   Support Systems Children;Spouse/Significant Other   Patient's Healthcare Decision Maker is: Legal Next of Kin   Prior Functional Level Independent in ADLs/IADLs   Current Functional Level Assistance with the following:;Mobility   Can patient return to prior living arrangement Yes   Ability to make needs known: Fair   Family able to assist with home care needs: Yes   Financial Resources [employer] None   Social/Functional History   Lives With Spouse   Type of [address] [place] [address] One level   Home Access Ramped entrance   [address] [place] [address]   ADL Assistance Independent   Homemaking Assistance Independent   Ambulation Assistance Independent   Transfer Assistance Independent   Discharge Planning   Type of [address] [place] [address] Prior To Admission [address] [facility] 1   Current DME Prior to [employer]; Shower Chair   Potential Assistance Needed N/A   DME Ordered?  No   Potential Assistance Purchasing Medications No   Type of Home Care Services None   Patient expects to be discharged to: [facility]

## 2023-12-23 NOTE — DISCHARGE INSTRUCTIONS
Discharge Instructions       PATIENT ID: Juani Mock  MRN: [id number]   YOB: 1945    DATE OF ADMISSION: 12/22/2023   DATE OF DISCHARGE: 12/23/2023    PRIMARY CARE PROVIDER: Yrn Molina     ATTENDING PHYSICIAN: Anum Vázquez MD   DISCHARGING PROVIDER: Steffen Mooney PA-C    To contact this individual call [phone] and ask the  to page. If unavailable ask to be transferred the Adult Hospitalist Department. DISCHARGE DIAGNOSES     Obstructive jaundice due to obstructing biliary stones status post ERCP, biliary sphincterectomy and bile duct stone extraction.  - Tolerating diet today (12/23)   - No aspirin for 7 days.    - LFTs trending down: AST: 74--> 57. ALT: 124 --> 83. ALP: 493 --> 337  - Will need to follow up with general surgeon for interval lap frank     Elevated CA 19-9, 3612 reference range: 0-35  Chronic pancreatitis, presented with diarrhea. ? Exocrine pancreatic insufficiency. - Recommend follow up with PCP next week for follow up     Chronic hypertension.    - Patient is on lisinopril 10 mg daily  - BP was low on admission likely 2/2 poor PO intake  - Check BP twice daily (AM and PM). If SBP is less than or equal to 120, do not take lisinopril     Type II DM with hyperglycemia  - Resume home medications at discharge  - Follow up with PCP as above     HLD  - Stop taking fenofibrate for now due to elevations in liver enzymes.  Follow up with your PCP    CONSULTATIONS: IP WOUND CARE NURSE CONSULT TO EVAL    PROCEDURES/SURGERIES: Procedure(s):  ERCP ENDOSCOPIC RETROGRADE CHOLANGIOPANCREATOGRAPHY    PENDING TEST RESULTS:   At the time of discharge the following test results are still pending: None    [facility]   [facility]     ADDITIONAL CARE RECOMMENDATIONS:   Stop taking fenofibrate for now  Do not take lisinopril if your systolic BP (top number) is less than or equal to 120  Follow up with your PCP this week if possible  Make an appointment

## 2023-12-23 NOTE — DISCHARGE SUMMARY
Discharge Summary       PATIENT ID: Aron Gomez  MRN: [id number]   YOB: 1945    DATE OF ADMISSION: 12/22/2023 11:22 AM    DATE OF DISCHARGE: 12/23/2023   PRIMARY CARE PROVIDER: Marisol Kent MD     ATTENDING PHYSICIAN: Mike Watts MD  DISCHARGING PROVIDER: Liz Mary PA-C    To contact this individual call [phone] and ask the  to page. If unavailable ask to be transferred the Adult Hospitalist Department. CONSULTATIONS: IP WOUND CARE NURSE CONSULT TO EVAL    PROCEDURES/SURGERIES: Procedure(s):  ERCP ENDOSCOPIC RETROGRADE CHOLANGIOPANCREATOGRAPHY     ADMITTING DIAGNOSES & HOSPITAL COURSE:   Aron Gomez is a 69 y.o. male presented to [facility] ED with with complaints of diarrhea x 2 weeks. He was found to have significantly elevated total bilirubin. Abdominal imaging with CT followed by MRI revealed multiple CBD stones and biliary stricture. He was found to have obstructive jaundice, unsuccessful ERCP on 12/21 by general surgery. He is now transferred to [facility] for attempt of repeat ERCP by GI Dr. Jose Su. He underwent biliary sphincterotomy and bile duct stone extraction. I evaluated him on the floor after he came from the endoscopy suite. He denies abdominal pain, nausea or vomiting. BP is low 90/46, he is asymptomatic. Order 500 saline bolus, increase to maintenance IV fluid. Discussed with RN at the bedside. DISCHARGE DIAGNOSES / PLAN:      Obstructive jaundice due to obstructing biliary stones status post ERCP, biliary sphincterectomy and bile duct stone extraction.  - Tolerating diet today (12/23)   - No aspirin for 7 days.    - LFTs trending down: AST: 74--> 57. ALT: 124 --> 83. ALP: 493 --> 337  - Will need to follow up with general surgeon for interval lap frank    Elevated CA 19-9, 3612 reference range: 0-35  Chronic pancreatitis, presented with diarrhea. ? Exocrine pancreatic insufficiency.   - Recommend follow up with PCP next week for FOLLOWING:   Fever over 101 degrees for 24 hours. Chest pain, shortness of breath, fever, chills, nausea, vomiting, diarrhea, change in mentation, falling, weakness, bleeding. Severe pain or pain not relieved by medications. Or, any other signs or symptoms that you may have questions about. DISPOSITION:   x Home With:   OT  PT  HH  RN       Long term SNF/Inpatient Rehab    Independent/assisted living    Hospice    Other:       PATIENT CONDITION AT DISCHARGE:     Functional status    Poor     Deconditioned    x Independent      Cognition    x Lucid     Forgetful     Dementia      Catheters/lines (plus indication)    Soler     PICC     PEG    x None      Code status    x Full code     DNR      PHYSICAL EXAMINATION AT DISCHARGE:    General : alert x 3, awake, no acute distress,   HEENT: PEERL, EOMI, moist mucus membrane  Neck: supple, no JVD, no meningeal signs  Chest: Clear to auscultation bilaterally   CVS: S1 S2 heard, Capillary refill less than 2 seconds  Abd: soft/ Non tender, non distended, BS physiological,   Ext: no clubbing, no cyanosis, no edema, brisk 2+ DP pulses  Neuro/Psych: pleasant mood and affect, Hard of hearing, CN 2-12 grossly intact, sensory grossly within normal limit, Strength 5/5 in all extremities  Skin: warm     CHRONIC MEDICAL DIAGNOSES:  Principal Problem:    Biliary stricture  Resolved Problems:    * No resolved hospital problems.  *        Greater than 31 minutes were spent with the patient on counseling and coordination of care    Signed:   Mariya Kraft PA-C  12/23/2023  9:44 AM

## 2024-01-03 ENCOUNTER — APPOINTMENT (OUTPATIENT)
Facility: HOSPITAL | Age: 79
DRG: 322 | End: 2024-01-03
Payer: MEDICARE

## 2024-01-03 ENCOUNTER — TELEPHONE (OUTPATIENT)
Age: 79
End: 2024-01-03

## 2024-01-03 ENCOUNTER — HOSPITAL ENCOUNTER (INPATIENT)
Facility: HOSPITAL | Age: 79
LOS: 6 days | Discharge: NURSING, REHAB OR LONG-TERM CARE | DRG: 322 | End: 2024-01-09
Attending: EMERGENCY MEDICINE | Admitting: GENERAL ACUTE CARE HOSPITAL
Payer: MEDICARE

## 2024-01-03 DIAGNOSIS — I21.4 NSTEMI (NON-ST ELEVATED MYOCARDIAL INFARCTION) (HCC): Primary | ICD-10-CM

## 2024-01-03 DIAGNOSIS — I25.10 CAD (CORONARY ARTERY DISEASE): ICD-10-CM

## 2024-01-03 LAB
ALBUMIN SERPL-MCNC: 3.2 G/DL (ref 3.5–5)
ALBUMIN/GLOB SERPL: 0.8 (ref 1.1–2.2)
ALP SERPL-CCNC: 189 U/L (ref 45–117)
ALT SERPL-CCNC: 50 U/L (ref 12–78)
ANION GAP SERPL CALC-SCNC: 2 MMOL/L (ref 5–15)
APTT PPP: 24.5 SEC (ref 22.1–31)
AST SERPL-CCNC: 69 U/L (ref 15–37)
BASOPHILS # BLD: 0 K/UL (ref 0–0.1)
BASOPHILS NFR BLD: 1 % (ref 0–1)
BILIRUB SERPL-MCNC: 2.2 MG/DL (ref 0.2–1)
BUN SERPL-MCNC: 23 MG/DL (ref 6–20)
BUN/CREAT SERPL: 20 (ref 12–20)
CALCIUM SERPL-MCNC: 9 MG/DL (ref 8.5–10.1)
CHLORIDE SERPL-SCNC: 105 MMOL/L (ref 97–108)
CO2 SERPL-SCNC: 28 MMOL/L (ref 21–32)
CREAT SERPL-MCNC: 1.14 MG/DL (ref 0.7–1.3)
DIFFERENTIAL METHOD BLD: ABNORMAL
EOSINOPHIL # BLD: 0.3 K/UL (ref 0–0.4)
EOSINOPHIL NFR BLD: 5 % (ref 0–7)
ERYTHROCYTE [DISTWIDTH] IN BLOOD BY AUTOMATED COUNT: 14.1 % (ref 11.5–14.5)
EST. AVERAGE GLUCOSE BLD GHB EST-MCNC: 148 MG/DL
GLOBULIN SER CALC-MCNC: 4 G/DL (ref 2–4)
GLUCOSE BLD STRIP.AUTO-MCNC: 336 MG/DL (ref 65–117)
GLUCOSE SERPL-MCNC: 392 MG/DL (ref 65–100)
HBA1C MFR BLD: 6.8 % (ref 4–5.6)
HCT VFR BLD AUTO: 41.1 % (ref 36.6–50.3)
HGB BLD-MCNC: 14 G/DL (ref 12.1–17)
IMM GRANULOCYTES # BLD AUTO: 0 K/UL (ref 0–0.04)
IMM GRANULOCYTES NFR BLD AUTO: 1 % (ref 0–0.5)
INR PPP: 1 (ref 0.9–1.1)
LYMPHOCYTES # BLD: 2.2 K/UL (ref 0.8–3.5)
LYMPHOCYTES NFR BLD: 36 % (ref 12–49)
MCH RBC QN AUTO: 35.4 PG (ref 26–34)
MCHC RBC AUTO-ENTMCNC: 34.1 G/DL (ref 30–36.5)
MCV RBC AUTO: 103.8 FL (ref 80–99)
MONOCYTES # BLD: 0.7 K/UL (ref 0–1)
MONOCYTES NFR BLD: 11 % (ref 5–13)
NEUTS SEG # BLD: 2.9 K/UL (ref 1.8–8)
NEUTS SEG NFR BLD: 46 % (ref 32–75)
NRBC # BLD: 0 K/UL (ref 0–0.01)
NRBC BLD-RTO: 0 PER 100 WBC
PLATELET # BLD AUTO: 202 K/UL (ref 150–400)
PMV BLD AUTO: 11 FL (ref 8.9–12.9)
POTASSIUM SERPL-SCNC: 4.7 MMOL/L (ref 3.5–5.1)
PROT SERPL-MCNC: 7.2 G/DL (ref 6.4–8.2)
PROTHROMBIN TIME: 9.9 SEC (ref 9–11.1)
RBC # BLD AUTO: 3.96 M/UL (ref 4.1–5.7)
SERVICE CMNT-IMP: ABNORMAL
SODIUM SERPL-SCNC: 135 MMOL/L (ref 136–145)
THERAPEUTIC RANGE: NORMAL SECS (ref 58–77)
TROPONIN I SERPL HS-MCNC: ABNORMAL NG/L (ref 0–76)
UFH PPP CHRO-ACNC: <0.1 IU/ML
WBC # BLD AUTO: 6.1 K/UL (ref 4.1–11.1)

## 2024-01-03 PROCEDURE — 6370000000 HC RX 637 (ALT 250 FOR IP): Performed by: STUDENT IN AN ORGANIZED HEALTH CARE EDUCATION/TRAINING PROGRAM

## 2024-01-03 PROCEDURE — C1894 INTRO/SHEATH, NON-LASER: HCPCS | Performed by: STUDENT IN AN ORGANIZED HEALTH CARE EDUCATION/TRAINING PROGRAM

## 2024-01-03 PROCEDURE — 85025 COMPLETE CBC W/AUTO DIFF WBC: CPT

## 2024-01-03 PROCEDURE — C1769 GUIDE WIRE: HCPCS | Performed by: STUDENT IN AN ORGANIZED HEALTH CARE EDUCATION/TRAINING PROGRAM

## 2024-01-03 PROCEDURE — 99152 MOD SED SAME PHYS/QHP 5/>YRS: CPT | Performed by: STUDENT IN AN ORGANIZED HEALTH CARE EDUCATION/TRAINING PROGRAM

## 2024-01-03 PROCEDURE — 83036 HEMOGLOBIN GLYCOSYLATED A1C: CPT

## 2024-01-03 PROCEDURE — 2060000000 HC ICU INTERMEDIATE R&B

## 2024-01-03 PROCEDURE — 2500000003 HC RX 250 WO HCPCS: Performed by: STUDENT IN AN ORGANIZED HEALTH CARE EDUCATION/TRAINING PROGRAM

## 2024-01-03 PROCEDURE — 6360000002 HC RX W HCPCS: Performed by: STUDENT IN AN ORGANIZED HEALTH CARE EDUCATION/TRAINING PROGRAM

## 2024-01-03 PROCEDURE — 6360000002 HC RX W HCPCS: Performed by: EMERGENCY MEDICINE

## 2024-01-03 PROCEDURE — 4A023N7 MEASUREMENT OF CARDIAC SAMPLING AND PRESSURE, LEFT HEART, PERCUTANEOUS APPROACH: ICD-10-PCS | Performed by: STUDENT IN AN ORGANIZED HEALTH CARE EDUCATION/TRAINING PROGRAM

## 2024-01-03 PROCEDURE — 6370000000 HC RX 637 (ALT 250 FOR IP): Performed by: EMERGENCY MEDICINE

## 2024-01-03 PROCEDURE — 93005 ELECTROCARDIOGRAM TRACING: CPT | Performed by: EMERGENCY MEDICINE

## 2024-01-03 PROCEDURE — 80053 COMPREHEN METABOLIC PANEL: CPT

## 2024-01-03 PROCEDURE — 99291 CRITICAL CARE FIRST HOUR: CPT

## 2024-01-03 PROCEDURE — 85520 HEPARIN ASSAY: CPT

## 2024-01-03 PROCEDURE — 93452 LEFT HRT CATH W/VENTRCLGRPHY: CPT | Performed by: STUDENT IN AN ORGANIZED HEALTH CARE EDUCATION/TRAINING PROGRAM

## 2024-01-03 PROCEDURE — 2580000003 HC RX 258: Performed by: STUDENT IN AN ORGANIZED HEALTH CARE EDUCATION/TRAINING PROGRAM

## 2024-01-03 PROCEDURE — B2111ZZ FLUOROSCOPY OF MULTIPLE CORONARY ARTERIES USING LOW OSMOLAR CONTRAST: ICD-10-PCS | Performed by: STUDENT IN AN ORGANIZED HEALTH CARE EDUCATION/TRAINING PROGRAM

## 2024-01-03 PROCEDURE — 85730 THROMBOPLASTIN TIME PARTIAL: CPT

## 2024-01-03 PROCEDURE — 2709999900 HC NON-CHARGEABLE SUPPLY: Performed by: STUDENT IN AN ORGANIZED HEALTH CARE EDUCATION/TRAINING PROGRAM

## 2024-01-03 PROCEDURE — 84484 ASSAY OF TROPONIN QUANT: CPT

## 2024-01-03 PROCEDURE — 1100000003 HC PRIVATE W/ TELEMETRY

## 2024-01-03 PROCEDURE — 6370000000 HC RX 637 (ALT 250 FOR IP): Performed by: GENERAL ACUTE CARE HOSPITAL

## 2024-01-03 PROCEDURE — 36415 COLL VENOUS BLD VENIPUNCTURE: CPT

## 2024-01-03 PROCEDURE — 71045 X-RAY EXAM CHEST 1 VIEW: CPT

## 2024-01-03 PROCEDURE — 82962 GLUCOSE BLOOD TEST: CPT

## 2024-01-03 PROCEDURE — 6360000004 HC RX CONTRAST MEDICATION: Performed by: STUDENT IN AN ORGANIZED HEALTH CARE EDUCATION/TRAINING PROGRAM

## 2024-01-03 PROCEDURE — 85610 PROTHROMBIN TIME: CPT

## 2024-01-03 RX ORDER — SODIUM CHLORIDE 9 MG/ML
INJECTION, SOLUTION INTRAVENOUS PRN
Status: DISCONTINUED | OUTPATIENT
Start: 2024-01-03 | End: 2024-01-09 | Stop reason: HOSPADM

## 2024-01-03 RX ORDER — HEPARIN SODIUM 1000 [USP'U]/ML
2000 INJECTION, SOLUTION INTRAVENOUS; SUBCUTANEOUS PRN
Status: DISCONTINUED | OUTPATIENT
Start: 2024-01-03 | End: 2024-01-05

## 2024-01-03 RX ORDER — OXYCODONE HYDROCHLORIDE 5 MG/1
5 TABLET ORAL EVERY 4 HOURS PRN
Status: DISCONTINUED | OUTPATIENT
Start: 2024-01-03 | End: 2024-01-09 | Stop reason: HOSPADM

## 2024-01-03 RX ORDER — NITROGLYCERIN 20 MG/100ML
5-200 INJECTION INTRAVENOUS CONTINUOUS
Status: DISCONTINUED | OUTPATIENT
Start: 2024-01-03 | End: 2024-01-05

## 2024-01-03 RX ORDER — ACETAMINOPHEN 325 MG/1
650 TABLET ORAL EVERY 4 HOURS PRN
Status: DISCONTINUED | OUTPATIENT
Start: 2024-01-03 | End: 2024-01-04

## 2024-01-03 RX ORDER — SODIUM CHLORIDE 9 MG/ML
INJECTION, SOLUTION INTRAVENOUS CONTINUOUS
Status: ACTIVE | OUTPATIENT
Start: 2024-01-03 | End: 2024-01-03

## 2024-01-03 RX ORDER — SODIUM CHLORIDE 0.9 % (FLUSH) 0.9 %
5-40 SYRINGE (ML) INJECTION EVERY 12 HOURS SCHEDULED
Status: DISCONTINUED | OUTPATIENT
Start: 2024-01-03 | End: 2024-01-04

## 2024-01-03 RX ORDER — POTASSIUM CHLORIDE 7.45 MG/ML
10 INJECTION INTRAVENOUS PRN
Status: DISCONTINUED | OUTPATIENT
Start: 2024-01-03 | End: 2024-01-08

## 2024-01-03 RX ORDER — SODIUM CHLORIDE 0.9 % (FLUSH) 0.9 %
5-40 SYRINGE (ML) INJECTION EVERY 12 HOURS SCHEDULED
Status: DISCONTINUED | OUTPATIENT
Start: 2024-01-03 | End: 2024-01-09 | Stop reason: HOSPADM

## 2024-01-03 RX ORDER — HEPARIN SODIUM 1000 [USP'U]/ML
INJECTION, SOLUTION INTRAVENOUS; SUBCUTANEOUS PRN
Status: DISCONTINUED | OUTPATIENT
Start: 2024-01-03 | End: 2024-01-03 | Stop reason: HOSPADM

## 2024-01-03 RX ORDER — ATORVASTATIN CALCIUM 40 MG/1
80 TABLET, FILM COATED ORAL NIGHTLY
Status: DISCONTINUED | OUTPATIENT
Start: 2024-01-03 | End: 2024-01-09 | Stop reason: HOSPADM

## 2024-01-03 RX ORDER — ASPIRIN 81 MG/1
81 TABLET ORAL DAILY
Status: DISCONTINUED | OUTPATIENT
Start: 2024-01-04 | End: 2024-01-09 | Stop reason: HOSPADM

## 2024-01-03 RX ORDER — INSULIN LISPRO 100 [IU]/ML
0-8 INJECTION, SOLUTION INTRAVENOUS; SUBCUTANEOUS
Status: DISCONTINUED | OUTPATIENT
Start: 2024-01-03 | End: 2024-01-09 | Stop reason: HOSPADM

## 2024-01-03 RX ORDER — HEPARIN SODIUM 10000 [USP'U]/ML
INJECTION, SOLUTION INTRAVENOUS; SUBCUTANEOUS PRN
Status: DISCONTINUED | OUTPATIENT
Start: 2024-01-03 | End: 2024-01-03 | Stop reason: HOSPADM

## 2024-01-03 RX ORDER — VERAPAMIL HYDROCHLORIDE 2.5 MG/ML
INJECTION, SOLUTION INTRAVENOUS PRN
Status: DISCONTINUED | OUTPATIENT
Start: 2024-01-03 | End: 2024-01-03 | Stop reason: HOSPADM

## 2024-01-03 RX ORDER — MAGNESIUM SULFATE IN WATER 40 MG/ML
2000 INJECTION, SOLUTION INTRAVENOUS PRN
Status: DISCONTINUED | OUTPATIENT
Start: 2024-01-03 | End: 2024-01-08

## 2024-01-03 RX ORDER — INSULIN LISPRO 100 [IU]/ML
6 INJECTION, SOLUTION INTRAVENOUS; SUBCUTANEOUS
Status: DISCONTINUED | OUTPATIENT
Start: 2024-01-04 | End: 2024-01-04

## 2024-01-03 RX ORDER — MORPHINE SULFATE 2 MG/ML
1 INJECTION, SOLUTION INTRAMUSCULAR; INTRAVENOUS EVERY 4 HOURS PRN
Status: DISCONTINUED | OUTPATIENT
Start: 2024-01-03 | End: 2024-01-09 | Stop reason: HOSPADM

## 2024-01-03 RX ORDER — HEPARIN SODIUM 10000 [USP'U]/100ML
5-30 INJECTION, SOLUTION INTRAVENOUS CONTINUOUS
Status: DISCONTINUED | OUTPATIENT
Start: 2024-01-03 | End: 2024-01-05

## 2024-01-03 RX ORDER — HEPARIN SODIUM 1000 [USP'U]/ML
4000 INJECTION, SOLUTION INTRAVENOUS; SUBCUTANEOUS PRN
Status: DISCONTINUED | OUTPATIENT
Start: 2024-01-03 | End: 2024-01-05

## 2024-01-03 RX ORDER — INSULIN GLARGINE 100 [IU]/ML
30 INJECTION, SOLUTION SUBCUTANEOUS NIGHTLY
Status: DISCONTINUED | OUTPATIENT
Start: 2024-01-03 | End: 2024-01-04

## 2024-01-03 RX ORDER — FENTANYL CITRATE 50 UG/ML
INJECTION, SOLUTION INTRAMUSCULAR; INTRAVENOUS PRN
Status: DISCONTINUED | OUTPATIENT
Start: 2024-01-03 | End: 2024-01-03 | Stop reason: HOSPADM

## 2024-01-03 RX ORDER — CHOLECALCIFEROL (VITAMIN D3) 125 MCG
1000 CAPSULE ORAL DAILY
Status: DISCONTINUED | OUTPATIENT
Start: 2024-01-04 | End: 2024-01-09 | Stop reason: HOSPADM

## 2024-01-03 RX ORDER — SODIUM CHLORIDE 0.9 % (FLUSH) 0.9 %
5-40 SYRINGE (ML) INJECTION PRN
Status: DISCONTINUED | OUTPATIENT
Start: 2024-01-03 | End: 2024-01-09 | Stop reason: HOSPADM

## 2024-01-03 RX ORDER — POTASSIUM CHLORIDE 20 MEQ/1
40 TABLET, EXTENDED RELEASE ORAL PRN
Status: DISCONTINUED | OUTPATIENT
Start: 2024-01-03 | End: 2024-01-08

## 2024-01-03 RX ORDER — DEXTROSE MONOHYDRATE 100 MG/ML
INJECTION, SOLUTION INTRAVENOUS CONTINUOUS PRN
Status: DISCONTINUED | OUTPATIENT
Start: 2024-01-03 | End: 2024-01-09 | Stop reason: HOSPADM

## 2024-01-03 RX ORDER — SODIUM CHLORIDE 0.9 % (FLUSH) 0.9 %
5-40 SYRINGE (ML) INJECTION PRN
Status: DISCONTINUED | OUTPATIENT
Start: 2024-01-03 | End: 2024-01-04

## 2024-01-03 RX ORDER — POLYETHYLENE GLYCOL 3350 17 G/17G
17 POWDER, FOR SOLUTION ORAL DAILY PRN
Status: DISCONTINUED | OUTPATIENT
Start: 2024-01-03 | End: 2024-01-09 | Stop reason: HOSPADM

## 2024-01-03 RX ORDER — INSULIN LISPRO 100 [IU]/ML
0-4 INJECTION, SOLUTION INTRAVENOUS; SUBCUTANEOUS NIGHTLY
Status: DISCONTINUED | OUTPATIENT
Start: 2024-01-03 | End: 2024-01-09 | Stop reason: HOSPADM

## 2024-01-03 RX ORDER — ASPIRIN 325 MG
325 TABLET ORAL
Status: COMPLETED | OUTPATIENT
Start: 2024-01-03 | End: 2024-01-03

## 2024-01-03 RX ORDER — LISINOPRIL 10 MG/1
10 TABLET ORAL DAILY
Status: DISCONTINUED | OUTPATIENT
Start: 2024-01-04 | End: 2024-01-06

## 2024-01-03 RX ORDER — LIDOCAINE HYDROCHLORIDE 10 MG/ML
INJECTION, SOLUTION INFILTRATION; PERINEURAL PRN
Status: DISCONTINUED | OUTPATIENT
Start: 2024-01-03 | End: 2024-01-03 | Stop reason: HOSPADM

## 2024-01-03 RX ORDER — ACETAMINOPHEN 650 MG/1
650 SUPPOSITORY RECTAL EVERY 6 HOURS PRN
Status: DISCONTINUED | OUTPATIENT
Start: 2024-01-03 | End: 2024-01-09 | Stop reason: HOSPADM

## 2024-01-03 RX ORDER — LEVOTHYROXINE SODIUM 0.05 MG/1
100 TABLET ORAL
Status: DISCONTINUED | OUTPATIENT
Start: 2024-01-04 | End: 2024-01-05

## 2024-01-03 RX ORDER — ONDANSETRON 2 MG/ML
4 INJECTION INTRAMUSCULAR; INTRAVENOUS EVERY 6 HOURS PRN
Status: DISCONTINUED | OUTPATIENT
Start: 2024-01-03 | End: 2024-01-09 | Stop reason: HOSPADM

## 2024-01-03 RX ORDER — ACETAMINOPHEN 325 MG/1
650 TABLET ORAL EVERY 6 HOURS PRN
Status: DISCONTINUED | OUTPATIENT
Start: 2024-01-03 | End: 2024-01-09 | Stop reason: HOSPADM

## 2024-01-03 RX ORDER — HEPARIN SODIUM 1000 [USP'U]/ML
4000 INJECTION, SOLUTION INTRAVENOUS; SUBCUTANEOUS ONCE
Status: COMPLETED | OUTPATIENT
Start: 2024-01-03 | End: 2024-01-03

## 2024-01-03 RX ORDER — INSULIN ASPART 100 [IU]/ML
30 INJECTION, SUSPENSION SUBCUTANEOUS 2 TIMES DAILY WITH MEALS
Status: DISCONTINUED | OUTPATIENT
Start: 2024-01-03 | End: 2024-01-03

## 2024-01-03 RX ORDER — ONDANSETRON 4 MG/1
4 TABLET, ORALLY DISINTEGRATING ORAL EVERY 8 HOURS PRN
Status: DISCONTINUED | OUTPATIENT
Start: 2024-01-03 | End: 2024-01-09 | Stop reason: HOSPADM

## 2024-01-03 RX ORDER — PRASUGREL 10 MG/1
60 TABLET, FILM COATED ORAL ONCE
Status: COMPLETED | OUTPATIENT
Start: 2024-01-03 | End: 2024-01-03

## 2024-01-03 RX ADMIN — ATORVASTATIN CALCIUM 80 MG: 40 TABLET, FILM COATED ORAL at 20:51

## 2024-01-03 RX ADMIN — NITROGLYCERIN 5 MCG/MIN: 20 INJECTION INTRAVENOUS at 17:41

## 2024-01-03 RX ADMIN — ASPIRIN 325 MG: 325 TABLET ORAL at 16:46

## 2024-01-03 RX ADMIN — HEPARIN SODIUM AND DEXTROSE 8 UNITS/KG/HR: 10000; 5 INJECTION INTRAVENOUS at 16:50

## 2024-01-03 RX ADMIN — SODIUM CHLORIDE: 9 INJECTION, SOLUTION INTRAVENOUS at 19:27

## 2024-01-03 RX ADMIN — SODIUM CHLORIDE, PRESERVATIVE FREE 10 ML: 5 INJECTION INTRAVENOUS at 20:59

## 2024-01-03 RX ADMIN — HEPARIN SODIUM 4000 UNITS: 1000 INJECTION INTRAVENOUS; SUBCUTANEOUS at 16:47

## 2024-01-03 RX ADMIN — SODIUM CHLORIDE: 9 INJECTION, SOLUTION INTRAVENOUS at 22:22

## 2024-01-03 RX ADMIN — PRASUGREL 60 MG: 10 TABLET, FILM COATED ORAL at 21:45

## 2024-01-03 RX ADMIN — INSULIN GLARGINE 30 UNITS: 100 INJECTION, SOLUTION SUBCUTANEOUS at 20:52

## 2024-01-03 RX ADMIN — INSULIN LISPRO 4 UNITS: 100 INJECTION, SOLUTION INTRAVENOUS; SUBCUTANEOUS at 21:14

## 2024-01-03 ASSESSMENT — PAIN SCALES - GENERAL
PAINLEVEL_OUTOF10: 0
PAINLEVEL_OUTOF10: 3
PAINLEVEL_OUTOF10: 3

## 2024-01-03 ASSESSMENT — PAIN DESCRIPTION - LOCATION: LOCATION: CHEST

## 2024-01-03 NOTE — TELEPHONE ENCOUNTER
Patient wife called to schedule fu from surgery, patient had surgery at [facility] however was seen by our md in er, need clarification on who to follow up with please advise    [phone]

## 2024-01-03 NOTE — CONSULTS
NSTEMI with active chest pain (onset 12-24 hours ago)    Suspect silent ischemia on electrocardiogram    Recommend intravenous nitroglycerin for chest pain. Noted multiple diastolic blood pressures in the 50s, would avoid beta blocker at this time. Recommend urgent left heart catheterization for revascularization.

## 2024-01-03 NOTE — PROGRESS NOTES
Pharmacy Conversion of Mixed Insulin/Concentrated Insulin Products to Basal/Bolus Insulin regimen    PTA Mixed Insulin Regimen:  Insulin Type: novolog 70/30  Insulin Dose: 30  units bid    Conversion Protocol:  Total PTA Daily Insulin requirement: 60 Units/24 hours  Basal Insulin Conversion: (60 Units x 0.7 )= 42 Units x 0.80 = 34 units of insulin glargine daily   Prandial Insulin Conversion: (60 Units x 0.3) = 18 Units/ 3 meals = 6 units with meals    Plan:  Will place order for Lantus 30 units daily and recommend titrating to 16 units (80% of home basal insulin PTA), as per DTC recommendations given patient's current renal status.  Will place order for Lispro 6 units with meals.  Sliding scale order already in place.     Thanks,  Charlie Walton RPH

## 2024-01-03 NOTE — ED PROVIDER NOTES
[facility] EMERGENCY DEPT  EMERGENCY DEPARTMENT ENCOUNTER       Pt Name: Errol Brandt  MRN: [id number]  Birthdate 1945  Date of evaluation: 1/3/2024  Provider: Jagdeep Nava MD   PCP: Gary Motley MD  Note Started: 4:11 PM 1/3/24     CHIEF COMPLAINT       Chief Complaint   Patient presents with    Chest Pain     Sharp, starting last night,  per EMS, type 1 diabetic, recently hospitalized with complications, chronic wounds to feet        HISTORY OF PRESENT ILLNESS: 1 or more elements      History From: Patient, History limited by: None     Errol Brandt is a 78 y.o. male who presents with chest pain.  He reports substernal chest pain last night into this morning, sharp in nature.  Reports mild associated dyspnea.  Pain does not radiate to arm or jaws or back.  No associated nausea or vomiting.  Denies abdominal pain.  Recently mated for this choledocholithiasis status post ERCP and biliary stone extraction     Nursing Notes were all reviewed and agreed with or any disagreements were addressed in the HPI.     REVIEW OF SYSTEMS        Positives and Pertinent negatives as per HPI.    PAST HISTORY     Past Medical History:  Past Medical History:   Diagnosis Date    Diabetes (HCC)     Ill-defined condition     perpherial artery disease    Ill-defined condition     hyperlidemia       Past Surgical History:  Past Surgical History:   Procedure Laterality Date    CHOLECYSTECTOMY, LAPAROSCOPIC N/A 12/21/2023    CHOLECYSTECTOMY LAPAROSCOPIC CHOLANGIOGRAM performed by Adryan Rees MD at [facility] MAIN OR    COLONOSCOPY N/A 4/27/2022    COLONOSCOPY performed by Barber Breaux MD at [facility] ENDOSCOPY    ERCP N/A 12/21/2023    ERCP ENDOSCOPIC RETROGRADE CHOLANGIOPANCREATOGRAPHY performed by Adryan Armando MD at [facility] MAIN OR    ERCP N/A 12/22/2023    ERCP ENDOSCOPIC RETROGRADE CHOLANGIOPANCREATOGRAPHY performed by Ni Pa MD at [facility] ENDOSCOPY    OTHER SURGICAL HISTORY  2011    right femoral tibial bypass

## 2024-01-03 NOTE — ED NOTES
Per lab, critical trop of 10,172. Dr. Nava notified, at bedside to update pt on plan for admission.    Repeat EKG provided to Dr. Nava.

## 2024-01-03 NOTE — H&P
Hospitalist Admission Note    NAME:   Errol Brandt   : 1945   MRN: [id number]     Date/Time: 1/3/2024 4:47 PM    Patient PCP: Gary Motley MD    ______________________________________________________________________  Given the patient's current clinical presentation, I have a high level of concern for decompensation if discharged from the emergency department.  Complex decision making was performed, which includes reviewing the patient's available past medical records, laboratory results, and x-ray films.       My assessment of this patient's clinical condition and my plan of care is as follows.    Assessment / Plan:    Non-STEMI  Cardiology consulted, planning for urgent CATH  Started on heparin drip  Start aspirin and statin  Added Nitropaste  Started metoprolol  Continue lisinopril  Echo    IDDM  Was on NPH 40 twice daily plus metformin plus Januvia  Hold oral agents  Insulin sign scale  Hypoglycemia protocol  Start Lantus and Premeal lispro    Hypothyroidism  Resume Synthroid  Check TSH    Hypertension/hyperlipidemia  Adjust medication status as above      Medical Decision Making:   I personally reviewed labs: yes as below   I personally reviewed imaging reports:yes as below   Toxic drug monitoring: Monitor hemoglobin while on blood thinners  Discussed case with: ED provider. After discussion I am in agreement that acuity of patient's medical condition necessitates hospital stay.      Code Status: Full Code  Baseline:     Subjective:   CHIEF COMPLAINT: Chest pain    HISTORY OF PRESENT ILLNESS:     Errol Brandt is a 78 y.o.  male with PMHx significant for  has a past medical history of Diabetes (HCC), Ill-defined condition, and Ill-defined condition.     Patient developed chest pain last night, was so severe, substernal, associated with mild shortness of breath.  No reported radiation, nausea, vomiting, and abdominal pain.  Of note, patient recently admitted for choledocholithiasis and jaundice

## 2024-01-04 ENCOUNTER — APPOINTMENT (OUTPATIENT)
Facility: HOSPITAL | Age: 79
DRG: 322 | End: 2024-01-04
Payer: MEDICARE

## 2024-01-04 PROBLEM — I65.23 BILATERAL CAROTID ARTERY STENOSIS: Status: ACTIVE | Noted: 2024-01-04

## 2024-01-04 PROBLEM — Z01.810 PREOP CARDIOVASCULAR EXAM: Status: ACTIVE | Noted: 2024-01-04

## 2024-01-04 LAB
ALBUMIN SERPL-MCNC: 2.7 G/DL (ref 3.5–5)
ALBUMIN/GLOB SERPL: 0.8 (ref 1.1–2.2)
ALP SERPL-CCNC: 165 U/L (ref 45–117)
ALT SERPL-CCNC: 42 U/L (ref 12–78)
ANION GAP SERPL CALC-SCNC: 5 MMOL/L (ref 5–15)
APPEARANCE UR: CLEAR
AST SERPL-CCNC: 61 U/L (ref 15–37)
BACTERIA URNS QL MICRO: NEGATIVE /HPF
BASOPHILS # BLD: 0 K/UL (ref 0–0.1)
BASOPHILS NFR BLD: 0 % (ref 0–1)
BILIRUB SERPL-MCNC: 1.7 MG/DL (ref 0.2–1)
BILIRUB UR QL: NEGATIVE
BUN SERPL-MCNC: 20 MG/DL (ref 6–20)
BUN/CREAT SERPL: 24 (ref 12–20)
CALCIUM SERPL-MCNC: 8 MG/DL (ref 8.5–10.1)
CHLORIDE SERPL-SCNC: 108 MMOL/L (ref 97–108)
CO2 SERPL-SCNC: 26 MMOL/L (ref 21–32)
COLOR UR: ABNORMAL
CREAT SERPL-MCNC: 0.85 MG/DL (ref 0.7–1.3)
DIFFERENTIAL METHOD BLD: ABNORMAL
ECHO AO ROOT DIAM: 3.6 CM
ECHO AO ROOT INDEX: 1.7 CM/M2
ECHO AV AREA PEAK VELOCITY: 1.8 CM2
ECHO AV AREA VTI: 2.1 CM2
ECHO AV AREA/BSA PEAK VELOCITY: 0.8 CM2/M2
ECHO AV AREA/BSA VTI: 1 CM2/M2
ECHO AV MEAN GRADIENT: 7 MMHG
ECHO AV MEAN VELOCITY: 1.3 M/S
ECHO AV PEAK GRADIENT: 13 MMHG
ECHO AV PEAK VELOCITY: 1.8 M/S
ECHO AV VELOCITY RATIO: 0.44
ECHO AV VTI: 41.3 CM
ECHO BSA: 2.15 M2
ECHO BSA: 2.15 M2
ECHO BSA: 2.38 M2
ECHO LA DIAMETER INDEX: 1.75 CM/M2
ECHO LA DIAMETER: 3.7 CM
ECHO LA TO AORTIC ROOT RATIO: 1.03
ECHO LA VOL A-L A2C: 32 ML (ref 18–58)
ECHO LA VOL A-L A4C: 45 ML (ref 18–58)
ECHO LA VOL MOD A2C: 30 ML (ref 18–58)
ECHO LA VOL MOD A4C: 41 ML (ref 18–58)
ECHO LA VOLUME AREA LENGTH: 40 ML
ECHO LA VOLUME INDEX A-L A2C: 15 ML/M2 (ref 16–34)
ECHO LA VOLUME INDEX A-L A4C: 21 ML/M2 (ref 16–34)
ECHO LA VOLUME INDEX AREA LENGTH: 19 ML/M2 (ref 16–34)
ECHO LA VOLUME INDEX MOD A2C: 14 ML/M2 (ref 16–34)
ECHO LA VOLUME INDEX MOD A4C: 19 ML/M2 (ref 16–34)
ECHO LV E' LATERAL VELOCITY: 5 CM/S
ECHO LV E' SEPTAL VELOCITY: 5 CM/S
ECHO LV FRACTIONAL SHORTENING: 23 % (ref 28–44)
ECHO LV INTERNAL DIMENSION DIASTOLE INDEX: 1.42 CM/M2
ECHO LV INTERNAL DIMENSION DIASTOLIC: 3 CM (ref 4.2–5.9)
ECHO LV INTERNAL DIMENSION SYSTOLIC INDEX: 1.08 CM/M2
ECHO LV INTERNAL DIMENSION SYSTOLIC: 2.3 CM
ECHO LV IVSD: 1.4 CM (ref 0.6–1)
ECHO LV MASS 2D: 140.5 G (ref 88–224)
ECHO LV MASS INDEX 2D: 66.3 G/M2 (ref 49–115)
ECHO LV POSTERIOR WALL DIASTOLIC: 1.4 CM (ref 0.6–1)
ECHO LV RELATIVE WALL THICKNESS RATIO: 0.93
ECHO LVOT AREA: 4.2 CM2
ECHO LVOT AV VTI INDEX: 0.52
ECHO LVOT DIAM: 2.3 CM
ECHO LVOT MEAN GRADIENT: 1 MMHG
ECHO LVOT PEAK GRADIENT: 3 MMHG
ECHO LVOT PEAK VELOCITY: 0.8 M/S
ECHO LVOT STROKE VOLUME INDEX: 41.9 ML/M2
ECHO LVOT SV: 88.9 ML
ECHO LVOT VTI: 21.4 CM
ECHO MV A VELOCITY: 1.1 M/S
ECHO MV E DECELERATION TIME (DT): 297.3 MS
ECHO MV E VELOCITY: 0.63 M/S
ECHO MV E/A RATIO: 0.57
ECHO MV E/E' LATERAL: 12.6
ECHO MV E/E' RATIO (AVERAGED): 12.6
ECHO RA VOLUME: 26 ML
ECHO RA VOLUME: 27 ML
ECHO RV TAPSE: 0.9 CM (ref 1.7–?)
EOSINOPHIL # BLD: 0.2 K/UL (ref 0–0.4)
EOSINOPHIL NFR BLD: 4 % (ref 0–7)
EPITH CASTS URNS QL MICRO: ABNORMAL /LPF
ERYTHROCYTE [DISTWIDTH] IN BLOOD BY AUTOMATED COUNT: 14.1 % (ref 11.5–14.5)
ERYTHROCYTE [DISTWIDTH] IN BLOOD BY AUTOMATED COUNT: 14.3 % (ref 11.5–14.5)
GLOBULIN SER CALC-MCNC: 3.3 G/DL (ref 2–4)
GLUCOSE BLD STRIP.AUTO-MCNC: 111 MG/DL (ref 65–117)
GLUCOSE BLD STRIP.AUTO-MCNC: 139 MG/DL (ref 65–117)
GLUCOSE BLD STRIP.AUTO-MCNC: 267 MG/DL (ref 65–117)
GLUCOSE BLD STRIP.AUTO-MCNC: 356 MG/DL (ref 65–117)
GLUCOSE SERPL-MCNC: 270 MG/DL (ref 65–100)
GLUCOSE UR STRIP.AUTO-MCNC: >1000 MG/DL
HCT VFR BLD AUTO: 33.3 % (ref 36.6–50.3)
HCT VFR BLD AUTO: 34 % (ref 36.6–50.3)
HGB BLD-MCNC: 11.6 G/DL (ref 12.1–17)
HGB BLD-MCNC: 11.7 G/DL (ref 12.1–17)
HGB UR QL STRIP: NEGATIVE
HYALINE CASTS URNS QL MICRO: ABNORMAL /LPF (ref 0–2)
IMM GRANULOCYTES # BLD AUTO: 0 K/UL (ref 0–0.04)
IMM GRANULOCYTES NFR BLD AUTO: 0 % (ref 0–0.5)
KETONES UR QL STRIP.AUTO: NEGATIVE MG/DL
LEUKOCYTE ESTERASE UR QL STRIP.AUTO: NEGATIVE
LYMPHOCYTES # BLD: 2.2 K/UL (ref 0.8–3.5)
LYMPHOCYTES NFR BLD: 41 % (ref 12–49)
MAGNESIUM SERPL-MCNC: 2 MG/DL (ref 1.6–2.4)
MCH RBC QN AUTO: 35.8 PG (ref 26–34)
MCH RBC QN AUTO: 36 PG (ref 26–34)
MCHC RBC AUTO-ENTMCNC: 34.4 G/DL (ref 30–36.5)
MCHC RBC AUTO-ENTMCNC: 34.8 G/DL (ref 30–36.5)
MCV RBC AUTO: 103.4 FL (ref 80–99)
MCV RBC AUTO: 104 FL (ref 80–99)
MONOCYTES # BLD: 0.6 K/UL (ref 0–1)
MONOCYTES NFR BLD: 11 % (ref 5–13)
NEUTS SEG # BLD: 2.4 K/UL (ref 1.8–8)
NEUTS SEG NFR BLD: 44 % (ref 32–75)
NITRITE UR QL STRIP.AUTO: NEGATIVE
NRBC # BLD: 0 K/UL (ref 0–0.01)
NRBC # BLD: 0 K/UL (ref 0–0.01)
NRBC BLD-RTO: 0 PER 100 WBC
NRBC BLD-RTO: 0 PER 100 WBC
NT PRO BNP: 1525 PG/ML
PH UR STRIP: 7 (ref 5–8)
PLATELET # BLD AUTO: 162 K/UL (ref 150–400)
PLATELET # BLD AUTO: 164 K/UL (ref 150–400)
PMV BLD AUTO: 11.3 FL (ref 8.9–12.9)
PMV BLD AUTO: 11.3 FL (ref 8.9–12.9)
POTASSIUM SERPL-SCNC: 3.8 MMOL/L (ref 3.5–5.1)
PROT SERPL-MCNC: 6 G/DL (ref 6.4–8.2)
PROT UR STRIP-MCNC: ABNORMAL MG/DL
RBC # BLD AUTO: 3.22 M/UL (ref 4.1–5.7)
RBC # BLD AUTO: 3.27 M/UL (ref 4.1–5.7)
RBC #/AREA URNS HPF: ABNORMAL /HPF (ref 0–5)
SERVICE CMNT-IMP: ABNORMAL
SERVICE CMNT-IMP: NORMAL
SODIUM SERPL-SCNC: 139 MMOL/L (ref 136–145)
SP GR UR REFRACTOMETRY: 1.02
TSH SERPL DL<=0.05 MIU/L-ACNC: 15.9 UIU/ML (ref 0.36–3.74)
UFH PPP CHRO-ACNC: 0.26 IU/ML
UFH PPP CHRO-ACNC: 0.56 IU/ML
UFH PPP CHRO-ACNC: <0.1 IU/ML
URINE CULTURE IF INDICATED: ABNORMAL
UROBILINOGEN UR QL STRIP.AUTO: 2 EU/DL (ref 0.2–1)
VAS LEFT CCA DIST EDV: 0 CM/S
VAS LEFT CCA DIST PSV: 64.6 CM/S
VAS LEFT CCA PROX EDV: 0 CM/S
VAS LEFT CCA PROX PSV: 87.9 CM/S
VAS LEFT ECA EDV: 0 CM/S
VAS LEFT ECA PSV: 59.2 CM/S
VAS LEFT ICA DIST EDV: 8.1 CM/S
VAS LEFT ICA DIST PSV: 40.3 CM/S
VAS LEFT ICA MID EDV: 7.8 CM/S
VAS LEFT ICA MID PSV: 39.2 CM/S
VAS LEFT ICA PROX EDV: 6 CM/S
VAS LEFT ICA PROX PSV: 40 CM/S
VAS LEFT ICA/CCA PSV: 0.62 NO UNITS
VAS LEFT SUBCLAVIAN PROX EDV: 0 CM/S
VAS LEFT SUBCLAVIAN PROX PSV: 108.2 CM/S
VAS LEFT VERTEBRAL EDV: 6.28 CM/S
VAS LEFT VERTEBRAL PSV: 34.2 CM/S
VAS RIGHT CCA DIST EDV: 0 CM/S
VAS RIGHT CCA DIST PSV: 67.7 CM/S
VAS RIGHT CCA PROX EDV: 0 CM/S
VAS RIGHT CCA PROX PSV: 103.2 CM/S
VAS RIGHT ECA EDV: 0 CM/S
VAS RIGHT ECA PSV: 87.1 CM/S
VAS RIGHT ICA DIST EDV: 5.4 CM/S
VAS RIGHT ICA DIST PSV: 31.3 CM/S
VAS RIGHT ICA MID EDV: 9 CM/S
VAS RIGHT ICA MID PSV: 51.9 CM/S
VAS RIGHT ICA PROX EDV: 4.6 CM/S
VAS RIGHT ICA PROX PSV: 42 CM/S
VAS RIGHT ICA/CCA PSV: 0.8 NO UNITS
VAS RIGHT SUBCLAVIAN PROX EDV: 0 CM/S
VAS RIGHT SUBCLAVIAN PROX PSV: 96.8 CM/S
VAS RIGHT VERTEBRAL EDV: 6.89 CM/S
VAS RIGHT VERTEBRAL PSV: 27 CM/S
WBC # BLD AUTO: 5.4 K/UL (ref 4.1–11.1)
WBC # BLD AUTO: 6.2 K/UL (ref 4.1–11.1)
WBC URNS QL MICRO: ABNORMAL /HPF (ref 0–4)

## 2024-01-04 PROCEDURE — 6370000000 HC RX 637 (ALT 250 FOR IP): Performed by: STUDENT IN AN ORGANIZED HEALTH CARE EDUCATION/TRAINING PROGRAM

## 2024-01-04 PROCEDURE — 93005 ELECTROCARDIOGRAM TRACING: CPT | Performed by: EMERGENCY MEDICINE

## 2024-01-04 PROCEDURE — 81001 URINALYSIS AUTO W/SCOPE: CPT

## 2024-01-04 PROCEDURE — 93880 EXTRACRANIAL BILAT STUDY: CPT | Performed by: PSYCHIATRY & NEUROLOGY

## 2024-01-04 PROCEDURE — 85520 HEPARIN ASSAY: CPT

## 2024-01-04 PROCEDURE — 6370000000 HC RX 637 (ALT 250 FOR IP): Performed by: GENERAL ACUTE CARE HOSPITAL

## 2024-01-04 PROCEDURE — 85025 COMPLETE CBC W/AUTO DIFF WBC: CPT

## 2024-01-04 PROCEDURE — 97166 OT EVAL MOD COMPLEX 45 MIN: CPT

## 2024-01-04 PROCEDURE — 93970 EXTREMITY STUDY: CPT

## 2024-01-04 PROCEDURE — 6370000000 HC RX 637 (ALT 250 FOR IP): Performed by: INTERNAL MEDICINE

## 2024-01-04 PROCEDURE — 85027 COMPLETE CBC AUTOMATED: CPT

## 2024-01-04 PROCEDURE — 83880 ASSAY OF NATRIURETIC PEPTIDE: CPT

## 2024-01-04 PROCEDURE — 83735 ASSAY OF MAGNESIUM: CPT

## 2024-01-04 PROCEDURE — 82962 GLUCOSE BLOOD TEST: CPT

## 2024-01-04 PROCEDURE — 80053 COMPREHEN METABOLIC PANEL: CPT

## 2024-01-04 PROCEDURE — 2580000003 HC RX 258: Performed by: STUDENT IN AN ORGANIZED HEALTH CARE EDUCATION/TRAINING PROGRAM

## 2024-01-04 PROCEDURE — 93880 EXTRACRANIAL BILAT STUDY: CPT

## 2024-01-04 PROCEDURE — 6360000002 HC RX W HCPCS: Performed by: STUDENT IN AN ORGANIZED HEALTH CARE EDUCATION/TRAINING PROGRAM

## 2024-01-04 PROCEDURE — 93922 UPR/L XTREMITY ART 2 LEVELS: CPT

## 2024-01-04 PROCEDURE — 97530 THERAPEUTIC ACTIVITIES: CPT

## 2024-01-04 PROCEDURE — 93306 TTE W/DOPPLER COMPLETE: CPT

## 2024-01-04 PROCEDURE — 97162 PT EVAL MOD COMPLEX 30 MIN: CPT

## 2024-01-04 PROCEDURE — 36415 COLL VENOUS BLD VENIPUNCTURE: CPT

## 2024-01-04 PROCEDURE — 2060000000 HC ICU INTERMEDIATE R&B

## 2024-01-04 PROCEDURE — 6370000000 HC RX 637 (ALT 250 FOR IP): Performed by: NURSE PRACTITIONER

## 2024-01-04 PROCEDURE — 84443 ASSAY THYROID STIM HORMONE: CPT

## 2024-01-04 PROCEDURE — 6360000002 HC RX W HCPCS: Performed by: EMERGENCY MEDICINE

## 2024-01-04 RX ORDER — 0.9 % SODIUM CHLORIDE 0.9 %
2000 INTRAVENOUS SOLUTION INTRAVENOUS ONCE
Status: COMPLETED | OUTPATIENT
Start: 2024-01-04 | End: 2024-01-04

## 2024-01-04 RX ORDER — PRASUGREL 10 MG/1
10 TABLET, FILM COATED ORAL DAILY
Status: DISCONTINUED | OUTPATIENT
Start: 2024-01-04 | End: 2024-01-09 | Stop reason: HOSPADM

## 2024-01-04 RX ORDER — RANOLAZINE 500 MG/1
500 TABLET, EXTENDED RELEASE ORAL 2 TIMES DAILY
Status: DISCONTINUED | OUTPATIENT
Start: 2024-01-04 | End: 2024-01-05

## 2024-01-04 RX ORDER — LANOLIN ALCOHOL/MO/W.PET/CERES
3 CREAM (GRAM) TOPICAL NIGHTLY PRN
Status: DISCONTINUED | OUTPATIENT
Start: 2024-01-04 | End: 2024-01-09 | Stop reason: HOSPADM

## 2024-01-04 RX ORDER — INSULIN GLARGINE 100 [IU]/ML
40 INJECTION, SOLUTION SUBCUTANEOUS NIGHTLY
Status: DISCONTINUED | OUTPATIENT
Start: 2024-01-04 | End: 2024-01-08

## 2024-01-04 RX ORDER — INSULIN LISPRO 100 [IU]/ML
10 INJECTION, SOLUTION INTRAVENOUS; SUBCUTANEOUS
Status: DISCONTINUED | OUTPATIENT
Start: 2024-01-04 | End: 2024-01-05

## 2024-01-04 RX ADMIN — RANOLAZINE 500 MG: 500 TABLET, FILM COATED, EXTENDED RELEASE ORAL at 21:43

## 2024-01-04 RX ADMIN — RANOLAZINE 500 MG: 500 TABLET, FILM COATED, EXTENDED RELEASE ORAL at 12:50

## 2024-01-04 RX ADMIN — SODIUM CHLORIDE, PRESERVATIVE FREE 10 ML: 5 INJECTION INTRAVENOUS at 09:12

## 2024-01-04 RX ADMIN — INSULIN LISPRO 6 UNITS: 100 INJECTION, SOLUTION INTRAVENOUS; SUBCUTANEOUS at 09:10

## 2024-01-04 RX ADMIN — SODIUM CHLORIDE 1000 ML: 9 INJECTION, SOLUTION INTRAVENOUS at 15:23

## 2024-01-04 RX ADMIN — INSULIN LISPRO 8 UNITS: 100 INJECTION, SOLUTION INTRAVENOUS; SUBCUTANEOUS at 12:49

## 2024-01-04 RX ADMIN — HEPARIN SODIUM 2000 UNITS: 1000 INJECTION INTRAVENOUS; SUBCUTANEOUS at 09:09

## 2024-01-04 RX ADMIN — INSULIN LISPRO 10 UNITS: 100 INJECTION, SOLUTION INTRAVENOUS; SUBCUTANEOUS at 12:50

## 2024-01-04 RX ADMIN — ATORVASTATIN CALCIUM 80 MG: 40 TABLET, FILM COATED ORAL at 21:41

## 2024-01-04 RX ADMIN — PRASUGREL 10 MG: 10 TABLET, FILM COATED ORAL at 12:50

## 2024-01-04 RX ADMIN — LEVOTHYROXINE SODIUM 100 MCG: 0.05 TABLET ORAL at 06:35

## 2024-01-04 RX ADMIN — SODIUM CHLORIDE, PRESERVATIVE FREE 10 ML: 5 INJECTION INTRAVENOUS at 21:43

## 2024-01-04 RX ADMIN — MELATONIN 3 MG: at 03:00

## 2024-01-04 RX ADMIN — INSULIN GLARGINE 40 UNITS: 100 INJECTION, SOLUTION SUBCUTANEOUS at 21:42

## 2024-01-04 RX ADMIN — SODIUM CHLORIDE 1000 ML: 9 INJECTION, SOLUTION INTRAVENOUS at 16:33

## 2024-01-04 RX ADMIN — ASPIRIN 81 MG: 81 TABLET, COATED ORAL at 09:11

## 2024-01-04 RX ADMIN — METOPROLOL TARTRATE 25 MG: 25 TABLET, FILM COATED ORAL at 09:11

## 2024-01-04 RX ADMIN — INSULIN LISPRO 4 UNITS: 100 INJECTION, SOLUTION INTRAVENOUS; SUBCUTANEOUS at 09:11

## 2024-01-04 RX ADMIN — HEPARIN SODIUM AND DEXTROSE 8 UNITS/KG/HR: 10000; 5 INJECTION INTRAVENOUS at 00:25

## 2024-01-04 RX ADMIN — CYANOCOBALAMIN TAB 500 MCG 1000 MCG: 500 TAB at 09:11

## 2024-01-04 RX ADMIN — NITROGLYCERIN 5 MCG/MIN: 20 INJECTION INTRAVENOUS at 00:17

## 2024-01-04 NOTE — PROGRESS NOTES
Called for hypotension.  Noted more than 3 g/dL drop of hemoglobin from prior.  Recommend:    -Hold nitroglycerin, heparin, prasugrel, metoprolol  -Stat CBC and stool occult blood  -Fluid bolus with normal saline    Might need gastroenterology and critical care consults

## 2024-01-04 NOTE — PROGRESS NOTES
Cardiac Catheterization Procedure Note   Patient: Errol Brandt  MRN: [id number]  SSN: [id number]   YOB: 1945 Age: 78 y.o.  Sex: male    Date of Procedure: 1/3/2024   Pre-procedure Diagnosis: NSTEMI  Post-procedure Diagnosis: Coronary Artery Disease  Procedure: Left Heart Cath  :  Dr. Sae Garzon MD    Assistant(s):  None  Anesthesia: Moderate Sedation   Estimated Blood Loss: Less than 10 mL   Specimens Removed: None  Findings: Mid right coronary 70%, distal tortuous 70% stenosis, culprit for acute myocardial infarction    Mid obtuse marginal 50-60% stenosis  Left PLB with focal 80% stenosis, small to medium caliber vessel distally  Proximal LAD 50% stenosis, diffuse 20% disease  Large mid diagonal 50% stenosis    Recommendations:    - Cardiac surgery consultation  - Continue aspirin, resume intravenous nitroglycerin titrated to chest pain relief  - Resume heparin around midnight    Blood sugars 390s, will get HbA1c    Complications: None   Implants:  None  Signed by:  Sae Garzon MD  1/3/2024  7:15 PM

## 2024-01-04 NOTE — PLAN OF CARE
Problem: Physical Therapy - Adult  Goal: By Discharge: Performs mobility at highest level of function for planned discharge setting.  See evaluation for individualized goals.  Description: FUNCTIONAL STATUS PRIOR TO ADMISSION: Patient was modified independent using a rolling walker for functional mobility.    HOME SUPPORT PRIOR TO ADMISSION: The patient lived with wife.    Physical Therapy Goals  Initiated 1/4/2024  1.  Patient will move from supine to sit and sit to supine in bed with independence within 7 day(s).    2.  Patient will perform sit to stand with modified independence within 7 day(s).  3.  Patient will transfer from bed to chair and chair to bed with modified independence using the least restrictive device within 7 day(s).  4.  Patient will ambulate with contact guard assist for 50 feet with the least restrictive device within 7 day(s).       Outcome: Progressing    PHYSICAL THERAPY EVALUATION    Patient: Errol Brandt (78 y.o. male)  Date: 1/4/2024  Primary Diagnosis: NSTEMI (non-ST elevated myocardial infarction) (HCC) [I21.4]  Procedure(s) (LRB):  Left heart cath / coronary angiography (N/A) 1 Day Post-Op   Precautions: Fall Risk, Bed Alarm                    ASSESSMENT :   DEFICITS/IMPAIRMENTS:   The patient is limited by decreased functional mobility, ROM, strength, endurance, balance     Based on the impairments listed above pt is below functional baseline. Pt was received in supine and cleared by nursing to mobilize. He was very [place] but agreeable to mobilize. He was able to mobilize to the EOB and stand with RW. Needed increased assistance to stand with RW, noted increased posterior lean. Able to take a few steps to the chair with unsteady gait. Let sitting up.     Patient will benefit from skilled intervention to address the above impairments.    Functional Outcome Measure:  The patient scored 16/24 on the [facility] outcome measure which is indicative of decreased functional mobility.              [facility] AM-PAC®      Basic Mobility Inpatient Short Form (6-Clicks) Version 2  How much HELP from another person do you currently need... (If the patient hasn't done an activity recently, how much help from another person do you think they would need if they tried?) Total A Lot A Little None   1.  Turning from your back to your side while in a flat bed without using bedrails? []  1 []  2 [x]  3  []  4   2.  Moving from lying on your back to sitting on the side of a flat bed without using bedrails? []  1 []  2 [x]  3  []  4   3.  Moving to and from a bed to a chair (including a wheelchair)? []  1 []  2 [x]  3  []  4   4. Standing up from a chair using your arms (e.g. wheelchair or bedside chair)? []  1 []  2 [x]  3  []  4   5.  Walking in hospital room? []  1 [x]  2 []  3  []  4   6.  Climbing 3-5 steps with a railing? []  1 [x]  2 []  3  []  4     Raw Score: 16/24                            Cutoff score ?171,2,3 had higher odds of discharging home with home health or need of SNF/IPR.    1. Kellen Madden, Lila Triplett, Dallas Marc, Patricia Solomon, Lenny Solis, Nabeel Madden.  Validity of the AM-PAC “6-Clicks” Inpatient Daily Activity and Basic Mobility Short Forms. Physical Therapy Mar 2014, [phone]; DOI: 10.2522/ptj.36320464  2. Geoffrey BENSON, Henrietta SORIANO, Neeta SORIANO, Kaitlyn SORIANO. Association of AM-PAC \"6-Clicks\" Basic Mobility and Daily Activity Scores With Discharge Destination. Phys Ther. 2021 Apr 4;101(4):bhpr740. doi: 10.1093/ptj/cgkn115. PMID: 98314127.  3. Sandro SORIANO, Lucia RUDOLPH, Madhavi S, Kendall K, Flora S. Activity Measure for Post-Acute Care \"6-Clicks\" Basic Mobility Scores Predict Discharge Destination After Acute Care Hospitalization in Select Patient Groups: A Retrospective, Observational Study. Arch Rehabil Res Clin Transl. 2022 Jul 16;4(3):637537. doi: 10.1016/j.arrct.2022.850007. PMID: 05561683; PMCID: [id number].  4. Maryanne PALACIO, Tiffany S, Antonia W, Chayo DYSON. AM-PAC Short

## 2024-01-04 NOTE — PROGRESS NOTES
Hospitalist Progress Note    NAME:   Errol Brandt   : 1945   MRN: [id number]     Date/Time: 2024 3:55 PM  Patient PCP: Gayr Motley MD    Estimated discharge date:> 48h    Barriers: Cards clearance      Assessment / Plan:  Hypotension   Systolic blood pressure dropped down to 90s and put it down to the 70s  IV fluid bolus 2 L ordered by cardiology  Review of labs showed drop of hemoglobin from 14 to 11, nitro drip and heparin drip on hold  Discussed with cardiology  Will closely monitor, check stool occult blood  Patient is high risk of further decompensation  Will follow-up H&H every 8 hours  If systolic blood pressure does not improve with IV fluid bolus, will consult intensivist    Non-STEMI  Cardiology consulted, status post cardiac cath which showed  Mid right coronary 70%, distal tortuous 70% stenosis, culprit for acute myocardial infarction   CT surgery consulted, patient deemed to be a poor surgical candidate, CT surgery recommended staged PCI by cardiology.  Discussed with cardiology Dr. Garzon, plan for PCI once patient medically stable    Continue with aspirin and statin  Added Nitropaste  Started metoprolol  Continue lisinopril  Echo showed EF of 55%     IDDM with hyperglycemia  Was on NPH 40 twice daily plus metformin plus Januvia  Hold oral agents  Insulin sign scale  Hypoglycemia protocol  Patient blood sugar elevated between 200-300, will increase Lantus to 40 unit     Hypothyroidism  Resume Synthroid  Check TSH     Hypertension/hyperlipidemia  Adjust medication status as above        Medical Decision Making:   I personally reviewed labs: yes as below   I personally reviewed imaging reports:yes as below   Toxic drug monitoring: Monitor hemoglobin while on blood thinners  Discussed case with: Cardiology        Code Status: Full Code  Baseline:       Subjective:     Chief Complaint / Reason for Physician Visit  \"Follow-up NSTEMI\".  Discussed with RN events overnight.   Patient    01/03/24 2117 (!) 120/100 -- -- 60 19 98 % -- --   01/03/24 2102 130/61 -- -- 60 21 94 % -- --   01/03/24 2047 117/66 -- -- 67 16 97 % -- --   01/03/24 2032 97/61 -- -- 64 24 96 % -- --   01/03/24 2017 (!) 101/50 -- -- 61 18 94 % -- --   01/03/24 2001 (!) 96/59 -- -- 62 14 97 % -- --   01/03/24 1947 (!) 98/54 -- -- 67 16 97 % -- --   01/03/24 1942 (!) 97/47 97.7 °F (36.5 °C) Oral 60 19 95 % -- --   01/03/24 1935 (!) 97/47 -- -- 60 21 96 % -- --   01/03/24 1730 (!) 148/70 -- -- 70 19 100 % -- --   01/03/24 1700 (!) 149/73 -- -- 78 19 99 % -- --   01/03/24 1645 (!) 131/57 -- -- 68 11 99 % -- --   01/03/24 1615 (!) 143/66 -- -- 70 18 97 % -- --   01/03/24 1600 121/67 -- -- 64 16 98 % -- --         Intake/Output Summary (Last 24 hours) at 1/4/2024 1555  Last data filed at 1/4/2024 1232  Gross per 24 hour   Intake --   Output 850 ml   Net -850 ml        I had a face to face encounter and independently examined this patient on 1/4/2024, as outlined below:  PHYSICAL EXAM:  General: Alert, cooperative  EENT:  EOMI. Anicteric sclerae.  Resp:  CTA bilaterally, no wheezing or rales.  No accessory muscle use  CV:  Regular  rhythm,  No edema  GI:  Soft, Non distended, Non tender.  +Bowel sounds  Neurologic:  Alert and oriented X 3, normal speech,   Psych:   Good insight. Not anxious nor agitated  Skin:  No rashes.  No jaundice    Reviewed most current lab test results and cultures  YES  Reviewed most current radiology test results   YES  Review and summation of old records today    NO  Reviewed patient's current orders and MAR    YES  PMH/SH reviewed - no change compared to H&P    Procedures: see electronic medical records for all procedures/Xrays and details which were not copied into this note but were reviewed prior to creation of Plan.      LABS:  I reviewed today's most current labs and imaging studies.  Pertinent labs include:  Recent Labs     01/03/24  1516 01/04/24  0018 01/04/24  1528   WBC 6.1 5.4 6.2   HGB 14.0

## 2024-01-04 NOTE — PLAN OF CARE
MAIN OR    COLONOSCOPY N/A 4/27/2022    COLONOSCOPY performed by Barber Breaux MD at hospitals ENDOSCOPY    ERCP N/A 12/21/2023    ERCP ENDOSCOPIC RETROGRADE CHOLANGIOPANCREATOGRAPHY performed by Adryan Armando MD at hospitals MAIN OR    ERCP N/A 12/22/2023    ERCP ENDOSCOPIC RETROGRADE CHOLANGIOPANCREATOGRAPHY performed by Ni Pa MD at [facility] ENDOSCOPY    OTHER SURGICAL HISTORY  2011    right femoral tibial bypass    OTHER SURGICAL HISTORY      drained times two in back    VASCULAR SURGERY  2012    blood clot.right leg          Expanded or extensive additional review of patient history:   Lives With: Spouse     Home Layout: One level                 Bathroom Equipment: Shower chair     Home Equipment: Cane, Walker, rolling           Hand Dominance: right     EXAMINATION OF PERFORMANCE DEFICITS:    Cognitive/Behavioral Status:  Orientation  Overall Orientation Status: Within Functional Limits  Orientation Level: Oriented X4  Cognition  Overall Cognitive Status: WFL    Hearing:   Hearing  Hearing: Exceptions to WFL ([place])  Hearing Exceptions: Hard of hearing/hearing concerns    Vision/Perceptual:          Vision  Vision: Impaired       Range of Motion:   AROM: Generally decreased, functional  PROM: Generally decreased, functional      Strength:  Strength: Generally decreased, functional      Coordination:  Coordination: Generally decreased, functional            Tone & Sensation:   Tone: Normal             Functional Mobility and Transfers for ADLs:    Bed Mobility:     Bed Mobility Training  Bed Mobility Training: Yes  Rolling: Contact-guard assistance  Supine to Sit: Contact-guard assistance  Scooting: Contact-guard assistance    Transfers:      Transfer Training  Transfer Training: Yes  Sit to Stand: Moderate assistance;Assist X2  Stand to Sit: Moderate assistance;Assist X2  Bed to Chair: Moderate assistance;Assist X2                     Balance:   Standing: Impaired  Balance  Sitting: Impaired  Sitting -    Fair     After treatment:   Patient left in no apparent distress sitting up in chair, Call bell within reach, and Bed/ chair alarm activated    COMMUNICATION/EDUCATION:   The patient's plan of care was discussed with: physical therapist and registered nurse    Patient Education  Education Given To: Patient  Education Provided: Role of Therapy;Plan of Care  Education Method: Verbal  Barriers to Learning: Hearing  Education Outcome: Continued education needed    Thank you for this referral.  Erin Garcia OT  Minutes: 13    Occupational Therapy Evaluation Charge Determination   History Examination Decision-Making   MEDIUM Complexity : Expanded review of history including physical, cognitive and psychocial history  MEDIUM Complexity: 3-5 Performance deficits relating to physical, cognitive, or psychosocial skills that result in activity limitations and/or participation restrictions MEDIUM Complexity: Patient may present with comorbidities that affect occupational performance. Minimal to moderate modifications of tasks or assist (eg. physical or verbal) with assist is necessary to enable pt to complete eval   Based on the above components, the patient evaluation is determined to be of the following complexity level: Medium

## 2024-01-04 NOTE — CARE COORDINATION
Care Management Initial Assessment       RUR: 18%  Readmission? No  1st IM letter given? Yes  1st  letter given: No      CM introduced self and role to wifeChelsi, verified pt demographics, insurance info,emergency contact and ACD.   Pt lives with wfie and son one story home, Independent with  ADL's, ambulate with canero walker and drives.  DME: cane, walker and shower chair  HH / SNF in the past  ([facility]). Uses  [facility].    Plan: Home vs Home with HH   Family to provide transportation at d/c.         01/04/24 1157   Service Assessment   Patient Orientation Alert and Oriented   Cognition Alert   History Provided By Spouse  (Spouse - Chelsi Brandt)   Primary Caregiver Self   Support Systems Spouse/Significant Other;Children  (Spouse - Chelsi Brandt and Son)   Patient's Healthcare Decision Maker is: Legal Next of Kin   PCP Verified by CM Yes   Last Visit to PCP Within last 6 months   Prior Functional Level Independent in ADLs/IADLs   Current Functional Level Independent in ADLs/IADLs   Can patient return to prior living arrangement Yes   Ability to make needs known: Good   Financial Resources Medicare   Community Resources None   Social/Functional History   Lives With Spouse   Home Layout One level   Bathroom Equipment Shower chair   Home Equipment Cane;Walker, rolling   Receives Help From Family   ADL Assistance Independent   Ambulation Assistance Independent   Transfer Assistance Independent   Active  No   Patient's  Info Family provides   Mode of Transportation Car   Discharge Planning   Living Arrangements Spouse/Significant Other   Current Services Prior To Admission None   Current DME Prior to Arrival Cane;Walker;Shower Chair   Potential Assistance Needed N/A   DME Ordered? No   Patient expects to be discharged to: House     Advance Care Planning     General Advance Care Planning (ACP) Conversation    Date of Conversation: 1/4/2024  Conducted with:

## 2024-01-04 NOTE — CONSULTS
4/27/2022    COLONOSCOPY performed by Barber Breaux MD at [facility] ENDOSCOPY    ERCP N/A 12/21/2023    ERCP ENDOSCOPIC RETROGRADE CHOLANGIOPANCREATOGRAPHY performed by Adryan Armando MD at [facility] MAIN OR    ERCP N/A 12/22/2023    ERCP ENDOSCOPIC RETROGRADE CHOLANGIOPANCREATOGRAPHY performed by Ni Pa MD at [facility] ENDOSCOPY    OTHER SURGICAL HISTORY  2011    right femoral tibial bypass    OTHER SURGICAL HISTORY      drained times two in back    VASCULAR SURGERY  2012    blood clot.right leg      Social History     Tobacco Use    Smoking status: Every Day     Current packs/day: 1.00     Types: Cigarettes    Smokeless tobacco: Never   Substance Use Topics    Alcohol use: No      No family history on file.  Prior to Admission medications    Medication Sig Start Date End Date Taking? Authorizing Provider   levothyroxine (SYNTHROID) 100 MCG tablet Take 1 tablet by mouth every morning (before breakfast)  Patient not taking: Reported on 1/3/2024 4/6/23   Provider, MD Nicolas   metFORMIN (GLUCOPHAGE-XR) 500 MG extended release tablet Take 2 tablets by mouth 2 times daily    ProviderNicolas MD   cyanocobalamin 1000 MCG tablet Take 1,000 mcg by mouth daily  Patient not taking: Reported on 1/3/2024 5/27/21   Automatic Reconciliation, Ar   insulin aspart protamine-insulin aspart (NOVOLOG 70/30) (70-30) 100 UNIT/ML injection Inject 40 Units into the skin 2 times daily    Automatic Reconciliation, Ar   lisinopril (PRINIVIL;ZESTRIL) 10 MG tablet Take 10 mg by mouth daily  Patient not taking: Reported on 1/3/2024 5/27/21   Automatic Reconciliation, Ar   SITagliptin (JANUVIA) 50 MG tablet Take 1 tablet by mouth daily    Automatic Reconciliation, Ar       No Known Allergies      Review of Systems: Pertinent per HPI  Consititutional: Denies fever or chills.  Eyes:  Denies use of glasses or vision problems(cataracts).  ENT:  Denies hearing or swallowing difficulty.  CV: Denies CP, claudication, HTN.  Resp: Denies  13:43,  QRS axis shifted right         Assessment:     Principal Problem:    NSTEMI (non-ST elevated myocardial infarction) (HCC)  Resolved Problems:    * No resolved hospital problems. *        Plan:     STS Risk Calculator V2.81 - Discussed by surgeon with patient- Pending completion of workup    Treatment Plan:    NSTEMI/CAD:   Initiate cardiac surgery workup.    Continue aspirin, statin, beta-blocker, heparin drip, nitroglycerin drip.    Received Effient January 3 at 2145-needs washout-check daily P2Y12 levels.   Not ideally surgical candidate, Oms too small to graft, proximal LAD only 50%- recommend PCI. Dr. Watt discussing with Dr. Garzon.   HTN: On lisinopril PTA.  Continue Lopressor 25 twice daily.  Hold lisinopril for now pending date and time of potential intervention, as that should be avoided for 48 hours preop to avoid periop hypotension.   Resume per cardiology post-PCI.   HLD: not on statin historically but initiated here.   Continue.  History of subarachnoid hemorrhage after ground-level fall, April 2023, noted  Former smoker  Check preop PFTs.  IDDM complicated by peripheral neuropathy: Patient of Dr. Hunt. A1C 6.8; on 40 units of 70/30 twice daily, Januvia, metformin PTA.   Management per primary team for now (currently on Lantus 30 units nightly, sliding scale insulin and 6 units of mealtime coverage).  Hypothyroidism:  Continue Synthroid.  TSH pending.  CKD stage IIIa: patient of Horacio Zendejas . Creatinine currently 0.85.   Avoid nephrotoxins.   Monitor renal function.  Strict I&O, daily weights.   Consider nephrology consult if indicated.   Chronic pancreatitis, elevated LFTs, recent choledocholithiasis status post ERCP and biliary stone extraction (12-): Noted. No GI sx currently.   Monitor LFTs.  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  OP follow up.   PAD status post right femoral-tibial bypass (2011).   Check ABIs, vein mapping.  History DVT

## 2024-01-04 NOTE — PROGRESS NOTES
[facility]  [address] [place] [address]  [phone]  [email or web address]  CARDIOLOGY PROGRESS NOTE    1/4/2024 2:09 PM    Admit Date: 1/3/2024    Admit Diagnosis:   NSTEMI (non-ST elevated myocardial infarction) (HCC) [I21.4]    Subjective:     Errol Brandt was seen at the bedside.    /64   Pulse 59   Temp 97.4 °F (36.3 °C) (Oral)   Resp 19   Ht 1.803 m (5' 10.98\")   Wt 92.1 kg (203 lb)   SpO2 100%   BMI 28.33 kg/m²     Current Facility-Administered Medications   Medication Dose Route Frequency    melatonin tablet 3 mg  3 mg Oral Nightly PRN    prasugrel (EFFIENT) tablet 10 mg  10 mg Oral Daily    ranolazine (RANEXA) extended release tablet 500 mg  500 mg Oral BID    insulin lispro (HUMALOG) injection vial 10 Units  10 Units SubCUTAneous TID WC    heparin (porcine) injection 4,000 Units  4,000 Units IntraVENous PRN    heparin (porcine) injection 2,000 Units  2,000 Units IntraVENous PRN    heparin 25,000 units in dextrose 5% 250 mL (premix) infusion  5-30 Units/kg/hr IntraVENous Continuous    metoprolol tartrate (LOPRESSOR) tablet 25 mg  25 mg Oral BID    atorvastatin (LIPITOR) tablet 80 mg  80 mg Oral Nightly    vitamin B-12 (CYANOCOBALAMIN) tablet 1,000 mcg  1,000 mcg Oral Daily    insulin lispro (HUMALOG) injection vial 0-8 Units  0-8 Units SubCUTAneous TID WC    insulin lispro (HUMALOG) injection vial 0-4 Units  0-4 Units SubCUTAneous Nightly    glucose chewable tablet 16 g  4 tablet Oral PRN    dextrose bolus 10% 125 mL  125 mL IntraVENous PRN    Or    dextrose bolus 10% 250 mL  250 mL IntraVENous PRN    glucagon injection 1 mg  1 mg SubCUTAneous PRN    dextrose 10 % infusion   IntraVENous Continuous PRN    levothyroxine (SYNTHROID) tablet 100 mcg  100 mcg Oral QAM AC    [Held by provider] lisinopril (PRINIVIL;ZESTRIL) tablet 10 mg  10 mg Oral Daily    0.9 % sodium chloride infusion   IntraVENous PRN    potassium chloride (KLOR-CON M)

## 2024-01-04 NOTE — PLAN OF CARE
Predictive Model Details          33 (Caution)  Factor Value    Calculated 1/4/2024 03:48 37% Age 78 years old    Deterioration Index Model 30% Pulse oximetry 81 %     24% Respiratory rate 22     7% Systolic 148     1% Sodium 139 mmol/L     1% Hematocrit abnormal (34.0 %)     1% Potassium 3.8 mmol/L     0% Temperature 97.6 °F (36.4 °C)     0% WBC count 5.4 K/uL     0% Pulse 72        Problem: Chronic Conditions and Co-morbidities  Goal: Patient's chronic conditions and co-morbidity symptoms are monitored and maintained or improved  Outcome: Progressing     Problem: Discharge Planning  Goal: Discharge to home or other facility with appropriate resources  Outcome: Progressing     Problem: Safety - Adult  Goal: Free from fall injury  Outcome: Progressing     Problem: Pain  Goal: Verbalizes/displays adequate comfort level or baseline comfort level  Outcome: Progressing

## 2024-01-04 NOTE — PROGRESS NOTES
1428: Patient's BP is 81/45 mmHg, retook BP and its 76/44 mmHg. Patient states being asymptomatic and has no CP. Nitro drip turned off per orders. MD Garzon notified.     Predictive Model Details          38 (Caution)  Factor Value    Calculated 1/4/2024 18:39 36% Pulse oximetry 76 %    Deterioration Index Model 32% Age 78 years old     18% Systolic 91     10% Respiratory rate 19     1% Sodium 139 mmol/L     1% Pulse 61     1% Hematocrit abnormal (33.3 %)     1% Potassium 3.8 mmol/L     0% Temperature 98.6 °F (37 °C)     0% WBC count 6.2 K/uL

## 2024-01-05 LAB
ACT BLD: 239 SECS (ref 79–138)
ACT BLD: 304 SECS (ref 79–138)
ACT BLD: 499 SECS (ref 79–138)
ANION GAP SERPL CALC-SCNC: 3 MMOL/L (ref 5–15)
BASOPHILS # BLD: 0 K/UL (ref 0–0.1)
BASOPHILS NFR BLD: 1 % (ref 0–1)
BUN SERPL-MCNC: 18 MG/DL (ref 6–20)
BUN/CREAT SERPL: 18 (ref 12–20)
CALCIUM SERPL-MCNC: 7.9 MG/DL (ref 8.5–10.1)
CHLORIDE SERPL-SCNC: 109 MMOL/L (ref 97–108)
CO2 SERPL-SCNC: 26 MMOL/L (ref 21–32)
CREAT SERPL-MCNC: 0.98 MG/DL (ref 0.7–1.3)
DIFFERENTIAL METHOD BLD: ABNORMAL
ECHO BSA: 2.15 M2
ECHO BSA: 2.15 M2
EOSINOPHIL # BLD: 0.3 K/UL (ref 0–0.4)
EOSINOPHIL NFR BLD: 6 % (ref 0–7)
ERYTHROCYTE [DISTWIDTH] IN BLOOD BY AUTOMATED COUNT: 14.4 % (ref 11.5–14.5)
GLUCOSE BLD STRIP.AUTO-MCNC: 107 MG/DL (ref 65–117)
GLUCOSE BLD STRIP.AUTO-MCNC: 112 MG/DL (ref 65–117)
GLUCOSE BLD STRIP.AUTO-MCNC: 245 MG/DL (ref 65–117)
GLUCOSE SERPL-MCNC: 179 MG/DL (ref 65–100)
HCT VFR BLD AUTO: 33.8 % (ref 36.6–50.3)
HCT VFR BLD AUTO: 37.5 % (ref 36.6–50.3)
HGB BLD-MCNC: 11.7 G/DL (ref 12.1–17)
HGB BLD-MCNC: 13.3 G/DL (ref 12.1–17)
IMM GRANULOCYTES # BLD AUTO: 0 K/UL (ref 0–0.04)
IMM GRANULOCYTES NFR BLD AUTO: 0 % (ref 0–0.5)
LYMPHOCYTES # BLD: 2.3 K/UL (ref 0.8–3.5)
LYMPHOCYTES NFR BLD: 46 % (ref 12–49)
MCH RBC QN AUTO: 36 PG (ref 26–34)
MCHC RBC AUTO-ENTMCNC: 34.6 G/DL (ref 30–36.5)
MCV RBC AUTO: 104 FL (ref 80–99)
MONOCYTES # BLD: 0.6 K/UL (ref 0–1)
MONOCYTES NFR BLD: 11 % (ref 5–13)
NEUTS SEG # BLD: 1.8 K/UL (ref 1.8–8)
NEUTS SEG NFR BLD: 36 % (ref 32–75)
NRBC # BLD: 0 K/UL (ref 0–0.01)
NRBC BLD-RTO: 0 PER 100 WBC
PLATELET # BLD AUTO: 155 K/UL (ref 150–400)
PMV BLD AUTO: 11 FL (ref 8.9–12.9)
POTASSIUM SERPL-SCNC: 4.1 MMOL/L (ref 3.5–5.1)
RBC # BLD AUTO: 3.25 M/UL (ref 4.1–5.7)
SERVICE CMNT-IMP: ABNORMAL
SERVICE CMNT-IMP: NORMAL
SERVICE CMNT-IMP: NORMAL
SODIUM SERPL-SCNC: 138 MMOL/L (ref 136–145)
TROPONIN I SERPL HS-MCNC: 5456 NG/L (ref 0–76)
VAS LEFT ABI: 0.73
VAS LEFT ARM BP: 104 MMHG
VAS LEFT DORSALIS PEDIS BP: 51 MMHG
VAS LEFT GSV ANKLE DIAM: 2.5 MM
VAS LEFT GSV AT KNEE DIAM: 3 MM
VAS LEFT GSV BK MID DIAM: 3.2 MM
VAS LEFT GSV BK PROX DIAM: 2.4 MM
VAS LEFT GSV THIGH DIST DIAM: 3.7 MM
VAS LEFT GSV THIGH MID DIAM: 4.2 MM
VAS LEFT GSV THIGH PROX DIAM: 4.9 MM
VAS LEFT PTA BP: 76 MMHG
VAS LEFT TBI: 0.58
VAS LEFT TOE PRESSURE: 60 MMHG
VAS RIGHT ABI: 0.99
VAS RIGHT DORSALIS PEDIS BP: 103 MMHG
VAS RIGHT PTA BP: 97 MMHG
VAS RIGHT TBI: 0.72
VAS RIGHT TOE PRESSURE: 75 MMHG
VIT B12 SERPL-MCNC: 542 PG/ML (ref 193–986)
WBC # BLD AUTO: 5.1 K/UL (ref 4.1–11.1)

## 2024-01-05 PROCEDURE — 92978 ENDOLUMINL IVUS OCT C 1ST: CPT | Performed by: STUDENT IN AN ORGANIZED HEALTH CARE EDUCATION/TRAINING PROGRAM

## 2024-01-05 PROCEDURE — 2709999900 HC NON-CHARGEABLE SUPPLY: Performed by: STUDENT IN AN ORGANIZED HEALTH CARE EDUCATION/TRAINING PROGRAM

## 2024-01-05 PROCEDURE — B240ZZ3 ULTRASONOGRAPHY OF SINGLE CORONARY ARTERY, INTRAVASCULAR: ICD-10-PCS | Performed by: STUDENT IN AN ORGANIZED HEALTH CARE EDUCATION/TRAINING PROGRAM

## 2024-01-05 PROCEDURE — 4A023N7 MEASUREMENT OF CARDIAC SAMPLING AND PRESSURE, LEFT HEART, PERCUTANEOUS APPROACH: ICD-10-PCS | Performed by: STUDENT IN AN ORGANIZED HEALTH CARE EDUCATION/TRAINING PROGRAM

## 2024-01-05 PROCEDURE — 93970 EXTREMITY STUDY: CPT | Performed by: SURGERY

## 2024-01-05 PROCEDURE — 99153 MOD SED SAME PHYS/QHP EA: CPT | Performed by: STUDENT IN AN ORGANIZED HEALTH CARE EDUCATION/TRAINING PROGRAM

## 2024-01-05 PROCEDURE — 85025 COMPLETE CBC W/AUTO DIFF WBC: CPT

## 2024-01-05 PROCEDURE — 6370000000 HC RX 637 (ALT 250 FOR IP): Performed by: INTERNAL MEDICINE

## 2024-01-05 PROCEDURE — 2580000003 HC RX 258: Performed by: STUDENT IN AN ORGANIZED HEALTH CARE EDUCATION/TRAINING PROGRAM

## 2024-01-05 PROCEDURE — 82607 VITAMIN B-12: CPT

## 2024-01-05 PROCEDURE — 6360000004 HC RX CONTRAST MEDICATION: Performed by: STUDENT IN AN ORGANIZED HEALTH CARE EDUCATION/TRAINING PROGRAM

## 2024-01-05 PROCEDURE — C1887 CATHETER, GUIDING: HCPCS | Performed by: STUDENT IN AN ORGANIZED HEALTH CARE EDUCATION/TRAINING PROGRAM

## 2024-01-05 PROCEDURE — 85018 HEMOGLOBIN: CPT

## 2024-01-05 PROCEDURE — B2111ZZ FLUOROSCOPY OF MULTIPLE CORONARY ARTERIES USING LOW OSMOLAR CONTRAST: ICD-10-PCS | Performed by: STUDENT IN AN ORGANIZED HEALTH CARE EDUCATION/TRAINING PROGRAM

## 2024-01-05 PROCEDURE — 6360000002 HC RX W HCPCS: Performed by: STUDENT IN AN ORGANIZED HEALTH CARE EDUCATION/TRAINING PROGRAM

## 2024-01-05 PROCEDURE — C9600 PERC DRUG-EL COR STENT SING: HCPCS | Performed by: STUDENT IN AN ORGANIZED HEALTH CARE EDUCATION/TRAINING PROGRAM

## 2024-01-05 PROCEDURE — 02C03ZZ EXTIRPATION OF MATTER FROM CORONARY ARTERY, ONE ARTERY, PERCUTANEOUS APPROACH: ICD-10-PCS | Performed by: STUDENT IN AN ORGANIZED HEALTH CARE EDUCATION/TRAINING PROGRAM

## 2024-01-05 PROCEDURE — 6370000000 HC RX 637 (ALT 250 FOR IP): Performed by: GENERAL ACUTE CARE HOSPITAL

## 2024-01-05 PROCEDURE — 36415 COLL VENOUS BLD VENIPUNCTURE: CPT

## 2024-01-05 PROCEDURE — 85014 HEMATOCRIT: CPT

## 2024-01-05 PROCEDURE — 02F03ZZ FRAGMENTATION IN CORONARY ARTERY, ONE ARTERY, PERCUTANEOUS APPROACH: ICD-10-PCS | Performed by: STUDENT IN AN ORGANIZED HEALTH CARE EDUCATION/TRAINING PROGRAM

## 2024-01-05 PROCEDURE — 99152 MOD SED SAME PHYS/QHP 5/>YRS: CPT | Performed by: STUDENT IN AN ORGANIZED HEALTH CARE EDUCATION/TRAINING PROGRAM

## 2024-01-05 PROCEDURE — C1725 CATH, TRANSLUMIN NON-LASER: HCPCS | Performed by: STUDENT IN AN ORGANIZED HEALTH CARE EDUCATION/TRAINING PROGRAM

## 2024-01-05 PROCEDURE — 93005 ELECTROCARDIOGRAM TRACING: CPT | Performed by: STUDENT IN AN ORGANIZED HEALTH CARE EDUCATION/TRAINING PROGRAM

## 2024-01-05 PROCEDURE — C1713 ANCHOR/SCREW BN/BN,TIS/BN: HCPCS | Performed by: STUDENT IN AN ORGANIZED HEALTH CARE EDUCATION/TRAINING PROGRAM

## 2024-01-05 PROCEDURE — 85347 COAGULATION TIME ACTIVATED: CPT

## 2024-01-05 PROCEDURE — 6370000000 HC RX 637 (ALT 250 FOR IP): Performed by: STUDENT IN AN ORGANIZED HEALTH CARE EDUCATION/TRAINING PROGRAM

## 2024-01-05 PROCEDURE — 2500000003 HC RX 250 WO HCPCS: Performed by: STUDENT IN AN ORGANIZED HEALTH CARE EDUCATION/TRAINING PROGRAM

## 2024-01-05 PROCEDURE — 80048 BASIC METABOLIC PNL TOTAL CA: CPT

## 2024-01-05 PROCEDURE — C1874 STENT, COATED/COV W/DEL SYS: HCPCS | Performed by: STUDENT IN AN ORGANIZED HEALTH CARE EDUCATION/TRAINING PROGRAM

## 2024-01-05 PROCEDURE — C1761 HC CATH TRANSLUM INTRAVASCULAR LITHOTRIPSY CORONARY: HCPCS | Performed by: STUDENT IN AN ORGANIZED HEALTH CARE EDUCATION/TRAINING PROGRAM

## 2024-01-05 PROCEDURE — 2060000000 HC ICU INTERMEDIATE R&B

## 2024-01-05 PROCEDURE — 82962 GLUCOSE BLOOD TEST: CPT

## 2024-01-05 PROCEDURE — C1894 INTRO/SHEATH, NON-LASER: HCPCS | Performed by: STUDENT IN AN ORGANIZED HEALTH CARE EDUCATION/TRAINING PROGRAM

## 2024-01-05 PROCEDURE — C1769 GUIDE WIRE: HCPCS | Performed by: STUDENT IN AN ORGANIZED HEALTH CARE EDUCATION/TRAINING PROGRAM

## 2024-01-05 PROCEDURE — C1753 CATH, INTRAVAS ULTRASOUND: HCPCS | Performed by: STUDENT IN AN ORGANIZED HEALTH CARE EDUCATION/TRAINING PROGRAM

## 2024-01-05 PROCEDURE — 93922 UPR/L XTREMITY ART 2 LEVELS: CPT | Performed by: SURGERY

## 2024-01-05 PROCEDURE — 027035Z DILATION OF CORONARY ARTERY, ONE ARTERY WITH TWO DRUG-ELUTING INTRALUMINAL DEVICES, PERCUTANEOUS APPROACH: ICD-10-PCS | Performed by: STUDENT IN AN ORGANIZED HEALTH CARE EDUCATION/TRAINING PROGRAM

## 2024-01-05 PROCEDURE — 93458 L HRT ARTERY/VENTRICLE ANGIO: CPT | Performed by: STUDENT IN AN ORGANIZED HEALTH CARE EDUCATION/TRAINING PROGRAM

## 2024-01-05 PROCEDURE — 99221 1ST HOSP IP/OBS SF/LOW 40: CPT

## 2024-01-05 PROCEDURE — C1724 CATH, TRANS ATHEREC,ROTATION: HCPCS | Performed by: STUDENT IN AN ORGANIZED HEALTH CARE EDUCATION/TRAINING PROGRAM

## 2024-01-05 PROCEDURE — 6370000000 HC RX 637 (ALT 250 FOR IP)

## 2024-01-05 PROCEDURE — 84484 ASSAY OF TROPONIN QUANT: CPT

## 2024-01-05 DEVICE — STENT ONYXNG25022UX ONYX 2.50X22RX
Type: IMPLANTABLE DEVICE | Status: FUNCTIONAL
Brand: ONYX FRONTIER™

## 2024-01-05 DEVICE — EVEROLIMUS-ELUTING PLATINUM CHROMIUM CORONARY STENT SYSTEM
Type: IMPLANTABLE DEVICE | Status: FUNCTIONAL
Brand: SYNERGY™ XD

## 2024-01-05 RX ORDER — SODIUM CHLORIDE 0.9 % (FLUSH) 0.9 %
5-40 SYRINGE (ML) INJECTION PRN
Status: DISCONTINUED | OUTPATIENT
Start: 2024-01-05 | End: 2024-01-09 | Stop reason: HOSPADM

## 2024-01-05 RX ORDER — SODIUM CHLORIDE 0.9 % (FLUSH) 0.9 %
5-40 SYRINGE (ML) INJECTION EVERY 12 HOURS SCHEDULED
Status: DISCONTINUED | OUTPATIENT
Start: 2024-01-05 | End: 2024-01-09 | Stop reason: HOSPADM

## 2024-01-05 RX ORDER — PHENYLEPHRINE HYDROCHLORIDE 10 MG/ML
INJECTION INTRAVENOUS PRN
Status: DISCONTINUED | OUTPATIENT
Start: 2024-01-05 | End: 2024-01-05 | Stop reason: HOSPADM

## 2024-01-05 RX ORDER — SODIUM CHLORIDE 9 MG/ML
INJECTION, SOLUTION INTRAVENOUS PRN
Status: DISCONTINUED | OUTPATIENT
Start: 2024-01-05 | End: 2024-01-09 | Stop reason: HOSPADM

## 2024-01-05 RX ORDER — HEPARIN SODIUM 10000 [USP'U]/ML
INJECTION, SOLUTION INTRAVENOUS; SUBCUTANEOUS PRN
Status: DISCONTINUED | OUTPATIENT
Start: 2024-01-05 | End: 2024-01-05 | Stop reason: HOSPADM

## 2024-01-05 RX ORDER — FENTANYL CITRATE 50 UG/ML
INJECTION, SOLUTION INTRAMUSCULAR; INTRAVENOUS PRN
Status: DISCONTINUED | OUTPATIENT
Start: 2024-01-05 | End: 2024-01-05 | Stop reason: HOSPADM

## 2024-01-05 RX ORDER — PRASUGREL 5 MG/1
TABLET, FILM COATED ORAL PRN
Status: DISCONTINUED | OUTPATIENT
Start: 2024-01-05 | End: 2024-01-05 | Stop reason: HOSPADM

## 2024-01-05 RX ORDER — INSULIN LISPRO 100 [IU]/ML
8 INJECTION, SOLUTION INTRAVENOUS; SUBCUTANEOUS
Status: DISCONTINUED | OUTPATIENT
Start: 2024-01-05 | End: 2024-01-09 | Stop reason: HOSPADM

## 2024-01-05 RX ORDER — HEPARIN SODIUM 1000 [USP'U]/ML
INJECTION, SOLUTION INTRAVENOUS; SUBCUTANEOUS PRN
Status: DISCONTINUED | OUTPATIENT
Start: 2024-01-05 | End: 2024-01-05 | Stop reason: HOSPADM

## 2024-01-05 RX ORDER — LEVOTHYROXINE SODIUM 0.12 MG/1
125 TABLET ORAL
Status: DISCONTINUED | OUTPATIENT
Start: 2024-01-06 | End: 2024-01-09 | Stop reason: HOSPADM

## 2024-01-05 RX ORDER — VERAPAMIL HYDROCHLORIDE 2.5 MG/ML
INJECTION, SOLUTION INTRAVENOUS PRN
Status: DISCONTINUED | OUTPATIENT
Start: 2024-01-05 | End: 2024-01-05 | Stop reason: HOSPADM

## 2024-01-05 RX ORDER — ACETAMINOPHEN 325 MG/1
650 TABLET ORAL EVERY 4 HOURS PRN
Status: DISCONTINUED | OUTPATIENT
Start: 2024-01-05 | End: 2024-01-09 | Stop reason: HOSPADM

## 2024-01-05 RX ADMIN — INSULIN LISPRO 2 UNITS: 100 INJECTION, SOLUTION INTRAVENOUS; SUBCUTANEOUS at 17:44

## 2024-01-05 RX ADMIN — ATORVASTATIN CALCIUM 80 MG: 40 TABLET, FILM COATED ORAL at 21:54

## 2024-01-05 RX ADMIN — SODIUM CHLORIDE, PRESERVATIVE FREE 10 ML: 5 INJECTION INTRAVENOUS at 22:04

## 2024-01-05 RX ADMIN — RANOLAZINE 500 MG: 500 TABLET, FILM COATED, EXTENDED RELEASE ORAL at 08:54

## 2024-01-05 RX ADMIN — INSULIN LISPRO 8 UNITS: 100 INJECTION, SOLUTION INTRAVENOUS; SUBCUTANEOUS at 17:44

## 2024-01-05 RX ADMIN — INSULIN GLARGINE 40 UNITS: 100 INJECTION, SOLUTION SUBCUTANEOUS at 21:54

## 2024-01-05 RX ADMIN — LEVOTHYROXINE SODIUM 100 MCG: 0.05 TABLET ORAL at 08:51

## 2024-01-05 RX ADMIN — CYANOCOBALAMIN TAB 500 MCG 1000 MCG: 500 TAB at 08:51

## 2024-01-05 RX ADMIN — ASPIRIN 81 MG: 81 TABLET, COATED ORAL at 08:51

## 2024-01-05 RX ADMIN — SODIUM CHLORIDE, PRESERVATIVE FREE 10 ML: 5 INJECTION INTRAVENOUS at 08:18

## 2024-01-05 ASSESSMENT — PAIN SCALES - GENERAL
PAINLEVEL_OUTOF10: 0

## 2024-01-05 NOTE — DIABETES MGMT
infarction) (HCC) [I21.4 (ICD-10-CM)]     PACS Images     Show images for Vascular duplex carotid bilateral  Interpretation Summary         Mild (<50%) stenosis in the right internal carotid artery.  Mild and heterogeneous plaque in the right internal carotid artery.    Mild (<50%) stenosis in the left internal carotid artery.  Mild and heterogeneous plaque in the left internal carotid artery.    Normal antegrade flow involving the right vertebral artery.    Normal antegrade flow involving the left vertebral artery.     Procedure Details    A gray scale, color Doppler imaging and spectral Doppler analysis ultrasound was performed. During the study longitudinal and transverse views were obtained. Continuous wave doppler and pulsed wave doppler was performed. Overall the study quality was adequate.     Cerebrovascular Findings    Right Carotid    Common Carotid Artery: Patent. Mild and heterogeneous plaque.   Internal Carotid Artery: Mild (<50%) stenosis. Mild and heterogeneous plaque.   External Carotid Artery: Patent.   Vertebral Artery: Flow is antegrade.   Subclavian Artery: Normal.   Left Carotid    Common Carotid Artery: Patent.   Internal Carotid Artery: Mild (<50%) stenosis. Mild and heterogeneous plaque.    External Carotid Artery: Patent.   Vertebral Artery: Flow is antegrade.   Subclavian Artery: Normal.     Carotid Measurements     Right PSV Right EDV Left PSV Left EDV   CCA Prox 103.2 cm/s       0 cm/s       87.9 cm/s       0 cm/s         CCA Dist 67.7 cm/s       0 cm/s       64.6 cm/s       0 cm/s         ICA Prox 42 cm/s       4.6 cm/s       40 cm/s       6 cm/s         ICA Mid 51.9 cm/s       9 cm/s       39.2 cm/s       7.8 cm/s         ICA Dist 31.3 cm/s       5.4 cm/s       40.3 cm/s       8.1 cm/s         ICA/CCA Ratio 0.8 no units        0.62 no units          ECA 87.1 cm/s       0 cm/s       59.2 cm/s       0 cm/s         Vertebral 27 cm/s       6.89 cm/s       34.2 cm/s       6.28 cm/s        (circumstances permitting) before determining care. More than fifty (50) percent of the time was spent in patient counseling and/or care coordination.  Total minutes: 40 minutes    PAPO Morelos - CNS  Diabetes Clinical Nurse Specialist  [facility]  Access via [employer]

## 2024-01-05 NOTE — PROGRESS NOTES
Hospitalist Progress Note    NAME:   Errol Brandt   : 1945   MRN: [id number]     Date/Time: 2024 3:55 PM  Patient PCP: Gary Motley MD    Estimated discharge date:> 48h    Barriers: Cards clearance      Assessment / Plan:      Non-STEMI  S/p PCI to RCA   Cardiology consulted, status post cardiac cath which showed  Mid right coronary 70%, distal tortuous 70% stenosis, culprit for acute myocardial infarction   CT surgery consulted, patient deemed to be a poor surgical candidate, CT surgery recommended staged PCI by cardiology.  Status post PCI to RCA on , cardiology recommended aspirin and prasugrel, discontinue Ranexa, heparin drip    Hypotension  resolved  Systolic blood pressure dropped down to 90s and put it down to the 70s  IV fluid bolus 2 L ordered by cardiology  Review of labs showed drop of hemoglobin from 14 to 11, nitro drip and heparin drip on hold  Discussed with cardiology  Will closely monitor, check stool occult blood  Patient is high risk of further decompensation  Will follow-up H&H every 8 hours  If systolic blood pressure does not improve with IV fluid bolus, will consult intensivist  Continue with aspirin and statin  Added Nitropaste  Started metoprolol  Continue lisinopril  Echo showed EF of 55%  : Systolic blood pressure 99     IDDM with hyperglycemia  Was on NPH 40 twice daily plus metformin plus Januvia  Hold oral agents  Insulin sliding scale  Hypoglycemia protocol  Patient blood sugar elevated between 200-300, continue with Lantus 40 unit  Blood sugar better controlled     Hypothyroidism  Resume Synthroid  TSH 15 check free T4  Will increase Synthroid to 125     Hypertension/hyperlipidemia  Adjust medication status as above        Medical Decision Making:   I personally reviewed labs: yes as below   I personally reviewed imaging reports:yes as below   Toxic drug monitoring: Monitor hemoglobin while on blood thinners  Discussed case with: Cardiology

## 2024-01-05 NOTE — PROGRESS NOTES
Cardiac Catheterization Procedure Note   Patient: Errol Brandt  MRN: [id number]  SSN: [id number]   YOB: 1945 Age: 78 y.o.  Sex: male    Date of Procedure: 1/5/2024   Pre-procedure Diagnosis: NSTEMI  Post-procedure Diagnosis: Coronary Artery Disease  Procedure: PCI and to RCA  :  Dr. Sae Garzon MD    Assistant(s):  None  Anesthesia: Moderate Sedation   Estimated Blood Loss: Less than 10 mL   Specimens Removed: None  Findings: Extremely calcified right coronary profoundly increasing procedure time. Had to use rotational atherectomy 1.5 mm brandi for equipment to cross. Finally able to stent culprit lesion in mid vessel with 3.5 x 48 mm stent, and optimized distally and only partially proximally as again 4.5 mm balloon unable to cross into proximal stent despite advanced support techniques.     Distal right coronary lesion was left with balloon angioplasty alone    Recommend:     - Aspirin and prasugrel x 12 months  - Discontinue ranolazine, heparin and nitroglycerin  - Okay for outpatient follow up after monitoring overnight    Complications: None   Implants:  None  Signed by:  Sae Garzon MD  1/5/2024  1:55 PM

## 2024-01-05 NOTE — PROGRESS NOTES
0700 Bedside shift change report given to Alana Davenport (oncoming nurse) by Britni Rn (offgoing nurse). Report included the following information Nurse Handoff Report.    End of Shift Note    Bedside shift change report given to Britni Davenport (oncoming nurse) by Alana Fraga RN (offgoing nurse).  Report included the following information SBAR and Kardex    Shift worked:  Day     Shift summary and any significant changes:    Ryann PARNELL came to see pt and they will proceed with the PCI. Pt placed NPO    Hgb sent at 0900 and it had gone up to 13.3     Concerns for physician to address:       Zone phone for oncoming shift:          Activity:     Number times ambulated in hallways past shift: 0  Number of times OOB to chair past shift: 1    Cardiac:   Cardiac Monitoring: Yes           Access:  Current line(s): PIV     Genitourinary:   Urinary status: voiding and external catheter    Respiratory:      Chronic home O2 use?: YES  Incentive spirometer at bedside: NO       GI:     Current diet:  Diet NPO  Passing flatus: YES  Tolerating current diet: YES       Pain Management:   Patient states pain is manageable on current regimen: YES    Skin:     Interventions: float heels, increase time out of bed, PT/OT consult, and internal/external urinary devices    Patient Safety:  Fall Score:    Interventions: bed/chair alarm, assistive device (walker, cane. etc), gripper socks, pt to call before getting OOB, and stay with me (per policy)       Length of Stay:  Expected LOS: 2  Actual LOS: 2      Alana Fraga RN

## 2024-01-05 NOTE — PROGRESS NOTES
Predictive Model Details          21 (Normal)  Factor Value    Calculated 1/5/2024 08:19 64% Age 78 years old    Deterioration Index Model 20% Respiratory rate 14     4% Pulse 57     4% Sodium 138 mmol/L     3% Pulse oximetry 99 %     2% Potassium 4.1 mmol/L     2% Hematocrit abnormal (33.8 %)     1% Systolic 115     0% WBC count 5.1 K/uL     0% Temperature 97.8 °F (36.6 °C)

## 2024-01-05 NOTE — PLAN OF CARE
Problem: Chronic Conditions and Co-morbidities  Goal: Patient's chronic conditions and co-morbidity symptoms are monitored and maintained or improved  Outcome: Progressing  Flowsheets (Taken 1/4/2024 0800 by Aminata James, RN)  Care Plan - Patient's Chronic Conditions and Co-Morbidity Symptoms are Monitored and Maintained or Improved: Monitor and assess patient's chronic conditions and comorbid symptoms for stability, deterioration, or improvement     Problem: Discharge Planning  Goal: Discharge to home or other facility with appropriate resources  Outcome: Progressing  Flowsheets (Taken 1/4/2024 0800 by Aminata James, RN)  Discharge to home or other facility with appropriate resources: Identify barriers to discharge with patient and caregiver     Problem: Safety - Adult  Goal: Free from fall injury  Outcome: Progressing  Flowsheets (Taken 1/4/2024 2000)  Free From Fall Injury: Instruct family/caregiver on patient safety     Problem: Pain  Goal: Verbalizes/displays adequate comfort level or baseline comfort level  Outcome: Progressing     Problem: ABCDS Injury Assessment  Goal: Absence of physical injury  Outcome: Progressing  Flowsheets (Taken 1/4/2024 2000)  Absence of Physical Injury: Implement safety measures based on patient assessment     Problem: Occupational Therapy - Adult  Goal: By Discharge: Performs self-care activities at highest level of function for planned discharge setting.  See evaluation for individualized goals.  Description: FUNCTIONAL STATUS PRIOR TO ADMISSION:  Patient was independent in ADLs and used rolling walker for functional mobility.   Receives Help From: Family, ADL Assistance: Independent,  ,  ,  ,  ,  ,  , Ambulation Assistance: Independent, Transfer Assistance: Independent, Active : No     HOME SUPPORT: Patient lived with wife and son.    Occupational Therapy Goals:  Initiated 1/4/2024  1.  Patient will perform grooming in standing with Contact Guard  Assist within 7 day(s).  2.  Patient will perform upper body dressing with Stand by Assist within 7 day(s).  3.  Patient will perform lower body dressing with Minimal Assist within 7 day(s).  4.  Patient will perform toilet transfers with Contact Guard Assist  within 7 day(s).  5.  Patient will perform all aspects of toileting with Contact Guard Assist within 7 day(s).  6.  Patient will participate in upper extremity therapeutic exercise/activities with Stand by Assist for 3 minutes within 7 day(s).    1/4/2024 1322 by Erin Garcia OT  Outcome: Progressing     Problem: Physical Therapy - Adult  Goal: By Discharge: Performs mobility at highest level of function for planned discharge setting.  See evaluation for individualized goals.  Description: FUNCTIONAL STATUS PRIOR TO ADMISSION: Patient was modified independent using a rolling walker for functional mobility.    HOME SUPPORT PRIOR TO ADMISSION: The patient lived with wife.    Physical Therapy Goals  Initiated 1/4/2024  1.  Patient will move from supine to sit and sit to supine in bed with independence within 7 day(s).    2.  Patient will perform sit to stand with modified independence within 7 day(s).  3.  Patient will transfer from bed to chair and chair to bed with modified independence using the least restrictive device within 7 day(s).  4.  Patient will ambulate with contact guard assist for 50 feet with the least restrictive device within 7 day(s).       1/4/2024 1348 by Taylor Marcial, PT  Outcome: Progressing     Problem: Skin/Tissue Integrity  Goal: Absence of new skin breakdown  Description: 1.  Monitor for areas of redness and/or skin breakdown  2.  Assess vascular access sites hourly  3.  Every 4-6 hours minimum:  Change oxygen saturation probe site  4.  Every 4-6 hours:  If on nasal continuous positive airway pressure, respiratory therapy assess nares and determine need for appliance change or resting period.  Outcome: Progressing

## 2024-01-05 NOTE — PROGRESS NOTES
Occupational Therapy    Chart reviewed and interventions attempted. Pt ACE at [facility]. Will defer and continue to follow.

## 2024-01-05 NOTE — CARE COORDINATION
Transition of Care Plan:    RUR: 18%  Prior Level of Functioning: Dependent  Disposition:  Home with family vs HH  -  If SNF or IPR: Date FOC offered:  1/5/24  Date FOC received: 1/5/24  [facility]  Date authorization started with reference number:   Date authorization received and expires:   Follow up appointments: PCP,Specialist   DME needed: none  Transportation at discharge: BLS to arrange  IM/IMM Medicare/ letter given: 2nd IM Letter to be given  Is patient a [facility] and connected with [facility]? N/a   If yes, was [facility] transfer form completed and [facility] notified?   Caregiver Contact:   Chelsi Brandt (Spouse)  [phone] (Home Phone)     Discharge Caregiver contacted prior to discharge?  CM discussed d/ plan with pt and son at bedside  Care Conference needed? none  Barriers to discharge:  Insurance auth needed  if SNF    Updated Note:  CM was called by Son to meet with hsis brother to discuss d/c plan.    CM met with brother in the room, the son shared his concerns about pt going [facility], He feels it is not beneficial for pt at this time.  Pt has been to SNF then with HH in the past.  Family is looking into [facility] for both parents  Pt ' Wife is in wheelchair , The Other Son stays with parents to assist as needed. Neither pt drives and relies on family.    Auth on hold.as family wants to think about it.   CM informed son that weekend CM is available if they assist with d/c plan.    CM discussed d/c plan with wife and díaz greed with SNF.    Referral sent to [facility] via [facility].    OFELIA Lance,RN  Care   [facility]  Phone: [phone]

## 2024-01-05 NOTE — PROGRESS NOTES
Predictive Model Details          25 (Normal)  Factor Value    Calculated 1/4/2024 21:54 52% Age 78 years old    Deterioration Index Model 18% Systolic 97     17% Respiratory rate 19     4% Pulse oximetry 93 %     3% Pulse 56     2% Sodium 139 mmol/L     2% Hematocrit abnormal (33.3 %)     1% Potassium 3.8 mmol/L     0% Temperature 98 °F (36.7 °C)     0% WBC count 6.2 K/uL

## 2024-01-05 NOTE — PLAN OF CARE
Problem: Chronic Conditions and Co-morbidities  Goal: Patient's chronic conditions and co-morbidity symptoms are monitored and maintained or improved  1/5/2024 0818 by Alana Fraga RN  Outcome: Progressing  1/4/2024 2155 by Britni Wells RN  Outcome: Progressing  Flowsheets (Taken 1/4/2024 0800 by Aminata James, RN)  Care Plan - Patient's Chronic Conditions and Co-Morbidity Symptoms are Monitored and Maintained or Improved: Monitor and assess patient's chronic conditions and comorbid symptoms for stability, deterioration, or improvement     Problem: Discharge Planning  Goal: Discharge to home or other facility with appropriate resources  1/5/2024 0818 by Alana Fraga RN  Outcome: Progressing  1/4/2024 2155 by Britni Wells RN  Outcome: Progressing  Flowsheets (Taken 1/4/2024 0800 by Aminata James, RN)  Discharge to home or other facility with appropriate resources: Identify barriers to discharge with patient and caregiver     Problem: Safety - Adult  Goal: Free from fall injury  1/5/2024 0818 by Alana Fraga RN  Outcome: Progressing  1/4/2024 2155 by Britni Wells RN  Outcome: Progressing  Flowsheets (Taken 1/4/2024 2000)  Free From Fall Injury: Instruct family/caregiver on patient safety     Problem: Pain  Goal: Verbalizes/displays adequate comfort level or baseline comfort level  1/5/2024 0818 by Alana Fraga RN  Outcome: Progressing  1/4/2024 2155 by Britni Wells RN  Outcome: Progressing     Problem: ABCDS Injury Assessment  Goal: Absence of physical injury  1/5/2024 0818 by Alana Fraga RN  Outcome: Progressing  1/4/2024 2155 by Britni Wells RN  Outcome: Progressing  Flowsheets (Taken 1/4/2024 2000)  Absence of Physical Injury: Implement safety measures based on patient assessment     Problem: Skin/Tissue Integrity  Goal: Absence of new skin breakdown  Description: 1.  Monitor for areas of redness and/or skin breakdown  2.  Assess vascular access sites  hourly  3.  Every 4-6 hours minimum:  Change oxygen saturation probe site  4.  Every 4-6 hours:  If on nasal continuous positive airway pressure, respiratory therapy assess nares and determine need for appliance change or resting period.  1/5/2024 0818 by Alana Fraga, RN  Outcome: Progressing  1/4/2024 2155 by Britni Wells, RN  Outcome: Progressing

## 2024-01-06 LAB
ANION GAP SERPL CALC-SCNC: 3 MMOL/L (ref 5–15)
BASOPHILS # BLD: 0 K/UL (ref 0–0.1)
BASOPHILS NFR BLD: 1 % (ref 0–1)
BUN SERPL-MCNC: 24 MG/DL (ref 6–20)
BUN/CREAT SERPL: 21 (ref 12–20)
CALCIUM SERPL-MCNC: 8.3 MG/DL (ref 8.5–10.1)
CHLORIDE SERPL-SCNC: 104 MMOL/L (ref 97–108)
CO2 SERPL-SCNC: 28 MMOL/L (ref 21–32)
CREAT SERPL-MCNC: 1.16 MG/DL (ref 0.7–1.3)
DIFFERENTIAL METHOD BLD: ABNORMAL
EKG ATRIAL RATE: 56 BPM
EKG ATRIAL RATE: 73 BPM
EKG DIAGNOSIS: NORMAL
EKG DIAGNOSIS: NORMAL
EKG P AXIS: 44 DEGREES
EKG P AXIS: 5 DEGREES
EKG P-R INTERVAL: 178 MS
EKG P-R INTERVAL: 210 MS
EKG Q-T INTERVAL: 428 MS
EKG Q-T INTERVAL: 460 MS
EKG QRS DURATION: 68 MS
EKG QRS DURATION: 72 MS
EKG QTC CALCULATION (BAZETT): 443 MS
EKG QTC CALCULATION (BAZETT): 471 MS
EKG R AXIS: -18 DEGREES
EKG R AXIS: -3 DEGREES
EKG T AXIS: 51 DEGREES
EKG T AXIS: 70 DEGREES
EKG VENTRICULAR RATE: 56 BPM
EKG VENTRICULAR RATE: 73 BPM
EOSINOPHIL # BLD: 0.3 K/UL (ref 0–0.4)
EOSINOPHIL NFR BLD: 6 % (ref 0–7)
ERYTHROCYTE [DISTWIDTH] IN BLOOD BY AUTOMATED COUNT: 14.6 % (ref 11.5–14.5)
GLUCOSE BLD STRIP.AUTO-MCNC: 107 MG/DL (ref 65–117)
GLUCOSE BLD STRIP.AUTO-MCNC: 125 MG/DL (ref 65–117)
GLUCOSE BLD STRIP.AUTO-MCNC: 230 MG/DL (ref 65–117)
GLUCOSE BLD STRIP.AUTO-MCNC: 306 MG/DL (ref 65–117)
GLUCOSE SERPL-MCNC: 348 MG/DL (ref 65–100)
HCT VFR BLD AUTO: 32.9 % (ref 36.6–50.3)
HGB BLD-MCNC: 11.6 G/DL (ref 12.1–17)
IMM GRANULOCYTES # BLD AUTO: 0 K/UL (ref 0–0.04)
IMM GRANULOCYTES NFR BLD AUTO: 0 % (ref 0–0.5)
LYMPHOCYTES # BLD: 1.9 K/UL (ref 0.8–3.5)
LYMPHOCYTES NFR BLD: 37 % (ref 12–49)
MCH RBC QN AUTO: 36.5 PG (ref 26–34)
MCHC RBC AUTO-ENTMCNC: 35.3 G/DL (ref 30–36.5)
MCV RBC AUTO: 103.5 FL (ref 80–99)
MONOCYTES # BLD: 0.6 K/UL (ref 0–1)
MONOCYTES NFR BLD: 12 % (ref 5–13)
NEUTS SEG # BLD: 2.2 K/UL (ref 1.8–8)
NEUTS SEG NFR BLD: 44 % (ref 32–75)
NRBC # BLD: 0 K/UL (ref 0–0.01)
NRBC BLD-RTO: 0 PER 100 WBC
PLATELET # BLD AUTO: 175 K/UL (ref 150–400)
PMV BLD AUTO: 11.2 FL (ref 8.9–12.9)
POTASSIUM SERPL-SCNC: 4 MMOL/L (ref 3.5–5.1)
RBC # BLD AUTO: 3.18 M/UL (ref 4.1–5.7)
SERVICE CMNT-IMP: ABNORMAL
SERVICE CMNT-IMP: NORMAL
SODIUM SERPL-SCNC: 135 MMOL/L (ref 136–145)
WBC # BLD AUTO: 4.9 K/UL (ref 4.1–11.1)

## 2024-01-06 PROCEDURE — 36415 COLL VENOUS BLD VENIPUNCTURE: CPT

## 2024-01-06 PROCEDURE — 85025 COMPLETE CBC W/AUTO DIFF WBC: CPT

## 2024-01-06 PROCEDURE — 97116 GAIT TRAINING THERAPY: CPT | Performed by: PHYSICAL THERAPIST

## 2024-01-06 PROCEDURE — 6370000000 HC RX 637 (ALT 250 FOR IP): Performed by: STUDENT IN AN ORGANIZED HEALTH CARE EDUCATION/TRAINING PROGRAM

## 2024-01-06 PROCEDURE — 82962 GLUCOSE BLOOD TEST: CPT

## 2024-01-06 PROCEDURE — 80048 BASIC METABOLIC PNL TOTAL CA: CPT

## 2024-01-06 PROCEDURE — 97530 THERAPEUTIC ACTIVITIES: CPT | Performed by: PHYSICAL THERAPIST

## 2024-01-06 PROCEDURE — 2580000003 HC RX 258: Performed by: STUDENT IN AN ORGANIZED HEALTH CARE EDUCATION/TRAINING PROGRAM

## 2024-01-06 PROCEDURE — 6370000000 HC RX 637 (ALT 250 FOR IP): Performed by: NURSE PRACTITIONER

## 2024-01-06 PROCEDURE — 6370000000 HC RX 637 (ALT 250 FOR IP)

## 2024-01-06 PROCEDURE — 6370000000 HC RX 637 (ALT 250 FOR IP): Performed by: GENERAL ACUTE CARE HOSPITAL

## 2024-01-06 PROCEDURE — 6370000000 HC RX 637 (ALT 250 FOR IP): Performed by: INTERNAL MEDICINE

## 2024-01-06 PROCEDURE — 2060000000 HC ICU INTERMEDIATE R&B

## 2024-01-06 RX ORDER — METOPROLOL SUCCINATE 25 MG/1
12.5 TABLET, EXTENDED RELEASE ORAL DAILY
Status: DISCONTINUED | OUTPATIENT
Start: 2024-01-06 | End: 2024-01-09 | Stop reason: HOSPADM

## 2024-01-06 RX ADMIN — INSULIN LISPRO 8 UNITS: 100 INJECTION, SOLUTION INTRAVENOUS; SUBCUTANEOUS at 17:32

## 2024-01-06 RX ADMIN — SODIUM CHLORIDE, PRESERVATIVE FREE 10 ML: 5 INJECTION INTRAVENOUS at 09:55

## 2024-01-06 RX ADMIN — INSULIN LISPRO 8 UNITS: 100 INJECTION, SOLUTION INTRAVENOUS; SUBCUTANEOUS at 09:48

## 2024-01-06 RX ADMIN — INSULIN GLARGINE 40 UNITS: 100 INJECTION, SOLUTION SUBCUTANEOUS at 21:12

## 2024-01-06 RX ADMIN — METOPROLOL SUCCINATE 12.5 MG: 25 TABLET, EXTENDED RELEASE ORAL at 17:33

## 2024-01-06 RX ADMIN — INSULIN LISPRO 2 UNITS: 100 INJECTION, SOLUTION INTRAVENOUS; SUBCUTANEOUS at 09:49

## 2024-01-06 RX ADMIN — CYANOCOBALAMIN TAB 500 MCG 1000 MCG: 500 TAB at 09:48

## 2024-01-06 RX ADMIN — SODIUM CHLORIDE, PRESERVATIVE FREE 10 ML: 5 INJECTION INTRAVENOUS at 09:54

## 2024-01-06 RX ADMIN — PRASUGREL 10 MG: 10 TABLET, FILM COATED ORAL at 09:49

## 2024-01-06 RX ADMIN — LEVOTHYROXINE SODIUM 125 MCG: 0.12 TABLET ORAL at 09:48

## 2024-01-06 RX ADMIN — INSULIN LISPRO 8 UNITS: 100 INJECTION, SOLUTION INTRAVENOUS; SUBCUTANEOUS at 12:40

## 2024-01-06 RX ADMIN — INSULIN LISPRO 6 UNITS: 100 INJECTION, SOLUTION INTRAVENOUS; SUBCUTANEOUS at 12:39

## 2024-01-06 RX ADMIN — ASPIRIN 81 MG: 81 TABLET, COATED ORAL at 09:48

## 2024-01-06 RX ADMIN — SODIUM CHLORIDE, PRESERVATIVE FREE 10 ML: 5 INJECTION INTRAVENOUS at 23:07

## 2024-01-06 RX ADMIN — SODIUM CHLORIDE, PRESERVATIVE FREE 10 ML: 5 INJECTION INTRAVENOUS at 21:13

## 2024-01-06 RX ADMIN — ATORVASTATIN CALCIUM 80 MG: 40 TABLET, FILM COATED ORAL at 21:12

## 2024-01-06 ASSESSMENT — PAIN SCALES - GENERAL: PAINLEVEL_OUTOF10: 0

## 2024-01-06 NOTE — PLAN OF CARE
Problem: Physical Therapy - Adult  Goal: By Discharge: Performs mobility at highest level of function for planned discharge setting.  See evaluation for individualized goals.  Description: FUNCTIONAL STATUS PRIOR TO ADMISSION: Patient was modified independent using a rolling walker for functional mobility.    HOME SUPPORT PRIOR TO ADMISSION: The patient lived with wife.    Physical Therapy Goals  Initiated 1/4/2024  1.  Patient will move from supine to sit and sit to supine in bed with independence within 7 day(s).    2.  Patient will perform sit to stand with modified independence within 7 day(s).  3.  Patient will transfer from bed to chair and chair to bed with modified independence using the least restrictive device within 7 day(s).  4.  Patient will ambulate with contact guard assist for 50 feet with the least restrictive device within 7 day(s).       Outcome: Progressing   PHYSICAL THERAPY TREATMENT    Patient: Errol Brandt (78 y.o. male)  Date: 1/6/2024  Diagnosis: NSTEMI (non-ST elevated myocardial infarction) ([facility]) [I21.4] NSTEMI (non-ST elevated myocardial infarction) ([facility])  Procedure(s) (LRB):  Percutaneous coronary intervention (N/A)  Left heart cath / coronary angiography (N/A)  Intravascular ultrasound (N/A)  Intravascular lithotripsy (N/A)  Atherectomy coronary (N/A) 1 Day Post-Op  Precautions: Fall Risk, Bed Alarm                    ASSESSMENT:  Patient continues to benefit from skilled PT services and is progressing towards goals. Patient is very [place] which impairs ability to accurately follow commands increasing risk of falls. He demonstrates impaired functional mobility, balance and endurance. Patient sitting in chair upon arrival. He requires CGA for transfers today. Patient able to ambulate 100 feet in hodges today using RW for support. 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   Patient demonstrating

## 2024-01-06 NOTE — PROGRESS NOTES
Predictive Model Details          23 (Normal)  Factor Value    Calculated 1/6/2024 08:59 56% Age 78 years old    Deterioration Index Model 18% Respiratory rate 19     8% Systolic 103     6% Sodium abnormal (135 mmol/L)     4% BUN abnormal (24 MG/DL)     3% Pulse 59     2% Hematocrit abnormal (32.9 %)     2% Pulse oximetry 94 %     1% WBC count 4.9 K/uL     0% Temperature 97.7 °F (36.5 °C)     0% Potassium 4.0 mmol/L

## 2024-01-06 NOTE — PROGRESS NOTES
0700 Bedside shift change report given to Alana HONG (oncoming nurse) by Britni RN (offgoing nurse). Report included the following information Nurse Handoff Report.    End of Shift Note    Bedside shift change report given to Britni HONG (oncoming nurse) by Alana Fraga RN (offgoing nurse).  Report included the following information SBAR and Kardex    Shift worked: Day     Shift summary and any significant changes:    Pt/OT came to see pt and pt walked the hodges.     Pts sons came up and were trying to make a decision as to place pt in a SNF or take him home after discharge     Concerns for physician to address:       Zone phone for oncoming shift:          Activity:     Number times ambulated in hallways past shift: 1  Number of times OOB to chair past shift: 1    Cardiac:   Cardiac Monitoring: Yes           Access:  Current line(s): PIV     Genitourinary:   Urinary status: voiding and external catheter    Respiratory:      Chronic home O2 use?: NO  Incentive spirometer at bedside: NO       GI:     Current diet:  ADULT DIET; Regular; 4 carb choices (60 gm/meal); Low Fat/Low Chol/High Fiber/2 gm Na  Passing flatus: YES  Tolerating current diet: YES       Pain Management:   Patient states pain is manageable on current regimen: YES    Skin:     Interventions: float heels, increase time out of bed, PT/OT consult, and internal/external urinary devices    Patient Safety:  Fall Score:    Interventions: bed/chair alarm, assistive device (walker, cane. etc), gripper socks, pt to call before getting OOB, and stay with me (per policy)       Length of Stay:  Expected LOS: 2  Actual LOS: 3      Alana Fraga RN

## 2024-01-06 NOTE — PROGRESS NOTES
[facility]  [address], [place] [address] [phone]  [email or web address]  CARDIOLOGY PROGRESS NOTE    1/6/2024 12:50 PM    Admit Date: 1/3/2024    Admit Diagnosis:   NSTEMI (non-ST elevated myocardial infarction) (HCC) [I21.4]    Subjective:     Errol Brandt was seen at the bedside sitting up in the chair, no cardiac complaints this afternoon.     Right radial site C/D/I  VSS    BP (!) 102/51   Pulse 72   Temp 98.2 °F (36.8 °C) (Oral)   Resp 17   Ht 1.803 m (5' 10.98\")   Wt 92.1 kg (203 lb)   SpO2 95%   BMI 28.33 kg/m²     Current Facility-Administered Medications   Medication Dose Route Frequency    sodium chloride flush 0.9 % injection 5-40 mL  5-40 mL IntraVENous 2 times per day    sodium chloride flush 0.9 % injection 5-40 mL  5-40 mL IntraVENous PRN    0.9 % sodium chloride infusion   IntraVENous PRN    acetaminophen (TYLENOL) tablet 650 mg  650 mg Oral Q4H PRN    insulin lispro (HUMALOG) injection vial 8 Units  8 Units SubCUTAneous TID WC    levothyroxine (SYNTHROID) tablet 125 mcg  125 mcg Oral QAM AC    melatonin tablet 3 mg  3 mg Oral Nightly PRN    prasugrel (EFFIENT) tablet 10 mg  10 mg Oral Daily    insulin glargine (LANTUS) injection vial 40 Units  40 Units SubCUTAneous Nightly    zinc oxide 40 % paste   Topical 4x Daily PRN    [Held by provider] metoprolol tartrate (LOPRESSOR) tablet 25 mg  25 mg Oral BID    atorvastatin (LIPITOR) tablet 80 mg  80 mg Oral Nightly    vitamin B-12 (CYANOCOBALAMIN) tablet 1,000 mcg  1,000 mcg Oral Daily    insulin lispro (HUMALOG) injection vial 0-8 Units  0-8 Units SubCUTAneous TID WC    insulin lispro (HUMALOG) injection vial 0-4 Units  0-4 Units SubCUTAneous Nightly    glucose chewable tablet 16 g  4 tablet Oral PRN    dextrose bolus 10% 125 mL  125 mL IntraVENous PRN    Or    dextrose bolus 10% 250 mL  250 mL IntraVENous PRN    glucagon injection 1 mg  1 mg SubCUTAneous PRN    dextrose 10 % infusion

## 2024-01-06 NOTE — PLAN OF CARE
Problem: Chronic Conditions and Co-morbidities  Goal: Patient's chronic conditions and co-morbidity symptoms are monitored and maintained or improved  Outcome: Progressing     Problem: Discharge Planning  Goal: Discharge to home or other facility with appropriate resources  Outcome: Progressing     Problem: Safety - Adult  Goal: Free from fall injury  Outcome: Progressing  Flowsheets (Taken 1/5/2024 2015 by Britni Wells, RN)  Free From Fall Injury: Instruct family/caregiver on patient safety     Problem: Pain  Goal: Verbalizes/displays adequate comfort level or baseline comfort level  Outcome: Progressing     Problem: ABCDS Injury Assessment  Goal: Absence of physical injury  Outcome: Progressing  Flowsheets (Taken 1/5/2024 2015 by Britni Wells, RN)  Absence of Physical Injury: Implement safety measures based on patient assessment     Problem: Skin/Tissue Integrity  Goal: Absence of new skin breakdown  Description: 1.  Monitor for areas of redness and/or skin breakdown  2.  Assess vascular access sites hourly  3.  Every 4-6 hours minimum:  Change oxygen saturation probe site  4.  Every 4-6 hours:  If on nasal continuous positive airway pressure, respiratory therapy assess nares and determine need for appliance change or resting period.  Outcome: Progressing

## 2024-01-06 NOTE — PROGRESS NOTES
Hospitalist Progress Note    NAME:   Errol Brandt   : 1945   MRN: [id number]     Date/Time: 2024 1:37 PM  Patient PCP: Gary Motley MD    Estimated discharge date:  Barriers: Disposition      Assessment / Plan:      Non-STEMI  S/p PCI to RCA   Cardiology consulted, status post cardiac cath which showed  Mid right coronary 70%, distal tortuous 70% stenosis, culprit for acute myocardial infarction   CT surgery consulted, patient deemed to be a poor surgical candidate, CT surgery recommended staged PCI by cardiology.  Status post PCI to RCA on , cardiology recommended aspirin and prasugrel, discontinue Ranexa, heparin drip  Patient clinically feeling better, spoke with patient's son over the phone, family deciding on home with home health versus SNF    Hypotension  resolved  Systolic blood pressure dropped down to 90s and put it down to the 70s  IV fluid bolus 2 L ordered by cardiology  Review of labs showed drop of hemoglobin from 14 to 11, nitro drip and heparin drip on hold  Discussed with cardiology  Will closely monitor, check stool occult blood  Patient is high risk of further decompensation  Will follow-up H&H every 8 hours  If systolic blood pressure does not improve with IV fluid bolus, will consult intensivist  Continue with aspirin and statin  Added Nitropaste  Started metoprolol  Continue lisinopril  Echo showed EF of 55%  : Systolic blood pressure 99     IDDM with hyperglycemia  Was on NPH 40 twice daily plus metformin plus Januvia  Hold oral agents  Insulin sliding scale  Hypoglycemia protocol  Patient blood sugar elevated between 200-300, continue with Lantus 40 unit  Blood sugar better controlled     Hypothyroidism  Resume Synthroid  TSH 15 check free T4  Will increase Synthroid to 125     Hypertension/hyperlipidemia  Adjust medication status as above        Medical Decision Making:   I personally reviewed labs: yes as below   I personally reviewed imaging  cooperative  EENT:  EOMI. Anicteric sclerae.  Resp:  CTA bilaterally, no wheezing or rales.  No accessory muscle use  CV:  Regular  rhythm,  No edema  GI:  Soft, Non distended, Non tender.  +Bowel sounds  Neurologic:  Alert and oriented X 3, normal speech,   Psych:   Good insight. Not anxious nor agitated  Skin:  No rashes.  No jaundice    Reviewed most current lab test results and cultures  YES  Reviewed most current radiology test results   YES  Review and summation of old records today    NO  Reviewed patient's current orders and MAR    YES  PMH/SH reviewed - no change compared to H&P    Procedures: see electronic medical records for all procedures/Xrays and details which were not copied into this note but were reviewed prior to creation of Plan.      LABS:  I reviewed today's most current labs and imaging studies.  Pertinent labs include:  Recent Labs     01/04/24  1528 01/05/24  0025 01/05/24  0857 01/06/24  0107   WBC 6.2 5.1  --  4.9   HGB 11.6* 11.7* 13.3 11.6*   HCT 33.3* 33.8* 37.5 32.9*    155  --  175       Recent Labs     01/03/24  1516 01/03/24  1606 01/04/24  0018 01/04/24  0636 01/05/24  0024 01/06/24  0107   *  --  139  --  138 135*   K 4.7  --  3.8  --  4.1 4.0     --  108  --  109* 104   CO2 28  --  26  --  26 28   GLUCOSE 392*  --  270*  --  179* 348*   BUN 23*  --  20  --  18 24*   CREATININE 1.14  --  0.85  --  0.98 1.16   CALCIUM 9.0  --  8.0*  --  7.9* 8.3*   MG  --   --   --  2.0  --   --    LABALBU 3.2*  --  2.7*  --   --   --    BILITOT 2.2*  --  1.7*  --   --   --    AST 69*  --  61*  --   --   --    ALT 50  --  42  --   --   --    INR  --  1.0  --   --   --   --          Signed: Adolph Soriano MD

## 2024-01-07 LAB
BASOPHILS # BLD: 0 K/UL (ref 0–0.1)
BASOPHILS NFR BLD: 0 % (ref 0–1)
DIFFERENTIAL METHOD BLD: ABNORMAL
EKG ATRIAL RATE: 54 BPM
EKG DIAGNOSIS: NORMAL
EKG P AXIS: 65 DEGREES
EKG P-R INTERVAL: 210 MS
EKG Q-T INTERVAL: 472 MS
EKG QRS DURATION: 76 MS
EKG QTC CALCULATION (BAZETT): 447 MS
EKG R AXIS: -24 DEGREES
EKG T AXIS: 48 DEGREES
EKG VENTRICULAR RATE: 54 BPM
EOSINOPHIL # BLD: 0.3 K/UL (ref 0–0.4)
EOSINOPHIL NFR BLD: 5 % (ref 0–7)
ERYTHROCYTE [DISTWIDTH] IN BLOOD BY AUTOMATED COUNT: 14.8 % (ref 11.5–14.5)
GLUCOSE BLD STRIP.AUTO-MCNC: 178 MG/DL (ref 65–117)
GLUCOSE BLD STRIP.AUTO-MCNC: 241 MG/DL (ref 65–117)
GLUCOSE BLD STRIP.AUTO-MCNC: 247 MG/DL (ref 65–117)
GLUCOSE BLD STRIP.AUTO-MCNC: 327 MG/DL (ref 65–117)
HCT VFR BLD AUTO: 34.2 % (ref 36.6–50.3)
HGB BLD-MCNC: 11.8 G/DL (ref 12.1–17)
IMM GRANULOCYTES # BLD AUTO: 0 K/UL (ref 0–0.04)
IMM GRANULOCYTES NFR BLD AUTO: 0 % (ref 0–0.5)
LYMPHOCYTES # BLD: 2 K/UL (ref 0.8–3.5)
LYMPHOCYTES NFR BLD: 30 % (ref 12–49)
MCH RBC QN AUTO: 35.5 PG (ref 26–34)
MCHC RBC AUTO-ENTMCNC: 34.5 G/DL (ref 30–36.5)
MCV RBC AUTO: 103 FL (ref 80–99)
MONOCYTES # BLD: 0.7 K/UL (ref 0–1)
MONOCYTES NFR BLD: 10 % (ref 5–13)
NEUTS SEG # BLD: 3.7 K/UL (ref 1.8–8)
NEUTS SEG NFR BLD: 55 % (ref 32–75)
NRBC # BLD: 0 K/UL (ref 0–0.01)
NRBC BLD-RTO: 0 PER 100 WBC
PLATELET # BLD AUTO: 180 K/UL (ref 150–400)
PMV BLD AUTO: 10.9 FL (ref 8.9–12.9)
RBC # BLD AUTO: 3.32 M/UL (ref 4.1–5.7)
SERVICE CMNT-IMP: ABNORMAL
WBC # BLD AUTO: 6.7 K/UL (ref 4.1–11.1)

## 2024-01-07 PROCEDURE — 82962 GLUCOSE BLOOD TEST: CPT

## 2024-01-07 PROCEDURE — 6370000000 HC RX 637 (ALT 250 FOR IP)

## 2024-01-07 PROCEDURE — 2580000003 HC RX 258: Performed by: STUDENT IN AN ORGANIZED HEALTH CARE EDUCATION/TRAINING PROGRAM

## 2024-01-07 PROCEDURE — 2060000000 HC ICU INTERMEDIATE R&B

## 2024-01-07 PROCEDURE — 6370000000 HC RX 637 (ALT 250 FOR IP): Performed by: GENERAL ACUTE CARE HOSPITAL

## 2024-01-07 PROCEDURE — 6370000000 HC RX 637 (ALT 250 FOR IP): Performed by: INTERNAL MEDICINE

## 2024-01-07 PROCEDURE — 85025 COMPLETE CBC W/AUTO DIFF WBC: CPT

## 2024-01-07 PROCEDURE — 36415 COLL VENOUS BLD VENIPUNCTURE: CPT

## 2024-01-07 PROCEDURE — 6370000000 HC RX 637 (ALT 250 FOR IP): Performed by: NURSE PRACTITIONER

## 2024-01-07 PROCEDURE — 6370000000 HC RX 637 (ALT 250 FOR IP): Performed by: STUDENT IN AN ORGANIZED HEALTH CARE EDUCATION/TRAINING PROGRAM

## 2024-01-07 RX ADMIN — INSULIN LISPRO 8 UNITS: 100 INJECTION, SOLUTION INTRAVENOUS; SUBCUTANEOUS at 17:32

## 2024-01-07 RX ADMIN — INSULIN GLARGINE 40 UNITS: 100 INJECTION, SOLUTION SUBCUTANEOUS at 20:00

## 2024-01-07 RX ADMIN — ACETAMINOPHEN 650 MG: 325 TABLET ORAL at 21:34

## 2024-01-07 RX ADMIN — ASPIRIN 81 MG: 81 TABLET, COATED ORAL at 09:15

## 2024-01-07 RX ADMIN — ACETAMINOPHEN 650 MG: 325 TABLET ORAL at 01:38

## 2024-01-07 RX ADMIN — ATORVASTATIN CALCIUM 80 MG: 40 TABLET, FILM COATED ORAL at 20:00

## 2024-01-07 RX ADMIN — INSULIN LISPRO 2 UNITS: 100 INJECTION, SOLUTION INTRAVENOUS; SUBCUTANEOUS at 09:16

## 2024-01-07 RX ADMIN — POLYETHYLENE GLYCOL 3350 17 G: 17 POWDER, FOR SOLUTION ORAL at 23:53

## 2024-01-07 RX ADMIN — INSULIN LISPRO 2 UNITS: 100 INJECTION, SOLUTION INTRAVENOUS; SUBCUTANEOUS at 17:32

## 2024-01-07 RX ADMIN — SODIUM CHLORIDE, PRESERVATIVE FREE 10 ML: 5 INJECTION INTRAVENOUS at 09:18

## 2024-01-07 RX ADMIN — LEVOTHYROXINE SODIUM 125 MCG: 0.12 TABLET ORAL at 09:18

## 2024-01-07 RX ADMIN — CYANOCOBALAMIN TAB 500 MCG 1000 MCG: 500 TAB at 09:15

## 2024-01-07 RX ADMIN — INSULIN LISPRO 8 UNITS: 100 INJECTION, SOLUTION INTRAVENOUS; SUBCUTANEOUS at 09:15

## 2024-01-07 RX ADMIN — INSULIN LISPRO 6 UNITS: 100 INJECTION, SOLUTION INTRAVENOUS; SUBCUTANEOUS at 12:06

## 2024-01-07 RX ADMIN — METOPROLOL SUCCINATE 12.5 MG: 25 TABLET, EXTENDED RELEASE ORAL at 09:15

## 2024-01-07 RX ADMIN — SODIUM CHLORIDE, PRESERVATIVE FREE 10 ML: 5 INJECTION INTRAVENOUS at 20:00

## 2024-01-07 RX ADMIN — PRASUGREL 10 MG: 10 TABLET, FILM COATED ORAL at 09:15

## 2024-01-07 RX ADMIN — INSULIN LISPRO 8 UNITS: 100 INJECTION, SOLUTION INTRAVENOUS; SUBCUTANEOUS at 12:06

## 2024-01-07 ASSESSMENT — PAIN SCALES - GENERAL: PAINLEVEL_OUTOF10: 4

## 2024-01-07 ASSESSMENT — PAIN DESCRIPTION - ORIENTATION: ORIENTATION: ANTERIOR

## 2024-01-07 ASSESSMENT — PAIN DESCRIPTION - DESCRIPTORS: DESCRIPTORS: ACHING

## 2024-01-07 ASSESSMENT — PAIN DESCRIPTION - LOCATION: LOCATION: HAND

## 2024-01-07 NOTE — PLAN OF CARE
Problem: Chronic Conditions and Co-morbidities  Goal: Patient's chronic conditions and co-morbidity symptoms are monitored and maintained or improved  1/7/2024 0847 by Alana Fraga RN  Outcome: Progressing  1/6/2024 2014 by Britni Wells RN  Outcome: Progressing  1/6/2024 2013 by Britni Wells RN  Outcome: Not Progressing  Flowsheets (Taken 1/6/2024 2002)  Care Plan - Patient's Chronic Conditions and Co-Morbidity Symptoms are Monitored and Maintained or Improved: Monitor and assess patient's chronic conditions and comorbid symptoms for stability, deterioration, or improvement     Problem: Discharge Planning  Goal: Discharge to home or other facility with appropriate resources  1/7/2024 0847 by Alana Fraga RN  Outcome: Progressing  1/6/2024 2014 by Britni Wells RN  Outcome: Progressing  1/6/2024 2013 by Britni Wells RN  Outcome: Not Progressing  Flowsheets (Taken 1/6/2024 2002)  Discharge to home or other facility with appropriate resources: Identify barriers to discharge with patient and caregiver     Problem: Safety - Adult  Goal: Free from fall injury  1/7/2024 0847 by Alana Fraga RN  Outcome: Progressing  1/6/2024 2014 by Britni Wells RN  Outcome: Progressing  1/6/2024 2013 by Britni Wells RN  Outcome: Not Progressing  Flowsheets (Taken 1/6/2024 2002)  Free From Fall Injury: Instruct family/caregiver on patient safety     Problem: Pain  Goal: Verbalizes/displays adequate comfort level or baseline comfort level  1/7/2024 0847 by Alana Fraga RN  Outcome: Progressing  1/6/2024 2014 by Britni Wells RN  Outcome: Progressing  1/6/2024 2013 by Britni Wells RN  Outcome: Not Progressing     Problem: ABCDS Injury Assessment  Goal: Absence of physical injury  1/7/2024 0847 by Alana Fraga RN  Outcome: Progressing  1/6/2024 2014 by Britni Wells RN  Outcome: Progressing  1/6/2024 2013 by Britni Wells RN  Outcome: Not Progressing  Flowsheets (Taken  1/6/2024 2002)  Absence of Physical Injury: Implement safety measures based on patient assessment     Problem: Skin/Tissue Integrity  Goal: Absence of new skin breakdown  Description: 1.  Monitor for areas of redness and/or skin breakdown  2.  Assess vascular access sites hourly  3.  Every 4-6 hours minimum:  Change oxygen saturation probe site  4.  Every 4-6 hours:  If on nasal continuous positive airway pressure, respiratory therapy assess nares and determine need for appliance change or resting period.  1/7/2024 0847 by Alana Fraga RN  Outcome: Progressing  1/6/2024 2014 by Britni Wells RN  Outcome: Progressing  1/6/2024 2013 by Britni Wells RN  Outcome: Not Progressing     Problem: Chronic Conditions and Co-morbidities  Goal: Patient's chronic conditions and co-morbidity symptoms are monitored and maintained or improved  1/7/2024 0847 by Alana Fraga RN  Outcome: Progressing  1/6/2024 2014 by Britni Wells RN  Outcome: Progressing  1/6/2024 2013 by Britni Wells RN  Outcome: Not Progressing  Flowsheets (Taken 1/6/2024 2002)  Care Plan - Patient's Chronic Conditions and Co-Morbidity Symptoms are Monitored and Maintained or Improved: Monitor and assess patient's chronic conditions and comorbid symptoms for stability, deterioration, or improvement     Problem: Discharge Planning  Goal: Discharge to home or other facility with appropriate resources  1/7/2024 0847 by Alana Fraga RN  Outcome: Progressing  1/6/2024 2014 by Britni Wells RN  Outcome: Progressing  1/6/2024 2013 by Britni Wells RN  Outcome: Not Progressing  Flowsheets (Taken 1/6/2024 2002)  Discharge to home or other facility with appropriate resources: Identify barriers to discharge with patient and caregiver     Problem: Safety - Adult  Goal: Free from fall injury  1/7/2024 0847 by Alana Fraga RN  Outcome: Progressing  1/6/2024 2014 by Britni Wells RN  Outcome: Progressing  1/6/2024 2013 by Russell

## 2024-01-07 NOTE — PROGRESS NOTES
Predictive Model Details          21 (Normal)  Factor Value    Calculated 1/6/2024 20:13 67% Age 78 years old    Deterioration Index Model 13% Respiratory rate 18     8% Sodium abnormal (135 mmol/L)     5% BUN abnormal (24 MG/DL)     3% Hematocrit abnormal (32.9 %)     2% Pulse oximetry 94 %     1% Systolic 109     1% Pulse 67     1% WBC count 4.9 K/uL     0% Temperature 97.8 °F (36.6 °C)     0% Potassium 4.0 mmol/L

## 2024-01-07 NOTE — PLAN OF CARE
Britni Wells RN  Outcome: Not Progressing  Flowsheets (Taken 1/6/2024 2002)  Absence of Physical Injury: Implement safety measures based on patient assessment  1/6/2024 0858 by Alana Fraga RN  Outcome: Progressing  Flowsheets (Taken 1/5/2024 2015 by Britni Wells, RN)  Absence of Physical Injury: Implement safety measures based on patient assessment     Problem: Occupational Therapy - Adult  Goal: By Discharge: Performs self-care activities at highest level of function for planned discharge setting.  See evaluation for individualized goals.  Description: FUNCTIONAL STATUS PRIOR TO ADMISSION:  Patient was independent in ADLs and used rolling walker for functional mobility.   Receives Help From: Family, ADL Assistance: Independent,  ,  ,  ,  ,  ,  , Ambulation Assistance: Independent, Transfer Assistance: Independent, Active : No     HOME SUPPORT: Patient lived with wife and son.    Occupational Therapy Goals:  Initiated 1/4/2024  1.  Patient will perform grooming in standing with Contact Guard Assist within 7 day(s).  2.  Patient will perform upper body dressing with Stand by Assist within 7 day(s).  3.  Patient will perform lower body dressing with Minimal Assist within 7 day(s).  4.  Patient will perform toilet transfers with Contact Guard Assist  within 7 day(s).  5.  Patient will perform all aspects of toileting with Contact Guard Assist within 7 day(s).  6.  Patient will participate in upper extremity therapeutic exercise/activities with Stand by Assist for 3 minutes within 7 day(s).    1/6/2024 0950 by Anita Partida, PT  Outcome: Progressing     Problem: Physical Therapy - Adult  Goal: By Discharge: Performs mobility at highest level of function for planned discharge setting.  See evaluation for individualized goals.  Description: FUNCTIONAL STATUS PRIOR TO ADMISSION: Patient was modified independent using a rolling walker for functional mobility.    HOME SUPPORT PRIOR TO ADMISSION: The  patient lived with wife.    Physical Therapy Goals  Initiated 1/4/2024  1.  Patient will move from supine to sit and sit to supine in bed with independence within 7 day(s).    2.  Patient will perform sit to stand with modified independence within 7 day(s).  3.  Patient will transfer from bed to chair and chair to bed with modified independence using the least restrictive device within 7 day(s).  4.  Patient will ambulate with contact guard assist for 50 feet with the least restrictive device within 7 day(s).       1/6/2024 0950 by Anita Partida, PT  Outcome: Progressing     Problem: Skin/Tissue Integrity  Goal: Absence of new skin breakdown  Description: 1.  Monitor for areas of redness and/or skin breakdown  2.  Assess vascular access sites hourly  3.  Every 4-6 hours minimum:  Change oxygen saturation probe site  4.  Every 4-6 hours:  If on nasal continuous positive airway pressure, respiratory therapy assess nares and determine need for appliance change or resting period.  1/6/2024 2014 by Britni Wells RN  Outcome: Progressing  1/6/2024 2013 by Britni Wells RN  Outcome: Not Progressing  1/6/2024 0858 by Alana Fraga RN  Outcome: Progressing     Problem: Chronic Conditions and Co-morbidities  Goal: Patient's chronic conditions and co-morbidity symptoms are monitored and maintained or improved  1/6/2024 2014 by Britni Wells, RN  Outcome: Progressing  1/6/2024 2013 by Britni Wells RN  Outcome: Not Progressing  Flowsheets (Taken 1/6/2024 2002)  Care Plan - Patient's Chronic Conditions and Co-Morbidity Symptoms are Monitored and Maintained or Improved: Monitor and assess patient's chronic conditions and comorbid symptoms for stability, deterioration, or improvement  1/6/2024 0858 by Alana Fraga RN  Outcome: Progressing     Problem: Discharge Planning  Goal: Discharge to home or other facility with appropriate resources  1/6/2024 2014 by Britni Wells RN  Outcome:

## 2024-01-07 NOTE — PROGRESS NOTES
Predictive Model Details          24 (Normal)  Factor Value    Calculated 1/7/2024 08:48 53% Age 78 years old    Deterioration Index Model 21% Systolic 96     10% Respiratory rate 18     6% Sodium abnormal (135 mmol/L)     4% BUN abnormal (24 MG/DL)     2% Pulse 60     2% Pulse oximetry 94 %     1% Hematocrit abnormal (34.2 %)     0% Potassium 4.0 mmol/L     0% Temperature 98.1 °F (36.7 °C)     0% WBC count 6.7 K/uL

## 2024-01-07 NOTE — PROGRESS NOTES
Hospitalist Progress Note    NAME:   Errol Brandt   : 1945   MRN: [id number]     Date/Time: 2024 1:37 PM  Patient PCP: Gary Motley MD    Estimated discharge date:  Barriers: Disposition      Assessment / Plan:      Non-STEMI  S/p PCI to RCA   Cardiology consulted, status post cardiac cath which showed  Mid right coronary 70%, distal tortuous 70% stenosis, culprit for acute myocardial infarction   CT surgery consulted, patient deemed to be a poor surgical candidate, CT surgery recommended staged PCI by cardiology.  Status post PCI to RCA on , cardiology recommended aspirin and prasugrel, discontinue Ranexa, heparin drip  Patient clinically feeling better, spoke with patient's son over the phone, family deciding on home with home health versus SNF  : Family decided to discharge patient to SNF    Hypotension  resolved  Systolic blood pressure dropped down to 90s and put it down to the 70s  IV fluid bolus 2 L ordered by cardiology  Review of labs showed drop of hemoglobin from 14 to 11, nitro drip and heparin drip on hold  Discussed with cardiology  Will closely monitor,  Patient is high risk of further decompensation  Will follow-up H&H every 8 hours  If systolic blood pressure does not improve with IV fluid bolus, will consult intensivist  Continue with aspirin and statin  Added Nitropaste  Started metoprolol  Continue lisinopril  Echo showed EF of 55%  : Systolic blood pressure 99  : Hemoglobin stable, systolic blood pressure around 100     IDDM with hyperglycemia  Was on NPH 40 twice daily plus metformin plus Januvia  Hold oral agents  Insulin sliding scale  Hypoglycemia protocol  Patient blood sugar elevated between 200-300, continue with Lantus 40 unit  Blood sugar better controlled     Hypothyroidism  Resume Synthroid  TSH 15 check free T4  Will increase Synthroid to 125     Hypertension/hyperlipidemia  Adjust medication status as above        Medical  MAR    YES  PMH/SH reviewed - no change compared to H&P    Procedures: see electronic medical records for all procedures/Xrays and details which were not copied into this note but were reviewed prior to creation of Plan.      LABS:  I reviewed today's most current labs and imaging studies.  Pertinent labs include:  Recent Labs     01/05/24  0025 01/05/24  0857 01/06/24  0107 01/07/24  0140   WBC 5.1  --  4.9 6.7   HGB 11.7* 13.3 11.6* 11.8*   HCT 33.8* 37.5 32.9* 34.2*     --  175 180       Recent Labs     01/05/24  0024 01/06/24  0107    135*   K 4.1 4.0   * 104   CO2 26 28   GLUCOSE 179* 348*   BUN 18 24*   CREATININE 0.98 1.16   CALCIUM 7.9* 8.3*         Signed: Adolph Soriano MD

## 2024-01-07 NOTE — PROGRESS NOTES
0700 Bedside shift change report given to Alana Davenport (oncoming nurse) by Britni Davenport (offgoing nurse). Report included the following information Nurse Handoff Report.    End of Shift Note    Bedside shift change report given to Montserrat DAVENPORT (oncoming nurse) by Alana Fraga RN (offgoing nurse).  Report included the following information SBAR and Kardex    Shift worked: Day     Shift summary and any significant changes:     CM came by and Bo PARNELL stated that pt will be going to SNF possibly tomorrow    Otherwise uneventful shift   Concerns for physician to address:       Zone phone for oncoming shift:          Activity:     Number times ambulated in hallways past shift: 0  Number of times OOB to chair past shift: 1    Cardiac:   Cardiac Monitoring: Yes           Access:  Current line(s): PIV     Genitourinary:   Urinary status: voiding and external catheter    Respiratory:      Chronic home O2 use?: NO  Incentive spirometer at bedside: NO       GI:     Current diet:  ADULT DIET; Regular; 4 carb choices (60 gm/meal); Low Fat/Low Chol/High Fiber/2 gm Na  Passing flatus: YES  Tolerating current diet: YES       Pain Management:   Patient states pain is manageable on current regimen: YES    Skin:     Interventions: float heels, increase time out of bed, PT/OT consult, and internal/external urinary devices    Patient Safety:  Fall Score:    Interventions: bed/chair alarm, assistive device (walker, cane. etc), gripper socks, pt to call before getting OOB, and stay with me (per policy)       Length of Stay:  Expected LOS: 2  Actual LOS: 4      Alana Fraga RN

## 2024-01-08 DIAGNOSIS — I21.4 NSTEMI (NON-ST ELEVATION MYOCARDIAL INFARCTION) (HCC): Primary | ICD-10-CM

## 2024-01-08 LAB
GLUCOSE BLD STRIP.AUTO-MCNC: 184 MG/DL (ref 65–117)
GLUCOSE BLD STRIP.AUTO-MCNC: 248 MG/DL (ref 65–117)
GLUCOSE BLD STRIP.AUTO-MCNC: 86 MG/DL (ref 65–117)
P2Y12 PLT RESPONSE: 72 PRU (ref 194–418)
SERVICE CMNT-IMP: ABNORMAL
SERVICE CMNT-IMP: ABNORMAL
SERVICE CMNT-IMP: NORMAL

## 2024-01-08 PROCEDURE — 36415 COLL VENOUS BLD VENIPUNCTURE: CPT

## 2024-01-08 PROCEDURE — 6370000000 HC RX 637 (ALT 250 FOR IP): Performed by: GENERAL ACUTE CARE HOSPITAL

## 2024-01-08 PROCEDURE — 6370000000 HC RX 637 (ALT 250 FOR IP)

## 2024-01-08 PROCEDURE — 6370000000 HC RX 637 (ALT 250 FOR IP): Performed by: INTERNAL MEDICINE

## 2024-01-08 PROCEDURE — 97535 SELF CARE MNGMENT TRAINING: CPT

## 2024-01-08 PROCEDURE — 2580000003 HC RX 258: Performed by: STUDENT IN AN ORGANIZED HEALTH CARE EDUCATION/TRAINING PROGRAM

## 2024-01-08 PROCEDURE — 6370000000 HC RX 637 (ALT 250 FOR IP): Performed by: STUDENT IN AN ORGANIZED HEALTH CARE EDUCATION/TRAINING PROGRAM

## 2024-01-08 PROCEDURE — 1100000003 HC PRIVATE W/ TELEMETRY

## 2024-01-08 PROCEDURE — 85576 BLOOD PLATELET AGGREGATION: CPT

## 2024-01-08 PROCEDURE — 99231 SBSQ HOSP IP/OBS SF/LOW 25: CPT

## 2024-01-08 PROCEDURE — 97116 GAIT TRAINING THERAPY: CPT

## 2024-01-08 PROCEDURE — 82962 GLUCOSE BLOOD TEST: CPT

## 2024-01-08 PROCEDURE — 6370000000 HC RX 637 (ALT 250 FOR IP): Performed by: NURSE PRACTITIONER

## 2024-01-08 RX ORDER — LEVOTHYROXINE SODIUM 0.12 MG/1
125 TABLET ORAL
Qty: 30 TABLET | Refills: 3 | Status: SHIPPED | OUTPATIENT
Start: 2024-01-09

## 2024-01-08 RX ORDER — METOPROLOL SUCCINATE 25 MG/1
12.5 TABLET, EXTENDED RELEASE ORAL DAILY
Qty: 30 TABLET | Refills: 3 | Status: SHIPPED | OUTPATIENT
Start: 2024-01-09

## 2024-01-08 RX ORDER — ATORVASTATIN CALCIUM 80 MG/1
80 TABLET, FILM COATED ORAL NIGHTLY
Qty: 30 TABLET | Refills: 5 | Status: SHIPPED | OUTPATIENT
Start: 2024-01-08

## 2024-01-08 RX ORDER — ASPIRIN 81 MG/1
81 TABLET ORAL DAILY
Qty: 30 TABLET | Refills: 5 | Status: SHIPPED | OUTPATIENT
Start: 2024-01-09

## 2024-01-08 RX ORDER — PRASUGREL 10 MG/1
10 TABLET, FILM COATED ORAL DAILY
Qty: 30 TABLET | Refills: 5 | Status: SHIPPED | OUTPATIENT
Start: 2024-01-09 | End: 2024-07-07

## 2024-01-08 RX ADMIN — INSULIN LISPRO 2 UNITS: 100 INJECTION, SOLUTION INTRAVENOUS; SUBCUTANEOUS at 13:00

## 2024-01-08 RX ADMIN — INSULIN LISPRO 8 UNITS: 100 INJECTION, SOLUTION INTRAVENOUS; SUBCUTANEOUS at 09:24

## 2024-01-08 RX ADMIN — LEVOTHYROXINE SODIUM 125 MCG: 0.12 TABLET ORAL at 06:46

## 2024-01-08 RX ADMIN — INSULIN LISPRO 8 UNITS: 100 INJECTION, SOLUTION INTRAVENOUS; SUBCUTANEOUS at 13:00

## 2024-01-08 RX ADMIN — SODIUM CHLORIDE, PRESERVATIVE FREE 10 ML: 5 INJECTION INTRAVENOUS at 23:07

## 2024-01-08 RX ADMIN — CYANOCOBALAMIN TAB 500 MCG 1000 MCG: 500 TAB at 09:25

## 2024-01-08 RX ADMIN — SODIUM CHLORIDE, PRESERVATIVE FREE 10 ML: 5 INJECTION INTRAVENOUS at 09:25

## 2024-01-08 RX ADMIN — ACETAMINOPHEN 650 MG: 325 TABLET ORAL at 03:17

## 2024-01-08 RX ADMIN — ATORVASTATIN CALCIUM 80 MG: 40 TABLET, FILM COATED ORAL at 23:06

## 2024-01-08 RX ADMIN — INSULIN HUMAN 30 UNITS: 100 INJECTION, SUSPENSION SUBCUTANEOUS at 18:16

## 2024-01-08 RX ADMIN — ASPIRIN 81 MG: 81 TABLET, COATED ORAL at 09:24

## 2024-01-08 RX ADMIN — PRASUGREL 10 MG: 10 TABLET, FILM COATED ORAL at 09:25

## 2024-01-08 RX ADMIN — METOPROLOL SUCCINATE 12.5 MG: 25 TABLET, EXTENDED RELEASE ORAL at 09:25

## 2024-01-08 RX ADMIN — INSULIN LISPRO 8 UNITS: 100 INJECTION, SOLUTION INTRAVENOUS; SUBCUTANEOUS at 18:16

## 2024-01-08 ASSESSMENT — PAIN DESCRIPTION - DESCRIPTORS
DESCRIPTORS: DISCOMFORT;ACHING
DESCRIPTORS: DISCOMFORT;ACHING

## 2024-01-08 ASSESSMENT — PAIN SCALES - GENERAL
PAINLEVEL_OUTOF10: 5
PAINLEVEL_OUTOF10: 5

## 2024-01-08 ASSESSMENT — PAIN DESCRIPTION - LOCATION
LOCATION: PENIS
LOCATION: PENIS

## 2024-01-08 NOTE — PROGRESS NOTES
Predictive Model Details          26 (Normal)  Factor Value    Calculated 1/7/2024 23:32 49% Age 78 years old    Deterioration Index Model 37% Neurological exam X     6% Sodium abnormal (135 mmol/L)     4% BUN abnormal (24 MG/DL)     2% Systolic 119     1% Hematocrit abnormal (34.2 %)     1% Pulse 66     0% Temperature 97.7 °F (36.5 °C)     0% Potassium 4.0 mmol/L     0% Pulse oximetry 95 %     0% Respiratory rate 16     0% WBC count 6.7 K/uL

## 2024-01-08 NOTE — DISCHARGE INSTRUCTIONS
HOSPITALIST DISCHARGE INSTRUCTIONS    NAME: Errol Brandt   :  1945   MRN:  [id number]     Date/Time:  2024 2:24 PM    ADMIT DATE: 1/3/2024   DISCHARGE DATE: 2024     Attending Physician: Adolph Soriano MD  Non-STEMI  S/p PCI to RCA   Cardiology consulted, status post cardiac cath which showed  Mid right coronary 70%, distal tortuous 70% stenosis, culprit for acute myocardial infarction   Hypotension  resolved  IDDM with hyperglycemia  Hypothyroidism  Resume Synthroid  Hypertension/hyperlipidemia         Medications: Per above medication reconciliation.    Pain Management: per above medications    Recommended diet: cardiac diet    Recommended activity: activity as tolerated    Wound care: None    Indwelling devices:  None    Supplemental Oxygen: None    Required Lab work: Per SNF routine    Glucose management:  Accucheck ACHS with sliding scale per SNF protocol    Code status: full code                          Smoking Cessation Program: This is a free, phone/web based, smoking cessation program. The program is individualized to meet each patient's needs. To enroll use the link - www.quitnow[place].org or call 8-743-SVDSZND (1.938.468.4703) .

## 2024-01-08 NOTE — DIABETES MGMT
[facility]  DIABETES MANAGEMENT CONSULT    Consulted by Provider for advanced nursing evaluation and care for inpatient blood glucose management.    Evaluation and Action Plan   This 78 year old male patient with Type 2 diabetes has good BG control at home.His current A1c is 6.8%. The patient's wife manages his medication per patient's son. The patient was admitted for chest pain and was initially a CABG work-up . He had a heart cath today and a stent was placed. The patient is doing well post-recovery. He ate and tolerated. He was started on 40 units Lantus while hospitalized and BG's are stable. He can likely resume his PTA insulin dosing at discharge. He reportedly takes 70/30 combo insulin at home. No changes to inpatient basal dosing today. A slight decrease in meal time insulin.   The patient's Bg's have been elevated for the past 2 mornings on the current dosing. I have made adjustment to his basal insulin regimen.  It is note that some of the scheduled meal time doses were held. The patient uses combo insulin at home. I recommend that he resume his combo insulin at discharge. The patient may require a higher dose in the early apert of the based of his BG trends. In anticipation of discharge I will make some adjustments.         Diabetes Discharge Plan   Medication  Use 45 units 70/30 insulin with breakfast and 20 units of 70/30 insulin with dinner  Monitor BG's   Follow-up with outpatient provider for future diabetes management   Referral  []        Outpatient diabetes education   Additional orders            Initial Presentation   Errol Brandt is a 78 y.o. male admitted from after experiencing chest pain.   LAB: Glucose 392. A1c 6.8%. Troponin 10,172.   CXR:Narrative & Impression  INDICATION: Chest pain.  Non-ST elevation (NSTEMI) myocardial infarction /  Reason for exam:->chest pain     Portable AP view of the chest.       Direct comparison made to prior chest x-ray dated  7.8 cm/s         ICA Dist 31.3 cm/s       5.4 cm/s       40.3 cm/s       8.1 cm/s         ICA/CCA Ratio 0.8 no units        0.62 no units          ECA 87.1 cm/s       0 cm/s       59.2 cm/s       0 cm/s         Vertebral 27 cm/s       6.89 cm/s       34.2 cm/s       6.28 cm/s         Subclavian Prox 96.8 cm/s       0 cm/s       108.2 cm/s       0 cm/s                                        HX:   Past Medical History:   Diagnosis Date    Diabetes ([facility])     Ill-defined condition     perpherial artery disease    Ill-defined condition     hyperlidemia        INITIAL DX: NSTEMI (non-ST elevated myocardial infarction) ([facility]) [I21.4]     Current Treatment     TX: ASA. Lipitor. Insulin. Synthroid. Effient. Vitamin b-12    Hospital Course   Clinical progress has been complicated by CAD.     Diabetes History   Hx of type 2 diabetes. Takes combo insulin 70/30 at home. Resides with his wife. Family members at bedside. Supportive family.     Diabetes-related Medical History    Macrovascular disease  CAD, peripheral vascular disease, and foot wounds      Diabetes Medication History  Diabetes drug class Diabetes drug name Additional Comments   Insulin Combo insulin                                             Metformin  Diabetes self-management practices:   Eating pattern Deferred     Physical activity pattern Deferred     Monitoring pattern   [x] Testing BGs sufficiently to inform self-management adjustments    Taking medications pattern  [x] Consistent administration          Overall evaluation:    [x] Achieving A1c target with drug therapy & self-care practices    Subjective   \" I'm doing okay\"      Objective   Physical exam  General Obese  in no acute distress. Conversant and cooperative  Neuro  Alert, oriented   Vital Signs   Vitals:    01/08/24 1200   BP: (!) 116/57   Pulse: 61   Resp:    Temp:    SpO2:          Laboratory  Recent Labs     01/06/24  0107 01/07/24  0140   WBC 4.9 6.7   HGB 11.6* 11.8*   HCT 32.9* 34.2*   MCV

## 2024-01-08 NOTE — PLAN OF CARE
Problem: Chronic Conditions and Co-morbidities  Goal: Patient's chronic conditions and co-morbidity symptoms are monitored and maintained or improved  1/8/2024 0349 by Montserrat Fine RN  Outcome: Progressing  1/8/2024 0348 by Montserrat Fine RN  Outcome: Progressing     Problem: Discharge Planning  Goal: Discharge to home or other facility with appropriate resources  1/8/2024 0349 by Montserrat Fine RN  Outcome: Progressing  1/8/2024 0348 by Montserrat Fine RN  Outcome: Progressing     Problem: Safety - Adult  Goal: Free from fall injury  1/8/2024 0349 by Montserrat Fine RN  Outcome: Progressing  1/8/2024 0348 by Montserrat Fine RN  Outcome: Progressing     Problem: Pain  Goal: Verbalizes/displays adequate comfort level or baseline comfort level  1/8/2024 0349 by Montserrat Fine RN  Outcome: Not Progressing  1/8/2024 0348 by Montserrat Fine RN  Outcome: Progressing     Problem: ABCDS Injury Assessment  Goal: Absence of physical injury  1/8/2024 0349 by Montserrat Fine RN  Outcome: Progressing  1/8/2024 0348 by Montserrat Fine RN  Outcome: Progressing     Problem: Skin/Tissue Integrity  Goal: Absence of new skin breakdown  Description: 1.  Monitor for areas of redness and/or skin breakdown  2.  Assess vascular access sites hourly  3.  Every 4-6 hours minimum:  Change oxygen saturation probe site  4.  Every 4-6 hours:  If on nasal continuous positive airway pressure, respiratory therapy assess nares and determine need for appliance change or resting period.  1/8/2024 0349 by Montserrat Fine RN  Outcome: Progressing  1/8/2024 0348 by Montserrat Fine RN  Outcome: Progressing     Problem: Pain  Goal: Verbalizes/displays adequate comfort level or baseline comfort level  1/8/2024 0349 by Montserrat Fine RN  Outcome: Not Progressing  1/8/2024 0348 by Montserrat Fine RN  Outcome: Progressing

## 2024-01-08 NOTE — PROGRESS NOTES
Hospitalist Progress Note    NAME:   Errol Brandt   : 1945   MRN: [id number]     Date/Time: 2024 2:20 PM  Patient PCP: Gary Motley MD    Estimated discharge date:  Barriers: Disposition      Assessment / Plan:      Non-STEMI  S/p PCI to RCA   Cardiology consulted, status post cardiac cath which showed  Mid right coronary 70%, distal tortuous 70% stenosis, culprit for acute myocardial infarction   CT surgery consulted, patient deemed to be a poor surgical candidate, CT surgery recommended staged PCI by cardiology.  Status post PCI to RCA on , cardiology recommended aspirin and prasugrel, discontinue Ranexa, heparin drip  Patient clinically feeling better, spoke with patient's son over the phone, family deciding on home with home health versus SNF  : Family decided to discharge patient to SNF    Hypotension  resolved  Systolic blood pressure dropped down to 90s and put it down to the 70s  IV fluid bolus 2 L ordered by cardiology  Review of labs showed drop of hemoglobin from 14 to 11, nitro drip and heparin drip on hold  Discussed with cardiology  Will closely monitor,  Patient is high risk of further decompensation  Will follow-up H&H every 8 hours  If systolic blood pressure does not improve with IV fluid bolus, will consult intensivist  Continue with aspirin and statin  Added Nitropaste  Started metoprolol  Continue lisinopril  Echo showed EF of 55%  : Systolic blood pressure 99  : Hemoglobin stable, systolic blood pressure around 100     IDDM with hyperglycemia  Was on NPH 40 twice daily plus metformin plus Januvia  Hold oral agents  Insulin sliding scale  Hypoglycemia protocol  Patient blood sugar elevated between 200-300, continue with Lantus 40 unit  Blood sugar better controlled     Hypothyroidism  Resume Synthroid  TSH 15 check free T4  Will increase Synthroid to 125     Hypertension/hyperlipidemia  Adjust medication status as above        Medical  Decision Making:   I personally reviewed labs: yes as below   I personally reviewed imaging reports:yes as below   Toxic drug monitoring: Monitor hemoglobin while on blood thinners  Discussed case with: Cardiology        Code Status: Full Code  Baseline:       Subjective:     Chief Complaint / Reason for Physician Visit  \"Follow-up NSTEMI\".  Discussed with RN events overnight.   Status post PCI  Awaiting snf placement   Objective:     VITALS:   Last 24hrs VS reviewed since prior progress note. Most recent are:  Patient Vitals for the past 24 hrs:   BP Temp Temp src Pulse Resp SpO2 Weight   01/08/24 1200 (!) 116/57 -- -- 61 -- -- --   01/08/24 1150 (!) 132/102 98.2 °F (36.8 °C) Oral 61 18 95 % --   01/08/24 0738 103/80 98.1 °F (36.7 °C) Oral 58 18 96 % --   01/08/24 0608 -- -- -- -- -- -- 89.9 kg (198 lb 3.1 oz)   01/08/24 0322 (!) 147/59 97.8 °F (36.6 °C) Oral 60 16 96 % --   01/08/24 0000 109/60 97.9 °F (36.6 °C) Oral 62 16 96 % --   01/07/24 2015 (!) 119/47 97.7 °F (36.5 °C) Oral 66 16 95 % --   01/07/24 1530 (!) 107/55 98.3 °F (36.8 °C) Oral 62 18 96 % --           Intake/Output Summary (Last 24 hours) at 1/8/2024 1420  Last data filed at 1/8/2024 0608  Gross per 24 hour   Intake --   Output 1250 ml   Net -1250 ml          I had a face to face encounter and independently examined this patient on 1/8/2024, as outlined below:  PHYSICAL EXAM:  General: Alert, cooperative  EENT:  EOMI. Anicteric sclerae.  Resp:  CTA bilaterally, no wheezing or rales.  No accessory muscle use  CV:  Regular  rhythm,  No edema  GI:  Soft, Non distended, Non tender.  +Bowel sounds  Neurologic:  Alert and oriented X 3, normal speech,   Psych:   Good insight. Not anxious nor agitated  Skin:  No rashes.  No jaundice    Reviewed most current lab test results and cultures  YES  Reviewed most current radiology test results   YES  Review and summation of old records today    NO  Reviewed patient's current orders and MAR    YES  PMH/SH reviewed

## 2024-01-08 NOTE — CARE COORDINATION
Transition of Care Plan:     RUR: 18%  Prior Level of Functioning: Dependent  Disposition:  SNF- [facility]- Auth pending  If SNF or IPR: Date FOC offered:  1/5/24  Date FOC received: 1/8/24  Accepting facility: [facility]  Date authorization started with reference number:   Date authorization received and expires:   Follow up appointments: PCP,Specialist   DME needed: none  Transportation at discharge: BLS to arrange  IM/IMM Medicare/ letter given: 2nd IM Letter to be given  Is patient a  and connected with VA? N/a              If yes, was [place] transfer form completed and VA notified?   Caregiver Contact:   Chelsi Brandt (Spouse)  [phone] (Home Phone)   Discharge Caregiver contacted prior to discharge?  CM discussed d/ plan with sonJulio via phone and now family [facility]  Care Conference needed? none  Barriers to discharge:  Insurance auth needed  - SNF      CM sent referral to [facility] via Lary Villagomez with [facility] called and shared they can accept today once once auth is approved.  Pt will need updated SNF notes    CM faxed progress notes to [facility] to review for auth.    OFELIA Lance,RN  Care   [facility]  Phone: [phone]

## 2024-01-08 NOTE — PLAN OF CARE
Problem: Physical Therapy - Adult  Goal: By Discharge: Performs mobility at highest level of function for planned discharge setting.  See evaluation for individualized goals.  Description: FUNCTIONAL STATUS PRIOR TO ADMISSION: Patient was modified independent using a rolling walker for functional mobility.    HOME SUPPORT PRIOR TO ADMISSION: The patient lived with wife.    Physical Therapy Goals  Initiated 1/4/2024  1.  Patient will move from supine to sit and sit to supine in bed with independence within 7 day(s).    2.  Patient will perform sit to stand with modified independence within 7 day(s).  3.  Patient will transfer from bed to chair and chair to bed with modified independence using the least restrictive device within 7 day(s).  4.  Patient will ambulate with contact guard assist for 50 feet with the least restrictive device within 7 day(s).       Outcome: Progressing     PHYSICAL THERAPY TREATMENT    Patient: Errol Brandt (78 y.o. male)  Date: 1/8/2024  Diagnosis: NSTEMI (non-ST elevated myocardial infarction) (Formerly [facility]) [I21.4] NSTEMI (non-ST elevated myocardial infarction) (Formerly [facility])  Procedure(s) (LRB):  Percutaneous coronary intervention (N/A)  Left heart cath / coronary angiography (N/A)  Intravascular ultrasound (N/A)  Intravascular lithotripsy (N/A)  Atherectomy coronary (N/A) 3 Days Post-Op  Precautions: Fall Risk, Bed Alarm                    ASSESSMENT:  Patient continues to benefit from skilled PT services and is slowly progressing towards goals. Received patient sitting up in chair, agreeable to mobilize. Required verbal and tactile cues each time to perform sit<>stand transfer with proper hand placement, no carryover noted within same session. BP remained stable with changes in position, no dizziness reported. Ambulated 200' in the hallway using RW, contact guard with minimum to moderate assistance at times due to gait patterning. Patient maintains knee and hip flexion during ambulation along with

## 2024-01-08 NOTE — PROGRESS NOTES
Predictive Model Details          29 (Normal)  Factor Value    Calculated 1/8/2024 08:22 42% Age 78 years old    Deterioration Index Model 32% Neurological exam X     8% Respiratory rate 18     6% Systolic 103     5% Sodium abnormal (135 mmol/L)     3% BUN abnormal (24 MG/DL)     2% Pulse 58     1% Hematocrit abnormal (34.2 %)     0% Potassium 4.0 mmol/L     0% Pulse oximetry 96 %     0% Temperature 98.1 °F (36.7 °C)     0% WBC count 6.7 K/uL

## 2024-01-08 NOTE — CARDIO/PULMONARY
Chart reviewed: Patient is 78 y.o. male admitted with NSTEMI (non-ST elevated myocardial infarction) (HCC) [I21.4]    LVEF 55-60% via echo on 1/4/24.     Education: MI education folder, with catheterization brochure, to bedside of Errol Brandt.                                                Educated using teach back method. Reviewed MI diagnosis definition and purpose of intervention.  Discussed risk factors for CAD to include the following: family history, elevated BMI, hyperlipidemia, hypertension, diabetes, stress, and smoking. Smoking Cessation Program link added to AVS. Discussed Heart Healthy/Low Sodium (2000 mg) diet. Reviewed the importance of medication compliance, the purpose of effient, and potential side effects. Discussed follow up appointments with cardiologist, signs and symptoms of angina, and what to report to physician after discharge.  Emphasized the value of cardiac rehab. Discussed Cardiac Rehab Program format, benefits, and encouraged enrollment to assist with risk modification and management. Pt declines enrollment.    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 Meals are provided by his son (frozen dinners).    Errol Brandt verbalized understanding with questions answered.     DONALDO BOJORQUEZ RN

## 2024-01-08 NOTE — PLAN OF CARE
Problem: Occupational Therapy - Adult  Goal: By Discharge: Performs self-care activities at highest level of function for planned discharge setting.  See evaluation for individualized goals.  Description: FUNCTIONAL STATUS PRIOR TO ADMISSION:  Patient was independent in ADLs and used rolling walker for functional mobility.   Receives Help From: Family, ADL Assistance: Independent,  ,  ,  ,  ,  ,  , Ambulation Assistance: Independent, Transfer Assistance: Independent, Active : No     HOME SUPPORT: Patient lived with wife and son.    Occupational Therapy Goals:  Initiated 1/4/2024  1.  Patient will perform grooming in standing with Contact Guard Assist within 7 day(s).  2.  Patient will perform upper body dressing with Stand by Assist within 7 day(s).  3.  Patient will perform lower body dressing with Minimal Assist within 7 day(s).  4.  Patient will perform toilet transfers with Contact Guard Assist  within 7 day(s).  5.  Patient will perform all aspects of toileting with Contact Guard Assist within 7 day(s).  6.  Patient will participate in upper extremity therapeutic exercise/activities with Stand by Assist for 3 minutes within 7 day(s).    Outcome: Progressing     OCCUPATIONAL THERAPY TREATMENT  Patient: Errol Brandt (78 y.o. male)  Date: 1/8/2024  Primary Diagnosis: NSTEMI (non-ST elevated myocardial infarction) ([facility]) [I21.4]  Procedure(s) (LRB):  Percutaneous coronary intervention (N/A)  Left heart cath / coronary angiography (N/A)  Intravascular ultrasound (N/A)  Intravascular lithotripsy (N/A)  Atherectomy coronary (N/A) 3 Days Post-Op   Precautions: Fall Risk, Bed Alarm                Chart, occupational therapy assessment, plan of care, and goals were reviewed.    ASSESSMENT  Patient continues to benefit from skilled OT services and is slowly progressing towards goals. Pt noted with progressive mobility/balance, evidenced by CGA for sit to stand/stand to sit; however, noted to benefit from Min A

## 2024-01-08 NOTE — PROGRESS NOTES
1400: Bedside report given to GELY Adams. Reviewed SBAR, Kardex, I/O, MAR, Procedural information and Recent results. VSS, NSR (rhythm), RA (oxygenation). A/O X 4, but has short term memory

## 2024-01-09 VITALS
DIASTOLIC BLOOD PRESSURE: 48 MMHG | HEIGHT: 71 IN | WEIGHT: 198.19 LBS | BODY MASS INDEX: 27.75 KG/M2 | OXYGEN SATURATION: 96 % | TEMPERATURE: 98.5 F | HEART RATE: 76 BPM | SYSTOLIC BLOOD PRESSURE: 103 MMHG | RESPIRATION RATE: 21 BRPM

## 2024-01-09 LAB
GLUCOSE BLD STRIP.AUTO-MCNC: 160 MG/DL (ref 65–117)
GLUCOSE BLD STRIP.AUTO-MCNC: 95 MG/DL (ref 65–117)
P2Y12 PLT RESPONSE: 85 PRU (ref 194–418)
SERVICE CMNT-IMP: ABNORMAL
SERVICE CMNT-IMP: NORMAL

## 2024-01-09 PROCEDURE — 6370000000 HC RX 637 (ALT 250 FOR IP): Performed by: INTERNAL MEDICINE

## 2024-01-09 PROCEDURE — 6370000000 HC RX 637 (ALT 250 FOR IP): Performed by: NURSE PRACTITIONER

## 2024-01-09 PROCEDURE — 2580000003 HC RX 258: Performed by: STUDENT IN AN ORGANIZED HEALTH CARE EDUCATION/TRAINING PROGRAM

## 2024-01-09 PROCEDURE — 6370000000 HC RX 637 (ALT 250 FOR IP): Performed by: GENERAL ACUTE CARE HOSPITAL

## 2024-01-09 PROCEDURE — 85576 BLOOD PLATELET AGGREGATION: CPT

## 2024-01-09 PROCEDURE — 82962 GLUCOSE BLOOD TEST: CPT

## 2024-01-09 PROCEDURE — 36415 COLL VENOUS BLD VENIPUNCTURE: CPT

## 2024-01-09 PROCEDURE — 6370000000 HC RX 637 (ALT 250 FOR IP)

## 2024-01-09 PROCEDURE — 6370000000 HC RX 637 (ALT 250 FOR IP): Performed by: STUDENT IN AN ORGANIZED HEALTH CARE EDUCATION/TRAINING PROGRAM

## 2024-01-09 RX ORDER — CASTOR OIL AND BALSAM, PERU 788; 87 MG/G; MG/G
OINTMENT TOPICAL 2 TIMES DAILY
Status: DISCONTINUED | OUTPATIENT
Start: 2024-01-09 | End: 2024-01-09 | Stop reason: HOSPADM

## 2024-01-09 RX ADMIN — PRASUGREL 10 MG: 10 TABLET, FILM COATED ORAL at 10:40

## 2024-01-09 RX ADMIN — ASPIRIN 81 MG: 81 TABLET, COATED ORAL at 08:51

## 2024-01-09 RX ADMIN — LEVOTHYROXINE SODIUM 125 MCG: 0.12 TABLET ORAL at 08:51

## 2024-01-09 RX ADMIN — SODIUM CHLORIDE, PRESERVATIVE FREE 10 ML: 5 INJECTION INTRAVENOUS at 09:07

## 2024-01-09 RX ADMIN — CYANOCOBALAMIN TAB 500 MCG 1000 MCG: 500 TAB at 08:49

## 2024-01-09 RX ADMIN — SODIUM CHLORIDE, PRESERVATIVE FREE 10 ML: 5 INJECTION INTRAVENOUS at 08:49

## 2024-01-09 RX ADMIN — INSULIN LISPRO 8 UNITS: 100 INJECTION, SOLUTION INTRAVENOUS; SUBCUTANEOUS at 12:41

## 2024-01-09 NOTE — DISCHARGE SUMMARY
Discharge Summary    Name: Errol Brandt  [id number]  YOB: 1945 (Age: 78 y.o.)   Date of Admission: 1/3/2024  Date of Discharge: 1/9/2024  Attending Physician: Adolph Soriano MD    Discharge Diagnosis:   Non-STEMI  S/p PCI to RCA 01/05  Cardiology consulted, status post cardiac cath which showed  Mid right coronary 70%, distal tortuous 70% stenosis, culprit for acute myocardial infarction   Hypotension 01/04 resolved  IDDM with hyperglycemia  Hypothyroidism  Resume Synthroid  Hypertension/hyperlipidemia    Consultations:  IP CONSULT TO CARDIOLOGY  IP CONSULT TO CARDIOLOGY  IP CONSULT TO CARDIOTHORACIC SURGERY  IP CONSULT TO DIABETES MANAGEMENT  IP CONSULT TO CARDIAC REHAB  IP CONSULT TO CARDIAC REHAB  IP CONSULT TO CASE MANAGEMENT      Brief Admission History/Reason for Admission Per Tito Puentes MD:   CHIEF COMPLAINT: Chest pain     HISTORY OF PRESENT ILLNESS:     Errol Brandt is a 78 y.o.  male with PMHx significant for  has a past medical history of Diabetes (HCC), Ill-defined condition, and Ill-defined condition.      Patient developed chest pain last night, was so severe, substernal, associated with mild shortness of breath.  No reported radiation, nausea, vomiting, and abdominal pain.  Of note, patient recently admitted for choledocholithiasis and jaundice and status post stone removal and sphincterectomy via ERCP at [facility].  Patient denies taking any blood thinners at home.  Patient advised to have follow-up with general surgery for cholecystectomy.  No reported abdominal pain, fever, nausea, vomiting and diarrhea.     ED w/up showed significant elevated troponin for which cardiology was consulted.  EKG without significant acute ischemic changes.    Brief Hospital Course by Main Problems:   Non-STEMI  S/p PCI to RCA 01/05  Cardiology consulted, status post cardiac cath which showed  Mid right coronary 70%, distal tortuous 70% stenosis, culprit for  aspart injection pen  levothyroxine 125 MCG tablet  metoprolol succinate 25 MG extended release tablet  prasugrel 10 MG Tabs             DISPOSITION:    Home with Family:       Home with HH/PT/OT/RN:    SNF/LTC: x   FARZANEH:    OTHER:            Code status:   Recommended diet: cardiac diet  Recommended activity: activity as tolerated  Wound care: None      Follow up with:   PCP : Gary Motley MD    [facility] (LINK) [address] [facility] [address] [phone]              Total time in minutes spent coordinating this discharge (includes going over instructions, follow-up, prescriptions, and preparing report for sign off to her PCP) :  35 minutes

## 2024-01-09 NOTE — PROGRESS NOTES
Bedside and Verbal shift change report given to Daisy/GELY Melissa (oncoming nurse) by GELY Whitmore (offgoing nurse). Report included the following information Nurse Handoff Report, Index, Intake/Output, MAR, Recent Results, and Cardiac Rhythm Sinus Rhythm .       0858: Scheduled Metoprolol held d/t BP being 93/53 and HR 71 as well as Insulin Lispro 8 units d/t BG being 93. Bo PARNELL notified.     DISCHARGE SUMMARY FROM [facility] NURSE    The patient is stable for discharge. I have reviewed the discharge instructions with the patient. The patient verbalized understanding. All questions were fully answered. The patient verbalized no complaints.    Hard scripts and medication handouts were given and reviewed with the patient. Appropriate educational materials and medication side effects teaching were also provided.    Cardiac monitor and IV line(s) were removed.     There were no personal belongings, valuables or home medications left at patient's bedside,  or safe.     Daisy Zendejas RN, 1/9/2024 3:21 PM

## 2024-01-09 NOTE — PLAN OF CARE

## 2024-01-09 NOTE — PROGRESS NOTES
Discharge Summary     Patient: Errol Brandt MRN: [id number]  SSN: [id number]    YOB: 1945  Age: 78 y.o.  Sex: male       Code Status: Full  Allergies: No Known Allergies    Admit Date: 1/3/2024    Discharge Date: 1/9/2024      Admission Diagnoses: NSTEMI (non-ST elevated myocardial infarction) (HCC) [I21.4]    PMH:   Past Medical History:   Diagnosis Date    Diabetes (HCC)     Ill-defined condition     perpherial artery disease    Ill-defined condition     hyperlidemia       Social History:  Social history   Social History     Tobacco Use    Smoking status: Every Day     Current packs/day: 1.00     Types: Cigarettes    Smokeless tobacco: Never   Substance Use Topics    Alcohol use: No     Drug History   Social History     Substance and Sexual Activity   Drug Use Not on file     Familiy History No family history on file.    Consults: None    Significant Diagnostic Studies: n/a    Discharge Condition: Stable    Disposition: SNF    Discharge Medications:   Current Discharge Medication List        START taking these medications    Details   aspirin 81 MG EC tablet Take 1 tablet by mouth daily  Qty: 30 tablet, Refills: 5      atorvastatin (LIPITOR) 80 MG tablet Take 1 tablet by mouth nightly  Qty: 30 tablet, Refills: 5      metoprolol succinate (TOPROL XL) 25 MG extended release tablet Take 0.5 tablets by mouth daily  Qty: 30 tablet, Refills: 3      prasugrel (EFFIENT) 10 MG TABS Take 1 tablet by mouth daily  Qty: 30 tablet, Refills: 5           CONTINUE these medications which have CHANGED    Details   insulin aspart prot & aspart (NOVOLOG 70/30) injection pen Inject 20-45 Units into the skin 2 times daily Use 45 units 70/30 insulin daily before breakfast. Use 20 units 70/30 insulin nightly before dinner.  Qty: 5 Adjustable Dose Pre-filled Pen Syringe, Refills: 3      levothyroxine (SYNTHROID) 125 MCG tablet Take 1 tablet by mouth every morning (before breakfast)  Qty: 30 tablet, Refills: 3

## 2024-01-09 NOTE — CARE COORDINATION
Transition of Care Plan:     RUR: 18%  Prior Level of Functioning: Dependent  Disposition:  SNF- [facility]-   RN will call report: [phone]  Room Number: 64B    12:13 PM   CM talked to WifeChelsi and Son Julio and they are in agreement with DC plan.   Son will be here to transport Pt around 2:45 PM to 3 PM in the car.     SNF, Pt and RN are aware of plan.     No further CM needs.     10:41 AM   CM called Pt Chelsi guerrero and left a VM     10:19 AM  The auth for Errol Brandt has been approved for 3 days to [facility].     If SNF or IPR: Date FOC offered:  1/5/24  Date FOC received: 1/8/24  Accepting facility: [facility]  Date authorization started with reference number: 1/8  Date authorization received and expires: 1/9   Auth#: [id number]  Isidro Ref: [id number]  Service Dates: 1/8 - 1/10  CM Coordinator: Eugenie Allen    CM called Erik at [facility] to let her know auth is approved and see if they have a bed to offer today.     Follow up appointments: PCP,Specialist   DME needed: none  Transportation at discharge: BLS vs family  IM/IMM Medicare/ letter given: 2nd IM Letter delivered 1/9  Is patient a  and connected with VA? N/a              If yes, was  transfer form completed and VA notified?   Caregiver Contact:   Chelsi Brandt (Spouse)  [phone] (Home Phone)   Discharge Caregiver contacted prior to discharge?  CM discussed d/ plan with sonJulio via phone and now family wants SNF on [facility]- [facility]  Care Conference needed? none  Barriers to discharge: Bed Availability     Montserrat Kwong CM  [phone]

## 2024-01-09 NOTE — PROGRESS NOTES
I have reviewed and am in agreement with the assessment and documentation as performed by my orientee, GELY Villa. Please refer to Daisy's progress note for update on pt's daily events.     1426 [facility] See notes for details

## 2024-01-09 NOTE — PLAN OF CARE
Problem: Chronic Conditions and Co-morbidities  Goal: Patient's chronic conditions and co-morbidity symptoms are monitored and maintained or improved  1/9/2024 1525 by Daisy Zendjeas RN  Outcome: Adequate for Discharge  1/9/2024 1156 by Daisy Zendejas RN  Outcome: Progressing     Problem: Discharge Planning  Goal: Discharge to home or other facility with appropriate resources  1/9/2024 1525 by Daisy Zendejas RN  Outcome: Adequate for Discharge  1/9/2024 1156 by Daisy Zendejas RN  Outcome: Progressing     Problem: Safety - Adult  Goal: Free from fall injury  1/9/2024 1525 by Daisy Zendejas RN  Outcome: Adequate for Discharge  1/9/2024 1156 by Daisy Zendejas RN  Outcome: Progressing     Problem: Pain  Goal: Verbalizes/displays adequate comfort level or baseline comfort level  1/9/2024 1525 by Daisy Zendejas RN  Outcome: Adequate for Discharge  1/9/2024 1156 by Daisy Zendejas RN  Outcome: Progressing     Problem: ABCDS Injury Assessment  Goal: Absence of physical injury  1/9/2024 1525 by Daisy Zendejas RN  Outcome: Adequate for Discharge  1/9/2024 1156 by Daisy Zendejas RN  Outcome: Progressing     Problem: Skin/Tissue Integrity  Goal: Absence of new skin breakdown  Description: 1.  Monitor for areas of redness and/or skin breakdown  2.  Assess vascular access sites hourly  3.  Every 4-6 hours minimum:  Change oxygen saturation probe site  4.  Every 4-6 hours:  If on nasal continuous positive airway pressure, respiratory therapy assess nares and determine need for appliance change or resting period.  1/9/2024 1525 by Daisy Zendejas RN  Outcome: Adequate for Discharge  1/9/2024 1156 by Daisy Zendejas RN  Outcome: Progressing

## 2024-01-09 NOTE — PROGRESS NOTES
Spiritual Care Assessment/Progress Note  [facility]    Name: Errol Brandt MRN: [id number]    Age: 78 y.o.     Sex: male   Language: English     Date: 1/9/2024            Total Time Calculated: 14 min              Spiritual Assessment begun in MRM 2 CARDIAC MEDICAL STEP DOWN  Service Provided For:: Patient  Referral/Consult From:: Rounding  Encounter Overview/Reason : Initial Encounter    Spiritual beliefs:      [] Involved in a ady tradition/spiritual practice:      [] Supported by a ady community:      [] Claims no spiritual orientation:      [] Seeking spiritual identity:           [] Adheres to an individual form of spirituality:      [x] Not able to assess:    did not indicate            Identified resources for coping and support system:   Support System: Spouse       [x] Prayer                  [] Devotional reading               [] Music                  [] Guided Imagery     [] Pet visits                                        [] Other: (COMMENT)     Specific area/focus of visit   Encounter:    Crisis:    Spiritual/Emotional needs: Type: Spiritual Support  Ritual, Rites and Sacraments:    Grief, Loss, and Adjustments:    Ethics/Mediation:    Behavioral Health:    Palliative Care:    Advance Care Planning:      Plan/Referrals: Other (Comment) (patient is going home today)    Narrative:  Reviewed chart prior to visit on CMSD unit for spiritual assessment and support.  Mr Brandt was seated in recliner; no family/friends present.  He indicated he is supposed to be discharged today.  When asked about any concerns he might have, he shared that his wife is undergoing chemotherapy for breast cancer that spread to her liver. Affirmed loving concern for his wife and offered assurance of prayer.  No other concerns were expressed.  He is expected to go home today.    LESLIE Haddad, BCC, Staff   [facility]     Paging Service  [phone]

## 2024-01-10 LAB — ECHO BSA: 2.15 M2

## 2024-01-27 ENCOUNTER — HOSPITAL ENCOUNTER (EMERGENCY)
Facility: HOSPITAL | Age: 79
Discharge: HOME | End: 2024-01-28
Attending: STUDENT IN AN ORGANIZED HEALTH CARE EDUCATION/TRAINING PROGRAM
Payer: MEDICARE

## 2024-01-27 ENCOUNTER — APPOINTMENT (OUTPATIENT)
Facility: HOSPITAL | Age: 79
End: 2024-01-27
Payer: MEDICARE

## 2024-01-27 DIAGNOSIS — N30.01 ACUTE CYSTITIS WITH HEMATURIA: Primary | ICD-10-CM

## 2024-01-27 DIAGNOSIS — R10.84 GENERALIZED ABDOMINAL PAIN: ICD-10-CM

## 2024-01-27 PROCEDURE — 71045 X-RAY EXAM CHEST 1 VIEW: CPT

## 2024-01-27 PROCEDURE — 99285 EMERGENCY DEPT VISIT HI MDM: CPT

## 2024-01-27 PROCEDURE — 93005 ELECTROCARDIOGRAM TRACING: CPT | Performed by: STUDENT IN AN ORGANIZED HEALTH CARE EDUCATION/TRAINING PROGRAM

## 2024-01-27 ASSESSMENT — PAIN DESCRIPTION - ORIENTATION: ORIENTATION: MID;UPPER

## 2024-01-27 ASSESSMENT — PAIN - FUNCTIONAL ASSESSMENT: PAIN_FUNCTIONAL_ASSESSMENT: 0-10

## 2024-01-27 ASSESSMENT — PAIN SCALES - GENERAL: PAINLEVEL_OUTOF10: 5

## 2024-01-27 ASSESSMENT — PAIN DESCRIPTION - LOCATION: LOCATION: ABDOMEN

## 2024-01-27 ASSESSMENT — PAIN DESCRIPTION - DESCRIPTORS: DESCRIPTORS: TENDER

## 2024-01-28 ENCOUNTER — APPOINTMENT (OUTPATIENT)
Facility: HOSPITAL | Age: 79
End: 2024-01-28
Payer: MEDICARE

## 2024-01-28 VITALS
HEIGHT: 70 IN | DIASTOLIC BLOOD PRESSURE: 64 MMHG | TEMPERATURE: 98.1 F | SYSTOLIC BLOOD PRESSURE: 124 MMHG | HEART RATE: 70 BPM | BODY MASS INDEX: 28.63 KG/M2 | OXYGEN SATURATION: 96 % | WEIGHT: 199.96 LBS | RESPIRATION RATE: 19 BRPM

## 2024-01-28 LAB
ALBUMIN SERPL-MCNC: 2.9 G/DL (ref 3.5–5)
ALBUMIN/GLOB SERPL: 0.6 (ref 1.1–2.2)
ALP SERPL-CCNC: 187 U/L (ref 45–117)
ALT SERPL-CCNC: 36 U/L (ref 12–78)
ANION GAP SERPL CALC-SCNC: 5 MMOL/L (ref 5–15)
APPEARANCE UR: ABNORMAL
AST SERPL-CCNC: 66 U/L (ref 15–37)
BACTERIA URNS QL MICRO: ABNORMAL /HPF
BASOPHILS # BLD: 0 K/UL (ref 0–0.1)
BASOPHILS NFR BLD: 1 % (ref 0–1)
BILIRUB SERPL-MCNC: 1.5 MG/DL (ref 0.2–1)
BILIRUB UR QL: NEGATIVE
BUN SERPL-MCNC: 32 MG/DL (ref 6–20)
BUN/CREAT SERPL: 28 (ref 12–20)
CALCIUM SERPL-MCNC: 8.7 MG/DL (ref 8.5–10.1)
CHLORIDE SERPL-SCNC: 110 MMOL/L (ref 97–108)
CO2 SERPL-SCNC: 28 MMOL/L (ref 21–32)
COLOR UR: ABNORMAL
COMMENT:: NORMAL
CREAT SERPL-MCNC: 1.16 MG/DL (ref 0.7–1.3)
DIFFERENTIAL METHOD BLD: ABNORMAL
EKG ATRIAL RATE: 68 BPM
EKG DIAGNOSIS: NORMAL
EKG P AXIS: 6 DEGREES
EKG P-R INTERVAL: 176 MS
EKG Q-T INTERVAL: 440 MS
EKG QRS DURATION: 72 MS
EKG QTC CALCULATION (BAZETT): 467 MS
EKG R AXIS: -1 DEGREES
EKG T AXIS: 4 DEGREES
EKG VENTRICULAR RATE: 68 BPM
EOSINOPHIL # BLD: 0.2 K/UL (ref 0–0.4)
EOSINOPHIL NFR BLD: 3 % (ref 0–7)
EPITH CASTS URNS QL MICRO: ABNORMAL /LPF
ERYTHROCYTE [DISTWIDTH] IN BLOOD BY AUTOMATED COUNT: 15.8 % (ref 11.5–14.5)
GLOBULIN SER CALC-MCNC: 4.6 G/DL (ref 2–4)
GLUCOSE SERPL-MCNC: 66 MG/DL (ref 65–100)
GLUCOSE UR STRIP.AUTO-MCNC: NEGATIVE MG/DL
HCT VFR BLD AUTO: 33.7 % (ref 36.6–50.3)
HGB BLD-MCNC: 11.2 G/DL (ref 12.1–17)
HGB UR QL STRIP: ABNORMAL
IMM GRANULOCYTES # BLD AUTO: 0 K/UL (ref 0–0.04)
IMM GRANULOCYTES NFR BLD AUTO: 0 % (ref 0–0.5)
KETONES UR QL STRIP.AUTO: ABNORMAL MG/DL
LEUKOCYTE ESTERASE UR QL STRIP.AUTO: ABNORMAL
LIPASE SERPL-CCNC: 39 U/L (ref 13–75)
LYMPHOCYTES # BLD: 1.6 K/UL (ref 0.8–3.5)
LYMPHOCYTES NFR BLD: 24 % (ref 12–49)
MAGNESIUM SERPL-MCNC: 2 MG/DL (ref 1.6–2.4)
MCH RBC QN AUTO: 35.3 PG (ref 26–34)
MCHC RBC AUTO-ENTMCNC: 33.2 G/DL (ref 30–36.5)
MCV RBC AUTO: 106.3 FL (ref 80–99)
MONOCYTES # BLD: 0.7 K/UL (ref 0–1)
MONOCYTES NFR BLD: 10 % (ref 5–13)
NEUTS SEG # BLD: 4.1 K/UL (ref 1.8–8)
NEUTS SEG NFR BLD: 62 % (ref 32–75)
NITRITE UR QL STRIP.AUTO: NEGATIVE
NRBC # BLD: 0 K/UL (ref 0–0.01)
NRBC BLD-RTO: 0 PER 100 WBC
PH UR STRIP: 5.5 (ref 5–8)
PLATELET # BLD AUTO: 293 K/UL (ref 150–400)
PMV BLD AUTO: 9.9 FL (ref 8.9–12.9)
POTASSIUM SERPL-SCNC: 3.7 MMOL/L (ref 3.5–5.1)
PROT SERPL-MCNC: 7.5 G/DL (ref 6.4–8.2)
PROT UR STRIP-MCNC: 100 MG/DL
RBC # BLD AUTO: 3.17 M/UL (ref 4.1–5.7)
RBC #/AREA URNS HPF: ABNORMAL /HPF (ref 0–5)
SODIUM SERPL-SCNC: 143 MMOL/L (ref 136–145)
SP GR UR REFRACTOMETRY: 1.01
SPECIMEN HOLD: NORMAL
TROPONIN I SERPL HS-MCNC: 12 NG/L (ref 0–76)
TROPONIN I SERPL HS-MCNC: 12 NG/L (ref 0–76)
URINE CULTURE IF INDICATED: ABNORMAL
UROBILINOGEN UR QL STRIP.AUTO: 2 EU/DL (ref 0.2–1)
WBC # BLD AUTO: 6.6 K/UL (ref 4.1–11.1)
WBC URNS QL MICRO: >100 /HPF (ref 0–4)

## 2024-01-28 PROCEDURE — 87186 SC STD MICRODIL/AGAR DIL: CPT

## 2024-01-28 PROCEDURE — 83735 ASSAY OF MAGNESIUM: CPT

## 2024-01-28 PROCEDURE — 6360000002 HC RX W HCPCS: Performed by: STUDENT IN AN ORGANIZED HEALTH CARE EDUCATION/TRAINING PROGRAM

## 2024-01-28 PROCEDURE — 74177 CT ABD & PELVIS W/CONTRAST: CPT

## 2024-01-28 PROCEDURE — 96365 THER/PROPH/DIAG IV INF INIT: CPT

## 2024-01-28 PROCEDURE — 84484 ASSAY OF TROPONIN QUANT: CPT

## 2024-01-28 PROCEDURE — 83690 ASSAY OF LIPASE: CPT

## 2024-01-28 PROCEDURE — 87077 CULTURE AEROBIC IDENTIFY: CPT

## 2024-01-28 PROCEDURE — 6360000004 HC RX CONTRAST MEDICATION: Performed by: EMERGENCY MEDICINE

## 2024-01-28 PROCEDURE — 87086 URINE CULTURE/COLONY COUNT: CPT

## 2024-01-28 PROCEDURE — 36415 COLL VENOUS BLD VENIPUNCTURE: CPT

## 2024-01-28 PROCEDURE — 85025 COMPLETE CBC W/AUTO DIFF WBC: CPT

## 2024-01-28 PROCEDURE — 81001 URINALYSIS AUTO W/SCOPE: CPT

## 2024-01-28 PROCEDURE — 80053 COMPREHEN METABOLIC PANEL: CPT

## 2024-01-28 PROCEDURE — 2580000003 HC RX 258: Performed by: STUDENT IN AN ORGANIZED HEALTH CARE EDUCATION/TRAINING PROGRAM

## 2024-01-28 RX ORDER — CEFDINIR 300 MG/1
300 CAPSULE ORAL 2 TIMES DAILY
Qty: 14 CAPSULE | Refills: 0 | Status: SHIPPED | OUTPATIENT
Start: 2024-01-28 | End: 2024-02-04

## 2024-01-28 RX ADMIN — IOPAMIDOL 100 ML: 755 INJECTION, SOLUTION INTRAVENOUS at 01:59

## 2024-01-28 RX ADMIN — CEFTRIAXONE SODIUM 1000 MG: 1 INJECTION, POWDER, FOR SOLUTION INTRAMUSCULAR; INTRAVENOUS at 02:55

## 2024-01-28 ASSESSMENT — LIFESTYLE VARIABLES
HOW MANY STANDARD DRINKS CONTAINING ALCOHOL DO YOU HAVE ON A TYPICAL DAY: PATIENT DOES NOT DRINK
HOW OFTEN DO YOU HAVE A DRINK CONTAINING ALCOHOL: NEVER

## 2024-01-28 NOTE — ED PROVIDER NOTES
voice recognition software. Quite often unanticipated grammatical, syntax, homophones, and other interpretive errors are inadvertently transcribed by the computer software. Please disregards these errors. Please excuse any errors that have escaped final proofreading.)         Foster Raya MD  01/28/24 0317

## 2024-01-28 NOTE — ED NOTES
Attempted IV access x4 tries without success.  Blood was obtained and sent to lab for analysis, but the IV catheter couldn't be advanced.  ER MD notified, no additional sticks necessary at this time.

## 2024-01-30 LAB
BACTERIA SPEC CULT: ABNORMAL
CC UR VC: ABNORMAL
SERVICE CMNT-IMP: ABNORMAL

## 2024-02-03 PROBLEM — Z01.810 PREOP CARDIOVASCULAR EXAM: Status: RESOLVED | Noted: 2024-01-04 | Resolved: 2024-02-03

## 2024-03-06 ENCOUNTER — APPOINTMENT (OUTPATIENT)
Facility: HOSPITAL | Age: 79
DRG: 690 | End: 2024-03-06
Payer: MEDICARE

## 2024-03-06 ENCOUNTER — HOSPITAL ENCOUNTER (INPATIENT)
Facility: HOSPITAL | Age: 79
LOS: 6 days | Discharge: NURSING, REHAB OR LONG-TERM CARE | DRG: 690 | End: 2024-03-13
Attending: EMERGENCY MEDICINE | Admitting: INTERNAL MEDICINE
Payer: MEDICARE

## 2024-03-06 DIAGNOSIS — R31.0 GROSS HEMATURIA: Primary | ICD-10-CM

## 2024-03-06 LAB
ALBUMIN SERPL-MCNC: 3.6 G/DL (ref 3.5–5)
ALBUMIN/GLOB SERPL: 0.8 (ref 1.1–2.2)
ALP SERPL-CCNC: 119 U/L (ref 45–117)
ALT SERPL-CCNC: 18 U/L (ref 12–78)
ANION GAP SERPL CALC-SCNC: 3 MMOL/L (ref 5–15)
AST SERPL-CCNC: 12 U/L (ref 15–37)
BASOPHILS # BLD: 0 K/UL (ref 0–0.1)
BASOPHILS NFR BLD: 0 % (ref 0–1)
BILIRUB SERPL-MCNC: 0.6 MG/DL (ref 0.2–1)
BUN SERPL-MCNC: 20 MG/DL (ref 6–20)
BUN/CREAT SERPL: 14 (ref 12–20)
CALCIUM SERPL-MCNC: 8.9 MG/DL (ref 8.5–10.1)
CHLORIDE SERPL-SCNC: 106 MMOL/L (ref 97–108)
CO2 SERPL-SCNC: 29 MMOL/L (ref 21–32)
CREAT SERPL-MCNC: 1.41 MG/DL (ref 0.7–1.3)
DIFFERENTIAL METHOD BLD: ABNORMAL
EOSINOPHIL # BLD: 0.2 K/UL (ref 0–0.4)
EOSINOPHIL NFR BLD: 3 % (ref 0–7)
ERYTHROCYTE [DISTWIDTH] IN BLOOD BY AUTOMATED COUNT: 13.5 % (ref 11.5–14.5)
GLOBULIN SER CALC-MCNC: 4.4 G/DL (ref 2–4)
GLUCOSE SERPL-MCNC: 373 MG/DL (ref 65–100)
HCT VFR BLD AUTO: 43.4 % (ref 36.6–50.3)
HGB BLD-MCNC: 14.3 G/DL (ref 12.1–17)
IMM GRANULOCYTES # BLD AUTO: 0 K/UL (ref 0–0.04)
IMM GRANULOCYTES NFR BLD AUTO: 0 % (ref 0–0.5)
LYMPHOCYTES # BLD: 2.3 K/UL (ref 0.8–3.5)
LYMPHOCYTES NFR BLD: 47 % (ref 12–49)
MAGNESIUM SERPL-MCNC: 2.3 MG/DL (ref 1.6–2.4)
MCH RBC QN AUTO: 35.3 PG (ref 26–34)
MCHC RBC AUTO-ENTMCNC: 32.9 G/DL (ref 30–36.5)
MCV RBC AUTO: 107.2 FL (ref 80–99)
MONOCYTES # BLD: 0.6 K/UL (ref 0–1)
MONOCYTES NFR BLD: 11 % (ref 5–13)
NEUTS SEG # BLD: 2 K/UL (ref 1.8–8)
NEUTS SEG NFR BLD: 39 % (ref 32–75)
NRBC # BLD: 0 K/UL (ref 0–0.01)
NRBC BLD-RTO: 0 PER 100 WBC
PLATELET # BLD AUTO: 177 K/UL (ref 150–400)
PMV BLD AUTO: 9.7 FL (ref 8.9–12.9)
POTASSIUM SERPL-SCNC: 3.3 MMOL/L (ref 3.5–5.1)
PROT SERPL-MCNC: 8 G/DL (ref 6.4–8.2)
RBC # BLD AUTO: 4.05 M/UL (ref 4.1–5.7)
SODIUM SERPL-SCNC: 138 MMOL/L (ref 136–145)
WBC # BLD AUTO: 5 K/UL (ref 4.1–11.1)

## 2024-03-06 PROCEDURE — 36415 COLL VENOUS BLD VENIPUNCTURE: CPT

## 2024-03-06 PROCEDURE — 6360000004 HC RX CONTRAST MEDICATION: Performed by: EMERGENCY MEDICINE

## 2024-03-06 PROCEDURE — 83735 ASSAY OF MAGNESIUM: CPT

## 2024-03-06 PROCEDURE — 74177 CT ABD & PELVIS W/CONTRAST: CPT

## 2024-03-06 PROCEDURE — 96372 THER/PROPH/DIAG INJ SC/IM: CPT

## 2024-03-06 PROCEDURE — 6360000002 HC RX W HCPCS: Performed by: EMERGENCY MEDICINE

## 2024-03-06 PROCEDURE — 99285 EMERGENCY DEPT VISIT HI MDM: CPT

## 2024-03-06 PROCEDURE — 85025 COMPLETE CBC W/AUTO DIFF WBC: CPT

## 2024-03-06 PROCEDURE — 6370000000 HC RX 637 (ALT 250 FOR IP): Performed by: EMERGENCY MEDICINE

## 2024-03-06 PROCEDURE — 80053 COMPREHEN METABOLIC PANEL: CPT

## 2024-03-06 RX ORDER — LIDOCAINE HYDROCHLORIDE 20 MG/ML
JELLY TOPICAL PRN
Status: DISCONTINUED | OUTPATIENT
Start: 2024-03-06 | End: 2024-03-13 | Stop reason: HOSPADM

## 2024-03-06 RX ORDER — HALOPERIDOL 5 MG/ML
2.5 INJECTION INTRAMUSCULAR EVERY 6 HOURS PRN
Status: DISCONTINUED | OUTPATIENT
Start: 2024-03-06 | End: 2024-03-13 | Stop reason: HOSPADM

## 2024-03-06 RX ORDER — ACETAMINOPHEN 325 MG/1
650 TABLET ORAL EVERY 4 HOURS PRN
Status: DISCONTINUED | OUTPATIENT
Start: 2024-03-06 | End: 2024-03-07 | Stop reason: SDUPTHER

## 2024-03-06 RX ORDER — ACETAMINOPHEN 500 MG
1000 TABLET ORAL
Status: COMPLETED | OUTPATIENT
Start: 2024-03-06 | End: 2024-03-06

## 2024-03-06 RX ORDER — 0.9 % SODIUM CHLORIDE 0.9 %
1000 INTRAVENOUS SOLUTION INTRAVENOUS ONCE
Status: COMPLETED | OUTPATIENT
Start: 2024-03-06 | End: 2024-03-07

## 2024-03-06 RX ORDER — ONDANSETRON 2 MG/ML
4 INJECTION INTRAMUSCULAR; INTRAVENOUS EVERY 4 HOURS PRN
Status: DISCONTINUED | OUTPATIENT
Start: 2024-03-06 | End: 2024-03-07 | Stop reason: SDUPTHER

## 2024-03-06 RX ADMIN — LIDOCAINE HYDROCHLORIDE: 20 JELLY TOPICAL at 22:48

## 2024-03-06 RX ADMIN — IOPAMIDOL 100 ML: 755 INJECTION, SOLUTION INTRAVENOUS at 20:01

## 2024-03-06 RX ADMIN — ACETAMINOPHEN 1000 MG: 500 TABLET ORAL at 19:22

## 2024-03-06 RX ADMIN — HALOPERIDOL LACTATE 2.5 MG: 5 INJECTION, SOLUTION INTRAMUSCULAR at 23:31

## 2024-03-07 ENCOUNTER — APPOINTMENT (OUTPATIENT)
Facility: HOSPITAL | Age: 79
DRG: 690 | End: 2024-03-07
Payer: MEDICARE

## 2024-03-07 PROBLEM — R31.0 GROSS HEMATURIA: Status: ACTIVE | Noted: 2024-03-07

## 2024-03-07 LAB
ANION GAP SERPL CALC-SCNC: 6 MMOL/L (ref 5–15)
BUN SERPL-MCNC: 27 MG/DL (ref 6–20)
BUN/CREAT SERPL: 17 (ref 12–20)
CALCIUM SERPL-MCNC: 8.3 MG/DL (ref 8.5–10.1)
CHLORIDE SERPL-SCNC: 113 MMOL/L (ref 97–108)
CO2 SERPL-SCNC: 21 MMOL/L (ref 21–32)
CREAT SERPL-MCNC: 1.56 MG/DL (ref 0.7–1.3)
ERYTHROCYTE [DISTWIDTH] IN BLOOD BY AUTOMATED COUNT: 14 % (ref 11.5–14.5)
GLUCOSE BLD STRIP.AUTO-MCNC: 216 MG/DL (ref 65–117)
GLUCOSE BLD STRIP.AUTO-MCNC: 258 MG/DL (ref 65–117)
GLUCOSE BLD STRIP.AUTO-MCNC: 272 MG/DL (ref 65–117)
GLUCOSE BLD STRIP.AUTO-MCNC: 313 MG/DL (ref 65–117)
GLUCOSE BLD STRIP.AUTO-MCNC: 83 MG/DL (ref 65–117)
GLUCOSE BLD STRIP.AUTO-MCNC: 88 MG/DL (ref 65–117)
GLUCOSE SERPL-MCNC: 209 MG/DL (ref 65–100)
HCT VFR BLD AUTO: 37.6 % (ref 36.6–50.3)
HGB BLD-MCNC: 12.3 G/DL (ref 12.1–17)
MCH RBC QN AUTO: 35.8 PG (ref 26–34)
MCHC RBC AUTO-ENTMCNC: 32.7 G/DL (ref 30–36.5)
MCV RBC AUTO: 109.3 FL (ref 80–99)
NRBC # BLD: 0 K/UL (ref 0–0.01)
NRBC BLD-RTO: 0 PER 100 WBC
PLATELET # BLD AUTO: 145 K/UL (ref 150–400)
PMV BLD AUTO: 10.6 FL (ref 8.9–12.9)
POTASSIUM SERPL-SCNC: 3.6 MMOL/L (ref 3.5–5.1)
RBC # BLD AUTO: 3.44 M/UL (ref 4.1–5.7)
SERVICE CMNT-IMP: ABNORMAL
SERVICE CMNT-IMP: NORMAL
SERVICE CMNT-IMP: NORMAL
SODIUM SERPL-SCNC: 140 MMOL/L (ref 136–145)
WBC # BLD AUTO: 11.3 K/UL (ref 4.1–11.1)

## 2024-03-07 PROCEDURE — 36415 COLL VENOUS BLD VENIPUNCTURE: CPT

## 2024-03-07 PROCEDURE — 51702 INSERT TEMP BLADDER CATH: CPT

## 2024-03-07 PROCEDURE — 2580000003 HC RX 258: Performed by: EMERGENCY MEDICINE

## 2024-03-07 PROCEDURE — 80048 BASIC METABOLIC PNL TOTAL CA: CPT

## 2024-03-07 PROCEDURE — 1100000003 HC PRIVATE W/ TELEMETRY

## 2024-03-07 PROCEDURE — 85027 COMPLETE CBC AUTOMATED: CPT

## 2024-03-07 PROCEDURE — 87186 SC STD MICRODIL/AGAR DIL: CPT

## 2024-03-07 PROCEDURE — 87086 URINE CULTURE/COLONY COUNT: CPT

## 2024-03-07 PROCEDURE — 87077 CULTURE AEROBIC IDENTIFY: CPT

## 2024-03-07 PROCEDURE — P9041 ALBUMIN (HUMAN),5%, 50ML: HCPCS | Performed by: HOSPITALIST

## 2024-03-07 PROCEDURE — 2580000003 HC RX 258: Performed by: INTERNAL MEDICINE

## 2024-03-07 PROCEDURE — 6360000002 HC RX W HCPCS: Performed by: INTERNAL MEDICINE

## 2024-03-07 PROCEDURE — 71045 X-RAY EXAM CHEST 1 VIEW: CPT

## 2024-03-07 PROCEDURE — 6370000000 HC RX 637 (ALT 250 FOR IP): Performed by: INTERNAL MEDICINE

## 2024-03-07 PROCEDURE — 6360000002 HC RX W HCPCS: Performed by: EMERGENCY MEDICINE

## 2024-03-07 PROCEDURE — 6360000002 HC RX W HCPCS: Performed by: HOSPITALIST

## 2024-03-07 PROCEDURE — 6370000000 HC RX 637 (ALT 250 FOR IP): Performed by: STUDENT IN AN ORGANIZED HEALTH CARE EDUCATION/TRAINING PROGRAM

## 2024-03-07 PROCEDURE — 82962 GLUCOSE BLOOD TEST: CPT

## 2024-03-07 RX ORDER — PRASUGREL 10 MG/1
10 TABLET, FILM COATED ORAL DAILY
Status: DISCONTINUED | OUTPATIENT
Start: 2024-03-07 | End: 2024-03-13 | Stop reason: HOSPADM

## 2024-03-07 RX ORDER — ONDANSETRON 2 MG/ML
4 INJECTION INTRAMUSCULAR; INTRAVENOUS EVERY 6 HOURS PRN
Status: DISCONTINUED | OUTPATIENT
Start: 2024-03-07 | End: 2024-03-13 | Stop reason: HOSPADM

## 2024-03-07 RX ORDER — ATORVASTATIN CALCIUM 40 MG/1
80 TABLET, FILM COATED ORAL NIGHTLY
Status: DISCONTINUED | OUTPATIENT
Start: 2024-03-07 | End: 2024-03-13 | Stop reason: HOSPADM

## 2024-03-07 RX ORDER — ASPIRIN 81 MG/1
81 TABLET ORAL DAILY
Status: DISCONTINUED | OUTPATIENT
Start: 2024-03-07 | End: 2024-03-13 | Stop reason: HOSPADM

## 2024-03-07 RX ORDER — ACETAMINOPHEN 325 MG/1
650 TABLET ORAL EVERY 6 HOURS PRN
Status: DISCONTINUED | OUTPATIENT
Start: 2024-03-07 | End: 2024-03-13 | Stop reason: HOSPADM

## 2024-03-07 RX ORDER — INSULIN GLARGINE 100 [IU]/ML
36 INJECTION, SOLUTION SUBCUTANEOUS DAILY
Status: DISCONTINUED | OUTPATIENT
Start: 2024-03-07 | End: 2024-03-08

## 2024-03-07 RX ORDER — ONDANSETRON 4 MG/1
4 TABLET, ORALLY DISINTEGRATING ORAL EVERY 8 HOURS PRN
Status: DISCONTINUED | OUTPATIENT
Start: 2024-03-07 | End: 2024-03-13 | Stop reason: HOSPADM

## 2024-03-07 RX ORDER — INSULIN LISPRO 100 [IU]/ML
7 INJECTION, SOLUTION INTRAVENOUS; SUBCUTANEOUS
Status: DISCONTINUED | OUTPATIENT
Start: 2024-03-07 | End: 2024-03-08

## 2024-03-07 RX ORDER — ACETAMINOPHEN 650 MG/1
650 SUPPOSITORY RECTAL EVERY 6 HOURS PRN
Status: DISCONTINUED | OUTPATIENT
Start: 2024-03-07 | End: 2024-03-13 | Stop reason: HOSPADM

## 2024-03-07 RX ORDER — SODIUM CHLORIDE AND POTASSIUM CHLORIDE 150; 900 MG/100ML; MG/100ML
INJECTION, SOLUTION INTRAVENOUS CONTINUOUS
Status: DISCONTINUED | OUTPATIENT
Start: 2024-03-07 | End: 2024-03-09

## 2024-03-07 RX ORDER — INSULIN LISPRO 100 [IU]/ML
0-4 INJECTION, SOLUTION INTRAVENOUS; SUBCUTANEOUS NIGHTLY
Status: DISCONTINUED | OUTPATIENT
Start: 2024-03-07 | End: 2024-03-13 | Stop reason: HOSPADM

## 2024-03-07 RX ORDER — INSULIN ASPART 100 [IU]/ML
45 INJECTION, SUSPENSION SUBCUTANEOUS
Status: DISCONTINUED | OUTPATIENT
Start: 2024-03-07 | End: 2024-03-07

## 2024-03-07 RX ORDER — METOPROLOL SUCCINATE 25 MG/1
12.5 TABLET, EXTENDED RELEASE ORAL DAILY
Status: DISCONTINUED | OUTPATIENT
Start: 2024-03-07 | End: 2024-03-13 | Stop reason: HOSPADM

## 2024-03-07 RX ORDER — SODIUM CHLORIDE 9 MG/ML
INJECTION, SOLUTION INTRAVENOUS PRN
Status: DISCONTINUED | OUTPATIENT
Start: 2024-03-07 | End: 2024-03-13 | Stop reason: HOSPADM

## 2024-03-07 RX ORDER — INSULIN LISPRO 100 [IU]/ML
0-8 INJECTION, SOLUTION INTRAVENOUS; SUBCUTANEOUS
Status: DISCONTINUED | OUTPATIENT
Start: 2024-03-07 | End: 2024-03-13 | Stop reason: HOSPADM

## 2024-03-07 RX ORDER — SODIUM CHLORIDE 0.9 % (FLUSH) 0.9 %
5-40 SYRINGE (ML) INJECTION EVERY 12 HOURS SCHEDULED
Status: DISCONTINUED | OUTPATIENT
Start: 2024-03-07 | End: 2024-03-13 | Stop reason: HOSPADM

## 2024-03-07 RX ORDER — HYDRALAZINE HYDROCHLORIDE 20 MG/ML
10 INJECTION INTRAMUSCULAR; INTRAVENOUS EVERY 6 HOURS PRN
Status: DISCONTINUED | OUTPATIENT
Start: 2024-03-07 | End: 2024-03-13 | Stop reason: HOSPADM

## 2024-03-07 RX ORDER — ALBUMIN, HUMAN INJ 5% 5 %
25 SOLUTION INTRAVENOUS ONCE
Status: COMPLETED | OUTPATIENT
Start: 2024-03-07 | End: 2024-03-08

## 2024-03-07 RX ORDER — SODIUM CHLORIDE 0.9 % (FLUSH) 0.9 %
5-40 SYRINGE (ML) INJECTION PRN
Status: DISCONTINUED | OUTPATIENT
Start: 2024-03-07 | End: 2024-03-13 | Stop reason: HOSPADM

## 2024-03-07 RX ORDER — POLYETHYLENE GLYCOL 3350 17 G/17G
17 POWDER, FOR SOLUTION ORAL DAILY PRN
Status: DISCONTINUED | OUTPATIENT
Start: 2024-03-07 | End: 2024-03-13 | Stop reason: HOSPADM

## 2024-03-07 RX ADMIN — METOPROLOL SUCCINATE 12.5 MG: 25 TABLET, EXTENDED RELEASE ORAL at 09:50

## 2024-03-07 RX ADMIN — INSULIN GLARGINE 36 UNITS: 100 INJECTION, SOLUTION SUBCUTANEOUS at 09:51

## 2024-03-07 RX ADMIN — SODIUM CHLORIDE 1000 MG: 900 INJECTION INTRAVENOUS at 03:06

## 2024-03-07 RX ADMIN — SODIUM CHLORIDE, PRESERVATIVE FREE 10 ML: 5 INJECTION INTRAVENOUS at 21:26

## 2024-03-07 RX ADMIN — INSULIN LISPRO 7 UNITS: 100 INJECTION, SOLUTION INTRAVENOUS; SUBCUTANEOUS at 12:37

## 2024-03-07 RX ADMIN — INSULIN LISPRO 2 UNITS: 100 INJECTION, SOLUTION INTRAVENOUS; SUBCUTANEOUS at 12:37

## 2024-03-07 RX ADMIN — SODIUM CHLORIDE 1000 MG: 900 INJECTION INTRAVENOUS at 09:57

## 2024-03-07 RX ADMIN — POTASSIUM CHLORIDE AND SODIUM CHLORIDE: 900; 150 INJECTION, SOLUTION INTRAVENOUS at 15:32

## 2024-03-07 RX ADMIN — POTASSIUM CHLORIDE AND SODIUM CHLORIDE: 900; 150 INJECTION, SOLUTION INTRAVENOUS at 04:54

## 2024-03-07 RX ADMIN — INSULIN LISPRO 6 UNITS: 100 INJECTION, SOLUTION INTRAVENOUS; SUBCUTANEOUS at 09:51

## 2024-03-07 RX ADMIN — INSULIN LISPRO 7 UNITS: 100 INJECTION, SOLUTION INTRAVENOUS; SUBCUTANEOUS at 19:24

## 2024-03-07 RX ADMIN — ALBUMIN (HUMAN) 25 G: 12.5 INJECTION, SOLUTION INTRAVENOUS at 22:19

## 2024-03-07 RX ADMIN — ATORVASTATIN CALCIUM 80 MG: 40 TABLET, FILM COATED ORAL at 21:19

## 2024-03-07 RX ADMIN — ACETAMINOPHEN 650 MG: 325 TABLET ORAL at 10:49

## 2024-03-07 RX ADMIN — PRASUGREL 10 MG: 10 TABLET, FILM COATED ORAL at 09:49

## 2024-03-07 RX ADMIN — SODIUM CHLORIDE 1000 ML: 9 INJECTION, SOLUTION INTRAVENOUS at 03:09

## 2024-03-07 RX ADMIN — LEVOTHYROXINE SODIUM 125 MCG: 0.07 TABLET ORAL at 09:50

## 2024-03-07 RX ADMIN — INSULIN LISPRO 7 UNITS: 100 INJECTION, SOLUTION INTRAVENOUS; SUBCUTANEOUS at 09:51

## 2024-03-07 RX ADMIN — ACETAMINOPHEN 650 MG: 325 TABLET ORAL at 19:24

## 2024-03-07 RX ADMIN — SODIUM CHLORIDE, PRESERVATIVE FREE 10 ML: 5 INJECTION INTRAVENOUS at 09:52

## 2024-03-07 RX ADMIN — ASPIRIN 81 MG: 81 TABLET, COATED ORAL at 09:50

## 2024-03-07 ASSESSMENT — PAIN SCALES - GENERAL
PAINLEVEL_OUTOF10: 4
PAINLEVEL_OUTOF10: 5

## 2024-03-07 ASSESSMENT — PAIN DESCRIPTION - ORIENTATION: ORIENTATION: ANTERIOR

## 2024-03-07 ASSESSMENT — PAIN - FUNCTIONAL ASSESSMENT: PAIN_FUNCTIONAL_ASSESSMENT: ACTIVITIES ARE NOT PREVENTED

## 2024-03-07 ASSESSMENT — PAIN DESCRIPTION - FREQUENCY: FREQUENCY: CONTINUOUS

## 2024-03-07 ASSESSMENT — PAIN DESCRIPTION - DESCRIPTORS: DESCRIPTORS: DISCOMFORT

## 2024-03-07 ASSESSMENT — PAIN DESCRIPTION - LOCATION: LOCATION: GROIN

## 2024-03-07 ASSESSMENT — PAIN DESCRIPTION - PAIN TYPE: TYPE: ACUTE PAIN

## 2024-03-07 ASSESSMENT — PAIN DESCRIPTION - ONSET: ONSET: ON-GOING

## 2024-03-07 NOTE — H&P
Hospitalist Admission Note    NAME:   Errol Brandt   : 1945   MRN: [id number]     Date/Time: 3/6/2024 11:59 PM    Patient PCP: Gary Motley MD    ______________________________________________________________________  Given the patient's current clinical presentation, I have a high level of concern for decompensation if discharged from the emergency department.  Complex decision making was performed, which includes reviewing the patient's available past medical records, laboratory results, and x-ray films.       My assessment of this patient's clinical condition and my plan of care is as follows.    Assessment / Plan:    Gross hematuria POA-history of being on dual antiplatelet agent after cardiac stent  Acute cystitis POA  Bilateral hydro ureteral nephrosis with blood clots in urinary bladder POA  Bilateral nephrolithiasis non-obstructing POA  DANIELLE POA-likely postobstructive  Hypokalemia POA  Hemoglobin 14.3  Potassium 3.3  Creatinine 1.4  CT abdomen pelvis noted for bilateral hydronephrosis with blood pressure in bladder ?  Clots with evidence of acute cystitis    Admit to remote telemetry bed  Status post Soler placed by ED, seems to be draining well without any clots but is lokesh bloody.  Inpatient urology consulted-likely needs CBI, cystoscopy for diagnostic and therapeutic.  Will keep patient n.p.o. for now till seen by urology for further plans  IV fluids with potassium in it, follow BMP in a.m.  Check UA with reflex culture  Empiric IV antibiotics for now-started on ceftriaxone in ED  Continue aspirin and Effient for now-cannot be stopped due to cardiac stent  Will need urology clearance for patient to go home on dual antiplatelet agent-if unable to get cleared will need cardiology input/consult  Transfuse as needed    Hypertension  Hyperlipidemia  CAD status post non-STEMI status post PCI to RCA in 2024-on DAPT since  Diabetes type 2 insulin-dependent with uncontrolled  high density material is noted within the bladder concerning for blood products. Increased bilateral hydroureteronephrosis appears to be related to bladder distention. Punctate bilateral nonobstructing renal stones. Mild pericholecystic edema but no stones are identified. Chronic pancreatitis.     _______________________________________________________________________    TOTAL TIME:  76 Minutes    Critical Care Provided     Minutes non procedure based    Signed: Lindsey Zendejas MD    Procedures: see electronic medical records for all procedures/Xrays and details which were not copied into this note but were reviewed prior to creation of Plan.

## 2024-03-07 NOTE — CONSULTS
Requesting Provider: Amy Lopez MD - Reason for Consultation: \"Gross hematuria with bilateral hydronephrosis\"  Pre-existing [facility] Patient:   No                Patient: Errol Brandt MRN: [id number]  SSN: [id number]    YOB: 1945  Age: 79 y.o.  Sex: male     Location: [address] [place] [address]       Code Status: Full Code   PCP: Gary Motley MD  - [phone]   Emergency Contact:  Primary Emergency Contact: Julio Brandt, Home Phone: [phone]   Race/Orthodox/Language: White (non-) / Adventist / Speaks English   Payor: Payor: HU[employer] MEDICARE / Plan: HU[employer] GOLD PLUS HMO / Product Type: *No Product type* /    Prior Admission Data: 1/28/24 [facility] EMERGENCY DEPT [facility]   Hospitalized:  Hospital Day: 2 - Admitted 3/6/2024  6:14 PM   POD # * No surgery found *  by * Surgery not found * - Blood Loss: * No surgery found * * Surgery not found *     CONSULTANTS  IP CONSULT TO HOSPITALIST  IP CONSULT TO UROLOGY   ADMISSION DIAGNOSES  [unfilled]      Assessment/Plan:       Gross Hematuria - suspect 2/2 acute cystitis and BPH at this time  Urinary Retention - acute on chronic  Bilateral Hydroureteronephrosis   Non-Obstructing Bilateral Renal Stones  - No urological surgical intervention recommended at this time  - I performed manual bladder irrigation on the patient at my rounds 3/7/2024.  He has a 22 Serbian two-way Soler currently.  He tolerated manual bladder irrigation with ease.  Suprapubic pain resolved on completion of manual bladder irrigation. I removed large amount of small to medium clots from his bladder.  I irrigated his bladder until no more clots and resultant clear yellow-pink urine almost peach colored.  Patient tolerated very well.      - For now; leave Soler catheter in place and do not remove.  - Patient would benefit from eventual cystoscopy possibly outpatient    - Nursing staff instructed and provided recommendations for performing manual bladder irrigation at  renal  stones. Mild pericholecystic edema but no stones are identified. Chronic  pancreatitis.    US Result (most recent):  US RETROPERITONEAL COMPLETE 04/07/2022    Narrative  Clinical indication: Chronic renal insufficiency.    Real-time ultrasonography of the kidneys and retroperitoneum.    The right kidney measured 10.6 cm, the left 12.1. There is no obstruction or  masses. Echogenicity is within normal limits. There are bilateral small  intrarenal calculi. There is a 1 cm cyst on the left.  Aorta is arteriosclerotic. Iliacs are poorly visualized due to gas.  The IVC appears normal.  The bladder appears unremarkable. The liver is echogenic.  The prostate is visualized and measured 2.6 x 2.8 x 2.1 cm.    Impression  Nonobstructing intrarenal calculi.  .    Cultures   [unfilled]     Past History: (Complete 2+/3 areas)   No Known Allergies   Current Facility-Administered Medications   Medication Dose Route Frequency    cefTRIAXone (ROCEPHIN) 1,000 mg in sodium chloride 0.9 % 50 mL IVPB (mini-bag)  1,000 mg IntraVENous Q24H    0.9% NaCl with KCl 20 mEq infusion   IntraVENous Continuous    hydrALAZINE (APRESOLINE) injection 10 mg  10 mg IntraVENous Q6H PRN    aspirin EC tablet 81 mg  81 mg Oral Daily    atorvastatin (LIPITOR) tablet 80 mg  80 mg Oral Nightly    levothyroxine (SYNTHROID) tablet 125 mcg  125 mcg Oral QAM AC    metoprolol succinate (TOPROL XL) extended release tablet 12.5 mg  12.5 mg Oral Daily    prasugrel (EFFIENT) tablet 10 mg  10 mg Oral Daily    insulin lispro (HUMALOG) injection vial 0-8 Units  0-8 Units SubCUTAneous TID WC    insulin lispro (HUMALOG) injection vial 0-4 Units  0-4 Units SubCUTAneous Nightly    sodium chloride flush 0.9 % injection 5-40 mL  5-40 mL IntraVENous 2 times per day    sodium chloride flush 0.9 % injection 5-40 mL  5-40 mL IntraVENous PRN    0.9 % sodium chloride infusion   IntraVENous PRN    ondansetron (ZOFRAN-ODT) disintegrating tablet 4 mg  4 mg Oral Q8H PRN    Or

## 2024-03-07 NOTE — PROGRESS NOTES
..End of Shift Note    Bedside shift change report given to GELY Goins (oncoming nurse) by Valdemar Astorga RN (offgoing nurse).  Report included the following information SBAR, Kardex, Intake/Output, MAR, and Recent Results    Shift worked:  9052-6430     Shift summary and any significant changes:     Pt given prescribed meds per MAR. PRN Tylenol given x2. Manual bladder irrigation completed per provider order. Orders in note from Urology passed onto night shift nurse. Caring rounds completed.            Valdemar Astorga RN

## 2024-03-07 NOTE — PROGRESS NOTES
Patient seen and evaluated at the bedside for gross hematuria/UTI  Urology is following  Continue ceftriaxone  Follow-up cultures  Labs reviewed  Nonbillable note

## 2024-03-07 NOTE — ED PROVIDER NOTES
[facility] EMERGENCY DEPT  EMERGENCY DEPARTMENT ENCOUNTER       Pt Name: Errol Brandt  MRN: [id number]  Birthdate 1945  Date of evaluation: 3/6/2024  Provider: Vanessa Bonner MD   PCP: Gary Motley MD  Note Started: 10:23 PM EST 3/6/24     CHIEF COMPLAINT       Chief Complaint   Patient presents with    Rectal Pain    Constipation     Arrives with EMS from home for 1 week hx of constipation        HISTORY OF PRESENT ILLNESS: 1 or more elements      History From: patient, EMS, History limited by: none     Errol Brandt is a 79 y.o. male who presents with a chief complaint of constipation and rectal pain       Please See [facility] for Additional Details of the HPI/PMH  Nursing Notes were all reviewed and agreed with or any disagreements were addressed in the HPI.     REVIEW OF SYSTEMS        Positives and Pertinent negatives as per HPI.    PAST HISTORY     Past Medical History:  Past Medical History:   Diagnosis Date    Diabetes (HCC)     Ill-defined condition     perpherial artery disease    Ill-defined condition     hyperlidemia       Past Surgical History:  Past Surgical History:   Procedure Laterality Date    CARDIAC PROCEDURE N/A 1/3/2024    Left heart cath / coronary angiography performed by Sae Garzon MD at [facility] CARDIAC CATH LAB    CARDIAC PROCEDURE N/A 1/5/2024    Percutaneous coronary intervention performed by Sae Garzon MD at [facility] CARDIAC CATH LAB    CARDIAC PROCEDURE N/A 1/5/2024    Left heart cath / coronary angiography performed by Sae Garzon MD at [facility] CARDIAC CATH LAB    CARDIAC PROCEDURE N/A 1/5/2024    Intravascular ultrasound performed by Sae Garzon MD at [facility] CARDIAC CATH LAB    CARDIAC PROCEDURE N/A 1/5/2024    Atherectomy coronary performed by Sae Garzon MD at [facility] CARDIAC CATH LAB    CHOLECYSTECTOMY, LAPAROSCOPIC N/A 12/21/2023    CHOLECYSTECTOMY LAPAROSCOPIC CHOLANGIOGRAM performed by Adryan Rees MD at [facility] MAIN OR    COLONOSCOPY N/A 4/27/2022  results of their tests and reason(s) for their admission have been discussed with pt and/or available family. They convey agreement and understanding for the need to be admitted and for the admission diagnosis.     PATIENT REFERRED TO:  No follow-up provider specified.     DISCHARGE MEDICATIONS:     Medication List        ASK your doctor about these medications      aspirin 81 MG EC tablet  Take 1 tablet by mouth daily     atorvastatin 80 MG tablet  Commonly known as: LIPITOR  Take 1 tablet by mouth nightly     insulin aspart prot & aspart injection pen  Commonly known as: NovoLOG 70/30  Inject 20-45 Units into the skin 2 times daily Use 45 units 70/30 insulin daily before breakfast. Use 20 units 70/30 insulin nightly before dinner.     levothyroxine 125 MCG tablet  Commonly known as: SYNTHROID  Take 1 tablet by mouth every morning (before breakfast)     metFORMIN 500 MG extended release tablet  Commonly known as: GLUCOPHAGE-XR     metoprolol succinate 25 MG extended release tablet  Commonly known as: TOPROL XL  Take 0.5 tablets by mouth daily     prasugrel 10 MG Tabs  Commonly known as: EFFIENT  Take 1 tablet by mouth daily     SITagliptin 50 MG tablet  Commonly known as: JANUVIA                DISCONTINUED MEDICATIONS:  Current Discharge Medication List          I am the Primary Clinician of Record.   Vanessa Bonner MD (electronically signed)    (Please note that parts of this dictation were completed with voice recognition software. Quite often unanticipated grammatical, syntax, homophones, and other interpretive errors are inadvertently transcribed by the computer software. Please disregards these errors. Please excuse any errors that have escaped final proofreading.)         Vanessa Bonner MD  03/07/24 2125

## 2024-03-07 NOTE — ED NOTES
This RN and off-going RN arrived to patients bedside for bedside report at 2245 to find patient with blood on both arms, hands, abdomen, both legs, groin and his brief was saturated with blood.  Patient appeared restless and confused.  Patient attempting to get out of bed unassisted.  Both RNs and 2 techs cleaned this patient up and during this process patient had 3 semi-formed bowel movements.  Linen changed.  Gown changed.  Patient repositioned in bed.  Patient self-removed IV at this time.  Off-going RN placed curiel with assistance of this RN.  Dark red blood filled catheter and urine bag.  MD notified at this time about blood and patients agitation.  Haldol administered IM in R deltoid by off-going RN.  Bed alarm placed.    0200 - Curiel irrigated with sterile water until pink-tinged fluid filled curiel tubing.  Patient is now calm and relaxing in bed.  Call bell within reach.  Bed alarm engaged.  Patient still has not received ordered antibiotics, fluid bolus and fluid infusion due to lack of IV access at this point.  Will attempt IV access prior to transporting patient to the floor.

## 2024-03-07 NOTE — PLAN OF CARE
Problem: Pain  Goal: Verbalizes/displays adequate comfort level or baseline comfort level  Flowsheets (Taken 3/7/2024 0421)  Verbalizes/displays adequate comfort level or baseline comfort level:   Encourage patient to monitor pain and request assistance   Assess pain using appropriate pain scale   Administer analgesics based on type and severity of pain and evaluate response     Problem: Safety - Adult  Goal: Free from fall injury  Flowsheets (Taken 3/7/2024 0421)  Free From Fall Injury:   Instruct family/caregiver on patient safety   Based on caregiver fall risk screen, instruct family/caregiver to ask for assistance with transferring infant if caregiver noted to have fall risk factors     Patient admitted from ED patient is alert time self, unable perform admission assessment patient have poor concentration confuse x4.

## 2024-03-08 LAB
ANION GAP SERPL CALC-SCNC: 5 MMOL/L (ref 5–15)
BASOPHILS # BLD: 0.1 K/UL (ref 0–0.1)
BASOPHILS NFR BLD: 1 % (ref 0–1)
BUN SERPL-MCNC: 26 MG/DL (ref 6–20)
BUN/CREAT SERPL: 22 (ref 12–20)
CALCIUM SERPL-MCNC: 7.8 MG/DL (ref 8.5–10.1)
CHLORIDE SERPL-SCNC: 116 MMOL/L (ref 97–108)
CO2 SERPL-SCNC: 23 MMOL/L (ref 21–32)
CREAT SERPL-MCNC: 1.19 MG/DL (ref 0.7–1.3)
DIFFERENTIAL METHOD BLD: ABNORMAL
EOSINOPHIL # BLD: 0.2 K/UL (ref 0–0.4)
EOSINOPHIL NFR BLD: 2 % (ref 0–7)
ERYTHROCYTE [DISTWIDTH] IN BLOOD BY AUTOMATED COUNT: 13.9 % (ref 11.5–14.5)
GLUCOSE BLD STRIP.AUTO-MCNC: 115 MG/DL (ref 65–117)
GLUCOSE BLD STRIP.AUTO-MCNC: 182 MG/DL (ref 65–117)
GLUCOSE BLD STRIP.AUTO-MCNC: 210 MG/DL (ref 65–117)
GLUCOSE BLD STRIP.AUTO-MCNC: 83 MG/DL (ref 65–117)
GLUCOSE SERPL-MCNC: 78 MG/DL (ref 65–100)
HCT VFR BLD AUTO: 33.1 % (ref 36.6–50.3)
HGB BLD-MCNC: 10.6 G/DL (ref 12.1–17)
IMM GRANULOCYTES # BLD AUTO: 0.2 K/UL (ref 0–0.04)
IMM GRANULOCYTES NFR BLD AUTO: 2 % (ref 0–0.5)
LYMPHOCYTES # BLD: 1.6 K/UL (ref 0.8–3.5)
LYMPHOCYTES NFR BLD: 17 % (ref 12–49)
MCH RBC QN AUTO: 34.9 PG (ref 26–34)
MCHC RBC AUTO-ENTMCNC: 32 G/DL (ref 30–36.5)
MCV RBC AUTO: 108.9 FL (ref 80–99)
MONOCYTES # BLD: 1 K/UL (ref 0–1)
MONOCYTES NFR BLD: 11 % (ref 5–13)
NEUTS SEG # BLD: 6.3 K/UL (ref 1.8–8)
NEUTS SEG NFR BLD: 67 % (ref 32–75)
NRBC # BLD: 0 K/UL (ref 0–0.01)
NRBC BLD-RTO: 0 PER 100 WBC
PLATELET # BLD AUTO: 131 K/UL (ref 150–400)
PMV BLD AUTO: 10.3 FL (ref 8.9–12.9)
POTASSIUM SERPL-SCNC: 4 MMOL/L (ref 3.5–5.1)
RBC # BLD AUTO: 3.04 M/UL (ref 4.1–5.7)
RBC MORPH BLD: ABNORMAL
SERVICE CMNT-IMP: ABNORMAL
SERVICE CMNT-IMP: ABNORMAL
SERVICE CMNT-IMP: NORMAL
SERVICE CMNT-IMP: NORMAL
SODIUM SERPL-SCNC: 144 MMOL/L (ref 136–145)
WBC # BLD AUTO: 9.4 K/UL (ref 4.1–11.1)

## 2024-03-08 PROCEDURE — 1100000003 HC PRIVATE W/ TELEMETRY

## 2024-03-08 PROCEDURE — 51702 INSERT TEMP BLADDER CATH: CPT

## 2024-03-08 PROCEDURE — 6370000000 HC RX 637 (ALT 250 FOR IP): Performed by: INTERNAL MEDICINE

## 2024-03-08 PROCEDURE — 80048 BASIC METABOLIC PNL TOTAL CA: CPT

## 2024-03-08 PROCEDURE — 82962 GLUCOSE BLOOD TEST: CPT

## 2024-03-08 PROCEDURE — 6360000002 HC RX W HCPCS: Performed by: INTERNAL MEDICINE

## 2024-03-08 PROCEDURE — 85025 COMPLETE CBC W/AUTO DIFF WBC: CPT

## 2024-03-08 PROCEDURE — 2580000003 HC RX 258: Performed by: INTERNAL MEDICINE

## 2024-03-08 PROCEDURE — 2700000000 HC OXYGEN THERAPY PER DAY

## 2024-03-08 PROCEDURE — 36415 COLL VENOUS BLD VENIPUNCTURE: CPT

## 2024-03-08 RX ADMIN — SODIUM CHLORIDE, PRESERVATIVE FREE 10 ML: 5 INJECTION INTRAVENOUS at 20:45

## 2024-03-08 RX ADMIN — PRASUGREL 10 MG: 10 TABLET, FILM COATED ORAL at 09:31

## 2024-03-08 RX ADMIN — POTASSIUM CHLORIDE AND SODIUM CHLORIDE: 900; 150 INJECTION, SOLUTION INTRAVENOUS at 13:42

## 2024-03-08 RX ADMIN — METOPROLOL SUCCINATE 12.5 MG: 25 TABLET, EXTENDED RELEASE ORAL at 09:31

## 2024-03-08 RX ADMIN — ASPIRIN 81 MG: 81 TABLET, COATED ORAL at 09:32

## 2024-03-08 RX ADMIN — SODIUM CHLORIDE 1000 MG: 900 INJECTION INTRAVENOUS at 09:36

## 2024-03-08 RX ADMIN — LEVOTHYROXINE SODIUM 125 MCG: 0.07 TABLET ORAL at 06:01

## 2024-03-08 RX ADMIN — POTASSIUM CHLORIDE AND SODIUM CHLORIDE: 900; 150 INJECTION, SOLUTION INTRAVENOUS at 01:26

## 2024-03-08 RX ADMIN — SODIUM CHLORIDE, PRESERVATIVE FREE 10 ML: 5 INJECTION INTRAVENOUS at 09:33

## 2024-03-08 RX ADMIN — ATORVASTATIN CALCIUM 80 MG: 40 TABLET, FILM COATED ORAL at 20:45

## 2024-03-08 NOTE — PROGRESS NOTES
..End of Shift Note    Bedside shift change report given to GELY Romero (oncoming nurse) by Valdemar Astorga RN (offgoing nurse).  Report included the following information SBAR, Kardex, Intake/Output, MAR, and Recent Results    Shift worked:  9417-9162     Shift summary and any significant changes:     Pt given prescribed meds per MAR. No PRN meds given. Spoke with urology and manual bladder irrigation completed. Pt up in chair. Caring rounds completed.            Valdemar Astorga RN

## 2024-03-08 NOTE — PROGRESS NOTES
Hospitalist Progress Note    NAME:   Errol Brandt   : 1945   MRN: [id number]     Date/Time: 3/8/2024 4:55 PM  Patient PCP: Gary Motley MD    Estimated discharge date: 3/10  Barriers: Resolution of hematuria      Assessment / Plan:    Gross hematuria POA-history of being on dual antiplatelet agent after cardiac stent  Acute cystitis POA  Bilateral hydro ureteral nephrosis with blood clots in urinary bladder POA  Bilateral nephrolithiasis non-obstructing POA  DANIELLE POA-likely postobstructive  Hypokalemia POA  Hemoglobin 14.3  Potassium 3.3  Creatinine 1.4  CT abdomen pelvis noted for bilateral hydronephrosis with blood pressure in bladder ?  Clots with evidence of acute cystitis     Status post Soler placed by ED, color slightly better today  Urology following  UA positive  Empiric IV antibiotics for now-started on ceftriaxone in ED  Continue aspirin and Effient for now-cannot be stopped due to cardiac stent (Stent placed in 2024)  Will need urology clearance for patient to go home on dual antiplatelet agent-if unable to get cleared will need cardiology input/consult  Transfuse as needed     Hypertension  Hyperlipidemia  CAD status post non-STEMI status post PCI to RCA in 2024-on DAPT since  Diabetes type 2 insulin-dependent with uncontrolled hyperglycemia  Hypothyroidism  Chronic pancreatitis     Continue home metoprolol, IV hydralazine as needed uncontrolled hypertension  Continue home statin  Continue home aspirin and Effient after recent cardiac stent in January  Continue home insulin regimen, holding home metformin for now, fingersticks before every meal and at bedtime here, sliding scale lispro for correction scale, holding home Januvia  Continue home Synthroid           Medical Decision Making:   I personally reviewed labs: CBC, BMP, magnesium, LFTs  I personally reviewed imaging: CT of abdomen pelvis, chest x-ray  I personally reviewed EKG: None  Toxic drug monitoring:  studies.  Pertinent labs include:  Recent Labs     03/06/24  1856 03/07/24  1250 03/08/24  0511   WBC 5.0 11.3* 9.4   HGB 14.3 12.3 10.6*   HCT 43.4 37.6 33.1*    145* 131*     Recent Labs     03/06/24  1857 03/07/24  1250 03/08/24  0511    140 144   K 3.3* 3.6 4.0    113* 116*   CO2 29 21 23   GLUCOSE 373* 209* 78   BUN 20 27* 26*   CREATININE 1.41* 1.56* 1.19   CALCIUM 8.9 8.3* 7.8*   MG 2.3  --   --    LABALBU 3.6  --   --    BILITOT 0.6  --   --    AST 12*  --   --    ALT 18  --   --        Signed: Amy Lopez MD

## 2024-03-08 NOTE — CARE COORDINATION
Care Management Initial Assessment       RUR: 23%   Readmission? No  1st IM letter given? Yes   1st  letter given: No    CM completed assessment with pts son, at bedside.  Pt is known to reside with son in his one story home.  Pt is known to be active with PCP: use next week-March and use [facility].  Pts son reported pt is needs limited assistance with ADLs, and doesn't drive.  Pt use DME at home: walker.  Pt was most recently d/c from [facility] and once was in a IPR.    Pt pending urine cultures and being followed by Uro.    CM will continue to follow.    RAMAKRISHNA Rushing CM  [phone]       03/08/24 1540   Service Assessment   Patient Orientation Other (see comment)  (pts son)   Cognition Other (see comment)   History Provided By Child/Family   Primary Caregiver Self   Support Systems Children;Spouse/Significant Other   PCP Verified by CM Yes   Last Visit to PCP Within last 3 months   Prior Functional Level Assistance with the following:;Bathing;Dressing;Other (see comment);Mobility;Shopping;Housework;Cooking  (limited assistance)   Current Functional Level Assistance with the following:;Bathing;Dressing;Other (see comment);Mobility;Shopping;Housework;Cooking   Can patient return to prior living arrangement Yes   Ability to make needs known: Other (see comment)  (son provided information)   Family able to assist with home care needs: Yes   Would you like for me to discuss the discharge plan with any other family members/significant others, and if so, who? Yes   Financial Resources Medicare   Social/Functional History   Lives With Family;Spouse   Type of Home House   Active  No   Mode of Transportation Family   Discharge Planning   Type of Residence House   Living Arrangements Spouse/Significant Other;Children

## 2024-03-08 NOTE — PROGRESS NOTES
End of Shift Note    Bedside shift change report given to Valdemar HONG (oncoming nurse) by Buster Garcia RN (offgoing nurse).  Report included the following information SBAR, Kardex, Intake/Output, MAR, Recent Results, and Cardiac Rhythm NSR    Shift worked: Night   Shift summary and any significant changes:    After shift change BP low at 84/40  & repeated at various site= 91/42 and MD informed and orders  for Albumen.administration. BP is soft but improved comparatively. More alert and is eagerly waiting for B/fast. Apple juice given frequently during the shift as his B/Sugar at 2100 was 83.Labs drawn this morning.       Buster Garcia RN

## 2024-03-08 NOTE — PROGRESS NOTES
Component Value Date/Time     03/08/2024 05:11 AM    K 4.0 03/08/2024 05:11 AM     03/08/2024 05:11 AM    CO2 23 03/08/2024 05:11 AM    BUN 26 03/08/2024 05:11 AM     Assessment/Plan:   Gross Hematuria - suspect 2/2 acute cystitis and BPH at this time  Urinary Retention - acute on chronic  Bilateral Hydroureteronephrosis   Non-Obstructing Bilateral Renal Stones  - No urological surgical intervention recommended at this time  - I performed manual bladder irrigation on the patient at my rounds 3/7/2024.  He has a 22 Fijian two-way Curiel currently.  He is tolerating manual bladder irrigation with ease with myself and nursing staff.   - No c/p suprapubic pain today      - For now; leave Curiel catheter in place and do not remove.  - Patient would benefit from eventual cystoscopy possibly outpatient     - Nursing staff instructed and provided recommendations for performing manual bladder irrigation     - Nursing staff to perform manual bladder irrigations if patient develops suprapubic pain, low urine output in Curiel bag, dark blood in Curiel tubing or blood clots in Curiel tubing.     - If dark bloody urine in curiel persists in afternoon 3/8/2024 or beyond - nursing staff should replace 2-way 22Fr curiel with a  3-way 24 Fr curiel catheter and initiate CBI to drip rate with resultant clear pink urine.     - Should CBI be initiated - stop CBI and perform manual bladder irrigation if CBI stops flowing/dripping, patient develop suprapubic pain, dark red blood in Curiel tubing, low urine output in Curiel bag. Discussed with nursing staff as next step.    - Following     D/w Dr. Pb Banerjee    Supervising MD, Dr. Pb Banerjee  Signed By: RAJNI VELEZ, APRN - NP - March 8, 2024

## 2024-03-09 LAB
ANION GAP SERPL CALC-SCNC: 4 MMOL/L (ref 5–15)
BACTERIA SPEC CULT: ABNORMAL
BUN SERPL-MCNC: 17 MG/DL (ref 6–20)
BUN/CREAT SERPL: 19 (ref 12–20)
CALCIUM SERPL-MCNC: 8.1 MG/DL (ref 8.5–10.1)
CC UR VC: ABNORMAL
CHLORIDE SERPL-SCNC: 112 MMOL/L (ref 97–108)
CO2 SERPL-SCNC: 24 MMOL/L (ref 21–32)
CREAT SERPL-MCNC: 0.88 MG/DL (ref 0.7–1.3)
ERYTHROCYTE [DISTWIDTH] IN BLOOD BY AUTOMATED COUNT: 13.6 % (ref 11.5–14.5)
GLUCOSE BLD STRIP.AUTO-MCNC: 182 MG/DL (ref 65–117)
GLUCOSE BLD STRIP.AUTO-MCNC: 194 MG/DL (ref 65–117)
GLUCOSE BLD STRIP.AUTO-MCNC: 197 MG/DL (ref 65–117)
GLUCOSE BLD STRIP.AUTO-MCNC: 241 MG/DL (ref 65–117)
GLUCOSE SERPL-MCNC: 202 MG/DL (ref 65–100)
HCT VFR BLD AUTO: 33.5 % (ref 36.6–50.3)
HGB BLD-MCNC: 10.8 G/DL (ref 12.1–17)
MCH RBC QN AUTO: 34.8 PG (ref 26–34)
MCHC RBC AUTO-ENTMCNC: 32.2 G/DL (ref 30–36.5)
MCV RBC AUTO: 108.1 FL (ref 80–99)
NRBC # BLD: 0 K/UL (ref 0–0.01)
NRBC BLD-RTO: 0 PER 100 WBC
PLATELET # BLD AUTO: 143 K/UL (ref 150–400)
PMV BLD AUTO: 10.6 FL (ref 8.9–12.9)
POTASSIUM SERPL-SCNC: 4.2 MMOL/L (ref 3.5–5.1)
RBC # BLD AUTO: 3.1 M/UL (ref 4.1–5.7)
SERVICE CMNT-IMP: ABNORMAL
SODIUM SERPL-SCNC: 140 MMOL/L (ref 136–145)
WBC # BLD AUTO: 7.6 K/UL (ref 4.1–11.1)

## 2024-03-09 PROCEDURE — 85027 COMPLETE CBC AUTOMATED: CPT

## 2024-03-09 PROCEDURE — 2580000003 HC RX 258: Performed by: STUDENT IN AN ORGANIZED HEALTH CARE EDUCATION/TRAINING PROGRAM

## 2024-03-09 PROCEDURE — 1100000003 HC PRIVATE W/ TELEMETRY

## 2024-03-09 PROCEDURE — 36415 COLL VENOUS BLD VENIPUNCTURE: CPT

## 2024-03-09 PROCEDURE — 51702 INSERT TEMP BLADDER CATH: CPT

## 2024-03-09 PROCEDURE — 80048 BASIC METABOLIC PNL TOTAL CA: CPT

## 2024-03-09 PROCEDURE — 82962 GLUCOSE BLOOD TEST: CPT

## 2024-03-09 PROCEDURE — 6360000002 HC RX W HCPCS: Performed by: INTERNAL MEDICINE

## 2024-03-09 PROCEDURE — 2580000003 HC RX 258: Performed by: INTERNAL MEDICINE

## 2024-03-09 PROCEDURE — 6370000000 HC RX 637 (ALT 250 FOR IP): Performed by: INTERNAL MEDICINE

## 2024-03-09 PROCEDURE — 6370000000 HC RX 637 (ALT 250 FOR IP): Performed by: STUDENT IN AN ORGANIZED HEALTH CARE EDUCATION/TRAINING PROGRAM

## 2024-03-09 RX ORDER — INSULIN GLARGINE 100 [IU]/ML
10 INJECTION, SOLUTION SUBCUTANEOUS NIGHTLY
Status: DISCONTINUED | OUTPATIENT
Start: 2024-03-09 | End: 2024-03-10

## 2024-03-09 RX ORDER — DEXTROSE MONOHYDRATE 100 MG/ML
INJECTION, SOLUTION INTRAVENOUS CONTINUOUS PRN
Status: DISCONTINUED | OUTPATIENT
Start: 2024-03-09 | End: 2024-03-13 | Stop reason: HOSPADM

## 2024-03-09 RX ORDER — SODIUM CHLORIDE 9 MG/ML
INJECTION, SOLUTION INTRAVENOUS CONTINUOUS
Status: DISCONTINUED | OUTPATIENT
Start: 2024-03-09 | End: 2024-03-13 | Stop reason: HOSPADM

## 2024-03-09 RX ADMIN — PRASUGREL 10 MG: 10 TABLET, FILM COATED ORAL at 08:57

## 2024-03-09 RX ADMIN — POTASSIUM CHLORIDE AND SODIUM CHLORIDE: 900; 150 INJECTION, SOLUTION INTRAVENOUS at 00:02

## 2024-03-09 RX ADMIN — SODIUM CHLORIDE, PRESERVATIVE FREE 10 ML: 5 INJECTION INTRAVENOUS at 21:20

## 2024-03-09 RX ADMIN — ATORVASTATIN CALCIUM 80 MG: 40 TABLET, FILM COATED ORAL at 21:19

## 2024-03-09 RX ADMIN — SODIUM CHLORIDE 1000 MG: 900 INJECTION INTRAVENOUS at 08:59

## 2024-03-09 RX ADMIN — SODIUM CHLORIDE, PRESERVATIVE FREE 10 ML: 5 INJECTION INTRAVENOUS at 08:57

## 2024-03-09 RX ADMIN — INSULIN LISPRO 2 UNITS: 100 INJECTION, SOLUTION INTRAVENOUS; SUBCUTANEOUS at 13:57

## 2024-03-09 RX ADMIN — METOPROLOL SUCCINATE 12.5 MG: 25 TABLET, EXTENDED RELEASE ORAL at 08:56

## 2024-03-09 RX ADMIN — SODIUM CHLORIDE: 9 INJECTION, SOLUTION INTRAVENOUS at 14:17

## 2024-03-09 RX ADMIN — ASPIRIN 81 MG: 81 TABLET, COATED ORAL at 08:57

## 2024-03-09 RX ADMIN — INSULIN GLARGINE 10 UNITS: 100 INJECTION, SOLUTION SUBCUTANEOUS at 21:19

## 2024-03-09 RX ADMIN — LEVOTHYROXINE SODIUM 125 MCG: 0.07 TABLET ORAL at 06:37

## 2024-03-09 NOTE — PROGRESS NOTES
..End of Shift Note    Bedside shift change report given to Valdemar Collazo RN (oncoming nurse) by Chavo Hernandez RN (offgoing nurse).  Report included the following information SBAR, Kardex, Intake/Output, MAR, and Recent Results    Shift worked:  1900 - 0700     Shift summary and any significant changes:     Pt given prescribed meds per MAR. No PRN meds given. Manual bladder irrigations completed 2x's during shift. Thermostat adjusted several times during shift for patient comfort. Pt up to chair. Caring rounds completed.            Chavo Hernandez RN

## 2024-03-09 NOTE — PROGRESS NOTES
..End of Shift Note    Bedside shift change report given to GELY Marin (oncoming nurse) by Valdemar Astorga RN (offgoing nurse).  Report included the following information SBAR, Kardex, Intake/Output, MAR, and Recent Results    Shift worked:  4704-2859     Shift summary and any significant changes:     Pt given prescribed meds per MAR. Manual bladder irrigation completed per orders. Urine is pink tinged strawberry color throughout shift with minimal small clots. Pt up in chair. Pt had x1 BM. Caring rounds completed.          Valdemar Astorga RN

## 2024-03-09 NOTE — PROGRESS NOTES
Hospitalist Progress Note    NAME:   Errol Brandt   : 1945   MRN: [id number]     Date/Time: 3/9/2024 4:14 PM  Patient PCP: Gary Motley MD    Estimated discharge date: 3/10  Barriers: Resolution of hematuria      Assessment / Plan:    Gross hematuria POA-history of being on dual antiplatelet agent after cardiac stent  Acute cystitis POA  Bilateral hydro ureteral nephrosis with blood clots in urinary bladder POA  Bilateral nephrolithiasis non-obstructing POA  DANIELLE POA-likely postobstructive  Hypokalemia POA  Hemoglobin 14.3  Potassium 3.3  Creatinine 1.4  CT abdomen pelvis noted for bilateral hydronephrosis with blood pressure in bladder ?  Clots with evidence of acute cystitis     Status post Soler placed by ED, color slightly better today  Urology following  UA positive  Empiric IV antibiotics for now  Continue aspirin and Effient for now-cannot be stopped due to cardiac stent (Stent placed in 2024)  Monitor hb daily  Urine is clearing today, pink in color     Hypertension  Hyperlipidemia  CAD status post non-STEMI status post PCI to RCA in 2024-on DAPT since  Diabetes type 2 insulin-dependent with uncontrolled hyperglycemia  Hypothyroidism  Chronic pancreatitis     Continue home metoprolol, IV hydralazine as needed uncontrolled hypertension  Continue home statin  Continue home aspirin and Effient after recent cardiac stent in January  Continue home insulin regimen, holding home metformin for now, fingersticks before every meal and at bedtime here, sliding scale lispro for correction scale, holding home Januvia. Lantus added  Continue home Synthroid     Medical Decision Making:   I personally reviewed labs: CBC, BMP, magnesium, LFTs  I personally reviewed imaging: CT of abdomen pelvis, chest x-ray  I personally reviewed EKG: None  Toxic drug monitoring: None  Discussed case with: RN< CM     Code Status: Full code  DVT Prophylaxis: SCDs  Baseline: Patient is independent with ADLs at

## 2024-03-10 ENCOUNTER — APPOINTMENT (OUTPATIENT)
Facility: HOSPITAL | Age: 79
DRG: 690 | End: 2024-03-10
Payer: MEDICARE

## 2024-03-10 LAB
ERYTHROCYTE [DISTWIDTH] IN BLOOD BY AUTOMATED COUNT: 13.5 % (ref 11.5–14.5)
GLUCOSE BLD STRIP.AUTO-MCNC: 170 MG/DL (ref 65–117)
GLUCOSE BLD STRIP.AUTO-MCNC: 230 MG/DL (ref 65–117)
GLUCOSE BLD STRIP.AUTO-MCNC: 256 MG/DL (ref 65–117)
GLUCOSE BLD STRIP.AUTO-MCNC: 272 MG/DL (ref 65–117)
GLUCOSE BLD STRIP.AUTO-MCNC: 281 MG/DL (ref 65–117)
HCT VFR BLD AUTO: 36.6 % (ref 36.6–50.3)
HGB BLD-MCNC: 11.9 G/DL (ref 12.1–17)
MCH RBC QN AUTO: 34.7 PG (ref 26–34)
MCHC RBC AUTO-ENTMCNC: 32.5 G/DL (ref 30–36.5)
MCV RBC AUTO: 106.7 FL (ref 80–99)
NRBC # BLD: 0 K/UL (ref 0–0.01)
NRBC BLD-RTO: 0 PER 100 WBC
PLATELET # BLD AUTO: 157 K/UL (ref 150–400)
PMV BLD AUTO: 10.3 FL (ref 8.9–12.9)
RBC # BLD AUTO: 3.43 M/UL (ref 4.1–5.7)
SERVICE CMNT-IMP: ABNORMAL
WBC # BLD AUTO: 6.3 K/UL (ref 4.1–11.1)

## 2024-03-10 PROCEDURE — 1100000003 HC PRIVATE W/ TELEMETRY

## 2024-03-10 PROCEDURE — 6370000000 HC RX 637 (ALT 250 FOR IP): Performed by: INTERNAL MEDICINE

## 2024-03-10 PROCEDURE — 36415 COLL VENOUS BLD VENIPUNCTURE: CPT

## 2024-03-10 PROCEDURE — 85027 COMPLETE CBC AUTOMATED: CPT

## 2024-03-10 PROCEDURE — 6360000002 HC RX W HCPCS: Performed by: INTERNAL MEDICINE

## 2024-03-10 PROCEDURE — 6370000000 HC RX 637 (ALT 250 FOR IP): Performed by: STUDENT IN AN ORGANIZED HEALTH CARE EDUCATION/TRAINING PROGRAM

## 2024-03-10 PROCEDURE — 2580000003 HC RX 258: Performed by: INTERNAL MEDICINE

## 2024-03-10 PROCEDURE — 2580000003 HC RX 258: Performed by: STUDENT IN AN ORGANIZED HEALTH CARE EDUCATION/TRAINING PROGRAM

## 2024-03-10 PROCEDURE — 82962 GLUCOSE BLOOD TEST: CPT

## 2024-03-10 PROCEDURE — 70450 CT HEAD/BRAIN W/O DYE: CPT

## 2024-03-10 RX ORDER — INSULIN GLARGINE 100 [IU]/ML
18 INJECTION, SOLUTION SUBCUTANEOUS NIGHTLY
Status: DISCONTINUED | OUTPATIENT
Start: 2024-03-10 | End: 2024-03-11

## 2024-03-10 RX ADMIN — SODIUM CHLORIDE, PRESERVATIVE FREE 10 ML: 5 INJECTION INTRAVENOUS at 08:13

## 2024-03-10 RX ADMIN — INSULIN LISPRO 4 UNITS: 100 INJECTION, SOLUTION INTRAVENOUS; SUBCUTANEOUS at 12:58

## 2024-03-10 RX ADMIN — SODIUM CHLORIDE, PRESERVATIVE FREE 10 ML: 5 INJECTION INTRAVENOUS at 21:08

## 2024-03-10 RX ADMIN — SODIUM CHLORIDE: 9 INJECTION, SOLUTION INTRAVENOUS at 20:58

## 2024-03-10 RX ADMIN — INSULIN GLARGINE 18 UNITS: 100 INJECTION, SOLUTION SUBCUTANEOUS at 21:06

## 2024-03-10 RX ADMIN — PRASUGREL 10 MG: 10 TABLET, FILM COATED ORAL at 08:13

## 2024-03-10 RX ADMIN — ASPIRIN 81 MG: 81 TABLET, COATED ORAL at 08:13

## 2024-03-10 RX ADMIN — LEVOTHYROXINE SODIUM 125 MCG: 0.07 TABLET ORAL at 06:02

## 2024-03-10 RX ADMIN — METOPROLOL SUCCINATE 12.5 MG: 25 TABLET, EXTENDED RELEASE ORAL at 08:13

## 2024-03-10 RX ADMIN — SODIUM CHLORIDE 1000 MG: 900 INJECTION INTRAVENOUS at 08:17

## 2024-03-10 RX ADMIN — INSULIN LISPRO 2 UNITS: 100 INJECTION, SOLUTION INTRAVENOUS; SUBCUTANEOUS at 18:45

## 2024-03-10 RX ADMIN — SODIUM CHLORIDE: 9 INJECTION, SOLUTION INTRAVENOUS at 06:04

## 2024-03-10 RX ADMIN — ATORVASTATIN CALCIUM 80 MG: 40 TABLET, FILM COATED ORAL at 21:02

## 2024-03-10 ASSESSMENT — PAIN SCALES - GENERAL: PAINLEVEL_OUTOF10: 0

## 2024-03-10 NOTE — PROGRESS NOTES
POST FALL MANAGEMENT    Errol Brandt  MEDICAL RECORD NUMBER:  [id number]  AGE: 79 y.o.   GENDER: male  : 1945  TODAYS DATE:  3/10/2024    Details     Fall Occurred: Yes    Was the Fall Witnessed:  Yes       Brief Review of Event: Patient used the call bell to ask if he can use the bathroom.  PCT Nidia went in patient's room to assist him from the chair to the bathroom. Patient known to be x1 assist with a walker. While the PCT was walking with the patient to the bathroom, the patient fell to his knees and hit his left side of his head on the sink. This RN heard the fall from the hallway and ran to patient's room to find the patient's knees on the floor and the tech next to patient holding him under his left arm. Code fall was called overhead. Vital signs were taken (blood pressure was elevated). Blood sugar was obtained. Head to toe assessment completed. Nursing supervisor, charge nurse, primary nurse, PCT, and doctor were at bedside. Jessica PARNELL ordered a CT head due to patient hitting head on sink. Patient had no complaints of pain or dizziness.         Who found the patient: Patient care technician Nidia       Where was the patient at the time of the fall: on the bathroom floor next to the sink      Patient Comments: \"My left foot gave out\"        Date Fall Occurred:  March 10, 2024 .       Time Fall Occurred: 2:19p.m.     Assessment     Post Fall Head to Toe Assessment Completed: Yes    Post Fall Predictive Analytic Score Reviewed: Yes     Post Fall Vitals Completed: Yes    Post Fall Neuro Checks Completed: Yes    Injury Occurred(if yes, describe injury):  yes - cut on left side of the face near his eye           Did the Patient Experience:(Check Cristiano all that apply)    [x] Patient hit head  [] Loss of consciousness  [] Change in mental status following the fall  [] Patient is on an anticoagulant medication      CT Performed:

## 2024-03-10 NOTE — PROGRESS NOTES
End of Shift Note    Bedside shift change report given to Tomas HONG (oncoming nurse) by Sugey Fine RN (offgoing nurse).  Report included the following information SBAR, Kardex, Intake/Output, and MAR    Shift worked:  7A - 7P      Shift summary and any significant changes:   ed.  Pt tolerated care. Medications given per MAR. Insulin coverage given for lunch and dinner. Patient had a witnessed fall today - head CT scan ordered and completed due to patient hitting head on sink in bathroom during fall. IV flushed and patent. Hourly rounding completed. Soler care completed - manual irrigation done every 4 hours. Urine is red with small amount of blood clots.      Concerns for physician to address:      Zone phone for oncoming shift:             Sugey Fine RN

## 2024-03-10 NOTE — PROGRESS NOTES
Hospitalist Progress Note    NAME:   Errol Brandt   : 1945   MRN: [id number]     Date/Time: 3/10/2024 3:05 PM  Patient PCP: Gary Motley MD    Estimated discharge date: 3/11  Barriers: Resolution of hematuria, PT/OT consult      Assessment / Plan:    Gross hematuria POA- history of being on dual antiplatelet agent after cardiac stent  Acute cystitis POA  Bilateral hydro ureteral nephrosis with blood clots in urinary bladder POA  Bilateral nephrolithiasis non-obstructing POA  DANIELLE POA-likely postobstructive  Hypokalemia POA    Hemoglobin 14.3  Potassium 3.3  Creatinine 1.4  CT abdomen pelvis noted for bilateral hydronephrosis with blood pressure in bladder ?  Clots with evidence of acute cystitis     Status post Soler placed by ED, color slightly better today  Urology following  UA positive  Empiric IV antibiotics for now  Continue aspirin and Effient for now-cannot be stopped due to cardiac stent (Stent placed in 2024)  Monitor hb daily  Urine is clearing today, pink in color    Fall:  He had a fall today when he was going to restroom with assistance of patient admission  Reports he felt dizzy, his left leg gave away and hit his head  Has a small laceration lateral to left eye, with some bleeding, however it seems to be frozen and I do not think suture is needed  Will get a CT head     Hypertension  Hyperlipidemia  CAD status post non-STEMI status post PCI to RCA in 2024-on DAPT since  Diabetes type 2 insulin-dependent with uncontrolled hyperglycemia  Hypothyroidism  Chronic pancreatitis     Continue home metoprolol, IV hydralazine as needed uncontrolled hypertension  Continue home statin  Continue home aspirin and Effient after recent cardiac stent in January  Continue home Synthroid    DM:  Continue home insulin regimen, holding home metformin for now, fingersticks before every meal and at bedtime here, sliding scale lispro for correction scale, holding home Januvia.  Lantus dose

## 2024-03-11 LAB
ANION GAP SERPL CALC-SCNC: 3 MMOL/L (ref 5–15)
BUN SERPL-MCNC: 12 MG/DL (ref 6–20)
BUN/CREAT SERPL: 14 (ref 12–20)
CALCIUM SERPL-MCNC: 8.5 MG/DL (ref 8.5–10.1)
CHLORIDE SERPL-SCNC: 107 MMOL/L (ref 97–108)
CO2 SERPL-SCNC: 26 MMOL/L (ref 21–32)
CREAT SERPL-MCNC: 0.88 MG/DL (ref 0.7–1.3)
ERYTHROCYTE [DISTWIDTH] IN BLOOD BY AUTOMATED COUNT: 13.4 % (ref 11.5–14.5)
GLUCOSE BLD STRIP.AUTO-MCNC: 169 MG/DL (ref 65–117)
GLUCOSE BLD STRIP.AUTO-MCNC: 205 MG/DL (ref 65–117)
GLUCOSE BLD STRIP.AUTO-MCNC: 239 MG/DL (ref 65–117)
GLUCOSE BLD STRIP.AUTO-MCNC: 282 MG/DL (ref 65–117)
GLUCOSE SERPL-MCNC: 274 MG/DL (ref 65–100)
HCT VFR BLD AUTO: 37.4 % (ref 36.6–50.3)
HGB BLD-MCNC: 12.4 G/DL (ref 12.1–17)
MCH RBC QN AUTO: 35.2 PG (ref 26–34)
MCHC RBC AUTO-ENTMCNC: 33.2 G/DL (ref 30–36.5)
MCV RBC AUTO: 106.3 FL (ref 80–99)
NRBC # BLD: 0 K/UL (ref 0–0.01)
NRBC BLD-RTO: 0 PER 100 WBC
PLATELET # BLD AUTO: 155 K/UL (ref 150–400)
PMV BLD AUTO: 10.2 FL (ref 8.9–12.9)
POTASSIUM SERPL-SCNC: 3.9 MMOL/L (ref 3.5–5.1)
RBC # BLD AUTO: 3.52 M/UL (ref 4.1–5.7)
SERVICE CMNT-IMP: ABNORMAL
SODIUM SERPL-SCNC: 136 MMOL/L (ref 136–145)
WBC # BLD AUTO: 4.7 K/UL (ref 4.1–11.1)

## 2024-03-11 PROCEDURE — 6360000002 HC RX W HCPCS: Performed by: INTERNAL MEDICINE

## 2024-03-11 PROCEDURE — 6370000000 HC RX 637 (ALT 250 FOR IP): Performed by: NURSE PRACTITIONER

## 2024-03-11 PROCEDURE — 80048 BASIC METABOLIC PNL TOTAL CA: CPT

## 2024-03-11 PROCEDURE — 85027 COMPLETE CBC AUTOMATED: CPT

## 2024-03-11 PROCEDURE — 97165 OT EVAL LOW COMPLEX 30 MIN: CPT

## 2024-03-11 PROCEDURE — 97530 THERAPEUTIC ACTIVITIES: CPT

## 2024-03-11 PROCEDURE — 51702 INSERT TEMP BLADDER CATH: CPT

## 2024-03-11 PROCEDURE — 6370000000 HC RX 637 (ALT 250 FOR IP): Performed by: INTERNAL MEDICINE

## 2024-03-11 PROCEDURE — 2580000003 HC RX 258: Performed by: INTERNAL MEDICINE

## 2024-03-11 PROCEDURE — 1100000003 HC PRIVATE W/ TELEMETRY

## 2024-03-11 PROCEDURE — 82962 GLUCOSE BLOOD TEST: CPT

## 2024-03-11 PROCEDURE — 36415 COLL VENOUS BLD VENIPUNCTURE: CPT

## 2024-03-11 PROCEDURE — 97162 PT EVAL MOD COMPLEX 30 MIN: CPT

## 2024-03-11 PROCEDURE — 97116 GAIT TRAINING THERAPY: CPT

## 2024-03-11 PROCEDURE — 6370000000 HC RX 637 (ALT 250 FOR IP): Performed by: STUDENT IN AN ORGANIZED HEALTH CARE EDUCATION/TRAINING PROGRAM

## 2024-03-11 RX ORDER — INSULIN GLARGINE 100 [IU]/ML
23 INJECTION, SOLUTION SUBCUTANEOUS NIGHTLY
Status: DISCONTINUED | OUTPATIENT
Start: 2024-03-11 | End: 2024-03-12

## 2024-03-11 RX ORDER — LANOLIN ALCOHOL/MO/W.PET/CERES
3 CREAM (GRAM) TOPICAL NIGHTLY PRN
Status: DISCONTINUED | OUTPATIENT
Start: 2024-03-11 | End: 2024-03-13 | Stop reason: HOSPADM

## 2024-03-11 RX ADMIN — INSULIN LISPRO 2 UNITS: 100 INJECTION, SOLUTION INTRAVENOUS; SUBCUTANEOUS at 18:34

## 2024-03-11 RX ADMIN — INSULIN LISPRO 2 UNITS: 100 INJECTION, SOLUTION INTRAVENOUS; SUBCUTANEOUS at 10:09

## 2024-03-11 RX ADMIN — SODIUM CHLORIDE, PRESERVATIVE FREE 10 ML: 5 INJECTION INTRAVENOUS at 10:10

## 2024-03-11 RX ADMIN — SODIUM CHLORIDE, PRESERVATIVE FREE 10 ML: 5 INJECTION INTRAVENOUS at 20:56

## 2024-03-11 RX ADMIN — LEVOTHYROXINE SODIUM 125 MCG: 0.07 TABLET ORAL at 06:01

## 2024-03-11 RX ADMIN — INSULIN GLARGINE 23 UNITS: 100 INJECTION, SOLUTION SUBCUTANEOUS at 20:55

## 2024-03-11 RX ADMIN — PRASUGREL 10 MG: 10 TABLET, FILM COATED ORAL at 10:06

## 2024-03-11 RX ADMIN — ACETAMINOPHEN 650 MG: 325 TABLET ORAL at 20:55

## 2024-03-11 RX ADMIN — INSULIN LISPRO 4 UNITS: 100 INJECTION, SOLUTION INTRAVENOUS; SUBCUTANEOUS at 13:11

## 2024-03-11 RX ADMIN — SODIUM CHLORIDE 1000 MG: 900 INJECTION INTRAVENOUS at 10:36

## 2024-03-11 RX ADMIN — ASPIRIN 81 MG: 81 TABLET, COATED ORAL at 10:06

## 2024-03-11 RX ADMIN — METOPROLOL SUCCINATE 12.5 MG: 25 TABLET, EXTENDED RELEASE ORAL at 10:06

## 2024-03-11 RX ADMIN — ATORVASTATIN CALCIUM 80 MG: 40 TABLET, FILM COATED ORAL at 20:54

## 2024-03-11 RX ADMIN — MELATONIN 3 MG: at 21:01

## 2024-03-11 ASSESSMENT — PAIN SCALES - GENERAL: PAINLEVEL_OUTOF10: 5

## 2024-03-11 NOTE — CARE COORDINATION
Transition of Care Plan:    RUR: 21%  Prior Level of Functioning: Need assistance with ADLs   Disposition: SNF Placement-[facility] (acceptance and auth)  Follow up appointments: Follow up with PCP and/or Specialist   DME needed: will use at facility  Transportation at discharge: BLS and/or family   IM/IMM Medicare/ letter given: 2nd IM Medicare to be given  Is patient a  and connected with VA? N/A   If yes, was  transfer form completed and VA notified? N/A  Caregiver Contact: Julio Brandt (son) [phone]  Discharge Caregiver contacted prior to discharge? Family to be contacted   Care Conference needed? Not at this time   Barriers to discharge: Medical Stable, Placement-insurance auth      CM staffed case with clinical team.  Pt will remain as inpatient for 24-48hrs.  Pt currently has curiel placed.    CM staffed case with therapist and pt is being recommended for snf placement at the time of d/c.     CM spoke with pts son: Roberth, via telephone and pt is agreeable to d/c needs and plans.  Roberth selected [facility].  Roberth reported that he is able to transport to facility at the time of d/c.    CM sent [facility], via careport [facility]  Pt was accepted.  CM will initiate insurance auth with [employer].  Insurance auth can take 24-48hrs for approval.      CM will continue to follow.    RAMAKRISHNA Rushing CM  [phone]

## 2024-03-11 NOTE — PROGRESS NOTES
..End of Shift Note    Bedside shift change report given to GELY Ma (oncoming nurse) by Valdemar Astorga RN (offgoing nurse).  Report included the following information SBAR, Kardex, Intake/Output, MAR, and Recent Results    Shift worked:  [id number]     Shift summary and any significant changes:     Pt given prescribed meds per MAR. No PRN meds given. Pt had x1 BM. Up in chair today. Worked with PT. Soler care completed. Caring rounds completed.          Valdemar Astorga RN

## 2024-03-11 NOTE — PLAN OF CARE
Problem: Physical Therapy - Adult  Goal: By Discharge: Performs mobility at highest level of function for planned discharge setting.  See evaluation for individualized goals.  Description: FUNCTIONAL STATUS PRIOR TO ADMISSION: Pt lives with wife and son and states that he needs some assist with ADLs but amb with at rolling walker on his own. He states when the son is at work that he has to care for his wife. CM does note that there are other sons that assist as well. Unsure how much time pt is alone with wife.    HOME SUPPORT PRIOR TO ADMISSION: The patient lived with wife and son; wife unable to help.    Physical Therapy Goals  Initiated 3/11/2024  1.  Patient will move from supine to sit and sit to supine, scoot up and down, and roll side to side in bed with modified independence within 7 day(s).    2.  Patient will perform sit to stand with modified independence within 7 day(s).  3.  Patient will transfer from bed to chair and chair to bed with modified independence using the least restrictive device within 7 day(s).  4.  Patient will ambulate with modified independence for 75 feet with the least restrictive device within 7 day(s).   PHYSICAL THERAPY EVALUATION    Patient: Errol Brandt (79 y.o. male)  Date: 3/11/2024  Primary Diagnosis: Gross hematuria [R31.0]       Precautions: Restrictions/Precautions: Fall Risk, Bed Alarm                      ASSESSMENT :   DEFICITS/IMPAIRMENTS:   The patient is limited by decreased functional mobility, independence in ADLs, strength, activity tolerance, cognition, command following, attention/concentration, coordination, balance ; He is very unsteady in gait and shows some weakness of L ankle yuly in inversion/dorsiflexion as well as general weakness. He is needing min to mod A of 1 with the walker and CGA of second for safety tolerating 10' only  at this time before exhibiting more LE flexion. His BP is softer but stable sit to stand and post ambulation, but pt was  Sensation:   Tone: Normal       Coordination:  Coordination: Generally decreased, functional    Range Of Motion:  AROM: Generally decreased, functional       Functional Mobility:  Bed Mobility:     Bed Mobility Training  Bed Mobility Training: No (received and ended session in chair)  Transfers:     Transfer Training  Transfer Training: Yes  Overall Level of Assistance: Minimum assistance;Assist X1;Assist X2;Additional time;Adaptive equipment (RW)  Interventions: Verbal cues;Safety awareness training;Tactile cues (hand placement, RW management)  Sit to Stand: Minimum assistance;Additional time  Stand to Sit: Minimum assistance;Additional time  Stand Pivot Transfers: Minimum assistance;Additional time;Adaptive equipment;Assist X1  Bed to Chair: Minimum assistance;Moderate assistance;Assist X1;Assist X2;Other (comment) (variable, poor safety awareness so incr assist at times as pt does not complete turn)  Toilet Transfer: Minimum assistance;Assist X1;Additional time;Adaptive equipment (stand pivot BSC d/t quick fatigue, poor safety/insight)  Balance:               Balance  Sitting: Intact  Standing: Impaired  Standing - Static: Good;Constant support  Standing - Dynamic: Fair;Constant support;Poor  Ambulation/Gait Training:                       Gait  Gait Training: Yes  Overall Level of Assistance: Minimum assistance;Moderate assistance;Assist X1  Distance (ft): 10 Feet (x2)  Assistive Device: Gait belt;Walker, rolling  Interventions: Verbal cues;Safety awareness training;Tactile cues  Base of Support: Narrowed  Speed/Noni: Pace decreased (< 100 feet/min);Shuffled  Step Length: Left shortened;Right shortened  Gait Abnormalities: Decreased step clearance;Trunk sway increased

## 2024-03-11 NOTE — PROGRESS NOTES
Urology Progress Note    Patient: Errol Brandt MRN: [id number]  SSN: [id number]    YOB: 1945  Age: 79 y.o.  Sex: male            Assessment:     Errol Brandt is a 79 y.o. male seen in consultation for urinary retention, GH.     VSS, afebrile  Hgb- 12.4, creatinine- 0.88  Ucx- + e.coli, 5000 colonies     FC in place draining clear very mildly pin tinged urine, no s/s clot retention     Plan:     Gross Hematuria - suspect 2/2 acute cystitis and BPH at this time  Urinary Retention - acute on chronic  Bilateral Hydroureteronephrosis   - Maintain FC for now, if urine remains clear may consider VT in am   - Consider Flomax, if not contraindicated due to falls   - OP FU with possible cysto    Following     Subjective:     Denies suprapubic pain or pressure   Denies flank pain, difficulty voiding       Objective:     BP (!) 151/73   Pulse 73   Temp 97.7 °F (36.5 °C) (Oral)   Resp 17   Ht 1.778 m (5' 10\")   Wt 85.7 kg (189 lb)   SpO2 99%   BMI 27.12 kg/m²       Intake/Output Summary (Last 24 hours) at 3/11/2024 0843  Last data filed at 3/11/2024 0541  Gross per 24 hour   Intake 516 ml   Output 4150 ml   Net -3634 ml       Physical Exam  General: NAD  HEENT: NC/AT, EOMI, MMM  Abdomen: soft, NTTP, nondistended, no suprapubic fullness or tenderness  :  FC in place draining clear mildly pink tinged urine,no flank pain, no CVA tenderness  Neuro: Appropriate, no focal neurological deficits  Mood/Affect: normal    Recent Results (from the past 24 hour(s))   POCT Glucose    Collection Time: 03/10/24 11:32 AM   Result Value Ref Range    POC Glucose 256 (H) 65 - 117 mg/dL    Performed by: Chuck Jacob    POCT Glucose    Collection Time: 03/10/24  2:22 PM   Result Value Ref Range    POC Glucose 281 (H) 65 - 117 mg/dL    Performed by: Cheryl Gregg RN    POCT Glucose    Collection Time: 03/10/24  4:12 PM   Result Value Ref Range    POC Glucose 230 (H) 65 -

## 2024-03-11 NOTE — PROGRESS NOTES
Occupational Therapy  3/11/2024    Orders received, chart reviewed and patient evaluated by occupational therapy. Pending progression with skilled acute occupational therapy, recommend:  Therapy up to 5 days/week in Skilled nursing facility    Recommend with nursing patient to complete as able in order to maintain strength, endurance and independence: OOB to chair 3x/day and mobilizing to BSC for toileting with RW, gait belt, and x2 assist for safety. Thank you for your assistance.     Full evaluation to follow.   Lay Longoria, SHERRIE, OTR/L

## 2024-03-11 NOTE — PROGRESS NOTES
Physical Therapy    Pt seen for eval. He is very unsteady in gait and shows some weakness of L ankle yuly in inversion/dorsiflexion. He is needing min to mod A of 1 with the walker and CGA of second for safety tolerating 10' only  at this time before exhibiting more LE flexion. His BP is softer but stable sit to stand and post ambulation, but pt was received in the chair and supine BP from previous reading with nursing was much higher, see below. Recommend SNF rehab, full report to follow.     03/10/24 1929 03/11/24 0925 03/11/24 0930   Vital Signs   Pulse 73 76 95   Heart Rate Source Monitor  --   --    Respirations 17  --   --    BP (!) 151/73 (!) 114/56 111/67   MAP (Calculated) 99 75 82   MAP (mmHg) 97 71 78   BP Location Left upper arm Right upper arm Right upper arm   BP Method Automatic  --   --    Patient Position Supine Sitting Standing      03/11/24 0932   Vital Signs   Pulse 96   Heart Rate Source  --    Respirations  --    BP (!) 105/54   MAP (Calculated) 71   MAP (mmHg) 68   BP Location Right upper arm   BP Method  --    Patient Position Standing

## 2024-03-11 NOTE — PROGRESS NOTES
Hospitalist Progress Note    NAME:   Errol Brandt   : 1945   MRN: [id number]     Date/Time: 3/11/2024 3:59 PM  Patient PCP: Gary Motley MD    Estimated discharge date: 3/12  Barriers: placement      Assessment / Plan:    Gross hematuria POA- history of being on dual antiplatelet agent after cardiac stent  Acute cystitis POA  Bilateral hydro ureteral nephrosis with blood clots in urinary bladder POA  Bilateral nephrolithiasis non-obstructing POA  DANIELLE POA-likely postobstructive  Hypokalemia POA    CT abdomen pelvis noted for bilateral hydronephrosis with blood pressure in bladder ?  Clots with evidence of acute cystitis     Status post Soler placed by ED, color slightly better today  Urology following  UA positive  Empiric IV antibiotics for now  Continue aspirin and Effient for now-cannot be stopped due to cardiac stent (Stent placed in 2024)  Monitor hb daily  Urine is clearing  PT OT evaluation: Recommend SNF,  working on the placement    Fall:  He had a fall on 3/10, Reports he felt dizzy, his left leg gave away and hit his head  Has a small laceration lateral to left eye, with some bleeding, however it seems to be frozen and I do not think suture is needed  CT head showed no acute pathology     Hypertension  Hyperlipidemia  CAD status post non-STEMI status post PCI to RCA in 2024-on DAPT since  Diabetes type 2 insulin-dependent with uncontrolled hyperglycemia  Hypothyroidism  Chronic pancreatitis     Continue home metoprolol, IV hydralazine as needed uncontrolled hypertension  Continue home statin  Continue home aspirin and Effient after recent cardiac stent in January  Continue home Synthroid    DM:  Continue home insulin regimen, holding home metformin for now, fingersticks before every meal and at bedtime here, sliding scale lispro for correction scale, holding home Januvia.  Lantus increased to 23 units     Medical Decision Making:   I personally reviewed labs: CBC,  records for all procedures/Xrays and details which were not copied into this note but were reviewed prior to creation of Plan.      LABS:  I reviewed today's most current labs and imaging studies.  Pertinent labs include:  Recent Labs     03/09/24  0312 03/10/24  0404 03/11/24  0518   WBC 7.6 6.3 4.7   HGB 10.8* 11.9* 12.4   HCT 33.5* 36.6 37.4   * 157 155     Recent Labs     03/09/24  0312 03/11/24  0518    136   K 4.2 3.9   * 107   CO2 24 26   GLUCOSE 202* 274*   BUN 17 12   CREATININE 0.88 0.88   CALCIUM 8.1* 8.5       Signed: Amy Lopez MD

## 2024-03-12 ENCOUNTER — APPOINTMENT (OUTPATIENT)
Facility: HOSPITAL | Age: 79
DRG: 690 | End: 2024-03-12
Payer: MEDICARE

## 2024-03-12 LAB
GLUCOSE BLD STRIP.AUTO-MCNC: 214 MG/DL (ref 65–117)
GLUCOSE BLD STRIP.AUTO-MCNC: 229 MG/DL (ref 65–117)
GLUCOSE BLD STRIP.AUTO-MCNC: 258 MG/DL (ref 65–117)
GLUCOSE BLD STRIP.AUTO-MCNC: 314 MG/DL (ref 65–117)
SERVICE CMNT-IMP: ABNORMAL

## 2024-03-12 PROCEDURE — 6370000000 HC RX 637 (ALT 250 FOR IP): Performed by: INTERNAL MEDICINE

## 2024-03-12 PROCEDURE — 97116 GAIT TRAINING THERAPY: CPT

## 2024-03-12 PROCEDURE — 2580000003 HC RX 258: Performed by: STUDENT IN AN ORGANIZED HEALTH CARE EDUCATION/TRAINING PROGRAM

## 2024-03-12 PROCEDURE — 2580000003 HC RX 258: Performed by: INTERNAL MEDICINE

## 2024-03-12 PROCEDURE — 76770 US EXAM ABDO BACK WALL COMP: CPT

## 2024-03-12 PROCEDURE — 1100000003 HC PRIVATE W/ TELEMETRY

## 2024-03-12 PROCEDURE — 51702 INSERT TEMP BLADDER CATH: CPT

## 2024-03-12 PROCEDURE — 6370000000 HC RX 637 (ALT 250 FOR IP): Performed by: NURSE PRACTITIONER

## 2024-03-12 PROCEDURE — 6370000000 HC RX 637 (ALT 250 FOR IP): Performed by: STUDENT IN AN ORGANIZED HEALTH CARE EDUCATION/TRAINING PROGRAM

## 2024-03-12 PROCEDURE — 82962 GLUCOSE BLOOD TEST: CPT

## 2024-03-12 RX ORDER — TAMSULOSIN HYDROCHLORIDE 0.4 MG/1
0.4 CAPSULE ORAL DAILY
Status: DISCONTINUED | OUTPATIENT
Start: 2024-03-12 | End: 2024-03-13 | Stop reason: HOSPADM

## 2024-03-12 RX ORDER — INSULIN GLARGINE 100 [IU]/ML
35 INJECTION, SOLUTION SUBCUTANEOUS NIGHTLY
Status: DISCONTINUED | OUTPATIENT
Start: 2024-03-12 | End: 2024-03-13 | Stop reason: HOSPADM

## 2024-03-12 RX ADMIN — PRASUGREL 10 MG: 10 TABLET, FILM COATED ORAL at 08:29

## 2024-03-12 RX ADMIN — METOPROLOL SUCCINATE 12.5 MG: 25 TABLET, EXTENDED RELEASE ORAL at 08:28

## 2024-03-12 RX ADMIN — INSULIN LISPRO 4 UNITS: 100 INJECTION, SOLUTION INTRAVENOUS; SUBCUTANEOUS at 20:38

## 2024-03-12 RX ADMIN — INSULIN LISPRO 4 UNITS: 100 INJECTION, SOLUTION INTRAVENOUS; SUBCUTANEOUS at 17:29

## 2024-03-12 RX ADMIN — TAMSULOSIN HYDROCHLORIDE 0.4 MG: 0.4 CAPSULE ORAL at 09:23

## 2024-03-12 RX ADMIN — ACETAMINOPHEN 650 MG: 325 TABLET ORAL at 20:40

## 2024-03-12 RX ADMIN — SODIUM CHLORIDE, PRESERVATIVE FREE 10 ML: 5 INJECTION INTRAVENOUS at 20:41

## 2024-03-12 RX ADMIN — MELATONIN 3 MG: at 20:40

## 2024-03-12 RX ADMIN — INSULIN GLARGINE 35 UNITS: 100 INJECTION, SOLUTION SUBCUTANEOUS at 20:39

## 2024-03-12 RX ADMIN — SODIUM CHLORIDE: 9 INJECTION, SOLUTION INTRAVENOUS at 10:47

## 2024-03-12 RX ADMIN — SODIUM CHLORIDE, PRESERVATIVE FREE 10 ML: 5 INJECTION INTRAVENOUS at 08:27

## 2024-03-12 RX ADMIN — ATORVASTATIN CALCIUM 80 MG: 40 TABLET, FILM COATED ORAL at 20:40

## 2024-03-12 RX ADMIN — INSULIN LISPRO 2 UNITS: 100 INJECTION, SOLUTION INTRAVENOUS; SUBCUTANEOUS at 08:33

## 2024-03-12 RX ADMIN — INSULIN LISPRO 2 UNITS: 100 INJECTION, SOLUTION INTRAVENOUS; SUBCUTANEOUS at 12:53

## 2024-03-12 RX ADMIN — LEVOTHYROXINE SODIUM 125 MCG: 0.07 TABLET ORAL at 05:48

## 2024-03-12 RX ADMIN — ASPIRIN 81 MG: 81 TABLET, COATED ORAL at 08:28

## 2024-03-12 ASSESSMENT — PAIN SCALES - GENERAL
PAINLEVEL_OUTOF10: 3
PAINLEVEL_OUTOF10: 2
PAINLEVEL_OUTOF10: 0

## 2024-03-12 NOTE — PLAN OF CARE
Problem: Physical Therapy - Adult  Goal: By Discharge: Performs mobility at highest level of function for planned discharge setting.  See evaluation for individualized goals.  Description: FUNCTIONAL STATUS PRIOR TO ADMISSION: Pt lives with wife and son and states that he needs some assist with ADLs but amb with at rolling walker on his own. He states when the son is at work that he has to care for his wife. CM does note that there are other sons that assist as well. Unsure how much time pt is alone with wife.    HOME SUPPORT PRIOR TO ADMISSION: The patient lived with wife and son; wife unable to help.    Physical Therapy Goals  Initiated 3/11/2024  1.  Patient will move from supine to sit and sit to supine, scoot up and down, and roll side to side in bed with modified independence within 7 day(s).    2.  Patient will perform sit to stand with modified independence within 7 day(s).  3.  Patient will transfer from bed to chair and chair to bed with modified independence using the least restrictive device within 7 day(s).  4.  Patient will ambulate with modified independence for 75 feet with the least restrictive device within 7 day(s).       Outcome: Progressing   PHYSICAL THERAPY TREATMENT    Patient: Errol Brandt (79 y.o. male)  Date: 3/12/2024  Diagnosis: Gross hematuria [R31.0] Gross hematuria      Precautions: Fall Risk, Bed Alarm                      ASSESSMENT:  Patient continues to benefit from skilled PT services and is slowly progressing towards goals. Pt received in chair. He was able to amb 12' x2 with seated rest with flexed posture and CGA, shortened steps. Flexion does increased with fatigue. He needs multimodal cues for safe turns and approaches to chair. He was able to complete bed mobility with CGA for orthostatics. Pt without c/o lightheadedness today and BP more stable. See below. Pt is still presenting well below his baseline and would benefit from SNF rehab at d/c.     03/12/24 1506 03/12/24  1507 03/12/24 1509   Vital Signs   /67 (!) 154/66 131/61   MAP (Calculated) 90 95 84   MAP (mmHg) 86 91 81   BP Location Right upper arm Right upper arm Left upper arm   Patient Position Supine Sitting Standing          PLAN:  Patient continues to benefit from skilled intervention to address the above impairments.  Continue treatment per established plan of care.    Recommendation for discharge: (in order for the patient to meet his/her long term goals): Therapy up to 5 days/week in Skilled nursing facility    Other factors to consider for discharge: patient's current support system is unable to meet their requirements for physical assistance, impaired cognition, high risk for falls, not safe to be alone, and concern for safely navigating or managing the home environment    IF patient discharges home will need the following DME: rolling walker and wheelchair 18 inch (owns)       SUBJECTIVE:   Patient stated, \"I don't feel dizzy. How's my blood pressure?.\"    OBJECTIVE DATA SUMMARY:   Critical Behavior:  Orientation  Orientation Level: Oriented X4  Cognition  Overall Cognitive Status: Exceptions  Following Commands: Follows one step commands with increased time;Follows one step commands with repetition  Safety Judgement: Decreased awareness of need for assistance;Decreased awareness of need for safety  Problem Solving: Assistance required to generate solutions;Assistance required to implement solutions;Assistance required to identify errors made;Assistance required to correct errors made  Insights: Decreased awareness of deficits  Initiation: Requires cues for some  Sequencing: Requires cues for some  Cognition Comment: very [place]    Functional Mobility Training:  Bed Mobility:  Bed Mobility Training  Bed Mobility Training: Yes  Overall Level of Assistance: Contact-guard assistance;Additional time  Interventions: Verbal cues;Safety awareness training  Rolling: Contact-guard assistance  Supine to Sit:

## 2024-03-12 NOTE — PROGRESS NOTES
.End of Shift Note    Bedside shift change report given to Kristi HONG  (oncoming nurse) by Dominique Venegas RN (offgoing nurse).  Report included the following information SBAR, Kardex, Intake/Output, MAR, Recent Results, and Med Rec Status    Shift worked:  [id number]     Shift summary and any significant changes:     Pt given prescribed med's per MAR. Pt alarm on both bed and recliner. Pt needs to be assisted by x2 assist. Pt curiel has been patent and no clots notable in line and urine has been a raspberry color. Curiel care completed. Caring rounds completed.           Dominique Venegas, RN

## 2024-03-12 NOTE — CARE COORDINATION
Transition of Care Plan:    RUR: 21%  Prior Level of Functioning: Need assistance with ADLs   Disposition: SNF Placement-[facility] (auth pending )  Follow up appointments: Follow up with PCP and/or Specialist   DME needed: will use at facility  Transportation at discharge: BLS and/or family   IM/IMM Medicare/ letter given: 2nd IM Medicare to be given  Is patient a  and connected with VA? N/A   If yes, was [place] transfer form completed and VA notified? N/A  Caregiver Contact: Julio Brandt (son) [phone]  Discharge Caregiver contacted prior to discharge? Family to be contacted   Care Conference needed? Not at this time   Barriers to discharge: Medical Stable, Placement-insurance auth      CM initiated insurance auth, with pts insurance: [employer].  INSURANCE AUTH CAN TAKE 24-48HRS FOR APPROVAL.    CM staffed case with clinical team, and pt will complete voiding trial on today.  Pt is currently being followed by [facility].    CM will continue to follow.    RAMAKRISHNA Rushing CM  [phone]

## 2024-03-12 NOTE — PROGRESS NOTES
Urology Progress Note    Patient: Errol Brandt MRN: [id number]  SSN: [id number]    YOB: 1945  Age: 79 y.o.  Sex: male            Assessment:     Errol Brandt is a 79 y.o. male seen in consultation for urinary retention, GH.     VSS, afebrile  No am labs to review     FC in place draining clear very mildly pink tinged urine with small amount of clot material noted, no s/s of clot retention    Plan:     Gross Hematuria - suspect 2/2 acute cystitis and BPH at this time  Urinary Retention - acute on chronic  Bilateral Hydroureteronephrosis   - Maintain FC for now, VT in am. Ideally Flomax should be started prior to VT, if able   - OP FU with possible cysto    Following     Subjective:     Denies suprapubic pain or pressure   Denies flank pain, difficulty voiding       Objective:     BP (!) 98/54   Pulse 74   Temp 97.5 °F (36.4 °C) (Oral)   Resp 18   Ht 1.778 m (5' 10\")   Wt 85.7 kg (189 lb)   SpO2 96%   BMI 27.12 kg/m²       Intake/Output Summary (Last 24 hours) at 3/12/2024 0743  Last data filed at 3/12/2024 0500  Gross per 24 hour   Intake 60 ml   Output 2620 ml   Net -2560 ml         Physical Exam  General: NAD  HEENT: NC/AT, EOMI, MMM  Abdomen: soft, NTTP, nondistended, no suprapubic fullness or tenderness  :  FC in place draining clear mildly pink tinged urine,no flank pain, no CVA tenderness  Neuro: Appropriate, no focal neurological deficits  Mood/Affect: normal    Recent Results (from the past 24 hour(s))   POCT Glucose    Collection Time: 03/11/24  8:45 AM   Result Value Ref Range    POC Glucose 239 (H) 65 - 117 mg/dL    Performed by: Elaina Moon    POCT Glucose    Collection Time: 03/11/24 11:27 AM   Result Value Ref Range    POC Glucose 282 (H) 65 - 117 mg/dL    Performed by: Elaina Moon    POCT Glucose    Collection Time: 03/11/24  5:28 PM   Result Value Ref Range    POC Glucose 205 (H) 65 - 117 mg/dL    Performed by: Carlos Turcios

## 2024-03-12 NOTE — PROGRESS NOTES
Hospitalist Progress Note    NAME:   Errol Brandt   : 1945   MRN: [id number]     Date/Time: 3/12/2024 4:30 PM  Patient PCP: Gary Motley MD    Estimated discharge date: 3/13  Barriers: placement      Assessment / Plan:    Gross hematuria POA- history of being on dual antiplatelet agent after cardiac stent  Acute cystitis POA  Bilateral hydro ureteral nephrosis with blood clots in urinary bladder POA  Bilateral nephrolithiasis non-obstructing POA  DANIELLE POA-likely postobstructive  Hypokalemia POA    CT abdomen pelvis noted for bilateral hydronephrosis with blood pressure in bladder ?  Clots with evidence of acute cystitis  Urology following  UA positive  Empiric IV antibiotics for now  Continue aspirin and Effient for now-cannot be stopped due to cardiac stent (Stent placed in 2024)  Monitor hb daily  Urine is clearing  PT OT evaluation: Recommend SNF,  working on the placement  Urology planning on TOV, starting flomax to help with this    Fall:  He had a fall on 3/10, Reports he felt dizzy, his left leg gave away and hit his head  Has a small laceration lateral to left eye, with some bleeding, however it seems to be frozen and I do not think suture is needed  CT head showed no acute pathology     Hypertension  Hyperlipidemia  CAD status post non-STEMI status post PCI to RCA in 2024-on DAPT since  Diabetes type 2 insulin-dependent with uncontrolled hyperglycemia  Hypothyroidism  Chronic pancreatitis     Continue home metoprolol, IV hydralazine as needed uncontrolled hypertension  Continue home statin  Continue home aspirin and Effient after recent cardiac stent in January  Continue home Synthroid    DM:  Continue home insulin regimen, holding home metformin for now, fingersticks before every meal and at bedtime here, sliding scale lispro for correction scale, holding home Januvia.  Lantus dose increased today     Medical Decision Making:   I personally reviewed labs: CBC, BMP  I

## 2024-03-13 VITALS
BODY MASS INDEX: 27.06 KG/M2 | SYSTOLIC BLOOD PRESSURE: 123 MMHG | DIASTOLIC BLOOD PRESSURE: 59 MMHG | TEMPERATURE: 98.2 F | HEART RATE: 76 BPM | WEIGHT: 189 LBS | RESPIRATION RATE: 20 BRPM | HEIGHT: 70 IN | OXYGEN SATURATION: 98 %

## 2024-03-13 LAB
GLUCOSE BLD STRIP.AUTO-MCNC: 146 MG/DL (ref 65–117)
GLUCOSE BLD STRIP.AUTO-MCNC: 229 MG/DL (ref 65–117)
GLUCOSE BLD STRIP.AUTO-MCNC: 251 MG/DL (ref 65–117)
SERVICE CMNT-IMP: ABNORMAL

## 2024-03-13 PROCEDURE — 6370000000 HC RX 637 (ALT 250 FOR IP): Performed by: INTERNAL MEDICINE

## 2024-03-13 PROCEDURE — 97535 SELF CARE MNGMENT TRAINING: CPT

## 2024-03-13 PROCEDURE — 82962 GLUCOSE BLOOD TEST: CPT

## 2024-03-13 PROCEDURE — 2580000003 HC RX 258: Performed by: INTERNAL MEDICINE

## 2024-03-13 PROCEDURE — 6370000000 HC RX 637 (ALT 250 FOR IP): Performed by: NURSE PRACTITIONER

## 2024-03-13 PROCEDURE — 97530 THERAPEUTIC ACTIVITIES: CPT

## 2024-03-13 PROCEDURE — 2580000003 HC RX 258: Performed by: STUDENT IN AN ORGANIZED HEALTH CARE EDUCATION/TRAINING PROGRAM

## 2024-03-13 PROCEDURE — 51798 US URINE CAPACITY MEASURE: CPT

## 2024-03-13 RX ORDER — TAMSULOSIN HYDROCHLORIDE 0.4 MG/1
0.4 CAPSULE ORAL DAILY
Qty: 30 CAPSULE | Refills: 3 | Status: SHIPPED | OUTPATIENT
Start: 2024-03-14 | End: 2024-03-13 | Stop reason: HOSPADM

## 2024-03-13 RX ADMIN — TAMSULOSIN HYDROCHLORIDE 0.4 MG: 0.4 CAPSULE ORAL at 08:29

## 2024-03-13 RX ADMIN — ASPIRIN 81 MG: 81 TABLET, COATED ORAL at 08:29

## 2024-03-13 RX ADMIN — INSULIN LISPRO 2 UNITS: 100 INJECTION, SOLUTION INTRAVENOUS; SUBCUTANEOUS at 17:18

## 2024-03-13 RX ADMIN — SODIUM CHLORIDE: 9 INJECTION, SOLUTION INTRAVENOUS at 06:07

## 2024-03-13 RX ADMIN — LEVOTHYROXINE SODIUM 125 MCG: 0.07 TABLET ORAL at 06:07

## 2024-03-13 RX ADMIN — PRASUGREL 10 MG: 10 TABLET, FILM COATED ORAL at 08:30

## 2024-03-13 RX ADMIN — METOPROLOL SUCCINATE 12.5 MG: 25 TABLET, EXTENDED RELEASE ORAL at 08:29

## 2024-03-13 RX ADMIN — INSULIN LISPRO 4 UNITS: 100 INJECTION, SOLUTION INTRAVENOUS; SUBCUTANEOUS at 12:38

## 2024-03-13 RX ADMIN — SODIUM CHLORIDE, PRESERVATIVE FREE 10 ML: 5 INJECTION INTRAVENOUS at 08:28

## 2024-03-13 ASSESSMENT — PAIN SCALES - GENERAL: PAINLEVEL_OUTOF10: 0

## 2024-03-13 NOTE — PROGRESS NOTES
Patient: Errol Brandt MRN: [id number]  SSN: [id number]    YOB: 1945  Age: 79 y.o.  Sex: male        ADMITTED: 3/6/2024 to Amy Lopez MD by Lindsey Zendejas MD for Gross hematuria [R31.0]  POD# * No surgery found *     Errol Brandt is doing fair curiel removed this morning He denies abdominal pain or discomfort . Encouraged to increase PO intake .       Vitals: Temp (24hrs), Av.7 °F (36.5 °C), Min:97.3 °F (36.3 °C), Max:97.9 °F (36.6 °C)    Blood pressure (!) 109/97, pulse 73, temperature 97.3 °F (36.3 °C), resp. rate 17, height 1.778 m (5' 10\"), weight 85.7 kg (189 lb), SpO2 97 %.    Intake and Output:   1901 -  0700  In: 60   Out: 4150 [Urine:4150]   0701 -  1900  In: -   Out: 875 [Urine:875]  STEVE Output lats 24 hrs: No data found.   STEVE Output last 8 hrs: No data found.  BM over last 24 hrs: No data found.    Exam:   Gen: NAD  CV: extremities well perfused  Lungs: nonlabored respirations. Symmetric chest expansion  Ext: no edema  Abdomen: soft NTND no suprapubic pain   : curiel removed      Labs:  CBC:   Lab Results   Component Value Date/Time    WBC 4.7 2024 05:18 AM    HCT 37.4 2024 05:18 AM     2024 05:18 AM     BMP:   Lab Results   Component Value Date/Time     2024 05:18 AM    K 3.9 2024 05:18 AM     2024 05:18 AM    CO2 26 2024 05:18 AM    BUN 12 2024 05:18 AM     Cultures: N/A    Imaging: N/A  Assessment/Plan:   Errol Brandt is a 79 y.o. male seen in consultation for urinary retention, GH.   1. GH - resolved   2 Urinary retention - will do VT today   3. Frank Hydronephrosis resolved with curiel     D/w Dr Banerjee     Signed By: PAPO Dunne - NP - 2024

## 2024-03-13 NOTE — DISCHARGE SUMMARY
Discharge Summary    Name: Errol Brandt  [id number]  YOB: 1945 (Age: 79 y.o.)   Date of Admission: 3/6/2024  Date of Discharge: 3/13/2024  Attending Physician: Amy Lopez MD    Discharge Diagnosis:  Gross hematuria  Acute cystitis  Bilateral hydronephrosis  Bilateral nephrolithiasis  DANIELLE  Hypokalemia  Fall  Hypertension  Hyperlipidemia  CAD status post PCI  Hypothyroidism  Diabetes mellitus  Chronic pancreatitis  Diabetes mellitus    Consultations:  IP CONSULT TO HOSPITALIST  IP CONSULT TO UROLOGY      Brief Admission History/Reason for Admission Per Lindsey Zendejas MD:   Errol Brandt is a 79 y.o.  male with PMHx significant for hypertension hyperlipidemia diabetic on insulin,  hypothyroidism, CAD status post recent PCI with RCA stent DAPT since comes in with chief complaint of rectal/suprapubic pain for the past 1 week with associated constipation as per the patient.  Patient was found to have gross hematuria in the ED with CT abdomen and pelvis evident for blood products in the bladder with acute cystitis findings with bilateral hydroureteronephrosis.  Patient's hemoglobin was preserved at 14.3 with hemodynamic stability when seen by me in ED.    Brief Hospital Course by Main Problems:   Gross hematuria POA- history of being on dual antiplatelet agent after cardiac stent  Acute cystitis POA  Bilateral hydro ureteral nephrosis with blood clots in urinary bladder POA  Bilateral nephrolithiasis non-obstructing POA  DANIELLE POA-likely postobstructive  Hypokalemia POA     CT abdomen pelvis noted for bilateral hydronephrosis with blood pressure in bladder ?  Clots with evidence of acute cystitis  Urology following  UA positive  Completed IV antibiotics course resume home  Continue aspirin and Effient for now-cannot be stopped due to cardiac stent (Stent placed in Jan 2024)  Monitor hb daily  Urine is clearing  PT OT evaluation: Recommend SNF,  working  tablet  Take 1 tablet by mouth daily     atorvastatin 80 MG tablet  Commonly known as: LIPITOR  Take 1 tablet by mouth nightly     insulin aspart prot & aspart injection pen  Commonly known as: NovoLOG 70/30  Inject 20-45 Units into the skin 2 times daily Use 45 units 70/30 insulin daily before breakfast. Use 20 units 70/30 insulin nightly before dinner.     levothyroxine 125 MCG tablet  Commonly known as: SYNTHROID  Take 1 tablet by mouth every morning (before breakfast)     metFORMIN 500 MG extended release tablet  Commonly known as: GLUCOPHAGE-XR     metoprolol succinate 25 MG extended release tablet  Commonly known as: TOPROL XL  Take 0.5 tablets by mouth daily     prasugrel 10 MG Tabs  Commonly known as: EFFIENT  Take 1 tablet by mouth daily     SITagliptin 50 MG tablet  Commonly known as: JANUVIA               Where to Get Your Medications        These medications were sent to [id number] - [place], [place] - [address] - P [phone] - F [phone] [address] [address]      Phone: [phone]   tamsulosin 0.4 MG capsule             DISPOSITION:    Home with Family:       Home with HH/PT/OT/RN:    SNF/LTC: x   FARZANEH:    OTHER:            Code status:   Recommended diet: cardiac diet  Recommended activity: activity as tolerated  Wound care: None      Follow up with:   PCP : Gary Motley MD    [facility] ([facility]  [address] [address] [phone]        [facility] [address], [address]  [facility] [address] [phone]  Go on 3/27/2024  Appointment with Dr. Lange at 2:10 pm          Total time in minutes spent coordinating this discharge (includes going over instructions, follow-up, prescriptions, and preparing report for sign off to her PCP) :  35 minutes

## 2024-03-13 NOTE — H&P
[facility]. [facility] to  at 1800. IV removed.

## 2024-03-13 NOTE — PLAN OF CARE
Problem: Occupational Therapy - Adult  Goal: By Discharge: Performs self-care activities at highest level of function for planned discharge setting.  See evaluation for individualized goals.  Description: FUNCTIONAL STATUS PRIOR TO ADMISSION:  Patient with recent admission 1/2024 for NSTEMI and d/c to SNF, recently discharged home and reports needing assistance for only dressing and IADLs.  Receives Help From: Family (sons assist patient with IADL and dressing, patient reports he provides assistance for wife who has cancer when son is at work), ADL Assistance: Needs assistance, Bath:  (patient reports independence with bathing including transfer), Dressing: Moderate assistance,  ,  , Toileting: Independent,  , Ambulation Assistance: Independent (with RW), Transfer Assistance: Independent, Active : No     HOME SUPPORT: Patient lives with son and wife (who has cancer), son provides some assistance (see above), patient provides care for wife when son at work.     Occupational Therapy Goals:  Initiated 3/11/2024  1.  Patient will perform standing grooming 3 minutes with Contact Guard Assist within 7 day(s).  2.  Patient will perform lower body dressing with Minimal Assist within 7 day(s).  3.  Patient will perform upper body dressing with Contact Guard Assist within 7 day(s).  4.  Patient will perform toilet transfers with Contact Guard Assist  within 7 day(s).  5.  Patient will perform all aspects of toileting with Minimal Assist within 7 day(s).  6.  Patient will participate in upper extremity therapeutic exercise/activities with Contact Guard Assist for 5 minutes within 7 day(s).    7.  Patient will utilize energy conservation techniques during functional activities with verbal and visual cues within 7 day(s).    3/13/2024 1400 by Lay Longoria OT  Outcome: Progressing

## 2024-03-13 NOTE — DISCHARGE INSTRUCTIONS
HOSPITALIST DISCHARGE INSTRUCTIONS    NAME: Errol Brandt   :  1945   MRN:  [id number]     Date/Time:  3/13/2024 2:41 PM    ADMIT DATE: 3/6/2024     DISCHARGE DATE: 3/13/2024     DISCHARGE DIAGNOSIS:  Bilateral hydronephrosis  UTI  Hematuria         MEDICATIONS:  As per medication reconciliation  list  It is important that you take the medication exactly as they are prescribed.   Keep your medication in the bottles provided by the pharmacist and keep a list of the medication names, dosages, and times to be taken in your wallet.   Do not take other medications without consulting your doctor.     Pain Management: per above medications    What to do at Home    Recommended diet:  diabetic diet    Recommended activity: activity as tolerated    If you have questions regarding the hospital related prescriptions or hospital related issues please call at .    If you experience any of the following symptoms then please call your primary care physician or return to the emergency room if you cannot get hold of your doctor:  Fever, chills, nausea, vomiting, diarrhea, change in mentation, falling, bleeding, shortness of breath    Follow Up:  PMD: you are to call and set up an appointment to see them in 7-10 days.      Information obtained by :  I understand that if any problems occur once I am at home I am to contact my physician.    I understand and acknowledge receipt of the instructions indicated above.                                                                                                                                           Physician's or R.N.'s Signature                                                                  Date/Time                                                                                                                                              Patient or Representative Signature                                                          Date/Time

## 2024-03-13 NOTE — PLAN OF CARE
repetition  Attention Span: Attends with cues to redirect;Difficulty dividing attention  Memory: Decreased recall of precautions  Safety Judgement: Decreased awareness of need for assistance;Decreased awareness of need for safety  Problem Solving: Assistance required to generate solutions;Assistance required to implement solutions;Assistance required to identify errors made;Assistance required to correct errors made;Decreased awareness of errors  Insights: Decreased awareness of deficits  Initiation: Requires cues for some  Sequencing: Requires cues for some    Functional Mobility and Transfers for ADLs:  Bed Mobility:  Bed Mobility Training  Bed Mobility Training: No     Transfers:   Transfer Training  Transfer Training: Yes  Overall Level of Assistance: Additional time;Adaptive equipment;Assist X1;Minimum assistance  Interventions: Safety awareness training;Tactile cues;Verbal cues (hand placement, safe transfer, RW mgmt)  Sit to Stand: Additional time;Contact-guard assistance;Adaptive equipment  Stand to Sit: Contact-guard assistance;Additional time;Adaptive equipment  Bed to Chair: Minimum assistance;Assist X1;Additional time;Adaptive equipment (LOB d/t narrow BEN/tripped over feet, min A to correct with RW)           Balance:  Standing: Impaired  Balance  Sitting: Intact  Standing: Impaired  Standing - Static: Constant support;Good  Standing - Dynamic: Constant support;Fair      ADL Intervention:       LE Dressing: Minimal assistance  LE Dressing Skilled Clinical Factors: tailor sit to don/doff socks, min A to adjust/safety as patient attempting to ambulate with loose sock/ not on bottom    Toileting: Minimal assistance  Toileting Skilled Clinical Factors: seated with urinal, standing for bowel hygiene min A with LUE support on RW           Functional Mobility: Minimal assistance;Contact guard assistance             Pt educated on safe transfer techniques, with specific emphasis on proper hand placement to  push up from seated surface rather than attempt to pull self up, fully positioning self in-front of desired seated location, feeling chair on back of legs and reaching back with 1-2 UE to slowly lower self to seated position.    Patient instructed and indicated understanding the benefits of maintaining activity tolerance, functional mobility, and independence with self care tasks during acute stay  to ensure safe return home and to baseline. Encouraged patient to increase frequency and duration OOB, be out of bed for all meals, perform daily ADLs (as approved by RN/MD regarding bathing etc), and performing functional mobility to/from bathroom.        Pain Ratin/10   Pain Intervention(s):   pain is at a level acceptable to the patient      Activity Tolerance:   Fair  and requires rest breaks  Please refer to the flowsheet for vital signs taken during this treatment.    After treatment:   Patient left in no apparent distress sitting up in chair, Call bell within reach, Bed/ chair alarm activated, and Updated patient's board on functional status and mobility recommendations    COMMUNICATION/EDUCATION:   The patient's plan of care was discussed with: registered nurse    Patient Education  Education Given To: Patient  Education Provided: Role of Therapy;Plan of Care;Precautions;Transfer Training;Fall Prevention Strategies;ADL Adaptive Strategies;Energy Conservation  Education Method: Demonstration;Verbal;Teach Back  Barriers to Learning: Cognition;Hearing  Education Outcome: Continued education needed;Verbalized understanding    Thank you for this referral.  Lay Longoria OT  Minutes: 23

## 2024-03-13 NOTE — PROGRESS NOTES
[facility] Np Kiya with [facility] explained pt has not had any blood clots for past 24 hrs. She advised to remove curiel and start void trial.   Curiel removed at 0950, will monitor and do bladder scan at 1600.

## 2024-03-13 NOTE — PROGRESS NOTES
Spiritual Care Partner Volunteer visited patient at [facility] in [facility] MEDICAL ONCOLOGY on 3/13/2024   Documented by: Chaplain Rc Nguyen M.Div., [facility].   Paging Service: [phone])

## 2024-03-13 NOTE — CARE COORDINATION
03/13/24 1214   Services At/After Discharge   Transition of Care Consult (CM Consult) SNF  ([facility])   Services At/After Discharge Skilled Nursing Facility (SNF)  ([facility])    Resource Information Provided? No   Mode of Transport at Discharge BLS  ([facility] to be arranged)   Confirm Follow Up Transport Family   Condition of Participation: Discharge Planning   The Patient and/or Patient Representative was provided with a Choice of Provider? Patient Representative  (pts son: Roberth)   The Patient and/Or Patient Representative agree with the Discharge Plan? Yes   Freedom of Choice list was provided with basic dialogue that supports the patient's individualized plan of care/goals, treatment preferences, and shares the quality data associated with the providers?  Yes     Transition of Care Plan to SNF/Rehab    Communication to Patient/Family:  Met with patient and family and they are agreeable to the transition plan. The Plan for Transition of Care is related to the following treatment goals:     CM aware that pt received insurance with with [employer], and can transition, when medically stable.  CM spoke with pts son: Roberth, and he is agreeable to the following.    The Patient and/or patient representative was provided with a choice of provider and agrees  with the discharge plan.      Yes [x] No []    A Freedom of choice list was provided with basic dialogue that supports the patient's individualized plan of care/goals and shares the quality data associated with the providers.       Yes [] No []    SNF/Rehab Transition:  Patient has been accepted to [facility] SN[facility]/Rehab and meets criteria for admission.   Patient will transported by [facility] and expected to leave at [facility].    Communication to SNF/Rehab:  Bedside RN, Onc Nurse , has been notified to update the transition plan to the facility and call report ([phone]).  Discharge information has been updated on the AVS. And  direct admission to a VA nursing facility  [] Individuals who are pateints or residents of a state owned/operated facility that is licensed by Department of Behavioral Services (DBHDS) and seek direct admission to VA nursing facility  [] A screening not required for enrollment in Medicaid Hospice services as set out in 12 VAC 30-  [] [facility] ([facility]) staff shall perform screenings of the [facility] clients.    Advanced Care Plan:  []Surrogate Decision Maker of Care  []POA  []Communicated Code Status and copy sent.    Other:

## 2024-03-13 NOTE — CARE COORDINATION
TIFFANIE aware that pt will have to complete voiding trail, to d/c.  Pt will receive next bladder scan at 4P.    Pt will transition to [facility], once complete.  TIFFANIE spoke with pts son: Roberth, regarding the following.    [employer] Auth ID: [id number]  AUTH Date: 3/12-3/14    CALL Report: [phone]    NURSING STAFF TO ARRANGE TRANSPORT WITH [facility]: [phone], PROVIDE [employer] ID INFO TO TRANSPORT REP: [id number]    TRANSPORT PACKET IN CHART     RAMAKRISHNA Rushing CM  [phone]

## 2024-03-18 ENCOUNTER — OFFICE VISIT (OUTPATIENT)
Facility: CLINIC | Age: 79
End: 2024-03-18
Payer: MEDICARE

## 2024-03-18 VITALS
TEMPERATURE: 97.3 F | HEART RATE: 90 BPM | WEIGHT: 190 LBS | BODY MASS INDEX: 27.26 KG/M2 | RESPIRATION RATE: 18 BRPM | OXYGEN SATURATION: 96 % | SYSTOLIC BLOOD PRESSURE: 118 MMHG | DIASTOLIC BLOOD PRESSURE: 67 MMHG

## 2024-03-18 DIAGNOSIS — Z79.4 TYPE 2 DIABETES MELLITUS WITH STAGE 2 CHRONIC KIDNEY DISEASE, WITH LONG-TERM CURRENT USE OF INSULIN (HCC): ICD-10-CM

## 2024-03-18 DIAGNOSIS — N30.01 ACUTE CYSTITIS WITH HEMATURIA: Primary | ICD-10-CM

## 2024-03-18 DIAGNOSIS — N17.9 ACUTE KIDNEY INJURY (HCC): ICD-10-CM

## 2024-03-18 DIAGNOSIS — E78.5 HYPERLIPIDEMIA, UNSPECIFIED HYPERLIPIDEMIA TYPE: ICD-10-CM

## 2024-03-18 DIAGNOSIS — I10 PRIMARY HYPERTENSION: ICD-10-CM

## 2024-03-18 DIAGNOSIS — N20.0 BILATERAL NEPHROLITHIASIS: ICD-10-CM

## 2024-03-18 DIAGNOSIS — R35.0 FREQUENCY OF MICTURITION: ICD-10-CM

## 2024-03-18 DIAGNOSIS — N18.2 TYPE 2 DIABETES MELLITUS WITH STAGE 2 CHRONIC KIDNEY DISEASE, WITH LONG-TERM CURRENT USE OF INSULIN (HCC): ICD-10-CM

## 2024-03-18 DIAGNOSIS — E03.9 ACQUIRED HYPOTHYROIDISM: ICD-10-CM

## 2024-03-18 DIAGNOSIS — E11.22 TYPE 2 DIABETES MELLITUS WITH STAGE 2 CHRONIC KIDNEY DISEASE, WITH LONG-TERM CURRENT USE OF INSULIN (HCC): ICD-10-CM

## 2024-03-18 DIAGNOSIS — N13.30 BILATERAL HYDRONEPHROSIS: ICD-10-CM

## 2024-03-18 DIAGNOSIS — E87.6 HYPOKALEMIA: ICD-10-CM

## 2024-03-18 DIAGNOSIS — I25.10 CORONARY ARTERY DISEASE INVOLVING NATIVE CORONARY ARTERY OF NATIVE HEART, UNSPECIFIED WHETHER ANGINA PRESENT: ICD-10-CM
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
## 2024-03-18 PROCEDURE — 3044F HG A1C LEVEL LT 7.0%: CPT | Performed by: NURSE PRACTITIONER

## 2024-03-18 PROCEDURE — 1123F ACP DISCUSS/DSCN MKR DOCD: CPT | Performed by: NURSE PRACTITIONER

## 2024-03-18 PROCEDURE — G8484 FLU IMMUNIZE NO ADMIN: HCPCS | Performed by: NURSE PRACTITIONER

## 2024-03-18 PROCEDURE — 99309 SBSQ NF CARE MODERATE MDM 30: CPT | Performed by: NURSE PRACTITIONER

## 2024-03-18 NOTE — PROGRESS NOTES
[facility] [address]  [place] [place] [address]  Phone: [phone]  Fax: [phone]  Also available via [employer]     PLACE OF SERVICE:  [facility] [address] [place] [address]    SKILLED VISIT    Chief Complaint:   Chief Complaint   Patient presents with    Follow-up         HPI : Errol Brandt is a 79 y.o. male here for follow up.    Previous history prior to admission:  Patient is a 79-year-old  male that was admitted to the hospital from 3/6/2024 until 3/13/2024.  He has a past medical history of hypertension hyperlipidemia coronary artery disease with PCI hypothyroidism insulin-dependent diabetes chronic pancreatitiS.  He presented to the emergency room with chief complaint of rectal and suprapubic pain x 1 week and associated with constipation.  He was found to have gross hematuria in the emergency room CT of abdomen and pelvis showed acute cystitis, bilateral hydroureteronephrosis and bilateral nephrolithiasis that is nonobstructing.  His urinalysis was positive he completed course of IV antibiotics.  He was on aspirin and Effient and it was noted that Effient could not be stopped due to recent coronary artery stent in January 2024.  Most recent hemoglobin was stable.  He had Soler catheter during admission that was removed day prior to admission.  Patient also noted to have fall on 3/10/2024 reporting that he felt dizzy and his leg gave away.  He had a small arm laceration that did not require any sutures.  CT of the head was negative.  He was discharged to [facility] and rehabilitation to help with strengthening.    Current visit:  Patient sitting up in wheelchair today he is awake alert oriented x 2.  He answers most questions coherently does appear to be somewhat hard of hearing.  His vital signs are reviewed blood pressures are ranging 100-120 systolic he is afebrile heart rate is rate controlled he denies any

## 2024-03-20 ENCOUNTER — OFFICE VISIT (OUTPATIENT)
Facility: CLINIC | Age: 79
End: 2024-03-20
Payer: MEDICARE

## 2024-03-20 VITALS
RESPIRATION RATE: 18 BRPM | WEIGHT: 179.2 LBS | SYSTOLIC BLOOD PRESSURE: 119 MMHG | DIASTOLIC BLOOD PRESSURE: 64 MMHG | TEMPERATURE: 98.1 F | OXYGEN SATURATION: 95 % | BODY MASS INDEX: 25.71 KG/M2 | HEART RATE: 96 BPM

## 2024-03-20 DIAGNOSIS — I25.10 CORONARY ARTERY DISEASE INVOLVING NATIVE CORONARY ARTERY OF NATIVE HEART, UNSPECIFIED WHETHER ANGINA PRESENT: ICD-10-CM

## 2024-03-20 DIAGNOSIS — E87.6 HYPOKALEMIA: ICD-10-CM

## 2024-03-20 DIAGNOSIS — N20.0 BILATERAL NEPHROLITHIASIS: ICD-10-CM

## 2024-03-20 DIAGNOSIS — E78.5 HYPERLIPIDEMIA, UNSPECIFIED HYPERLIPIDEMIA TYPE: ICD-10-CM

## 2024-03-20 DIAGNOSIS — N30.01 ACUTE CYSTITIS WITH HEMATURIA: Primary | ICD-10-CM

## 2024-03-20 DIAGNOSIS — E11.22 TYPE 2 DIABETES MELLITUS WITH STAGE 2 CHRONIC KIDNEY DISEASE, WITH LONG-TERM CURRENT USE OF INSULIN (HCC): ICD-10-CM
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
## 2024-03-20 DIAGNOSIS — N13.30 BILATERAL HYDRONEPHROSIS: ICD-10-CM

## 2024-03-20 DIAGNOSIS — I10 PRIMARY HYPERTENSION: ICD-10-CM

## 2024-03-20 DIAGNOSIS — R35.0 URINARY FREQUENCY: ICD-10-CM

## 2024-03-20 DIAGNOSIS — N18.2 TYPE 2 DIABETES MELLITUS WITH STAGE 2 CHRONIC KIDNEY DISEASE, WITH LONG-TERM CURRENT USE OF INSULIN (HCC): ICD-10-CM

## 2024-03-20 DIAGNOSIS — N17.9 ACUTE KIDNEY INJURY (HCC): ICD-10-CM

## 2024-03-20 DIAGNOSIS — E03.9 ACQUIRED HYPOTHYROIDISM: ICD-10-CM

## 2024-03-20 DIAGNOSIS — Z79.4 TYPE 2 DIABETES MELLITUS WITH STAGE 2 CHRONIC KIDNEY DISEASE, WITH LONG-TERM CURRENT USE OF INSULIN (HCC): ICD-10-CM

## 2024-03-20 PROCEDURE — G8484 FLU IMMUNIZE NO ADMIN: HCPCS | Performed by: NURSE PRACTITIONER

## 2024-03-20 PROCEDURE — 99309 SBSQ NF CARE MODERATE MDM 30: CPT | Performed by: NURSE PRACTITIONER

## 2024-03-20 PROCEDURE — 3044F HG A1C LEVEL LT 7.0%: CPT | Performed by: NURSE PRACTITIONER

## 2024-03-20 PROCEDURE — 1123F ACP DISCUSS/DSCN MKR DOCD: CPT | Performed by: NURSE PRACTITIONER

## 2024-03-20 RX ORDER — TAMSULOSIN HYDROCHLORIDE 0.4 MG/1
0.4 CAPSULE ORAL DAILY
COMMUNITY
End: 2024-03-22 | Stop reason: SDUPTHER

## 2024-03-20 NOTE — PROGRESS NOTES
[facility] [place] [id number]  [facility] [place] [address]  Phone: [phone]  Fax: [phone]  Also available via Perfect Serve     PLACE OF SERVICE:  [facility] [address] [place] [address]    SKILLED VISIT    Chief Complaint:   Chief Complaint   Patient presents with    Follow-up         HPI : Errol Brandt is a 79 y.o. male here for follow up.    Previous history prior to admission:  Patient is a 79-year-old  male that was admitted to the hospital from 3/6/2024 until 3/13/2024.  He has a past medical history of hypertension hyperlipidemia coronary artery disease with PCI hypothyroidism insulin-dependent diabetes chronic pancreatitiS.  He presented to the emergency room with chief complaint of rectal and suprapubic pain x 1 week and associated with constipation.  He was found to have gross hematuria in the emergency room CT of abdomen and pelvis showed acute cystitis, bilateral hydroureteronephrosis and bilateral nephrolithiasis that is nonobstructing.  His urinalysis was positive he completed course of IV antibiotics.  He was on aspirin and Effient and it was noted that Effient could not be stopped due to recent coronary artery stent in January 2024.  Most recent hemoglobin was stable.  He had Soler catheter during admission that was removed day prior to admission.  Patient also noted to have fall on 3/10/2024 reporting that he felt dizzy and his leg gave away.  He had a small arm laceration that did not require any sutures.  CT of the head was negative.  He was discharged to [facility] and rehabilitation to help with strengthening.    Current visit:  Patient sitting up in wheelchair today he is awake alert oriented x 2.  He answers most questions coherently does appear to be somewhat hard of hearing.  His vital signs are reviewed blood pressures are ranging 100-120 systolic he is afebrile heart rate is rate controlled he denies any  Corneal abrasion, left

## 2024-03-22 ENCOUNTER — OFFICE VISIT (OUTPATIENT)
Facility: CLINIC | Age: 79
End: 2024-03-22

## 2024-03-22 VITALS
RESPIRATION RATE: 16 BRPM | HEART RATE: 78 BPM | DIASTOLIC BLOOD PRESSURE: 70 MMHG | BODY MASS INDEX: 26.6 KG/M2 | TEMPERATURE: 97.2 F | WEIGHT: 185.4 LBS | OXYGEN SATURATION: 94 % | SYSTOLIC BLOOD PRESSURE: 141 MMHG

## 2024-03-22 DIAGNOSIS — I10 PRIMARY HYPERTENSION: ICD-10-CM

## 2024-03-22 DIAGNOSIS — E78.5 HYPERLIPIDEMIA, UNSPECIFIED HYPERLIPIDEMIA TYPE: ICD-10-CM

## 2024-03-22 DIAGNOSIS — E11.22 TYPE 2 DIABETES MELLITUS WITH STAGE 2 CHRONIC KIDNEY DISEASE, WITH LONG-TERM CURRENT USE OF INSULIN (HCC): ICD-10-CM

## 2024-03-22 DIAGNOSIS — E03.9 ACQUIRED HYPOTHYROIDISM: ICD-10-CM

## 2024-03-22 DIAGNOSIS — E87.6 HYPOKALEMIA: ICD-10-CM
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
## 2024-03-22 DIAGNOSIS — N13.30 BILATERAL HYDRONEPHROSIS: ICD-10-CM

## 2024-03-22 DIAGNOSIS — N18.2 TYPE 2 DIABETES MELLITUS WITH STAGE 2 CHRONIC KIDNEY DISEASE, WITH LONG-TERM CURRENT USE OF INSULIN (HCC): ICD-10-CM

## 2024-03-22 DIAGNOSIS — I25.10 CORONARY ARTERY DISEASE INVOLVING NATIVE CORONARY ARTERY OF NATIVE HEART, UNSPECIFIED WHETHER ANGINA PRESENT: ICD-10-CM

## 2024-03-22 DIAGNOSIS — R35.0 URINARY FREQUENCY: ICD-10-CM

## 2024-03-22 DIAGNOSIS — N30.01 ACUTE CYSTITIS WITH HEMATURIA: Primary | ICD-10-CM

## 2024-03-22 DIAGNOSIS — Z79.4 TYPE 2 DIABETES MELLITUS WITH STAGE 2 CHRONIC KIDNEY DISEASE, WITH LONG-TERM CURRENT USE OF INSULIN (HCC): ICD-10-CM

## 2024-03-22 DIAGNOSIS — N20.0 BILATERAL NEPHROLITHIASIS: ICD-10-CM

## 2024-03-22 DIAGNOSIS — N17.9 ACUTE KIDNEY INJURY (HCC): ICD-10-CM

## 2024-03-22 RX ORDER — PRASUGREL 10 MG/1
10 TABLET, FILM COATED ORAL DAILY
Qty: 30 TABLET | Refills: 1 | Status: SHIPPED | OUTPATIENT
Start: 2024-03-22 | End: 2024-05-21

## 2024-03-22 RX ORDER — METOPROLOL SUCCINATE 25 MG/1
12.5 TABLET, EXTENDED RELEASE ORAL DAILY
Qty: 30 TABLET | Refills: 1 | Status: SHIPPED | OUTPATIENT
Start: 2024-03-22

## 2024-03-22 RX ORDER — ASPIRIN 81 MG/1
81 TABLET ORAL DAILY
Qty: 30 TABLET | Refills: 5 | Status: SHIPPED | OUTPATIENT
Start: 2024-03-22

## 2024-03-22 RX ORDER — LEVOTHYROXINE SODIUM 0.12 MG/1
125 TABLET ORAL
Qty: 30 TABLET | Refills: 1 | Status: SHIPPED | OUTPATIENT
Start: 2024-03-22

## 2024-03-22 RX ORDER — ATORVASTATIN CALCIUM 80 MG/1
80 TABLET, FILM COATED ORAL NIGHTLY
Qty: 30 TABLET | Refills: 1 | Status: SHIPPED | OUTPATIENT
Start: 2024-03-22

## 2024-03-22 RX ORDER — METFORMIN HYDROCHLORIDE 500 MG/1
1000 TABLET, EXTENDED RELEASE ORAL 2 TIMES DAILY
Qty: 120 TABLET | Refills: 1 | Status: SHIPPED | OUTPATIENT
Start: 2024-03-22 | End: 2024-05-21

## 2024-03-22 RX ORDER — TAMSULOSIN HYDROCHLORIDE 0.4 MG/1
0.4 CAPSULE ORAL DAILY
Qty: 30 CAPSULE | Refills: 1 | Status: SHIPPED | OUTPATIENT
Start: 2024-03-22

## 2024-03-22 NOTE — PROGRESS NOTES
Shawn [address] [facility] [place] [address]  Phone: [phone]  Fax: [phone]  Also available via Perfect Serve     Place of Service:  [facility] [address] [place] [address]                                                                     Discharge Summary       Admit Dx: Hematuria, acute cystitis bilateral hydronephrosis and nephrolithiasis    Disposition: Home    Presentation:  Patient is a 79-year-old  male that was admitted to the hospital from 3/6/2024 until 3/13/2024. He has a past medical history of hypertension hyperlipidemia coronary artery disease with PCI hypothyroidism insulin-dependent diabetes chronic pancreatitiS. He presented to the emergency room with chief complaint of rectal and suprapubic pain x 1 week and associated with constipation. He was found to have gross hematuria in the emergency room CT of abdomen and pelvis showed acute cystitis, bilateral hydroureteronephrosis and bilateral nephrolithiasis that is nonobstructing. His urinalysis was positive he completed course of IV antibiotics. He was on aspirin and Effient and it was noted that Effient could not be stopped due to recent coronary artery stent in January 2024. Most recent hemoglobin was stable. He had Soler catheter during admission that was removed day prior to admission. Patient also noted to have fall on 3/10/2024 reporting that he felt dizzy and his leg gave away. He had a small arm laceration that did not require any sutures. CT of the head was negative. He was discharged to [facility] and rehabilitation to help with strengthening.     Discharge time : > 30 mins    Brief SNF Course:  This is an 79 y.o. male sitting up in the wheelchair today he is awake alert vital signs have been controlled and are stable 90 lightheadedness been afebrile no cough congestion shortness of breath.  Appetite has remained good during his stay he has

## 2024-03-26 ENCOUNTER — APPOINTMENT (OUTPATIENT)
Facility: HOSPITAL | Age: 79
DRG: 178 | End: 2024-03-26
Payer: MEDICARE

## 2024-03-26 ENCOUNTER — HOSPITAL ENCOUNTER (EMERGENCY)
Facility: HOSPITAL | Age: 79
Discharge: HOME | DRG: 178 | End: 2024-03-26
Attending: EMERGENCY MEDICINE
Payer: MEDICARE

## 2024-03-26 VITALS
SYSTOLIC BLOOD PRESSURE: 128 MMHG | RESPIRATION RATE: 16 BRPM | OXYGEN SATURATION: 98 % | HEART RATE: 75 BPM | DIASTOLIC BLOOD PRESSURE: 59 MMHG | TEMPERATURE: 98.5 F

## 2024-03-26 DIAGNOSIS — E86.0 DEHYDRATION: Primary | ICD-10-CM

## 2024-03-26 DIAGNOSIS — N39.0 ACUTE UTI: ICD-10-CM

## 2024-03-26 LAB
ALBUMIN SERPL-MCNC: 3.5 G/DL (ref 3.5–5)
ALBUMIN/GLOB SERPL: 0.8 (ref 1.1–2.2)
ALP SERPL-CCNC: 108 U/L (ref 45–117)
ALT SERPL-CCNC: 26 U/L (ref 12–78)
ANION GAP SERPL CALC-SCNC: 6 MMOL/L (ref 5–15)
APPEARANCE UR: CLEAR
AST SERPL-CCNC: 25 U/L (ref 15–37)
BACTERIA URNS QL MICRO: NEGATIVE /HPF
BASOPHILS # BLD: 0 K/UL (ref 0–0.1)
BASOPHILS NFR BLD: 0 % (ref 0–1)
BILIRUB SERPL-MCNC: 1.1 MG/DL (ref 0.2–1)
BILIRUB UR QL: NEGATIVE
BUN SERPL-MCNC: 27 MG/DL (ref 6–20)
BUN/CREAT SERPL: 22 (ref 12–20)
CALCIUM SERPL-MCNC: 9.4 MG/DL (ref 8.5–10.1)
CHLORIDE SERPL-SCNC: 108 MMOL/L (ref 97–108)
CO2 SERPL-SCNC: 26 MMOL/L (ref 21–32)
COLOR UR: ABNORMAL
CREAT SERPL-MCNC: 1.24 MG/DL (ref 0.7–1.3)
DIFFERENTIAL METHOD BLD: ABNORMAL
EOSINOPHIL # BLD: 0 K/UL (ref 0–0.4)
EOSINOPHIL NFR BLD: 1 % (ref 0–7)
EPITH CASTS URNS QL MICRO: ABNORMAL /LPF
ERYTHROCYTE [DISTWIDTH] IN BLOOD BY AUTOMATED COUNT: 13.3 % (ref 11.5–14.5)
GLOBULIN SER CALC-MCNC: 4.6 G/DL (ref 2–4)
GLUCOSE SERPL-MCNC: 254 MG/DL (ref 65–100)
GLUCOSE UR STRIP.AUTO-MCNC: 500 MG/DL
HCT VFR BLD AUTO: 42.8 % (ref 36.6–50.3)
HGB BLD-MCNC: 13.8 G/DL (ref 12.1–17)
HGB UR QL STRIP: ABNORMAL
HYALINE CASTS URNS QL MICRO: ABNORMAL /LPF (ref 0–2)
IMM GRANULOCYTES # BLD AUTO: 0 K/UL (ref 0–0.04)
IMM GRANULOCYTES NFR BLD AUTO: 0 % (ref 0–0.5)
KETONES UR QL STRIP.AUTO: ABNORMAL MG/DL
LEUKOCYTE ESTERASE UR QL STRIP.AUTO: ABNORMAL
LYMPHOCYTES # BLD: 1.5 K/UL (ref 0.8–3.5)
LYMPHOCYTES NFR BLD: 40 % (ref 12–49)
MAGNESIUM SERPL-MCNC: 2.1 MG/DL (ref 1.6–2.4)
MCH RBC QN AUTO: 34.2 PG (ref 26–34)
MCHC RBC AUTO-ENTMCNC: 32.2 G/DL (ref 30–36.5)
MCV RBC AUTO: 106.2 FL (ref 80–99)
MONOCYTES # BLD: 0.3 K/UL (ref 0–1)
MONOCYTES NFR BLD: 8 % (ref 5–13)
NEUTS SEG # BLD: 2 K/UL (ref 1.8–8)
NEUTS SEG NFR BLD: 51 % (ref 32–75)
NITRITE UR QL STRIP.AUTO: NEGATIVE
NRBC # BLD: 0 K/UL (ref 0–0.01)
NRBC BLD-RTO: 0 PER 100 WBC
PH UR STRIP: 6.5 (ref 5–8)
PLATELET # BLD AUTO: 208 K/UL (ref 150–400)
PMV BLD AUTO: 10.5 FL (ref 8.9–12.9)
POTASSIUM SERPL-SCNC: 4 MMOL/L (ref 3.5–5.1)
PROT SERPL-MCNC: 8.1 G/DL (ref 6.4–8.2)
PROT UR STRIP-MCNC: 100 MG/DL
RBC # BLD AUTO: 4.03 M/UL (ref 4.1–5.7)
RBC #/AREA URNS HPF: >100 /HPF (ref 0–5)
SODIUM SERPL-SCNC: 140 MMOL/L (ref 136–145)
SP GR UR REFRACTOMETRY: 1.02
TROPONIN I SERPL HS-MCNC: 13 NG/L (ref 0–76)
URINE CULTURE IF INDICATED: ABNORMAL
UROBILINOGEN UR QL STRIP.AUTO: 1 EU/DL (ref 0.2–1)
WBC # BLD AUTO: 3.9 K/UL (ref 4.1–11.1)
WBC URNS QL MICRO: >100 /HPF (ref 0–4)

## 2024-03-26 PROCEDURE — 81001 URINALYSIS AUTO W/SCOPE: CPT

## 2024-03-26 PROCEDURE — 36415 COLL VENOUS BLD VENIPUNCTURE: CPT

## 2024-03-26 PROCEDURE — 94762 N-INVAS EAR/PLS OXIMTRY CONT: CPT

## 2024-03-26 PROCEDURE — 80053 COMPREHEN METABOLIC PANEL: CPT

## 2024-03-26 PROCEDURE — 99285 EMERGENCY DEPT VISIT HI MDM: CPT

## 2024-03-26 PROCEDURE — 84484 ASSAY OF TROPONIN QUANT: CPT

## 2024-03-26 PROCEDURE — 96360 HYDRATION IV INFUSION INIT: CPT

## 2024-03-26 PROCEDURE — 2580000003 HC RX 258: Performed by: EMERGENCY MEDICINE

## 2024-03-26 PROCEDURE — 71045 X-RAY EXAM CHEST 1 VIEW: CPT

## 2024-03-26 PROCEDURE — 6370000000 HC RX 637 (ALT 250 FOR IP): Performed by: EMERGENCY MEDICINE

## 2024-03-26 PROCEDURE — 83735 ASSAY OF MAGNESIUM: CPT

## 2024-03-26 PROCEDURE — 85025 COMPLETE CBC W/AUTO DIFF WBC: CPT

## 2024-03-26 PROCEDURE — 93005 ELECTROCARDIOGRAM TRACING: CPT | Performed by: EMERGENCY MEDICINE

## 2024-03-26 PROCEDURE — 87086 URINE CULTURE/COLONY COUNT: CPT

## 2024-03-26 RX ORDER — ZINC OXIDE 13 %
1 CREAM (GRAM) TOPICAL 2 TIMES DAILY
Qty: 60 CAPSULE | Refills: 0 | Status: SHIPPED | OUTPATIENT
Start: 2024-03-26 | End: 2024-04-25

## 2024-03-26 RX ORDER — CEPHALEXIN 500 MG/1
500 CAPSULE ORAL 4 TIMES DAILY
Qty: 28 CAPSULE | Refills: 0 | Status: ON HOLD | OUTPATIENT
Start: 2024-03-26 | End: 2024-03-31 | Stop reason: HOSPADM

## 2024-03-26 RX ORDER — 0.9 % SODIUM CHLORIDE 0.9 %
1000 INTRAVENOUS SOLUTION INTRAVENOUS
Status: COMPLETED | OUTPATIENT
Start: 2024-03-26 | End: 2024-03-26

## 2024-03-26 RX ORDER — CEPHALEXIN 250 MG/1
500 CAPSULE ORAL
Status: COMPLETED | OUTPATIENT
Start: 2024-03-26 | End: 2024-03-26

## 2024-03-26 RX ADMIN — SODIUM CHLORIDE 1000 ML: 9 INJECTION, SOLUTION INTRAVENOUS at 17:39

## 2024-03-26 RX ADMIN — CEPHALEXIN 500 MG: 250 CAPSULE ORAL at 19:50

## 2024-03-26 ASSESSMENT — ENCOUNTER SYMPTOMS
ABDOMINAL PAIN: 0
DIARRHEA: 1

## 2024-03-26 ASSESSMENT — PAIN SCALES - GENERAL: PAINLEVEL_OUTOF10: 0

## 2024-03-26 NOTE — ED PROVIDER NOTES
EMERGENCY DEPARTMENT HISTORY AND PHYSICAL EXAM    Date: 3/26/2024  Patient Name: Errol Brandt  Patient Age and Sex: 79 y.o. male  MRN:  [id number]  CSN:  [id number]    History of Present Illness     Chief Complaint   Patient presents with    Dizziness     Pt BIB EMS coming from home. Pt is on blood thinners. Pt here for dizziness x today       History Provided By: Patient    Ability to gather history was limited by:     HPI: Errol Brandt, 79 y.o. male   With history of insulin-dependent diabetes, coronary artery disease status post recent PCI, on dual antiplatelet therapy, complains of feeling dizzy and lightheaded, in the setting of diarrhea for the last 2 days.  No headache or falls.      Tobacco Use      Smoking status: Every Day        Packs/day: 1.00        Types: Cigarettes      Smokeless tobacco: Never     Past History   The patient's medical, surgical, and social history were reviewed by me today.    Current Medications:  No current facility-administered medications on file prior to encounter.     Current Outpatient Medications on File Prior to Encounter   Medication Sig Dispense Refill    insulin aspart prot & aspart (NOVOLOG 70/30) injection pen Inject 20 Units into the skin 2 times daily Use 20 units 70/30 insulin daily before breakfast AND Use 20 units 70/30 insulin nightly before dinner. 5 Adjustable Dose Pre-filled Pen Syringe 1    aspirin 81 MG EC tablet Take 1 tablet by mouth daily 30 tablet 5    atorvastatin (LIPITOR) 80 MG tablet Take 1 tablet by mouth nightly 30 tablet 1    levothyroxine (SYNTHROID) 125 MCG tablet Take 1 tablet by mouth every morning (before breakfast) 30 tablet 1    metFORMIN (GLUCOPHAGE-XR) 500 MG extended release tablet Take 2 tablets by mouth 2 times daily 120 tablet 1    metoprolol succinate (TOPROL XL) 25 MG extended release tablet Take 0.5 tablets by mouth daily 30 tablet 1    prasugrel (EFFIENT) 10 MG TABS Take 1 tablet by mouth daily 30 tablet 1    SITagliptin  has replaced Sinus rhythm  Nonspecific T wave abnormality no longer evident in Inferior leads     CBC with Auto Differential    Collection Time: 03/26/24  4:50 PM   Result Value Ref Range    WBC 3.9 (L) 4.1 - 11.1 K/uL    RBC 4.03 (L) 4.10 - 5.70 M/uL    Hemoglobin 13.8 12.1 - 17.0 g/dL    Hematocrit 42.8 36.6 - 50.3 %    .2 (H) 80.0 - 99.0 FL    MCH 34.2 (H) 26.0 - 34.0 PG    MCHC 32.2 30.0 - 36.5 g/dL    RDW 13.3 11.5 - 14.5 %    Platelets 208 150 - 400 K/uL    MPV 10.5 8.9 - 12.9 FL    Nucleated RBCs 0.0 0  WBC    nRBC 0.00 0.00 - 0.01 K/uL    Neutrophils % 51 32 - 75 %    Lymphocytes % 40 12 - 49 %    Monocytes % 8 5 - 13 %    Eosinophils % 1 0 - 7 %    Basophils % 0 0 - 1 %    Immature Granulocytes 0 0.0 - 0.5 %    Neutrophils Absolute 2.0 1.8 - 8.0 K/UL    Lymphocytes Absolute 1.5 0.8 - 3.5 K/UL    Monocytes Absolute 0.3 0.0 - 1.0 K/UL    Eosinophils Absolute 0.0 0.0 - 0.4 K/UL    Basophils Absolute 0.0 0.0 - 0.1 K/UL    Absolute Immature Granulocyte 0.0 0.00 - 0.04 K/UL    Differential Type AUTOMATED     Comprehensive Metabolic Panel    Collection Time: 03/26/24  4:50 PM   Result Value Ref Range    Sodium 140 136 - 145 mmol/L    Potassium 4.0 3.5 - 5.1 mmol/L    Chloride 108 97 - 108 mmol/L    CO2 26 21 - 32 mmol/L    Anion Gap 6 5 - 15 mmol/L    Glucose 254 (H) 65 - 100 mg/dL    BUN 27 (H) 6 - 20 MG/DL    Creatinine 1.24 0.70 - 1.30 MG/DL    Bun/Cre Ratio 22 (H) 12 - 20      Est, Glom Filt Rate 60 (L) >60 ml/min/1.73m2    Calcium 9.4 8.5 - 10.1 MG/DL    Total Bilirubin 1.1 (H) 0.2 - 1.0 MG/DL    ALT 26 12 - 78 U/L    AST 25 15 - 37 U/L    Alk Phosphatase 108 45 - 117 U/L    Total Protein 8.1 6.4 - 8.2 g/dL    Albumin 3.5 3.5 - 5.0 g/dL    Globulin 4.6 (H) 2.0 - 4.0 g/dL    Albumin/Globulin Ratio 0.8 (L) 1.1 - 2.2     Troponin    Collection Time: 03/26/24  4:50 PM   Result Value Ref Range    Troponin, High Sensitivity 13 0 - 76 ng/L   Magnesium    Collection Time: 03/26/24  4:50 PM   Result

## 2024-03-26 NOTE — DISCHARGE INSTRUCTIONS
Your examination today was consistent with some mild dehydration and a urinary tract infection.  I would recommend that you take Keflex antibiotic 4 times a day for 1 week, and try to stay well-hydrated by drinking water frequently throughout the day.  In addition you should take a probiotic once or twice a day, and/or eat Greek yogurt, to replace the normal bacteria in your gut.

## 2024-03-27 LAB
BACTERIA SPEC CULT: NORMAL
EKG ATRIAL RATE: 147 BPM
EKG DIAGNOSIS: NORMAL
EKG Q-T INTERVAL: 442 MS
EKG QRS DURATION: 74 MS
EKG QTC CALCULATION (BAZETT): 500 MS
EKG R AXIS: 47 DEGREES
EKG T AXIS: 52 DEGREES
EKG VENTRICULAR RATE: 77 BPM
SERVICE CMNT-IMP: NORMAL

## 2024-03-27 RX ORDER — ATORVASTATIN CALCIUM 80 MG/1
80 TABLET, FILM COATED ORAL NIGHTLY
Qty: 90 TABLET | OUTPATIENT
Start: 2024-03-27

## 2024-03-27 RX ORDER — PRASUGREL 10 MG/1
10 TABLET, FILM COATED ORAL DAILY
Qty: 90 TABLET | OUTPATIENT
Start: 2024-03-27 | End: 2024-05-26

## 2024-03-27 RX ORDER — METFORMIN HYDROCHLORIDE 500 MG/1
1000 TABLET, EXTENDED RELEASE ORAL 2 TIMES DAILY
Qty: 360 TABLET | OUTPATIENT
Start: 2024-03-27

## 2024-03-27 RX ORDER — LEVOTHYROXINE SODIUM 0.12 MG/1
125 TABLET ORAL
Qty: 90 TABLET | OUTPATIENT
Start: 2024-03-27

## 2024-03-28 ENCOUNTER — APPOINTMENT (OUTPATIENT)
Facility: HOSPITAL | Age: 79
DRG: 178 | End: 2024-03-28
Payer: MEDICARE

## 2024-03-28 ENCOUNTER — HOSPITAL ENCOUNTER (INPATIENT)
Facility: HOSPITAL | Age: 79
LOS: 4 days | Discharge: HOME | DRG: 178 | End: 2024-04-01
Attending: EMERGENCY MEDICINE | Admitting: STUDENT IN AN ORGANIZED HEALTH CARE EDUCATION/TRAINING PROGRAM
Payer: MEDICARE

## 2024-03-28 DIAGNOSIS — U07.1 COVID-19: ICD-10-CM

## 2024-03-28 DIAGNOSIS — E16.2 HYPOGLYCEMIA: ICD-10-CM

## 2024-03-28 DIAGNOSIS — N30.01 ACUTE CYSTITIS WITH HEMATURIA: ICD-10-CM

## 2024-03-28 DIAGNOSIS — R53.1 GENERALIZED WEAKNESS: Primary | ICD-10-CM

## 2024-03-28 LAB
ALBUMIN SERPL-MCNC: 3.2 G/DL (ref 3.5–5)
ALBUMIN/GLOB SERPL: 0.7 (ref 1.1–2.2)
ALP SERPL-CCNC: 106 U/L (ref 45–117)
ALT SERPL-CCNC: 24 U/L (ref 12–78)
AMPHET UR QL SCN: NEGATIVE
ANION GAP SERPL CALC-SCNC: 4 MMOL/L (ref 5–15)
ANION GAP SERPL CALC-SCNC: 7 MMOL/L (ref 5–15)
APAP SERPL-MCNC: 3 UG/ML (ref 10–30)
APPEARANCE UR: ABNORMAL
AST SERPL-CCNC: 30 U/L (ref 15–37)
BACTERIA URNS QL MICRO: NEGATIVE /HPF
BARBITURATES UR QL SCN: NEGATIVE
BASOPHILS # BLD: 0 K/UL (ref 0–0.1)
BASOPHILS # BLD: 0 K/UL (ref 0–0.1)
BASOPHILS NFR BLD: 0 % (ref 0–1)
BASOPHILS NFR BLD: 0 % (ref 0–1)
BENZODIAZ UR QL: NEGATIVE
BILIRUB SERPL-MCNC: 0.7 MG/DL (ref 0.2–1)
BILIRUB UR QL: NEGATIVE
BUN SERPL-MCNC: 22 MG/DL (ref 6–20)
BUN SERPL-MCNC: 25 MG/DL (ref 6–20)
BUN/CREAT SERPL: 20 (ref 12–20)
BUN/CREAT SERPL: 21 (ref 12–20)
CALCIUM SERPL-MCNC: 8.7 MG/DL (ref 8.5–10.1)
CALCIUM SERPL-MCNC: 8.7 MG/DL (ref 8.5–10.1)
CANNABINOIDS UR QL SCN: NEGATIVE
CHLORIDE SERPL-SCNC: 115 MMOL/L (ref 97–108)
CHLORIDE SERPL-SCNC: 115 MMOL/L (ref 97–108)
CK SERPL-CCNC: 380 U/L (ref 39–308)
CO2 SERPL-SCNC: 24 MMOL/L (ref 21–32)
CO2 SERPL-SCNC: 24 MMOL/L (ref 21–32)
COCAINE UR QL SCN: NEGATIVE
COLOR UR: ABNORMAL
COMMENT:: NORMAL
CREAT SERPL-MCNC: 1.07 MG/DL (ref 0.7–1.3)
CREAT SERPL-MCNC: 1.22 MG/DL (ref 0.7–1.3)
DIFFERENTIAL METHOD BLD: ABNORMAL
DIFFERENTIAL METHOD BLD: ABNORMAL
EOSINOPHIL # BLD: 0.1 K/UL (ref 0–0.4)
EOSINOPHIL # BLD: 0.1 K/UL (ref 0–0.4)
EOSINOPHIL NFR BLD: 1 % (ref 0–7)
EOSINOPHIL NFR BLD: 1 % (ref 0–7)
EPITH CASTS URNS QL MICRO: ABNORMAL /LPF
ERYTHROCYTE [DISTWIDTH] IN BLOOD BY AUTOMATED COUNT: 13.2 % (ref 11.5–14.5)
ERYTHROCYTE [DISTWIDTH] IN BLOOD BY AUTOMATED COUNT: 13.3 % (ref 11.5–14.5)
ETHANOL SERPL-MCNC: <10 MG/DL (ref 0–0.08)
FLUAV AG NPH QL IA: NEGATIVE
FLUBV AG NOSE QL IA: NEGATIVE
GLOBULIN SER CALC-MCNC: 4.3 G/DL (ref 2–4)
GLUCOSE BLD STRIP.AUTO-MCNC: 122 MG/DL (ref 65–117)
GLUCOSE BLD STRIP.AUTO-MCNC: 154 MG/DL (ref 65–117)
GLUCOSE BLD STRIP.AUTO-MCNC: 169 MG/DL (ref 65–117)
GLUCOSE BLD STRIP.AUTO-MCNC: 308 MG/DL (ref 65–117)
GLUCOSE BLD STRIP.AUTO-MCNC: 32 MG/DL (ref 65–117)
GLUCOSE BLD STRIP.AUTO-MCNC: 323 MG/DL (ref 65–117)
GLUCOSE BLD STRIP.AUTO-MCNC: 324 MG/DL (ref 65–117)
GLUCOSE BLD STRIP.AUTO-MCNC: 33 MG/DL (ref 65–117)
GLUCOSE BLD STRIP.AUTO-MCNC: 343 MG/DL (ref 65–117)
GLUCOSE BLD STRIP.AUTO-MCNC: 442 MG/DL (ref 65–117)
GLUCOSE BLD STRIP.AUTO-MCNC: 84 MG/DL (ref 65–117)
GLUCOSE BLD STRIP.AUTO-MCNC: 91 MG/DL (ref 65–117)
GLUCOSE SERPL-MCNC: 37 MG/DL (ref 65–100)
GLUCOSE SERPL-MCNC: 65 MG/DL (ref 65–100)
GLUCOSE UR STRIP.AUTO-MCNC: 250 MG/DL
HCT VFR BLD AUTO: 37.6 % (ref 36.6–50.3)
HCT VFR BLD AUTO: 38.2 % (ref 36.6–50.3)
HGB BLD-MCNC: 12.2 G/DL (ref 12.1–17)
HGB BLD-MCNC: 12.3 G/DL (ref 12.1–17)
HGB UR QL STRIP: ABNORMAL
HYALINE CASTS URNS QL MICRO: ABNORMAL /LPF (ref 0–2)
IMM GRANULOCYTES # BLD AUTO: 0 K/UL (ref 0–0.04)
IMM GRANULOCYTES # BLD AUTO: 0 K/UL (ref 0–0.04)
IMM GRANULOCYTES NFR BLD AUTO: 0 % (ref 0–0.5)
IMM GRANULOCYTES NFR BLD AUTO: 1 % (ref 0–0.5)
KETONES UR QL STRIP.AUTO: ABNORMAL MG/DL
LACTATE BLD-SCNC: 1 MMOL/L (ref 0.4–2)
LEUKOCYTE ESTERASE UR QL STRIP.AUTO: ABNORMAL
LYMPHOCYTES # BLD: 1.1 K/UL (ref 0.8–3.5)
LYMPHOCYTES # BLD: 1.6 K/UL (ref 0.8–3.5)
LYMPHOCYTES NFR BLD: 18 % (ref 12–49)
LYMPHOCYTES NFR BLD: 32 % (ref 12–49)
Lab: NORMAL
MCH RBC QN AUTO: 33.7 PG (ref 26–34)
MCH RBC QN AUTO: 34.2 PG (ref 26–34)
MCHC RBC AUTO-ENTMCNC: 31.9 G/DL (ref 30–36.5)
MCHC RBC AUTO-ENTMCNC: 32.7 G/DL (ref 30–36.5)
MCV RBC AUTO: 104.4 FL (ref 80–99)
MCV RBC AUTO: 105.5 FL (ref 80–99)
METHADONE UR QL: NEGATIVE
MONOCYTES # BLD: 0.6 K/UL (ref 0–1)
MONOCYTES # BLD: 0.6 K/UL (ref 0–1)
MONOCYTES NFR BLD: 11 % (ref 5–13)
MONOCYTES NFR BLD: 9 % (ref 5–13)
NEUTS SEG # BLD: 2.9 K/UL (ref 1.8–8)
NEUTS SEG # BLD: 4.4 K/UL (ref 1.8–8)
NEUTS SEG NFR BLD: 56 % (ref 32–75)
NEUTS SEG NFR BLD: 71 % (ref 32–75)
NITRITE UR QL STRIP.AUTO: NEGATIVE
NRBC # BLD: 0 K/UL (ref 0–0.01)
NRBC # BLD: 0 K/UL (ref 0–0.01)
NRBC BLD-RTO: 0 PER 100 WBC
NRBC BLD-RTO: 0 PER 100 WBC
OPIATES UR QL: NEGATIVE
PCP UR QL: NEGATIVE
PH UR STRIP: 6 (ref 5–8)
PLATELET # BLD AUTO: 187 K/UL (ref 150–400)
PLATELET # BLD AUTO: 216 K/UL (ref 150–400)
PMV BLD AUTO: 10.7 FL (ref 8.9–12.9)
PMV BLD AUTO: 10.9 FL (ref 8.9–12.9)
POTASSIUM SERPL-SCNC: 3.1 MMOL/L (ref 3.5–5.1)
POTASSIUM SERPL-SCNC: 4.1 MMOL/L (ref 3.5–5.1)
PROCALCITONIN SERPL-MCNC: <0.05 NG/ML
PROT SERPL-MCNC: 7.5 G/DL (ref 6.4–8.2)
PROT UR STRIP-MCNC: 100 MG/DL
RBC # BLD AUTO: 3.6 M/UL (ref 4.1–5.7)
RBC # BLD AUTO: 3.62 M/UL (ref 4.1–5.7)
RBC #/AREA URNS HPF: >100 /HPF (ref 0–5)
SALICYLATES SERPL-MCNC: <1.7 MG/DL (ref 2.8–20)
SARS-COV-2 RDRP RESP QL NAA+PROBE: DETECTED
SERVICE CMNT-IMP: ABNORMAL
SERVICE CMNT-IMP: NORMAL
SERVICE CMNT-IMP: NORMAL
SODIUM SERPL-SCNC: 143 MMOL/L (ref 136–145)
SODIUM SERPL-SCNC: 146 MMOL/L (ref 136–145)
SOURCE: ABNORMAL
SP GR UR REFRACTOMETRY: 1.02
SPECIMEN HOLD: NORMAL
URINE CULTURE IF INDICATED: ABNORMAL
UROBILINOGEN UR QL STRIP.AUTO: 1 EU/DL (ref 0.2–1)
WBC # BLD AUTO: 5.2 K/UL (ref 4.1–11.1)
WBC # BLD AUTO: 6.2 K/UL (ref 4.1–11.1)
WBC URNS QL MICRO: >100 /HPF (ref 0–4)

## 2024-03-28 PROCEDURE — 97166 OT EVAL MOD COMPLEX 45 MIN: CPT

## 2024-03-28 PROCEDURE — 6370000000 HC RX 637 (ALT 250 FOR IP): Performed by: NURSE PRACTITIONER

## 2024-03-28 PROCEDURE — 70450 CT HEAD/BRAIN W/O DYE: CPT

## 2024-03-28 PROCEDURE — 6370000000 HC RX 637 (ALT 250 FOR IP): Performed by: INTERNAL MEDICINE

## 2024-03-28 PROCEDURE — 80053 COMPREHEN METABOLIC PANEL: CPT

## 2024-03-28 PROCEDURE — 82550 ASSAY OF CK (CPK): CPT

## 2024-03-28 PROCEDURE — 87804 INFLUENZA ASSAY W/OPTIC: CPT

## 2024-03-28 PROCEDURE — 6360000002 HC RX W HCPCS: Performed by: STUDENT IN AN ORGANIZED HEALTH CARE EDUCATION/TRAINING PROGRAM

## 2024-03-28 PROCEDURE — 36415 COLL VENOUS BLD VENIPUNCTURE: CPT

## 2024-03-28 PROCEDURE — 6370000000 HC RX 637 (ALT 250 FOR IP): Performed by: STUDENT IN AN ORGANIZED HEALTH CARE EDUCATION/TRAINING PROGRAM

## 2024-03-28 PROCEDURE — 85025 COMPLETE CBC W/AUTO DIFF WBC: CPT

## 2024-03-28 PROCEDURE — 82962 GLUCOSE BLOOD TEST: CPT

## 2024-03-28 PROCEDURE — 87635 SARS-COV-2 COVID-19 AMP PRB: CPT

## 2024-03-28 PROCEDURE — 99285 EMERGENCY DEPT VISIT HI MDM: CPT

## 2024-03-28 PROCEDURE — 80179 DRUG ASSAY SALICYLATE: CPT

## 2024-03-28 PROCEDURE — 6360000002 HC RX W HCPCS: Performed by: INTERNAL MEDICINE

## 2024-03-28 PROCEDURE — 84145 PROCALCITONIN (PCT): CPT

## 2024-03-28 PROCEDURE — 2580000003 HC RX 258: Performed by: STUDENT IN AN ORGANIZED HEALTH CARE EDUCATION/TRAINING PROGRAM

## 2024-03-28 PROCEDURE — 80307 DRUG TEST PRSMV CHEM ANLYZR: CPT

## 2024-03-28 PROCEDURE — 97161 PT EVAL LOW COMPLEX 20 MIN: CPT

## 2024-03-28 PROCEDURE — 97116 GAIT TRAINING THERAPY: CPT

## 2024-03-28 PROCEDURE — 97535 SELF CARE MNGMENT TRAINING: CPT

## 2024-03-28 PROCEDURE — 87086 URINE CULTURE/COLONY COUNT: CPT

## 2024-03-28 PROCEDURE — 81001 URINALYSIS AUTO W/SCOPE: CPT

## 2024-03-28 PROCEDURE — 2500000003 HC RX 250 WO HCPCS

## 2024-03-28 PROCEDURE — 82077 ASSAY SPEC XCP UR&BREATH IA: CPT

## 2024-03-28 PROCEDURE — 1100000003 HC PRIVATE W/ TELEMETRY

## 2024-03-28 PROCEDURE — 83605 ASSAY OF LACTIC ACID: CPT

## 2024-03-28 PROCEDURE — 80143 DRUG ASSAY ACETAMINOPHEN: CPT

## 2024-03-28 PROCEDURE — 71045 X-RAY EXAM CHEST 1 VIEW: CPT

## 2024-03-28 RX ORDER — ACETAMINOPHEN 325 MG/1
650 TABLET ORAL EVERY 6 HOURS PRN
Status: DISCONTINUED | OUTPATIENT
Start: 2024-03-28 | End: 2024-04-01 | Stop reason: HOSPADM

## 2024-03-28 RX ORDER — DEXAMETHASONE SODIUM PHOSPHATE 10 MG/ML
6 INJECTION, SOLUTION INTRAMUSCULAR; INTRAVENOUS EVERY 24 HOURS
Status: DISCONTINUED | OUTPATIENT
Start: 2024-03-28 | End: 2024-03-28

## 2024-03-28 RX ORDER — POLYETHYLENE GLYCOL 3350 17 G/17G
17 POWDER, FOR SOLUTION ORAL DAILY PRN
Status: DISCONTINUED | OUTPATIENT
Start: 2024-03-28 | End: 2024-04-01 | Stop reason: HOSPADM

## 2024-03-28 RX ORDER — INSULIN LISPRO 100 [IU]/ML
0-8 INJECTION, SOLUTION INTRAVENOUS; SUBCUTANEOUS
Status: DISCONTINUED | OUTPATIENT
Start: 2024-03-28 | End: 2024-04-01 | Stop reason: HOSPADM

## 2024-03-28 RX ORDER — ACETAMINOPHEN 325 MG/1
650 TABLET ORAL EVERY 6 HOURS PRN
Status: DISCONTINUED | OUTPATIENT
Start: 2024-03-28 | End: 2024-03-28

## 2024-03-28 RX ORDER — DEXAMETHASONE SODIUM PHOSPHATE 10 MG/ML
6 INJECTION, SOLUTION INTRAMUSCULAR; INTRAVENOUS
Status: DISCONTINUED | OUTPATIENT
Start: 2024-03-28 | End: 2024-03-28

## 2024-03-28 RX ORDER — ATORVASTATIN CALCIUM 40 MG/1
80 TABLET, FILM COATED ORAL NIGHTLY
Status: DISCONTINUED | OUTPATIENT
Start: 2024-03-29 | End: 2024-04-01 | Stop reason: HOSPADM

## 2024-03-28 RX ORDER — INSULIN GLARGINE 100 [IU]/ML
20 INJECTION, SOLUTION SUBCUTANEOUS NIGHTLY
Status: DISCONTINUED | OUTPATIENT
Start: 2024-03-28 | End: 2024-03-29

## 2024-03-28 RX ORDER — SODIUM CHLORIDE 0.9 % (FLUSH) 0.9 %
5-40 SYRINGE (ML) INJECTION EVERY 12 HOURS SCHEDULED
Status: DISCONTINUED | OUTPATIENT
Start: 2024-03-28 | End: 2024-04-01 | Stop reason: HOSPADM

## 2024-03-28 RX ORDER — ASPIRIN 81 MG/1
81 TABLET ORAL DAILY
Status: DISCONTINUED | OUTPATIENT
Start: 2024-03-28 | End: 2024-04-01 | Stop reason: HOSPADM

## 2024-03-28 RX ORDER — SODIUM CHLORIDE 9 MG/ML
INJECTION, SOLUTION INTRAVENOUS PRN
Status: DISCONTINUED | OUTPATIENT
Start: 2024-03-28 | End: 2024-04-01 | Stop reason: HOSPADM

## 2024-03-28 RX ORDER — INSULIN LISPRO 100 [IU]/ML
0-4 INJECTION, SOLUTION INTRAVENOUS; SUBCUTANEOUS NIGHTLY
Status: DISCONTINUED | OUTPATIENT
Start: 2024-03-28 | End: 2024-04-01 | Stop reason: HOSPADM

## 2024-03-28 RX ORDER — METOPROLOL SUCCINATE 25 MG/1
12.5 TABLET, EXTENDED RELEASE ORAL DAILY
Status: DISCONTINUED | OUTPATIENT
Start: 2024-03-28 | End: 2024-04-01 | Stop reason: HOSPADM

## 2024-03-28 RX ORDER — DEXTROSE MONOHYDRATE 100 MG/ML
INJECTION, SOLUTION INTRAVENOUS CONTINUOUS PRN
Status: DISCONTINUED | OUTPATIENT
Start: 2024-03-28 | End: 2024-04-01 | Stop reason: HOSPADM

## 2024-03-28 RX ORDER — PRASUGREL 10 MG/1
10 TABLET, FILM COATED ORAL DAILY
Status: DISCONTINUED | OUTPATIENT
Start: 2024-03-28 | End: 2024-04-01 | Stop reason: HOSPADM

## 2024-03-28 RX ORDER — ENOXAPARIN SODIUM 100 MG/ML
40 INJECTION SUBCUTANEOUS DAILY
Status: DISCONTINUED | OUTPATIENT
Start: 2024-03-28 | End: 2024-04-01 | Stop reason: HOSPADM

## 2024-03-28 RX ORDER — ONDANSETRON 2 MG/ML
4 INJECTION INTRAMUSCULAR; INTRAVENOUS EVERY 4 HOURS PRN
Status: DISCONTINUED | OUTPATIENT
Start: 2024-03-28 | End: 2024-03-28

## 2024-03-28 RX ORDER — DEXTROSE MONOHYDRATE 25 G/50ML
INJECTION, SOLUTION INTRAVENOUS
Status: COMPLETED
Start: 2024-03-28 | End: 2024-03-28

## 2024-03-28 RX ORDER — ONDANSETRON 2 MG/ML
4 INJECTION INTRAMUSCULAR; INTRAVENOUS EVERY 6 HOURS PRN
Status: DISCONTINUED | OUTPATIENT
Start: 2024-03-28 | End: 2024-04-01 | Stop reason: HOSPADM

## 2024-03-28 RX ORDER — 0.9 % SODIUM CHLORIDE 0.9 %
1000 INTRAVENOUS SOLUTION INTRAVENOUS ONCE
Status: COMPLETED | OUTPATIENT
Start: 2024-03-28 | End: 2024-03-28

## 2024-03-28 RX ORDER — SODIUM CHLORIDE 0.9 % (FLUSH) 0.9 %
5-40 SYRINGE (ML) INJECTION PRN
Status: DISCONTINUED | OUTPATIENT
Start: 2024-03-28 | End: 2024-04-01 | Stop reason: HOSPADM

## 2024-03-28 RX ORDER — ONDANSETRON 4 MG/1
4 TABLET, ORALLY DISINTEGRATING ORAL EVERY 8 HOURS PRN
Status: DISCONTINUED | OUTPATIENT
Start: 2024-03-28 | End: 2024-04-01 | Stop reason: HOSPADM

## 2024-03-28 RX ORDER — LANOLIN ALCOHOL/MO/W.PET/CERES
3 CREAM (GRAM) TOPICAL NIGHTLY
Status: DISCONTINUED | OUTPATIENT
Start: 2024-03-28 | End: 2024-04-01 | Stop reason: HOSPADM

## 2024-03-28 RX ORDER — GLUCAGON 1 MG/ML
1 KIT INJECTION PRN
Status: DISCONTINUED | OUTPATIENT
Start: 2024-03-28 | End: 2024-04-01 | Stop reason: HOSPADM

## 2024-03-28 RX ORDER — TAMSULOSIN HYDROCHLORIDE 0.4 MG/1
0.4 CAPSULE ORAL DAILY
Status: DISCONTINUED | OUTPATIENT
Start: 2024-03-28 | End: 2024-04-01 | Stop reason: HOSPADM

## 2024-03-28 RX ORDER — ACETAMINOPHEN 650 MG/1
650 SUPPOSITORY RECTAL EVERY 6 HOURS PRN
Status: DISCONTINUED | OUTPATIENT
Start: 2024-03-28 | End: 2024-04-01 | Stop reason: HOSPADM

## 2024-03-28 RX ORDER — INSULIN LISPRO 100 [IU]/ML
6 INJECTION, SOLUTION INTRAVENOUS; SUBCUTANEOUS ONCE
Status: COMPLETED | OUTPATIENT
Start: 2024-03-28 | End: 2024-03-28

## 2024-03-28 RX ADMIN — MELATONIN 3 MG: at 20:38

## 2024-03-28 RX ADMIN — SODIUM CHLORIDE, PRESERVATIVE FREE 10 ML: 5 INJECTION INTRAVENOUS at 11:41

## 2024-03-28 RX ADMIN — PRASUGREL 10 MG: 10 TABLET, FILM COATED ORAL at 11:52

## 2024-03-28 RX ADMIN — SODIUM CHLORIDE 1000 MG: 900 INJECTION INTRAVENOUS at 01:51

## 2024-03-28 RX ADMIN — POTASSIUM BICARBONATE 40 MEQ: 782 TABLET, EFFERVESCENT ORAL at 02:02

## 2024-03-28 RX ADMIN — TAMSULOSIN HYDROCHLORIDE 0.4 MG: 0.4 CAPSULE ORAL at 11:40

## 2024-03-28 RX ADMIN — ENOXAPARIN SODIUM 40 MG: 100 INJECTION SUBCUTANEOUS at 11:41

## 2024-03-28 RX ADMIN — ACETAMINOPHEN 650 MG: 325 TABLET ORAL at 20:38

## 2024-03-28 RX ADMIN — METOPROLOL SUCCINATE 12.5 MG: 25 TABLET, EXTENDED RELEASE ORAL at 11:39

## 2024-03-28 RX ADMIN — SODIUM CHLORIDE 1000 ML: 9 INJECTION, SOLUTION INTRAVENOUS at 01:50

## 2024-03-28 RX ADMIN — INSULIN LISPRO 6 UNITS: 100 INJECTION, SOLUTION INTRAVENOUS; SUBCUTANEOUS at 16:56

## 2024-03-28 RX ADMIN — DEXAMETHASONE SODIUM PHOSPHATE 6 MG: 10 INJECTION, SOLUTION INTRAMUSCULAR; INTRAVENOUS at 11:58

## 2024-03-28 RX ADMIN — INSULIN LISPRO 6 UNITS: 100 INJECTION, SOLUTION INTRAVENOUS; SUBCUTANEOUS at 20:40

## 2024-03-28 RX ADMIN — DEXTROSE MONOHYDRATE 50 ML: 25 INJECTION, SOLUTION INTRAVENOUS at 00:26

## 2024-03-28 RX ADMIN — SODIUM CHLORIDE, PRESERVATIVE FREE 10 ML: 5 INJECTION INTRAVENOUS at 20:38

## 2024-03-28 RX ADMIN — ACETAMINOPHEN 650 MG: 325 TABLET ORAL at 04:45

## 2024-03-28 RX ADMIN — LEVOTHYROXINE SODIUM 125 MCG: 0.03 TABLET ORAL at 05:40

## 2024-03-28 RX ADMIN — ASPIRIN 81 MG: 81 TABLET, COATED ORAL at 11:39

## 2024-03-28 RX ADMIN — INSULIN GLARGINE 20 UNITS: 100 INJECTION, SOLUTION SUBCUTANEOUS at 22:09

## 2024-03-28 ASSESSMENT — PAIN DESCRIPTION - DESCRIPTORS: DESCRIPTORS: ACHING

## 2024-03-28 ASSESSMENT — PAIN SCALES - GENERAL
PAINLEVEL_OUTOF10: 3

## 2024-03-28 ASSESSMENT — PAIN SCALES - WONG BAKER: WONGBAKER_NUMERICALRESPONSE: NO HURT

## 2024-03-28 ASSESSMENT — PAIN DESCRIPTION - ORIENTATION
ORIENTATION: RIGHT
ORIENTATION: RIGHT

## 2024-03-28 ASSESSMENT — PAIN DESCRIPTION - LOCATION
LOCATION: HAND
LOCATION: HAND
LOCATION: GENERALIZED

## 2024-03-28 ASSESSMENT — PAIN - FUNCTIONAL ASSESSMENT
PAIN_FUNCTIONAL_ASSESSMENT: NONE - DENIES PAIN
PAIN_FUNCTIONAL_ASSESSMENT: ACTIVITIES ARE NOT PREVENTED

## 2024-03-28 NOTE — ED NOTES
Pt arrives via EMS from home for generalized weakness. Per EMS BG was 60, on arrival pts BG was 33. Pt was give D50 IV push. MD Her made aware

## 2024-03-28 NOTE — H&P
to admit for work up and evaluation of the above problems.     Past Medical History:   Diagnosis Date    Diabetes (HCC)     Ill-defined condition     perpherial artery disease    Ill-defined condition     hyperlidemia        Past Surgical History:   Procedure Laterality Date    CARDIAC PROCEDURE N/A 1/3/2024    Left heart cath / coronary angiography performed by Sae Garzon MD at [facility] CARDIAC CATH LAB    CARDIAC PROCEDURE N/A 1/5/2024    Percutaneous coronary intervention performed by Sae Garzon MD at [facility] CARDIAC CATH LAB    CARDIAC PROCEDURE N/A 1/5/2024    Left heart cath / coronary angiography performed by Sae Garzon MD at [facility] CARDIAC CATH LAB    CARDIAC PROCEDURE N/A 1/5/2024    Intravascular ultrasound performed by Sae Garzon MD at [facility] CARDIAC CATH LAB    CARDIAC PROCEDURE N/A 1/5/2024    Atherectomy coronary performed by Sae Garzon MD at [facility] CARDIAC CATH LAB    CHOLECYSTECTOMY, LAPAROSCOPIC N/A 12/21/2023    CHOLECYSTECTOMY LAPAROSCOPIC CHOLANGIOGRAM performed by Adryan Rees MD at [facility] MAIN OR    COLONOSCOPY N/A 4/27/2022    COLONOSCOPY performed by Barber Breaux MD at [facility] ENDOSCOPY    ERCP N/A 12/21/2023    ERCP ENDOSCOPIC RETROGRADE CHOLANGIOPANCREATOGRAPHY performed by Adryan Armando MD at [facility] MAIN OR    ERCP N/A 12/22/2023    ERCP ENDOSCOPIC RETROGRADE CHOLANGIOPANCREATOGRAPHY performed by Ni Pa MD at [facility] ENDOSCOPY    INVASIVE VASCULAR N/A 1/5/2024    Intravascular lithotripsy performed by Sae Garzon MD at [facility] CARDIAC CATH LAB    OTHER SURGICAL HISTORY  2011    right femoral tibial bypass    OTHER SURGICAL HISTORY      drained times two in back    VASCULAR SURGERY  2012    blood clot.right leg       Social History     Tobacco Use    Smoking status: Every Day     Current packs/day: 1.00     Types: Cigarettes    Smokeless tobacco: Never   Substance Use Topics    Alcohol use: No        No family history on file.  No Known

## 2024-03-28 NOTE — CONSULTS
Palliative Medicine  Patient Name: Errol Brandt  YOB: 1945  MRN: [id number]  Age: 79 y.o.  Gender: male    Date of Initial Consult: 3/28/24  Date of Service: 3/29/2024  Time: 11:42 AM  Provider: PAPO King NP  Hospital Day: 2  Admit Date: 3/28/2024  Referring Provider:  Kath Her MD    Reasons for Consultation:  Goals of Care    HISTORY OF PRESENT ILLNESS (HPI):   Errol Brandt is a 79 y.o. male with a past medical history of DM, PAD and HLD, who was admitted on 3/28/2024 from home with a diagnosis of COVID 19, complicated UTI, and weakness/debility.     He was just discharged from [facility] Saturday  His wife is currently admitted to [facility] with plan to d/c on the 28th, but is in poor health as well d/t cancer diagnosis    3/26:  ED visit for dehydration and dizziness in the setting of diarrhea for the last 2 days  3/6-3/13/24:  Admitted for gross hematuria and acute cystitis  1/27:  ED visit for acute cystitis and abdominal pain  1/3-1/9:  Hospitalization for NSTEMI s/p PCI to RCA on 2/5    Psychosocial: Lives with his wife and son Julio.  Son Adryan lives nearby.  Wife is recovering from breast cancer s/p mastectomy.        PALLIATIVE DIAGNOSES:    Goals of Care  DNR Discussion  COVID-19  Complicated UTI  Unproductive cough    ASSESSMENT AND PLAN:   Met with Mr Brandt at bedside-   He is VERY hard of hearing, and with the COVID precautions it made it near impossible for him to have a meaningful conversation with me.  He appears oriented, he knew his wife was here in the hospital, and wanted an update on her (I spoke with Julio and she was discharged last night and is home now)  I tried to have an ACP conversation with him, but it was impossible due to the communication issues we were having  I spoke with Julio briefly, and updated him on his dad.  I also encouraged them to talk about DNR once his dad is home and they can all talk together with his moms input as

## 2024-03-28 NOTE — WOUND CARE
Wound Care consulted for \"Stage 2 between buttocks bilateral feet wounds / cysts upper mid back\".  Chart reviewed and patient assessed. Pt. Is 79 years old and he is admitted for Covid 19 and is in the Droplet isolation. He has cystitis with gross hematuria. On anticoagulants. He is DM with A1C of 6.8 back in January.      Fissure in the gluteal cleft - needs Zinc   Cream (orange tube).       Various scabs on feet and legs - self limiting - keep clean with bathing and keep them dry.                 These growths appear chronic and reappearing &scarred / non-draining  Currently. Keep them clean with bath and cover only if draining.         Plan: Pressure injury prevention. Luis Score is 15 today.  Encourage some mobility. Float heels. Wound care for sacrum is zinc oxide cream.   Cornelia Dinh RN, BSN, CWON

## 2024-03-28 NOTE — ED PROVIDER NOTES
EMERGENCY DEPARTMENT HISTORY AND PHYSICAL EXAM     ----------------------------------------------------------------------------  Please note that this dictation was completed with [employer], the [employer] voice recognition software.  Quite often unanticipated grammatical, syntax, homophones, and other interpretive errors are inadvertently transcribed by the computer software.  Please disregard these errors.  Please excuse any errors that have escaped final proofreading  ----------------------------------------------------------------------------      Date: 3/28/2024  Patient Name: Errol Brandt      HISTORY OF PRESENT ILLNESS     Chief Complaint   Patient presents with    Generalized Weakness     Pt arrives via EMS from home with complaints of generalized weakness, pt reports unable to get out of bed today, report numb all over his body. Recent dc for UTI    Hypoglycemia     Per EMS BG was 60 in route, on arrival BG 33        History obtainted from:  Patient    Other independent source of history: EMS, family    HPI: Errol Brandt is a 79 y.o. male, with significant pmhx of recurrent UTIs, peripheral artery disease, cholesterol, diabetes who presents via EMS to the ED with c/o generalized weakness and difficulty getting out of this bed today.  Patient is been seen emergency department 2 days ago for urinary tract back, started on Keflex at that time.  Patient is a poor historian and extremely hard of hearing.  Unsure if he has been compliant with his previously prescribed medications.  Notes that his wife is currently at another facility and that his son has been staying with him to assist him but we are unsure as to if he is there all day.  Patient is also hypoglycemic on arrival in the 50s.  Was given D50 with improvement to 150.  Patient is able to speak in full, unlabored sentences but noted to have dry, nonproductive cough.  Denies chest pain, abdominal pain or feeling short of breath when asked

## 2024-03-29 PROBLEM — U07.1 COVID-19: Status: ACTIVE | Noted: 2024-03-29

## 2024-03-29 PROBLEM — Z71.89 GOALS OF CARE, COUNSELING/DISCUSSION: Status: ACTIVE | Noted: 2024-03-29

## 2024-03-29 PROBLEM — Z71.89 DNR (DO NOT RESUSCITATE) DISCUSSION: Status: ACTIVE | Noted: 2024-03-29

## 2024-03-29 PROBLEM — R05.8: Status: ACTIVE | Noted: 2024-03-29

## 2024-03-29 LAB
ALBUMIN SERPL-MCNC: 2.9 G/DL (ref 3.5–5)
ALBUMIN/GLOB SERPL: 0.7 (ref 1.1–2.2)
ALP SERPL-CCNC: 109 U/L (ref 45–117)
ALT SERPL-CCNC: 22 U/L (ref 12–78)
ANION GAP SERPL CALC-SCNC: 7 MMOL/L (ref 5–15)
AST SERPL-CCNC: 21 U/L (ref 15–37)
BACTERIA SPEC CULT: NORMAL
BASOPHILS # BLD: 0 K/UL (ref 0–0.1)
BASOPHILS NFR BLD: 0 % (ref 0–1)
BILIRUB SERPL-MCNC: 0.8 MG/DL (ref 0.2–1)
BUN SERPL-MCNC: 22 MG/DL (ref 6–20)
BUN/CREAT SERPL: 19 (ref 12–20)
CALCIUM SERPL-MCNC: 8.5 MG/DL (ref 8.5–10.1)
CHLORIDE SERPL-SCNC: 108 MMOL/L (ref 97–108)
CO2 SERPL-SCNC: 20 MMOL/L (ref 21–32)
CREAT SERPL-MCNC: 1.17 MG/DL (ref 0.7–1.3)
CRP SERPL-MCNC: 0.38 MG/DL (ref 0–0.3)
D DIMER PPP FEU-MCNC: 0.75 MG/L FEU (ref 0–0.65)
DIFFERENTIAL METHOD BLD: ABNORMAL
EOSINOPHIL # BLD: 0 K/UL (ref 0–0.4)
EOSINOPHIL NFR BLD: 0 % (ref 0–7)
ERYTHROCYTE [DISTWIDTH] IN BLOOD BY AUTOMATED COUNT: 13.1 % (ref 11.5–14.5)
GLOBULIN SER CALC-MCNC: 4.2 G/DL (ref 2–4)
GLUCOSE BLD STRIP.AUTO-MCNC: 275 MG/DL (ref 65–117)
GLUCOSE BLD STRIP.AUTO-MCNC: 294 MG/DL (ref 65–117)
GLUCOSE BLD STRIP.AUTO-MCNC: 310 MG/DL (ref 65–117)
GLUCOSE BLD STRIP.AUTO-MCNC: 328 MG/DL (ref 65–117)
GLUCOSE SERPL-MCNC: 328 MG/DL (ref 65–100)
HCT VFR BLD AUTO: 35.5 % (ref 36.6–50.3)
HGB BLD-MCNC: 11.8 G/DL (ref 12.1–17)
IMM GRANULOCYTES # BLD AUTO: 0 K/UL (ref 0–0.04)
IMM GRANULOCYTES NFR BLD AUTO: 1 % (ref 0–0.5)
LYMPHOCYTES # BLD: 0.9 K/UL (ref 0.8–3.5)
LYMPHOCYTES NFR BLD: 22 % (ref 12–49)
MCH RBC QN AUTO: 35 PG (ref 26–34)
MCHC RBC AUTO-ENTMCNC: 33.2 G/DL (ref 30–36.5)
MCV RBC AUTO: 105.3 FL (ref 80–99)
MONOCYTES # BLD: 0.3 K/UL (ref 0–1)
MONOCYTES NFR BLD: 8 % (ref 5–13)
NEUTS SEG # BLD: 3 K/UL (ref 1.8–8)
NEUTS SEG NFR BLD: 69 % (ref 32–75)
NRBC # BLD: 0 K/UL (ref 0–0.01)
NRBC BLD-RTO: 0 PER 100 WBC
PLATELET # BLD AUTO: 172 K/UL (ref 150–400)
PMV BLD AUTO: 11.1 FL (ref 8.9–12.9)
POTASSIUM SERPL-SCNC: 4.7 MMOL/L (ref 3.5–5.1)
PROCALCITONIN SERPL-MCNC: <0.05 NG/ML
PROT SERPL-MCNC: 7.1 G/DL (ref 6.4–8.2)
RBC # BLD AUTO: 3.37 M/UL (ref 4.1–5.7)
SERVICE CMNT-IMP: ABNORMAL
SERVICE CMNT-IMP: NORMAL
SODIUM SERPL-SCNC: 135 MMOL/L (ref 136–145)
WBC # BLD AUTO: 4.2 K/UL (ref 4.1–11.1)

## 2024-03-29 PROCEDURE — 6360000002 HC RX W HCPCS: Performed by: STUDENT IN AN ORGANIZED HEALTH CARE EDUCATION/TRAINING PROGRAM

## 2024-03-29 PROCEDURE — 6370000000 HC RX 637 (ALT 250 FOR IP): Performed by: NURSE PRACTITIONER

## 2024-03-29 PROCEDURE — 80053 COMPREHEN METABOLIC PANEL: CPT

## 2024-03-29 PROCEDURE — 97530 THERAPEUTIC ACTIVITIES: CPT

## 2024-03-29 PROCEDURE — 97535 SELF CARE MNGMENT TRAINING: CPT

## 2024-03-29 PROCEDURE — 86140 C-REACTIVE PROTEIN: CPT

## 2024-03-29 PROCEDURE — 85379 FIBRIN DEGRADATION QUANT: CPT

## 2024-03-29 PROCEDURE — 2580000003 HC RX 258: Performed by: STUDENT IN AN ORGANIZED HEALTH CARE EDUCATION/TRAINING PROGRAM

## 2024-03-29 PROCEDURE — 99222 1ST HOSP IP/OBS MODERATE 55: CPT | Performed by: NURSE PRACTITIONER

## 2024-03-29 PROCEDURE — 82962 GLUCOSE BLOOD TEST: CPT

## 2024-03-29 PROCEDURE — 1100000003 HC PRIVATE W/ TELEMETRY

## 2024-03-29 PROCEDURE — 6370000000 HC RX 637 (ALT 250 FOR IP): Performed by: INTERNAL MEDICINE

## 2024-03-29 PROCEDURE — 36415 COLL VENOUS BLD VENIPUNCTURE: CPT

## 2024-03-29 PROCEDURE — 84145 PROCALCITONIN (PCT): CPT

## 2024-03-29 PROCEDURE — 6370000000 HC RX 637 (ALT 250 FOR IP): Performed by: STUDENT IN AN ORGANIZED HEALTH CARE EDUCATION/TRAINING PROGRAM

## 2024-03-29 PROCEDURE — 97116 GAIT TRAINING THERAPY: CPT

## 2024-03-29 PROCEDURE — 85025 COMPLETE CBC W/AUTO DIFF WBC: CPT

## 2024-03-29 RX ORDER — INSULIN GLARGINE 100 [IU]/ML
35 INJECTION, SOLUTION SUBCUTANEOUS NIGHTLY
Status: DISCONTINUED | OUTPATIENT
Start: 2024-03-30 | End: 2024-04-01 | Stop reason: HOSPADM

## 2024-03-29 RX ORDER — INSULIN LISPRO 100 [IU]/ML
6 INJECTION, SOLUTION INTRAVENOUS; SUBCUTANEOUS
Status: DISCONTINUED | OUTPATIENT
Start: 2024-03-30 | End: 2024-04-01 | Stop reason: HOSPADM

## 2024-03-29 RX ORDER — GUAIFENESIN 600 MG/1
600 TABLET, EXTENDED RELEASE ORAL 2 TIMES DAILY PRN
Status: DISCONTINUED | OUTPATIENT
Start: 2024-03-29 | End: 2024-04-01 | Stop reason: HOSPADM

## 2024-03-29 RX ADMIN — MELATONIN 3 MG: at 21:57

## 2024-03-29 RX ADMIN — PRASUGREL 10 MG: 10 TABLET, FILM COATED ORAL at 08:54

## 2024-03-29 RX ADMIN — ATORVASTATIN CALCIUM 80 MG: 40 TABLET, FILM COATED ORAL at 21:57

## 2024-03-29 RX ADMIN — SODIUM CHLORIDE, PRESERVATIVE FREE 10 ML: 5 INJECTION INTRAVENOUS at 22:03

## 2024-03-29 RX ADMIN — SODIUM CHLORIDE, PRESERVATIVE FREE 10 ML: 5 INJECTION INTRAVENOUS at 08:56

## 2024-03-29 RX ADMIN — METOPROLOL SUCCINATE 12.5 MG: 25 TABLET, EXTENDED RELEASE ORAL at 08:54

## 2024-03-29 RX ADMIN — ENOXAPARIN SODIUM 40 MG: 100 INJECTION SUBCUTANEOUS at 08:55

## 2024-03-29 RX ADMIN — SODIUM CHLORIDE 1000 MG: 900 INJECTION INTRAVENOUS at 00:11

## 2024-03-29 RX ADMIN — INSULIN LISPRO 4 UNITS: 100 INJECTION, SOLUTION INTRAVENOUS; SUBCUTANEOUS at 21:57

## 2024-03-29 RX ADMIN — TAMSULOSIN HYDROCHLORIDE 0.4 MG: 0.4 CAPSULE ORAL at 08:54

## 2024-03-29 RX ADMIN — GUAIFENESIN 600 MG: 600 TABLET, EXTENDED RELEASE ORAL at 01:24

## 2024-03-29 RX ADMIN — SODIUM CHLORIDE: 9 INJECTION, SOLUTION INTRAVENOUS at 00:10

## 2024-03-29 RX ADMIN — LEVOTHYROXINE SODIUM 125 MCG: 0.03 TABLET ORAL at 05:57

## 2024-03-29 RX ADMIN — ACETAMINOPHEN 650 MG: 325 TABLET ORAL at 21:57

## 2024-03-29 RX ADMIN — INSULIN GLARGINE 20 UNITS: 100 INJECTION, SOLUTION SUBCUTANEOUS at 21:56

## 2024-03-29 RX ADMIN — ASPIRIN 81 MG: 81 TABLET, COATED ORAL at 08:54

## 2024-03-29 RX ADMIN — INSULIN LISPRO 6 UNITS: 100 INJECTION, SOLUTION INTRAVENOUS; SUBCUTANEOUS at 18:25

## 2024-03-29 RX ADMIN — INSULIN LISPRO 4 UNITS: 100 INJECTION, SOLUTION INTRAVENOUS; SUBCUTANEOUS at 08:55

## 2024-03-30 LAB
ALBUMIN SERPL-MCNC: 2.9 G/DL (ref 3.5–5)
ALBUMIN/GLOB SERPL: 0.7 (ref 1.1–2.2)
ALP SERPL-CCNC: 98 U/L (ref 45–117)
ALT SERPL-CCNC: 21 U/L (ref 12–78)
ANION GAP SERPL CALC-SCNC: 5 MMOL/L (ref 5–15)
AST SERPL-CCNC: 17 U/L (ref 15–37)
BASOPHILS # BLD: 0 K/UL (ref 0–0.1)
BASOPHILS NFR BLD: 0 % (ref 0–1)
BILIRUB SERPL-MCNC: 0.6 MG/DL (ref 0.2–1)
BUN SERPL-MCNC: 17 MG/DL (ref 6–20)
BUN/CREAT SERPL: 19 (ref 12–20)
CALCIUM SERPL-MCNC: 8.6 MG/DL (ref 8.5–10.1)
CHLORIDE SERPL-SCNC: 110 MMOL/L (ref 97–108)
CO2 SERPL-SCNC: 25 MMOL/L (ref 21–32)
CREAT SERPL-MCNC: 0.9 MG/DL (ref 0.7–1.3)
CRP SERPL-MCNC: <0.29 MG/DL (ref 0–0.3)
DIFFERENTIAL METHOD BLD: ABNORMAL
EOSINOPHIL # BLD: 0.1 K/UL (ref 0–0.4)
EOSINOPHIL NFR BLD: 2 % (ref 0–7)
ERYTHROCYTE [DISTWIDTH] IN BLOOD BY AUTOMATED COUNT: 13.3 % (ref 11.5–14.5)
GLOBULIN SER CALC-MCNC: 4.2 G/DL (ref 2–4)
GLUCOSE BLD STRIP.AUTO-MCNC: 125 MG/DL (ref 65–117)
GLUCOSE BLD STRIP.AUTO-MCNC: 172 MG/DL (ref 65–117)
GLUCOSE BLD STRIP.AUTO-MCNC: 176 MG/DL (ref 65–117)
GLUCOSE BLD STRIP.AUTO-MCNC: 221 MG/DL (ref 65–117)
GLUCOSE SERPL-MCNC: 129 MG/DL (ref 65–100)
HCT VFR BLD AUTO: 37.3 % (ref 36.6–50.3)
HGB BLD-MCNC: 12.1 G/DL (ref 12.1–17)
IMM GRANULOCYTES # BLD AUTO: 0 K/UL (ref 0–0.04)
IMM GRANULOCYTES NFR BLD AUTO: 0 % (ref 0–0.5)
LYMPHOCYTES # BLD: 2.1 K/UL (ref 0.8–3.5)
LYMPHOCYTES NFR BLD: 41 % (ref 12–49)
MCH RBC QN AUTO: 33.8 PG (ref 26–34)
MCHC RBC AUTO-ENTMCNC: 32.4 G/DL (ref 30–36.5)
MCV RBC AUTO: 104.2 FL (ref 80–99)
MONOCYTES # BLD: 0.4 K/UL (ref 0–1)
MONOCYTES NFR BLD: 9 % (ref 5–13)
NEUTS SEG # BLD: 2.4 K/UL (ref 1.8–8)
NEUTS SEG NFR BLD: 48 % (ref 32–75)
NRBC # BLD: 0 K/UL (ref 0–0.01)
NRBC BLD-RTO: 0 PER 100 WBC
PLATELET # BLD AUTO: 187 K/UL (ref 150–400)
PMV BLD AUTO: 10.7 FL (ref 8.9–12.9)
POTASSIUM SERPL-SCNC: 3.9 MMOL/L (ref 3.5–5.1)
PROT SERPL-MCNC: 7.1 G/DL (ref 6.4–8.2)
RBC # BLD AUTO: 3.58 M/UL (ref 4.1–5.7)
SERVICE CMNT-IMP: ABNORMAL
SODIUM SERPL-SCNC: 140 MMOL/L (ref 136–145)
WBC # BLD AUTO: 5.1 K/UL (ref 4.1–11.1)

## 2024-03-30 PROCEDURE — 6370000000 HC RX 637 (ALT 250 FOR IP): Performed by: STUDENT IN AN ORGANIZED HEALTH CARE EDUCATION/TRAINING PROGRAM

## 2024-03-30 PROCEDURE — 80053 COMPREHEN METABOLIC PANEL: CPT

## 2024-03-30 PROCEDURE — 1100000003 HC PRIVATE W/ TELEMETRY

## 2024-03-30 PROCEDURE — 82962 GLUCOSE BLOOD TEST: CPT

## 2024-03-30 PROCEDURE — 2580000003 HC RX 258: Performed by: STUDENT IN AN ORGANIZED HEALTH CARE EDUCATION/TRAINING PROGRAM

## 2024-03-30 PROCEDURE — 85025 COMPLETE CBC W/AUTO DIFF WBC: CPT

## 2024-03-30 PROCEDURE — 86140 C-REACTIVE PROTEIN: CPT

## 2024-03-30 PROCEDURE — 6360000002 HC RX W HCPCS: Performed by: STUDENT IN AN ORGANIZED HEALTH CARE EDUCATION/TRAINING PROGRAM

## 2024-03-30 PROCEDURE — 36415 COLL VENOUS BLD VENIPUNCTURE: CPT

## 2024-03-30 PROCEDURE — 6370000000 HC RX 637 (ALT 250 FOR IP): Performed by: INTERNAL MEDICINE

## 2024-03-30 RX ADMIN — LEVOTHYROXINE SODIUM 125 MCG: 0.03 TABLET ORAL at 06:29

## 2024-03-30 RX ADMIN — SODIUM CHLORIDE 1000 MG: 900 INJECTION INTRAVENOUS at 00:01

## 2024-03-30 RX ADMIN — SODIUM CHLORIDE 1000 MG: 900 INJECTION INTRAVENOUS at 23:44

## 2024-03-30 RX ADMIN — INSULIN GLARGINE 35 UNITS: 100 INJECTION, SOLUTION SUBCUTANEOUS at 21:25

## 2024-03-30 RX ADMIN — INSULIN LISPRO 6 UNITS: 100 INJECTION, SOLUTION INTRAVENOUS; SUBCUTANEOUS at 18:21

## 2024-03-30 RX ADMIN — INSULIN LISPRO 6 UNITS: 100 INJECTION, SOLUTION INTRAVENOUS; SUBCUTANEOUS at 09:15

## 2024-03-30 RX ADMIN — ENOXAPARIN SODIUM 40 MG: 100 INJECTION SUBCUTANEOUS at 09:15

## 2024-03-30 RX ADMIN — SODIUM CHLORIDE, PRESERVATIVE FREE 10 ML: 5 INJECTION INTRAVENOUS at 09:15

## 2024-03-30 RX ADMIN — METOPROLOL SUCCINATE 12.5 MG: 25 TABLET, EXTENDED RELEASE ORAL at 09:15

## 2024-03-30 RX ADMIN — MELATONIN 3 MG: at 21:24

## 2024-03-30 RX ADMIN — ATORVASTATIN CALCIUM 80 MG: 40 TABLET, FILM COATED ORAL at 21:24

## 2024-03-30 RX ADMIN — INSULIN LISPRO 2 UNITS: 100 INJECTION, SOLUTION INTRAVENOUS; SUBCUTANEOUS at 12:37

## 2024-03-30 RX ADMIN — PRASUGREL 10 MG: 10 TABLET, FILM COATED ORAL at 09:15

## 2024-03-30 RX ADMIN — TAMSULOSIN HYDROCHLORIDE 0.4 MG: 0.4 CAPSULE ORAL at 09:15

## 2024-03-30 RX ADMIN — INSULIN LISPRO 6 UNITS: 100 INJECTION, SOLUTION INTRAVENOUS; SUBCUTANEOUS at 12:36

## 2024-03-30 RX ADMIN — SODIUM CHLORIDE, PRESERVATIVE FREE 10 ML: 5 INJECTION INTRAVENOUS at 21:25

## 2024-03-30 RX ADMIN — ASPIRIN 81 MG: 81 TABLET, COATED ORAL at 09:15

## 2024-03-30 ASSESSMENT — PAIN SCALES - GENERAL: PAINLEVEL_OUTOF10: 0

## 2024-03-31 LAB
GLUCOSE BLD STRIP.AUTO-MCNC: 121 MG/DL (ref 65–117)
GLUCOSE BLD STRIP.AUTO-MCNC: 155 MG/DL (ref 65–117)
GLUCOSE BLD STRIP.AUTO-MCNC: 232 MG/DL (ref 65–117)
GLUCOSE BLD STRIP.AUTO-MCNC: 248 MG/DL (ref 65–117)
SERVICE CMNT-IMP: ABNORMAL

## 2024-03-31 PROCEDURE — 6370000000 HC RX 637 (ALT 250 FOR IP): Performed by: NURSE PRACTITIONER

## 2024-03-31 PROCEDURE — 82962 GLUCOSE BLOOD TEST: CPT

## 2024-03-31 PROCEDURE — 2580000003 HC RX 258: Performed by: STUDENT IN AN ORGANIZED HEALTH CARE EDUCATION/TRAINING PROGRAM

## 2024-03-31 PROCEDURE — 6360000002 HC RX W HCPCS: Performed by: STUDENT IN AN ORGANIZED HEALTH CARE EDUCATION/TRAINING PROGRAM

## 2024-03-31 PROCEDURE — 6370000000 HC RX 637 (ALT 250 FOR IP): Performed by: STUDENT IN AN ORGANIZED HEALTH CARE EDUCATION/TRAINING PROGRAM

## 2024-03-31 PROCEDURE — 1100000003 HC PRIVATE W/ TELEMETRY

## 2024-03-31 PROCEDURE — 6370000000 HC RX 637 (ALT 250 FOR IP): Performed by: INTERNAL MEDICINE

## 2024-03-31 RX ADMIN — TAMSULOSIN HYDROCHLORIDE 0.4 MG: 0.4 CAPSULE ORAL at 09:20

## 2024-03-31 RX ADMIN — SODIUM CHLORIDE, PRESERVATIVE FREE 10 ML: 5 INJECTION INTRAVENOUS at 20:22

## 2024-03-31 RX ADMIN — INSULIN LISPRO 6 UNITS: 100 INJECTION, SOLUTION INTRAVENOUS; SUBCUTANEOUS at 12:28

## 2024-03-31 RX ADMIN — SODIUM CHLORIDE, PRESERVATIVE FREE 10 ML: 5 INJECTION INTRAVENOUS at 09:21

## 2024-03-31 RX ADMIN — INSULIN LISPRO 2 UNITS: 100 INJECTION, SOLUTION INTRAVENOUS; SUBCUTANEOUS at 12:29

## 2024-03-31 RX ADMIN — ENOXAPARIN SODIUM 40 MG: 100 INJECTION SUBCUTANEOUS at 09:20

## 2024-03-31 RX ADMIN — ASPIRIN 81 MG: 81 TABLET, COATED ORAL at 09:20

## 2024-03-31 RX ADMIN — MELATONIN 3 MG: at 20:21

## 2024-03-31 RX ADMIN — LEVOTHYROXINE SODIUM 125 MCG: 0.03 TABLET ORAL at 06:04

## 2024-03-31 RX ADMIN — ATORVASTATIN CALCIUM 80 MG: 40 TABLET, FILM COATED ORAL at 20:21

## 2024-03-31 RX ADMIN — INSULIN LISPRO 6 UNITS: 100 INJECTION, SOLUTION INTRAVENOUS; SUBCUTANEOUS at 09:20

## 2024-03-31 RX ADMIN — METOPROLOL SUCCINATE 12.5 MG: 25 TABLET, EXTENDED RELEASE ORAL at 09:20

## 2024-03-31 RX ADMIN — ACETAMINOPHEN 650 MG: 325 TABLET ORAL at 20:22

## 2024-03-31 RX ADMIN — INSULIN GLARGINE 35 UNITS: 100 INJECTION, SOLUTION SUBCUTANEOUS at 20:21

## 2024-03-31 RX ADMIN — PRASUGREL 10 MG: 10 TABLET, FILM COATED ORAL at 09:19

## 2024-03-31 RX ADMIN — INSULIN LISPRO 6 UNITS: 100 INJECTION, SOLUTION INTRAVENOUS; SUBCUTANEOUS at 17:35

## 2024-03-31 RX ADMIN — GUAIFENESIN 600 MG: 600 TABLET, EXTENDED RELEASE ORAL at 20:22

## 2024-03-31 ASSESSMENT — PAIN DESCRIPTION - LOCATION: LOCATION: GENERALIZED

## 2024-03-31 ASSESSMENT — PAIN DESCRIPTION - DESCRIPTORS: DESCRIPTORS: DISCOMFORT

## 2024-03-31 ASSESSMENT — PAIN SCALES - GENERAL
PAINLEVEL_OUTOF10: 0
PAINLEVEL_OUTOF10: 3

## 2024-03-31 ASSESSMENT — PAIN - FUNCTIONAL ASSESSMENT: PAIN_FUNCTIONAL_ASSESSMENT: ACTIVITIES ARE NOT PREVENTED

## 2024-03-31 NOTE — DISCHARGE INSTRUCTIONS
Resume home insulin as before, be careful not to take any more than directed   Would hold the metformin and Januvia for now    Check your blood sugars at least twice a day before meals, record for PCP review   Goal blood sugars are approximately 120-160 range, but avoid blood sugars less than 80    You had COVID infection, would isolate for an additional 3 days    Wear mask around any family members or friends    Do not go into public until the 3 days is over    Would sleep in a separate bedroom for the next 3 days    Cover your mouth while sneezing or coughing    Wash hands frequently

## 2024-04-01 VITALS
OXYGEN SATURATION: 95 % | BODY MASS INDEX: 27.16 KG/M2 | SYSTOLIC BLOOD PRESSURE: 112 MMHG | RESPIRATION RATE: 16 BRPM | HEIGHT: 71 IN | HEART RATE: 78 BPM | DIASTOLIC BLOOD PRESSURE: 74 MMHG | WEIGHT: 194 LBS | TEMPERATURE: 97.7 F

## 2024-04-01 LAB
GLUCOSE BLD STRIP.AUTO-MCNC: 145 MG/DL (ref 65–117)
GLUCOSE BLD STRIP.AUTO-MCNC: 297 MG/DL (ref 65–117)
SERVICE CMNT-IMP: ABNORMAL
SERVICE CMNT-IMP: ABNORMAL

## 2024-04-01 PROCEDURE — 6360000002 HC RX W HCPCS: Performed by: STUDENT IN AN ORGANIZED HEALTH CARE EDUCATION/TRAINING PROGRAM

## 2024-04-01 PROCEDURE — 6370000000 HC RX 637 (ALT 250 FOR IP): Performed by: STUDENT IN AN ORGANIZED HEALTH CARE EDUCATION/TRAINING PROGRAM

## 2024-04-01 PROCEDURE — 82962 GLUCOSE BLOOD TEST: CPT

## 2024-04-01 PROCEDURE — 2580000003 HC RX 258: Performed by: STUDENT IN AN ORGANIZED HEALTH CARE EDUCATION/TRAINING PROGRAM

## 2024-04-01 PROCEDURE — 6370000000 HC RX 637 (ALT 250 FOR IP): Performed by: INTERNAL MEDICINE

## 2024-04-01 RX ADMIN — LEVOTHYROXINE SODIUM 125 MCG: 0.03 TABLET ORAL at 05:27

## 2024-04-01 RX ADMIN — SODIUM CHLORIDE, PRESERVATIVE FREE 10 ML: 5 INJECTION INTRAVENOUS at 09:09

## 2024-04-01 RX ADMIN — INSULIN LISPRO 6 UNITS: 100 INJECTION, SOLUTION INTRAVENOUS; SUBCUTANEOUS at 09:09

## 2024-04-01 RX ADMIN — ENOXAPARIN SODIUM 40 MG: 100 INJECTION SUBCUTANEOUS at 09:09

## 2024-04-01 RX ADMIN — ASPIRIN 81 MG: 81 TABLET, COATED ORAL at 09:09

## 2024-04-01 RX ADMIN — METOPROLOL SUCCINATE 12.5 MG: 25 TABLET, EXTENDED RELEASE ORAL at 12:45

## 2024-04-01 RX ADMIN — TAMSULOSIN HYDROCHLORIDE 0.4 MG: 0.4 CAPSULE ORAL at 09:09

## 2024-04-01 RX ADMIN — PRASUGREL 10 MG: 10 TABLET, FILM COATED ORAL at 09:08

## 2024-04-01 RX ADMIN — INSULIN LISPRO 4 UNITS: 100 INJECTION, SOLUTION INTRAVENOUS; SUBCUTANEOUS at 12:45

## 2024-04-01 RX ADMIN — INSULIN LISPRO 6 UNITS: 100 INJECTION, SOLUTION INTRAVENOUS; SUBCUTANEOUS at 12:44

## 2024-04-01 NOTE — CARE COORDINATION
Chart reviewed. Patient discharging today, to home with home health.    CM spoke with patient's son, Julio Hernandez   Mobile: [phone]   CM had attempted to call patient's room, as he is on droplet precautions, with no reply. CM attempted to call patient's number that is listed and it was disconnected. Patient's son states he will come this afternoon to transport his father home. He thought that patient would have [facility], but [facility] is what is noted in the chart. Willing to have home health come into the home. No other needs identified.    CM called [facility] and stated that they have no record of patient. CM called [facility] [phone] and they are willing to provide home health services. Will need discharge summary sent to them. Fax is [phone].    Okay to [place] from  standpoint.     04/01/24 0908   Services At/After Discharge   Transition of Care Consult (CM Consult) Discharge Planning   Internal Home Health No   Reason Outside Agency Chosen Patient already serviced by other home care/hospice agency  (CM read that referral had already been sent to [facility])   Services At/After Discharge Home Health   [place] Resource Information Provided? No   Mode of Transport at Discharge Other (see comment)  (his son, Julio Brandt, will transport)   Hospital Transport Time of Discharge   (son states this afternoon)   Confirm Follow Up Transport Family   Condition of Participation: Discharge Planning   The Plan for Transition of Care is related to the following treatment goals: Patient will go home and continue recuperation. Home health to follow.   The Patient and/or Patient Representative was provided with a Choice of Provider?   (set up prior)   The Patient and/Or Patient Representative agree with the Discharge Plan? Yes     Delfina Morin RN  Care Manager  [id number]  
Discharge note faxed to [facility].     Delfina Morin, RN  Care Manager  [id number]  
Transition of Care Plan:    RUR: 25  Prior Level of Functioning:     [facility]  1:41 PM   TIFFANIE completed chart review.   MD note from yesterday said possibly home today?  [facility] accepted.  TIFFANIE sent them updates and stated DC today or tomorrow pending medical stability.      TIFFANIE sent a perfect serve to hospitalist to see if Pt would go.     If SNF or IPR: Date FOC offered:   Date FOC received:   Accepting facility:   Date authorization started with reference number:   Date authorization received and expires:     Follow up appointments: PCP: TIFFANIE Specialist tried to make appt.  Office will call Pt directly to schedule.   [facility] information on AVS.   DME needed: n/a  Transportation at discharge: Family?  IM/IMM Medicare/ letter given: 2nd IM 3/30/24  Is patient a  and connected with VA? N/a   If yes, was  transfer form completed and VA notified?   Caregiver Contact: Son: Julio [phone]  Discharge Caregiver contacted prior to discharge? Pt is updating family   Care Conference needed? N/a  Barriers to discharge: Medical stability     Montserrat Kwong, Weekend   [phone]         03/30/24 133   Services At/After Discharge   Transition of Care Consult (CM Consult) Home Health   Internal Home Health No   Reason Outside Agency Chosen Managed care specific requirement   Services At/After Discharge Home Health   [place] Resource Information Provided? No   Mode of Transport at Discharge Other (see comment)  (Family)   Confirm Follow Up Transport Family   Condition of Participation: Discharge Planning   The Plan for Transition of Care is related to the following treatment goals: Home with [facility]   The Patient and/or Patient Representative was provided with a Choice of Provider? Patient   The Patient and/Or Patient Representative agree with the Discharge Plan? Yes   Freedom of Choice list was provided with basic dialogue that supports the 
  Active  No   Discharge Planning   Type of Residence House   Living Arrangements Spouse/Significant Other;Children  (sonJulio lives with them)   Type of Home Care Services None   Patient expects to be discharged to: House         03/28/24 3594    Readmission Assessment   Number of Days since last admission? 8-30 days   Previous Disposition SNF  (then went home with family)   Who is being Interviewed Caregiver   What was the patient's/caregiver's perception as to why they think they needed to return back to the hospital? Other (Comment)  (new diagnosis of Covid)   Did you visit your Primary Care Physician after you left the hospital, before you returned this time? No   Why weren't you able to visit your PCP? Other (Comment)  (returned to the hospital before an appointment could be made)   Did you see a specialist, such as Cardiac, Pulmonary, Orthopedic Physician, etc. after you left the hospital? No   Who advised the patient to return to the hospital? Physician   Does the patient report anything that got in the way of taking their medications? No   In our efforts to provide the best possible care to you and others like you, can you think of anything that we could have done to help you after you left the hospital the first time, so that you might not have needed to return so soon? Other (Comment)  (no suggestion)                      Delfina Morin RN  Care Manager  [phone]

## 2024-04-01 NOTE — PROGRESS NOTES
Hospitalist Progress Note    NAME:   Errol Brandt   : 1945   MRN: [id number]     Date/Time: 3/29/2024 11:16 PM    Patient PCP: Gary Motley MD      Hospital Problem list:     COVID 19  Admit to telemetry monitoring  Continuous pulse oximetry and supplemental oxygen as needed maintain saturations greater than 90%  -Initiate dexamethasone if hypoxemic  Supportive therapies as needed  As needed acetaminophen  Status post COVID vaccines and boosters     Complicated UTI  Continue empiric ceftriaxone  -Cultures most recently positive for E. coli with variable resistance panel-sensitive to ceftriaxone  Follow urine culture and adjust as indicated     Weakness/debility  PT/OT consulted     Diabetes mellitus with hyperglycemia  Will hold off on basal insulin given recent hypoglycemia  Corrective coverage insulin  Accu-Cheks  Diabetic diet  Hypoglycemia protocol in place     CAD/PAD   Essential hypertension  Hyperlipidemia  Continue PTA aspirin, atorvastatin, metoprolol, prasugrel     BPH  Bladder management protocol  Continue PTA tamsulosin    Overweight POA Body mass index is 26.04 kg/m².     Code Status: Full    DVT Prophylaxis: Lovenox    History, assessment and plan for 3/29/2024:     Estimated discharge date: 3/30/2024    Needs to be done before discharge:    Reason for physician visit: Follow-up generalized weakness, COVID    \"I do not feel SOB\".  Discussed with RN events overnight.   Still with generalized weakness and mild cough  No shortness of breath remains on room air  Hypoglycemia is resolved but now with hyperglycemia, adjusting insulin  Inflammatory markers generally stable  Discussed with patient likely discharge in a.m.  No HA, SOB, cough, CP, abdominal pain, N/V, diarrhea    Medical Decision Making:   I personally reviewed labs: CBC, CMP  I personally reviewed imaging: CXR  I personally reviewed EKG:  Toxic drug monitoring:   Discussed case with: Patient    Total NON critical care TIME:  40  
    Hospitalist Progress Note    NAME:   Errol Brandt   : 1945   MRN: [id number]     Admit date: 3/28/2024    Discharge date: 24    PCP: Gary Motley MD    Discharge Diagnoses:    Discharge Medications:  Current Discharge Medication List        CONTINUE these medications which have NOT CHANGED    Details   Probiotic Product (PROBIOTIC DAILY) CAPS Take 1 capsule by mouth in the morning and at bedtime  Qty: 60 capsule, Refills: 0      insulin aspart prot & aspart (NOVOLOG 70/30) injection pen Inject 20 Units into the skin 2 times daily Use 20 units 70/30 insulin daily before breakfast AND Use 20 units 70/30 insulin nightly before dinner.  Qty: 5 Adjustable Dose Pre-filled Pen Syringe, Refills: 1      aspirin 81 MG EC tablet Take 1 tablet by mouth daily  Qty: 30 tablet, Refills: 5      atorvastatin (LIPITOR) 80 MG tablet Take 1 tablet by mouth nightly  Qty: 30 tablet, Refills: 1      levothyroxine (SYNTHROID) 125 MCG tablet Take 1 tablet by mouth every morning (before breakfast)  Qty: 30 tablet, Refills: 1      metFORMIN (GLUCOPHAGE-XR) 500 MG extended release tablet Take 2 tablets by mouth 2 times daily  Qty: 120 tablet, Refills: 1      metoprolol succinate (TOPROL XL) 25 MG extended release tablet Take 0.5 tablets by mouth daily  Qty: 30 tablet, Refills: 1      prasugrel (EFFIENT) 10 MG TABS Take 1 tablet by mouth daily  Qty: 30 tablet, Refills: 1      tamsulosin (FLOMAX) 0.4 MG capsule Take 1 capsule by mouth daily  Qty: 30 capsule, Refills: 1           STOP taking these medications       cephALEXin (KEFLEX) 500 MG capsule Comments:   Reason for Stopping:         SITagliptin (JANUVIA) 50 MG tablet Comments:   Reason for Stopping:               Follow-up Information       Follow up With Specialties Details Why Contact Info    Gary Motley MD Family Medicine Schedule an appointment as soon as possible for a visit in 1 week(s) Dr. Motley's office will call you to schedule your PCP hospital follow 
.  Discharge instruction  given to the patient, verbalize understanding, opportunities  to question given ,   Patient left the unit stable with his son .                 
.Bedside, Verbal, Recorded, and Written shift change report given to Virginia CALLEJAS  (oncoming nurse) by Dahiana buckner (offgoing nurse). Report included the following information Nurse Handoff Report, ED SBAR, Adult Overview, Intake/Output, MAR, Recent Results, Neuro Assessment, and Event Log.    
2008H RN informed NP [place] reg pt blood sugar of 442mg/dl.     2015H Provider ordered total of 6units Lispro.     0012H RN informed NP [place] that pt is asking for a medicine to help with his non productive cough. PRN Mucinex was ordered and given to pt.     0351H Assisted pt to sit on the recliner.     Bedside shift change report given to Nicole CALLEJAS (oncoming nurse) by Claudia HONG (offgoing nurse). Report included the following information Nurse Handoff Report, Intake/Output, MAR, Recent Results, and Cardiac Rhythm NSR .       At handoff report, pt is sleeping on the recliner, easily arousable, with chair alarm ON.  
Attempted to schedule hospital follow up PCP appointment. Office  will contact the patient with appointment information per office protocol. [facility] placed [facility] information AVS for patient resource.   Pending patient discharge. Tosha Ordaz, Care Management Assistant  
Bedside shift change report given to Dahiana RN (oncoming nurse) by Claudia RN (offgoing nurse). Report included the following information Nurse Handoff Report, Intake/Output, MAR, and Cardiac Rhythm NSR .    -Pt had a calm night  -He was able to ambulate multiple times to the bathroom using his walker. (X1 assist)      At handoff report, pt is awake in bed without complaints.   
Bedside shift change report given to GELY Mak (oncoming nurse) by JÚNIOR Danielle (offgoing nurse). Report included the following information Nurse Handoff Report, Index, ED SBAR, Adult Overview, Surgery Report, Intake/Output, MAR, Recent Results, Med Rec Status, and Cardiac Rhythm NSR .      0415: Patient arrived on unit from ED. Vitals obtained. Assessment and dual skin completed with RN. Abnormal skin assessments documented in chart media. Wound care completed. Database questions completed, except patient is unaware of home medications. PRN tylenol given at 0445 for Right hand pain.   
Nursing contacted Nocturnist/cross cover provider and notified patient lab result of glucose 442. VSS. No other reported concerns at this time. Ordered 6 units total lispro. Patient denies any further complaints or concerns. No acute distress reported. Nursing to notify Hospitalist for further/continued concerns. Will remain available overnight for further concerns if nursing/patient needs. Will defer further evaluation/management to the day shift primary care team.    Update:  Nurse notified pt now asking for cough med for non-productive cough. No other concerns reported. Vss. Mucinex 600mg bid prn ordered. Recent cxr reviewed.    Non-billable note.   
Nursing contacted Nocturnist/cross cover provider and notified patient upon arrival to the floor from the ER on skin check was noted to have several cysts on the mid back, and also reported stage 2 on sacrum, and \"some\" wounds on his feet. Nurse reported consult for wound care placed per nursing protocol. No other concerns reported. VSS. Asked nurse to please take pics of wounds (please see below), appreciate nurse to place wound care consult per protocol, and asked nurse to message dayshift, will defer to them to eval further, no reported sx infection on wounds reported. Placed wound care orders until seen by wound care this AM. Patient denies any further complaints or concerns. No acute distress reported. Nursing to notify Hospitalist for further/continued concerns. Will remain available overnight for further concerns if nursing/patient needs. Will defer further evaluation/management to the day shift primary care team.    Non-billable note.                                     
Nutrition: Chart reviewed for PI referral. Wound care assessed today - no PI noted (fissure in gluteal cleft). Nutrition assessment not indicated. RD available by consult if needs arise. Patient will be rescreened per policy. Thanks.  Chelo Guzman, CHRISTIANO  Ext [phone]     
Pt received at 3:30 pm from Nicole CALLEJAS    
12.1   HCT 38.2 35.5* 37.3    172 187       Recent Labs     03/28/24  0020 03/28/24  0449 03/29/24  0126 03/30/24  0321   * 143 135* 140   K 3.1* 4.1 4.7 3.9   * 115* 108 110*   CO2 24 24 20* 25   GLUCOSE 37* 65 328* 129*   BUN 25* 22* 22* 17   CREATININE 1.22 1.07 1.17 0.90   CALCIUM 8.7 8.7 8.5 8.6   LABALBU 3.2*  --  2.9* 2.9*   BILITOT 0.7  --  0.8 0.6   AST 30  --  21 17   ALT 24  --  22 21         Signed: Jagdeep Gentile Jr, MD

## 2024-04-01 NOTE — PLAN OF CARE
Problem: ABCDS Injury Assessment  Goal: Absence of physical injury  Outcome: Progressing     Problem: Safety - Adult  Goal: Free from fall injury  Outcome: Progressing     Problem: Skin/Tissue Integrity  Goal: Absence of new skin breakdown  Description: 1.  Monitor for areas of redness and/or skin breakdown  2.  Assess vascular access sites hourly  3.  Every 4-6 hours minimum:  Change oxygen saturation probe site  4.  Every 4-6 hours:  If on nasal continuous positive airway pressure, respiratory therapy assess nares and determine need for appliance change or resting period.  Outcome: Progressing     Problem: Pain  Goal: Verbalizes/displays adequate comfort level or baseline comfort level  Outcome: Progressing     Problem: Discharge Planning  Goal: Discharge to home or other facility with appropriate resources  Outcome: Progressing     
  Problem: Discharge Planning  Goal: Discharge to home or other facility with appropriate resources  3/31/2024 2200 by Claudia Casey RN  Outcome: Progressing  3/31/2024 1459 by Dahlia Gupta RN  Outcome: Adequate for Discharge     Problem: Pain  Goal: Verbalizes/displays adequate comfort level or baseline comfort level  3/31/2024 2200 by Claudia Casey RN  Outcome: Progressing  3/31/2024 1459 by Dahlia Gupta RN  Outcome: Adequate for Discharge     Problem: Skin/Tissue Integrity  Goal: Absence of new skin breakdown  Description: 1.  Monitor for areas of redness and/or skin breakdown  2.  Assess vascular access sites hourly  3.  Every 4-6 hours minimum:  Change oxygen saturation probe site  4.  Every 4-6 hours:  If on nasal continuous positive airway pressure, respiratory therapy assess nares and determine need for appliance change or resting period.  3/31/2024 2200 by Claudia Casey RN  Outcome: Progressing  3/31/2024 1459 by Dahlia Gupta RN  Outcome: Adequate for Discharge     Problem: Safety - Adult  Goal: Free from fall injury  3/31/2024 2200 by Claudia Casey RN  Outcome: Progressing  3/31/2024 1459 by Dahlia Gupta RN  Outcome: Adequate for Discharge     Problem: ABCDS Injury Assessment  Goal: Absence of physical injury  3/31/2024 2200 by Claudia Casey RN  Outcome: Progressing  3/31/2024 1459 by Dahlia Gupta RN  Outcome: Adequate for Discharge     Problem: Chronic Conditions and Co-morbidities  Goal: Patient's chronic conditions and co-morbidity symptoms are monitored and maintained or improved  3/31/2024 2200 by Claudia Casey RN  Outcome: Progressing  3/31/2024 1459 by Dahlia Gupta RN  Outcome: Adequate for Discharge     
  Problem: Discharge Planning  Goal: Discharge to home or other facility with appropriate resources  4/1/2024 0919 by Dahiana Zendejas RN  Outcome: Progressing  3/31/2024 2200 by Claudia Casey RN  Outcome: Progressing     Problem: Pain  Goal: Verbalizes/displays adequate comfort level or baseline comfort level  4/1/2024 0919 by Dahiana Zendejas RN  Outcome: Progressing  3/31/2024 2200 by Claudia Casey RN  Outcome: Progressing     Problem: Skin/Tissue Integrity  Goal: Absence of new skin breakdown  Description: 1.  Monitor for areas of redness and/or skin breakdown  2.  Assess vascular access sites hourly  3.  Every 4-6 hours minimum:  Change oxygen saturation probe site  4.  Every 4-6 hours:  If on nasal continuous positive airway pressure, respiratory therapy assess nares and determine need for appliance change or resting period.  4/1/2024 0919 by Dahiana Zendejas RN  Outcome: Progressing  3/31/2024 2200 by Claudia Casey RN  Outcome: Progressing     Problem: Safety - Adult  Goal: Free from fall injury  4/1/2024 0919 by Dahiana Zendejas RN  Outcome: Progressing  3/31/2024 2200 by Claudia Casey RN  Outcome: Progressing     Problem: ABCDS Injury Assessment  Goal: Absence of physical injury  4/1/2024 0919 by Dahiana Zendejas RN  Outcome: Progressing  3/31/2024 2200 by Claudia Casey RN  Outcome: Progressing     Problem: Chronic Conditions and Co-morbidities  Goal: Patient's chronic conditions and co-morbidity symptoms are monitored and maintained or improved  4/1/2024 0919 by Dahiana Zendejas RN  Outcome: Progressing  3/31/2024 2200 by Claudia Casey RN  Outcome: Progressing     
  Problem: Discharge Planning  Goal: Discharge to home or other facility with appropriate resources  4/1/2024 1321 by Dahiana Zendejas RN  Outcome: Adequate for Discharge  4/1/2024 0919 by Dahiana Zendejas RN  Outcome: Progressing     Problem: Pain  Goal: Verbalizes/displays adequate comfort level or baseline comfort level  4/1/2024 1321 by Dahiana Zendejas RN  Outcome: Adequate for Discharge  4/1/2024 0919 by Dahiana Zendejas RN  Outcome: Progressing     Problem: Skin/Tissue Integrity  Goal: Absence of new skin breakdown  Description: 1.  Monitor for areas of redness and/or skin breakdown  2.  Assess vascular access sites hourly  3.  Every 4-6 hours minimum:  Change oxygen saturation probe site  4.  Every 4-6 hours:  If on nasal continuous positive airway pressure, respiratory therapy assess nares and determine need for appliance change or resting period.  4/1/2024 1321 by Dahiana Zendejas RN  Outcome: Adequate for Discharge  4/1/2024 0919 by Dahiana Zendejas RN  Outcome: Progressing     Problem: Safety - Adult  Goal: Free from fall injury  4/1/2024 1321 by Dahiana Zendejas RN  Outcome: Adequate for Discharge  4/1/2024 0919 by Dahiana Zendejas RN  Outcome: Progressing     Problem: ABCDS Injury Assessment  Goal: Absence of physical injury  4/1/2024 1321 by Dahiana Zendejas RN  Outcome: Adequate for Discharge  4/1/2024 0919 by Dahiana Zendejas RN  Outcome: Progressing     Problem: Chronic Conditions and Co-morbidities  Goal: Patient's chronic conditions and co-morbidity symptoms are monitored and maintained or improved  4/1/2024 1321 by Dahiana Zendejas RN  Outcome: Adequate for Discharge  4/1/2024 0919 by Dahiana Zendejas RN  Outcome: Progressing     
  Problem: Discharge Planning  Goal: Discharge to home or other facility with appropriate resources  Outcome: Adequate for Discharge     Problem: Pain  Goal: Verbalizes/displays adequate comfort level or baseline comfort level  Outcome: Adequate for Discharge     Problem: Skin/Tissue Integrity  Goal: Absence of new skin breakdown  Description: 1.  Monitor for areas of redness and/or skin breakdown  2.  Assess vascular access sites hourly  3.  Every 4-6 hours minimum:  Change oxygen saturation probe site  4.  Every 4-6 hours:  If on nasal continuous positive airway pressure, respiratory therapy assess nares and determine need for appliance change or resting period.  Outcome: Adequate for Discharge     Problem: Safety - Adult  Goal: Free from fall injury  Outcome: Adequate for Discharge     Problem: ABCDS Injury Assessment  Goal: Absence of physical injury  Outcome: Adequate for Discharge     Problem: Chronic Conditions and Co-morbidities  Goal: Patient's chronic conditions and co-morbidity symptoms are monitored and maintained or improved  Outcome: Adequate for Discharge     
  Problem: Discharge Planning  Goal: Discharge to home or other facility with appropriate resources  Outcome: Progressing     Problem: Pain  Goal: Verbalizes/displays adequate comfort level or baseline comfort level  Outcome: Progressing     Problem: Skin/Tissue Integrity  Goal: Absence of new skin breakdown  Description: 1.  Monitor for areas of redness and/or skin breakdown  2.  Assess vascular access sites hourly  3.  Every 4-6 hours minimum:  Change oxygen saturation probe site  4.  Every 4-6 hours:  If on nasal continuous positive airway pressure, respiratory therapy assess nares and determine need for appliance change or resting period.  Outcome: Progressing     Problem: Safety - Adult  Goal: Free from fall injury  Outcome: Progressing     Problem: ABCDS Injury Assessment  Goal: Absence of physical injury  Outcome: Progressing     Problem: Chronic Conditions and Co-morbidities  Goal: Patient's chronic conditions and co-morbidity symptoms are monitored and maintained or improved  Outcome: Progressing     Problem: Physical Therapy - Adult  Goal: By Discharge: Performs mobility at highest level of function for planned discharge setting.  See evaluation for individualized goals.  Description: FUNCTIONAL STATUS PRIOR TO ADMISSION: Patient with recent hospital stay in March and discharged to SNF, reports he was ambulatory with RW for short distances.  Home only three days then experienced acute episode of weakness and inability to walk, tested positive for Covid in ED.      HOME SUPPORT PRIOR TO ADMISSION: The patient lived with son who can provide assist.  Patient's wife is also in the hospital.    Physical Therapy Goals  Initiated 3/28/2024  1.  Patient will move from supine to sit and sit to supine in bed with supervision/set-up within 7 day(s).    2.  Patient will perform sit to stand with supervision/set-up within 7 day(s).  3.  Patient will transfer from bed to chair and chair to bed with supervision/set-up 
  Problem: Occupational Therapy - Adult  Goal: By Discharge: Performs self-care activities at highest level of function for planned discharge setting.  See evaluation for individualized goals.  Description: FUNCTIONAL STATUS PRIOR TO ADMISSION:  Patient recently discharged from SNF rehab and returned home. Prior to last admission, he was independent in ADLs and functional mobility.  Receives Help From: Family, ADL Assistance: Needs assistance,  ,  ,  ,  ,  ,  ,  ,  , Active : No     HOME SUPPORT: Patient lived with son and wife. His wife is currently admitted to hospital.    Occupational Therapy Goals:  Initiated 3/28/2024  1.  Patient will perform grooming in standing with Supervision within 7 day(s).  2.  Patient will perform upper body dressing with Supervision within 7 day(s).  3.  Patient will perform lower body dressing with Minimal Assist within 7 day(s).  4.  Patient will perform toilet transfers with Contact Guard Assist  within 7 day(s).  5.  Patient will perform all aspects of toileting with Minimal Assist within 7 day(s).  6.  Patient will participate in upper extremity therapeutic exercise/activities with Stand by Assist for 5 minutes within 7 day(s).    Outcome: Progressing   OCCUPATIONAL THERAPY TREATMENT  Patient: Errol Brandt (79 y.o. male)  Date: 3/29/2024  Primary Diagnosis: Hypoglycemia [E16.2]  Generalized weakness [R53.1]  Acute cystitis with hematuria [N30.01]  COVID [U07.1]  COVID-19 [U07.1]       Precautions: Other (comment), Fall Risk (COVID +)                Chart, occupational therapy assessment, plan of care, and goals were reviewed.    ASSESSMENT  Patient continues to benefit from skilled OT services and is progressing towards goals. Patient received sitting in recliner chair and cleared for therapy by nursing. He completed sit > stand to walk to bathroom and was found saturated with urine. Patient walked into bathroom using rolling walker with contact guard to min assist for 
  Problem: Physical Therapy - Adult  Goal: By Discharge: Performs mobility at highest level of function for planned discharge setting.  See evaluation for individualized goals.  Description: FUNCTIONAL STATUS PRIOR TO ADMISSION: Patient with recent hospital stay in March and discharged to SNF, reports he was ambulatory with RW for short distances.  Home only three days then experienced acute episode of weakness and inability to walk, tested positive for Covid in ED.      HOME SUPPORT PRIOR TO ADMISSION: The patient lived with son who can provide assist.  Patient's wife is also in the hospital.    Physical Therapy Goals  Initiated 3/28/2024  1.  Patient will move from supine to sit and sit to supine in bed with supervision/set-up within 7 day(s).    2.  Patient will perform sit to stand with supervision/set-up within 7 day(s).  3.  Patient will transfer from bed to chair and chair to bed with supervision/set-up using the least restrictive device within 7 day(s).  4.  Patient will ambulate with supervision/set-up for 100 feet with the least restrictive device within 7 day(s).   3/28/2024 1057 by Karen Aguilar, PT  Outcome: Not Progressing     Problem: Physical Therapy - Adult  Goal: By Discharge: Performs mobility at highest level of function for planned discharge setting.  See evaluation for individualized goals.  Description: FUNCTIONAL STATUS PRIOR TO ADMISSION: Patient with recent hospital stay in March and discharged to SNF, reports he was ambulatory with RW for short distances.  Home only three days then experienced acute episode of weakness and inability to walk, tested positive for Covid in ED.      HOME SUPPORT PRIOR TO ADMISSION: The patient lived with son who can provide assist.  Patient's wife is also in the hospital.    Physical Therapy Goals  Initiated 3/28/2024  1.  Patient will move from supine to sit and sit to supine in bed with supervision/set-up within 7 day(s).    2.  Patient will perform sit to 
  Problem: Physical Therapy - Adult  Goal: By Discharge: Performs mobility at highest level of function for planned discharge setting.  See evaluation for individualized goals.  Description: FUNCTIONAL STATUS PRIOR TO ADMISSION: Patient with recent hospital stay in March and discharged to SNF, reports he was ambulatory with RW for short distances.  Home only three days then experienced acute episode of weakness and inability to walk, tested positive for Covid in ED.      HOME SUPPORT PRIOR TO ADMISSION: The patient lived with son who can provide assist.  Patient's wife is also in the hospital.    Physical Therapy Goals  Initiated 3/28/2024  1.  Patient will move from supine to sit and sit to supine in bed with supervision/set-up within 7 day(s).    2.  Patient will perform sit to stand with supervision/set-up within 7 day(s).  3.  Patient will transfer from bed to chair and chair to bed with supervision/set-up using the least restrictive device within 7 day(s).  4.  Patient will ambulate with supervision/set-up for 100 feet with the least restrictive device within 7 day(s).   Outcome: Progressing   PHYSICAL THERAPY TREATMENT    Patient: Errol Brandt (79 y.o. male)  Date: 3/29/2024  Diagnosis: Hypoglycemia [E16.2]  Generalized weakness [R53.1]  Acute cystitis with hematuria [N30.01]  COVID [U07.1]  COVID-19 [U07.1] COVID      Precautions: Other (comment), Fall Risk (COVID +)                      ASSESSMENT:  Patient continues to benefit from skilled PT services and is progressing towards goals. Patient received up in chair and agreeable to participate,  requires multi modal direction due to severe [place].  Patient stood from chair with CGA and was able to ambulate into bathroom with RW , then stood at sink for functional reaching activities.  Gait with mild unsteadiness yuly when turning and patient required min assist briefly to steady.   Gait improved with ambulation to door of room and back, needs cues for safe 
[x]  2 []  3  []  4     Raw Score: 18/24                            Cutoff score ?171,2,3 had higher odds of discharging home with home health or need of SNF/IPR.    1. Kellen Madden, Lila Triplett, Dallas Marc, Patricia Solomon, Lenny Solis, Nabeel Madden.  Validity of the AM-PAC “6-Clicks” Inpatient Daily Activity and Basic Mobility Short Forms. Physical Therapy Mar 2014, [phone]) [phone]; DOI: 10.2522/ptj.[id number]  2. Geoffrey BENSON, Henrietta SORIANO, Neeta SORIANO, Kaitlyn SORIANO. Association of AM-PAC \"6-Clicks\" Basic Mobility and Daily Activity Scores With Discharge Destination. Phys Ther. 2021 Apr 4;101(4):dkcr453. doi: 10.1093/ptj/[id number]. PMID: 75875502.  3. Sandro SORIANO, Lucia D, Madhavi S, Kendall K, Flora S. Activity Measure for Post-Acute Care \"6-Clicks\" Basic Mobility Scores Predict Discharge Destination After Acute Care Hospitalization in Select Patient Groups: A Retrospective, Observational Study. Arch Rehabil Res Clin Transl. 2022 Jul 16;4(3):793549. doi: [email or web address]/[email or web address]. PMID: 36986246; PMCID: [id number].  4. Maryanne PALACIO, Tiffany S, Antonia W, Chayo P. AM-PAC Short Forms Manual 4.0. Revised 2/2020.                                                                                                                                                                                                                                Activity Tolerance:   Good    After treatment:   Patient left in no apparent distress sitting up in chair, Call bell within reach, and Bed/ chair alarm activated    COMMUNICATION/EDUCATION:   The patient's plan of care was discussed with: occupational therapist and registered nurse    Patient Education  Education Given To: Patient  Education Provided: Role of Therapy;Plan of Care;Transfer Training;Fall Prevention Strategies  Education Method: Verbal  Barriers to Learning: Hearing  Education Outcome: Continued education needed    Thank you for this referral.  Karen Aguilar, PT  Minutes: 
Ratin/10     Activity Tolerance:   Fair , requires rest breaks, and SpO2 stable on room air    After treatment:   Patient left in no apparent distress sitting up in chair, Call bell within reach, and Bed/ chair alarm activated    COMMUNICATION/EDUCATION:   The patient's plan of care was discussed with: physical therapist and registered nurse    Patient Education  Education Given To: Patient  Education Provided: Role of Therapy;Plan of Care  Education Method: Verbal  Barriers to Learning: Hearing  Education Outcome: Verbalized understanding;Continued education needed    Thank you for this referral.  Erin Garcia OT  Minutes: 18    Occupational Therapy Evaluation Charge Determination   History Examination Decision-Making   MEDIUM Complexity : Expanded review of history including physical, cognitive and psychocial history  MEDIUM Complexity: 3-5 Performance deficits relating to physical, cognitive, or psychosocial skills that result in activity limitations and/or participation restrictions MEDIUM Complexity: Patient may present with comorbidities that affect occupational performance. Minimal to moderate modifications of tasks or assist (eg. physical or verbal) with assist is necessary to enable pt to complete eval   Based on the above components, the patient evaluation is determined to be of the following complexity level: Medium

## 2024-06-04 NOTE — H&P
CALLED LVM, Nondisplaced fracture of proximal phalanx of right great toe, Sprain of right foot -    Hospitalist Admission Note    NAME: Dustin Greer   :  1945   MRN:  [id number]     Date/Time:  2021 11:55 PM    Patient PCP: Mike Forrest MD  ______________________________________________________________________  Given the patient's current clinical presentation, I have a high level of concern for decompensation if discharged from the emergency department. Complex decision making was performed, which includes reviewing the patient's available past medical records, laboratory results, and x-ray films. My assessment of this patient's clinical condition and my plan of care is as follows. Assessment / Plan:  Sepsis of unknown origin  Metabolic encephalopathy  Acute hypoxic respiratory failure  Fall  Sepsis indicatedlactic acidosis, tachycardia, tachypnea  WBC 4.9, procalcitonin 0.71. VBG7.35/41.9/26/22. 9. On vancomycin, cefepime, Flagyl. CT abdomen/pelvis1. New evidence of hepatic steatosis. 2. Cholelithiasis is new. No acute cholecystitis or biliary obstruction. 3. Chronic pancreatitis. No evidence of acute pancreatitis. 4. Subacute or chronic L1 partial compression fracture. 5. Nonobstructing bilateral nephrolithiasis. CT head without contrastno acute findings. Chest x-ray Limited by low lung volumes. Possible mild interstitial pulmonary edema. Consider PA and lateral chest views when the patient can better tolerate. . Lactic acid 4.79 on admission, last check 2.29. Volume resuscitated as per sepsis protocol. D-dimer pending. CT chest without contrast pending. DuoNebs 4 times daily. Enteric bacterial panel pending, lipase 49. UA within normal limits. Blood culture pending. Diabetes type 2 with hyperglycemia  Sliding scale insulin plus Lantus 23 units, lispro 6 units with meals    Acute kidney injury  BUN/creatinine39/1.72, hydrating the patient. BMP in a.m.       Code Status: Full code  Surrogate Decision Maker: Wife    DVT Prophylaxis: Lovenox  GI Prophylaxis: not indicated    Baseline: Ambulatory with assistance at home      Subjective:   CHIEF COMPLAINT: Generalized weakness, fall    HISTORY OF PRESENT ILLNESS:     Claudette Bloom is a 77 y.o.  male who presents with generalized weakness, fall, confusion. Patient's past medical history of peripheral arterial disease, hypertension, hyperlipidemia. Patient agrees awake but slightly confused. Patient states that he is feeling weak and dizzy over the last couple of days and today he fell out of the wheelchair and hit the head on the floor. Patient has small abrasion below the eye. Patient is alert and oriented to place and self but not to time. Denies any chest pain but does report of shortness of breath. No cough, no fever and chills, no other complaints. We were asked to admit for work up and evaluation of the above problems. Past Medical History:   Diagnosis Date    Diabetes ([address]     Ill-defined condition     perpherial artery disease    Ill-defined condition     hyperlidemia        Past Surgical History:   Procedure Laterality Date    HX OTHER SURGICAL  2011    right femoral tibial bypass    HX OTHER SURGICAL      drained times two in back    VASCULAR SURGERY PROCEDURE UNLIST  2012    blood clot. right leg       Social History     Tobacco Use    Smoking status: Current Every Day Smoker     Packs/day: 1.00     Years: 40.00     Pack years: 40.00    Smokeless tobacco: Never Used   Substance Use Topics    Alcohol use: No        No family history on file. No significant family history. No Known Allergies     Prior to Admission medications    Medication Sig Start Date End Date Taking? Authorizing Provider   insulin aspart protamine/insulin aspart (NovoLOG Mix 70-30FlexPen U-100) 100 unit/mL (70-30) inpn 60 Units by SubCUTAneous route Every morning. Yes Provider, Historical   SITagliptin (JANUVIA) 50 mg tablet Take 50 mg by mouth daily.    Yes Provider, Historical   fenofibrate (TRICOR) 160 mg tablet Take 160 mg by mouth daily. Yes Provider, Historical       REVIEW OF SYSTEMS:     I am not able to complete the review of systems because: The patient is intubated and sedated    The patient has altered mental status due to his acute medical problems    The patient has baseline aphasia from prior stroke(s)    The patient has baseline dementia and is not reliable historian    The patient is in acute medical distress and unable to provide information           Total of 12 systems reviewed as follows:       POSITIVE= underlined text  Negative = text not underlined  General:  fever, chills, sweats, generalized weakness, weight loss/gain,      loss of appetite   Eyes:    blurred vision, eye pain, loss of vision, double vision  ENT:    rhinorrhea, pharyngitis   Respiratory:   cough, sputum production, SOB, YOUNG, wheezing, pleuritic pain   Cardiology:   chest pain, palpitations, orthopnea, PND, edema, syncope   Gastrointestinal:  abdominal pain , N/V, diarrhea, dysphagia, constipation, bleeding   Genitourinary:  frequency, urgency, dysuria, hematuria, incontinence   Muskuloskeletal :  arthralgia, myalgia, back pain  Hematology:  easy bruising, nose or gum bleeding, lymphadenopathy   Dermatological: rash, ulceration, pruritis, color change / jaundice  Endocrine:   hot flashes or polydipsia   Neurological:  headache, dizziness, confusion, focal weakness, paresthesia,     Speech difficulties, memory loss, gait difficulty  Psychological: Feelings of anxiety, depression, agitation    Objective:   VITALS:    Visit Vitals  /60   Pulse 98   Temp 99.2 °F (37.3 °C)   Resp (!) 32   Ht 5' 11\" (1.803 m)   Wt 100.7 kg (222 lb)   SpO2 95%   BMI 30.96 kg/m²       PHYSICAL EXAM:    General:    Alert, cooperative, no distress, appears stated age.      HEENT: Atraumatic, anicteric sclerae, pink conjunctivae     No oral ulcers, mucosa moist, throat clear, dentition fair  Neck:  Supple, symmetrical,  thyroid: non tender  Lungs:   Clear to auscultation bilaterally. No Wheezing or Rhonchi. No rales. Chest wall:  No tenderness  No Accessory muscle use. Heart:   Regular  rhythm,  No  murmur   No edema  Abdomen:   Soft, non-tender. Not distended. Bowel sounds normal  Extremities: No cyanosis. No clubbing,      Skin turgor normal, Capillary refill normal, Radial dial pulse 2+  Skin:     Not pale. Not Jaundiced  No rashes   Psych:  Good insight. Not depressed. Not anxious or agitated. Neurologic: EOMs intact. No facial asymmetry. No aphasia or slurred speech. Symmetrical strength, Sensation grossly intact. Alert and oriented X 2.     _______________________________________________________________________  Care Plan discussed with:    Comments   Patient x    Family      RN x    Care Manager                    Consultant:  x    _______________________________________________________________________  Expected  Disposition:   Home with Family    HH/PT/OT/RN x   SNF/LTC    JESSICA    ________________________________________________________________________  TOTAL TIME:  61 Minutes    Critical Care Provided     Minutes non procedure based      Comments     Reviewed previous records   >50% of visit spent in counseling and coordination of care  Discussion with patient and/or family and questions answered       ________________________________________________________________________  Signed: Symone Sauceda MD    Procedures: see electronic medical records for all procedures/Xrays and details which were not copied into this note but were reviewed prior to creation of Plan.     LAB DATA REVIEWED:    Recent Results (from the past 24 hour(s))   GLUCOSE, POC    Collection Time: 05/18/21  6:33 PM   Result Value Ref Range    Glucose (POC) 418 (H) 65 - 117 mg/dL    Performed by Reece Scruggs (EDT)    POC LACTIC ACID    Collection Time: 05/18/21  6:47 PM   Result Value Ref Range    Lactic Acid (POC) 4.79 (HH) 0.40 - 2.00 mmol/L   CBC WITH AUTOMATED DIFF    Collection Time: 05/18/21  6:52 PM   Result Value Ref Range    WBC 4.9 4.1 - 11.1 K/uL    RBC 4.19 4. 10 - 5.70 M/uL    HGB 15.0 12.1 - 17.0 g/dL    HCT 45.4 36.6 - 50.3 %    .4 (H) 80.0 - 99.0 FL    MCH 35.8 (H) 26.0 - 34.0 PG    MCHC 33.0 30.0 - 36.5 g/dL    RDW 12.9 11.5 - 14.5 %    PLATELET 029 350 - 554 K/uL    MPV 11.1 8.9 - 12.9 FL    NRBC 0.0 0  WBC    ABSOLUTE NRBC 0.00 0.00 - 0.01 K/uL    NEUTROPHILS 84 (H) 32 - 75 %    LYMPHOCYTES 10 (L) 12 - 49 %    MONOCYTES 6 5 - 13 %    EOSINOPHILS 0 0 - 7 %    BASOPHILS 0 0 - 1 %    IMMATURE GRANULOCYTES 0 0.0 - 0.5 %    ABS. NEUTROPHILS 4.1 1.8 - 8.0 K/UL    ABS. LYMPHOCYTES 0.5 (L) 0.8 - 3.5 K/UL    ABS. MONOCYTES 0.3 0.0 - 1.0 K/UL    ABS. EOSINOPHILS 0.0 0.0 - 0.4 K/UL    ABS. BASOPHILS 0.0 0.0 - 0.1 K/UL    ABS. IMM. GRANS. 0.0 0.00 - 0.04 K/UL    DF SMEAR SCANNED      RBC COMMENTS MACROCYTOSIS  1+       METABOLIC PANEL, COMPREHENSIVE    Collection Time: 05/18/21  6:52 PM   Result Value Ref Range    Sodium 135 (L) 136 - 145 mmol/L    Potassium 4.6 3.5 - 5.1 mmol/L    Chloride 105 97 - 108 mmol/L    CO2 22 21 - 32 mmol/L    Anion gap 8 5 - 15 mmol/L    Glucose 399 (H) 65 - 100 mg/dL    BUN 41 (H) 6 - 20 MG/DL    Creatinine 1.98 (H) 0.70 - 1.30 MG/DL    BUN/Creatinine ratio 21 (H) 12 - 20      GFR est AA 40 (L) >60 ml/min/1.73m2    GFR est non-AA 33 (L) >60 ml/min/1.73m2    Calcium 8.8 8.5 - 10.1 MG/DL    Bilirubin, total 1.0 0.2 - 1.0 MG/DL    ALT (SGPT) 41 12 - 78 U/L    AST (SGOT) 34 15 - 37 U/L    Alk.  phosphatase 50 45 - 117 U/L    Protein, total 7.8 6.4 - 8.2 g/dL    Albumin 3.9 3.5 - 5.0 g/dL    Globulin 3.9 2.0 - 4.0 g/dL    A-G Ratio 1.0 (L) 1.1 - 2.2     SAMPLES BEING HELD    Collection Time: 05/18/21  6:52 PM   Result Value Ref Range    SAMPLES BEING HELD BLOOD CULTURES     COMMENT        Add-on orders for these samples will be processed based on acceptable specimen integrity and analyte stability, which may vary by analyte. URINALYSIS W/ REFLEX CULTURE    Collection Time: 05/18/21  8:06 PM    Specimen: Urine   Result Value Ref Range    Color YELLOW/STRAW      Appearance CLEAR CLEAR      Specific gravity 1.030 1.003 - 1.030      pH (UA) 5.5 5.0 - 8.0      Protein TRACE (A) NEG mg/dL    Glucose >1,000 (A) NEG mg/dL    Ketone TRACE (A) NEG mg/dL    Bilirubin Negative NEG      Blood SMALL (A) NEG      Urobilinogen 0.2 0.2 - 1.0 EU/dL    Nitrites Negative NEG      Leukocyte Esterase Negative NEG      UA:UC IF INDICATED CULTURE NOT INDICATED BY UA RESULT CNI      WBC 0-4 0 - 4 /hpf    RBC 0-5 0 - 5 /hpf    Epithelial cells FEW FEW /lpf    Bacteria Negative NEG /hpf    Hyaline cast 0-2 0 - 5 /lpf   TROPONIN I    Collection Time: 05/18/21  8:06 PM   Result Value Ref Range    Troponin-I, Qt. <0.05 <0.05 ng/mL   ACETONE/KETONE, QL    Collection Time: 05/18/21  8:06 PM   Result Value Ref Range    Acetone/Ketone serum, QL.  Negative NEG        PROCALCITONIN    Collection Time: 05/18/21  8:06 PM   Result Value Ref Range    Procalcitonin 3.66 ng/mL   METABOLIC PANEL, BASIC    Collection Time: 05/18/21  8:06 PM   Result Value Ref Range    Sodium 137 136 - 145 mmol/L    Potassium 3.9 3.5 - 5.1 mmol/L    Chloride 107 97 - 108 mmol/L    CO2 22 21 - 32 mmol/L    Anion gap 8 5 - 15 mmol/L    Glucose 361 (H) 65 - 100 mg/dL    BUN 39 (H) 6 - 20 MG/DL    Creatinine 1.72 (H) 0.70 - 1.30 MG/DL    BUN/Creatinine ratio 23 (H) 12 - 20      GFR est AA 47 (L) >60 ml/min/1.73m2    GFR est non-AA 39 (L) >60 ml/min/1.73m2    Calcium 7.7 (L) 8.5 - 10.1 MG/DL   EKG, 12 LEAD, INITIAL    Collection Time: 05/18/21  8:20 PM   Result Value Ref Range    Ventricular Rate 100 BPM    Atrial Rate 100 BPM    P-R Interval 180 ms    QRS Duration 68 ms    Q-T Interval 362 ms    QTC Calculation (Bezet) 466 ms    Calculated P Axis 63 degrees    Calculated R Axis -26 degrees    Calculated T Axis 56 degrees    Diagnosis       Normal sinus rhythm  Low voltage QRS  When compared with ECG of 24-DEC-2017 03:31,  Vent.  rate has increased BY  33 BPM  QRS axis shifted left     BLOOD GAS,LACTIC ACID, POC    Collection Time: 05/18/21  9:54 PM   Result Value Ref Range    pH (POC) 7.35 7.35 - 7.45      pCO2 (POC) 41.9 35.0 - 45.0 MMHG    pO2 (POC) 26 (LL) 80 - 100 MMHG    HCO3 (POC) 22.9 22 - 26 MMOL/L    sO2 (POC) 43.5 (L) 92 - 97 %    Base deficit (POC) 2.8 mmol/L    Specimen type (POC) VENOUS BLOOD      Performed by Cody Sumner     Lactic Acid (POC) 2.29 (HH) 0.40 - 2.00 mmol/L    Critical value read back TATE    LIPASE    Collection Time: 05/18/21 10:00 PM   Result Value Ref Range    Lipase 49 (L) 73 - 393 U/L   NT-PRO BNP    Collection Time: 05/18/21 10:00 PM   Result Value Ref Range    NT pro- <450 PG/ML

## 2024-06-05 ENCOUNTER — OFFICE VISIT (OUTPATIENT)
Age: 79
End: 2024-06-05
Payer: MEDICARE

## 2024-06-05 VITALS
WEIGHT: 170 LBS | OXYGEN SATURATION: 96 % | HEIGHT: 71 IN | SYSTOLIC BLOOD PRESSURE: 110 MMHG | BODY MASS INDEX: 23.8 KG/M2 | HEART RATE: 60 BPM | RESPIRATION RATE: 16 BRPM | DIASTOLIC BLOOD PRESSURE: 61 MMHG

## 2024-06-05 DIAGNOSIS — E11.42 DIABETIC POLYNEUROPATHY ASSOCIATED WITH TYPE 2 DIABETES MELLITUS (HCC): ICD-10-CM

## 2024-06-05 DIAGNOSIS — Z79.4 TYPE 2 DIABETES MELLITUS WITH STAGE 2 CHRONIC KIDNEY DISEASE, WITH LONG-TERM CURRENT USE OF INSULIN (HCC): Primary | ICD-10-CM

## 2024-06-05 DIAGNOSIS — N18.2 TYPE 2 DIABETES MELLITUS WITH STAGE 2 CHRONIC KIDNEY DISEASE, WITH LONG-TERM CURRENT USE OF INSULIN (HCC): Primary | ICD-10-CM

## 2024-06-05 DIAGNOSIS — E11.22 TYPE 2 DIABETES MELLITUS WITH STAGE 2 CHRONIC KIDNEY DISEASE, WITH LONG-TERM CURRENT USE OF INSULIN (HCC): Primary | ICD-10-CM

## 2024-06-05 LAB — HBA1C MFR BLD: 9 %

## 2024-06-05 PROCEDURE — 1124F ACP DISCUSS-NO DSCNMKR DOCD: CPT | Performed by: GENERAL ACUTE CARE HOSPITAL

## 2024-06-05 PROCEDURE — 99204 OFFICE O/P NEW MOD 45 MIN: CPT | Performed by: GENERAL ACUTE CARE HOSPITAL

## 2024-06-05 PROCEDURE — G8427 DOCREV CUR MEDS BY ELIG CLIN: HCPCS | Performed by: GENERAL ACUTE CARE HOSPITAL

## 2024-06-05 PROCEDURE — 3044F HG A1C LEVEL LT 7.0%: CPT | Performed by: GENERAL ACUTE CARE HOSPITAL

## 2024-06-05 PROCEDURE — 3078F DIAST BP <80 MM HG: CPT | Performed by: GENERAL ACUTE CARE HOSPITAL

## 2024-06-05 PROCEDURE — 3074F SYST BP LT 130 MM HG: CPT | Performed by: GENERAL ACUTE CARE HOSPITAL

## 2024-06-05 PROCEDURE — G8420 CALC BMI NORM PARAMETERS: HCPCS | Performed by: GENERAL ACUTE CARE HOSPITAL

## 2024-06-05 PROCEDURE — 83036 HEMOGLOBIN GLYCOSYLATED A1C: CPT | Performed by: GENERAL ACUTE CARE HOSPITAL

## 2024-06-05 PROCEDURE — 4004F PT TOBACCO SCREEN RCVD TLK: CPT | Performed by: GENERAL ACUTE CARE HOSPITAL

## 2024-06-05 RX ORDER — ACYCLOVIR 400 MG/1
TABLET ORAL
Qty: 3 EACH | Refills: 3 | Status: SHIPPED | OUTPATIENT
Start: 2024-06-05 | End: 2024-06-06

## 2024-06-05 RX ORDER — GLUCAGON INJECTION, SOLUTION 0.5 MG/.1ML
INJECTION, SOLUTION SUBCUTANEOUS
Qty: 2 EACH | Refills: 1 | Status: SHIPPED | OUTPATIENT
Start: 2024-06-05

## 2024-06-05 RX ORDER — INSULIN ASPART 100 [IU]/ML
INJECTION, SUSPENSION SUBCUTANEOUS
Qty: 5 ADJUSTABLE DOSE PRE-FILLED PEN SYRINGE | Refills: 3 | Status: SHIPPED | OUTPATIENT
Start: 2024-06-05

## 2024-06-05 RX ORDER — ACYCLOVIR 400 MG/1
TABLET ORAL
Qty: 1 EACH | Refills: 0 | Status: SHIPPED | OUTPATIENT
Start: 2024-06-05

## 2024-06-05 RX ORDER — METFORMIN HYDROCHLORIDE 500 MG/1
1000 TABLET, EXTENDED RELEASE ORAL
Qty: 60 TABLET | Refills: 5 | Status: SHIPPED | OUTPATIENT
Start: 2024-06-05

## 2024-06-05 NOTE — PATIENT INSTRUCTIONS
PLAN FOR TODAY    We will plan to make the following changes to your diabetes medications:    Metformin XR 500mg take 2 tabs with dinner  Novolog 70/30 take 24 units before breakfast and 22 units before dinner    I have ordered the Dexcom G7 sensors for you. This includes 3 sensors per month. Each sensor is used for 10 days, so 3 are provided per month.     It will be important to continue checking your glucose just as you did previously.   I would like you at the very least to check you glucose during:    AM fasting before breakfast  Before Dinner  Bedtime  Any other time that you are not feeling well.     Always provide a glucose log that is completed at every visit so that we can review the results of your home glucose together. Without this, it is not possible to make accurate changes to your diabetes regimen.    Tran Navarro MD  [facility]     Diabetes and Meal Planning    Meal planning can be a key part of managing diabetes. Planning meals and snacks with the right balance of carbohydrate, protein, and fat can help you keep your blood sugar at the target level.  You don't have to eat special foods. You can eat what your family eats, including sweets once in a while. But you do have to pay attention to how often you eat and how much you eat of certain foods.    Your plate  The plate format is a simple way to help you manage how you eat. You plan meals by learning how much space each food should take on a plate. It can make it easier to keep your blood sugar level within your target range. It also helps you see if you're eating healthy portion sizes.  To use the plate format, you put non-starchy vegetables on half your plate. Add lean protein foods, such as fish, lean meats and poultry, or soy products, on one-quarter of the plate. Put a grain or starchy vegetable (such as brown rice or a potato) on the final quarter of the plate. You can add a small piece of fruit and some low-fat or

## 2024-06-05 NOTE — PROGRESS NOTES
sores.  Buy shoes that fit well but not too tightly to prevent bunions and blisters. Shoes should be flexible and breathable but prevent from injury.  Do not go barefoot, especially at night, to prevent injury.   Do not try to treat an early foot problem at home. Home remedies or treatments that you can buy without a prescription (such as corn removers) can be harmful.  Seek immediate help if:   You have a foot sore, an ulcer or break in the skin that is not healing after 4 days, bleeding corns or calluses, or an ingrown toenail.  You have blue or black areas.  You have peeling skin or tiny blisters between your toes or cracking or oozing of the skin.  You have a fever for more than 24 hours and a foot sore.  You have new numbness or tingling in your feet that does not go away after you move your feet or change positions.  You have new unexplained or unusual swelling of the foot or ankle.    ---------------------------------------------------------------------------    Hypoglycemia:    Hypoglycemia means that your blood sugar is low and your body is not getting enough fuel. Some people get low blood sugar from not eating often enough. Some medicines to treat diabetes can cause low blood sugar. People who have had surgery on their stomachs or intestines may get hypoglycemia. Problems with the pancreas, kidneys, or liver also can cause low blood sugar.  A snack or drink with sugar in it will raise your blood sugar and should ease your symptoms right away.  How can you care for yourself at home?  Learn your signs of low blood sugar. They are different for everyone. Some common early signs include:  Nausea.  Hunger.  Feeling nervous, irritable, or shaky.  Cold, clammy skin.  Sweating (when you're not exercising).  Use the \"rule of 15\" to treat low blood sugar. This includes eating 15 grams of carbohydrate from a quick-sugar food, such as 3 or 4 glucose tablets or ½ cup of juice. Wait 15 minutes and check your blood

## 2024-06-06 RX ORDER — ACYCLOVIR 400 MG/1
TABLET ORAL
Qty: 9 EACH | Refills: 3 | Status: SHIPPED | OUTPATIENT
Start: 2024-06-06

## 2024-06-13 ENCOUNTER — TELEPHONE (OUTPATIENT)
Age: 79
End: 2024-06-13

## 2024-06-13 NOTE — TELEPHONE ENCOUNTER
Called and left a detailed voice message for callback     Gladys MEADE is trying to get in contact with patient. Called pt to inform them and to give Gladys a call at [phone] * [phone]    10:52 AM  MARGO FOWLER MA 06/13/24

## 2024-06-20 ENCOUNTER — TELEPHONE (OUTPATIENT)
Age: 79
End: 2024-06-20

## 2024-06-20 ENCOUNTER — PATIENT MESSAGE (OUTPATIENT)
Age: 79
End: 2024-06-20

## 2024-06-20 NOTE — TELEPHONE ENCOUNTER
Please let mr Brandt know that Gladys has been calling him but not able to connect with you regarding Dexcom G7. Please give them a call at [phone].

## 2024-06-20 NOTE — TELEPHONE ENCOUNTER
Please let mr Brandt know that Gladys has been calling him but not able to connect with you regarding Dexcom G7. Please give them a call at [phone].

## 2024-06-21 NOTE — TELEPHONE ENCOUNTER
Spoke with Mrs Brandt and was asked to give the number for [employer] to their son Julio.  LVM for Julio explaining Adolfodion has been trying to contact his father regarding the Dexcom G7. Contact information left on voicemail.

## 2024-06-24 ENCOUNTER — HOSPITAL ENCOUNTER (EMERGENCY)
Facility: HOSPITAL | Age: 79
Discharge: HOME | End: 2024-06-25
Attending: EMERGENCY MEDICINE
Payer: MEDICARE

## 2024-06-24 DIAGNOSIS — R04.0 EPISTAXIS: Primary | ICD-10-CM

## 2024-06-24 LAB
ALBUMIN SERPL-MCNC: 3.6 G/DL (ref 3.5–5)
ALBUMIN/GLOB SERPL: 0.9 (ref 1.1–2.2)
ALP SERPL-CCNC: 143 U/L (ref 45–117)
ALT SERPL-CCNC: 24 U/L (ref 12–78)
ANION GAP SERPL CALC-SCNC: 5 MMOL/L (ref 5–15)
APTT PPP: 24.4 SEC (ref 22.1–31)
AST SERPL-CCNC: 14 U/L (ref 15–37)
BASOPHILS # BLD: 0 K/UL (ref 0–0.1)
BASOPHILS NFR BLD: 0 % (ref 0–1)
BILIRUB SERPL-MCNC: 0.8 MG/DL (ref 0.2–1)
BUN SERPL-MCNC: 25 MG/DL (ref 6–20)
BUN/CREAT SERPL: 20 (ref 12–20)
CALCIUM SERPL-MCNC: 9.4 MG/DL (ref 8.5–10.1)
CHLORIDE SERPL-SCNC: 106 MMOL/L (ref 97–108)
CO2 SERPL-SCNC: 28 MMOL/L (ref 21–32)
CREAT SERPL-MCNC: 1.25 MG/DL (ref 0.7–1.3)
DIFFERENTIAL METHOD BLD: ABNORMAL
EOSINOPHIL # BLD: 0.3 K/UL (ref 0–0.4)
EOSINOPHIL NFR BLD: 4 % (ref 0–7)
ERYTHROCYTE [DISTWIDTH] IN BLOOD BY AUTOMATED COUNT: 12.9 % (ref 11.5–14.5)
GLOBULIN SER CALC-MCNC: 4.2 G/DL (ref 2–4)
GLUCOSE SERPL-MCNC: 145 MG/DL (ref 65–100)
HCT VFR BLD AUTO: 46.2 % (ref 36.6–50.3)
HGB BLD-MCNC: 15.4 G/DL (ref 12.1–17)
IMM GRANULOCYTES # BLD AUTO: 0 K/UL (ref 0–0.04)
IMM GRANULOCYTES NFR BLD AUTO: 0 % (ref 0–0.5)
INR PPP: 1 (ref 0.9–1.1)
LYMPHOCYTES # BLD: 2.6 K/UL (ref 0.8–3.5)
LYMPHOCYTES NFR BLD: 46 % (ref 12–49)
MCH RBC QN AUTO: 33.2 PG (ref 26–34)
MCHC RBC AUTO-ENTMCNC: 33.3 G/DL (ref 30–36.5)
MCV RBC AUTO: 99.6 FL (ref 80–99)
MONOCYTES # BLD: 0.6 K/UL (ref 0–1)
MONOCYTES NFR BLD: 10 % (ref 5–13)
NEUTS SEG # BLD: 2.3 K/UL (ref 1.8–8)
NEUTS SEG NFR BLD: 40 % (ref 32–75)
NRBC # BLD: 0 K/UL (ref 0–0.01)
NRBC BLD-RTO: 0 PER 100 WBC
PLATELET # BLD AUTO: 172 K/UL (ref 150–400)
PMV BLD AUTO: 10.1 FL (ref 8.9–12.9)
POTASSIUM SERPL-SCNC: 3.8 MMOL/L (ref 3.5–5.1)
PROT SERPL-MCNC: 7.8 G/DL (ref 6.4–8.2)
PROTHROMBIN TIME: 10.3 SEC (ref 9–11.1)
RBC # BLD AUTO: 4.64 M/UL (ref 4.1–5.7)
SODIUM SERPL-SCNC: 139 MMOL/L (ref 136–145)
THERAPEUTIC RANGE: NORMAL SECS (ref 58–77)
WBC # BLD AUTO: 5.7 K/UL (ref 4.1–11.1)

## 2024-06-24 PROCEDURE — 85730 THROMBOPLASTIN TIME PARTIAL: CPT

## 2024-06-24 PROCEDURE — 80053 COMPREHEN METABOLIC PANEL: CPT

## 2024-06-24 PROCEDURE — 36415 COLL VENOUS BLD VENIPUNCTURE: CPT

## 2024-06-24 PROCEDURE — 99283 EMERGENCY DEPT VISIT LOW MDM: CPT

## 2024-06-24 PROCEDURE — 85025 COMPLETE CBC W/AUTO DIFF WBC: CPT

## 2024-06-24 PROCEDURE — 85610 PROTHROMBIN TIME: CPT

## 2024-06-24 RX ORDER — OXYMETAZOLINE HYDROCHLORIDE 0.05 G/100ML
2 SPRAY NASAL
Status: COMPLETED | OUTPATIENT
Start: 2024-06-24 | End: 2024-06-25

## 2024-06-24 RX ORDER — LIDOCAINE HCL/EPINEPHRINE/PF 2%-1:200K
10 VIAL (ML) INJECTION ONCE
Status: COMPLETED | OUTPATIENT
Start: 2024-06-24 | End: 2024-06-25

## 2024-06-24 ASSESSMENT — PAIN - FUNCTIONAL ASSESSMENT
PAIN_FUNCTIONAL_ASSESSMENT: NONE - DENIES PAIN
PAIN_FUNCTIONAL_ASSESSMENT: NONE - DENIES PAIN

## 2024-06-25 VITALS
SYSTOLIC BLOOD PRESSURE: 100 MMHG | WEIGHT: 172 LBS | HEIGHT: 72 IN | DIASTOLIC BLOOD PRESSURE: 57 MMHG | BODY MASS INDEX: 23.3 KG/M2 | RESPIRATION RATE: 18 BRPM | OXYGEN SATURATION: 98 % | HEART RATE: 59 BPM | TEMPERATURE: 97.9 F

## 2024-06-25 PROCEDURE — 6370000000 HC RX 637 (ALT 250 FOR IP)

## 2024-06-25 PROCEDURE — 2500000003 HC RX 250 WO HCPCS

## 2024-06-25 RX ADMIN — LIDOCAINE HYDROCHLORIDE,EPINEPHRINE BITARTRATE 10 ML: 20; .005 INJECTION, SOLUTION EPIDURAL; INFILTRATION; INTRACAUDAL; PERINEURAL at 00:08

## 2024-06-25 RX ADMIN — OXYMETAZOLINE HCL 2 SPRAY: 0.05 SPRAY NASAL at 00:08

## 2024-06-25 ASSESSMENT — PAIN - FUNCTIONAL ASSESSMENT: PAIN_FUNCTIONAL_ASSESSMENT: NONE - DENIES PAIN

## 2024-06-25 NOTE — ED NOTES
Patient discharged from the ED by Dr. Bonner. Diagnosis, medications, precautions and follow-ups were reviewed with the patient/family. Questions were asked and answered prior to departure. Patient departed the ED via wheelchair and was accompanied by his son.

## 2024-06-25 NOTE — DISCHARGE INSTRUCTIONS
You were seen for a nosebleed.  If you notice that your nose is bleeding again when you get home you need to pinch your nose and hold pressure for 20 minutes without any interruption.  Set a timer.  If after 20 minutes your nose is still bleeding continue holding pressure for another 20 minutes and repeat until the bleeding is stopped.  You have also been provided nasal spray and you can use 2 sprays of this which can help stop bleeding as well.

## 2024-06-25 NOTE — ED PROVIDER NOTES
atorvastatin 80 MG tablet  Commonly known as: LIPITOR  Take 1 tablet by mouth nightly     Gvoke HypoPen 2-Pack 0.5 MG/0.1ML Soaj  Generic drug: Glucagon  Use as needed for hypoglycemia (one 2-pack)     levothyroxine 125 MCG tablet  Commonly known as: SYNTHROID  Take 1 tablet by mouth every morning (before breakfast)     metFORMIN 500 MG extended release tablet  Commonly known as: GLUCOPHAGE-XR  Take 2 tablets by mouth Daily with supper     metoprolol succinate 25 MG extended release tablet  Commonly known as: TOPROL XL  Take 0.5 tablets by mouth daily     NovoLOG Mix 70/30 FlexPen injection pen  Generic drug: insulin aspart prot & aspart  Take 24 units before breakfast and 22 units before dinner (take 15 minutes before the meal time)     prasugrel 10 MG Tabs  Commonly known as: EFFIENT  Take 1 tablet by mouth daily     tamsulosin 0.4 MG capsule  Commonly known as: FLOMAX  Take 1 capsule by mouth daily                DISCONTINUED MEDICATIONS:  Current Discharge Medication List          I am the Primary Clinician of Record.   Wilner Roberson MD (electronically signed)    (Please note that parts of this dictation were completed with voice recognition software. Quite often unanticipated grammatical, syntax, homophones, and other interpretive errors are inadvertently transcribed by the computer software. Please disregards these errors. Please excuse any errors that have escaped final proofreading.)

## 2024-07-15 RX ORDER — LEVOTHYROXINE SODIUM 0.12 MG/1
125 TABLET ORAL
Qty: 30 TABLET | Refills: 1 | OUTPATIENT
Start: 2024-07-15

## 2024-10-07 ENCOUNTER — APPOINTMENT (OUTPATIENT)
Facility: HOSPITAL | Age: 79
End: 2024-10-07
Payer: MEDICARE

## 2024-10-07 ENCOUNTER — HOSPITAL ENCOUNTER (EMERGENCY)
Facility: HOSPITAL | Age: 79
Discharge: HOME | End: 2024-10-07
Attending: STUDENT IN AN ORGANIZED HEALTH CARE EDUCATION/TRAINING PROGRAM
Payer: MEDICARE

## 2024-10-07 VITALS
SYSTOLIC BLOOD PRESSURE: 121 MMHG | HEART RATE: 68 BPM | TEMPERATURE: 97.7 F | HEIGHT: 72 IN | WEIGHT: 175 LBS | DIASTOLIC BLOOD PRESSURE: 53 MMHG | RESPIRATION RATE: 18 BRPM | OXYGEN SATURATION: 96 % | BODY MASS INDEX: 23.7 KG/M2

## 2024-10-07 DIAGNOSIS — M54.50 ACUTE BILATERAL LOW BACK PAIN WITHOUT SCIATICA: Primary | ICD-10-CM

## 2024-10-07 DIAGNOSIS — R73.9 HYPERGLYCEMIA: ICD-10-CM

## 2024-10-07 LAB
ALBUMIN SERPL-MCNC: 3.2 G/DL (ref 3.5–5)
ALBUMIN/GLOB SERPL: 0.8 (ref 1.1–2.2)
ALP SERPL-CCNC: 121 U/L (ref 45–117)
ALT SERPL-CCNC: 24 U/L (ref 12–78)
ANION GAP SERPL CALC-SCNC: 6 MMOL/L (ref 2–12)
APPEARANCE UR: CLEAR
AST SERPL-CCNC: 12 U/L (ref 15–37)
BACTERIA URNS QL MICRO: NEGATIVE /HPF
BASOPHILS # BLD: 0 K/UL (ref 0–0.1)
BASOPHILS NFR BLD: 1 % (ref 0–1)
BILIRUB SERPL-MCNC: 0.7 MG/DL (ref 0.2–1)
BILIRUB UR QL: NEGATIVE
BUN SERPL-MCNC: 21 MG/DL (ref 6–20)
BUN/CREAT SERPL: 18 (ref 12–20)
CALCIUM SERPL-MCNC: 8.8 MG/DL (ref 8.5–10.1)
CHLORIDE SERPL-SCNC: 102 MMOL/L (ref 97–108)
CO2 SERPL-SCNC: 28 MMOL/L (ref 21–32)
COLOR UR: ABNORMAL
CREAT SERPL-MCNC: 1.19 MG/DL (ref 0.7–1.3)
DIFFERENTIAL METHOD BLD: ABNORMAL
EOSINOPHIL # BLD: 0.2 K/UL (ref 0–0.4)
EOSINOPHIL NFR BLD: 4 % (ref 0–7)
EPITH CASTS URNS QL MICRO: ABNORMAL /LPF
ERYTHROCYTE [DISTWIDTH] IN BLOOD BY AUTOMATED COUNT: 13.1 % (ref 11.5–14.5)
GLOBULIN SER CALC-MCNC: 4 G/DL (ref 2–4)
GLUCOSE BLD STRIP.AUTO-MCNC: 356 MG/DL (ref 65–117)
GLUCOSE SERPL-MCNC: 413 MG/DL (ref 65–100)
GLUCOSE UR STRIP.AUTO-MCNC: >1000 MG/DL
HCT VFR BLD AUTO: 42.1 % (ref 36.6–50.3)
HGB BLD-MCNC: 14.4 G/DL (ref 12.1–17)
HGB UR QL STRIP: NEGATIVE
HYALINE CASTS URNS QL MICRO: ABNORMAL /LPF (ref 0–2)
IMM GRANULOCYTES # BLD AUTO: 0 K/UL (ref 0–0.04)
IMM GRANULOCYTES NFR BLD AUTO: 0 % (ref 0–0.5)
KETONES UR QL STRIP.AUTO: NEGATIVE MG/DL
LEUKOCYTE ESTERASE UR QL STRIP.AUTO: NEGATIVE
LYMPHOCYTES # BLD: 1.7 K/UL (ref 0.8–3.5)
LYMPHOCYTES NFR BLD: 40 % (ref 12–49)
MCH RBC QN AUTO: 33.8 PG (ref 26–34)
MCHC RBC AUTO-ENTMCNC: 34.2 G/DL (ref 30–36.5)
MCV RBC AUTO: 98.8 FL (ref 80–99)
MONOCYTES # BLD: 0.4 K/UL (ref 0–1)
MONOCYTES NFR BLD: 10 % (ref 5–13)
NEUTS SEG # BLD: 1.9 K/UL (ref 1.8–8)
NEUTS SEG NFR BLD: 45 % (ref 32–75)
NITRITE UR QL STRIP.AUTO: NEGATIVE
NRBC # BLD: 0 K/UL (ref 0–0.01)
NRBC BLD-RTO: 0 PER 100 WBC
PH UR STRIP: 5.5 (ref 5–8)
PLATELET # BLD AUTO: 147 K/UL (ref 150–400)
PMV BLD AUTO: 10.7 FL (ref 8.9–12.9)
POTASSIUM SERPL-SCNC: 4 MMOL/L (ref 3.5–5.1)
PROT SERPL-MCNC: 7.2 G/DL (ref 6.4–8.2)
PROT UR STRIP-MCNC: NEGATIVE MG/DL
RBC # BLD AUTO: 4.26 M/UL (ref 4.1–5.7)
RBC #/AREA URNS HPF: ABNORMAL /HPF (ref 0–5)
SERVICE CMNT-IMP: ABNORMAL
SODIUM SERPL-SCNC: 136 MMOL/L (ref 136–145)
SP GR UR REFRACTOMETRY: 1.02 (ref 1–1.03)
URINE CULTURE IF INDICATED: ABNORMAL
UROBILINOGEN UR QL STRIP.AUTO: 1 EU/DL (ref 0.2–1)
WBC # BLD AUTO: 4.1 K/UL (ref 4.1–11.1)
WBC URNS QL MICRO: ABNORMAL /HPF (ref 0–4)

## 2024-10-07 PROCEDURE — 72131 CT LUMBAR SPINE W/O DYE: CPT

## 2024-10-07 PROCEDURE — 85025 COMPLETE CBC W/AUTO DIFF WBC: CPT

## 2024-10-07 PROCEDURE — 81001 URINALYSIS AUTO W/SCOPE: CPT

## 2024-10-07 PROCEDURE — 99284 EMERGENCY DEPT VISIT MOD MDM: CPT

## 2024-10-07 PROCEDURE — 2580000003 HC RX 258: Performed by: STUDENT IN AN ORGANIZED HEALTH CARE EDUCATION/TRAINING PROGRAM

## 2024-10-07 PROCEDURE — 6360000002 HC RX W HCPCS: Performed by: STUDENT IN AN ORGANIZED HEALTH CARE EDUCATION/TRAINING PROGRAM

## 2024-10-07 PROCEDURE — 36415 COLL VENOUS BLD VENIPUNCTURE: CPT

## 2024-10-07 PROCEDURE — 73630 X-RAY EXAM OF FOOT: CPT

## 2024-10-07 PROCEDURE — 96374 THER/PROPH/DIAG INJ IV PUSH: CPT

## 2024-10-07 PROCEDURE — 80053 COMPREHEN METABOLIC PANEL: CPT

## 2024-10-07 PROCEDURE — 96361 HYDRATE IV INFUSION ADD-ON: CPT

## 2024-10-07 PROCEDURE — 6370000000 HC RX 637 (ALT 250 FOR IP): Performed by: STUDENT IN AN ORGANIZED HEALTH CARE EDUCATION/TRAINING PROGRAM

## 2024-10-07 PROCEDURE — 82962 GLUCOSE BLOOD TEST: CPT

## 2024-10-07 PROCEDURE — 96375 TX/PRO/DX INJ NEW DRUG ADDON: CPT

## 2024-10-07 RX ORDER — LIDOCAINE 4 G/G
1 PATCH TOPICAL DAILY
Qty: 30 PATCH | Refills: 0 | Status: SHIPPED | OUTPATIENT
Start: 2024-10-07 | End: 2024-11-06

## 2024-10-07 RX ORDER — KETOROLAC TROMETHAMINE 30 MG/ML
15 INJECTION, SOLUTION INTRAMUSCULAR; INTRAVENOUS ONCE
Status: COMPLETED | OUTPATIENT
Start: 2024-10-07 | End: 2024-10-07

## 2024-10-07 RX ORDER — 0.9 % SODIUM CHLORIDE 0.9 %
1000 INTRAVENOUS SOLUTION INTRAVENOUS ONCE
Status: COMPLETED | OUTPATIENT
Start: 2024-10-07 | End: 2024-10-07

## 2024-10-07 RX ADMIN — KETOROLAC TROMETHAMINE 15 MG: 30 INJECTION, SOLUTION INTRAMUSCULAR at 12:15

## 2024-10-07 RX ADMIN — INSULIN HUMAN 8 UNITS: 100 INJECTION, SOLUTION PARENTERAL at 13:55

## 2024-10-07 RX ADMIN — SODIUM CHLORIDE 1000 ML: 9 INJECTION, SOLUTION INTRAVENOUS at 13:55

## 2024-10-07 ASSESSMENT — PAIN DESCRIPTION - ORIENTATION: ORIENTATION: LOWER

## 2024-10-07 ASSESSMENT — PAIN DESCRIPTION - PAIN TYPE: TYPE: ACUTE PAIN

## 2024-10-07 ASSESSMENT — PAIN SCALES - GENERAL
PAINLEVEL_OUTOF10: 6
PAINLEVEL_OUTOF10: 6

## 2024-10-07 ASSESSMENT — LIFESTYLE VARIABLES
HOW OFTEN DO YOU HAVE A DRINK CONTAINING ALCOHOL: NEVER
HOW MANY STANDARD DRINKS CONTAINING ALCOHOL DO YOU HAVE ON A TYPICAL DAY: PATIENT DOES NOT DRINK

## 2024-10-07 ASSESSMENT — PAIN DESCRIPTION - FREQUENCY: FREQUENCY: CONTINUOUS

## 2024-10-07 ASSESSMENT — PAIN - FUNCTIONAL ASSESSMENT
PAIN_FUNCTIONAL_ASSESSMENT: ACTIVITIES ARE NOT PREVENTED
PAIN_FUNCTIONAL_ASSESSMENT: 0-10

## 2024-10-07 ASSESSMENT — PAIN DESCRIPTION - LOCATION: LOCATION: BACK

## 2024-10-07 ASSESSMENT — PAIN DESCRIPTION - DESCRIPTORS: DESCRIPTORS: ACHING

## 2024-10-07 NOTE — ED PROVIDER NOTES
the patient under a comprehensive plan of care for diabetes and the patient would benefit from diabetic footwear to protect their feet, and this includes shoes and insoles. E11.65 1 each 0    aspirin 81 MG EC tablet Take 1 tablet by mouth daily 30 tablet 5    atorvastatin (LIPITOR) 80 MG tablet Take 1 tablet by mouth nightly 30 tablet 1    levothyroxine (SYNTHROID) 125 MCG tablet Take 1 tablet by mouth every morning (before breakfast) 30 tablet 1    metoprolol succinate (TOPROL XL) 25 MG extended release tablet Take 0.5 tablets by mouth daily 30 tablet 1    prasugrel (EFFIENT) 10 MG TABS Take 1 tablet by mouth daily 30 tablet 1    tamsulosin (FLOMAX) 0.4 MG capsule Take 1 capsule by mouth daily 30 capsule 1       Past History     Past Medical History:  Past Medical History:   Diagnosis Date    Diabetes (HCC)     Ill-defined condition     perpherial artery disease    Ill-defined condition     hyperlidemia       Past Surgical History:  Past Surgical History:   Procedure Laterality Date    CARDIAC PROCEDURE N/A 1/3/2024    Left heart cath / coronary angiography performed by Sae Garzon MD at [facility] CARDIAC CATH LAB    CARDIAC PROCEDURE N/A 1/5/2024    Percutaneous coronary intervention performed by Sae Garzon MD at [facility] CARDIAC CATH LAB    CARDIAC PROCEDURE N/A 1/5/2024    Left heart cath / coronary angiography performed by Sae Garzon MD at [facility] CARDIAC CATH LAB    CARDIAC PROCEDURE N/A 1/5/2024    Intravascular ultrasound performed by Sae Garzon MD at [facility] CARDIAC CATH LAB    CARDIAC PROCEDURE N/A 1/5/2024    Atherectomy coronary performed by Sae Garzon MD at [facility] CARDIAC CATH LAB    CHOLECYSTECTOMY, LAPAROSCOPIC N/A 12/21/2023    CHOLECYSTECTOMY LAPAROSCOPIC CHOLANGIOGRAM performed by Adryan Rees MD at [facility] MAIN OR    COLONOSCOPY N/A 4/27/2022    COLONOSCOPY performed by Barber Breaux MD at [facility] ENDOSCOPY    ERCP N/A 12/21/2023    ERCP ENDOSCOPIC RETROGRADE

## 2024-10-07 NOTE — ED NOTES
This RN assumed care of pt at this time. Pt arrives via wheelchair into ED RM 33. Marquita HONG and this RN assisted pt into changing into a hospital gown and into bed. This RN noticed pt's clothes were dirty. While assisting this pt, this RN noticed dried stool up pt's back, down pt's bilateral thighs, and on pt's bilateral arms. Pt wearing disposable adult brief. Pt hard of hearing. Pt's complaining of lower back pain and L foot pain onset x1m ago.     Pt reports living at home with wife (who pt reports \"is really sick with cancer\") and son.     Romana PARNELL and Marquita Frost RN made aware.

## 2024-10-07 NOTE — CARE COORDINATION
1652 - CM provided pt with [employer] Medicare resource number for needs.    Brenda Throckmorton RN  [facility] Case Management  /[phone]  [phone]

## 2024-10-07 NOTE — ED NOTES
This RN called Adult Protective Services at this time - see prior note. Spoke with Sheryl Interiano (Hotline Specialist).    Sheryl Interiano provided the following information:     [facility] Report Number: [id number]    [facility] of : [phone]

## 2024-11-19 DIAGNOSIS — E11.22 TYPE 2 DIABETES MELLITUS WITH STAGE 2 CHRONIC KIDNEY DISEASE, WITH LONG-TERM CURRENT USE OF INSULIN (HCC): Primary | ICD-10-CM

## 2024-11-19 DIAGNOSIS — E11.42 DIABETIC POLYNEUROPATHY ASSOCIATED WITH TYPE 2 DIABETES MELLITUS (HCC): ICD-10-CM

## 2024-11-19 DIAGNOSIS — N18.2 TYPE 2 DIABETES MELLITUS WITH STAGE 2 CHRONIC KIDNEY DISEASE, WITH LONG-TERM CURRENT USE OF INSULIN (HCC): Primary | ICD-10-CM

## 2024-11-19 DIAGNOSIS — Z79.4 TYPE 2 DIABETES MELLITUS WITH STAGE 2 CHRONIC KIDNEY DISEASE, WITH LONG-TERM CURRENT USE OF INSULIN (HCC): Primary | ICD-10-CM

## 2024-11-19 RX ORDER — ASPIRIN 81 MG/1
81 TABLET ORAL DAILY
Qty: 30 TABLET | Refills: 5 | Status: CANCELLED | OUTPATIENT
Start: 2024-11-19

## 2024-11-20 RX ORDER — INSULIN ASPART 100 [IU]/ML
INJECTION, SUSPENSION SUBCUTANEOUS
Qty: 15 ML | Refills: 5 | Status: SHIPPED | OUTPATIENT
Start: 2024-11-20

## 2025-03-31 ENCOUNTER — APPOINTMENT (OUTPATIENT)
Facility: HOSPITAL | Age: 80
DRG: 622 | End: 2025-03-31
Payer: MEDICARE

## 2025-03-31 ENCOUNTER — HOSPITAL ENCOUNTER (INPATIENT)
Facility: HOSPITAL | Age: 80
LOS: 57 days | Discharge: NURSING, REHAB OR LONG-TERM CARE | DRG: 622 | End: 2025-05-27
Attending: INTERNAL MEDICINE | Admitting: INTERNAL MEDICINE
Payer: MEDICARE

## 2025-03-31 DIAGNOSIS — J81.0 ACUTE PULMONARY EDEMA (HCC): ICD-10-CM

## 2025-03-31 DIAGNOSIS — M79.5 RETAINED FOREIGN BODY OF FOOT: Primary | ICD-10-CM

## 2025-03-31 DIAGNOSIS — A49.02 MRSA INFECTION: ICD-10-CM

## 2025-03-31 DIAGNOSIS — I99.8 LIMB ISCHEMIA: ICD-10-CM

## 2025-03-31 DIAGNOSIS — E11.628 DIABETIC FOOT INFECTION (HCC): ICD-10-CM

## 2025-03-31 DIAGNOSIS — R23.4 ESCHAR OF HEEL: ICD-10-CM

## 2025-03-31 DIAGNOSIS — L02.612 CELLULITIS AND ABSCESS OF TOE OF LEFT FOOT: ICD-10-CM

## 2025-03-31 DIAGNOSIS — R06.02 SHORTNESS OF BREATH: ICD-10-CM

## 2025-03-31 DIAGNOSIS — J96.01 ACUTE RESPIRATORY FAILURE WITH HYPOXIA (HCC): ICD-10-CM

## 2025-03-31 DIAGNOSIS — J90 PLEURAL EFFUSION: ICD-10-CM

## 2025-03-31 DIAGNOSIS — L03.116 CELLULITIS OF LEFT FOOT: ICD-10-CM

## 2025-03-31 DIAGNOSIS — H10.023 OTHER MUCOPURULENT CONJUNCTIVITIS OF BOTH EYES: ICD-10-CM

## 2025-03-31 DIAGNOSIS — L03.032 CELLULITIS AND ABSCESS OF TOE OF LEFT FOOT: ICD-10-CM

## 2025-03-31 DIAGNOSIS — L08.9 DIABETIC FOOT INFECTION (HCC): ICD-10-CM

## 2025-03-31 LAB
ALBUMIN SERPL-MCNC: 2.6 G/DL (ref 3.5–5)
ALBUMIN/GLOB SERPL: 0.6 (ref 1.1–2.2)
ALP SERPL-CCNC: 92 U/L (ref 45–117)
ALT SERPL-CCNC: 22 U/L (ref 12–78)
ANION GAP SERPL CALC-SCNC: 4 MMOL/L (ref 2–12)
AST SERPL-CCNC: 27 U/L (ref 15–37)
BASOPHILS # BLD: 0.03 K/UL (ref 0–0.1)
BASOPHILS NFR BLD: 0.3 % (ref 0–1)
BILIRUB SERPL-MCNC: 1.6 MG/DL (ref 0.2–1)
BUN SERPL-MCNC: 22 MG/DL (ref 6–20)
BUN/CREAT SERPL: 17 (ref 12–20)
CALCIUM SERPL-MCNC: 8.5 MG/DL (ref 8.5–10.1)
CHLORIDE SERPL-SCNC: 110 MMOL/L (ref 97–108)
CO2 SERPL-SCNC: 22 MMOL/L (ref 21–32)
CREAT SERPL-MCNC: 1.3 MG/DL (ref 0.7–1.3)
DIFFERENTIAL METHOD BLD: ABNORMAL
EOSINOPHIL # BLD: 0.02 K/UL (ref 0–0.4)
EOSINOPHIL NFR BLD: 0.2 % (ref 0–7)
ERYTHROCYTE [DISTWIDTH] IN BLOOD BY AUTOMATED COUNT: 12.7 % (ref 11.5–14.5)
EST. AVERAGE GLUCOSE BLD GHB EST-MCNC: 212 MG/DL
GLOBULIN SER CALC-MCNC: 4.6 G/DL (ref 2–4)
GLUCOSE BLD STRIP.AUTO-MCNC: 156 MG/DL (ref 65–117)
GLUCOSE BLD STRIP.AUTO-MCNC: 73 MG/DL (ref 65–117)
GLUCOSE SERPL-MCNC: 242 MG/DL (ref 65–100)
HBA1C MFR BLD: 9 % (ref 4–5.6)
HCT VFR BLD AUTO: 43.1 % (ref 36.6–50.3)
HGB BLD-MCNC: 14.8 G/DL (ref 12.1–17)
IMM GRANULOCYTES # BLD AUTO: 0.15 K/UL (ref 0–0.04)
IMM GRANULOCYTES NFR BLD AUTO: 1.3 % (ref 0–0.5)
IRON SATN MFR SERPL: 10 % (ref 20–50)
IRON SERPL-MCNC: 18 UG/DL (ref 35–150)
LYMPHOCYTES # BLD: 1.71 K/UL (ref 0.8–3.5)
LYMPHOCYTES NFR BLD: 15.4 % (ref 12–49)
MCH RBC QN AUTO: 34.7 PG (ref 26–34)
MCHC RBC AUTO-ENTMCNC: 34.3 G/DL (ref 30–36.5)
MCV RBC AUTO: 100.9 FL (ref 80–99)
MONOCYTES # BLD: 0.91 K/UL (ref 0–1)
MONOCYTES NFR BLD: 8.2 % (ref 5–13)
NEUTS SEG # BLD: 8.3 K/UL (ref 1.8–8)
NEUTS SEG NFR BLD: 74.6 % (ref 32–75)
NRBC # BLD: 0 K/UL (ref 0–0.01)
NRBC BLD-RTO: 0 PER 100 WBC
PLATELET # BLD AUTO: 145 K/UL (ref 150–400)
PMV BLD AUTO: 10.3 FL (ref 8.9–12.9)
POTASSIUM SERPL-SCNC: 3.5 MMOL/L (ref 3.5–5.1)
PROCALCITONIN SERPL-MCNC: 0.24 NG/ML
PROT SERPL-MCNC: 7.2 G/DL (ref 6.4–8.2)
RBC # BLD AUTO: 4.27 M/UL (ref 4.1–5.7)
SERVICE CMNT-IMP: ABNORMAL
SERVICE CMNT-IMP: NORMAL
SODIUM SERPL-SCNC: 136 MMOL/L (ref 136–145)
TIBC SERPL-MCNC: 184 UG/DL (ref 250–450)
WBC # BLD AUTO: 11.1 K/UL (ref 4.1–11.1)

## 2025-03-31 PROCEDURE — 70450 CT HEAD/BRAIN W/O DYE: CPT

## 2025-03-31 PROCEDURE — 83550 IRON BINDING TEST: CPT

## 2025-03-31 PROCEDURE — 6360000002 HC RX W HCPCS: Performed by: PHYSICIAN ASSISTANT

## 2025-03-31 PROCEDURE — 2580000003 HC RX 258: Performed by: INTERNAL MEDICINE

## 2025-03-31 PROCEDURE — 80053 COMPREHEN METABOLIC PANEL: CPT

## 2025-03-31 PROCEDURE — 87040 BLOOD CULTURE FOR BACTERIA: CPT

## 2025-03-31 PROCEDURE — 6360000002 HC RX W HCPCS: Performed by: INTERNAL MEDICINE

## 2025-03-31 PROCEDURE — 5A1D70Z PERFORMANCE OF URINARY FILTRATION, INTERMITTENT, LESS THAN 6 HOURS PER DAY: ICD-10-PCS | Performed by: SURGERY

## 2025-03-31 PROCEDURE — 1100000003 HC PRIVATE W/ TELEMETRY

## 2025-03-31 PROCEDURE — 87154 CUL TYP ID BLD PTHGN 6+ TRGT: CPT

## 2025-03-31 PROCEDURE — 82607 VITAMIN B-12: CPT

## 2025-03-31 PROCEDURE — XY0VX83 EXTRACORPOREAL INTRODUCTION OF ENDOTHELIAL DAMAGE INHIBITOR TO VEIN GRAFT, NEW TECHNOLOGY GROUP 3: ICD-10-PCS | Performed by: SURGERY

## 2025-03-31 PROCEDURE — 0J980ZZ DRAINAGE OF ABDOMEN SUBCUTANEOUS TISSUE AND FASCIA, OPEN APPROACH: ICD-10-PCS | Performed by: INTERNAL MEDICINE

## 2025-03-31 PROCEDURE — 82306 VITAMIN D 25 HYDROXY: CPT

## 2025-03-31 PROCEDURE — 82746 ASSAY OF FOLIC ACID SERUM: CPT

## 2025-03-31 PROCEDURE — 84145 PROCALCITONIN (PCT): CPT

## 2025-03-31 PROCEDURE — 02H633Z INSERTION OF INFUSION DEVICE INTO RIGHT ATRIUM, PERCUTANEOUS APPROACH: ICD-10-PCS | Performed by: SURGERY

## 2025-03-31 PROCEDURE — 85025 COMPLETE CBC W/AUTO DIFF WBC: CPT

## 2025-03-31 PROCEDURE — 87186 SC STD MICRODIL/AGAR DIL: CPT

## 2025-03-31 PROCEDURE — 99285 EMERGENCY DEPT VISIT HI MDM: CPT

## 2025-03-31 PROCEDURE — 82962 GLUCOSE BLOOD TEST: CPT

## 2025-03-31 PROCEDURE — 6370000000 HC RX 637 (ALT 250 FOR IP): Performed by: PHYSICIAN ASSISTANT

## 2025-03-31 PROCEDURE — 73600 X-RAY EXAM OF ANKLE: CPT

## 2025-03-31 PROCEDURE — 36415 COLL VENOUS BLD VENIPUNCTURE: CPT

## 2025-03-31 PROCEDURE — 2500000003 HC RX 250 WO HCPCS: Performed by: PHYSICIAN ASSISTANT

## 2025-03-31 PROCEDURE — 83036 HEMOGLOBIN GLYCOSYLATED A1C: CPT

## 2025-03-31 PROCEDURE — 2500000003 HC RX 250 WO HCPCS: Performed by: INTERNAL MEDICINE

## 2025-03-31 PROCEDURE — 87077 CULTURE AEROBIC IDENTIFY: CPT

## 2025-03-31 PROCEDURE — 73701 CT LOWER EXTREMITY W/DYE: CPT

## 2025-03-31 PROCEDURE — 6360000004 HC RX CONTRAST MEDICATION: Performed by: INTERNAL MEDICINE

## 2025-03-31 PROCEDURE — 83540 ASSAY OF IRON: CPT

## 2025-03-31 PROCEDURE — 96374 THER/PROPH/DIAG INJ IV PUSH: CPT

## 2025-03-31 PROCEDURE — 73620 X-RAY EXAM OF FOOT: CPT

## 2025-03-31 RX ORDER — ACETAMINOPHEN 650 MG/1
650 SUPPOSITORY RECTAL EVERY 6 HOURS PRN
Status: DISCONTINUED | OUTPATIENT
Start: 2025-03-31 | End: 2025-05-27 | Stop reason: HOSPADM

## 2025-03-31 RX ORDER — MAGNESIUM SULFATE IN WATER 40 MG/ML
2000 INJECTION, SOLUTION INTRAVENOUS PRN
Status: DISCONTINUED | OUTPATIENT
Start: 2025-03-31 | End: 2025-05-10

## 2025-03-31 RX ORDER — ENOXAPARIN SODIUM 100 MG/ML
40 INJECTION SUBCUTANEOUS DAILY
Status: DISCONTINUED | OUTPATIENT
Start: 2025-03-31 | End: 2025-04-18

## 2025-03-31 RX ORDER — INSULIN LISPRO 100 [IU]/ML
0-16 INJECTION, SOLUTION INTRAVENOUS; SUBCUTANEOUS EVERY 6 HOURS SCHEDULED
Status: DISCONTINUED | OUTPATIENT
Start: 2025-03-31 | End: 2025-04-01

## 2025-03-31 RX ORDER — POTASSIUM CHLORIDE 7.45 MG/ML
10 INJECTION INTRAVENOUS PRN
Status: DISCONTINUED | OUTPATIENT
Start: 2025-03-31 | End: 2025-05-10

## 2025-03-31 RX ORDER — SODIUM CHLORIDE 0.9 % (FLUSH) 0.9 %
5-40 SYRINGE (ML) INJECTION PRN
Status: DISCONTINUED | OUTPATIENT
Start: 2025-03-31 | End: 2025-05-27 | Stop reason: HOSPADM

## 2025-03-31 RX ORDER — ONDANSETRON 4 MG/1
4 TABLET, ORALLY DISINTEGRATING ORAL EVERY 8 HOURS PRN
Status: DISCONTINUED | OUTPATIENT
Start: 2025-03-31 | End: 2025-05-27 | Stop reason: HOSPADM

## 2025-03-31 RX ORDER — SODIUM CHLORIDE 9 MG/ML
INJECTION, SOLUTION INTRAVENOUS CONTINUOUS
Status: ACTIVE | OUTPATIENT
Start: 2025-03-31 | End: 2025-04-01

## 2025-03-31 RX ORDER — SODIUM CHLORIDE 0.9 % (FLUSH) 0.9 %
5-40 SYRINGE (ML) INJECTION EVERY 12 HOURS SCHEDULED
Status: DISCONTINUED | OUTPATIENT
Start: 2025-03-31 | End: 2025-05-27 | Stop reason: HOSPADM

## 2025-03-31 RX ORDER — VANCOMYCIN 2 G/400ML
2000 INJECTION, SOLUTION INTRAVENOUS ONCE
Status: COMPLETED | OUTPATIENT
Start: 2025-03-31 | End: 2025-03-31

## 2025-03-31 RX ORDER — ONDANSETRON 2 MG/ML
4 INJECTION INTRAMUSCULAR; INTRAVENOUS EVERY 6 HOURS PRN
Status: DISCONTINUED | OUTPATIENT
Start: 2025-03-31 | End: 2025-05-27 | Stop reason: HOSPADM

## 2025-03-31 RX ORDER — VANCOMYCIN 1.75 G/350ML
1250 INJECTION, SOLUTION INTRAVENOUS EVERY 24 HOURS
Status: DISCONTINUED | OUTPATIENT
Start: 2025-04-01 | End: 2025-04-02

## 2025-03-31 RX ORDER — IOPAMIDOL 755 MG/ML
100 INJECTION, SOLUTION INTRAVASCULAR
Status: COMPLETED | OUTPATIENT
Start: 2025-03-31 | End: 2025-03-31

## 2025-03-31 RX ORDER — CIPROFLOXACIN HYDROCHLORIDE 3.5 MG/ML
2 SOLUTION/ DROPS TOPICAL
Status: COMPLETED | OUTPATIENT
Start: 2025-03-31 | End: 2025-03-31

## 2025-03-31 RX ORDER — TRAMADOL HYDROCHLORIDE 50 MG/1
50 TABLET ORAL
Status: COMPLETED | OUTPATIENT
Start: 2025-03-31 | End: 2025-03-31

## 2025-03-31 RX ORDER — VANCOMYCIN 1.75 G/350ML
15 INJECTION, SOLUTION INTRAVENOUS EVERY 12 HOURS
Status: DISCONTINUED | OUTPATIENT
Start: 2025-03-31 | End: 2025-03-31

## 2025-03-31 RX ORDER — POTASSIUM CHLORIDE 1500 MG/1
40 TABLET, EXTENDED RELEASE ORAL PRN
Status: DISCONTINUED | OUTPATIENT
Start: 2025-03-31 | End: 2025-05-10

## 2025-03-31 RX ORDER — SODIUM CHLORIDE 9 MG/ML
INJECTION, SOLUTION INTRAVENOUS PRN
Status: DISCONTINUED | OUTPATIENT
Start: 2025-03-31 | End: 2025-05-27 | Stop reason: HOSPADM

## 2025-03-31 RX ORDER — ACETAMINOPHEN 325 MG/1
650 TABLET ORAL EVERY 6 HOURS PRN
Status: DISCONTINUED | OUTPATIENT
Start: 2025-03-31 | End: 2025-05-27 | Stop reason: HOSPADM

## 2025-03-31 RX ORDER — POLYETHYLENE GLYCOL 3350 17 G/17G
17 POWDER, FOR SOLUTION ORAL DAILY PRN
Status: DISCONTINUED | OUTPATIENT
Start: 2025-03-31 | End: 2025-05-27 | Stop reason: HOSPADM

## 2025-03-31 RX ADMIN — SODIUM CHLORIDE: 0.9 INJECTION, SOLUTION INTRAVENOUS at 18:29

## 2025-03-31 RX ADMIN — WATER 1000 MG: 1 INJECTION INTRAMUSCULAR; INTRAVENOUS; SUBCUTANEOUS at 14:52

## 2025-03-31 RX ADMIN — PIPERACILLIN AND TAZOBACTAM 4500 MG: 4; .5 INJECTION, POWDER, LYOPHILIZED, FOR SOLUTION INTRAVENOUS at 18:41

## 2025-03-31 RX ADMIN — ENOXAPARIN SODIUM 40 MG: 100 INJECTION SUBCUTANEOUS at 18:34

## 2025-03-31 RX ADMIN — SODIUM CHLORIDE, PRESERVATIVE FREE 10 ML: 5 INJECTION INTRAVENOUS at 20:00

## 2025-03-31 RX ADMIN — TRAMADOL HYDROCHLORIDE 50 MG: 50 TABLET, COATED ORAL at 14:53

## 2025-03-31 RX ADMIN — CIPROFLOXACIN HYDROCHLORIDE 2 DROP: 3 SOLUTION/ DROPS OPHTHALMIC at 14:54

## 2025-03-31 RX ADMIN — PIPERACILLIN AND TAZOBACTAM 3375 MG: 3; .375 INJECTION, POWDER, LYOPHILIZED, FOR SOLUTION INTRAVENOUS at 23:35

## 2025-03-31 RX ADMIN — IOPAMIDOL 100 ML: 755 INJECTION, SOLUTION INTRAVENOUS at 18:19

## 2025-03-31 RX ADMIN — VANCOMYCIN 2000 MG: 2 INJECTION, SOLUTION INTRAVENOUS at 18:36

## 2025-03-31 ASSESSMENT — PAIN SCALES - GENERAL
PAINLEVEL_OUTOF10: 5
PAINLEVEL_OUTOF10: 0
PAINLEVEL_OUTOF10: 3

## 2025-03-31 ASSESSMENT — PAIN - FUNCTIONAL ASSESSMENT: PAIN_FUNCTIONAL_ASSESSMENT: 0-10

## 2025-03-31 NOTE — ED PROVIDER NOTES
[facility] EMERGENCY DEPARTMENT  EMERGENCY DEPARTMENT ENCOUNTER       Pt Name: Errol Brandt  MRN: [id number]  Birthdate 1945  Date of evaluation: 3/31/2025  Provider: Ethel Solo PA-C   PCP: Gary Motley MD  Note Started: 2:38 PM EDT 3/31/25     CHIEF COMPLAINT       Chief Complaint   Patient presents with    Ankle Injury        HISTORY OF PRESENT ILLNESS: 1 or more elements      History From: Patient  HPI Limitations: None     Errol Brandt is a 80 y.o. male who presents to the emergency department today with complaint of left heel pain and multiple falls over the last several days.  Patient states he has had pain in his left heel for several days and then because it was so painful he fell yesterday and then fell again earlier today.  Patient denies any fevers chills body aches or swelling in the extremity.  States his right lower extremity is not affected.  Patient complains of the area is tender to touch     Nursing Notes were all reviewed and agreed with or any disagreements were addressed in the HPI.     REVIEW OF SYSTEMS      Review of Systems     Positives and Pertinent negatives as per HPI.    PAST HISTORY     Past Medical History:  Past Medical History:   Diagnosis Date    Diabetes (HCC)     Ill-defined condition     perpherial artery disease    Ill-defined condition     hyperlidemia       Past Surgical History:  Past Surgical History:   Procedure Laterality Date    CARDIAC PROCEDURE N/A 1/3/2024    Left heart cath / coronary angiography performed by Sae Garzon MD at \[facility]\"" CARDIAC CATH LAB    CARDIAC PROCEDURE N/A 1/5/2024    Percutaneous coronary intervention performed by Sae Garzon MD at \[facility]\"" CARDIAC CATH LAB    CARDIAC PROCEDURE N/A 1/5/2024    Left heart cath / coronary angiography performed by Sae Garzon MD at \[facility]\"" CARDIAC CATH LAB    CARDIAC PROCEDURE N/A 1/5/2024    Intravascular ultrasound performed by Sae Garzon MD at \[facility]\"" CARDIAC CATH LAB

## 2025-04-01 ENCOUNTER — ANESTHESIA EVENT (OUTPATIENT)
Facility: HOSPITAL | Age: 80
End: 2025-04-01
Payer: MEDICARE

## 2025-04-01 ENCOUNTER — ANESTHESIA (OUTPATIENT)
Facility: HOSPITAL | Age: 80
End: 2025-04-01
Payer: MEDICARE

## 2025-04-01 PROBLEM — M79.5 RETAINED FOREIGN BODY OF FOOT: Status: ACTIVE | Noted: 2025-04-01

## 2025-04-01 LAB
25(OH)D3 SERPL-MCNC: <9 NG/ML (ref 30–100)
ALBUMIN SERPL-MCNC: 2.8 G/DL (ref 3.5–5)
ALBUMIN/GLOB SERPL: 0.6 (ref 1.1–2.2)
ALP SERPL-CCNC: 101 U/L (ref 45–117)
ALT SERPL-CCNC: 24 U/L (ref 12–78)
ANION GAP SERPL CALC-SCNC: 8 MMOL/L (ref 2–12)
AST SERPL-CCNC: 24 U/L (ref 15–37)
BASOPHILS # BLD: 0.11 K/UL (ref 0–0.1)
BASOPHILS NFR BLD: 1 % (ref 0–1)
BILIRUB SERPL-MCNC: 0.9 MG/DL (ref 0.2–1)
BUN SERPL-MCNC: 22 MG/DL (ref 6–20)
BUN/CREAT SERPL: 18 (ref 12–20)
CALCIUM SERPL-MCNC: 8.6 MG/DL (ref 8.5–10.1)
CHLORIDE SERPL-SCNC: 106 MMOL/L (ref 97–108)
CO2 SERPL-SCNC: 24 MMOL/L (ref 21–32)
CREAT SERPL-MCNC: 1.23 MG/DL (ref 0.7–1.3)
DIFFERENTIAL METHOD BLD: ABNORMAL
EOSINOPHIL # BLD: 0 K/UL (ref 0–0.4)
EOSINOPHIL NFR BLD: 0 % (ref 0–7)
ERYTHROCYTE [DISTWIDTH] IN BLOOD BY AUTOMATED COUNT: 12.4 % (ref 11.5–14.5)
EST. AVERAGE GLUCOSE BLD GHB EST-MCNC: 212 MG/DL
FOLATE SERPL-MCNC: 19.7 NG/ML (ref 5–21)
GLOBULIN SER CALC-MCNC: 4.8 G/DL (ref 2–4)
GLUCOSE BLD STRIP.AUTO-MCNC: 212 MG/DL (ref 65–117)
GLUCOSE BLD STRIP.AUTO-MCNC: 238 MG/DL (ref 65–117)
GLUCOSE BLD STRIP.AUTO-MCNC: 248 MG/DL (ref 65–117)
GLUCOSE SERPL-MCNC: 155 MG/DL (ref 65–100)
HBA1C MFR BLD: 9 % (ref 4–5.6)
HCT VFR BLD AUTO: 44.3 % (ref 36.6–50.3)
HGB BLD-MCNC: 14.9 G/DL (ref 12.1–17)
IMM GRANULOCYTES # BLD AUTO: 0 K/UL (ref 0–0.04)
IMM GRANULOCYTES NFR BLD AUTO: 0 % (ref 0–0.5)
LYMPHOCYTES # BLD: 1.54 K/UL (ref 0.8–3.5)
LYMPHOCYTES NFR BLD: 14 % (ref 12–49)
MCH RBC QN AUTO: 34.5 PG (ref 26–34)
MCHC RBC AUTO-ENTMCNC: 33.6 G/DL (ref 30–36.5)
MCV RBC AUTO: 102.5 FL (ref 80–99)
MONOCYTES # BLD: 0.77 K/UL (ref 0–1)
MONOCYTES NFR BLD: 7 % (ref 5–13)
NEUTS BAND NFR BLD MANUAL: 1 %
NEUTS SEG # BLD: 8.58 K/UL (ref 1.8–8)
NEUTS SEG NFR BLD: 77 % (ref 32–75)
NRBC # BLD: 0 K/UL (ref 0–0.01)
NRBC BLD-RTO: 0 PER 100 WBC
PLATELET # BLD AUTO: 141 K/UL (ref 150–400)
PMV BLD AUTO: 11 FL (ref 8.9–12.9)
POTASSIUM SERPL-SCNC: 3.6 MMOL/L (ref 3.5–5.1)
PROCALCITONIN SERPL-MCNC: 0.4 NG/ML
PROT SERPL-MCNC: 7.6 G/DL (ref 6.4–8.2)
RBC # BLD AUTO: 4.32 M/UL (ref 4.1–5.7)
RBC MORPH BLD: ABNORMAL
SERVICE CMNT-IMP: ABNORMAL
SODIUM SERPL-SCNC: 138 MMOL/L (ref 136–145)
VIT B12 SERPL-MCNC: 368 PG/ML (ref 193–986)
WBC # BLD AUTO: 11 K/UL (ref 4.1–11.1)

## 2025-04-01 PROCEDURE — 97110 THERAPEUTIC EXERCISES: CPT

## 2025-04-01 PROCEDURE — 6360000002 HC RX W HCPCS: Performed by: PHYSICIAN ASSISTANT

## 2025-04-01 PROCEDURE — 97162 PT EVAL MOD COMPLEX 30 MIN: CPT

## 2025-04-01 PROCEDURE — 6370000000 HC RX 637 (ALT 250 FOR IP): Performed by: INTERNAL MEDICINE

## 2025-04-01 PROCEDURE — 85025 COMPLETE CBC W/AUTO DIFF WBC: CPT

## 2025-04-01 PROCEDURE — 99221 1ST HOSP IP/OBS SF/LOW 40: CPT

## 2025-04-01 PROCEDURE — 6360000002 HC RX W HCPCS: Performed by: INTERNAL MEDICINE

## 2025-04-01 PROCEDURE — 97535 SELF CARE MNGMENT TRAINING: CPT

## 2025-04-01 PROCEDURE — 82962 GLUCOSE BLOOD TEST: CPT

## 2025-04-01 PROCEDURE — 97116 GAIT TRAINING THERAPY: CPT

## 2025-04-01 PROCEDURE — 97166 OT EVAL MOD COMPLEX 45 MIN: CPT

## 2025-04-01 PROCEDURE — 2580000003 HC RX 258: Performed by: INTERNAL MEDICINE

## 2025-04-01 PROCEDURE — 80053 COMPREHEN METABOLIC PANEL: CPT

## 2025-04-01 PROCEDURE — 2500000003 HC RX 250 WO HCPCS: Performed by: INTERNAL MEDICINE

## 2025-04-01 PROCEDURE — 83036 HEMOGLOBIN GLYCOSYLATED A1C: CPT

## 2025-04-01 PROCEDURE — 1100000003 HC PRIVATE W/ TELEMETRY

## 2025-04-01 PROCEDURE — 84145 PROCALCITONIN (PCT): CPT

## 2025-04-01 RX ORDER — DEXTROSE MONOHYDRATE 100 MG/ML
INJECTION, SOLUTION INTRAVENOUS CONTINUOUS PRN
Status: DISCONTINUED | OUTPATIENT
Start: 2025-04-01 | End: 2025-05-15

## 2025-04-01 RX ORDER — INSULIN LISPRO 100 [IU]/ML
0-8 INJECTION, SOLUTION INTRAVENOUS; SUBCUTANEOUS
Status: DISCONTINUED | OUTPATIENT
Start: 2025-04-01 | End: 2025-05-27 | Stop reason: HOSPADM

## 2025-04-01 RX ORDER — GLUCAGON 1 MG/ML
1 KIT INJECTION PRN
Status: DISCONTINUED | OUTPATIENT
Start: 2025-04-01 | End: 2025-05-27 | Stop reason: HOSPADM

## 2025-04-01 RX ORDER — INSULIN LISPRO 100 [IU]/ML
0-16 INJECTION, SOLUTION INTRAVENOUS; SUBCUTANEOUS
Status: DISCONTINUED | OUTPATIENT
Start: 2025-04-01 | End: 2025-04-01

## 2025-04-01 RX ADMIN — SODIUM CHLORIDE, PRESERVATIVE FREE 5 ML: 5 INJECTION INTRAVENOUS at 07:53

## 2025-04-01 RX ADMIN — ACETAMINOPHEN 650 MG: 325 TABLET ORAL at 07:52

## 2025-04-01 RX ADMIN — INSULIN LISPRO 2 UNITS: 100 INJECTION, SOLUTION INTRAVENOUS; SUBCUTANEOUS at 22:07

## 2025-04-01 RX ADMIN — VANCOMYCIN 1250 MG: 1.75 INJECTION, SOLUTION INTRAVENOUS at 17:42

## 2025-04-01 RX ADMIN — ENOXAPARIN SODIUM 40 MG: 100 INJECTION SUBCUTANEOUS at 07:52

## 2025-04-01 RX ADMIN — PIPERACILLIN AND TAZOBACTAM 3375 MG: 3; .375 INJECTION, POWDER, LYOPHILIZED, FOR SOLUTION INTRAVENOUS at 14:31

## 2025-04-01 RX ADMIN — PIPERACILLIN AND TAZOBACTAM 3375 MG: 3; .375 INJECTION, POWDER, LYOPHILIZED, FOR SOLUTION INTRAVENOUS at 08:01

## 2025-04-01 RX ADMIN — SODIUM CHLORIDE, PRESERVATIVE FREE 10 ML: 5 INJECTION INTRAVENOUS at 22:08

## 2025-04-01 RX ADMIN — INSULIN LISPRO 4 UNITS: 100 INJECTION, SOLUTION INTRAVENOUS; SUBCUTANEOUS at 17:38

## 2025-04-01 ASSESSMENT — PAIN SCALES - GENERAL: PAINLEVEL_OUTOF10: 0

## 2025-04-01 NOTE — WOUND CARE
Wound Care Nurse Consulted for this patient for \"wound/ foot.\"  Pt. Is 80 years old and he was Admitted on 3-31-25 due to Cellulitis of the toe and foot, DM type 2, Neuropathy, Possible retained foreign body in foot.   Podiatry is also called by the attending and scheduled to see the patient for the same foot. Dr. Avalos is scheduled to do an Incision and drainage of the left foot tonight at 6 pm.   Pt. Has uncontrolled DM with A1C of 9.0 and average glucose is 212.   X-Ray of the Left foot:    IMPRESSION:  1. No acute Bony abnormality.  2. Linear foreign bodies are suggested in the soft tissues of the left heel and at the plantar aspect of the MTPs on lateral views only. Correlate with clinical history for retained foreign body/puncture wounds.  3. Bony deformity of the left second toe proximal phalanx poorly visualized on this two-view foot examination. If there is concern for acute injury of the second toe, standard dedicated 3 view examination of the left second toe would be recommended.  Assessment today revealed the following assessment. Pt. Has stable, dry scabs on the toes and foot from injuring the foot multiple times. Nothing to do with these except for keep them clean and dry.  Pt. Has a fairly severe Conjunctivitis in both eyes. He apparently takes medicine / eye drops for this. His son is bringing it into the hospital.     The entire ankle and distal foot is red, warm and painful to palpation in the setting of almost insensate neuropathy.        The Left heel is most likely a pressure injury As well as the Foreign body present: there is diffuse ecchymotic skin on the lateral heel as well as the darker spot related to the FB.       Plan: keep the heels floated and discourage patient from crossing his legs at the ankles.   Socks on the feet.   Dr. Avalos will write any wound care orders related to the surgery.   Recommend Warm soaks to the eyes with water wet wash cloths.   Cornelia Dinh RN, BSN,

## 2025-04-02 LAB
ACB COMPLEX DNA BLD POS QL NAA+NON-PROBE: NOT DETECTED
ACCESSION NUMBER, LLC1M: ABNORMAL
ALBUMIN SERPL-MCNC: 2.4 G/DL (ref 3.5–5)
ALBUMIN/GLOB SERPL: 0.5 (ref 1.1–2.2)
ALP SERPL-CCNC: 104 U/L (ref 45–117)
ALT SERPL-CCNC: 24 U/L (ref 12–78)
ANION GAP SERPL CALC-SCNC: 6 MMOL/L (ref 2–12)
AST SERPL-CCNC: 21 U/L (ref 15–37)
B FRAGILIS DNA BLD POS QL NAA+NON-PROBE: NOT DETECTED
BASOPHILS # BLD: 0.06 K/UL (ref 0–0.1)
BASOPHILS NFR BLD: 0.6 % (ref 0–1)
BILIRUB SERPL-MCNC: 0.7 MG/DL (ref 0.2–1)
BIOFIRE TEST COMMENT: ABNORMAL
BUN SERPL-MCNC: 20 MG/DL (ref 6–20)
BUN/CREAT SERPL: 18 (ref 12–20)
C ALBICANS DNA BLD POS QL NAA+NON-PROBE: NOT DETECTED
C AURIS DNA BLD POS QL NAA+NON-PROBE: NOT DETECTED
C GATTII+NEOFOR DNA BLD POS QL NAA+N-PRB: NOT DETECTED
C GLABRATA DNA BLD POS QL NAA+NON-PROBE: NOT DETECTED
C KRUSEI DNA BLD POS QL NAA+NON-PROBE: NOT DETECTED
C PARAP DNA BLD POS QL NAA+NON-PROBE: NOT DETECTED
C TROPICLS DNA BLD POS QL NAA+NON-PROBE: NOT DETECTED
CALCIUM SERPL-MCNC: 8 MG/DL (ref 8.5–10.1)
CHLORIDE SERPL-SCNC: 103 MMOL/L (ref 97–108)
CO2 SERPL-SCNC: 23 MMOL/L (ref 21–32)
CREAT SERPL-MCNC: 1.09 MG/DL (ref 0.7–1.3)
DIFFERENTIAL METHOD BLD: ABNORMAL
E CLOAC COMP DNA BLD POS NAA+NON-PROBE: NOT DETECTED
E COLI DNA BLD POS QL NAA+NON-PROBE: NOT DETECTED
E FAECALIS DNA BLD POS QL NAA+NON-PROBE: NOT DETECTED
E FAECIUM DNA BLD POS QL NAA+NON-PROBE: NOT DETECTED
ENTEROBACTERALES DNA BLD POS NAA+N-PRB: NOT DETECTED
EOSINOPHIL # BLD: 0.25 K/UL (ref 0–0.4)
EOSINOPHIL NFR BLD: 2.5 % (ref 0–7)
ERYTHROCYTE [DISTWIDTH] IN BLOOD BY AUTOMATED COUNT: 12.5 % (ref 11.5–14.5)
GLOBULIN SER CALC-MCNC: 4.5 G/DL (ref 2–4)
GLUCOSE BLD STRIP.AUTO-MCNC: 145 MG/DL (ref 65–117)
GLUCOSE BLD STRIP.AUTO-MCNC: 188 MG/DL (ref 65–117)
GLUCOSE BLD STRIP.AUTO-MCNC: 238 MG/DL (ref 65–117)
GLUCOSE BLD STRIP.AUTO-MCNC: 242 MG/DL (ref 65–117)
GLUCOSE BLD STRIP.AUTO-MCNC: 255 MG/DL (ref 65–117)
GLUCOSE BLD STRIP.AUTO-MCNC: 276 MG/DL (ref 65–117)
GLUCOSE SERPL-MCNC: 284 MG/DL (ref 65–100)
GP B STREP DNA BLD POS QL NAA+NON-PROBE: NOT DETECTED
HAEM INFLU DNA BLD POS QL NAA+NON-PROBE: NOT DETECTED
HCT VFR BLD AUTO: 40.1 % (ref 36.6–50.3)
HGB BLD-MCNC: 13.5 G/DL (ref 12.1–17)
IMM GRANULOCYTES # BLD AUTO: 0.05 K/UL (ref 0–0.04)
IMM GRANULOCYTES NFR BLD AUTO: 0.5 % (ref 0–0.5)
K OXYTOCA DNA BLD POS QL NAA+NON-PROBE: NOT DETECTED
KLEBSIELLA SP DNA BLD POS QL NAA+NON-PRB: NOT DETECTED
KLEBSIELLA SP DNA BLD POS QL NAA+NON-PRB: NOT DETECTED
L MONOCYTOG DNA BLD POS QL NAA+NON-PROBE: NOT DETECTED
LYMPHOCYTES # BLD: 1.38 K/UL (ref 0.8–3.5)
LYMPHOCYTES NFR BLD: 13.6 % (ref 12–49)
MAGNESIUM SERPL-MCNC: 2.2 MG/DL (ref 1.6–2.4)
MCH RBC QN AUTO: 33.8 PG (ref 26–34)
MCHC RBC AUTO-ENTMCNC: 33.7 G/DL (ref 30–36.5)
MCV RBC AUTO: 100.5 FL (ref 80–99)
MECA+MECC+MREJ ISLT/SPM QL: DETECTED
MONOCYTES # BLD: 0.77 K/UL (ref 0–1)
MONOCYTES NFR BLD: 7.6 % (ref 5–13)
N MEN DNA BLD POS QL NAA+NON-PROBE: NOT DETECTED
NEUTS SEG # BLD: 7.61 K/UL (ref 1.8–8)
NEUTS SEG NFR BLD: 75.2 % (ref 32–75)
NRBC # BLD: 0 K/UL (ref 0–0.01)
NRBC BLD-RTO: 0 PER 100 WBC
P AERUGINOSA DNA BLD POS NAA+NON-PROBE: NOT DETECTED
PLATELET # BLD AUTO: 146 K/UL (ref 150–400)
PMV BLD AUTO: 10.5 FL (ref 8.9–12.9)
POTASSIUM SERPL-SCNC: 3.5 MMOL/L (ref 3.5–5.1)
PROCALCITONIN SERPL-MCNC: 0.61 NG/ML
PROT SERPL-MCNC: 6.9 G/DL (ref 6.4–8.2)
PROTEUS SP DNA BLD POS QL NAA+NON-PROBE: NOT DETECTED
RBC # BLD AUTO: 3.99 M/UL (ref 4.1–5.7)
RESISTANT GENE TARGETS: ABNORMAL
S AUREUS DNA BLD POS QL NAA+NON-PROBE: DETECTED
S AUREUS+CONS DNA BLD POS NAA+NON-PROBE: DETECTED
S EPIDERMIDIS DNA BLD POS QL NAA+NON-PRB: NOT DETECTED
S LUGDUNENSIS DNA BLD POS QL NAA+NON-PRB: NOT DETECTED
S MALTOPHILIA DNA BLD POS QL NAA+NON-PRB: NOT DETECTED
S MARCESCENS DNA BLD POS NAA+NON-PROBE: NOT DETECTED
S PNEUM DNA BLD POS QL NAA+NON-PROBE: NOT DETECTED
S PYO DNA BLD POS QL NAA+NON-PROBE: NOT DETECTED
SALMONELLA DNA BLD POS QL NAA+NON-PROBE: NOT DETECTED
SERVICE CMNT-IMP: ABNORMAL
SODIUM SERPL-SCNC: 132 MMOL/L (ref 136–145)
STREPTOCOCCUS DNA BLD POS NAA+NON-PROBE: NOT DETECTED
VANCOMYCIN SERPL-MCNC: 11.4 UG/ML
WBC # BLD AUTO: 10.1 K/UL (ref 4.1–11.1)

## 2025-04-02 PROCEDURE — 99231 SBSQ HOSP IP/OBS SF/LOW 25: CPT

## 2025-04-02 PROCEDURE — 2580000003 HC RX 258: Performed by: NURSE ANESTHETIST, CERTIFIED REGISTERED

## 2025-04-02 PROCEDURE — 80053 COMPREHEN METABOLIC PANEL: CPT

## 2025-04-02 PROCEDURE — 87077 CULTURE AEROBIC IDENTIFY: CPT

## 2025-04-02 PROCEDURE — 047S3ZZ DILATION OF LEFT POSTERIOR TIBIAL ARTERY, PERCUTANEOUS APPROACH: ICD-10-PCS | Performed by: SURGERY

## 2025-04-02 PROCEDURE — 87205 SMEAR GRAM STAIN: CPT

## 2025-04-02 PROCEDURE — 6360000002 HC RX W HCPCS: Performed by: STUDENT IN AN ORGANIZED HEALTH CARE EDUCATION/TRAINING PROGRAM

## 2025-04-02 PROCEDURE — 3700000000 HC ANESTHESIA ATTENDED CARE: Performed by: PODIATRIST

## 2025-04-02 PROCEDURE — 2500000003 HC RX 250 WO HCPCS: Performed by: INTERNAL MEDICINE

## 2025-04-02 PROCEDURE — 87075 CULTR BACTERIA EXCEPT BLOOD: CPT

## 2025-04-02 PROCEDURE — 6360000002 HC RX W HCPCS: Performed by: INTERNAL MEDICINE

## 2025-04-02 PROCEDURE — 6370000000 HC RX 637 (ALT 250 FOR IP): Performed by: ANESTHESIOLOGY

## 2025-04-02 PROCEDURE — P9045 ALBUMIN (HUMAN), 5%, 250 ML: HCPCS | Performed by: NURSE ANESTHETIST, CERTIFIED REGISTERED

## 2025-04-02 PROCEDURE — P9045 ALBUMIN (HUMAN), 5%, 250 ML: HCPCS

## 2025-04-02 PROCEDURE — 6370000000 HC RX 637 (ALT 250 FOR IP): Performed by: INTERNAL MEDICINE

## 2025-04-02 PROCEDURE — 6360000002 HC RX W HCPCS: Performed by: NURSE ANESTHETIST, CERTIFIED REGISTERED

## 2025-04-02 PROCEDURE — 2580000003 HC RX 258: Performed by: STUDENT IN AN ORGANIZED HEALTH CARE EDUCATION/TRAINING PROGRAM

## 2025-04-02 PROCEDURE — 2720000010 HC SURG SUPPLY STERILE: Performed by: PODIATRIST

## 2025-04-02 PROCEDURE — 3700000001 HC ADD 15 MINUTES (ANESTHESIA): Performed by: PODIATRIST

## 2025-04-02 PROCEDURE — 80202 ASSAY OF VANCOMYCIN: CPT

## 2025-04-02 PROCEDURE — 6360000002 HC RX W HCPCS

## 2025-04-02 PROCEDURE — 2709999900 HC NON-CHARGEABLE SUPPLY: Performed by: PODIATRIST

## 2025-04-02 PROCEDURE — 0JBR0ZZ EXCISION OF LEFT FOOT SUBCUTANEOUS TISSUE AND FASCIA, OPEN APPROACH: ICD-10-PCS | Performed by: SURGERY

## 2025-04-02 PROCEDURE — 83735 ASSAY OF MAGNESIUM: CPT

## 2025-04-02 PROCEDURE — 2580000003 HC RX 258: Performed by: INTERNAL MEDICINE

## 2025-04-02 PROCEDURE — 6360000002 HC RX W HCPCS: Performed by: PODIATRIST

## 2025-04-02 PROCEDURE — 3600000002 HC SURGERY LEVEL 2 BASE: Performed by: PODIATRIST

## 2025-04-02 PROCEDURE — 36415 COLL VENOUS BLD VENIPUNCTURE: CPT

## 2025-04-02 PROCEDURE — 7100000001 HC PACU RECOVERY - ADDTL 15 MIN: Performed by: PODIATRIST

## 2025-04-02 PROCEDURE — 82962 GLUCOSE BLOOD TEST: CPT

## 2025-04-02 PROCEDURE — 85025 COMPLETE CBC W/AUTO DIFF WBC: CPT

## 2025-04-02 PROCEDURE — 1100000003 HC PRIVATE W/ TELEMETRY

## 2025-04-02 PROCEDURE — 84145 PROCALCITONIN (PCT): CPT

## 2025-04-02 PROCEDURE — 7100000000 HC PACU RECOVERY - FIRST 15 MIN: Performed by: PODIATRIST

## 2025-04-02 PROCEDURE — 2700000000 HC OXYGEN THERAPY PER DAY

## 2025-04-02 PROCEDURE — 87070 CULTURE OTHR SPECIMN AEROBIC: CPT

## 2025-04-02 PROCEDURE — 6370000000 HC RX 637 (ALT 250 FOR IP): Performed by: STUDENT IN AN ORGANIZED HEALTH CARE EDUCATION/TRAINING PROGRAM

## 2025-04-02 PROCEDURE — 3600000012 HC SURGERY LEVEL 2 ADDTL 15MIN: Performed by: PODIATRIST

## 2025-04-02 PROCEDURE — 87186 SC STD MICRODIL/AGAR DIL: CPT

## 2025-04-02 RX ORDER — FENTANYL CITRATE 50 UG/ML
50 INJECTION, SOLUTION INTRAMUSCULAR; INTRAVENOUS EVERY 5 MIN PRN
Status: DISCONTINUED | OUTPATIENT
Start: 2025-04-02 | End: 2025-04-02 | Stop reason: HOSPADM

## 2025-04-02 RX ORDER — SODIUM CHLORIDE 0.9 % (FLUSH) 0.9 %
5-40 SYRINGE (ML) INJECTION PRN
Status: DISCONTINUED | OUTPATIENT
Start: 2025-04-02 | End: 2025-04-02 | Stop reason: HOSPADM

## 2025-04-02 RX ORDER — SODIUM CHLORIDE 9 MG/ML
INJECTION, SOLUTION INTRAVENOUS PRN
Status: DISCONTINUED | OUTPATIENT
Start: 2025-04-02 | End: 2025-04-02 | Stop reason: HOSPADM

## 2025-04-02 RX ORDER — ENOXAPARIN SODIUM 100 MG/ML
40 INJECTION SUBCUTANEOUS DAILY
Status: CANCELLED | OUTPATIENT
Start: 2025-04-03

## 2025-04-02 RX ORDER — PHENYLEPHRINE HCL IN 0.9% NACL 0.4MG/10ML
SYRINGE (ML) INTRAVENOUS
Status: DISCONTINUED | OUTPATIENT
Start: 2025-04-02 | End: 2025-04-02 | Stop reason: SDUPTHER

## 2025-04-02 RX ORDER — SODIUM CHLORIDE 0.9 % (FLUSH) 0.9 %
5-40 SYRINGE (ML) INJECTION EVERY 12 HOURS SCHEDULED
Status: DISCONTINUED | OUTPATIENT
Start: 2025-04-02 | End: 2025-04-02 | Stop reason: HOSPADM

## 2025-04-02 RX ORDER — SODIUM CHLORIDE, SODIUM LACTATE, POTASSIUM CHLORIDE, CALCIUM CHLORIDE 600; 310; 30; 20 MG/100ML; MG/100ML; MG/100ML; MG/100ML
INJECTION, SOLUTION INTRAVENOUS
Status: DISCONTINUED | OUTPATIENT
Start: 2025-04-02 | End: 2025-04-02 | Stop reason: SDUPTHER

## 2025-04-02 RX ORDER — MINERAL OIL AND WHITE PETROLATUM 150; 830 MG/G; MG/G
OINTMENT OPHTHALMIC
Status: DISCONTINUED | OUTPATIENT
Start: 2025-04-02 | End: 2025-05-27 | Stop reason: HOSPADM

## 2025-04-02 RX ORDER — SODIUM CHLORIDE 9 MG/ML
INJECTION, SOLUTION INTRAVENOUS PRN
Status: CANCELLED | OUTPATIENT
Start: 2025-04-02

## 2025-04-02 RX ORDER — INSULIN LISPRO 100 [IU]/ML
4 INJECTION, SOLUTION INTRAVENOUS; SUBCUTANEOUS ONCE
Status: COMPLETED | OUTPATIENT
Start: 2025-04-02 | End: 2025-04-02

## 2025-04-02 RX ORDER — ALBUMIN HUMAN 50 G/1000ML
SOLUTION INTRAVENOUS
Status: COMPLETED
Start: 2025-04-02 | End: 2025-04-02

## 2025-04-02 RX ORDER — SODIUM CHLORIDE 0.9 % (FLUSH) 0.9 %
5-40 SYRINGE (ML) INJECTION EVERY 12 HOURS SCHEDULED
Status: CANCELLED | OUTPATIENT
Start: 2025-04-02

## 2025-04-02 RX ORDER — HYDROMORPHONE HYDROCHLORIDE 1 MG/ML
0.25 INJECTION, SOLUTION INTRAMUSCULAR; INTRAVENOUS; SUBCUTANEOUS EVERY 5 MIN PRN
Status: DISCONTINUED | OUTPATIENT
Start: 2025-04-02 | End: 2025-04-02 | Stop reason: HOSPADM

## 2025-04-02 RX ORDER — SODIUM CHLORIDE 0.9 % (FLUSH) 0.9 %
5-40 SYRINGE (ML) INJECTION PRN
Status: CANCELLED | OUTPATIENT
Start: 2025-04-02

## 2025-04-02 RX ORDER — NALOXONE HYDROCHLORIDE 0.4 MG/ML
INJECTION, SOLUTION INTRAMUSCULAR; INTRAVENOUS; SUBCUTANEOUS PRN
Status: DISCONTINUED | OUTPATIENT
Start: 2025-04-02 | End: 2025-04-02 | Stop reason: HOSPADM

## 2025-04-02 RX ORDER — ALBUMIN HUMAN 50 G/1000ML
SOLUTION INTRAVENOUS
Status: DISCONTINUED | OUTPATIENT
Start: 2025-04-02 | End: 2025-04-02 | Stop reason: SDUPTHER

## 2025-04-02 RX ORDER — PROCHLORPERAZINE EDISYLATE 5 MG/ML
5 INJECTION INTRAMUSCULAR; INTRAVENOUS
Status: DISCONTINUED | OUTPATIENT
Start: 2025-04-02 | End: 2025-04-02 | Stop reason: HOSPADM

## 2025-04-02 RX ORDER — ONDANSETRON 2 MG/ML
INJECTION INTRAMUSCULAR; INTRAVENOUS
Status: DISCONTINUED | OUTPATIENT
Start: 2025-04-02 | End: 2025-04-02 | Stop reason: SDUPTHER

## 2025-04-02 RX ORDER — ALBUMIN HUMAN 50 G/1000ML
12.5 SOLUTION INTRAVENOUS ONCE
Status: COMPLETED | OUTPATIENT
Start: 2025-04-02 | End: 2025-04-02

## 2025-04-02 RX ORDER — FENTANYL CITRATE 50 UG/ML
INJECTION, SOLUTION INTRAMUSCULAR; INTRAVENOUS
Status: DISCONTINUED | OUTPATIENT
Start: 2025-04-02 | End: 2025-04-02 | Stop reason: SDUPTHER

## 2025-04-02 RX ORDER — BUPIVACAINE HYDROCHLORIDE 5 MG/ML
INJECTION, SOLUTION PERINEURAL PRN
Status: DISCONTINUED | OUTPATIENT
Start: 2025-04-02 | End: 2025-04-02 | Stop reason: ALTCHOICE

## 2025-04-02 RX ADMIN — INSULIN LISPRO 2 UNITS: 100 INJECTION, SOLUTION INTRAVENOUS; SUBCUTANEOUS at 07:55

## 2025-04-02 RX ADMIN — PROPOFOL 15 MG: 10 INJECTION, EMULSION INTRAVENOUS at 18:14

## 2025-04-02 RX ADMIN — SODIUM CHLORIDE 200 ML: 0.9 INJECTION, SOLUTION INTRAVENOUS at 00:01

## 2025-04-02 RX ADMIN — INSULIN LISPRO 4 UNITS: 100 INJECTION, SOLUTION INTRAVENOUS; SUBCUTANEOUS at 18:06

## 2025-04-02 RX ADMIN — SODIUM CHLORIDE, PRESERVATIVE FREE 10 ML: 5 INJECTION INTRAVENOUS at 22:26

## 2025-04-02 RX ADMIN — Medication 40 MCG: at 18:20

## 2025-04-02 RX ADMIN — PIPERACILLIN AND TAZOBACTAM 3375 MG: 3; .375 INJECTION, POWDER, LYOPHILIZED, FOR SOLUTION INTRAVENOUS at 18:17

## 2025-04-02 RX ADMIN — PIPERACILLIN AND TAZOBACTAM 3375 MG: 3; .375 INJECTION, POWDER, LYOPHILIZED, FOR SOLUTION INTRAVENOUS at 08:00

## 2025-04-02 RX ADMIN — PIPERACILLIN AND TAZOBACTAM 3375 MG: 3; .375 INJECTION, POWDER, LYOPHILIZED, FOR SOLUTION INTRAVENOUS at 00:03

## 2025-04-02 RX ADMIN — VANCOMYCIN HYDROCHLORIDE 1000 MG: 1 INJECTION, POWDER, LYOPHILIZED, FOR SOLUTION INTRAVENOUS at 10:10

## 2025-04-02 RX ADMIN — ALBUMIN HUMAN 12.5 G: 50 SOLUTION INTRAVENOUS at 19:17

## 2025-04-02 RX ADMIN — WHITE PETROLATUM 57.7 %-MINERAL OIL 31.9 % EYE OINTMENT: at 22:27

## 2025-04-02 RX ADMIN — Medication 40 MCG: at 18:43

## 2025-04-02 RX ADMIN — Medication 80 MCG: at 18:22

## 2025-04-02 RX ADMIN — SODIUM CHLORIDE, POTASSIUM CHLORIDE, SODIUM LACTATE AND CALCIUM CHLORIDE: 600; 310; 30; 20 INJECTION, SOLUTION INTRAVENOUS at 18:10

## 2025-04-02 RX ADMIN — Medication 80 MCG: at 18:32

## 2025-04-02 RX ADMIN — ALBUMIN (HUMAN) 12.5 G: 12.5 INJECTION, SOLUTION INTRAVENOUS at 19:17

## 2025-04-02 RX ADMIN — INSULIN LISPRO 2 UNITS: 100 INJECTION, SOLUTION INTRAVENOUS; SUBCUTANEOUS at 17:18

## 2025-04-02 RX ADMIN — FENTANYL CITRATE 25 MCG: 50 INJECTION, SOLUTION INTRAMUSCULAR; INTRAVENOUS at 18:14

## 2025-04-02 RX ADMIN — Medication 40 MCG: at 18:30

## 2025-04-02 RX ADMIN — PROPOFOL 35 MCG/KG/MIN: 10 INJECTION, EMULSION INTRAVENOUS at 18:15

## 2025-04-02 RX ADMIN — SODIUM CHLORIDE, PRESERVATIVE FREE 10 ML: 5 INJECTION INTRAVENOUS at 08:00

## 2025-04-02 RX ADMIN — ALBUMIN (HUMAN) 12.5 G: 12.5 INJECTION, SOLUTION INTRAVENOUS at 18:27

## 2025-04-02 RX ADMIN — ONDANSETRON HYDROCHLORIDE 4 MG: 2 INJECTION, SOLUTION INTRAMUSCULAR; INTRAVENOUS at 18:43

## 2025-04-02 RX ADMIN — Medication 40 MCG: at 18:40

## 2025-04-02 RX ADMIN — Medication 80 MCG: at 18:47

## 2025-04-02 RX ADMIN — VANCOMYCIN HYDROCHLORIDE 1000 MG: 1 INJECTION, POWDER, LYOPHILIZED, FOR SOLUTION INTRAVENOUS at 22:25

## 2025-04-02 RX ADMIN — ACETAMINOPHEN 650 MG: 325 TABLET ORAL at 17:17

## 2025-04-02 RX ADMIN — INSULIN LISPRO 4 UNITS: 100 INJECTION, SOLUTION INTRAVENOUS; SUBCUTANEOUS at 12:07

## 2025-04-02 RX ADMIN — PIPERACILLIN AND TAZOBACTAM 3375 MG: 3; .375 INJECTION, POWDER, LYOPHILIZED, FOR SOLUTION INTRAVENOUS at 17:19

## 2025-04-02 ASSESSMENT — PAIN SCALES - GENERAL: PAINLEVEL_OUTOF10: 0

## 2025-04-02 ASSESSMENT — LIFESTYLE VARIABLES: SMOKING_STATUS: 1

## 2025-04-02 ASSESSMENT — PAIN - FUNCTIONAL ASSESSMENT: PAIN_FUNCTIONAL_ASSESSMENT: 0-10

## 2025-04-02 NOTE — CARE COORDINATION
Care Management Initial Assessment       RUR: 14%  Readmission? No  1st IM letter given? Yes - 3/31/25  1st  letter given: No      CM introduce self, explain role and confirmed demographics with pt.    Pt lives with his spouse and son in an one story home with a ramp to enter.    Pt has a hx of home health-[facility] [place] and SNF-[facility].    No hx of inpatient rehab.    Pt uses [facility] on [address].    At the time of d/c pt's son will transport.    CM will follow and assist with d/c planning.         04/02/25 5607   Service Assessment   Patient Orientation Alert and Oriented   Cognition Alert   History Provided By Patient;Medical Record   Primary Caregiver Self   Support Systems Spouse/Significant Other;Children   Patient's Healthcare Decision Maker is: Legal Next of Kin  (Pt's spouse Chelsi Brandt)   PCP Verified by CM Yes   Last Visit to PCP Within last 6 months   Prior Functional Level Independent in ADLs/IADLs   Current Functional Level Independent in ADLs/IADLs   Can patient return to prior living arrangement Yes   Family able to assist with home care needs: Yes   Would you like for me to discuss the discharge plan with any other family members/significant others, and if so, who? Yes  (Pt's son Julio Brandt)   Financial Resources Medicare   Community Resources None   Social/Functional History   Lives With Family   Type of Home House   Home Equipment Walker - Rolling;Wheelchair - Manual;Grab bars  (Ramp and shower chair)   Active  No   Patient's  Info Pt's son   Discharge Planning   Type of Residence House   Current Services Prior To Admission None   Patient expects to be discharged to: House     Advance Care Planning     General Advance Care Planning (ACP) Conversation    Date of Conversation: 4/2/2025  Conducted with: Patient with Decision Making Capacity  Other persons present: None    Healthcare Decision Maker:   Primary Decision Maker: Chelsi Brandt - Spouse -

## 2025-04-02 NOTE — BRIEF OP NOTE
Brief Postoperative Note      Patient: Errol Brandt  YOB: 1945  MRN: [id number]    Date of Procedure: 4/2/2025    Pre-Op Diagnosis Codes:      * Pain of left great toe [M79.675]     * Abscess [L02.91]     * Cellulitis of toe of left foot [L03.032] left heel Foreign body    Post-Op Diagnosis: Same and embedded in to the deep tissue deeper to fascial band       Procedure(s):  LEFT FOOT  INCISION AND DRAINAGE removal of foreign body and debridement of the deep tissue necrotic areas approx 10 sq cm deep fascia    Surgeon(s):  Joe Avalos DPM    Assistant:  * No surgical staff found *    Anesthesia: Monitor Anesthesia Care    Hemostasis: Ankle Tourniquet at 250 mm Hg    Estimated Blood Loss (mL): Minimal    Complications: None    Specimens:   * No specimens in log *     Implants:  * No implants in log *      Drains: * No LDAs found * 1/3\" iodoform packing    Findings:  Infection Present At Time Of Surgery (PATOS) (choose all levels that have infection present):  - Deep Infection (muscle/fascia) present as evidenced by fluid consistent with infection  Other Findings: viable margins of soft tissue no bony involvement   Tissue cultures taken   Electronically signed by Joe Avalos DPM on 4/2/2025 at 6:56 PM

## 2025-04-02 NOTE — ANESTHESIA POSTPROCEDURE EVALUATION
Department of Anesthesiology  Postprocedure Note    Patient: Errol Brandt  MRN: [id number]  YOB: 1945  Date of evaluation: 4/2/2025    Procedure Summary       Date: 04/02/25 Room / Location: [facility] MA[facility] OR M2 / [facility] MA[facility] OR    Anesthesia Start: 1810 Anesthesia Stop: 1859    Procedure: LEFT FOOT  INCISION AND DRAINAGE WITH FOREIGN BODY REMOVAL (Left: Foot) Diagnosis:       Pain of left great toe      Abscess      Cellulitis of toe of left foot      (Pain of left great toe [M79.675])      (Abscess [L02.91])      (Cellulitis of toe of left foot [L03.032])    Providers: Joe Avalos DPM Responsible Provider: Alonso Bullard MD    Anesthesia Type: MAC ASA Status: 3            Anesthesia Type: MAC    Nargis Phase I: Nargis Score: 7    Nargis Phase II:      Anesthesia Post Evaluation    Patient location during evaluation: PACU  Patient participation: complete - patient participated  Level of consciousness: sleepy but conscious and responsive to verbal stimuli  Airway patency: patent  Nausea & Vomiting: no vomiting and no nausea  Cardiovascular status: blood pressure returned to baseline and hemodynamically stable  Respiratory status: acceptable  Hydration status: stable  Pain management: adequate    No notable events documented.

## 2025-04-02 NOTE — FLOWSHEET NOTE
04/02/25 1900   Handoff   Communication Given Periop Handoff/Relief   Handoff phase Phase I receiving   Handoff Given To [facility] PACU RN   Handoff Received From [facility] OR RN/ Luisito LOMAX   Handoff Communication At bedside;Face to Face   Time Handoff Given 1900 1915: Notified Dr. Bullard of patient's BP, order received to give IV Albumin     1952: Patient easily arousable, no signs of distress, denies any complaints. Notified Dr. Bullard of patient's BP. SBP in mid to upper 90's after administration of IV Albumin, per MD patient may return to [facility].       04/02/25 2000   Handoff   Communication Given Periop Handoff/Relief   Handoff phase Phase I relief   Handoff Given To Sulema   Handoff Received From [facility]   Handoff Communication Face to Face;At bedside   Time Handoff Given 2000

## 2025-04-02 NOTE — ANESTHESIA PRE PROCEDURE
ROS comment: Hx Choledocholithiasis with obstructive jaundice s/p cholecystectomy and ERCP (12/21/23) and ERCP (12/22/23).   Endo/Other:    (+) DiabetesType II DM, using insulin, blood dyscrasia (Thrombocytopenia (Platelets = 146K on 4/2/25)): thrombocytopenia:..                  ROS comment: Left foot cellulitis due to uncontrolled diabetes mellitus  Left foot foreign body        BPH Abdominal: normal exam            Vascular:   + PVD, aortic or cerebral.        ROS comment: Bilateral carotid artery stenosis. Other Findings:         Anesthesia Plan      MAC     ASA 3       Induction: intravenous.    MIPS: Postoperative opioids intended and Prophylactic antiemetics administered.  Anesthetic plan and risks discussed with patient.      Plan discussed with CRNA.                Alonso Bullard MD   4/2/2025

## 2025-04-03 LAB
ALBUMIN SERPL-MCNC: 2.6 G/DL (ref 3.5–5)
ALBUMIN/GLOB SERPL: 0.6 (ref 1.1–2.2)
ALP SERPL-CCNC: 96 U/L (ref 45–117)
ALT SERPL-CCNC: 23 U/L (ref 12–78)
ANION GAP SERPL CALC-SCNC: 4 MMOL/L (ref 2–12)
AST SERPL-CCNC: 21 U/L (ref 15–37)
BASOPHILS # BLD: 0.05 K/UL (ref 0–0.1)
BASOPHILS NFR BLD: 0.6 % (ref 0–1)
BILIRUB SERPL-MCNC: 0.8 MG/DL (ref 0.2–1)
BUN SERPL-MCNC: 16 MG/DL (ref 6–20)
BUN/CREAT SERPL: 14 (ref 12–20)
CALCIUM SERPL-MCNC: 8.4 MG/DL (ref 8.5–10.1)
CHLORIDE SERPL-SCNC: 106 MMOL/L (ref 97–108)
CO2 SERPL-SCNC: 25 MMOL/L (ref 21–32)
CREAT SERPL-MCNC: 1.11 MG/DL (ref 0.7–1.3)
DIFFERENTIAL METHOD BLD: ABNORMAL
EOSINOPHIL # BLD: 0.3 K/UL (ref 0–0.4)
EOSINOPHIL NFR BLD: 3.8 % (ref 0–7)
ERYTHROCYTE [DISTWIDTH] IN BLOOD BY AUTOMATED COUNT: 12.8 % (ref 11.5–14.5)
GLOBULIN SER CALC-MCNC: 4.1 G/DL (ref 2–4)
GLUCOSE BLD STRIP.AUTO-MCNC: 152 MG/DL (ref 65–117)
GLUCOSE BLD STRIP.AUTO-MCNC: 241 MG/DL (ref 65–117)
GLUCOSE BLD STRIP.AUTO-MCNC: 329 MG/DL (ref 65–117)
GLUCOSE SERPL-MCNC: 259 MG/DL (ref 65–100)
HCT VFR BLD AUTO: 38.9 % (ref 36.6–50.3)
HGB BLD-MCNC: 13.1 G/DL (ref 12.1–17)
IMM GRANULOCYTES # BLD AUTO: 0.05 K/UL (ref 0–0.04)
IMM GRANULOCYTES NFR BLD AUTO: 0.6 % (ref 0–0.5)
LYMPHOCYTES # BLD: 1.46 K/UL (ref 0.8–3.5)
LYMPHOCYTES NFR BLD: 18.4 % (ref 12–49)
MCH RBC QN AUTO: 34 PG (ref 26–34)
MCHC RBC AUTO-ENTMCNC: 33.7 G/DL (ref 30–36.5)
MCV RBC AUTO: 101 FL (ref 80–99)
MONOCYTES # BLD: 0.85 K/UL (ref 0–1)
MONOCYTES NFR BLD: 10.7 % (ref 5–13)
NEUTS SEG # BLD: 5.22 K/UL (ref 1.8–8)
NEUTS SEG NFR BLD: 65.9 % (ref 32–75)
NRBC # BLD: 0 K/UL (ref 0–0.01)
NRBC BLD-RTO: 0 PER 100 WBC
PLATELET # BLD AUTO: 144 K/UL (ref 150–400)
PMV BLD AUTO: 10.4 FL (ref 8.9–12.9)
POTASSIUM SERPL-SCNC: 3.7 MMOL/L (ref 3.5–5.1)
PROT SERPL-MCNC: 6.7 G/DL (ref 6.4–8.2)
RBC # BLD AUTO: 3.85 M/UL (ref 4.1–5.7)
SERVICE CMNT-IMP: ABNORMAL
SODIUM SERPL-SCNC: 135 MMOL/L (ref 136–145)
WBC # BLD AUTO: 7.9 K/UL (ref 4.1–11.1)

## 2025-04-03 PROCEDURE — 85025 COMPLETE CBC W/AUTO DIFF WBC: CPT

## 2025-04-03 PROCEDURE — 80053 COMPREHEN METABOLIC PANEL: CPT

## 2025-04-03 PROCEDURE — 94760 N-INVAS EAR/PLS OXIMETRY 1: CPT

## 2025-04-03 PROCEDURE — 97116 GAIT TRAINING THERAPY: CPT

## 2025-04-03 PROCEDURE — 97535 SELF CARE MNGMENT TRAINING: CPT

## 2025-04-03 PROCEDURE — 6370000000 HC RX 637 (ALT 250 FOR IP): Performed by: INTERNAL MEDICINE

## 2025-04-03 PROCEDURE — 99231 SBSQ HOSP IP/OBS SF/LOW 25: CPT | Performed by: INTERNAL MEDICINE

## 2025-04-03 PROCEDURE — 6360000002 HC RX W HCPCS: Performed by: INTERNAL MEDICINE

## 2025-04-03 PROCEDURE — 6360000002 HC RX W HCPCS: Performed by: STUDENT IN AN ORGANIZED HEALTH CARE EDUCATION/TRAINING PROGRAM

## 2025-04-03 PROCEDURE — 2580000003 HC RX 258: Performed by: STUDENT IN AN ORGANIZED HEALTH CARE EDUCATION/TRAINING PROGRAM

## 2025-04-03 PROCEDURE — 1100000003 HC PRIVATE W/ TELEMETRY

## 2025-04-03 PROCEDURE — 2700000000 HC OXYGEN THERAPY PER DAY

## 2025-04-03 PROCEDURE — 2580000003 HC RX 258: Performed by: INTERNAL MEDICINE

## 2025-04-03 PROCEDURE — 97110 THERAPEUTIC EXERCISES: CPT

## 2025-04-03 PROCEDURE — 82962 GLUCOSE BLOOD TEST: CPT

## 2025-04-03 PROCEDURE — 6370000000 HC RX 637 (ALT 250 FOR IP): Performed by: STUDENT IN AN ORGANIZED HEALTH CARE EDUCATION/TRAINING PROGRAM

## 2025-04-03 PROCEDURE — 2500000003 HC RX 250 WO HCPCS: Performed by: INTERNAL MEDICINE

## 2025-04-03 PROCEDURE — 87040 BLOOD CULTURE FOR BACTERIA: CPT

## 2025-04-03 PROCEDURE — 36415 COLL VENOUS BLD VENIPUNCTURE: CPT

## 2025-04-03 RX ADMIN — PIPERACILLIN AND TAZOBACTAM 3375 MG: 3; .375 INJECTION, POWDER, LYOPHILIZED, FOR SOLUTION INTRAVENOUS at 00:22

## 2025-04-03 RX ADMIN — VANCOMYCIN HYDROCHLORIDE 1000 MG: 1 INJECTION, POWDER, LYOPHILIZED, FOR SOLUTION INTRAVENOUS at 21:19

## 2025-04-03 RX ADMIN — INSULIN LISPRO 6 UNITS: 100 INJECTION, SOLUTION INTRAVENOUS; SUBCUTANEOUS at 16:27

## 2025-04-03 RX ADMIN — PIPERACILLIN AND TAZOBACTAM 3375 MG: 3; .375 INJECTION, POWDER, LYOPHILIZED, FOR SOLUTION INTRAVENOUS at 10:11

## 2025-04-03 RX ADMIN — SODIUM CHLORIDE, PRESERVATIVE FREE 10 ML: 5 INJECTION INTRAVENOUS at 10:19

## 2025-04-03 RX ADMIN — INSULIN LISPRO 2 UNITS: 100 INJECTION, SOLUTION INTRAVENOUS; SUBCUTANEOUS at 12:44

## 2025-04-03 RX ADMIN — VANCOMYCIN HYDROCHLORIDE 1000 MG: 1 INJECTION, POWDER, LYOPHILIZED, FOR SOLUTION INTRAVENOUS at 10:19

## 2025-04-03 RX ADMIN — INSULIN HUMAN 12 UNITS: 100 INJECTION, SUSPENSION SUBCUTANEOUS at 16:27

## 2025-04-03 RX ADMIN — SODIUM CHLORIDE, PRESERVATIVE FREE 10 ML: 5 INJECTION INTRAVENOUS at 21:20

## 2025-04-03 RX ADMIN — PIPERACILLIN AND TAZOBACTAM 3375 MG: 3; .375 INJECTION, POWDER, LYOPHILIZED, FOR SOLUTION INTRAVENOUS at 15:35

## 2025-04-03 RX ADMIN — WHITE PETROLATUM 57.7 %-MINERAL OIL 31.9 % EYE OINTMENT: at 21:17

## 2025-04-03 ASSESSMENT — PAIN SCALES - GENERAL
PAINLEVEL_OUTOF10: 0
PAINLEVEL_OUTOF10: 0

## 2025-04-03 NOTE — CARE COORDINATION
Transition of Care Plan:    RUR: 13%  Prior Level of Functioning: Independent   Disposition: SNF   SHALONDA: 4/3/25  If SNF or IPR: Date FOC offered: 4/3/25  Date FOC received: 4/3/25  Accepting facility: Pending   Date authorization started with reference number: will need to auth   Date authorization received and expires:   Follow up appointments: Defer to facility   DME needed: Defer to facility   Transportation at discharge: BLS   IM/IMM Medicare/ letter given: 2nd IMM letter   Is patient a  and connected with VA? No   If yes, was [place] transfer form completed and VA notified? No  Caregiver Contact: pt's son   Discharge Caregiver contacted prior to discharge? Pt will contacted   Care Conference needed? No  Barriers to discharge: Medical clearance       MD spoke to  regarding that pt will need SNF placement at the time of d/c.    CM reached out to pt's son Julio Brandt regarding at the MD is recommended that pt to SNF at the time of d/c.     Pt's son was agreeable to placement and agreeable for referral to be sent to [facility].    CM sent referral to [facility].    Pt will need auth for placement.    CM will continue to follow and assist with d/c planning.    Nona Lopez

## 2025-04-04 ENCOUNTER — APPOINTMENT (OUTPATIENT)
Facility: HOSPITAL | Age: 80
DRG: 622 | End: 2025-04-04
Attending: STUDENT IN AN ORGANIZED HEALTH CARE EDUCATION/TRAINING PROGRAM
Payer: MEDICARE

## 2025-04-04 PROBLEM — B95.62 MRSA BACTEREMIA: Status: ACTIVE | Noted: 2025-04-04

## 2025-04-04 PROBLEM — L03.116 CELLULITIS OF LEFT FOOT: Status: ACTIVE | Noted: 2025-04-04

## 2025-04-04 PROBLEM — R78.81 MRSA BACTEREMIA: Status: ACTIVE | Noted: 2025-04-04

## 2025-04-04 PROBLEM — E11.65 POORLY CONTROLLED DIABETES MELLITUS (HCC): Status: ACTIVE | Noted: 2025-04-04

## 2025-04-04 PROBLEM — E03.9 HYPOTHYROIDISM: Status: ACTIVE | Noted: 2025-04-04

## 2025-04-04 PROBLEM — A49.02 MRSA INFECTION: Status: ACTIVE | Noted: 2025-04-04

## 2025-04-04 LAB
ALBUMIN SERPL-MCNC: 2.4 G/DL (ref 3.5–5)
ALBUMIN/GLOB SERPL: 0.5 (ref 1.1–2.2)
ALP SERPL-CCNC: 97 U/L (ref 45–117)
ALT SERPL-CCNC: 26 U/L (ref 12–78)
ANION GAP SERPL CALC-SCNC: 5 MMOL/L (ref 2–12)
AST SERPL-CCNC: 17 U/L (ref 15–37)
BACTERIA SPEC CULT: ABNORMAL
BACTERIA SPEC CULT: ABNORMAL
BACTERIA SPEC CULT: NORMAL
BASOPHILS # BLD: 0.04 K/UL (ref 0–0.1)
BASOPHILS NFR BLD: 0.5 % (ref 0–1)
BILIRUB SERPL-MCNC: 0.6 MG/DL (ref 0.2–1)
BUN SERPL-MCNC: 15 MG/DL (ref 6–20)
BUN/CREAT SERPL: 12 (ref 12–20)
CALCIUM SERPL-MCNC: 8.1 MG/DL (ref 8.5–10.1)
CHLORIDE SERPL-SCNC: 103 MMOL/L (ref 97–108)
CO2 SERPL-SCNC: 27 MMOL/L (ref 21–32)
CREAT SERPL-MCNC: 1.21 MG/DL (ref 0.7–1.3)
DIFFERENTIAL METHOD BLD: ABNORMAL
EOSINOPHIL # BLD: 0.29 K/UL (ref 0–0.4)
EOSINOPHIL NFR BLD: 3.4 % (ref 0–7)
ERYTHROCYTE [DISTWIDTH] IN BLOOD BY AUTOMATED COUNT: 12.6 % (ref 11.5–14.5)
GLOBULIN SER CALC-MCNC: 4.6 G/DL (ref 2–4)
GLUCOSE BLD STRIP.AUTO-MCNC: 262 MG/DL (ref 65–117)
GLUCOSE BLD STRIP.AUTO-MCNC: 324 MG/DL (ref 65–117)
GLUCOSE BLD STRIP.AUTO-MCNC: 336 MG/DL (ref 65–117)
GLUCOSE SERPL-MCNC: 261 MG/DL (ref 65–100)
HCT VFR BLD AUTO: 37.3 % (ref 36.6–50.3)
HGB BLD-MCNC: 12.5 G/DL (ref 12.1–17)
IMM GRANULOCYTES # BLD AUTO: 0.04 K/UL (ref 0–0.04)
IMM GRANULOCYTES NFR BLD AUTO: 0.5 % (ref 0–0.5)
LYMPHOCYTES # BLD: 2.02 K/UL (ref 0.8–3.5)
LYMPHOCYTES NFR BLD: 23.6 % (ref 12–49)
MCH RBC QN AUTO: 33.8 PG (ref 26–34)
MCHC RBC AUTO-ENTMCNC: 33.5 G/DL (ref 30–36.5)
MCV RBC AUTO: 100.8 FL (ref 80–99)
MONOCYTES # BLD: 0.91 K/UL (ref 0–1)
MONOCYTES NFR BLD: 10.6 % (ref 5–13)
NEUTS SEG # BLD: 5.27 K/UL (ref 1.8–8)
NEUTS SEG NFR BLD: 61.4 % (ref 32–75)
NRBC # BLD: 0 K/UL (ref 0–0.01)
NRBC BLD-RTO: 0 PER 100 WBC
PLATELET # BLD AUTO: 165 K/UL (ref 150–400)
PMV BLD AUTO: 10.4 FL (ref 8.9–12.9)
POTASSIUM SERPL-SCNC: 3.6 MMOL/L (ref 3.5–5.1)
PROT SERPL-MCNC: 7 G/DL (ref 6.4–8.2)
RBC # BLD AUTO: 3.7 M/UL (ref 4.1–5.7)
SERVICE CMNT-IMP: ABNORMAL
SERVICE CMNT-IMP: NORMAL
SODIUM SERPL-SCNC: 135 MMOL/L (ref 136–145)
WBC # BLD AUTO: 8.6 K/UL (ref 4.1–11.1)

## 2025-04-04 PROCEDURE — 99231 SBSQ HOSP IP/OBS SF/LOW 25: CPT | Performed by: INTERNAL MEDICINE

## 2025-04-04 PROCEDURE — 80053 COMPREHEN METABOLIC PANEL: CPT

## 2025-04-04 PROCEDURE — 85025 COMPLETE CBC W/AUTO DIFF WBC: CPT

## 2025-04-04 PROCEDURE — 2580000003 HC RX 258: Performed by: INTERNAL MEDICINE

## 2025-04-04 PROCEDURE — 97116 GAIT TRAINING THERAPY: CPT

## 2025-04-04 PROCEDURE — 97535 SELF CARE MNGMENT TRAINING: CPT

## 2025-04-04 PROCEDURE — 99223 1ST HOSP IP/OBS HIGH 75: CPT | Performed by: INTERNAL MEDICINE

## 2025-04-04 PROCEDURE — 6370000000 HC RX 637 (ALT 250 FOR IP): Performed by: INTERNAL MEDICINE

## 2025-04-04 PROCEDURE — 36415 COLL VENOUS BLD VENIPUNCTURE: CPT

## 2025-04-04 PROCEDURE — 97110 THERAPEUTIC EXERCISES: CPT

## 2025-04-04 PROCEDURE — 82962 GLUCOSE BLOOD TEST: CPT

## 2025-04-04 PROCEDURE — 6360000002 HC RX W HCPCS: Performed by: INTERNAL MEDICINE

## 2025-04-04 PROCEDURE — 2500000003 HC RX 250 WO HCPCS: Performed by: INTERNAL MEDICINE

## 2025-04-04 PROCEDURE — 1100000003 HC PRIVATE W/ TELEMETRY

## 2025-04-04 PROCEDURE — C8924 2D TTE W OR W/O FOL W/CON,FU: HCPCS

## 2025-04-04 PROCEDURE — 2580000003 HC RX 258: Performed by: STUDENT IN AN ORGANIZED HEALTH CARE EDUCATION/TRAINING PROGRAM

## 2025-04-04 PROCEDURE — 6360000002 HC RX W HCPCS: Performed by: STUDENT IN AN ORGANIZED HEALTH CARE EDUCATION/TRAINING PROGRAM

## 2025-04-04 PROCEDURE — 6360000004 HC RX CONTRAST MEDICATION: Performed by: STUDENT IN AN ORGANIZED HEALTH CARE EDUCATION/TRAINING PROGRAM

## 2025-04-04 RX ORDER — MUPIROCIN 20 MG/G
OINTMENT TOPICAL 2 TIMES DAILY
Status: DISPENSED | OUTPATIENT
Start: 2025-04-04 | End: 2025-04-09

## 2025-04-04 RX ADMIN — INSULIN LISPRO 6 UNITS: 100 INJECTION, SOLUTION INTRAVENOUS; SUBCUTANEOUS at 11:53

## 2025-04-04 RX ADMIN — INSULIN LISPRO 4 UNITS: 100 INJECTION, SOLUTION INTRAVENOUS; SUBCUTANEOUS at 16:33

## 2025-04-04 RX ADMIN — ENOXAPARIN SODIUM 40 MG: 100 INJECTION SUBCUTANEOUS at 08:47

## 2025-04-04 RX ADMIN — INSULIN LISPRO 6 UNITS: 100 INJECTION, SOLUTION INTRAVENOUS; SUBCUTANEOUS at 21:14

## 2025-04-04 RX ADMIN — SULFUR HEXAFLUORIDE 2 ML: KIT at 14:47

## 2025-04-04 RX ADMIN — PIPERACILLIN AND TAZOBACTAM 3375 MG: 3; .375 INJECTION, POWDER, LYOPHILIZED, FOR SOLUTION INTRAVENOUS at 08:44

## 2025-04-04 RX ADMIN — INSULIN LISPRO 2 UNITS: 100 INJECTION, SOLUTION INTRAVENOUS; SUBCUTANEOUS at 09:30

## 2025-04-04 RX ADMIN — INSULIN HUMAN 20 UNITS: 100 INJECTION, SUSPENSION SUBCUTANEOUS at 21:14

## 2025-04-04 RX ADMIN — SODIUM CHLORIDE, PRESERVATIVE FREE 10 ML: 5 INJECTION INTRAVENOUS at 21:12

## 2025-04-04 RX ADMIN — MUPIROCIN: 20 OINTMENT TOPICAL at 21:15

## 2025-04-04 RX ADMIN — SODIUM CHLORIDE, PRESERVATIVE FREE 10 ML: 5 INJECTION INTRAVENOUS at 08:47

## 2025-04-04 RX ADMIN — PIPERACILLIN AND TAZOBACTAM 3375 MG: 3; .375 INJECTION, POWDER, LYOPHILIZED, FOR SOLUTION INTRAVENOUS at 00:38

## 2025-04-04 RX ADMIN — INSULIN HUMAN 12 UNITS: 100 INJECTION, SUSPENSION SUBCUTANEOUS at 16:33

## 2025-04-04 RX ADMIN — VANCOMYCIN HYDROCHLORIDE 1000 MG: 1 INJECTION, POWDER, LYOPHILIZED, FOR SOLUTION INTRAVENOUS at 08:46

## 2025-04-04 RX ADMIN — INSULIN HUMAN 12 UNITS: 100 INJECTION, SUSPENSION SUBCUTANEOUS at 08:47

## 2025-04-04 RX ADMIN — VANCOMYCIN HYDROCHLORIDE 1000 MG: 1 INJECTION, POWDER, LYOPHILIZED, FOR SOLUTION INTRAVENOUS at 21:12

## 2025-04-04 RX ADMIN — WHITE PETROLATUM 57.7 %-MINERAL OIL 31.9 % EYE OINTMENT: at 21:15

## 2025-04-04 ASSESSMENT — PAIN SCALES - GENERAL
PAINLEVEL_OUTOF10: 0
PAINLEVEL_OUTOF10: 0

## 2025-04-05 LAB
ALBUMIN SERPL-MCNC: 2.5 G/DL (ref 3.5–5)
ALBUMIN/GLOB SERPL: 0.5 (ref 1.1–2.2)
ALP SERPL-CCNC: 102 U/L (ref 45–117)
ALT SERPL-CCNC: 35 U/L (ref 12–78)
ANION GAP SERPL CALC-SCNC: 5 MMOL/L (ref 2–12)
AST SERPL-CCNC: 31 U/L (ref 15–37)
BASOPHILS # BLD: 0.06 K/UL (ref 0–0.1)
BASOPHILS NFR BLD: 0.5 % (ref 0–1)
BILIRUB SERPL-MCNC: 0.8 MG/DL (ref 0.2–1)
BUN SERPL-MCNC: 14 MG/DL (ref 6–20)
BUN/CREAT SERPL: 14 (ref 12–20)
CALCIUM SERPL-MCNC: 8.6 MG/DL (ref 8.5–10.1)
CHLORIDE SERPL-SCNC: 103 MMOL/L (ref 97–108)
CO2 SERPL-SCNC: 28 MMOL/L (ref 21–32)
CREAT SERPL-MCNC: 1 MG/DL (ref 0.7–1.3)
DIFFERENTIAL METHOD BLD: ABNORMAL
ECHO AV AREA PEAK VELOCITY: 1.5 CM2
ECHO AV AREA VTI: 1.5 CM2
ECHO AV AREA/BSA PEAK VELOCITY: 0.7 CM2/M2
ECHO AV AREA/BSA VTI: 0.7 CM2/M2
ECHO AV MEAN GRADIENT: 12 MMHG
ECHO AV MEAN VELOCITY: 1.7 M/S
ECHO AV PEAK GRADIENT: 21 MMHG
ECHO AV PEAK VELOCITY: 2.3 M/S
ECHO AV VELOCITY RATIO: 0.43
ECHO AV VTI: 49.6 CM
ECHO BSA: 2.05 M2
ECHO LV EF PHYSICIAN: 65 %
ECHO LVOT AREA: 3.5 CM2
ECHO LVOT AV VTI INDEX: 0.44
ECHO LVOT DIAM: 2.1 CM
ECHO LVOT MEAN GRADIENT: 2 MMHG
ECHO LVOT PEAK GRADIENT: 4 MMHG
ECHO LVOT PEAK VELOCITY: 1 M/S
ECHO LVOT STROKE VOLUME INDEX: 36.8 ML/M2
ECHO LVOT SV: 75.1 ML
ECHO LVOT VTI: 21.7 CM
ECHO MV A VELOCITY: 1.32 M/S
ECHO MV E DECELERATION TIME (DT): 346.4 MS
ECHO MV E VELOCITY: 0.76 M/S
ECHO MV E/A RATIO: 0.58
ECHO TV REGURGITANT MAX VELOCITY: 1.76 M/S
ECHO TV REGURGITANT PEAK GRADIENT: 12 MMHG
EOSINOPHIL # BLD: 0.14 K/UL (ref 0–0.4)
EOSINOPHIL NFR BLD: 1.2 % (ref 0–7)
ERYTHROCYTE [DISTWIDTH] IN BLOOD BY AUTOMATED COUNT: 12.5 % (ref 11.5–14.5)
GLOBULIN SER CALC-MCNC: 5.1 G/DL (ref 2–4)
GLUCOSE BLD STRIP.AUTO-MCNC: 121 MG/DL (ref 65–117)
GLUCOSE BLD STRIP.AUTO-MCNC: 161 MG/DL (ref 65–117)
GLUCOSE BLD STRIP.AUTO-MCNC: 165 MG/DL (ref 65–117)
GLUCOSE BLD STRIP.AUTO-MCNC: 176 MG/DL (ref 65–117)
GLUCOSE BLD STRIP.AUTO-MCNC: 58 MG/DL (ref 65–117)
GLUCOSE BLD STRIP.AUTO-MCNC: 67 MG/DL (ref 65–117)
GLUCOSE SERPL-MCNC: 54 MG/DL (ref 65–100)
HCT VFR BLD AUTO: 39 % (ref 36.6–50.3)
HGB BLD-MCNC: 13.3 G/DL (ref 12.1–17)
IMM GRANULOCYTES # BLD AUTO: 0.1 K/UL (ref 0–0.04)
IMM GRANULOCYTES NFR BLD AUTO: 0.9 % (ref 0–0.5)
LYMPHOCYTES # BLD: 2.28 K/UL (ref 0.8–3.5)
LYMPHOCYTES NFR BLD: 19.5 % (ref 12–49)
MAGNESIUM SERPL-MCNC: 2.1 MG/DL (ref 1.6–2.4)
MCH RBC QN AUTO: 33.9 PG (ref 26–34)
MCHC RBC AUTO-ENTMCNC: 34.1 G/DL (ref 30–36.5)
MCV RBC AUTO: 99.5 FL (ref 80–99)
MONOCYTES # BLD: 1.02 K/UL (ref 0–1)
MONOCYTES NFR BLD: 8.7 % (ref 5–13)
NEUTS SEG # BLD: 8.1 K/UL (ref 1.8–8)
NEUTS SEG NFR BLD: 69.2 % (ref 32–75)
NRBC # BLD: 0 K/UL (ref 0–0.01)
NRBC BLD-RTO: 0 PER 100 WBC
PLATELET # BLD AUTO: 201 K/UL (ref 150–400)
PMV BLD AUTO: 10 FL (ref 8.9–12.9)
POTASSIUM SERPL-SCNC: 3 MMOL/L (ref 3.5–5.1)
PROT SERPL-MCNC: 7.6 G/DL (ref 6.4–8.2)
RBC # BLD AUTO: 3.92 M/UL (ref 4.1–5.7)
SERVICE CMNT-IMP: ABNORMAL
SERVICE CMNT-IMP: NORMAL
SODIUM SERPL-SCNC: 136 MMOL/L (ref 136–145)
VANCOMYCIN SERPL-MCNC: 16.7 UG/ML
WBC # BLD AUTO: 11.7 K/UL (ref 4.1–11.1)

## 2025-04-05 PROCEDURE — 2580000003 HC RX 258: Performed by: STUDENT IN AN ORGANIZED HEALTH CARE EDUCATION/TRAINING PROGRAM

## 2025-04-05 PROCEDURE — 36415 COLL VENOUS BLD VENIPUNCTURE: CPT

## 2025-04-05 PROCEDURE — 82962 GLUCOSE BLOOD TEST: CPT

## 2025-04-05 PROCEDURE — 6370000000 HC RX 637 (ALT 250 FOR IP): Performed by: INTERNAL MEDICINE

## 2025-04-05 PROCEDURE — 6360000002 HC RX W HCPCS: Performed by: STUDENT IN AN ORGANIZED HEALTH CARE EDUCATION/TRAINING PROGRAM

## 2025-04-05 PROCEDURE — 6370000000 HC RX 637 (ALT 250 FOR IP): Performed by: STUDENT IN AN ORGANIZED HEALTH CARE EDUCATION/TRAINING PROGRAM

## 2025-04-05 PROCEDURE — 2500000003 HC RX 250 WO HCPCS: Performed by: INTERNAL MEDICINE

## 2025-04-05 PROCEDURE — 85025 COMPLETE CBC W/AUTO DIFF WBC: CPT

## 2025-04-05 PROCEDURE — 1100000003 HC PRIVATE W/ TELEMETRY

## 2025-04-05 PROCEDURE — 80202 ASSAY OF VANCOMYCIN: CPT

## 2025-04-05 PROCEDURE — 80053 COMPREHEN METABOLIC PANEL: CPT

## 2025-04-05 PROCEDURE — 83735 ASSAY OF MAGNESIUM: CPT

## 2025-04-05 RX ORDER — POTASSIUM CHLORIDE 1500 MG/1
40 TABLET, EXTENDED RELEASE ORAL 2 TIMES DAILY
Status: COMPLETED | OUTPATIENT
Start: 2025-04-05 | End: 2025-04-05

## 2025-04-05 RX ADMIN — WHITE PETROLATUM 57.7 %-MINERAL OIL 31.9 % EYE OINTMENT: at 21:45

## 2025-04-05 RX ADMIN — ACETAMINOPHEN 650 MG: 325 TABLET ORAL at 05:22

## 2025-04-05 RX ADMIN — ENOXAPARIN SODIUM 40 MG: 100 INJECTION SUBCUTANEOUS at 09:06

## 2025-04-05 RX ADMIN — MUPIROCIN: 20 OINTMENT TOPICAL at 10:50

## 2025-04-05 RX ADMIN — MUPIROCIN: 20 OINTMENT TOPICAL at 21:45

## 2025-04-05 RX ADMIN — SODIUM CHLORIDE, PRESERVATIVE FREE 10 ML: 5 INJECTION INTRAVENOUS at 21:42

## 2025-04-05 RX ADMIN — VANCOMYCIN HYDROCHLORIDE 1000 MG: 1 INJECTION, POWDER, LYOPHILIZED, FOR SOLUTION INTRAVENOUS at 09:04

## 2025-04-05 RX ADMIN — POTASSIUM CHLORIDE 40 MEQ: 1500 TABLET, EXTENDED RELEASE ORAL at 21:42

## 2025-04-05 RX ADMIN — SODIUM CHLORIDE, PRESERVATIVE FREE 10 ML: 5 INJECTION INTRAVENOUS at 09:05

## 2025-04-05 RX ADMIN — VANCOMYCIN HYDROCHLORIDE 1000 MG: 1 INJECTION, POWDER, LYOPHILIZED, FOR SOLUTION INTRAVENOUS at 20:34

## 2025-04-05 RX ADMIN — INSULIN HUMAN 20 UNITS: 100 INJECTION, SUSPENSION SUBCUTANEOUS at 21:42

## 2025-04-05 RX ADMIN — POTASSIUM CHLORIDE 40 MEQ: 1500 TABLET, EXTENDED RELEASE ORAL at 10:46

## 2025-04-05 RX ADMIN — INSULIN HUMAN 20 UNITS: 100 INJECTION, SUSPENSION SUBCUTANEOUS at 09:06

## 2025-04-05 ASSESSMENT — PAIN SCALES - GENERAL: PAINLEVEL_OUTOF10: 0

## 2025-04-06 LAB
BACTERIA SPEC CULT: ABNORMAL
BACTERIA SPEC CULT: ABNORMAL
GLUCOSE BLD STRIP.AUTO-MCNC: 210 MG/DL (ref 65–117)
GLUCOSE BLD STRIP.AUTO-MCNC: 214 MG/DL (ref 65–117)
GLUCOSE BLD STRIP.AUTO-MCNC: 222 MG/DL (ref 65–117)
GLUCOSE BLD STRIP.AUTO-MCNC: 74 MG/DL (ref 65–117)
SERVICE CMNT-IMP: ABNORMAL
SERVICE CMNT-IMP: NORMAL

## 2025-04-06 PROCEDURE — 82962 GLUCOSE BLOOD TEST: CPT

## 2025-04-06 PROCEDURE — 6370000000 HC RX 637 (ALT 250 FOR IP): Performed by: INTERNAL MEDICINE

## 2025-04-06 PROCEDURE — 6360000002 HC RX W HCPCS: Performed by: STUDENT IN AN ORGANIZED HEALTH CARE EDUCATION/TRAINING PROGRAM

## 2025-04-06 PROCEDURE — 2580000003 HC RX 258: Performed by: STUDENT IN AN ORGANIZED HEALTH CARE EDUCATION/TRAINING PROGRAM

## 2025-04-06 PROCEDURE — 1100000003 HC PRIVATE W/ TELEMETRY

## 2025-04-06 PROCEDURE — 2500000003 HC RX 250 WO HCPCS: Performed by: INTERNAL MEDICINE

## 2025-04-06 RX ADMIN — VANCOMYCIN HYDROCHLORIDE 1000 MG: 1 INJECTION, POWDER, LYOPHILIZED, FOR SOLUTION INTRAVENOUS at 20:13

## 2025-04-06 RX ADMIN — ACETAMINOPHEN 650 MG: 325 TABLET ORAL at 20:29

## 2025-04-06 RX ADMIN — INSULIN HUMAN 20 UNITS: 100 INJECTION, SUSPENSION SUBCUTANEOUS at 08:29

## 2025-04-06 RX ADMIN — ENOXAPARIN SODIUM 40 MG: 100 INJECTION SUBCUTANEOUS at 08:28

## 2025-04-06 RX ADMIN — MUPIROCIN: 20 OINTMENT TOPICAL at 08:30

## 2025-04-06 RX ADMIN — INSULIN LISPRO 2 UNITS: 100 INJECTION, SOLUTION INTRAVENOUS; SUBCUTANEOUS at 16:53

## 2025-04-06 RX ADMIN — SODIUM CHLORIDE, PRESERVATIVE FREE 10 ML: 5 INJECTION INTRAVENOUS at 20:16

## 2025-04-06 RX ADMIN — INSULIN LISPRO 2 UNITS: 100 INJECTION, SOLUTION INTRAVENOUS; SUBCUTANEOUS at 20:16

## 2025-04-06 RX ADMIN — MUPIROCIN: 20 OINTMENT TOPICAL at 20:21

## 2025-04-06 RX ADMIN — WHITE PETROLATUM 57.7 %-MINERAL OIL 31.9 % EYE OINTMENT: at 20:21

## 2025-04-06 RX ADMIN — VANCOMYCIN HYDROCHLORIDE 1000 MG: 1 INJECTION, POWDER, LYOPHILIZED, FOR SOLUTION INTRAVENOUS at 08:28

## 2025-04-06 RX ADMIN — INSULIN HUMAN 20 UNITS: 100 INJECTION, SUSPENSION SUBCUTANEOUS at 20:15

## 2025-04-06 RX ADMIN — SODIUM CHLORIDE, PRESERVATIVE FREE 10 ML: 5 INJECTION INTRAVENOUS at 08:29

## 2025-04-06 RX ADMIN — INSULIN LISPRO 2 UNITS: 100 INJECTION, SOLUTION INTRAVENOUS; SUBCUTANEOUS at 10:59

## 2025-04-06 ASSESSMENT — PAIN DESCRIPTION - DESCRIPTORS: DESCRIPTORS: NUMBNESS;OTHER (COMMENT)

## 2025-04-06 ASSESSMENT — PAIN DESCRIPTION - LOCATION: LOCATION: FOOT

## 2025-04-06 ASSESSMENT — PAIN SCALES - GENERAL
PAINLEVEL_OUTOF10: 10
PAINLEVEL_OUTOF10: 0

## 2025-04-06 ASSESSMENT — PAIN DESCRIPTION - ORIENTATION: ORIENTATION: LEFT;RIGHT

## 2025-04-07 LAB
ALBUMIN SERPL-MCNC: 2.3 G/DL (ref 3.5–5)
ALBUMIN/GLOB SERPL: 0.4 (ref 1.1–2.2)
ALP SERPL-CCNC: 111 U/L (ref 45–117)
ALT SERPL-CCNC: 69 U/L (ref 12–78)
ANION GAP SERPL CALC-SCNC: 6 MMOL/L (ref 2–12)
AST SERPL-CCNC: 53 U/L (ref 15–37)
BACTERIA SPEC CULT: ABNORMAL
BASOPHILS # BLD: 0.04 K/UL (ref 0–0.1)
BASOPHILS NFR BLD: 0.4 % (ref 0–1)
BILIRUB SERPL-MCNC: 0.9 MG/DL (ref 0.2–1)
BUN SERPL-MCNC: 12 MG/DL (ref 6–20)
BUN/CREAT SERPL: 12 (ref 12–20)
CALCIUM SERPL-MCNC: 8.5 MG/DL (ref 8.5–10.1)
CHLORIDE SERPL-SCNC: 104 MMOL/L (ref 97–108)
CO2 SERPL-SCNC: 26 MMOL/L (ref 21–32)
CREAT SERPL-MCNC: 0.97 MG/DL (ref 0.7–1.3)
DIFFERENTIAL METHOD BLD: ABNORMAL
EOSINOPHIL # BLD: 0.24 K/UL (ref 0–0.4)
EOSINOPHIL NFR BLD: 2.5 % (ref 0–7)
ERYTHROCYTE [DISTWIDTH] IN BLOOD BY AUTOMATED COUNT: 12.8 % (ref 11.5–14.5)
GLOBULIN SER CALC-MCNC: 5.4 G/DL (ref 2–4)
GLUCOSE BLD STRIP.AUTO-MCNC: 212 MG/DL (ref 65–117)
GLUCOSE BLD STRIP.AUTO-MCNC: 214 MG/DL (ref 65–117)
GLUCOSE BLD STRIP.AUTO-MCNC: 325 MG/DL (ref 65–117)
GLUCOSE BLD STRIP.AUTO-MCNC: 83 MG/DL (ref 65–117)
GLUCOSE SERPL-MCNC: 82 MG/DL (ref 65–100)
GRAM STN SPEC: ABNORMAL
GRAM STN SPEC: ABNORMAL
HCT VFR BLD AUTO: 39.3 % (ref 36.6–50.3)
HGB BLD-MCNC: 13.2 G/DL (ref 12.1–17)
IMM GRANULOCYTES # BLD AUTO: 0.05 K/UL (ref 0–0.04)
IMM GRANULOCYTES NFR BLD AUTO: 0.5 % (ref 0–0.5)
LYMPHOCYTES # BLD: 1.8 K/UL (ref 0.8–3.5)
LYMPHOCYTES NFR BLD: 19.1 % (ref 12–49)
MCH RBC QN AUTO: 34.2 PG (ref 26–34)
MCHC RBC AUTO-ENTMCNC: 33.6 G/DL (ref 30–36.5)
MCV RBC AUTO: 101.8 FL (ref 80–99)
MONOCYTES # BLD: 0.59 K/UL (ref 0–1)
MONOCYTES NFR BLD: 6.3 % (ref 5–13)
NEUTS SEG # BLD: 6.72 K/UL (ref 1.8–8)
NEUTS SEG NFR BLD: 71.2 % (ref 32–75)
NRBC # BLD: 0 K/UL (ref 0–0.01)
NRBC BLD-RTO: 0 PER 100 WBC
PLATELET # BLD AUTO: 243 K/UL (ref 150–400)
PMV BLD AUTO: 10.1 FL (ref 8.9–12.9)
POTASSIUM SERPL-SCNC: 3.7 MMOL/L (ref 3.5–5.1)
PROT SERPL-MCNC: 7.7 G/DL (ref 6.4–8.2)
RBC # BLD AUTO: 3.86 M/UL (ref 4.1–5.7)
SERVICE CMNT-IMP: ABNORMAL
SERVICE CMNT-IMP: NORMAL
SODIUM SERPL-SCNC: 136 MMOL/L (ref 136–145)
WBC # BLD AUTO: 9.4 K/UL (ref 4.1–11.1)

## 2025-04-07 PROCEDURE — 6370000000 HC RX 637 (ALT 250 FOR IP): Performed by: PHYSICIAN ASSISTANT

## 2025-04-07 PROCEDURE — 97535 SELF CARE MNGMENT TRAINING: CPT

## 2025-04-07 PROCEDURE — 97116 GAIT TRAINING THERAPY: CPT

## 2025-04-07 PROCEDURE — 6370000000 HC RX 637 (ALT 250 FOR IP): Performed by: INTERNAL MEDICINE

## 2025-04-07 PROCEDURE — 99231 SBSQ HOSP IP/OBS SF/LOW 25: CPT | Performed by: CLINICAL NURSE SPECIALIST

## 2025-04-07 PROCEDURE — 6370000000 HC RX 637 (ALT 250 FOR IP): Performed by: CLINICAL NURSE SPECIALIST

## 2025-04-07 PROCEDURE — 2580000003 HC RX 258: Performed by: INTERNAL MEDICINE

## 2025-04-07 PROCEDURE — 6360000002 HC RX W HCPCS: Performed by: INTERNAL MEDICINE

## 2025-04-07 PROCEDURE — 36415 COLL VENOUS BLD VENIPUNCTURE: CPT

## 2025-04-07 PROCEDURE — 2500000003 HC RX 250 WO HCPCS: Performed by: INTERNAL MEDICINE

## 2025-04-07 PROCEDURE — 82962 GLUCOSE BLOOD TEST: CPT

## 2025-04-07 PROCEDURE — 6360000002 HC RX W HCPCS: Performed by: STUDENT IN AN ORGANIZED HEALTH CARE EDUCATION/TRAINING PROGRAM

## 2025-04-07 PROCEDURE — 1100000003 HC PRIVATE W/ TELEMETRY

## 2025-04-07 PROCEDURE — 80053 COMPREHEN METABOLIC PANEL: CPT

## 2025-04-07 PROCEDURE — 85025 COMPLETE CBC W/AUTO DIFF WBC: CPT

## 2025-04-07 RX ORDER — LACTOBACILLUS RHAMNOSUS GG 10B CELL
1 CAPSULE ORAL DAILY
Status: DISCONTINUED | OUTPATIENT
Start: 2025-04-07 | End: 2025-05-27 | Stop reason: HOSPADM

## 2025-04-07 RX ADMIN — INSULIN HUMAN 15 UNITS: 100 INJECTION, SUSPENSION SUBCUTANEOUS at 21:27

## 2025-04-07 RX ADMIN — INSULIN LISPRO 2 UNITS: 100 INJECTION, SOLUTION INTRAVENOUS; SUBCUTANEOUS at 17:41

## 2025-04-07 RX ADMIN — ACETAMINOPHEN 650 MG: 325 TABLET ORAL at 21:06

## 2025-04-07 RX ADMIN — SODIUM CHLORIDE, PRESERVATIVE FREE 10 ML: 5 INJECTION INTRAVENOUS at 10:25

## 2025-04-07 RX ADMIN — VANCOMYCIN HYDROCHLORIDE 1000 MG: 1 INJECTION, POWDER, LYOPHILIZED, FOR SOLUTION INTRAVENOUS at 13:21

## 2025-04-07 RX ADMIN — VANCOMYCIN HYDROCHLORIDE 1000 MG: 1 INJECTION, POWDER, LYOPHILIZED, FOR SOLUTION INTRAVENOUS at 21:34

## 2025-04-07 RX ADMIN — INSULIN HUMAN 20 UNITS: 100 INJECTION, SUSPENSION SUBCUTANEOUS at 10:24

## 2025-04-07 RX ADMIN — Medication 1 CAPSULE: at 13:22

## 2025-04-07 RX ADMIN — INSULIN LISPRO 6 UNITS: 100 INJECTION, SOLUTION INTRAVENOUS; SUBCUTANEOUS at 21:28

## 2025-04-07 RX ADMIN — INSULIN LISPRO 2 UNITS: 100 INJECTION, SOLUTION INTRAVENOUS; SUBCUTANEOUS at 14:00

## 2025-04-07 RX ADMIN — WHITE PETROLATUM 57.7 %-MINERAL OIL 31.9 % EYE OINTMENT: at 21:29

## 2025-04-07 RX ADMIN — SODIUM CHLORIDE, PRESERVATIVE FREE 10 ML: 5 INJECTION INTRAVENOUS at 21:08

## 2025-04-07 RX ADMIN — MUPIROCIN: 20 OINTMENT TOPICAL at 21:31

## 2025-04-07 RX ADMIN — ENOXAPARIN SODIUM 40 MG: 100 INJECTION SUBCUTANEOUS at 10:24

## 2025-04-07 ASSESSMENT — PAIN DESCRIPTION - DESCRIPTORS
DESCRIPTORS: ACHING
DESCRIPTORS: ACHING

## 2025-04-07 ASSESSMENT — PAIN DESCRIPTION - ORIENTATION
ORIENTATION: LEFT
ORIENTATION: LEFT

## 2025-04-07 ASSESSMENT — PAIN SCALES - GENERAL
PAINLEVEL_OUTOF10: 4
PAINLEVEL_OUTOF10: 3
PAINLEVEL_OUTOF10: 0

## 2025-04-07 ASSESSMENT — PAIN DESCRIPTION - LOCATION
LOCATION: FOOT
LOCATION: FOOT

## 2025-04-08 LAB
ALBUMIN SERPL-MCNC: 2.2 G/DL (ref 3.5–5)
ALBUMIN/GLOB SERPL: 0.4 (ref 1.1–2.2)
ALP SERPL-CCNC: 107 U/L (ref 45–117)
ALT SERPL-CCNC: 59 U/L (ref 12–78)
ANION GAP SERPL CALC-SCNC: 5 MMOL/L (ref 2–12)
AST SERPL-CCNC: 39 U/L (ref 15–37)
BACTERIA SPEC CULT: NORMAL
BACTERIA SPEC CULT: NORMAL
BASOPHILS # BLD: 0.04 K/UL (ref 0–0.1)
BASOPHILS NFR BLD: 0.4 % (ref 0–1)
BILIRUB SERPL-MCNC: 0.4 MG/DL (ref 0.2–1)
BUN SERPL-MCNC: 15 MG/DL (ref 6–20)
BUN/CREAT SERPL: 14 (ref 12–20)
CALCIUM SERPL-MCNC: 8 MG/DL (ref 8.5–10.1)
CHLORIDE SERPL-SCNC: 102 MMOL/L (ref 97–108)
CO2 SERPL-SCNC: 29 MMOL/L (ref 21–32)
CREAT SERPL-MCNC: 1.05 MG/DL (ref 0.7–1.3)
DIFFERENTIAL METHOD BLD: ABNORMAL
EOSINOPHIL # BLD: 0.23 K/UL (ref 0–0.4)
EOSINOPHIL NFR BLD: 2.6 % (ref 0–7)
ERYTHROCYTE [DISTWIDTH] IN BLOOD BY AUTOMATED COUNT: 12.8 % (ref 11.5–14.5)
GLOBULIN SER CALC-MCNC: 5.4 G/DL (ref 2–4)
GLUCOSE BLD STRIP.AUTO-MCNC: 160 MG/DL (ref 65–117)
GLUCOSE BLD STRIP.AUTO-MCNC: 166 MG/DL (ref 65–117)
GLUCOSE BLD STRIP.AUTO-MCNC: 275 MG/DL (ref 65–117)
GLUCOSE BLD STRIP.AUTO-MCNC: 76 MG/DL (ref 65–117)
GLUCOSE SERPL-MCNC: 179 MG/DL (ref 65–100)
HCT VFR BLD AUTO: 37.1 % (ref 36.6–50.3)
HGB BLD-MCNC: 12.2 G/DL (ref 12.1–17)
IMM GRANULOCYTES # BLD AUTO: 0.06 K/UL (ref 0–0.04)
IMM GRANULOCYTES NFR BLD AUTO: 0.7 % (ref 0–0.5)
LYMPHOCYTES # BLD: 1.76 K/UL (ref 0.8–3.5)
LYMPHOCYTES NFR BLD: 19.7 % (ref 12–49)
MCH RBC QN AUTO: 33.8 PG (ref 26–34)
MCHC RBC AUTO-ENTMCNC: 32.9 G/DL (ref 30–36.5)
MCV RBC AUTO: 102.8 FL (ref 80–99)
MONOCYTES # BLD: 0.58 K/UL (ref 0–1)
MONOCYTES NFR BLD: 6.5 % (ref 5–13)
NEUTS SEG # BLD: 6.28 K/UL (ref 1.8–8)
NEUTS SEG NFR BLD: 70.1 % (ref 32–75)
NRBC # BLD: 0 K/UL (ref 0–0.01)
NRBC BLD-RTO: 0 PER 100 WBC
PLATELET # BLD AUTO: 288 K/UL (ref 150–400)
PMV BLD AUTO: 9.8 FL (ref 8.9–12.9)
POTASSIUM SERPL-SCNC: 3.8 MMOL/L (ref 3.5–5.1)
PROT SERPL-MCNC: 7.6 G/DL (ref 6.4–8.2)
RBC # BLD AUTO: 3.61 M/UL (ref 4.1–5.7)
SERVICE CMNT-IMP: ABNORMAL
SERVICE CMNT-IMP: NORMAL
SODIUM SERPL-SCNC: 136 MMOL/L (ref 136–145)
WBC # BLD AUTO: 9 K/UL (ref 4.1–11.1)

## 2025-04-08 PROCEDURE — 99231 SBSQ HOSP IP/OBS SF/LOW 25: CPT

## 2025-04-08 PROCEDURE — 6360000002 HC RX W HCPCS: Performed by: INTERNAL MEDICINE

## 2025-04-08 PROCEDURE — 6360000002 HC RX W HCPCS: Performed by: STUDENT IN AN ORGANIZED HEALTH CARE EDUCATION/TRAINING PROGRAM

## 2025-04-08 PROCEDURE — 6370000000 HC RX 637 (ALT 250 FOR IP)

## 2025-04-08 PROCEDURE — 97530 THERAPEUTIC ACTIVITIES: CPT

## 2025-04-08 PROCEDURE — 1100000003 HC PRIVATE W/ TELEMETRY

## 2025-04-08 PROCEDURE — 2500000003 HC RX 250 WO HCPCS: Performed by: INTERNAL MEDICINE

## 2025-04-08 PROCEDURE — 6370000000 HC RX 637 (ALT 250 FOR IP): Performed by: INTERNAL MEDICINE

## 2025-04-08 PROCEDURE — 36415 COLL VENOUS BLD VENIPUNCTURE: CPT

## 2025-04-08 PROCEDURE — 82962 GLUCOSE BLOOD TEST: CPT

## 2025-04-08 PROCEDURE — 99233 SBSQ HOSP IP/OBS HIGH 50: CPT | Performed by: INTERNAL MEDICINE

## 2025-04-08 PROCEDURE — 80053 COMPREHEN METABOLIC PANEL: CPT

## 2025-04-08 PROCEDURE — 2580000003 HC RX 258: Performed by: INTERNAL MEDICINE

## 2025-04-08 PROCEDURE — 6370000000 HC RX 637 (ALT 250 FOR IP): Performed by: PHYSICIAN ASSISTANT

## 2025-04-08 PROCEDURE — 85025 COMPLETE CBC W/AUTO DIFF WBC: CPT

## 2025-04-08 PROCEDURE — 6370000000 HC RX 637 (ALT 250 FOR IP): Performed by: CLINICAL NURSE SPECIALIST

## 2025-04-08 RX ADMIN — VANCOMYCIN HYDROCHLORIDE 1000 MG: 1 INJECTION, POWDER, LYOPHILIZED, FOR SOLUTION INTRAVENOUS at 10:08

## 2025-04-08 RX ADMIN — WHITE PETROLATUM 57.7 %-MINERAL OIL 31.9 % EYE OINTMENT: at 20:53

## 2025-04-08 RX ADMIN — Medication 1 CAPSULE: at 10:02

## 2025-04-08 RX ADMIN — ENOXAPARIN SODIUM 40 MG: 100 INJECTION SUBCUTANEOUS at 10:02

## 2025-04-08 RX ADMIN — INSULIN LISPRO 4 UNITS: 100 INJECTION, SOLUTION INTRAVENOUS; SUBCUTANEOUS at 16:49

## 2025-04-08 RX ADMIN — INSULIN HUMAN 24 UNITS: 100 INJECTION, SUSPENSION SUBCUTANEOUS at 10:02

## 2025-04-08 RX ADMIN — MUPIROCIN: 20 OINTMENT TOPICAL at 10:03

## 2025-04-08 RX ADMIN — SODIUM CHLORIDE, PRESERVATIVE FREE 10 ML: 5 INJECTION INTRAVENOUS at 10:04

## 2025-04-08 RX ADMIN — VANCOMYCIN HYDROCHLORIDE 1000 MG: 1 INJECTION, POWDER, LYOPHILIZED, FOR SOLUTION INTRAVENOUS at 22:21

## 2025-04-08 RX ADMIN — INSULIN HUMAN 8 UNITS: 100 INJECTION, SUSPENSION SUBCUTANEOUS at 20:52

## 2025-04-08 RX ADMIN — MUPIROCIN: 20 OINTMENT TOPICAL at 20:56

## 2025-04-08 RX ADMIN — ACETAMINOPHEN 650 MG: 325 TABLET ORAL at 20:55

## 2025-04-08 RX ADMIN — SODIUM CHLORIDE, PRESERVATIVE FREE 10 ML: 5 INJECTION INTRAVENOUS at 20:54

## 2025-04-08 ASSESSMENT — PAIN DESCRIPTION - DESCRIPTORS: DESCRIPTORS: ACHING

## 2025-04-08 ASSESSMENT — PAIN SCALES - GENERAL
PAINLEVEL_OUTOF10: 0
PAINLEVEL_OUTOF10: 0
PAINLEVEL_OUTOF10: 4

## 2025-04-08 ASSESSMENT — PAIN DESCRIPTION - FREQUENCY: FREQUENCY: INTERMITTENT

## 2025-04-08 ASSESSMENT — PAIN DESCRIPTION - PAIN TYPE: TYPE: ACUTE PAIN

## 2025-04-08 ASSESSMENT — PAIN - FUNCTIONAL ASSESSMENT: PAIN_FUNCTIONAL_ASSESSMENT: PREVENTS OR INTERFERES SOME ACTIVE ACTIVITIES AND ADLS

## 2025-04-08 ASSESSMENT — PAIN DESCRIPTION - ORIENTATION: ORIENTATION: LEFT

## 2025-04-08 ASSESSMENT — PAIN DESCRIPTION - LOCATION: LOCATION: LEG

## 2025-04-08 ASSESSMENT — PAIN DESCRIPTION - DIRECTION: RADIATING_TOWARDS: NO

## 2025-04-08 ASSESSMENT — PAIN DESCRIPTION - ONSET: ONSET: GRADUAL

## 2025-04-08 NOTE — CARE COORDINATION
Transition of Care Plan:     RUR: 16%  Prior Level of Functioning: Independent   Disposition: SNF   SHALONDA: 4/3/25  If SNF or IPR: Date FOC offered: 4/3/25  Date FOC received: 4/3/25  Accepting facility: [facility]   Date authorization started with reference number: 4/8/25 Refer number is [id number]  Date authorization received and expires: 4/8/25 - 4/10/25 [id number] HEATHER Allen is the   Follow up appointments: Defer to facility   DME needed: Defer to facility   Transportation at discharge: BLS   IM/IMM Medicare/ letter given: 2nd IMM letter   Is patient a [place] and connected with VA? No              If yes, was  transfer form completed and VA notified? No  Caregiver Contact: pt's son   Discharge Caregiver contacted prior to discharge? Pt will contacted   Care Conference needed? No  Barriers to discharge: Medical clearance           317 pm: Pt has auth for placement at [facility].     CM is waiting on a bed at [facility] and final recommendation from ID regarding antibiotics.     Initial Note: [facility] has accepted pt and auth was started for placement.    CM will continue to follow and assist with d/c planning.    Nona Lopez

## 2025-04-09 ENCOUNTER — APPOINTMENT (OUTPATIENT)
Facility: HOSPITAL | Age: 80
DRG: 622 | End: 2025-04-09
Payer: MEDICARE

## 2025-04-09 ENCOUNTER — ANESTHESIA EVENT (OUTPATIENT)
Facility: HOSPITAL | Age: 80
End: 2025-04-09
Payer: MEDICARE

## 2025-04-09 ENCOUNTER — ANESTHESIA (OUTPATIENT)
Facility: HOSPITAL | Age: 80
End: 2025-04-09
Payer: MEDICARE

## 2025-04-09 LAB
ANION GAP SERPL CALC-SCNC: 5 MMOL/L (ref 2–12)
BASOPHILS # BLD: 0.04 K/UL (ref 0–0.1)
BASOPHILS NFR BLD: 0.5 % (ref 0–1)
BUN SERPL-MCNC: 13 MG/DL (ref 6–20)
BUN/CREAT SERPL: 13 (ref 12–20)
CALCIUM SERPL-MCNC: 8.4 MG/DL (ref 8.5–10.1)
CHLORIDE SERPL-SCNC: 105 MMOL/L (ref 97–108)
CO2 SERPL-SCNC: 26 MMOL/L (ref 21–32)
CREAT SERPL-MCNC: 0.98 MG/DL (ref 0.7–1.3)
DIFFERENTIAL METHOD BLD: ABNORMAL
EOSINOPHIL # BLD: 0.21 K/UL (ref 0–0.4)
EOSINOPHIL NFR BLD: 2.5 % (ref 0–7)
ERYTHROCYTE [DISTWIDTH] IN BLOOD BY AUTOMATED COUNT: 12.6 % (ref 11.5–14.5)
GLUCOSE BLD STRIP.AUTO-MCNC: 121 MG/DL (ref 65–117)
GLUCOSE BLD STRIP.AUTO-MCNC: 177 MG/DL (ref 65–117)
GLUCOSE BLD STRIP.AUTO-MCNC: 185 MG/DL (ref 65–117)
GLUCOSE BLD STRIP.AUTO-MCNC: 352 MG/DL (ref 65–117)
GLUCOSE BLD STRIP.AUTO-MCNC: 70 MG/DL (ref 65–117)
GLUCOSE BLD STRIP.AUTO-MCNC: 70 MG/DL (ref 65–117)
GLUCOSE BLD STRIP.AUTO-MCNC: 99 MG/DL (ref 65–117)
GLUCOSE SERPL-MCNC: 51 MG/DL (ref 65–100)
HCT VFR BLD AUTO: 37.6 % (ref 36.6–50.3)
HGB BLD-MCNC: 12.4 G/DL (ref 12.1–17)
IMM GRANULOCYTES # BLD AUTO: 0.04 K/UL (ref 0–0.04)
IMM GRANULOCYTES NFR BLD AUTO: 0.5 % (ref 0–0.5)
LYMPHOCYTES # BLD: 1.68 K/UL (ref 0.8–3.5)
LYMPHOCYTES NFR BLD: 19.6 % (ref 12–49)
MCH RBC QN AUTO: 33.8 PG (ref 26–34)
MCHC RBC AUTO-ENTMCNC: 33 G/DL (ref 30–36.5)
MCV RBC AUTO: 102.5 FL (ref 80–99)
MONOCYTES # BLD: 0.61 K/UL (ref 0–1)
MONOCYTES NFR BLD: 7.1 % (ref 5–13)
NEUTS SEG # BLD: 5.99 K/UL (ref 1.8–8)
NEUTS SEG NFR BLD: 69.8 % (ref 32–75)
NRBC # BLD: 0 K/UL (ref 0–0.01)
NRBC BLD-RTO: 0 PER 100 WBC
PLATELET # BLD AUTO: 316 K/UL (ref 150–400)
PMV BLD AUTO: 10.2 FL (ref 8.9–12.9)
POTASSIUM SERPL-SCNC: 3.5 MMOL/L (ref 3.5–5.1)
RBC # BLD AUTO: 3.67 M/UL (ref 4.1–5.7)
SERVICE CMNT-IMP: ABNORMAL
SERVICE CMNT-IMP: NORMAL
SODIUM SERPL-SCNC: 136 MMOL/L (ref 136–145)
VANCOMYCIN SERPL-MCNC: 22.8 UG/ML
WBC # BLD AUTO: 8.6 K/UL (ref 4.1–11.1)

## 2025-04-09 PROCEDURE — 2580000003 HC RX 258: Performed by: SURGERY

## 2025-04-09 PROCEDURE — 73551 X-RAY EXAM OF FEMUR 1: CPT

## 2025-04-09 PROCEDURE — C1725 CATH, TRANSLUMIN NON-LASER: HCPCS | Performed by: SURGERY

## 2025-04-09 PROCEDURE — 7100000000 HC PACU RECOVERY - FIRST 15 MIN: Performed by: SURGERY

## 2025-04-09 PROCEDURE — 3600000007 HC SURGERY HYBRID BASE: Performed by: SURGERY

## 2025-04-09 PROCEDURE — 2580000003 HC RX 258: Performed by: INTERNAL MEDICINE

## 2025-04-09 PROCEDURE — C1769 GUIDE WIRE: HCPCS | Performed by: SURGERY

## 2025-04-09 PROCEDURE — 2500000003 HC RX 250 WO HCPCS

## 2025-04-09 PROCEDURE — C1887 CATHETER, GUIDING: HCPCS | Performed by: SURGERY

## 2025-04-09 PROCEDURE — 85025 COMPLETE CBC W/AUTO DIFF WBC: CPT

## 2025-04-09 PROCEDURE — 76000 FLUOROSCOPY <1 HR PHYS/QHP: CPT

## 2025-04-09 PROCEDURE — 2500000003 HC RX 250 WO HCPCS: Performed by: SURGERY

## 2025-04-09 PROCEDURE — 7100000001 HC PACU RECOVERY - ADDTL 15 MIN: Performed by: SURGERY

## 2025-04-09 PROCEDURE — 3600000017 HC SURGERY HYBRID ADDL 15MIN: Performed by: SURGERY

## 2025-04-09 PROCEDURE — 6360000002 HC RX W HCPCS: Performed by: SURGERY

## 2025-04-09 PROCEDURE — 6360000004 HC RX CONTRAST MEDICATION: Performed by: SURGERY

## 2025-04-09 PROCEDURE — C1760 CLOSURE DEV, VASC: HCPCS | Performed by: SURGERY

## 2025-04-09 PROCEDURE — 6370000000 HC RX 637 (ALT 250 FOR IP): Performed by: PODIATRIST

## 2025-04-09 PROCEDURE — 1100000003 HC PRIVATE W/ TELEMETRY

## 2025-04-09 PROCEDURE — 36415 COLL VENOUS BLD VENIPUNCTURE: CPT

## 2025-04-09 PROCEDURE — 2709999900 HC NON-CHARGEABLE SUPPLY: Performed by: SURGERY

## 2025-04-09 PROCEDURE — 6360000002 HC RX W HCPCS: Performed by: ANESTHESIOLOGY

## 2025-04-09 PROCEDURE — 80202 ASSAY OF VANCOMYCIN: CPT

## 2025-04-09 PROCEDURE — 82962 GLUCOSE BLOOD TEST: CPT

## 2025-04-09 PROCEDURE — 6360000002 HC RX W HCPCS

## 2025-04-09 PROCEDURE — 6370000000 HC RX 637 (ALT 250 FOR IP): Performed by: SURGERY

## 2025-04-09 PROCEDURE — 2500000003 HC RX 250 WO HCPCS: Performed by: PODIATRIST

## 2025-04-09 PROCEDURE — 3700000001 HC ADD 15 MINUTES (ANESTHESIA): Performed by: SURGERY

## 2025-04-09 PROCEDURE — 2500000003 HC RX 250 WO HCPCS: Performed by: INTERNAL MEDICINE

## 2025-04-09 PROCEDURE — 80048 BASIC METABOLIC PNL TOTAL CA: CPT

## 2025-04-09 PROCEDURE — 2580000003 HC RX 258

## 2025-04-09 PROCEDURE — 3700000000 HC ANESTHESIA ATTENDED CARE: Performed by: SURGERY

## 2025-04-09 PROCEDURE — C1894 INTRO/SHEATH, NON-LASER: HCPCS | Performed by: SURGERY

## 2025-04-09 RX ORDER — SODIUM CHLORIDE 9 MG/ML
INJECTION, SOLUTION INTRAVENOUS CONTINUOUS
Status: DISCONTINUED | OUTPATIENT
Start: 2025-04-09 | End: 2025-04-10

## 2025-04-09 RX ORDER — HEPARIN SODIUM 1000 [USP'U]/ML
INJECTION, SOLUTION INTRAVENOUS; SUBCUTANEOUS
Status: DISCONTINUED | OUTPATIENT
Start: 2025-04-09 | End: 2025-04-09 | Stop reason: SDUPTHER

## 2025-04-09 RX ORDER — SODIUM CHLORIDE, SODIUM LACTATE, POTASSIUM CHLORIDE, CALCIUM CHLORIDE 600; 310; 30; 20 MG/100ML; MG/100ML; MG/100ML; MG/100ML
INJECTION, SOLUTION INTRAVENOUS
Status: DISCONTINUED | OUTPATIENT
Start: 2025-04-09 | End: 2025-04-09 | Stop reason: SDUPTHER

## 2025-04-09 RX ORDER — HYDROMORPHONE HYDROCHLORIDE 1 MG/ML
0.5 INJECTION, SOLUTION INTRAMUSCULAR; INTRAVENOUS; SUBCUTANEOUS EVERY 5 MIN PRN
Status: DISCONTINUED | OUTPATIENT
Start: 2025-04-09 | End: 2025-04-09 | Stop reason: HOSPADM

## 2025-04-09 RX ORDER — LIDOCAINE HYDROCHLORIDE 10 MG/ML
1 INJECTION, SOLUTION EPIDURAL; INFILTRATION; INTRACAUDAL; PERINEURAL
Status: DISCONTINUED | OUTPATIENT
Start: 2025-04-09 | End: 2025-04-09 | Stop reason: HOSPADM

## 2025-04-09 RX ORDER — SODIUM CHLORIDE 0.9 % (FLUSH) 0.9 %
5-40 SYRINGE (ML) INJECTION PRN
Status: DISCONTINUED | OUTPATIENT
Start: 2025-04-09 | End: 2025-04-09 | Stop reason: HOSPADM

## 2025-04-09 RX ORDER — IOPAMIDOL 755 MG/ML
INJECTION, SOLUTION INTRAVASCULAR PRN
Status: DISCONTINUED | OUTPATIENT
Start: 2025-04-09 | End: 2025-04-09 | Stop reason: ALTCHOICE

## 2025-04-09 RX ORDER — SUCCINYLCHOLINE/SOD CL,ISO/PF 200MG/10ML
SYRINGE (ML) INTRAVENOUS
Status: DISCONTINUED | OUTPATIENT
Start: 2025-04-09 | End: 2025-04-09 | Stop reason: SDUPTHER

## 2025-04-09 RX ORDER — NALOXONE HYDROCHLORIDE 0.4 MG/ML
INJECTION, SOLUTION INTRAMUSCULAR; INTRAVENOUS; SUBCUTANEOUS PRN
Status: DISCONTINUED | OUTPATIENT
Start: 2025-04-09 | End: 2025-04-09 | Stop reason: HOSPADM

## 2025-04-09 RX ORDER — SODIUM CHLORIDE 9 MG/ML
INJECTION, SOLUTION INTRAVENOUS PRN
Status: DISCONTINUED | OUTPATIENT
Start: 2025-04-09 | End: 2025-04-09 | Stop reason: HOSPADM

## 2025-04-09 RX ORDER — LIDOCAINE HYDROCHLORIDE 20 MG/ML
INJECTION, SOLUTION EPIDURAL; INFILTRATION; INTRACAUDAL; PERINEURAL
Status: DISCONTINUED | OUTPATIENT
Start: 2025-04-09 | End: 2025-04-09 | Stop reason: SDUPTHER

## 2025-04-09 RX ORDER — FENTANYL CITRATE 50 UG/ML
25 INJECTION, SOLUTION INTRAMUSCULAR; INTRAVENOUS EVERY 5 MIN PRN
Status: DISCONTINUED | OUTPATIENT
Start: 2025-04-09 | End: 2025-04-09 | Stop reason: HOSPADM

## 2025-04-09 RX ORDER — DEXAMETHASONE SODIUM PHOSPHATE 4 MG/ML
INJECTION, SOLUTION INTRA-ARTICULAR; INTRALESIONAL; INTRAMUSCULAR; INTRAVENOUS; SOFT TISSUE
Status: DISCONTINUED | OUTPATIENT
Start: 2025-04-09 | End: 2025-04-09 | Stop reason: SDUPTHER

## 2025-04-09 RX ORDER — VANCOMYCIN HYDROCHLORIDE 1 G/20ML
INJECTION, POWDER, LYOPHILIZED, FOR SOLUTION INTRAVENOUS
Status: DISPENSED
Start: 2025-04-09 | End: 2025-04-10

## 2025-04-09 RX ORDER — SODIUM CHLORIDE 0.9 % (FLUSH) 0.9 %
5-40 SYRINGE (ML) INJECTION EVERY 12 HOURS SCHEDULED
Status: DISCONTINUED | OUTPATIENT
Start: 2025-04-09 | End: 2025-04-09 | Stop reason: HOSPADM

## 2025-04-09 RX ORDER — DROPERIDOL 2.5 MG/ML
0.62 INJECTION, SOLUTION INTRAMUSCULAR; INTRAVENOUS
Status: DISCONTINUED | OUTPATIENT
Start: 2025-04-09 | End: 2025-04-09 | Stop reason: HOSPADM

## 2025-04-09 RX ORDER — ONDANSETRON 2 MG/ML
INJECTION INTRAMUSCULAR; INTRAVENOUS
Status: DISCONTINUED | OUTPATIENT
Start: 2025-04-09 | End: 2025-04-09 | Stop reason: SDUPTHER

## 2025-04-09 RX ORDER — SODIUM CHLORIDE 9 MG/ML
INJECTION, SOLUTION INTRAVENOUS
Status: DISCONTINUED | OUTPATIENT
Start: 2025-04-09 | End: 2025-04-09 | Stop reason: SDUPTHER

## 2025-04-09 RX ORDER — FENTANYL CITRATE 50 UG/ML
INJECTION, SOLUTION INTRAMUSCULAR; INTRAVENOUS
Status: DISCONTINUED | OUTPATIENT
Start: 2025-04-09 | End: 2025-04-09 | Stop reason: SDUPTHER

## 2025-04-09 RX ORDER — SODIUM CHLORIDE, SODIUM LACTATE, POTASSIUM CHLORIDE, CALCIUM CHLORIDE 600; 310; 30; 20 MG/100ML; MG/100ML; MG/100ML; MG/100ML
INJECTION, SOLUTION INTRAVENOUS CONTINUOUS
Status: DISCONTINUED | OUTPATIENT
Start: 2025-04-09 | End: 2025-04-09 | Stop reason: HOSPADM

## 2025-04-09 RX ORDER — PHENYLEPHRINE HCL IN 0.9% NACL 0.4MG/10ML
SYRINGE (ML) INTRAVENOUS
Status: DISCONTINUED | OUTPATIENT
Start: 2025-04-09 | End: 2025-04-09 | Stop reason: SDUPTHER

## 2025-04-09 RX ORDER — ONDANSETRON 2 MG/ML
4 INJECTION INTRAMUSCULAR; INTRAVENOUS
Status: DISCONTINUED | OUTPATIENT
Start: 2025-04-09 | End: 2025-04-09 | Stop reason: HOSPADM

## 2025-04-09 RX ADMIN — ACETAMINOPHEN 650 MG: 325 TABLET ORAL at 21:26

## 2025-04-09 RX ADMIN — INSULIN HUMAN 4 UNITS: 100 INJECTION, SUSPENSION SUBCUTANEOUS at 20:54

## 2025-04-09 RX ADMIN — SODIUM CHLORIDE: 0.9 INJECTION, SOLUTION INTRAVENOUS at 08:02

## 2025-04-09 RX ADMIN — VANCOMYCIN HYDROCHLORIDE 1000 MG: 1 INJECTION, POWDER, LYOPHILIZED, FOR SOLUTION INTRAVENOUS at 11:41

## 2025-04-09 RX ADMIN — HEPARIN SODIUM 5000 UNITS: 1000 INJECTION INTRAVENOUS; SUBCUTANEOUS at 12:15

## 2025-04-09 RX ADMIN — HYDROMORPHONE HYDROCHLORIDE 0.5 MG: 1 INJECTION, SOLUTION INTRAMUSCULAR; INTRAVENOUS; SUBCUTANEOUS at 13:53

## 2025-04-09 RX ADMIN — INSULIN LISPRO 8 UNITS: 100 INJECTION, SOLUTION INTRAVENOUS; SUBCUTANEOUS at 20:52

## 2025-04-09 RX ADMIN — MUPIROCIN: 20 OINTMENT TOPICAL at 08:03

## 2025-04-09 RX ADMIN — FENTANYL CITRATE 25 MCG: 50 INJECTION, SOLUTION INTRAMUSCULAR; INTRAVENOUS at 12:56

## 2025-04-09 RX ADMIN — SODIUM CHLORIDE, PRESERVATIVE FREE 10 ML: 5 INJECTION INTRAVENOUS at 08:04

## 2025-04-09 RX ADMIN — Medication 80 MCG: at 11:21

## 2025-04-09 RX ADMIN — WHITE PETROLATUM 57.7 %-MINERAL OIL 31.9 % EYE OINTMENT: at 20:56

## 2025-04-09 RX ADMIN — FENTANYL CITRATE 50 MCG: 50 INJECTION, SOLUTION INTRAMUSCULAR; INTRAVENOUS at 11:15

## 2025-04-09 RX ADMIN — SODIUM CHLORIDE: 900 INJECTION, SOLUTION INTRAVENOUS at 11:15

## 2025-04-09 RX ADMIN — PROPOFOL 110 MG: 10 INJECTION, EMULSION INTRAVENOUS at 11:15

## 2025-04-09 RX ADMIN — SODIUM CHLORIDE, PRESERVATIVE FREE 10 ML: 5 INJECTION INTRAVENOUS at 20:56

## 2025-04-09 RX ADMIN — SODIUM CHLORIDE, SODIUM LACTATE, POTASSIUM CHLORIDE, AND CALCIUM CHLORIDE: 600; 310; 30; 20 INJECTION, SOLUTION INTRAVENOUS at 11:09

## 2025-04-09 RX ADMIN — PHENYLEPHRINE HYDROCHLORIDE 40 MCG/MIN: 10 INJECTION INTRAVENOUS at 11:18

## 2025-04-09 RX ADMIN — ONDANSETRON HYDROCHLORIDE 4 MG: 2 INJECTION, SOLUTION INTRAMUSCULAR; INTRAVENOUS at 12:55

## 2025-04-09 RX ADMIN — Medication 100 MG: at 11:17

## 2025-04-09 RX ADMIN — VANCOMYCIN HYDROCHLORIDE 1000 MG: 1 INJECTION, POWDER, LYOPHILIZED, FOR SOLUTION INTRAVENOUS at 16:01

## 2025-04-09 RX ADMIN — DEXTROSE 250 ML: 10 SOLUTION INTRAVENOUS at 07:56

## 2025-04-09 RX ADMIN — LIDOCAINE HYDROCHLORIDE 80 MG: 20 INJECTION, SOLUTION EPIDURAL; INFILTRATION; INTRACAUDAL; PERINEURAL at 11:15

## 2025-04-09 RX ADMIN — DEXAMETHASONE SODIUM PHOSPHATE 4 MG: 4 INJECTION, SOLUTION INTRAMUSCULAR; INTRAVENOUS at 11:22

## 2025-04-09 RX ADMIN — SODIUM CHLORIDE: 0.9 INJECTION, SOLUTION INTRAVENOUS at 20:59

## 2025-04-09 ASSESSMENT — PAIN DESCRIPTION - DESCRIPTORS
DESCRIPTORS: ACHING;NUMBNESS
DESCRIPTORS: ACHING
DESCRIPTORS: ACHING

## 2025-04-09 ASSESSMENT — PAIN DESCRIPTION - ONSET: ONSET: GRADUAL

## 2025-04-09 ASSESSMENT — PAIN DESCRIPTION - PAIN TYPE
TYPE: CHRONIC PAIN;SURGICAL PAIN
TYPE: CHRONIC PAIN;SURGICAL PAIN

## 2025-04-09 ASSESSMENT — PAIN DESCRIPTION - FREQUENCY: FREQUENCY: INTERMITTENT

## 2025-04-09 ASSESSMENT — PAIN DESCRIPTION - ORIENTATION
ORIENTATION: LEFT

## 2025-04-09 ASSESSMENT — PAIN DESCRIPTION - LOCATION
LOCATION: LEG;FOOT
LOCATION: FOOT
LOCATION: LEG

## 2025-04-09 ASSESSMENT — PAIN DESCRIPTION - DIRECTION: RADIATING_TOWARDS: NO

## 2025-04-09 ASSESSMENT — PAIN - FUNCTIONAL ASSESSMENT: PAIN_FUNCTIONAL_ASSESSMENT: PREVENTS OR INTERFERES SOME ACTIVE ACTIVITIES AND ADLS

## 2025-04-09 ASSESSMENT — LIFESTYLE VARIABLES: SMOKING_STATUS: 1

## 2025-04-09 ASSESSMENT — ENCOUNTER SYMPTOMS: SHORTNESS OF BREATH: 0

## 2025-04-09 ASSESSMENT — PAIN SCALES - GENERAL
PAINLEVEL_OUTOF10: 4
PAINLEVEL_OUTOF10: 5
PAINLEVEL_OUTOF10: 2
PAINLEVEL_OUTOF10: 0
PAINLEVEL_OUTOF10: 1
PAINLEVEL_OUTOF10: 7

## 2025-04-09 NOTE — CARE COORDINATION
Transition of Care Plan:     RUR: 15%  Prior Level of Functioning: Independent   Disposition: SNF   SHALONDA: 4/14/25  If SNF or IPR: Date FOC offered: 4/3/25  Date FOC received: 4/3/25  Accepting facility: [facility]   Date authorization started with reference number: 4/8/25 Refer number is [id number]  Date authorization received and expires: 4/8/25 - 4/10/25 [id number] HEATHER Allen is the   Follow up appointments: Defer to facility   DME needed: Defer to facility   Transportation at discharge: BLS   IM/IMM Medicare/ letter given: 2nd IMM letter   Is patient a [place] and connected with VA? No              If yes, was [place] transfer form completed and VA notified? No  Caregiver Contact: pt's son   Discharge Caregiver contacted prior to discharge? Pt will contacted   Care Conference needed? No  Barriers to discharge: Medical clearance     CM reviewed pt's chart and pt is not medically stable for d/c due to waiting ID final rec and vascular.    CM will continue to follow and assist with d/c planning.    Nona Lopez

## 2025-04-09 NOTE — ANESTHESIA PRE PROCEDURE
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                  ROS comment: Left foot cellulitis Abdominal:              PE comment: Deferred   Vascular:   + PVD, aortic or cerebral.       Other Findings:       Anesthesia Plan      general     ASA 3       Induction: intravenous and rapid sequence.  BIS  MIPS: Postoperative opioids intended and Prophylactic antiemetics administered.  Anesthetic plan and risks discussed with patient.      Plan discussed with CRNA.    Attending anesthesiologist reviewed and agrees with Preprocedure content            Chandni Zendejas MD   4/9/2025

## 2025-04-09 NOTE — ANESTHESIA POST-OP
TRANSFER - OUT REPORT:    Verbal report given to Elsy HONG (name) on Errol R Rikki  being transferred to 1135(unit) for routine post-op       Report consisted of patient’s Situation, Background, Assessment and   Recommendations(SBAR).     Information from the following report(s) Surgery Report, Intake/Output, MAR, Recent Results, and Cardiac Rhythm NSR  was reviewed with the receiving nurse.    Opportunity for questions and clarification was provided.      Patient transported with:   Monitor  O2 @ 2 liters  Registered Nurse  Tech

## 2025-04-09 NOTE — ANESTHESIA POSTPROCEDURE EVALUATION
4/9/2025    Multimodal analgesia pain management approach  Pain management: satisfactory to patient    No notable events documented.

## 2025-04-09 NOTE — PERIOP NOTE
0845:  TRANSFER - IN REPORT:    Verbal report received from GELY Wylie on Errol Brandt  being received from 1135 for ordered procedure      Report consisted of patient’s Situation, Background, Assessment and   Recommendations(SBAR).     Information from the following report(s) Intake/Output, MAR, and Recent Results was reviewed with the receiving nurse.    Opportunity for questions and clarification was provided.      0958:  Patient to OR Holding area via his hospital bed.  Assessment completed upon patient’s arrival to unit and care assumed.  He is alert & oriented but extremely [place].      1015:  Mepilex sacral pad applied as a preventative measure.  His inner buttocks are dry & flaky with peeling skin. Mid upper back is a large lump that patient reports he has had a \"boil\" removed from there before.  The skin around this is shiny & scarred.  Mepilex pad was applied to this protrusion as well.  BLE have numerous abrasion with scabbing to the areas.  Patient states this is from falls at home & having to crawl on the floor to get up. Left heel has large black ulcer to site.      1024: Consents reviewed with the patient.  He verbalizes understanding of all info provided.  Signature obtained for anesthesia & procedure     1035:  Patient requested a urinal.  He was provided a urinal but was unable to contain his urine.  It spilled into his bed.  All linens were changed including his gown & tayler-care was provided.    1040:  Patient has an 22g IV to RFA.  It is difficult to flush & is leaking at the site that is slightly swollen.  IV was removed & I attempted 20g to BUE (2 attempts) without success.  JAMAICA Chisholm RN at bedside to assist.    1050:  Dr. FREDIS Zendejas at bedside to see patient & discuss anesthesia plan of care.    1057:  Dr. Garcia at the bedside to see the patient.  Surgical consent signed & site marked.    1102:  Dr. Zendejas back to bedside to assist with IV start.  He was able to obtain a 20g to right arm with

## 2025-04-09 NOTE — BRIEF OP NOTE
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 R CFA sheath placement w/ultrasound guidance            2. LLE runoff arteriogram w/fluoro            3. Left PT PTA (2.0mm)            4. Left heel debridement    Surgeon(s):  Foster Garcia MD    Assistant:  Surgical Assistant: Marlon Chavarria    Anesthesia: General    Estimated Blood Loss (mL): Minimal    Complications: None    Findings: Primarily tibial disease. Less than satisfactory result. Will need bypass.    Electronically signed by Foster Garcia MD on 4/9/2025 at 1:06 PM

## 2025-04-09 NOTE — FLOWSHEET NOTE
04/09/25 1320   Handoff   Communication Given Periop Handoff/Relief   Handoff phase Phase I receiving   Handoff Given To Jg Vela RN   Handoff Received From Lila Mathur RN/Jay Forte CRNA   Handoff Communication Face to Face;At bedside   Time Handoff Given 1320

## 2025-04-10 PROBLEM — L97.521 ISCHEMIC ULCER OF LEFT FOOT, LIMITED TO BREAKDOWN OF SKIN (HCC): Status: ACTIVE | Noted: 2025-04-10

## 2025-04-10 LAB
ANION GAP SERPL CALC-SCNC: 4 MMOL/L (ref 2–12)
BASOPHILS # BLD: 0.03 K/UL (ref 0–0.1)
BASOPHILS NFR BLD: 0.3 % (ref 0–1)
BUN SERPL-MCNC: 20 MG/DL (ref 6–20)
BUN/CREAT SERPL: 18 (ref 12–20)
CALCIUM SERPL-MCNC: 7.8 MG/DL (ref 8.5–10.1)
CHLORIDE SERPL-SCNC: 104 MMOL/L (ref 97–108)
CO2 SERPL-SCNC: 25 MMOL/L (ref 21–32)
CREAT SERPL-MCNC: 1.14 MG/DL (ref 0.7–1.3)
DIFFERENTIAL METHOD BLD: ABNORMAL
EOSINOPHIL # BLD: 0.01 K/UL (ref 0–0.4)
EOSINOPHIL NFR BLD: 0.1 % (ref 0–7)
ERYTHROCYTE [DISTWIDTH] IN BLOOD BY AUTOMATED COUNT: 12.6 % (ref 11.5–14.5)
GLUCOSE BLD STRIP.AUTO-MCNC: 188 MG/DL (ref 65–117)
GLUCOSE BLD STRIP.AUTO-MCNC: 205 MG/DL (ref 65–117)
GLUCOSE BLD STRIP.AUTO-MCNC: 254 MG/DL (ref 65–117)
GLUCOSE BLD STRIP.AUTO-MCNC: 356 MG/DL (ref 65–117)
GLUCOSE SERPL-MCNC: 366 MG/DL (ref 65–100)
HCT VFR BLD AUTO: 35.9 % (ref 36.6–50.3)
HGB BLD-MCNC: 11.6 G/DL (ref 12.1–17)
IMM GRANULOCYTES # BLD AUTO: 0.07 K/UL (ref 0–0.04)
IMM GRANULOCYTES NFR BLD AUTO: 0.7 % (ref 0–0.5)
LYMPHOCYTES # BLD: 1.16 K/UL (ref 0.8–3.5)
LYMPHOCYTES NFR BLD: 11.6 % (ref 12–49)
MCH RBC QN AUTO: 33.5 PG (ref 26–34)
MCHC RBC AUTO-ENTMCNC: 32.3 G/DL (ref 30–36.5)
MCV RBC AUTO: 103.8 FL (ref 80–99)
MONOCYTES # BLD: 0.73 K/UL (ref 0–1)
MONOCYTES NFR BLD: 7.3 % (ref 5–13)
NEUTS SEG # BLD: 8 K/UL (ref 1.8–8)
NEUTS SEG NFR BLD: 80 % (ref 32–75)
NRBC # BLD: 0 K/UL (ref 0–0.01)
NRBC BLD-RTO: 0 PER 100 WBC
PLATELET # BLD AUTO: 344 K/UL (ref 150–400)
PMV BLD AUTO: 10.2 FL (ref 8.9–12.9)
POTASSIUM SERPL-SCNC: 4.6 MMOL/L (ref 3.5–5.1)
RBC # BLD AUTO: 3.46 M/UL (ref 4.1–5.7)
SERVICE CMNT-IMP: ABNORMAL
SODIUM SERPL-SCNC: 133 MMOL/L (ref 136–145)
WBC # BLD AUTO: 10 K/UL (ref 4.1–11.1)

## 2025-04-10 PROCEDURE — 2500000003 HC RX 250 WO HCPCS: Performed by: PODIATRIST

## 2025-04-10 PROCEDURE — 85025 COMPLETE CBC W/AUTO DIFF WBC: CPT

## 2025-04-10 PROCEDURE — 6370000000 HC RX 637 (ALT 250 FOR IP): Performed by: SURGERY

## 2025-04-10 PROCEDURE — 2700000000 HC OXYGEN THERAPY PER DAY

## 2025-04-10 PROCEDURE — 6370000000 HC RX 637 (ALT 250 FOR IP): Performed by: PODIATRIST

## 2025-04-10 PROCEDURE — 94760 N-INVAS EAR/PLS OXIMETRY 1: CPT

## 2025-04-10 PROCEDURE — 99233 SBSQ HOSP IP/OBS HIGH 50: CPT | Performed by: INTERNAL MEDICINE

## 2025-04-10 PROCEDURE — 99231 SBSQ HOSP IP/OBS SF/LOW 25: CPT

## 2025-04-10 PROCEDURE — 6360000002 HC RX W HCPCS: Performed by: SURGERY

## 2025-04-10 PROCEDURE — 2580000003 HC RX 258: Performed by: SURGERY

## 2025-04-10 PROCEDURE — 80048 BASIC METABOLIC PNL TOTAL CA: CPT

## 2025-04-10 PROCEDURE — 1100000003 HC PRIVATE W/ TELEMETRY

## 2025-04-10 PROCEDURE — 82962 GLUCOSE BLOOD TEST: CPT

## 2025-04-10 PROCEDURE — 36415 COLL VENOUS BLD VENIPUNCTURE: CPT

## 2025-04-10 PROCEDURE — 6370000000 HC RX 637 (ALT 250 FOR IP): Performed by: PHYSICIAN ASSISTANT

## 2025-04-10 RX ORDER — NEOMYCIN SULFATE, POLYMYXIN B SULFATE AND DEXAMETHASONE 3.5; 10000; 1 MG/ML; [USP'U]/ML; MG/ML
1 SUSPENSION/ DROPS OPHTHALMIC EVERY 6 HOURS SCHEDULED
Status: DISPENSED | OUTPATIENT
Start: 2025-04-10 | End: 2025-04-15

## 2025-04-10 RX ADMIN — INSULIN LISPRO 2 UNITS: 100 INJECTION, SOLUTION INTRAVENOUS; SUBCUTANEOUS at 12:27

## 2025-04-10 RX ADMIN — INSULIN HUMAN 4 UNITS: 100 INJECTION, SUSPENSION SUBCUTANEOUS at 20:52

## 2025-04-10 RX ADMIN — VANCOMYCIN HYDROCHLORIDE 1000 MG: 1 INJECTION, POWDER, LYOPHILIZED, FOR SOLUTION INTRAVENOUS at 16:44

## 2025-04-10 RX ADMIN — SODIUM CHLORIDE, PRESERVATIVE FREE 10 ML: 5 INJECTION INTRAVENOUS at 08:01

## 2025-04-10 RX ADMIN — Medication 1 CAPSULE: at 08:01

## 2025-04-10 RX ADMIN — WHITE PETROLATUM 57.7 %-MINERAL OIL 31.9 % EYE OINTMENT: at 20:53

## 2025-04-10 RX ADMIN — INSULIN LISPRO 8 UNITS: 100 INJECTION, SOLUTION INTRAVENOUS; SUBCUTANEOUS at 08:01

## 2025-04-10 RX ADMIN — NEOMYCIN SULFATE, POLYMYXIN B SULFATE AND DEXAMETHASONE 1 DROP: 3.5; 10000; 1 SUSPENSION/ DROPS OPHTHALMIC at 16:46

## 2025-04-10 RX ADMIN — INSULIN LISPRO 4 UNITS: 100 INJECTION, SOLUTION INTRAVENOUS; SUBCUTANEOUS at 20:51

## 2025-04-10 RX ADMIN — SODIUM CHLORIDE, PRESERVATIVE FREE 10 ML: 5 INJECTION INTRAVENOUS at 20:54

## 2025-04-10 RX ADMIN — SODIUM CHLORIDE: 0.9 INJECTION, SOLUTION INTRAVENOUS at 10:30

## 2025-04-10 RX ADMIN — INSULIN HUMAN 24 UNITS: 100 INJECTION, SUSPENSION SUBCUTANEOUS at 08:01

## 2025-04-10 RX ADMIN — VANCOMYCIN HYDROCHLORIDE 1000 MG: 1 INJECTION, POWDER, LYOPHILIZED, FOR SOLUTION INTRAVENOUS at 03:40

## 2025-04-10 RX ADMIN — INSULIN LISPRO 2 UNITS: 100 INJECTION, SOLUTION INTRAVENOUS; SUBCUTANEOUS at 17:49

## 2025-04-10 RX ADMIN — ACETAMINOPHEN 650 MG: 325 TABLET ORAL at 22:50

## 2025-04-10 RX ADMIN — NEOMYCIN SULFATE, POLYMYXIN B SULFATE AND DEXAMETHASONE 1 DROP: 3.5; 10000; 1 SUSPENSION/ DROPS OPHTHALMIC at 12:27

## 2025-04-10 ASSESSMENT — PAIN SCALES - GENERAL: PAINLEVEL_OUTOF10: 7

## 2025-04-10 NOTE — WOUND CARE
Wound care consult for this patient for the left heel wound that was present on admission. The first time I saw him he was pre op and going to the OR for Foreign body removal from Dr. Avalos. The Foreign body was a linear metallic object. Pt. Has diabetes and his A1c is 9.    This surgery was completed but the wound did not heal well and started to develop eschar on the surface and inflammation was persistent.   The tissue and wound cultures came back positive for MRSA. Pt. Is on contact precautions.   Dr. Garcia from Vascular surgery took him to the OR yesterday for a procedure and a heel debridement but he will likely need a bypass.  This will be done on Monday.   Assessment today: the left heel is widely inflamed with areas of pin hole pustules and macerated skin on the medial and lateral aspect.   Left heel with mostly eschar and there   Is pink wound bed under this. Very inflamed.      White area is just macerated skin:  The wound bed is pink.       White area macerated and patient  Has pustules with maroon exudate:      Treatment today: the heel and surrounding skin was cleansed with Vashe and then a NS moist Hydrofera blue dressing applied.   Covered with a high drainage pad and wrapped with yon, taped and dated the dressing. Heel floated.   Plan: the Heel will need Collagenase Santyl ointment to start the enzymatic debridement per Dr. Avalos.   Once the vascular procedure is completed the wound will most likely start to heal better.   Wound care nurse to follow up next Tuesday for the wound.   Cornelia Dinh, RN, BSN, CWON

## 2025-04-11 ENCOUNTER — APPOINTMENT (OUTPATIENT)
Facility: HOSPITAL | Age: 80
DRG: 622 | End: 2025-04-11
Attending: SURGERY
Payer: MEDICARE

## 2025-04-11 LAB
ANION GAP SERPL CALC-SCNC: 4 MMOL/L (ref 2–12)
BASOPHILS # BLD: 0.04 K/UL (ref 0–0.1)
BASOPHILS NFR BLD: 0.5 % (ref 0–1)
BUN SERPL-MCNC: 21 MG/DL (ref 6–20)
BUN/CREAT SERPL: 19 (ref 12–20)
CALCIUM SERPL-MCNC: 8.2 MG/DL (ref 8.5–10.1)
CHLORIDE SERPL-SCNC: 104 MMOL/L (ref 97–108)
CO2 SERPL-SCNC: 28 MMOL/L (ref 21–32)
CREAT SERPL-MCNC: 1.1 MG/DL (ref 0.7–1.3)
DIFFERENTIAL METHOD BLD: ABNORMAL
EOSINOPHIL # BLD: 0.18 K/UL (ref 0–0.4)
EOSINOPHIL NFR BLD: 2.3 % (ref 0–7)
ERYTHROCYTE [DISTWIDTH] IN BLOOD BY AUTOMATED COUNT: 12.7 % (ref 11.5–14.5)
GLUCOSE BLD STRIP.AUTO-MCNC: 129 MG/DL (ref 65–117)
GLUCOSE BLD STRIP.AUTO-MCNC: 210 MG/DL (ref 65–117)
GLUCOSE BLD STRIP.AUTO-MCNC: 226 MG/DL (ref 65–117)
GLUCOSE BLD STRIP.AUTO-MCNC: 252 MG/DL (ref 65–117)
GLUCOSE SERPL-MCNC: 180 MG/DL (ref 65–100)
HCT VFR BLD AUTO: 37.1 % (ref 36.6–50.3)
HGB BLD-MCNC: 12.1 G/DL (ref 12.1–17)
IMM GRANULOCYTES # BLD AUTO: 0.03 K/UL (ref 0–0.04)
IMM GRANULOCYTES NFR BLD AUTO: 0.4 % (ref 0–0.5)
LYMPHOCYTES # BLD: 2.34 K/UL (ref 0.8–3.5)
LYMPHOCYTES NFR BLD: 29.8 % (ref 12–49)
MCH RBC QN AUTO: 33.2 PG (ref 26–34)
MCHC RBC AUTO-ENTMCNC: 32.6 G/DL (ref 30–36.5)
MCV RBC AUTO: 101.9 FL (ref 80–99)
MONOCYTES # BLD: 0.6 K/UL (ref 0–1)
MONOCYTES NFR BLD: 7.6 % (ref 5–13)
NEUTS SEG # BLD: 4.66 K/UL (ref 1.8–8)
NEUTS SEG NFR BLD: 59.4 % (ref 32–75)
NRBC # BLD: 0 K/UL (ref 0–0.01)
NRBC BLD-RTO: 0 PER 100 WBC
PLATELET # BLD AUTO: 361 K/UL (ref 150–400)
PMV BLD AUTO: 9.4 FL (ref 8.9–12.9)
POTASSIUM SERPL-SCNC: 4.4 MMOL/L (ref 3.5–5.1)
RBC # BLD AUTO: 3.64 M/UL (ref 4.1–5.7)
SERVICE CMNT-IMP: ABNORMAL
SODIUM SERPL-SCNC: 136 MMOL/L (ref 136–145)
VANCOMYCIN SERPL-MCNC: 24.3 UG/ML
WBC # BLD AUTO: 7.9 K/UL (ref 4.1–11.1)

## 2025-04-11 PROCEDURE — 2500000003 HC RX 250 WO HCPCS: Performed by: PODIATRIST

## 2025-04-11 PROCEDURE — 87070 CULTURE OTHR SPECIMN AEROBIC: CPT

## 2025-04-11 PROCEDURE — 6370000000 HC RX 637 (ALT 250 FOR IP): Performed by: PODIATRIST

## 2025-04-11 PROCEDURE — 97530 THERAPEUTIC ACTIVITIES: CPT

## 2025-04-11 PROCEDURE — 97164 PT RE-EVAL EST PLAN CARE: CPT

## 2025-04-11 PROCEDURE — 36415 COLL VENOUS BLD VENIPUNCTURE: CPT

## 2025-04-11 PROCEDURE — 2580000003 HC RX 258: Performed by: SURGERY

## 2025-04-11 PROCEDURE — 93971 EXTREMITY STUDY: CPT

## 2025-04-11 PROCEDURE — 85025 COMPLETE CBC W/AUTO DIFF WBC: CPT

## 2025-04-11 PROCEDURE — 97535 SELF CARE MNGMENT TRAINING: CPT

## 2025-04-11 PROCEDURE — 82962 GLUCOSE BLOOD TEST: CPT

## 2025-04-11 PROCEDURE — 80202 ASSAY OF VANCOMYCIN: CPT

## 2025-04-11 PROCEDURE — 6370000000 HC RX 637 (ALT 250 FOR IP): Performed by: SURGERY

## 2025-04-11 PROCEDURE — 87205 SMEAR GRAM STAIN: CPT

## 2025-04-11 PROCEDURE — 6360000002 HC RX W HCPCS: Performed by: SURGERY

## 2025-04-11 PROCEDURE — 87077 CULTURE AEROBIC IDENTIFY: CPT

## 2025-04-11 PROCEDURE — 99233 SBSQ HOSP IP/OBS HIGH 50: CPT | Performed by: INTERNAL MEDICINE

## 2025-04-11 PROCEDURE — 80048 BASIC METABOLIC PNL TOTAL CA: CPT

## 2025-04-11 PROCEDURE — 87186 SC STD MICRODIL/AGAR DIL: CPT

## 2025-04-11 PROCEDURE — 1100000003 HC PRIVATE W/ TELEMETRY

## 2025-04-11 RX ORDER — VANCOMYCIN 1.75 G/350ML
1250 INJECTION, SOLUTION INTRAVENOUS EVERY 24 HOURS
Status: DISCONTINUED | OUTPATIENT
Start: 2025-04-12 | End: 2025-04-15

## 2025-04-11 RX ADMIN — ACETAMINOPHEN 650 MG: 325 TABLET ORAL at 12:33

## 2025-04-11 RX ADMIN — WHITE PETROLATUM 57.7 %-MINERAL OIL 31.9 % EYE OINTMENT: at 21:51

## 2025-04-11 RX ADMIN — VANCOMYCIN HYDROCHLORIDE 1000 MG: 1 INJECTION, POWDER, LYOPHILIZED, FOR SOLUTION INTRAVENOUS at 05:13

## 2025-04-11 RX ADMIN — INSULIN HUMAN 24 UNITS: 100 INJECTION, SUSPENSION SUBCUTANEOUS at 10:13

## 2025-04-11 RX ADMIN — INSULIN HUMAN 4 UNITS: 100 INJECTION, SUSPENSION SUBCUTANEOUS at 21:51

## 2025-04-11 RX ADMIN — NEOMYCIN SULFATE, POLYMYXIN B SULFATE AND DEXAMETHASONE 1 DROP: 3.5; 10000; 1 SUSPENSION/ DROPS OPHTHALMIC at 06:48

## 2025-04-11 RX ADMIN — NEOMYCIN SULFATE, POLYMYXIN B SULFATE AND DEXAMETHASONE 1 DROP: 3.5; 10000; 1 SUSPENSION/ DROPS OPHTHALMIC at 18:22

## 2025-04-11 RX ADMIN — COLLAGENASE SANTYL: 250 OINTMENT TOPICAL at 10:24

## 2025-04-11 RX ADMIN — SODIUM CHLORIDE, PRESERVATIVE FREE 10 ML: 5 INJECTION INTRAVENOUS at 10:15

## 2025-04-11 RX ADMIN — NEOMYCIN SULFATE, POLYMYXIN B SULFATE AND DEXAMETHASONE 1 DROP: 3.5; 10000; 1 SUSPENSION/ DROPS OPHTHALMIC at 11:25

## 2025-04-11 RX ADMIN — ACETAMINOPHEN 650 MG: 325 TABLET ORAL at 21:48

## 2025-04-11 RX ADMIN — NEOMYCIN SULFATE, POLYMYXIN B SULFATE AND DEXAMETHASONE 1 DROP: 3.5; 10000; 1 SUSPENSION/ DROPS OPHTHALMIC at 00:12

## 2025-04-11 RX ADMIN — INSULIN LISPRO 2 UNITS: 100 INJECTION, SOLUTION INTRAVENOUS; SUBCUTANEOUS at 11:25

## 2025-04-11 RX ADMIN — INSULIN LISPRO 4 UNITS: 100 INJECTION, SOLUTION INTRAVENOUS; SUBCUTANEOUS at 21:50

## 2025-04-11 RX ADMIN — Medication 1 CAPSULE: at 10:13

## 2025-04-11 RX ADMIN — INSULIN LISPRO 2 UNITS: 100 INJECTION, SOLUTION INTRAVENOUS; SUBCUTANEOUS at 18:33

## 2025-04-11 RX ADMIN — SODIUM CHLORIDE, PRESERVATIVE FREE 10 ML: 5 INJECTION INTRAVENOUS at 21:50

## 2025-04-11 ASSESSMENT — PAIN SCALES - GENERAL
PAINLEVEL_OUTOF10: 10
PAINLEVEL_OUTOF10: 5
PAINLEVEL_OUTOF10: 0
PAINLEVEL_OUTOF10: 6

## 2025-04-11 ASSESSMENT — PAIN DESCRIPTION - DESCRIPTORS
DESCRIPTORS: ACHING
DESCRIPTORS: ACHING

## 2025-04-11 ASSESSMENT — PAIN DESCRIPTION - ORIENTATION
ORIENTATION: LEFT
ORIENTATION: LEFT

## 2025-04-11 ASSESSMENT — PAIN DESCRIPTION - LOCATION
LOCATION: FOOT
LOCATION: FOOT

## 2025-04-11 NOTE — CARE COORDINATION
Transition of Care Plan:     RUR: 14%  Prior Level of Functioning: Independent   Disposition: SNF   SHALONDA: 4/14/25  If SNF or IPR: Date FOC offered: 4/3/25  Date FOC received: 4/3/25  Accepting facility: [facility]   Date authorization started with reference number: 4/8/25 Refer number is [id number]  Date authorization received and expires: 4/8/25 - 4/10/25 [id number] HEATHER Allen is the   Follow up appointments: Defer to facility   DME needed: Defer to facility   Transportation at discharge: BLS   IM/IMM Medicare/ letter given: 2nd IMM letter   Is patient a [place] and connected with VA? No              If yes, was [place] transfer form completed and VA notified? No  Caregiver Contact: pt's son   Discharge Caregiver contacted prior to discharge? Pt will contacted   Care Conference needed? No  Barriers to discharge: Medical clearance        CM reviewed pt's chart and pt is not medically stable for d/c due to pt is having a bypass on 4/14.    Pt will also need auth and bed at [facility].    CM will continue to follow and assist with d/c planning.    Nona Lopez

## 2025-04-12 LAB
GLUCOSE BLD STRIP.AUTO-MCNC: 211 MG/DL (ref 65–117)
GLUCOSE BLD STRIP.AUTO-MCNC: 275 MG/DL (ref 65–117)
GLUCOSE BLD STRIP.AUTO-MCNC: 277 MG/DL (ref 65–117)
SERVICE CMNT-IMP: ABNORMAL

## 2025-04-12 PROCEDURE — 2500000003 HC RX 250 WO HCPCS: Performed by: PODIATRIST

## 2025-04-12 PROCEDURE — 6370000000 HC RX 637 (ALT 250 FOR IP): Performed by: SURGERY

## 2025-04-12 PROCEDURE — 82962 GLUCOSE BLOOD TEST: CPT

## 2025-04-12 PROCEDURE — 6370000000 HC RX 637 (ALT 250 FOR IP): Performed by: PODIATRIST

## 2025-04-12 PROCEDURE — 6360000002 HC RX W HCPCS: Performed by: INTERNAL MEDICINE

## 2025-04-12 PROCEDURE — 1100000003 HC PRIVATE W/ TELEMETRY

## 2025-04-12 RX ADMIN — Medication 1 CAPSULE: at 07:59

## 2025-04-12 RX ADMIN — SODIUM CHLORIDE, PRESERVATIVE FREE 10 ML: 5 INJECTION INTRAVENOUS at 21:41

## 2025-04-12 RX ADMIN — INSULIN HUMAN 24 UNITS: 100 INJECTION, SUSPENSION SUBCUTANEOUS at 07:59

## 2025-04-12 RX ADMIN — NEOMYCIN SULFATE, POLYMYXIN B SULFATE AND DEXAMETHASONE 1 DROP: 3.5; 10000; 1 SUSPENSION/ DROPS OPHTHALMIC at 22:33

## 2025-04-12 RX ADMIN — ACETAMINOPHEN 650 MG: 325 TABLET ORAL at 21:42

## 2025-04-12 RX ADMIN — VANCOMYCIN 1250 MG: 1.75 INJECTION, SOLUTION INTRAVENOUS at 04:41

## 2025-04-12 RX ADMIN — SODIUM CHLORIDE, PRESERVATIVE FREE 10 ML: 5 INJECTION INTRAVENOUS at 08:00

## 2025-04-12 RX ADMIN — INSULIN LISPRO 4 UNITS: 100 INJECTION, SOLUTION INTRAVENOUS; SUBCUTANEOUS at 08:00

## 2025-04-12 RX ADMIN — INSULIN LISPRO 2 UNITS: 100 INJECTION, SOLUTION INTRAVENOUS; SUBCUTANEOUS at 17:00

## 2025-04-12 RX ADMIN — NEOMYCIN SULFATE, POLYMYXIN B SULFATE AND DEXAMETHASONE 1 DROP: 3.5; 10000; 1 SUSPENSION/ DROPS OPHTHALMIC at 12:17

## 2025-04-12 RX ADMIN — INSULIN LISPRO 4 UNITS: 100 INJECTION, SOLUTION INTRAVENOUS; SUBCUTANEOUS at 12:16

## 2025-04-12 RX ADMIN — INSULIN HUMAN 4 UNITS: 100 INJECTION, SUSPENSION SUBCUTANEOUS at 21:38

## 2025-04-12 RX ADMIN — INSULIN LISPRO 4 UNITS: 100 INJECTION, SOLUTION INTRAVENOUS; SUBCUTANEOUS at 21:38

## 2025-04-12 RX ADMIN — NEOMYCIN SULFATE, POLYMYXIN B SULFATE AND DEXAMETHASONE 1 DROP: 3.5; 10000; 1 SUSPENSION/ DROPS OPHTHALMIC at 06:13

## 2025-04-12 RX ADMIN — COLLAGENASE SANTYL: 250 OINTMENT TOPICAL at 07:58

## 2025-04-12 RX ADMIN — NEOMYCIN SULFATE, POLYMYXIN B SULFATE AND DEXAMETHASONE 1 DROP: 3.5; 10000; 1 SUSPENSION/ DROPS OPHTHALMIC at 00:25

## 2025-04-12 RX ADMIN — WHITE PETROLATUM 57.7 %-MINERAL OIL 31.9 % EYE OINTMENT: at 21:39

## 2025-04-12 RX ADMIN — NEOMYCIN SULFATE, POLYMYXIN B SULFATE AND DEXAMETHASONE 1 DROP: 3.5; 10000; 1 SUSPENSION/ DROPS OPHTHALMIC at 17:00

## 2025-04-12 ASSESSMENT — PAIN DESCRIPTION - ORIENTATION: ORIENTATION: LEFT

## 2025-04-12 ASSESSMENT — PAIN SCALES - GENERAL
PAINLEVEL_OUTOF10: 5
PAINLEVEL_OUTOF10: 10

## 2025-04-12 ASSESSMENT — PAIN DESCRIPTION - LOCATION: LOCATION: FOOT

## 2025-04-12 ASSESSMENT — PAIN DESCRIPTION - DESCRIPTORS: DESCRIPTORS: ACHING

## 2025-04-13 LAB
ANION GAP SERPL CALC-SCNC: 3 MMOL/L (ref 2–12)
BACTERIA SPEC CULT: ABNORMAL
BUN SERPL-MCNC: 30 MG/DL (ref 6–20)
BUN/CREAT SERPL: 29 (ref 12–20)
CALCIUM SERPL-MCNC: 8.6 MG/DL (ref 8.5–10.1)
CHLORIDE SERPL-SCNC: 106 MMOL/L (ref 97–108)
CO2 SERPL-SCNC: 26 MMOL/L (ref 21–32)
CREAT SERPL-MCNC: 1.02 MG/DL (ref 0.7–1.3)
ERYTHROCYTE [DISTWIDTH] IN BLOOD BY AUTOMATED COUNT: 12.6 % (ref 11.5–14.5)
GLUCOSE BLD STRIP.AUTO-MCNC: 103 MG/DL (ref 65–117)
GLUCOSE BLD STRIP.AUTO-MCNC: 209 MG/DL (ref 65–117)
GLUCOSE BLD STRIP.AUTO-MCNC: 270 MG/DL (ref 65–117)
GLUCOSE BLD STRIP.AUTO-MCNC: 299 MG/DL (ref 65–117)
GLUCOSE SERPL-MCNC: 97 MG/DL (ref 65–100)
GRAM STN SPEC: ABNORMAL
GRAM STN SPEC: ABNORMAL
HCT VFR BLD AUTO: 39 % (ref 36.6–50.3)
HGB BLD-MCNC: 12.6 G/DL (ref 12.1–17)
MCH RBC QN AUTO: 33.5 PG (ref 26–34)
MCHC RBC AUTO-ENTMCNC: 32.3 G/DL (ref 30–36.5)
MCV RBC AUTO: 103.7 FL (ref 80–99)
NRBC # BLD: 0 K/UL (ref 0–0.01)
NRBC BLD-RTO: 0 PER 100 WBC
PLATELET # BLD AUTO: 271 K/UL (ref 150–400)
PMV BLD AUTO: 11 FL (ref 8.9–12.9)
POTASSIUM SERPL-SCNC: 4 MMOL/L (ref 3.5–5.1)
RBC # BLD AUTO: 3.76 M/UL (ref 4.1–5.7)
SERVICE CMNT-IMP: ABNORMAL
SERVICE CMNT-IMP: NORMAL
SODIUM SERPL-SCNC: 135 MMOL/L (ref 136–145)
WBC # BLD AUTO: 6.3 K/UL (ref 4.1–11.1)

## 2025-04-13 PROCEDURE — 99233 SBSQ HOSP IP/OBS HIGH 50: CPT | Performed by: INTERNAL MEDICINE

## 2025-04-13 PROCEDURE — 36415 COLL VENOUS BLD VENIPUNCTURE: CPT

## 2025-04-13 PROCEDURE — 6370000000 HC RX 637 (ALT 250 FOR IP): Performed by: PODIATRIST

## 2025-04-13 PROCEDURE — 82962 GLUCOSE BLOOD TEST: CPT

## 2025-04-13 PROCEDURE — 6370000000 HC RX 637 (ALT 250 FOR IP): Performed by: SURGERY

## 2025-04-13 PROCEDURE — 1100000003 HC PRIVATE W/ TELEMETRY

## 2025-04-13 PROCEDURE — 6360000002 HC RX W HCPCS: Performed by: INTERNAL MEDICINE

## 2025-04-13 PROCEDURE — 2500000003 HC RX 250 WO HCPCS: Performed by: PODIATRIST

## 2025-04-13 PROCEDURE — 85027 COMPLETE CBC AUTOMATED: CPT

## 2025-04-13 PROCEDURE — 80048 BASIC METABOLIC PNL TOTAL CA: CPT

## 2025-04-13 RX ADMIN — NEOMYCIN SULFATE, POLYMYXIN B SULFATE AND DEXAMETHASONE 1 DROP: 3.5; 10000; 1 SUSPENSION/ DROPS OPHTHALMIC at 19:32

## 2025-04-13 RX ADMIN — INSULIN LISPRO 2 UNITS: 100 INJECTION, SOLUTION INTRAVENOUS; SUBCUTANEOUS at 11:35

## 2025-04-13 RX ADMIN — VANCOMYCIN 1250 MG: 1.75 INJECTION, SOLUTION INTRAVENOUS at 04:11

## 2025-04-13 RX ADMIN — COLLAGENASE SANTYL: 250 OINTMENT TOPICAL at 07:39

## 2025-04-13 RX ADMIN — ACETAMINOPHEN 650 MG: 325 TABLET ORAL at 21:30

## 2025-04-13 RX ADMIN — INSULIN LISPRO 2 UNITS: 100 INJECTION, SOLUTION INTRAVENOUS; SUBCUTANEOUS at 16:30

## 2025-04-13 RX ADMIN — Medication 1 CAPSULE: at 07:37

## 2025-04-13 RX ADMIN — NEOMYCIN SULFATE, POLYMYXIN B SULFATE AND DEXAMETHASONE 1 DROP: 3.5; 10000; 1 SUSPENSION/ DROPS OPHTHALMIC at 11:36

## 2025-04-13 RX ADMIN — INSULIN LISPRO 4 UNITS: 100 INJECTION, SOLUTION INTRAVENOUS; SUBCUTANEOUS at 21:30

## 2025-04-13 RX ADMIN — WHITE PETROLATUM 57.7 %-MINERAL OIL 31.9 % EYE OINTMENT: at 21:31

## 2025-04-13 RX ADMIN — SODIUM CHLORIDE, PRESERVATIVE FREE 10 ML: 5 INJECTION INTRAVENOUS at 21:31

## 2025-04-13 RX ADMIN — NEOMYCIN SULFATE, POLYMYXIN B SULFATE AND DEXAMETHASONE 1 DROP: 3.5; 10000; 1 SUSPENSION/ DROPS OPHTHALMIC at 06:28

## 2025-04-13 RX ADMIN — INSULIN HUMAN 24 UNITS: 100 INJECTION, SUSPENSION SUBCUTANEOUS at 07:38

## 2025-04-13 RX ADMIN — INSULIN HUMAN 4 UNITS: 100 INJECTION, SUSPENSION SUBCUTANEOUS at 21:30

## 2025-04-13 RX ADMIN — SODIUM CHLORIDE, PRESERVATIVE FREE 10 ML: 5 INJECTION INTRAVENOUS at 07:38

## 2025-04-13 ASSESSMENT — PAIN SCALES - GENERAL
PAINLEVEL_OUTOF10: 10
PAINLEVEL_OUTOF10: 0

## 2025-04-13 ASSESSMENT — PAIN DESCRIPTION - ORIENTATION: ORIENTATION: LEFT

## 2025-04-13 ASSESSMENT — PAIN DESCRIPTION - DESCRIPTORS: DESCRIPTORS: ACHING

## 2025-04-13 ASSESSMENT — PAIN DESCRIPTION - LOCATION: LOCATION: FOOT;ANKLE

## 2025-04-14 ENCOUNTER — ANESTHESIA (OUTPATIENT)
Facility: HOSPITAL | Age: 80
End: 2025-04-14
Payer: MEDICARE

## 2025-04-14 ENCOUNTER — ANESTHESIA EVENT (OUTPATIENT)
Facility: HOSPITAL | Age: 80
End: 2025-04-14
Payer: MEDICARE

## 2025-04-14 LAB
GLUCOSE BLD STRIP.AUTO-MCNC: 108 MG/DL (ref 65–117)
GLUCOSE BLD STRIP.AUTO-MCNC: 112 MG/DL (ref 65–117)
GLUCOSE BLD STRIP.AUTO-MCNC: 136 MG/DL (ref 65–117)
GLUCOSE BLD STRIP.AUTO-MCNC: 261 MG/DL (ref 65–117)
GLUCOSE BLD STRIP.AUTO-MCNC: 388 MG/DL (ref 65–117)
SERVICE CMNT-IMP: ABNORMAL
SERVICE CMNT-IMP: NORMAL
SERVICE CMNT-IMP: NORMAL

## 2025-04-14 PROCEDURE — 3700000000 HC ANESTHESIA ATTENDED CARE: Performed by: SURGERY

## 2025-04-14 PROCEDURE — 6370000000 HC RX 637 (ALT 250 FOR IP): Performed by: SURGERY

## 2025-04-14 PROCEDURE — 2709999900 HC NON-CHARGEABLE SUPPLY: Performed by: SURGERY

## 2025-04-14 PROCEDURE — 3600000002 HC SURGERY LEVEL 2 BASE: Performed by: SURGERY

## 2025-04-14 PROCEDURE — 6360000002 HC RX W HCPCS: Performed by: SURGERY

## 2025-04-14 PROCEDURE — 2720000010 HC SURG SUPPLY STERILE: Performed by: SURGERY

## 2025-04-14 PROCEDURE — 1100000000 HC RM PRIVATE

## 2025-04-14 PROCEDURE — 3600000012 HC SURGERY LEVEL 2 ADDTL 15MIN: Performed by: SURGERY

## 2025-04-14 PROCEDURE — 6360000002 HC RX W HCPCS: Performed by: INTERNAL MEDICINE

## 2025-04-14 PROCEDURE — 99233 SBSQ HOSP IP/OBS HIGH 50: CPT | Performed by: INTERNAL MEDICINE

## 2025-04-14 PROCEDURE — 2580000003 HC RX 258

## 2025-04-14 PROCEDURE — 2500000003 HC RX 250 WO HCPCS: Performed by: SURGERY

## 2025-04-14 PROCEDURE — 7100000001 HC PACU RECOVERY - ADDTL 15 MIN: Performed by: SURGERY

## 2025-04-14 PROCEDURE — 7100000000 HC PACU RECOVERY - FIRST 15 MIN: Performed by: SURGERY

## 2025-04-14 PROCEDURE — 82962 GLUCOSE BLOOD TEST: CPT

## 2025-04-14 PROCEDURE — 3700000001 HC ADD 15 MINUTES (ANESTHESIA): Performed by: SURGERY

## 2025-04-14 PROCEDURE — 041L09L BYPASS LEFT FEMORAL ARTERY TO POPLITEAL ARTERY WITH AUTOLOGOUS VENOUS TISSUE, OPEN APPROACH: ICD-10-PCS | Performed by: SURGERY

## 2025-04-14 PROCEDURE — 6360000002 HC RX W HCPCS

## 2025-04-14 PROCEDURE — 2500000003 HC RX 250 WO HCPCS

## 2025-04-14 PROCEDURE — C1757 CATH, THROMBECTOMY/EMBOLECT: HCPCS | Performed by: SURGERY

## 2025-04-14 RX ORDER — ONDANSETRON 2 MG/ML
INJECTION INTRAMUSCULAR; INTRAVENOUS
Status: DISCONTINUED | OUTPATIENT
Start: 2025-04-14 | End: 2025-04-14 | Stop reason: SDUPTHER

## 2025-04-14 RX ORDER — SUCCINYLCHOLINE CHLORIDE 20 MG/ML
INJECTION INTRAMUSCULAR; INTRAVENOUS
Status: DISCONTINUED | OUTPATIENT
Start: 2025-04-14 | End: 2025-04-14 | Stop reason: SDUPTHER

## 2025-04-14 RX ORDER — LIDOCAINE HYDROCHLORIDE 20 MG/ML
INJECTION, SOLUTION EPIDURAL; INFILTRATION; INTRACAUDAL; PERINEURAL
Status: DISCONTINUED | OUTPATIENT
Start: 2025-04-14 | End: 2025-04-14 | Stop reason: SDUPTHER

## 2025-04-14 RX ORDER — MEPERIDINE HYDROCHLORIDE 25 MG/ML
12.5 INJECTION INTRAMUSCULAR; INTRAVENOUS; SUBCUTANEOUS EVERY 5 MIN PRN
Status: DISCONTINUED | OUTPATIENT
Start: 2025-04-14 | End: 2025-04-14 | Stop reason: HOSPADM

## 2025-04-14 RX ORDER — SODIUM CHLORIDE 9 MG/ML
INJECTION, SOLUTION INTRAVENOUS PRN
Status: DISCONTINUED | OUTPATIENT
Start: 2025-04-14 | End: 2025-04-14 | Stop reason: HOSPADM

## 2025-04-14 RX ORDER — NALOXONE HYDROCHLORIDE 0.4 MG/ML
INJECTION, SOLUTION INTRAMUSCULAR; INTRAVENOUS; SUBCUTANEOUS PRN
Status: DISCONTINUED | OUTPATIENT
Start: 2025-04-14 | End: 2025-04-14 | Stop reason: HOSPADM

## 2025-04-14 RX ORDER — FENTANYL CITRATE 50 UG/ML
INJECTION, SOLUTION INTRAMUSCULAR; INTRAVENOUS
Status: DISCONTINUED | OUTPATIENT
Start: 2025-04-14 | End: 2025-04-14 | Stop reason: SDUPTHER

## 2025-04-14 RX ORDER — HEPARIN SODIUM 1000 [USP'U]/ML
INJECTION, SOLUTION INTRAVENOUS; SUBCUTANEOUS
Status: DISCONTINUED | OUTPATIENT
Start: 2025-04-14 | End: 2025-04-14 | Stop reason: SDUPTHER

## 2025-04-14 RX ORDER — FENTANYL CITRATE 50 UG/ML
50 INJECTION, SOLUTION INTRAMUSCULAR; INTRAVENOUS EVERY 5 MIN PRN
Status: DISCONTINUED | OUTPATIENT
Start: 2025-04-14 | End: 2025-04-14 | Stop reason: HOSPADM

## 2025-04-14 RX ORDER — DEXAMETHASONE SODIUM PHOSPHATE 4 MG/ML
INJECTION, SOLUTION INTRA-ARTICULAR; INTRALESIONAL; INTRAMUSCULAR; INTRAVENOUS; SOFT TISSUE
Status: DISCONTINUED | OUTPATIENT
Start: 2025-04-14 | End: 2025-04-14 | Stop reason: SDUPTHER

## 2025-04-14 RX ORDER — ONDANSETRON 2 MG/ML
4 INJECTION INTRAMUSCULAR; INTRAVENOUS
Status: DISCONTINUED | OUTPATIENT
Start: 2025-04-14 | End: 2025-04-14 | Stop reason: HOSPADM

## 2025-04-14 RX ORDER — SODIUM CHLORIDE, SODIUM LACTATE, POTASSIUM CHLORIDE, CALCIUM CHLORIDE 600; 310; 30; 20 MG/100ML; MG/100ML; MG/100ML; MG/100ML
INJECTION, SOLUTION INTRAVENOUS
Status: DISCONTINUED | OUTPATIENT
Start: 2025-04-14 | End: 2025-04-14 | Stop reason: SDUPTHER

## 2025-04-14 RX ORDER — SODIUM CHLORIDE 0.9 % (FLUSH) 0.9 %
5-40 SYRINGE (ML) INJECTION EVERY 12 HOURS SCHEDULED
Status: DISCONTINUED | OUTPATIENT
Start: 2025-04-14 | End: 2025-04-14 | Stop reason: HOSPADM

## 2025-04-14 RX ORDER — ENOXAPARIN SODIUM 100 MG/ML
1 INJECTION SUBCUTANEOUS EVERY 12 HOURS
Status: DISCONTINUED | OUTPATIENT
Start: 2025-04-14 | End: 2025-04-28

## 2025-04-14 RX ORDER — MORPHINE SULFATE 2 MG/ML
2 INJECTION, SOLUTION INTRAMUSCULAR; INTRAVENOUS
Status: DISCONTINUED | OUTPATIENT
Start: 2025-04-14 | End: 2025-05-04

## 2025-04-14 RX ORDER — PROCHLORPERAZINE EDISYLATE 5 MG/ML
5 INJECTION INTRAMUSCULAR; INTRAVENOUS
Status: DISCONTINUED | OUTPATIENT
Start: 2025-04-14 | End: 2025-04-14 | Stop reason: HOSPADM

## 2025-04-14 RX ORDER — ASPIRIN 81 MG/1
81 TABLET, CHEWABLE ORAL DAILY
Status: DISCONTINUED | OUTPATIENT
Start: 2025-04-14 | End: 2025-05-20

## 2025-04-14 RX ORDER — DEXMEDETOMIDINE HYDROCHLORIDE 100 UG/ML
INJECTION, SOLUTION INTRAVENOUS
Status: DISCONTINUED | OUTPATIENT
Start: 2025-04-14 | End: 2025-04-14 | Stop reason: SDUPTHER

## 2025-04-14 RX ORDER — SODIUM CHLORIDE 0.9 % (FLUSH) 0.9 %
5-40 SYRINGE (ML) INJECTION PRN
Status: DISCONTINUED | OUTPATIENT
Start: 2025-04-14 | End: 2025-04-14 | Stop reason: HOSPADM

## 2025-04-14 RX ORDER — HYDROMORPHONE HYDROCHLORIDE 1 MG/ML
0.5 INJECTION, SOLUTION INTRAMUSCULAR; INTRAVENOUS; SUBCUTANEOUS EVERY 5 MIN PRN
Status: DISCONTINUED | OUTPATIENT
Start: 2025-04-14 | End: 2025-04-14 | Stop reason: HOSPADM

## 2025-04-14 RX ORDER — ROCURONIUM BROMIDE 10 MG/ML
INJECTION, SOLUTION INTRAVENOUS
Status: DISCONTINUED | OUTPATIENT
Start: 2025-04-14 | End: 2025-04-14 | Stop reason: SDUPTHER

## 2025-04-14 RX ADMIN — INSULIN LISPRO 4 UNITS: 100 INJECTION, SOLUTION INTRAVENOUS; SUBCUTANEOUS at 18:14

## 2025-04-14 RX ADMIN — DEXMEDETOMIDINE 2 MCG: 100 INJECTION, SOLUTION INTRAVENOUS at 11:04

## 2025-04-14 RX ADMIN — HYDROMORPHONE HYDROCHLORIDE 0.2 MG: 1 INJECTION, SOLUTION INTRAMUSCULAR; INTRAVENOUS; SUBCUTANEOUS at 12:38

## 2025-04-14 RX ADMIN — INSULIN LISPRO 8 UNITS: 100 INJECTION, SOLUTION INTRAVENOUS; SUBCUTANEOUS at 21:18

## 2025-04-14 RX ADMIN — HEPARIN SODIUM 1000 UNITS: 1000 INJECTION, SOLUTION INTRAVENOUS; SUBCUTANEOUS at 11:30

## 2025-04-14 RX ADMIN — VANCOMYCIN 1250 MG: 1.75 INJECTION, SOLUTION INTRAVENOUS at 03:48

## 2025-04-14 RX ADMIN — SODIUM CHLORIDE, POTASSIUM CHLORIDE, SODIUM LACTATE AND CALCIUM CHLORIDE: 600; 310; 30; 20 INJECTION, SOLUTION INTRAVENOUS at 09:37

## 2025-04-14 RX ADMIN — DEXAMETHASONE SODIUM PHOSPHATE 4 MG: 4 INJECTION, SOLUTION INTRAMUSCULAR; INTRAVENOUS at 09:52

## 2025-04-14 RX ADMIN — SODIUM CHLORIDE, PRESERVATIVE FREE 10 ML: 5 INJECTION INTRAVENOUS at 21:06

## 2025-04-14 RX ADMIN — ROCURONIUM BROMIDE 10 MG: 10 INJECTION INTRAVENOUS at 12:45

## 2025-04-14 RX ADMIN — ROCURONIUM BROMIDE 10 MG: 10 INJECTION INTRAVENOUS at 10:17

## 2025-04-14 RX ADMIN — NEOMYCIN SULFATE, POLYMYXIN B SULFATE AND DEXAMETHASONE 1 DROP: 3.5; 10000; 1 SUSPENSION/ DROPS OPHTHALMIC at 00:19

## 2025-04-14 RX ADMIN — SODIUM CHLORIDE, POTASSIUM CHLORIDE, SODIUM LACTATE AND CALCIUM CHLORIDE: 600; 310; 30; 20 INJECTION, SOLUTION INTRAVENOUS at 12:49

## 2025-04-14 RX ADMIN — SUGAMMADEX 200 MG: 100 INJECTION, SOLUTION INTRAVENOUS at 13:18

## 2025-04-14 RX ADMIN — HEPARIN SODIUM 1000 UNITS: 1000 INJECTION, SOLUTION INTRAVENOUS; SUBCUTANEOUS at 12:40

## 2025-04-14 RX ADMIN — NEOMYCIN SULFATE, POLYMYXIN B SULFATE AND DEXAMETHASONE 1 DROP: 3.5; 10000; 1 SUSPENSION/ DROPS OPHTHALMIC at 06:21

## 2025-04-14 RX ADMIN — ACETAMINOPHEN 650 MG: 325 TABLET ORAL at 23:08

## 2025-04-14 RX ADMIN — NEOMYCIN SULFATE, POLYMYXIN B SULFATE AND DEXAMETHASONE 1 DROP: 3.5; 10000; 1 SUSPENSION/ DROPS OPHTHALMIC at 23:44

## 2025-04-14 RX ADMIN — ENOXAPARIN SODIUM 80 MG: 100 INJECTION SUBCUTANEOUS at 14:34

## 2025-04-14 RX ADMIN — ROCURONIUM BROMIDE 10 MG: 10 INJECTION INTRAVENOUS at 10:41

## 2025-04-14 RX ADMIN — ASPIRIN 81 MG: 81 TABLET, CHEWABLE ORAL at 18:14

## 2025-04-14 RX ADMIN — ONDANSETRON 4 MG: 2 INJECTION, SOLUTION INTRAMUSCULAR; INTRAVENOUS at 13:14

## 2025-04-14 RX ADMIN — NEOMYCIN SULFATE, POLYMYXIN B SULFATE AND DEXAMETHASONE 1 DROP: 3.5; 10000; 1 SUSPENSION/ DROPS OPHTHALMIC at 18:14

## 2025-04-14 RX ADMIN — INSULIN HUMAN 4 UNITS: 100 INJECTION, SUSPENSION SUBCUTANEOUS at 21:18

## 2025-04-14 RX ADMIN — PHENYLEPHRINE HYDROCHLORIDE 20 MCG/MIN: 10 INJECTION INTRAVENOUS at 09:48

## 2025-04-14 RX ADMIN — ROCURONIUM BROMIDE 20 MG: 10 INJECTION INTRAVENOUS at 10:14

## 2025-04-14 RX ADMIN — FENTANYL CITRATE 50 MCG: 50 INJECTION, SOLUTION INTRAMUSCULAR; INTRAVENOUS at 09:45

## 2025-04-14 RX ADMIN — HYDROMORPHONE HYDROCHLORIDE 0.2 MG: 1 INJECTION, SOLUTION INTRAMUSCULAR; INTRAVENOUS; SUBCUTANEOUS at 11:15

## 2025-04-14 RX ADMIN — PROPOFOL 140 MG: 10 INJECTION, EMULSION INTRAVENOUS at 09:46

## 2025-04-14 RX ADMIN — FENTANYL CITRATE 25 MCG: 50 INJECTION, SOLUTION INTRAMUSCULAR; INTRAVENOUS at 10:59

## 2025-04-14 RX ADMIN — WHITE PETROLATUM 57.7 %-MINERAL OIL 31.9 % EYE OINTMENT: at 23:02

## 2025-04-14 RX ADMIN — FENTANYL CITRATE 25 MCG: 50 INJECTION, SOLUTION INTRAMUSCULAR; INTRAVENOUS at 10:13

## 2025-04-14 RX ADMIN — SUCCINYLCHOLINE CHLORIDE 120 MG: 20 INJECTION, SOLUTION INTRAMUSCULAR; INTRAVENOUS at 09:47

## 2025-04-14 RX ADMIN — DEXMEDETOMIDINE 2 MCG: 100 INJECTION, SOLUTION INTRAVENOUS at 10:58

## 2025-04-14 RX ADMIN — LIDOCAINE HYDROCHLORIDE 80 MG: 20 INJECTION, SOLUTION EPIDURAL; INFILTRATION; INTRACAUDAL; PERINEURAL at 09:45

## 2025-04-14 RX ADMIN — HEPARIN SODIUM 5000 UNITS: 1000 INJECTION, SOLUTION INTRAVENOUS; SUBCUTANEOUS at 10:31

## 2025-04-14 ASSESSMENT — PAIN DESCRIPTION - DESCRIPTORS
DESCRIPTORS: DISCOMFORT;ACHING
DESCRIPTORS: ACHING

## 2025-04-14 ASSESSMENT — LIFESTYLE VARIABLES: SMOKING_STATUS: 1

## 2025-04-14 ASSESSMENT — PAIN - FUNCTIONAL ASSESSMENT
PAIN_FUNCTIONAL_ASSESSMENT: PREVENTS OR INTERFERES SOME ACTIVE ACTIVITIES AND ADLS
PAIN_FUNCTIONAL_ASSESSMENT: 0-10
PAIN_FUNCTIONAL_ASSESSMENT: PREVENTS OR INTERFERES WITH MANY ACTIVE NOT PASSIVE ACTIVITIES

## 2025-04-14 ASSESSMENT — PAIN DESCRIPTION - FREQUENCY: FREQUENCY: CONTINUOUS

## 2025-04-14 ASSESSMENT — PAIN DESCRIPTION - LOCATION: LOCATION: FOOT

## 2025-04-14 ASSESSMENT — PAIN DESCRIPTION - ORIENTATION: ORIENTATION: LEFT

## 2025-04-14 ASSESSMENT — ENCOUNTER SYMPTOMS: SHORTNESS OF BREATH: 0

## 2025-04-14 ASSESSMENT — PAIN SCALES - WONG BAKER: WONGBAKER_NUMERICALRESPONSE: HURTS A LITTLE BIT

## 2025-04-14 ASSESSMENT — PAIN DESCRIPTION - PAIN TYPE: TYPE: CHRONIC PAIN

## 2025-04-14 NOTE — ANESTHESIA POST-OP
TRANSFER - OUT REPORT:    Verbal report given to Minnie HONG (name) on Errol Brandt  being transferred to [facility](unit) for routine post-op       Report consisted of patient’s Situation, Background, Assessment and   Recommendations(SBAR).     Information from the following report(s) Surgery Report, Intake/Output, MAR, and Recent Results was reviewed with the receiving nurse.    Opportunity for questions and clarification was provided.      Patient transported with:   O2 @ 2 liters  Tech  Receiving RN reminded to release postop orders, I released Lovenox order as dose was due a 3pm and was given in PACU.

## 2025-04-14 NOTE — BRIEF OP NOTE
Brief Postoperative Note      Patient: Errol Brandt  YOB: 1945  MRN: [id number]    Date of Procedure: 4/14/2025    Pre-Op Diagnosis: CLTI LLE w/ gangrene heel    Post-Op Diagnosis: Same       Proc: Left SFA=>TPT/PT bypass w/reversed GSV    Surgeon(s):  Foster Garcia MD    Assistant:  First Assistant: Salena Hanna RN    Anesthesia: General    Estimated Blood Loss (mL): 200     Complications: None    Findings: heavily diseased tibial vessels.  Outflow of graft into all three w/toe of anastomosis on PT artery.    Electronically signed by Foster Garcia MD on 4/14/2025 at 1:33 PM

## 2025-04-14 NOTE — OP NOTE
[facility]              [address] [place]  [address]                            OPERATIVE REPORT      PATIENT NAME: IRAIDA KENNEDY               : 1945  MED REC NO: [id number]                       ROOM: 1135  ACCOUNT NO: [id number]                       ADMIT DATE: 2025  PROVIDER: Foster Garcia MD    DATE OF SERVICE:  2025    PREOPERATIVE DIAGNOSES:  Chronic limb-threatening ischemia of the left leg.    POSTOPERATIVE DIAGNOSES:  Chronic limb-threatening ischemia of the left leg.    PROCEDURES PERFORMED:       1. Right common femoral artery sheath placement with ultrasound guidance.     2. Left leg runoff arteriogram with intraoperative fluoroscopy.     3. Left posterior tibial artery balloon angioplasty (2 mm).     4. Excisional debridement of left heel.    SURGEON:  Foster Garcia MD    ANESTHESIA:  General.    ESTIMATED BLOOD LOSS:  Minimal.    COMPLICATIONS:  none    INDICATIONS:  The patient is a 80-year-old gentleman with chronic ischemia of his left leg, who not long ago had debridement, exploration of his left heel, removal of a foreign body.  The heel wound has become gangrenous.  The patient has no pulses palpable below the femoral level and is brought to the operating room for further heel debridement and percutaneous arterial intervention.    DESCRIPTION OF PROCEDURE:  After informed consent, the patient was placed supine on the operating table.  After adequate induction of general anesthesia, site and patient confirmation, and confirmation of ongoing antibiotics for infection, the right groin was prepped and draped in usual sterile fashion.  1% local lidocaine was used in conjunction with hand-held ultrasound to percutaneously access the right common femoral artery directly over the femoral head and avoiding visible calcium.  Hand-held ultrasound was used to verify vessel patency and confirm needle position.  A 6-M[place] sheath

## 2025-04-14 NOTE — PERIOP NOTE
0853 - PT INTO PRE-OP HOLDING 18.  ALERT AND ORIENTED X4 - PT VERY [place].  OSULLIVAN SPON AND TO COMMAND.  CHILO LE WEAK.  RESP EVEN AND UNLABORED BSB DIMINISHED.  POX ON RA > 94%.  PT C/O 10/10 LEFT HEEL PAIN  - ABLE TO REST IN NAPS.  PRE-OP TCHING DONE - PT VERBALIZES UNDERSTANDING.   SR UP X3, CB IN PLACE  WAITING SURGERY.

## 2025-04-14 NOTE — ANESTHESIA POSTPROCEDURE EVALUATION
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, unspecified laterality, unspecified ulcer stage [E13.621, L97.409])    Providers: Foster Garcia MD Responsible Provider: Gomez Camargo MD    Anesthesia Type: General ASA Status: 3            Anesthesia Type: General    Nargis Phase I: Nargis Score: 8    Nargis Phase II:      Anesthesia Post Evaluation    Patient location during evaluation: PACU  Patient participation: complete - patient participated  Level of consciousness: sleepy but conscious and responsive to verbal stimuli  Airway patency: patent  Nausea & Vomiting: no vomiting and no nausea  Cardiovascular status: blood pressure returned to baseline and hemodynamically stable  Respiratory status: acceptable  Hydration status: stable    No notable events documented.

## 2025-04-14 NOTE — FLOWSHEET NOTE
04/14/25 1336   Handoff   Communication Given Periop Handoff/Relief   Handoff phase Phase I receiving   Handoff Given To Jg Vela RN   Handoff Received From Justin Angelo RN /Jay Forte CRNA   Handoff Communication Face to Face;At bedside   Time Handoff Given 5145

## 2025-04-14 NOTE — ANESTHESIA PRE PROCEDURE
Department of Anesthesiology  Preprocedure Note       Name:  Errol Brandt   Age:  80 y.o.  :  1945                                          MRN:  [id number]         Date:  2025      Surgeon: Surgeon(s):  Foster Garcia MD    Procedure: Procedure(s):  LEFT FEMORAL  TO TIBIAL BYPASS WITH VEIN    Medications prior to admission:   Prior to Admission medications    Medication Sig Start Date End Date Taking? Authorizing Provider   NOVOLOG MIX 70/30 FLEXPEN injection Take 24 units before breakfast and 22 units before dinner (take 15 minutes before the meal time), please set up a follow up with Dr Navarro by calling [phone] 24   Tran Navarro MD   Continuous Glucose Sensor (DEXCOM G7 SENSOR) MISC USED TO MEASURE BLOOD SUGAR 4 TO 5 TIMES PER DAY, 24   Tran Navarro MD   Continuous Glucose  (DEXCOM G7 ) RENEA For checking blood sugar 4 to 5 times per day 24   Tran Navarro MD   metFORMIN (GLUCOPHAGE-XR) 500 MG extended release tablet Take 2 tablets by mouth Daily with supper 24   Tran Navarro MD   Glucagon (GVOKE HYPOPEN 2-PACK) 0.5 MG/0.1ML SOAJ Use as needed for hypoglycemia (one 2-pack) 24   Tran Navarro MD   Diabetic Shoe MISC by Does not apply route * I am treating the patient under a comprehensive plan of care for diabetes and the patient would benefit from diabetic footwear to protect their feet, and this includes shoes and insoles.    Tran Navarro MD   aspirin 81 MG EC tablet Take 1 tablet by mouth daily 3/22/24   Erin Concepcion APRN - NP   atorvastatin (LIPITOR) 80 MG tablet Take 1 tablet by mouth nightly 3/22/24   Erin Concepcion APRN - NP   levothyroxine (SYNTHROID) 125 MCG tablet Take 1 tablet by mouth every morning (before breakfast) 3/22/24   Erin Concepcion APRN - MARTINE   metoprolol succinate (TOPROL XL) 25 MG extended release tablet Take 0.5 tablets by mouth daily 3/22/24   Concepcion, Erin E, APRN - NP   prasugrel (EFFIENT) 10 MG

## 2025-04-15 LAB
ANION GAP SERPL CALC-SCNC: 6 MMOL/L (ref 2–12)
BUN SERPL-MCNC: 40 MG/DL (ref 6–20)
BUN/CREAT SERPL: 26 (ref 12–20)
CALCIUM SERPL-MCNC: 8.2 MG/DL (ref 8.5–10.1)
CHLORIDE SERPL-SCNC: 103 MMOL/L (ref 97–108)
CO2 SERPL-SCNC: 23 MMOL/L (ref 21–32)
CREAT SERPL-MCNC: 1.51 MG/DL (ref 0.7–1.3)
ERYTHROCYTE [DISTWIDTH] IN BLOOD BY AUTOMATED COUNT: 13 % (ref 11.5–14.5)
GLUCOSE BLD STRIP.AUTO-MCNC: 232 MG/DL (ref 65–117)
GLUCOSE BLD STRIP.AUTO-MCNC: 284 MG/DL (ref 65–117)
GLUCOSE BLD STRIP.AUTO-MCNC: 286 MG/DL (ref 65–117)
GLUCOSE BLD STRIP.AUTO-MCNC: 302 MG/DL (ref 65–117)
GLUCOSE SERPL-MCNC: 331 MG/DL (ref 65–100)
HCT VFR BLD AUTO: 32 % (ref 36.6–50.3)
HGB BLD-MCNC: 10.6 G/DL (ref 12.1–17)
MCH RBC QN AUTO: 33.5 PG (ref 26–34)
MCHC RBC AUTO-ENTMCNC: 33.1 G/DL (ref 30–36.5)
MCV RBC AUTO: 101.3 FL (ref 80–99)
NRBC # BLD: 0 K/UL (ref 0–0.01)
NRBC BLD-RTO: 0 PER 100 WBC
PLATELET # BLD AUTO: 391 K/UL (ref 150–400)
PMV BLD AUTO: 9.4 FL (ref 8.9–12.9)
POTASSIUM SERPL-SCNC: 4.9 MMOL/L (ref 3.5–5.1)
RBC # BLD AUTO: 3.16 M/UL (ref 4.1–5.7)
SERVICE CMNT-IMP: ABNORMAL
SODIUM SERPL-SCNC: 132 MMOL/L (ref 136–145)
VANCOMYCIN SERPL-MCNC: 16.3 UG/ML
WBC # BLD AUTO: 9.9 K/UL (ref 4.1–11.1)

## 2025-04-15 PROCEDURE — 6370000000 HC RX 637 (ALT 250 FOR IP): Performed by: SURGERY

## 2025-04-15 PROCEDURE — 99231 SBSQ HOSP IP/OBS SF/LOW 25: CPT

## 2025-04-15 PROCEDURE — 80048 BASIC METABOLIC PNL TOTAL CA: CPT

## 2025-04-15 PROCEDURE — 1100000000 HC RM PRIVATE

## 2025-04-15 PROCEDURE — 85027 COMPLETE CBC AUTOMATED: CPT

## 2025-04-15 PROCEDURE — 97535 SELF CARE MNGMENT TRAINING: CPT

## 2025-04-15 PROCEDURE — 82962 GLUCOSE BLOOD TEST: CPT

## 2025-04-15 PROCEDURE — 6360000002 HC RX W HCPCS: Performed by: SURGERY

## 2025-04-15 PROCEDURE — 2500000003 HC RX 250 WO HCPCS: Performed by: SURGERY

## 2025-04-15 PROCEDURE — 80202 ASSAY OF VANCOMYCIN: CPT

## 2025-04-15 PROCEDURE — 97530 THERAPEUTIC ACTIVITIES: CPT | Performed by: PHYSICAL THERAPIST

## 2025-04-15 PROCEDURE — 36415 COLL VENOUS BLD VENIPUNCTURE: CPT

## 2025-04-15 PROCEDURE — 97164 PT RE-EVAL EST PLAN CARE: CPT | Performed by: PHYSICAL THERAPIST

## 2025-04-15 PROCEDURE — 97168 OT RE-EVAL EST PLAN CARE: CPT

## 2025-04-15 RX ADMIN — SODIUM CHLORIDE, PRESERVATIVE FREE 10 ML: 5 INJECTION INTRAVENOUS at 09:24

## 2025-04-15 RX ADMIN — SODIUM CHLORIDE, PRESERVATIVE FREE 10 ML: 5 INJECTION INTRAVENOUS at 21:07

## 2025-04-15 RX ADMIN — WHITE PETROLATUM 57.7 %-MINERAL OIL 31.9 % EYE OINTMENT: at 21:08

## 2025-04-15 RX ADMIN — INSULIN LISPRO 6 UNITS: 100 INJECTION, SOLUTION INTRAVENOUS; SUBCUTANEOUS at 17:40

## 2025-04-15 RX ADMIN — ASPIRIN 81 MG: 81 TABLET, CHEWABLE ORAL at 09:23

## 2025-04-15 RX ADMIN — ACETAMINOPHEN 650 MG: 325 TABLET ORAL at 16:57

## 2025-04-15 RX ADMIN — NEOMYCIN SULFATE, POLYMYXIN B SULFATE AND DEXAMETHASONE 1 DROP: 3.5; 10000; 1 SUSPENSION/ DROPS OPHTHALMIC at 05:57

## 2025-04-15 RX ADMIN — ENOXAPARIN SODIUM 80 MG: 100 INJECTION SUBCUTANEOUS at 16:48

## 2025-04-15 RX ADMIN — INSULIN LISPRO 2 UNITS: 100 INJECTION, SOLUTION INTRAVENOUS; SUBCUTANEOUS at 13:32

## 2025-04-15 RX ADMIN — ENOXAPARIN SODIUM 80 MG: 100 INJECTION SUBCUTANEOUS at 02:57

## 2025-04-15 RX ADMIN — Medication 1 CAPSULE: at 09:23

## 2025-04-15 RX ADMIN — INSULIN LISPRO 4 UNITS: 100 INJECTION, SOLUTION INTRAVENOUS; SUBCUTANEOUS at 09:23

## 2025-04-15 RX ADMIN — COLLAGENASE SANTYL: 250 OINTMENT TOPICAL at 13:33

## 2025-04-15 RX ADMIN — VANCOMYCIN 1250 MG: 1.75 INJECTION, SOLUTION INTRAVENOUS at 04:57

## 2025-04-15 RX ADMIN — INSULIN LISPRO 4 UNITS: 100 INJECTION, SOLUTION INTRAVENOUS; SUBCUTANEOUS at 21:07

## 2025-04-15 RX ADMIN — INSULIN HUMAN 4 UNITS: 100 INJECTION, SUSPENSION SUBCUTANEOUS at 21:06

## 2025-04-15 ASSESSMENT — PAIN DESCRIPTION - ORIENTATION
ORIENTATION: LEFT
ORIENTATION: LEFT

## 2025-04-15 ASSESSMENT — PAIN DESCRIPTION - DESCRIPTORS: DESCRIPTORS: ACHING

## 2025-04-15 ASSESSMENT — PAIN SCALES - GENERAL
PAINLEVEL_OUTOF10: 3
PAINLEVEL_OUTOF10: 0
PAINLEVEL_OUTOF10: 10
PAINLEVEL_OUTOF10: 0

## 2025-04-15 ASSESSMENT — PAIN DESCRIPTION - LOCATION
LOCATION: LEG
LOCATION: LEG

## 2025-04-15 ASSESSMENT — PAIN - FUNCTIONAL ASSESSMENT: PAIN_FUNCTIONAL_ASSESSMENT: ACTIVITIES ARE NOT PREVENTED

## 2025-04-15 NOTE — CARE COORDINATION
sition of Care Plan:     RUR: 17%  Prior Level of Functioning: Independent   Disposition: SNF   SHALONDA: -  If SNF or IPR: Date FOC offered: 4/3/25  Date FOC received: 4/3/25  Accepting facility: [facility]   Date authorization started with reference number: Prior auth . Facility restarting auth on 4/15/25  Date authorization received and expires: new auth started  Follow up appointments: Defer to facility   DME needed: Defer to facility   Transportation at discharge: BLS   IM/IMM Medicare/ letter given: 2nd IMM letter needed  Is patient a [place] and connected with VA? No              If yes, was [place] transfer form completed and VA notified? No  Caregiver Contact: pt's son   Julio Brandt  [phone]     Discharge Caregiver contacted prior to discharge? Pt will contacted   Care Conference needed? No  Barriers to discharge: Medical clearance, auth from insurance obtained for SNF       Chart reviewed. Patient discussed in rounds and not ready for discharge.     CM spoke with Erik Stark RN, liaison for SNF. States patient has been accepted, but auth that was obtained has . Facility has started new auth at [facility].      CM will continue to follow.    Delfina Morin RN  Care Manager  [phone]

## 2025-04-16 LAB
ANION GAP SERPL CALC-SCNC: 5 MMOL/L (ref 2–12)
BUN SERPL-MCNC: 30 MG/DL (ref 6–20)
BUN/CREAT SERPL: 27 (ref 12–20)
CALCIUM SERPL-MCNC: 7.9 MG/DL (ref 8.5–10.1)
CHLORIDE SERPL-SCNC: 107 MMOL/L (ref 97–108)
CO2 SERPL-SCNC: 23 MMOL/L (ref 21–32)
CREAT SERPL-MCNC: 1.13 MG/DL (ref 0.7–1.3)
ECHO BSA: 2.05 M2
ERYTHROCYTE [DISTWIDTH] IN BLOOD BY AUTOMATED COUNT: 12.9 % (ref 11.5–14.5)
GLUCOSE BLD STRIP.AUTO-MCNC: 222 MG/DL (ref 65–117)
GLUCOSE BLD STRIP.AUTO-MCNC: 226 MG/DL (ref 65–117)
GLUCOSE BLD STRIP.AUTO-MCNC: 247 MG/DL (ref 65–117)
GLUCOSE BLD STRIP.AUTO-MCNC: 272 MG/DL (ref 65–117)
GLUCOSE SERPL-MCNC: 201 MG/DL (ref 65–100)
HCT VFR BLD AUTO: 30.8 % (ref 36.6–50.3)
HGB BLD-MCNC: 10 G/DL (ref 12.1–17)
MCH RBC QN AUTO: 33.7 PG (ref 26–34)
MCHC RBC AUTO-ENTMCNC: 32.5 G/DL (ref 30–36.5)
MCV RBC AUTO: 103.7 FL (ref 80–99)
NRBC # BLD: 0 K/UL (ref 0–0.01)
NRBC BLD-RTO: 0 PER 100 WBC
PLATELET # BLD AUTO: 330 K/UL (ref 150–400)
PMV BLD AUTO: 9.8 FL (ref 8.9–12.9)
POTASSIUM SERPL-SCNC: 4.3 MMOL/L (ref 3.5–5.1)
RBC # BLD AUTO: 2.97 M/UL (ref 4.1–5.7)
SERVICE CMNT-IMP: ABNORMAL
SODIUM SERPL-SCNC: 135 MMOL/L (ref 136–145)
VAS LEFT GSV ANKLE DIAM: 3.8 MM
VAS LEFT GSV AT KNEE DIAM: 4.4 MM
VAS LEFT GSV BK MID DIAM: 3 MM
VAS LEFT GSV BK PROX DIAM: 2.9 MM
VAS LEFT GSV THIGH DIST DIAM: 5 MM
VAS LEFT GSV THIGH MID DIAM: 4.5 MM
VAS LEFT GSV THIGH PROX DIAM: 5.3 MM
WBC # BLD AUTO: 7.8 K/UL (ref 4.1–11.1)

## 2025-04-16 PROCEDURE — 80048 BASIC METABOLIC PNL TOTAL CA: CPT

## 2025-04-16 PROCEDURE — 97116 GAIT TRAINING THERAPY: CPT

## 2025-04-16 PROCEDURE — 2580000003 HC RX 258: Performed by: INTERNAL MEDICINE

## 2025-04-16 PROCEDURE — 99231 SBSQ HOSP IP/OBS SF/LOW 25: CPT

## 2025-04-16 PROCEDURE — 6360000002 HC RX W HCPCS: Performed by: SURGERY

## 2025-04-16 PROCEDURE — 2500000003 HC RX 250 WO HCPCS: Performed by: SURGERY

## 2025-04-16 PROCEDURE — 6370000000 HC RX 637 (ALT 250 FOR IP): Performed by: SURGERY

## 2025-04-16 PROCEDURE — 97530 THERAPEUTIC ACTIVITIES: CPT

## 2025-04-16 PROCEDURE — 99232 SBSQ HOSP IP/OBS MODERATE 35: CPT | Performed by: INTERNAL MEDICINE

## 2025-04-16 PROCEDURE — 36415 COLL VENOUS BLD VENIPUNCTURE: CPT

## 2025-04-16 PROCEDURE — 6370000000 HC RX 637 (ALT 250 FOR IP): Performed by: INTERNAL MEDICINE

## 2025-04-16 PROCEDURE — 85027 COMPLETE CBC AUTOMATED: CPT

## 2025-04-16 PROCEDURE — 1100000000 HC RM PRIVATE

## 2025-04-16 PROCEDURE — 6360000002 HC RX W HCPCS: Performed by: INTERNAL MEDICINE

## 2025-04-16 PROCEDURE — 82962 GLUCOSE BLOOD TEST: CPT

## 2025-04-16 RX ADMIN — INSULIN LISPRO 2 UNITS: 100 INJECTION, SOLUTION INTRAVENOUS; SUBCUTANEOUS at 17:43

## 2025-04-16 RX ADMIN — INSULIN LISPRO 2 UNITS: 100 INJECTION, SOLUTION INTRAVENOUS; SUBCUTANEOUS at 20:54

## 2025-04-16 RX ADMIN — Medication 1 CAPSULE: at 09:51

## 2025-04-16 RX ADMIN — ASPIRIN 81 MG: 81 TABLET, CHEWABLE ORAL at 09:51

## 2025-04-16 RX ADMIN — COLLAGENASE SANTYL: 250 OINTMENT TOPICAL at 09:53

## 2025-04-16 RX ADMIN — SODIUM CHLORIDE, PRESERVATIVE FREE 10 ML: 5 INJECTION INTRAVENOUS at 20:55

## 2025-04-16 RX ADMIN — ENOXAPARIN SODIUM 80 MG: 100 INJECTION SUBCUTANEOUS at 17:44

## 2025-04-16 RX ADMIN — ACETAMINOPHEN 650 MG: 325 TABLET ORAL at 20:45

## 2025-04-16 RX ADMIN — INSULIN LISPRO 4 UNITS: 100 INJECTION, SOLUTION INTRAVENOUS; SUBCUTANEOUS at 13:47

## 2025-04-16 RX ADMIN — VANCOMYCIN HYDROCHLORIDE 1000 MG: 1 INJECTION, POWDER, LYOPHILIZED, FOR SOLUTION INTRAVENOUS at 04:21

## 2025-04-16 RX ADMIN — INSULIN HUMAN 4 UNITS: 100 INJECTION, SUSPENSION SUBCUTANEOUS at 20:54

## 2025-04-16 RX ADMIN — INSULIN HUMAN 24 UNITS: 100 INJECTION, SUSPENSION SUBCUTANEOUS at 09:52

## 2025-04-16 RX ADMIN — WHITE PETROLATUM 57.7 %-MINERAL OIL 31.9 % EYE OINTMENT: at 20:55

## 2025-04-16 RX ADMIN — ENOXAPARIN SODIUM 80 MG: 100 INJECTION SUBCUTANEOUS at 04:22

## 2025-04-16 RX ADMIN — INSULIN LISPRO 2 UNITS: 100 INJECTION, SOLUTION INTRAVENOUS; SUBCUTANEOUS at 09:52

## 2025-04-16 ASSESSMENT — PAIN DESCRIPTION - LOCATION: LOCATION: LEG

## 2025-04-16 ASSESSMENT — PAIN DESCRIPTION - ORIENTATION: ORIENTATION: LEFT

## 2025-04-16 NOTE — OP NOTE
[facility]              [address] [place]  [address]                            OPERATIVE REPORT      PATIENT NAME: IRAIDA KENNEDY               : 1945  MED REC NO: [id number]                       ROOM: 3114  ACCOUNT NO: [id number]                       ADMIT DATE: 2025  PROVIDER: Foster Garcia MD    DATE OF SERVICE:  2025    PREOPERATIVE DIAGNOSES:  Chronic limb-threatening ischemia on the left with gangrene of the heel.    POSTOPERATIVE DIAGNOSES:  Chronic limb-threatening ischemia on the left with gangrene of the heel.    PROCEDURES PERFORMED:  Left superficial femoral to tibioperoneal trunk/posterior tibial bypass with reversed ipsilateral greater saphenous vein.    SURGEON:  Foster Garcia MD    ANESTHESIA:  General.    ESTIMATED BLOOD LOSS:  200 mL.    COMPLICATIONS:  None.    INDICATIONS:  The patient is a 80-year-old diabetic, who several weeks previously stepped on a metallic foreign body creating an infected left heel wound, which has now become gangrenous.  Attempts at percutaneous revascularization were unsuccessful and he is now brought to the operating room for the surgical bypass.    DESCRIPTION OF PROCEDURE:  After obtaining informed consent, the patient supine on the operating table, after adequate induction of general anesthesia, signing patient's confirmation, confirmation of ongoing antibiotics for infection, the left leg was prepped and draped in the usual sterile fashion.  Intraoperative ultrasound was used to confirm that the distal superficial femoral artery above the knee was patent with good color flow.  An incision was made over the marked greater saphenous vein at this level.  The saphenous vein at this level was of excellent caliber and quality and it was dissected free, retracted posteriorly, and protected.  Dissection was then deepened until the superficial femoral artery was dissected free and

## 2025-04-17 ENCOUNTER — ANESTHESIA EVENT (OUTPATIENT)
Facility: HOSPITAL | Age: 80
End: 2025-04-17
Payer: MEDICARE

## 2025-04-17 PROBLEM — R23.4 ESCHAR OF HEEL: Status: ACTIVE | Noted: 2025-04-17

## 2025-04-17 LAB
ANION GAP SERPL CALC-SCNC: 4 MMOL/L (ref 2–12)
BUN SERPL-MCNC: 24 MG/DL (ref 6–20)
BUN/CREAT SERPL: 21 (ref 12–20)
CALCIUM SERPL-MCNC: 8 MG/DL (ref 8.5–10.1)
CHLORIDE SERPL-SCNC: 107 MMOL/L (ref 97–108)
CO2 SERPL-SCNC: 23 MMOL/L (ref 21–32)
CREAT SERPL-MCNC: 1.12 MG/DL (ref 0.7–1.3)
ERYTHROCYTE [DISTWIDTH] IN BLOOD BY AUTOMATED COUNT: 12.7 % (ref 11.5–14.5)
GLUCOSE BLD STRIP.AUTO-MCNC: 155 MG/DL (ref 65–117)
GLUCOSE BLD STRIP.AUTO-MCNC: 189 MG/DL (ref 65–117)
GLUCOSE BLD STRIP.AUTO-MCNC: 196 MG/DL (ref 65–117)
GLUCOSE BLD STRIP.AUTO-MCNC: 202 MG/DL (ref 65–117)
GLUCOSE SERPL-MCNC: 147 MG/DL (ref 65–100)
HCT VFR BLD AUTO: 26.9 % (ref 36.6–50.3)
HGB BLD-MCNC: 8.7 G/DL (ref 12.1–17)
MCH RBC QN AUTO: 33.6 PG (ref 26–34)
MCHC RBC AUTO-ENTMCNC: 32.3 G/DL (ref 30–36.5)
MCV RBC AUTO: 103.9 FL (ref 80–99)
NRBC # BLD: 0 K/UL (ref 0–0.01)
NRBC BLD-RTO: 0 PER 100 WBC
PLATELET # BLD AUTO: 337 K/UL (ref 150–400)
PMV BLD AUTO: 9.4 FL (ref 8.9–12.9)
POTASSIUM SERPL-SCNC: 4 MMOL/L (ref 3.5–5.1)
RBC # BLD AUTO: 2.59 M/UL (ref 4.1–5.7)
SERVICE CMNT-IMP: ABNORMAL
SODIUM SERPL-SCNC: 134 MMOL/L (ref 136–145)
VANCOMYCIN SERPL-MCNC: 23.7 UG/ML
WBC # BLD AUTO: 7.7 K/UL (ref 4.1–11.1)

## 2025-04-17 PROCEDURE — 2580000003 HC RX 258: Performed by: INTERNAL MEDICINE

## 2025-04-17 PROCEDURE — 6360000002 HC RX W HCPCS: Performed by: INTERNAL MEDICINE

## 2025-04-17 PROCEDURE — 36415 COLL VENOUS BLD VENIPUNCTURE: CPT

## 2025-04-17 PROCEDURE — 85027 COMPLETE CBC AUTOMATED: CPT

## 2025-04-17 PROCEDURE — 6370000000 HC RX 637 (ALT 250 FOR IP): Performed by: SURGERY

## 2025-04-17 PROCEDURE — 80048 BASIC METABOLIC PNL TOTAL CA: CPT

## 2025-04-17 PROCEDURE — 6360000002 HC RX W HCPCS: Performed by: SURGERY

## 2025-04-17 PROCEDURE — 82962 GLUCOSE BLOOD TEST: CPT

## 2025-04-17 PROCEDURE — 2500000003 HC RX 250 WO HCPCS: Performed by: SURGERY

## 2025-04-17 PROCEDURE — 6370000000 HC RX 637 (ALT 250 FOR IP): Performed by: INTERNAL MEDICINE

## 2025-04-17 PROCEDURE — 80202 ASSAY OF VANCOMYCIN: CPT

## 2025-04-17 PROCEDURE — 1100000000 HC RM PRIVATE

## 2025-04-17 PROCEDURE — 99232 SBSQ HOSP IP/OBS MODERATE 35: CPT | Performed by: INTERNAL MEDICINE

## 2025-04-17 RX ORDER — VANCOMYCIN 1.75 G/350ML
1250 INJECTION, SOLUTION INTRAVENOUS EVERY 24 HOURS
Status: DISCONTINUED | OUTPATIENT
Start: 2025-04-18 | End: 2025-04-17

## 2025-04-17 RX ADMIN — ACETAMINOPHEN 650 MG: 325 TABLET ORAL at 18:45

## 2025-04-17 RX ADMIN — INSULIN LISPRO 2 UNITS: 100 INJECTION, SOLUTION INTRAVENOUS; SUBCUTANEOUS at 18:43

## 2025-04-17 RX ADMIN — ASPIRIN 81 MG: 81 TABLET, CHEWABLE ORAL at 09:31

## 2025-04-17 RX ADMIN — INSULIN HUMAN 24 UNITS: 100 INJECTION, SUSPENSION SUBCUTANEOUS at 09:31

## 2025-04-17 RX ADMIN — WHITE PETROLATUM 57.7 %-MINERAL OIL 31.9 % EYE OINTMENT: at 21:49

## 2025-04-17 RX ADMIN — INSULIN HUMAN 4 UNITS: 100 INJECTION, SUSPENSION SUBCUTANEOUS at 21:49

## 2025-04-17 RX ADMIN — SODIUM CHLORIDE, PRESERVATIVE FREE 10 ML: 5 INJECTION INTRAVENOUS at 21:50

## 2025-04-17 RX ADMIN — INSULIN LISPRO 2 UNITS: 100 INJECTION, SOLUTION INTRAVENOUS; SUBCUTANEOUS at 13:32

## 2025-04-17 RX ADMIN — Medication 1 CAPSULE: at 09:30

## 2025-04-17 RX ADMIN — ENOXAPARIN SODIUM 80 MG: 100 INJECTION SUBCUTANEOUS at 04:27

## 2025-04-17 RX ADMIN — COLLAGENASE SANTYL: 250 OINTMENT TOPICAL at 17:20

## 2025-04-17 RX ADMIN — VANCOMYCIN HYDROCHLORIDE 1000 MG: 1 INJECTION, POWDER, LYOPHILIZED, FOR SOLUTION INTRAVENOUS at 04:25

## 2025-04-17 RX ADMIN — SODIUM CHLORIDE, PRESERVATIVE FREE 10 ML: 5 INJECTION INTRAVENOUS at 09:31

## 2025-04-17 RX ADMIN — INSULIN LISPRO 2 UNITS: 100 INJECTION, SOLUTION INTRAVENOUS; SUBCUTANEOUS at 21:49

## 2025-04-17 RX ADMIN — ENOXAPARIN SODIUM 80 MG: 100 INJECTION SUBCUTANEOUS at 15:13

## 2025-04-17 ASSESSMENT — LIFESTYLE VARIABLES: SMOKING_STATUS: 1

## 2025-04-17 ASSESSMENT — PAIN SCALES - GENERAL
PAINLEVEL_OUTOF10: 10
PAINLEVEL_OUTOF10: 0

## 2025-04-17 ASSESSMENT — PAIN DESCRIPTION - ORIENTATION: ORIENTATION: LEFT

## 2025-04-17 ASSESSMENT — PAIN DESCRIPTION - LOCATION: LOCATION: LEG

## 2025-04-17 ASSESSMENT — PAIN DESCRIPTION - DESCRIPTORS: DESCRIPTORS: ACHING

## 2025-04-17 ASSESSMENT — ENCOUNTER SYMPTOMS: SHORTNESS OF BREATH: 0

## 2025-04-17 ASSESSMENT — PAIN - FUNCTIONAL ASSESSMENT: PAIN_FUNCTIONAL_ASSESSMENT: ACTIVITIES ARE NOT PREVENTED

## 2025-04-17 NOTE — ANESTHESIA PRE PROCEDURE
Department of Anesthesiology  Preprocedure Note       Name:  Errol Brandt   Age:  80 y.o.  :  1945                                          MRN:  [id number]         Date:  2025      Surgeon: Surgeon(s):  Foster Garcia MD    Procedure: Procedure(s):  LEFT HEEL DEBRIDEMENT    Medications prior to admission:   Prior to Admission medications    Medication Sig Start Date End Date Taking? Authorizing Provider   NOVOLOG MIX 70/30 FLEXPEN injection Take 24 units before breakfast and 22 units before dinner (take 15 minutes before the meal time), please set up a follow up with Dr Navarro by calling [phone] 24   Tran Navarro MD   Continuous Glucose Sensor (DEXCOM G7 SENSOR) MISC USED TO MEASURE BLOOD SUGAR 4 TO 5 TIMES PER DAY, 24   Tran Navarro MD   Continuous Glucose  (DEXCOM G7 ) RENEA For checking blood sugar 4 to 5 times per day 24   Tran Navarro MD   metFORMIN (GLUCOPHAGE-XR) 500 MG extended release tablet Take 2 tablets by mouth Daily with supper 24   Tran Navarro MD   Glucagon (GVOKE HYPOPEN 2-PACK) 0.5 MG/0.1ML SOAJ Use as needed for hypoglycemia (one 2-pack) 24   Tran Navarro MD   Diabetic Shoe MISC by Does not apply route * I am treating the patient under a comprehensive plan of care for diabetes and the patient would benefit from diabetic footwear to protect their feet, and this includes shoes and insoles.    Tran Navarro MD   aspirin 81 MG EC tablet Take 1 tablet by mouth daily 3/22/24   Erin Concepcion APRERIC - NP   atorvastatin (LIPITOR) 80 MG tablet Take 1 tablet by mouth nightly 3/22/24   Erin Concepcion APRN - NP   levothyroxine (SYNTHROID) 125 MCG tablet Take 1 tablet by mouth every morning (before breakfast) 3/22/24   Erin Concepcion APRN - MARTINE   metoprolol succinate (TOPROL XL) 25 MG extended release tablet Take 0.5 tablets by mouth daily 3/22/24   Concepcion, Erin E, APRN - NP   prasugrel (EFFIENT) 10 MG TABS Take 1 tablet by

## 2025-04-18 ENCOUNTER — ANESTHESIA (OUTPATIENT)
Facility: HOSPITAL | Age: 80
End: 2025-04-18
Payer: MEDICARE

## 2025-04-18 LAB
ANION GAP SERPL CALC-SCNC: 6 MMOL/L (ref 2–12)
BUN SERPL-MCNC: 27 MG/DL (ref 6–20)
BUN/CREAT SERPL: 23 (ref 12–20)
CALCIUM SERPL-MCNC: 7.8 MG/DL (ref 8.5–10.1)
CHLORIDE SERPL-SCNC: 108 MMOL/L (ref 97–108)
CO2 SERPL-SCNC: 23 MMOL/L (ref 21–32)
CREAT SERPL-MCNC: 1.17 MG/DL (ref 0.7–1.3)
ERYTHROCYTE [DISTWIDTH] IN BLOOD BY AUTOMATED COUNT: 13 % (ref 11.5–14.5)
GLUCOSE BLD STRIP.AUTO-MCNC: 152 MG/DL (ref 65–117)
GLUCOSE BLD STRIP.AUTO-MCNC: 166 MG/DL (ref 65–117)
GLUCOSE BLD STRIP.AUTO-MCNC: 175 MG/DL (ref 65–117)
GLUCOSE BLD STRIP.AUTO-MCNC: 187 MG/DL (ref 65–117)
GLUCOSE BLD STRIP.AUTO-MCNC: 314 MG/DL (ref 65–117)
GLUCOSE SERPL-MCNC: 147 MG/DL (ref 65–100)
HCT VFR BLD AUTO: 27.4 % (ref 36.6–50.3)
HGB BLD-MCNC: 8.9 G/DL (ref 12.1–17)
MCH RBC QN AUTO: 33.6 PG (ref 26–34)
MCHC RBC AUTO-ENTMCNC: 32.5 G/DL (ref 30–36.5)
MCV RBC AUTO: 103.4 FL (ref 80–99)
NRBC # BLD: 0 K/UL (ref 0–0.01)
NRBC BLD-RTO: 0 PER 100 WBC
PLATELET # BLD AUTO: 334 K/UL (ref 150–400)
PMV BLD AUTO: 9.4 FL (ref 8.9–12.9)
POTASSIUM SERPL-SCNC: 3.9 MMOL/L (ref 3.5–5.1)
RBC # BLD AUTO: 2.65 M/UL (ref 4.1–5.7)
SERVICE CMNT-IMP: ABNORMAL
SODIUM SERPL-SCNC: 137 MMOL/L (ref 136–145)
WBC # BLD AUTO: 7.3 K/UL (ref 4.1–11.1)

## 2025-04-18 PROCEDURE — 82962 GLUCOSE BLOOD TEST: CPT

## 2025-04-18 PROCEDURE — 3600000012 HC SURGERY LEVEL 2 ADDTL 15MIN: Performed by: SURGERY

## 2025-04-18 PROCEDURE — 1100000000 HC RM PRIVATE

## 2025-04-18 PROCEDURE — 2500000003 HC RX 250 WO HCPCS

## 2025-04-18 PROCEDURE — 6360000002 HC RX W HCPCS: Performed by: SURGERY

## 2025-04-18 PROCEDURE — 6370000000 HC RX 637 (ALT 250 FOR IP): Performed by: SURGERY

## 2025-04-18 PROCEDURE — 3700000001 HC ADD 15 MINUTES (ANESTHESIA): Performed by: SURGERY

## 2025-04-18 PROCEDURE — 85027 COMPLETE CBC AUTOMATED: CPT

## 2025-04-18 PROCEDURE — 2580000003 HC RX 258: Performed by: INTERNAL MEDICINE

## 2025-04-18 PROCEDURE — 2709999900 HC NON-CHARGEABLE SUPPLY: Performed by: SURGERY

## 2025-04-18 PROCEDURE — 80048 BASIC METABOLIC PNL TOTAL CA: CPT

## 2025-04-18 PROCEDURE — 0JBR0ZZ EXCISION OF LEFT FOOT SUBCUTANEOUS TISSUE AND FASCIA, OPEN APPROACH: ICD-10-PCS | Performed by: SURGERY

## 2025-04-18 PROCEDURE — 2500000003 HC RX 250 WO HCPCS: Performed by: SURGERY

## 2025-04-18 PROCEDURE — 6360000002 HC RX W HCPCS: Performed by: INTERNAL MEDICINE

## 2025-04-18 PROCEDURE — 36415 COLL VENOUS BLD VENIPUNCTURE: CPT

## 2025-04-18 PROCEDURE — 2580000003 HC RX 258

## 2025-04-18 PROCEDURE — 7100000000 HC PACU RECOVERY - FIRST 15 MIN: Performed by: SURGERY

## 2025-04-18 PROCEDURE — 3700000000 HC ANESTHESIA ATTENDED CARE: Performed by: SURGERY

## 2025-04-18 PROCEDURE — 6360000002 HC RX W HCPCS

## 2025-04-18 PROCEDURE — 3600000002 HC SURGERY LEVEL 2 BASE: Performed by: SURGERY

## 2025-04-18 PROCEDURE — 7100000001 HC PACU RECOVERY - ADDTL 15 MIN: Performed by: SURGERY

## 2025-04-18 RX ORDER — GLIPIZIDE 10 MG/1
1 TABLET ORAL EVERY 4 HOURS PRN
Status: DISCONTINUED | OUTPATIENT
Start: 2025-04-18 | End: 2025-05-27 | Stop reason: HOSPADM

## 2025-04-18 RX ORDER — SODIUM CHLORIDE 9 MG/ML
INJECTION, SOLUTION INTRAVENOUS PRN
Status: DISCONTINUED | OUTPATIENT
Start: 2025-04-18 | End: 2025-04-18 | Stop reason: HOSPADM

## 2025-04-18 RX ORDER — PROCHLORPERAZINE EDISYLATE 5 MG/ML
5 INJECTION INTRAMUSCULAR; INTRAVENOUS
Status: DISCONTINUED | OUTPATIENT
Start: 2025-04-18 | End: 2025-04-18 | Stop reason: HOSPADM

## 2025-04-18 RX ORDER — ONDANSETRON 2 MG/ML
4 INJECTION INTRAMUSCULAR; INTRAVENOUS
Status: DISCONTINUED | OUTPATIENT
Start: 2025-04-18 | End: 2025-04-18 | Stop reason: HOSPADM

## 2025-04-18 RX ORDER — FENTANYL CITRATE 50 UG/ML
INJECTION, SOLUTION INTRAMUSCULAR; INTRAVENOUS
Status: DISCONTINUED | OUTPATIENT
Start: 2025-04-18 | End: 2025-04-18 | Stop reason: SDUPTHER

## 2025-04-18 RX ORDER — LIDOCAINE HYDROCHLORIDE 10 MG/ML
INJECTION, SOLUTION EPIDURAL; INFILTRATION; INTRACAUDAL; PERINEURAL PRN
Status: DISCONTINUED | OUTPATIENT
Start: 2025-04-18 | End: 2025-04-18 | Stop reason: ALTCHOICE

## 2025-04-18 RX ORDER — PHENYLEPHRINE HCL IN 0.9% NACL 0.4MG/10ML
SYRINGE (ML) INTRAVENOUS
Status: DISCONTINUED | OUTPATIENT
Start: 2025-04-18 | End: 2025-04-18 | Stop reason: SDUPTHER

## 2025-04-18 RX ORDER — OXYCODONE HYDROCHLORIDE 5 MG/1
5 TABLET ORAL
Status: DISCONTINUED | OUTPATIENT
Start: 2025-04-18 | End: 2025-04-18 | Stop reason: HOSPADM

## 2025-04-18 RX ORDER — SODIUM CHLORIDE 0.9 % (FLUSH) 0.9 %
5-40 SYRINGE (ML) INJECTION PRN
Status: DISCONTINUED | OUTPATIENT
Start: 2025-04-18 | End: 2025-04-18 | Stop reason: HOSPADM

## 2025-04-18 RX ORDER — FENTANYL CITRATE 50 UG/ML
25 INJECTION, SOLUTION INTRAMUSCULAR; INTRAVENOUS EVERY 5 MIN PRN
Status: DISCONTINUED | OUTPATIENT
Start: 2025-04-18 | End: 2025-04-18 | Stop reason: HOSPADM

## 2025-04-18 RX ORDER — SODIUM CHLORIDE, SODIUM LACTATE, POTASSIUM CHLORIDE, CALCIUM CHLORIDE 600; 310; 30; 20 MG/100ML; MG/100ML; MG/100ML; MG/100ML
INJECTION, SOLUTION INTRAVENOUS
Status: DISCONTINUED | OUTPATIENT
Start: 2025-04-18 | End: 2025-04-18 | Stop reason: SDUPTHER

## 2025-04-18 RX ORDER — SODIUM CHLORIDE 0.9 % (FLUSH) 0.9 %
5-40 SYRINGE (ML) INJECTION EVERY 12 HOURS SCHEDULED
Status: DISCONTINUED | OUTPATIENT
Start: 2025-04-18 | End: 2025-04-18 | Stop reason: HOSPADM

## 2025-04-18 RX ORDER — LIDOCAINE HYDROCHLORIDE 20 MG/ML
INJECTION, SOLUTION EPIDURAL; INFILTRATION; INTRACAUDAL; PERINEURAL
Status: DISCONTINUED | OUTPATIENT
Start: 2025-04-18 | End: 2025-04-18 | Stop reason: SDUPTHER

## 2025-04-18 RX ORDER — HYDROMORPHONE HYDROCHLORIDE 1 MG/ML
0.5 INJECTION, SOLUTION INTRAMUSCULAR; INTRAVENOUS; SUBCUTANEOUS EVERY 5 MIN PRN
Status: DISCONTINUED | OUTPATIENT
Start: 2025-04-18 | End: 2025-04-18 | Stop reason: HOSPADM

## 2025-04-18 RX ORDER — DEXMEDETOMIDINE HYDROCHLORIDE 100 UG/ML
INJECTION, SOLUTION INTRAVENOUS
Status: DISCONTINUED | OUTPATIENT
Start: 2025-04-18 | End: 2025-04-18 | Stop reason: SDUPTHER

## 2025-04-18 RX ORDER — NALOXONE HYDROCHLORIDE 0.4 MG/ML
INJECTION, SOLUTION INTRAMUSCULAR; INTRAVENOUS; SUBCUTANEOUS PRN
Status: DISCONTINUED | OUTPATIENT
Start: 2025-04-18 | End: 2025-04-18 | Stop reason: HOSPADM

## 2025-04-18 RX ADMIN — DEXMEDETOMIDINE 4 MCG: 100 INJECTION, SOLUTION INTRAVENOUS at 12:49

## 2025-04-18 RX ADMIN — FENTANYL CITRATE 12.5 MCG: 50 INJECTION, SOLUTION INTRAMUSCULAR; INTRAVENOUS at 13:00

## 2025-04-18 RX ADMIN — INSULIN LISPRO 6 UNITS: 100 INJECTION, SOLUTION INTRAVENOUS; SUBCUTANEOUS at 20:58

## 2025-04-18 RX ADMIN — ASPIRIN 81 MG: 81 TABLET, CHEWABLE ORAL at 18:03

## 2025-04-18 RX ADMIN — Medication 120 MCG: at 12:59

## 2025-04-18 RX ADMIN — SODIUM CHLORIDE, POTASSIUM CHLORIDE, SODIUM LACTATE AND CALCIUM CHLORIDE: 600; 310; 30; 20 INJECTION, SOLUTION INTRAVENOUS at 12:41

## 2025-04-18 RX ADMIN — VANCOMYCIN HYDROCHLORIDE 1250 MG: 1.25 INJECTION, POWDER, LYOPHILIZED, FOR SOLUTION INTRAVENOUS at 03:31

## 2025-04-18 RX ADMIN — LIDOCAINE HYDROCHLORIDE 60 MG: 20 INJECTION, SOLUTION EPIDURAL; INFILTRATION; INTRACAUDAL; PERINEURAL at 12:49

## 2025-04-18 RX ADMIN — PROPOFOL 100 MCG/KG/MIN: 10 INJECTION, EMULSION INTRAVENOUS at 12:49

## 2025-04-18 RX ADMIN — WHITE PETROLATUM 57.7 %-MINERAL OIL 31.9 % EYE OINTMENT: at 21:00

## 2025-04-18 RX ADMIN — INSULIN HUMAN 4 UNITS: 100 INJECTION, SUSPENSION SUBCUTANEOUS at 20:57

## 2025-04-18 RX ADMIN — SODIUM CHLORIDE, PRESERVATIVE FREE 10 ML: 5 INJECTION INTRAVENOUS at 21:00

## 2025-04-18 RX ADMIN — Medication 1 CAPSULE: at 18:03

## 2025-04-18 RX ADMIN — SODIUM CHLORIDE, PRESERVATIVE FREE 10 ML: 5 INJECTION INTRAVENOUS at 11:36

## 2025-04-18 RX ADMIN — FENTANYL CITRATE 12.5 MCG: 50 INJECTION, SOLUTION INTRAMUSCULAR; INTRAVENOUS at 12:46

## 2025-04-18 RX ADMIN — ENOXAPARIN SODIUM 80 MG: 100 INJECTION SUBCUTANEOUS at 03:30

## 2025-04-18 RX ADMIN — ACETAMINOPHEN 650 MG: 325 TABLET ORAL at 21:05

## 2025-04-18 RX ADMIN — Medication 120 MCG: at 13:04

## 2025-04-18 ASSESSMENT — PAIN DESCRIPTION - ORIENTATION: ORIENTATION: LEFT

## 2025-04-18 ASSESSMENT — PAIN DESCRIPTION - DESCRIPTORS: DESCRIPTORS: ACHING

## 2025-04-18 ASSESSMENT — PAIN DESCRIPTION - LOCATION: LOCATION: LEG

## 2025-04-18 ASSESSMENT — PAIN SCALES - GENERAL
PAINLEVEL_OUTOF10: 0
PAINLEVEL_OUTOF10: 0
PAINLEVEL_OUTOF10: 3

## 2025-04-18 NOTE — WOUND CARE
Wound Care nurse following this patient for wound healing progress. Today was the follow up / reassessment day to see how well the Collagenase is working on the left heel wounds.   Just found out from nursing that someone is doing a heel debridement today.   Plan: Wound care nurse will follow up early next week.  Unless other orders are written for the wound care, the patient would continue to benefit from Collagenase for surface debridement and moist wound healing.   Cornelia Dinh RN, BSN, CWON

## 2025-04-18 NOTE — PERIOP NOTE
TRANSFER - IN REPORT:    Verbal report received from Miya HONG on Errol R Brandt  being received from 3114 for ordered procedure      Report consisted of patient's Situation, Background, Assessment and   Recommendations(SBAR).     Information from the following report(s) Nurse Handoff Report, ED Encounter Summary, ED SBAR, Adult Overview, Surgery Report, Intake/Output, MAR, Recent Results, and Med Rec Status was reviewed with the receiving nurse.    Opportunity for questions and clarification was provided.      Assessment completed upon patient's arrival to unit and care assumed.

## 2025-04-18 NOTE — CARE COORDINATION
Transition of Care Plan:     RUR: 14%  Prior Level of Functioning: Independent   Disposition: SNF   SHALONDA: 4/21  If SNF or IPR: Date FOC offered: 4/3/25  Date FOC received: 4/3/25  Accepting facility: [facility]   Date authorization started with reference number:   Date authorization received and expires:   Follow up appointments: Defer to facility   DME needed: Defer to facility   Transportation at discharge: BLS   IM/IMM Medicare/ letter given: 2nd IMM letter   Is patient a [place] and connected with VA? No              If yes, was [place] transfer form completed and VA notified? No  Caregiver Contact: pt's son   Discharge Caregiver contacted prior to discharge? Pt will contacted   Care Conference needed? No  Barriers to discharge: Medical clearance, Auth    Patient is expected to be ready for d/c early next week.  CM will continue to follow.    Aida Key  Ext [phone]

## 2025-04-18 NOTE — BRIEF OP NOTE
Brief Postoperative Note      Patient: Errol Brandt  YOB: 1945  MRN: [id number]    Date of Procedure: 4/18/2025    Pre-Op Diagnosis: L heel wound    Post-Op Diagnosis: Same       Procedure(s):  LEFT HEEL DEBRIDEMENT (Excisional)    Surgeon(s):  Foster Garcia MD    Anesthesia: General    Estimated Blood Loss (mL): Minimal    Complications: None    Findings: No exposed bone. Bled well.    Electronically signed by Foster Garcia MD on 4/18/2025 at 1:16 PM

## 2025-04-18 NOTE — ANESTHESIA POSTPROCEDURE EVALUATION
Department of Anesthesiology  Postprocedure Note    Patient: Errol Brandt  MRN: [id number]  YOB: 1945  Date of evaluation: 4/18/2025    Procedure Summary       Date: 04/18/25 Room / Location: [facility] MAIN OR M2 / [facility] MAIN OR    Anesthesia Start: 1241 Anesthesia Stop: 1325    Procedure: LEFT HEEL DEBRIDEMENT (Left: Foot) Diagnosis:       Peripheral vascular disease, unspecified      (Peripheral vascular disease, unspecified [I73.9])    Providers: Foster Garcia MD Responsible Provider: Gomez Camargo MD    Anesthesia Type: MAC ASA Status: 3            Anesthesia Type: MAC    Nargis Phase I: Nargis Score: 8    Nargis Phase II:      Anesthesia Post Evaluation    Patient location during evaluation: PACU  Patient participation: complete - patient participated  Level of consciousness: sleepy but conscious and responsive to verbal stimuli  Airway patency: patent  Nausea & Vomiting: no vomiting and no nausea  Cardiovascular status: blood pressure returned to baseline and hemodynamically stable  Respiratory status: acceptable  Hydration status: stable    No notable events documented.

## 2025-04-19 LAB
ANION GAP SERPL CALC-SCNC: 6 MMOL/L (ref 2–12)
BASOPHILS # BLD: 0.03 K/UL (ref 0–0.1)
BASOPHILS NFR BLD: 0.5 % (ref 0–1)
BUN SERPL-MCNC: 28 MG/DL (ref 6–20)
BUN/CREAT SERPL: 23 (ref 12–20)
CALCIUM SERPL-MCNC: 7.7 MG/DL (ref 8.5–10.1)
CHLORIDE SERPL-SCNC: 106 MMOL/L (ref 97–108)
CO2 SERPL-SCNC: 22 MMOL/L (ref 21–32)
CREAT SERPL-MCNC: 1.23 MG/DL (ref 0.7–1.3)
DIFFERENTIAL METHOD BLD: ABNORMAL
EOSINOPHIL # BLD: 0.07 K/UL (ref 0–0.4)
EOSINOPHIL NFR BLD: 1.1 % (ref 0–7)
ERYTHROCYTE [DISTWIDTH] IN BLOOD BY AUTOMATED COUNT: 13.1 % (ref 11.5–14.5)
GLUCOSE BLD STRIP.AUTO-MCNC: 178 MG/DL (ref 65–117)
GLUCOSE BLD STRIP.AUTO-MCNC: 241 MG/DL (ref 65–117)
GLUCOSE BLD STRIP.AUTO-MCNC: 270 MG/DL (ref 65–117)
GLUCOSE BLD STRIP.AUTO-MCNC: 386 MG/DL (ref 65–117)
GLUCOSE SERPL-MCNC: 257 MG/DL (ref 65–100)
HCT VFR BLD AUTO: 26.5 % (ref 36.6–50.3)
HGB BLD-MCNC: 8.7 G/DL (ref 12.1–17)
IMM GRANULOCYTES # BLD AUTO: 0.02 K/UL (ref 0–0.04)
IMM GRANULOCYTES NFR BLD AUTO: 0.3 % (ref 0–0.5)
LYMPHOCYTES # BLD: 1.41 K/UL (ref 0.8–3.5)
LYMPHOCYTES NFR BLD: 21.3 % (ref 12–49)
MCH RBC QN AUTO: 33.7 PG (ref 26–34)
MCHC RBC AUTO-ENTMCNC: 32.8 G/DL (ref 30–36.5)
MCV RBC AUTO: 102.7 FL (ref 80–99)
MONOCYTES # BLD: 0.87 K/UL (ref 0–1)
MONOCYTES NFR BLD: 13.1 % (ref 5–13)
NEUTS SEG # BLD: 4.23 K/UL (ref 1.8–8)
NEUTS SEG NFR BLD: 63.7 % (ref 32–75)
NRBC # BLD: 0 K/UL (ref 0–0.01)
NRBC BLD-RTO: 0 PER 100 WBC
PLATELET # BLD AUTO: 359 K/UL (ref 150–400)
PMV BLD AUTO: 9.4 FL (ref 8.9–12.9)
POTASSIUM SERPL-SCNC: 4.5 MMOL/L (ref 3.5–5.1)
RBC # BLD AUTO: 2.58 M/UL (ref 4.1–5.7)
SERVICE CMNT-IMP: ABNORMAL
SODIUM SERPL-SCNC: 134 MMOL/L (ref 136–145)
WBC # BLD AUTO: 6.6 K/UL (ref 4.1–11.1)

## 2025-04-19 PROCEDURE — 6370000000 HC RX 637 (ALT 250 FOR IP): Performed by: SURGERY

## 2025-04-19 PROCEDURE — 82962 GLUCOSE BLOOD TEST: CPT

## 2025-04-19 PROCEDURE — 80048 BASIC METABOLIC PNL TOTAL CA: CPT

## 2025-04-19 PROCEDURE — 85025 COMPLETE CBC W/AUTO DIFF WBC: CPT

## 2025-04-19 PROCEDURE — 2580000003 HC RX 258: Performed by: SURGERY

## 2025-04-19 PROCEDURE — 6370000000 HC RX 637 (ALT 250 FOR IP): Performed by: INTERNAL MEDICINE

## 2025-04-19 PROCEDURE — 36415 COLL VENOUS BLD VENIPUNCTURE: CPT

## 2025-04-19 PROCEDURE — 2500000003 HC RX 250 WO HCPCS: Performed by: SURGERY

## 2025-04-19 PROCEDURE — 6360000002 HC RX W HCPCS: Performed by: INTERNAL MEDICINE

## 2025-04-19 PROCEDURE — 6360000002 HC RX W HCPCS: Performed by: SURGERY

## 2025-04-19 PROCEDURE — 1100000000 HC RM PRIVATE

## 2025-04-19 RX ADMIN — INSULIN LISPRO 2 UNITS: 100 INJECTION, SOLUTION INTRAVENOUS; SUBCUTANEOUS at 18:35

## 2025-04-19 RX ADMIN — SODIUM CHLORIDE, PRESERVATIVE FREE 10 ML: 5 INJECTION INTRAVENOUS at 10:00

## 2025-04-19 RX ADMIN — INSULIN LISPRO 4 UNITS: 100 INJECTION, SOLUTION INTRAVENOUS; SUBCUTANEOUS at 10:00

## 2025-04-19 RX ADMIN — ENOXAPARIN SODIUM 80 MG: 100 INJECTION SUBCUTANEOUS at 14:12

## 2025-04-19 RX ADMIN — Medication 1 CAPSULE: at 10:00

## 2025-04-19 RX ADMIN — INSULIN HUMAN 4 UNITS: 100 INJECTION, SUSPENSION SUBCUTANEOUS at 22:28

## 2025-04-19 RX ADMIN — VANCOMYCIN HYDROCHLORIDE 1250 MG: 1.25 INJECTION, POWDER, LYOPHILIZED, FOR SOLUTION INTRAVENOUS at 03:42

## 2025-04-19 RX ADMIN — WHITE PETROLATUM 57.7 %-MINERAL OIL 31.9 % EYE OINTMENT: at 22:28

## 2025-04-19 RX ADMIN — ASPIRIN 81 MG: 81 TABLET, CHEWABLE ORAL at 10:00

## 2025-04-19 RX ADMIN — COLLAGENASE SANTYL: 250 OINTMENT TOPICAL at 10:03

## 2025-04-19 RX ADMIN — SODIUM CHLORIDE, PRESERVATIVE FREE 10 ML: 5 INJECTION INTRAVENOUS at 22:28

## 2025-04-19 RX ADMIN — ACETAMINOPHEN 650 MG: 325 TABLET ORAL at 18:37

## 2025-04-19 RX ADMIN — INSULIN HUMAN 24 UNITS: 100 INJECTION, SUSPENSION SUBCUTANEOUS at 14:10

## 2025-04-19 RX ADMIN — INSULIN LISPRO 8 UNITS: 100 INJECTION, SOLUTION INTRAVENOUS; SUBCUTANEOUS at 14:09

## 2025-04-19 ASSESSMENT — PAIN SCALES - GENERAL
PAINLEVEL_OUTOF10: 0
PAINLEVEL_OUTOF10: 5
PAINLEVEL_OUTOF10: 0

## 2025-04-19 ASSESSMENT — PAIN DESCRIPTION - LOCATION: LOCATION: FOOT

## 2025-04-19 ASSESSMENT — PAIN DESCRIPTION - ORIENTATION: ORIENTATION: LEFT

## 2025-04-20 LAB
ANION GAP SERPL CALC-SCNC: 3 MMOL/L (ref 2–12)
BASOPHILS # BLD: 0.03 K/UL (ref 0–0.1)
BASOPHILS NFR BLD: 0.4 % (ref 0–1)
BUN SERPL-MCNC: 29 MG/DL (ref 6–20)
BUN/CREAT SERPL: 22 (ref 12–20)
CALCIUM SERPL-MCNC: 8.2 MG/DL (ref 8.5–10.1)
CHLORIDE SERPL-SCNC: 107 MMOL/L (ref 97–108)
CO2 SERPL-SCNC: 26 MMOL/L (ref 21–32)
CREAT SERPL-MCNC: 1.29 MG/DL (ref 0.7–1.3)
DIFFERENTIAL METHOD BLD: ABNORMAL
EOSINOPHIL # BLD: 0.06 K/UL (ref 0–0.4)
EOSINOPHIL NFR BLD: 0.9 % (ref 0–7)
ERYTHROCYTE [DISTWIDTH] IN BLOOD BY AUTOMATED COUNT: 12.9 % (ref 11.5–14.5)
GLUCOSE BLD STRIP.AUTO-MCNC: 164 MG/DL (ref 65–117)
GLUCOSE BLD STRIP.AUTO-MCNC: 166 MG/DL (ref 65–117)
GLUCOSE BLD STRIP.AUTO-MCNC: 218 MG/DL (ref 65–117)
GLUCOSE BLD STRIP.AUTO-MCNC: 261 MG/DL (ref 65–117)
GLUCOSE SERPL-MCNC: 157 MG/DL (ref 65–100)
HCT VFR BLD AUTO: 27.1 % (ref 36.6–50.3)
HGB BLD-MCNC: 8.9 G/DL (ref 12.1–17)
IMM GRANULOCYTES # BLD AUTO: 0.04 K/UL (ref 0–0.04)
IMM GRANULOCYTES NFR BLD AUTO: 0.6 % (ref 0–0.5)
LYMPHOCYTES # BLD: 1.48 K/UL (ref 0.8–3.5)
LYMPHOCYTES NFR BLD: 21.2 % (ref 12–49)
MCH RBC QN AUTO: 33.1 PG (ref 26–34)
MCHC RBC AUTO-ENTMCNC: 32.8 G/DL (ref 30–36.5)
MCV RBC AUTO: 100.7 FL (ref 80–99)
MONOCYTES # BLD: 0.74 K/UL (ref 0–1)
MONOCYTES NFR BLD: 10.6 % (ref 5–13)
NEUTS SEG # BLD: 4.63 K/UL (ref 1.8–8)
NEUTS SEG NFR BLD: 66.3 % (ref 32–75)
NRBC # BLD: 0 K/UL (ref 0–0.01)
NRBC BLD-RTO: 0 PER 100 WBC
PLATELET # BLD AUTO: 345 K/UL (ref 150–400)
PMV BLD AUTO: 9.5 FL (ref 8.9–12.9)
POTASSIUM SERPL-SCNC: 4.2 MMOL/L (ref 3.5–5.1)
RBC # BLD AUTO: 2.69 M/UL (ref 4.1–5.7)
SERVICE CMNT-IMP: ABNORMAL
SODIUM SERPL-SCNC: 136 MMOL/L (ref 136–145)
VANCOMYCIN SERPL-MCNC: 14.3 UG/ML
WBC # BLD AUTO: 7 K/UL (ref 4.1–11.1)

## 2025-04-20 PROCEDURE — 80202 ASSAY OF VANCOMYCIN: CPT

## 2025-04-20 PROCEDURE — 36415 COLL VENOUS BLD VENIPUNCTURE: CPT

## 2025-04-20 PROCEDURE — 6370000000 HC RX 637 (ALT 250 FOR IP): Performed by: SURGERY

## 2025-04-20 PROCEDURE — 2500000003 HC RX 250 WO HCPCS: Performed by: SURGERY

## 2025-04-20 PROCEDURE — 6370000000 HC RX 637 (ALT 250 FOR IP): Performed by: INTERNAL MEDICINE

## 2025-04-20 PROCEDURE — 2580000003 HC RX 258: Performed by: SURGERY

## 2025-04-20 PROCEDURE — 82962 GLUCOSE BLOOD TEST: CPT

## 2025-04-20 PROCEDURE — 85025 COMPLETE CBC W/AUTO DIFF WBC: CPT

## 2025-04-20 PROCEDURE — 80048 BASIC METABOLIC PNL TOTAL CA: CPT

## 2025-04-20 PROCEDURE — 6360000002 HC RX W HCPCS: Performed by: SURGERY

## 2025-04-20 PROCEDURE — 1100000000 HC RM PRIVATE

## 2025-04-20 PROCEDURE — 6360000002 HC RX W HCPCS: Performed by: INTERNAL MEDICINE

## 2025-04-20 RX ADMIN — Medication 1 CAPSULE: at 10:24

## 2025-04-20 RX ADMIN — ACETAMINOPHEN 650 MG: 325 TABLET ORAL at 20:52

## 2025-04-20 RX ADMIN — VANCOMYCIN HYDROCHLORIDE 1250 MG: 1.25 INJECTION, POWDER, LYOPHILIZED, FOR SOLUTION INTRAVENOUS at 04:09

## 2025-04-20 RX ADMIN — INSULIN LISPRO 2 UNITS: 100 INJECTION, SOLUTION INTRAVENOUS; SUBCUTANEOUS at 17:43

## 2025-04-20 RX ADMIN — ENOXAPARIN SODIUM 80 MG: 100 INJECTION SUBCUTANEOUS at 04:09

## 2025-04-20 RX ADMIN — SODIUM CHLORIDE, PRESERVATIVE FREE 10 ML: 5 INJECTION INTRAVENOUS at 20:54

## 2025-04-20 RX ADMIN — ASPIRIN 81 MG: 81 TABLET, CHEWABLE ORAL at 10:24

## 2025-04-20 RX ADMIN — INSULIN HUMAN 24 UNITS: 100 INJECTION, SUSPENSION SUBCUTANEOUS at 10:24

## 2025-04-20 RX ADMIN — WHITE PETROLATUM 57.7 %-MINERAL OIL 31.9 % EYE OINTMENT: at 20:54

## 2025-04-20 RX ADMIN — ENOXAPARIN SODIUM 80 MG: 100 INJECTION SUBCUTANEOUS at 15:27

## 2025-04-20 RX ADMIN — SODIUM CHLORIDE, PRESERVATIVE FREE 10 ML: 5 INJECTION INTRAVENOUS at 10:33

## 2025-04-20 RX ADMIN — INSULIN HUMAN 6 UNITS: 100 INJECTION, SUSPENSION SUBCUTANEOUS at 20:53

## 2025-04-20 RX ADMIN — INSULIN LISPRO 4 UNITS: 100 INJECTION, SOLUTION INTRAVENOUS; SUBCUTANEOUS at 13:21

## 2025-04-20 ASSESSMENT — PAIN DESCRIPTION - DESCRIPTORS: DESCRIPTORS: DISCOMFORT;ACHING

## 2025-04-20 ASSESSMENT — PAIN SCALES - GENERAL: PAINLEVEL_OUTOF10: 3

## 2025-04-20 ASSESSMENT — PAIN DESCRIPTION - ORIENTATION: ORIENTATION: LEFT;RIGHT

## 2025-04-20 ASSESSMENT — PAIN DESCRIPTION - LOCATION: LOCATION: FOOT

## 2025-04-21 LAB
ANION GAP SERPL CALC-SCNC: 5 MMOL/L (ref 2–12)
BASOPHILS # BLD: 0.03 K/UL (ref 0–0.1)
BASOPHILS NFR BLD: 0.4 % (ref 0–1)
BUN SERPL-MCNC: 26 MG/DL (ref 6–20)
BUN/CREAT SERPL: 22 (ref 12–20)
CALCIUM SERPL-MCNC: 8.3 MG/DL (ref 8.5–10.1)
CHLORIDE SERPL-SCNC: 109 MMOL/L (ref 97–108)
CO2 SERPL-SCNC: 24 MMOL/L (ref 21–32)
CREAT SERPL-MCNC: 1.16 MG/DL (ref 0.7–1.3)
DIFFERENTIAL METHOD BLD: ABNORMAL
EOSINOPHIL # BLD: 0.11 K/UL (ref 0–0.4)
EOSINOPHIL NFR BLD: 1.6 % (ref 0–7)
ERYTHROCYTE [DISTWIDTH] IN BLOOD BY AUTOMATED COUNT: 13 % (ref 11.5–14.5)
GLUCOSE BLD STRIP.AUTO-MCNC: 187 MG/DL (ref 65–117)
GLUCOSE BLD STRIP.AUTO-MCNC: 204 MG/DL (ref 65–117)
GLUCOSE BLD STRIP.AUTO-MCNC: 215 MG/DL (ref 65–117)
GLUCOSE BLD STRIP.AUTO-MCNC: 90 MG/DL (ref 65–117)
GLUCOSE SERPL-MCNC: 78 MG/DL (ref 65–100)
HCT VFR BLD AUTO: 26.9 % (ref 36.6–50.3)
HGB BLD-MCNC: 8.6 G/DL (ref 12.1–17)
IMM GRANULOCYTES # BLD AUTO: 0.03 K/UL (ref 0–0.04)
IMM GRANULOCYTES NFR BLD AUTO: 0.4 % (ref 0–0.5)
LYMPHOCYTES # BLD: 1.77 K/UL (ref 0.8–3.5)
LYMPHOCYTES NFR BLD: 25.2 % (ref 12–49)
MCH RBC QN AUTO: 33 PG (ref 26–34)
MCHC RBC AUTO-ENTMCNC: 32 G/DL (ref 30–36.5)
MCV RBC AUTO: 103.1 FL (ref 80–99)
MONOCYTES # BLD: 0.72 K/UL (ref 0–1)
MONOCYTES NFR BLD: 10.3 % (ref 5–13)
NEUTS SEG # BLD: 4.35 K/UL (ref 1.8–8)
NEUTS SEG NFR BLD: 62.1 % (ref 32–75)
NRBC # BLD: 0 K/UL (ref 0–0.01)
NRBC BLD-RTO: 0 PER 100 WBC
PLATELET # BLD AUTO: 384 K/UL (ref 150–400)
PMV BLD AUTO: 9.9 FL (ref 8.9–12.9)
POTASSIUM SERPL-SCNC: 3.9 MMOL/L (ref 3.5–5.1)
RBC # BLD AUTO: 2.61 M/UL (ref 4.1–5.7)
SERVICE CMNT-IMP: ABNORMAL
SERVICE CMNT-IMP: NORMAL
SODIUM SERPL-SCNC: 138 MMOL/L (ref 136–145)
WBC # BLD AUTO: 7 K/UL (ref 4.1–11.1)

## 2025-04-21 PROCEDURE — 97168 OT RE-EVAL EST PLAN CARE: CPT

## 2025-04-21 PROCEDURE — 99233 SBSQ HOSP IP/OBS HIGH 50: CPT | Performed by: INTERNAL MEDICINE

## 2025-04-21 PROCEDURE — 2500000003 HC RX 250 WO HCPCS: Performed by: SURGERY

## 2025-04-21 PROCEDURE — 6370000000 HC RX 637 (ALT 250 FOR IP): Performed by: INTERNAL MEDICINE

## 2025-04-21 PROCEDURE — 97530 THERAPEUTIC ACTIVITIES: CPT

## 2025-04-21 PROCEDURE — 6360000002 HC RX W HCPCS: Performed by: SURGERY

## 2025-04-21 PROCEDURE — 85025 COMPLETE CBC W/AUTO DIFF WBC: CPT

## 2025-04-21 PROCEDURE — 6360000002 HC RX W HCPCS: Performed by: INTERNAL MEDICINE

## 2025-04-21 PROCEDURE — 2580000003 HC RX 258: Performed by: SURGERY

## 2025-04-21 PROCEDURE — 80048 BASIC METABOLIC PNL TOTAL CA: CPT

## 2025-04-21 PROCEDURE — 6370000000 HC RX 637 (ALT 250 FOR IP): Performed by: SURGERY

## 2025-04-21 PROCEDURE — 36415 COLL VENOUS BLD VENIPUNCTURE: CPT

## 2025-04-21 PROCEDURE — 51798 US URINE CAPACITY MEASURE: CPT

## 2025-04-21 PROCEDURE — 97164 PT RE-EVAL EST PLAN CARE: CPT

## 2025-04-21 PROCEDURE — 1100000000 HC RM PRIVATE

## 2025-04-21 PROCEDURE — 82962 GLUCOSE BLOOD TEST: CPT

## 2025-04-21 RX ADMIN — INSULIN HUMAN 6 UNITS: 100 INJECTION, SUSPENSION SUBCUTANEOUS at 21:30

## 2025-04-21 RX ADMIN — ASPIRIN 81 MG: 81 TABLET, CHEWABLE ORAL at 09:42

## 2025-04-21 RX ADMIN — COLLAGENASE SANTYL: 250 OINTMENT TOPICAL at 09:44

## 2025-04-21 RX ADMIN — SODIUM CHLORIDE, PRESERVATIVE FREE 10 ML: 5 INJECTION INTRAVENOUS at 21:40

## 2025-04-21 RX ADMIN — INSULIN LISPRO 2 UNITS: 100 INJECTION, SOLUTION INTRAVENOUS; SUBCUTANEOUS at 21:39

## 2025-04-21 RX ADMIN — Medication 1 CAPSULE: at 09:42

## 2025-04-21 RX ADMIN — INSULIN LISPRO 2 UNITS: 100 INJECTION, SOLUTION INTRAVENOUS; SUBCUTANEOUS at 18:27

## 2025-04-21 RX ADMIN — SODIUM CHLORIDE, PRESERVATIVE FREE 10 ML: 5 INJECTION INTRAVENOUS at 23:00

## 2025-04-21 RX ADMIN — INSULIN LISPRO 2 UNITS: 100 INJECTION, SOLUTION INTRAVENOUS; SUBCUTANEOUS at 13:02

## 2025-04-21 RX ADMIN — WHITE PETROLATUM 57.7 %-MINERAL OIL 31.9 % EYE OINTMENT: at 21:41

## 2025-04-21 RX ADMIN — ENOXAPARIN SODIUM 80 MG: 100 INJECTION SUBCUTANEOUS at 15:34

## 2025-04-21 RX ADMIN — VANCOMYCIN HYDROCHLORIDE 1250 MG: 1.25 INJECTION, POWDER, LYOPHILIZED, FOR SOLUTION INTRAVENOUS at 04:27

## 2025-04-21 RX ADMIN — ENOXAPARIN SODIUM 80 MG: 100 INJECTION SUBCUTANEOUS at 04:24

## 2025-04-21 RX ADMIN — INSULIN HUMAN 28 UNITS: 100 INJECTION, SUSPENSION SUBCUTANEOUS at 09:43

## 2025-04-21 ASSESSMENT — PAIN DESCRIPTION - DESCRIPTORS: DESCRIPTORS: ACHING;DISCOMFORT

## 2025-04-21 ASSESSMENT — PAIN - FUNCTIONAL ASSESSMENT: PAIN_FUNCTIONAL_ASSESSMENT: PREVENTS OR INTERFERES SOME ACTIVE ACTIVITIES AND ADLS

## 2025-04-21 ASSESSMENT — PAIN DESCRIPTION - PAIN TYPE: TYPE: CHRONIC PAIN

## 2025-04-21 ASSESSMENT — PAIN DESCRIPTION - ORIENTATION: ORIENTATION: LEFT;RIGHT

## 2025-04-21 ASSESSMENT — PAIN SCALES - GENERAL: PAINLEVEL_OUTOF10: 3

## 2025-04-21 ASSESSMENT — PAIN DESCRIPTION - LOCATION: LOCATION: FOOT

## 2025-04-21 NOTE — WOUND CARE
Wound Care reassessment post op from the heel debridement that was done on Friday by Dr. Garcia along with the vascular procedure. Santyl Collagenase ointment has been used to keep the slough and eschar moist / soft to be able to remove easier.   Assessment and Treatment of each wound: pt. Is awake but quite drowsy and he will not keep his legs still today. Wound care was a bit of a challenge due to his movements. Will need two care givers to do the wound care.   The Stapled vascular surgery sites are dry and intact and approximated, proximal leg. These were cleansed with vashe and gauze and dried well and then dressed with a silver alginate dressing directly on the staple site and two surgical foam dressings to cover the wounds. Dated for today.   The Left Heel wound: pink deeper wound bed but there is a thick layer of the yellow and brown slough covering about 70% of the wound bed.     This will eventually be able to debride off with the continuation of the Collagenase Santyl ointment and some continuation of the manual debridement.  Need two care givers for any surface debridement and this will be attempted on Wednesday. For now, we continue with the Santyl ointment.   Plan:  Wound care daily.  Discussed with nursing to re-order a new tube for him.   Wound care orders written for the left heel only. [facility] nurse to do another debridement with assistance from staff on Wednesday.   The left leg stapled operative site can remain covered for 5 days with the silver dressing and the foam dressing as long as it does not get soiled.   Keep the right heel completely off loaded and please correct any leg crossing that he does.   Discharge planning NOTE: Care management will need to arrange outpatient wound care with the outpatient wound center and also home care to work with the wound care plan. He will need an appointment soon after discharge for the initial in-office debridement.   Cornelia Dinh, RN, BSN, CWON

## 2025-04-22 LAB
GLUCOSE BLD STRIP.AUTO-MCNC: 152 MG/DL (ref 65–117)
GLUCOSE BLD STRIP.AUTO-MCNC: 154 MG/DL (ref 65–117)
GLUCOSE BLD STRIP.AUTO-MCNC: 249 MG/DL (ref 65–117)
SERVICE CMNT-IMP: ABNORMAL

## 2025-04-22 PROCEDURE — 1100000000 HC RM PRIVATE

## 2025-04-22 PROCEDURE — 2500000003 HC RX 250 WO HCPCS: Performed by: SURGERY

## 2025-04-22 PROCEDURE — 6370000000 HC RX 637 (ALT 250 FOR IP): Performed by: SURGERY

## 2025-04-22 PROCEDURE — 2580000003 HC RX 258: Performed by: SURGERY

## 2025-04-22 PROCEDURE — 6360000002 HC RX W HCPCS: Performed by: SURGERY

## 2025-04-22 PROCEDURE — 82962 GLUCOSE BLOOD TEST: CPT

## 2025-04-22 PROCEDURE — 6370000000 HC RX 637 (ALT 250 FOR IP): Performed by: INTERNAL MEDICINE

## 2025-04-22 PROCEDURE — 6360000002 HC RX W HCPCS: Performed by: INTERNAL MEDICINE

## 2025-04-22 RX ADMIN — ASPIRIN 81 MG: 81 TABLET, CHEWABLE ORAL at 09:17

## 2025-04-22 RX ADMIN — WHITE PETROLATUM 57.7 %-MINERAL OIL 31.9 % EYE OINTMENT: at 22:20

## 2025-04-22 RX ADMIN — ENOXAPARIN SODIUM 80 MG: 100 INJECTION SUBCUTANEOUS at 03:30

## 2025-04-22 RX ADMIN — COLLAGENASE SANTYL: 250 OINTMENT TOPICAL at 09:18

## 2025-04-22 RX ADMIN — SODIUM CHLORIDE, PRESERVATIVE FREE 10 ML: 5 INJECTION INTRAVENOUS at 22:20

## 2025-04-22 RX ADMIN — VANCOMYCIN HYDROCHLORIDE 1250 MG: 1.25 INJECTION, POWDER, LYOPHILIZED, FOR SOLUTION INTRAVENOUS at 03:45

## 2025-04-22 RX ADMIN — SODIUM CHLORIDE: 0.9 INJECTION, SOLUTION INTRAVENOUS at 03:42

## 2025-04-22 RX ADMIN — SODIUM CHLORIDE, PRESERVATIVE FREE 10 ML: 5 INJECTION INTRAVENOUS at 09:18

## 2025-04-22 RX ADMIN — Medication 1 CAPSULE: at 09:17

## 2025-04-22 RX ADMIN — INSULIN LISPRO 2 UNITS: 100 INJECTION, SOLUTION INTRAVENOUS; SUBCUTANEOUS at 12:50

## 2025-04-22 RX ADMIN — MORPHINE SULFATE 2 MG: 2 INJECTION, SOLUTION INTRAMUSCULAR; INTRAVENOUS at 10:07

## 2025-04-22 RX ADMIN — ENOXAPARIN SODIUM 80 MG: 100 INJECTION SUBCUTANEOUS at 14:17

## 2025-04-22 RX ADMIN — INSULIN HUMAN 28 UNITS: 100 INJECTION, SUSPENSION SUBCUTANEOUS at 09:17

## 2025-04-22 RX ADMIN — INSULIN HUMAN 6 UNITS: 100 INJECTION, SUSPENSION SUBCUTANEOUS at 22:20

## 2025-04-22 ASSESSMENT — PAIN SCALES - WONG BAKER: WONGBAKER_NUMERICALRESPONSE: NO HURT

## 2025-04-22 ASSESSMENT — PAIN SCALES - GENERAL
PAINLEVEL_OUTOF10: 0

## 2025-04-22 NOTE — CARE COORDINATION
Transition of Care Plan:    RUR: 17%  Prior Level of Functioning: independent  Disposition: [facility]- need ID plans for IV Antibiotic if needed  SHALONDA: 4/23/2025  If SNF or IPR: Date FOC offered: restart auth once plan for IV antibitoics is determined - if po then will need updated therapy notes  Date FOC received:   Accepting facility:   Date authorization started with reference number:   Date authorization received and expires:   Follow up appointments:   DME needed: per rehab  Transportation at discharge: likely BLS  IM/IMM Medicare/ letter given: will need 2nd IMM  Is patient a [place] and connected with VA?    If yes, was [place] transfer form completed and VA notified?   Caregiver Contact: Julio Mclaughlin   [phone]     Discharge Caregiver contacted prior to discharge? Per patient  Care Conference needed?   Barriers to discharge: medical stabilty, ID and vascular clearance and [employer] auth    SAMANTHA PARTIDA, MSW  [phone]

## 2025-04-22 NOTE — WOUND CARE
Wound care follow up for the left heel wound care:  Chart reviewed. I did the wound care yesterday and started with a Vashe wet dressing and the Santyl ointment.   Assessment and Treatment today: pt. Was rolled to the right side so his heel is visible and easier to clean and surface debride as needed. Most of the brown tissue was removed. There is some subcutaneous fat pad tissue that is viable in the wound bed as well as some pink granulation starting.   Yesterday 4-21      Today 4-22 after the debridement   daily WC:   Treatment today after the conservative sharps debridement:  Cleansed the wound with Vashe solution and then dressed the wound with a generous amount of Collagenase Santyl ointment and covered with a Vashe moist Hydrofera Blue dressing. Covered with a high drainage pack ABD and wrapped with kerlix, taped and dated the dressing.   Heel floated. Pt. Tolerated it well.   Cornelia Dinh RN, BSN, CWON

## 2025-04-22 NOTE — OP NOTE
[facility]              [address] [place]  [address]                            OPERATIVE REPORT      PATIENT NAME: IRAIDA KENNEDY               : 1945  MED REC NO: [id number]                       ROOM: 3114  ACCOUNT NO: [id number]                       ADMIT DATE: 2025  PROVIDER: Joe Avalos DPM    DATE OF SERVICE:  2025    PREOPERATIVE DIAGNOSES:  Pain in the left heel and with foreign body abscess.    POSTOPERATIVE DIAGNOSES:  Pain in the left heel and with foreign body abscess and embedded deep tissue to the deeper fascial band.    PROCEDURES PERFORMED:  Left foot incision and drainage, removal of foreign body, debridement of deep tissue necrotic area approximately 10 cm deep into the deep fascia.    SURGEON:  Joe Avalos DPM    ASSISTANT:  None.    ANESTHESIA:  MAC.  Ankle tourniquet at 250 mmHg.    ESTIMATED BLOOD LOSS:  Minimal.    SPECIMENS REMOVED:  None.    INTRAOPERATIVE FINDINGS:  The patient did have a deeper infection into muscle and fascia with viable margins in the deep tissue.     COMPLICATIONS:  None.    IMPLANTS:  None.    INDICATIONS:  The patient was admitted through the ER for a very painful infected left heel with foreign body detected on the imaging.    DESCRIPTION OF PROCEDURE:  This patient was admitted through the ER for infected left heel with pain on ambulation.  The patient does not recall any type of injury or incident, which progressed into right now.  The patient went through IV antibiotics and had imaging studies done, which showed a metallic object in the heel as well as in the bottom of the forefoot, which he does not have any complaints of or does not recall any type of injury.  When inquired with his son, he said the patient was using insulin pins and may have dropped the insulin pins and caused an injury with a foreign body entering his foot.  The patient was advised for an incision and

## 2025-04-23 ENCOUNTER — TELEPHONE (OUTPATIENT)
Age: 80
End: 2025-04-23

## 2025-04-23 LAB
ALBUMIN SERPL-MCNC: 2 G/DL (ref 3.5–5)
ANION GAP SERPL CALC-SCNC: 5 MMOL/L (ref 2–12)
BUN SERPL-MCNC: 34 MG/DL (ref 6–20)
BUN/CREAT SERPL: 25 (ref 12–20)
CALCIUM SERPL-MCNC: 8.2 MG/DL (ref 8.5–10.1)
CHLORIDE SERPL-SCNC: 108 MMOL/L (ref 97–108)
CO2 SERPL-SCNC: 23 MMOL/L (ref 21–32)
CREAT SERPL-MCNC: 1.37 MG/DL (ref 0.7–1.3)
GLUCOSE BLD STRIP.AUTO-MCNC: 150 MG/DL (ref 65–117)
GLUCOSE BLD STRIP.AUTO-MCNC: 262 MG/DL (ref 65–117)
GLUCOSE BLD STRIP.AUTO-MCNC: 273 MG/DL (ref 65–117)
GLUCOSE BLD STRIP.AUTO-MCNC: 299 MG/DL (ref 65–117)
GLUCOSE SERPL-MCNC: 133 MG/DL (ref 65–100)
PHOSPHATE SERPL-MCNC: 3.3 MG/DL (ref 2.6–4.7)
POTASSIUM SERPL-SCNC: 4.3 MMOL/L (ref 3.5–5.1)
SERVICE CMNT-IMP: ABNORMAL
SODIUM SERPL-SCNC: 136 MMOL/L (ref 136–145)
VANCOMYCIN SERPL-MCNC: 19.2 UG/ML

## 2025-04-23 PROCEDURE — 6360000002 HC RX W HCPCS: Performed by: SURGERY

## 2025-04-23 PROCEDURE — 2580000003 HC RX 258: Performed by: SURGERY

## 2025-04-23 PROCEDURE — 97530 THERAPEUTIC ACTIVITIES: CPT

## 2025-04-23 PROCEDURE — 2500000003 HC RX 250 WO HCPCS: Performed by: SURGERY

## 2025-04-23 PROCEDURE — 6370000000 HC RX 637 (ALT 250 FOR IP): Performed by: SURGERY

## 2025-04-23 PROCEDURE — 6370000000 HC RX 637 (ALT 250 FOR IP): Performed by: INTERNAL MEDICINE

## 2025-04-23 PROCEDURE — 80202 ASSAY OF VANCOMYCIN: CPT

## 2025-04-23 PROCEDURE — 82962 GLUCOSE BLOOD TEST: CPT

## 2025-04-23 PROCEDURE — 80069 RENAL FUNCTION PANEL: CPT

## 2025-04-23 PROCEDURE — 36415 COLL VENOUS BLD VENIPUNCTURE: CPT

## 2025-04-23 PROCEDURE — 99231 SBSQ HOSP IP/OBS SF/LOW 25: CPT

## 2025-04-23 PROCEDURE — 6360000002 HC RX W HCPCS: Performed by: INTERNAL MEDICINE

## 2025-04-23 PROCEDURE — 1100000000 HC RM PRIVATE

## 2025-04-23 PROCEDURE — 97535 SELF CARE MNGMENT TRAINING: CPT

## 2025-04-23 RX ADMIN — MORPHINE SULFATE 2 MG: 2 INJECTION, SOLUTION INTRAMUSCULAR; INTRAVENOUS at 16:39

## 2025-04-23 RX ADMIN — INSULIN LISPRO 4 UNITS: 100 INJECTION, SOLUTION INTRAVENOUS; SUBCUTANEOUS at 21:52

## 2025-04-23 RX ADMIN — INSULIN LISPRO 4 UNITS: 100 INJECTION, SOLUTION INTRAVENOUS; SUBCUTANEOUS at 13:36

## 2025-04-23 RX ADMIN — INSULIN HUMAN 28 UNITS: 100 INJECTION, SUSPENSION SUBCUTANEOUS at 09:29

## 2025-04-23 RX ADMIN — Medication 1 CAPSULE: at 09:29

## 2025-04-23 RX ADMIN — ENOXAPARIN SODIUM 80 MG: 100 INJECTION SUBCUTANEOUS at 16:39

## 2025-04-23 RX ADMIN — WHITE PETROLATUM 57.7 %-MINERAL OIL 31.9 % EYE OINTMENT: at 21:52

## 2025-04-23 RX ADMIN — ENOXAPARIN SODIUM 80 MG: 100 INJECTION SUBCUTANEOUS at 04:55

## 2025-04-23 RX ADMIN — VANCOMYCIN HYDROCHLORIDE 1250 MG: 1.25 INJECTION, POWDER, LYOPHILIZED, FOR SOLUTION INTRAVENOUS at 04:55

## 2025-04-23 RX ADMIN — ASPIRIN 81 MG: 81 TABLET, CHEWABLE ORAL at 09:29

## 2025-04-23 RX ADMIN — INSULIN LISPRO 4 UNITS: 100 INJECTION, SOLUTION INTRAVENOUS; SUBCUTANEOUS at 19:12

## 2025-04-23 RX ADMIN — INSULIN HUMAN 6 UNITS: 100 INJECTION, SUSPENSION SUBCUTANEOUS at 21:51

## 2025-04-23 RX ADMIN — SODIUM CHLORIDE, PRESERVATIVE FREE 10 ML: 5 INJECTION INTRAVENOUS at 21:52

## 2025-04-23 RX ADMIN — SODIUM CHLORIDE: 0.9 INJECTION, SOLUTION INTRAVENOUS at 04:52

## 2025-04-23 ASSESSMENT — PAIN DESCRIPTION - LOCATION: LOCATION: FOOT

## 2025-04-23 ASSESSMENT — PAIN DESCRIPTION - ORIENTATION: ORIENTATION: POSTERIOR

## 2025-04-23 ASSESSMENT — PAIN SCALES - GENERAL
PAINLEVEL_OUTOF10: 0
PAINLEVEL_OUTOF10: 6

## 2025-04-23 ASSESSMENT — PAIN DESCRIPTION - DESCRIPTORS: DESCRIPTORS: ACHING

## 2025-04-23 NOTE — CARE COORDINATION
Transition of Care Plan:     RUR: 18%  Prior Level of Functioning: independent  Disposition: [facility]  SHALONDA: 4/23/2025  If SNF or IPR: Date FOC offered:   Date FOC received:   Accepting facility:   Date authorization started with reference number: Auth restarted today  Date authorization received and expires:   Follow up appointments:   DME needed: per rehab  Transportation at discharge: likely BLS  IM/IMM Medicare/ letter given: will need 2nd IMM  Is patient a  and connected with VA?               If yes, was  transfer form completed and VA notified?   Caregiver Contact: Julio Mclaughlin   [phone]      Discharge Caregiver contacted prior to discharge? Per patient  Care Conference needed?   Barriers to discharge: medical stabilty, vascular clearance and [employer] auth    ID recs available as of today.   Auth is being restarted.  CM will continue to follow.    Aida Key  Ext [phone]

## 2025-04-24 LAB
ALBUMIN SERPL-MCNC: 2 G/DL (ref 3.5–5)
ANION GAP SERPL CALC-SCNC: 7 MMOL/L (ref 2–12)
BASOPHILS # BLD: 0.02 K/UL (ref 0–0.1)
BASOPHILS NFR BLD: 0.3 % (ref 0–1)
BUN SERPL-MCNC: 48 MG/DL (ref 6–20)
BUN/CREAT SERPL: 33 (ref 12–20)
CALCIUM SERPL-MCNC: 8.1 MG/DL (ref 8.5–10.1)
CHLORIDE SERPL-SCNC: 107 MMOL/L (ref 97–108)
CO2 SERPL-SCNC: 22 MMOL/L (ref 21–32)
CREAT SERPL-MCNC: 1.46 MG/DL (ref 0.7–1.3)
DIFFERENTIAL METHOD BLD: ABNORMAL
EOSINOPHIL # BLD: 0.29 K/UL (ref 0–0.4)
EOSINOPHIL NFR BLD: 3.7 % (ref 0–7)
ERYTHROCYTE [DISTWIDTH] IN BLOOD BY AUTOMATED COUNT: 13.8 % (ref 11.5–14.5)
GLUCOSE BLD STRIP.AUTO-MCNC: 117 MG/DL (ref 65–117)
GLUCOSE BLD STRIP.AUTO-MCNC: 142 MG/DL (ref 65–117)
GLUCOSE BLD STRIP.AUTO-MCNC: 205 MG/DL (ref 65–117)
GLUCOSE BLD STRIP.AUTO-MCNC: 236 MG/DL (ref 65–117)
GLUCOSE SERPL-MCNC: 89 MG/DL (ref 65–100)
HCT VFR BLD AUTO: 26.5 % (ref 36.6–50.3)
HGB BLD-MCNC: 8.4 G/DL (ref 12.1–17)
IMM GRANULOCYTES # BLD AUTO: 0.05 K/UL (ref 0–0.04)
IMM GRANULOCYTES NFR BLD AUTO: 0.6 % (ref 0–0.5)
LYMPHOCYTES # BLD: 2.2 K/UL (ref 0.8–3.5)
LYMPHOCYTES NFR BLD: 27.8 % (ref 12–49)
MCH RBC QN AUTO: 33.2 PG (ref 26–34)
MCHC RBC AUTO-ENTMCNC: 31.7 G/DL (ref 30–36.5)
MCV RBC AUTO: 104.7 FL (ref 80–99)
MONOCYTES # BLD: 1.03 K/UL (ref 0–1)
MONOCYTES NFR BLD: 13 % (ref 5–13)
NEUTS SEG # BLD: 4.31 K/UL (ref 1.8–8)
NEUTS SEG NFR BLD: 54.6 % (ref 32–75)
NRBC # BLD: 0 K/UL (ref 0–0.01)
NRBC BLD-RTO: 0 PER 100 WBC
PHOSPHATE SERPL-MCNC: 3.2 MG/DL (ref 2.6–4.7)
PLATELET # BLD AUTO: 402 K/UL (ref 150–400)
PMV BLD AUTO: 9.9 FL (ref 8.9–12.9)
POTASSIUM SERPL-SCNC: 4.4 MMOL/L (ref 3.5–5.1)
RBC # BLD AUTO: 2.53 M/UL (ref 4.1–5.7)
SERVICE CMNT-IMP: ABNORMAL
SERVICE CMNT-IMP: NORMAL
SODIUM SERPL-SCNC: 136 MMOL/L (ref 136–145)
WBC # BLD AUTO: 7.9 K/UL (ref 4.1–11.1)

## 2025-04-24 PROCEDURE — 6370000000 HC RX 637 (ALT 250 FOR IP): Performed by: SURGERY

## 2025-04-24 PROCEDURE — 2500000003 HC RX 250 WO HCPCS: Performed by: SURGERY

## 2025-04-24 PROCEDURE — 6370000000 HC RX 637 (ALT 250 FOR IP): Performed by: INTERNAL MEDICINE

## 2025-04-24 PROCEDURE — 2580000003 HC RX 258: Performed by: SURGERY

## 2025-04-24 PROCEDURE — 85025 COMPLETE CBC W/AUTO DIFF WBC: CPT

## 2025-04-24 PROCEDURE — 1100000000 HC RM PRIVATE

## 2025-04-24 PROCEDURE — 80069 RENAL FUNCTION PANEL: CPT

## 2025-04-24 PROCEDURE — 6360000002 HC RX W HCPCS: Performed by: INTERNAL MEDICINE

## 2025-04-24 PROCEDURE — 36415 COLL VENOUS BLD VENIPUNCTURE: CPT

## 2025-04-24 PROCEDURE — 6360000002 HC RX W HCPCS: Performed by: SURGERY

## 2025-04-24 PROCEDURE — 82962 GLUCOSE BLOOD TEST: CPT

## 2025-04-24 RX ORDER — MINERAL OIL AND WHITE PETROLATUM 150; 830 MG/G; MG/G
OINTMENT OPHTHALMIC
Status: SHIPPED | COMMUNITY
Start: 2025-04-24

## 2025-04-24 RX ORDER — GLIPIZIDE 10 MG/1
1 TABLET ORAL EVERY 4 HOURS PRN
Status: SHIPPED | COMMUNITY
Start: 2025-04-24

## 2025-04-24 RX ORDER — INSULIN LISPRO 100 [IU]/ML
0-8 INJECTION, SOLUTION INTRAVENOUS; SUBCUTANEOUS
Qty: 1 ML | Refills: 0 | Status: SHIPPED
Start: 2025-04-24

## 2025-04-24 RX ORDER — LACTOBACILLUS RHAMNOSUS GG 10B CELL
1 CAPSULE ORAL DAILY
Status: SHIPPED | COMMUNITY
Start: 2025-04-24

## 2025-04-24 RX ORDER — ENOXAPARIN SODIUM 100 MG/ML
1 INJECTION SUBCUTANEOUS EVERY 12 HOURS
Qty: 60 EACH | Refills: 1 | Status: SHIPPED
Start: 2025-04-24 | End: 2025-05-23 | Stop reason: HOSPADM

## 2025-04-24 RX ADMIN — INSULIN HUMAN 28 UNITS: 100 INJECTION, SUSPENSION SUBCUTANEOUS at 11:35

## 2025-04-24 RX ADMIN — WHITE PETROLATUM 57.7 %-MINERAL OIL 31.9 % EYE OINTMENT: at 21:12

## 2025-04-24 RX ADMIN — ENOXAPARIN SODIUM 80 MG: 100 INJECTION SUBCUTANEOUS at 18:33

## 2025-04-24 RX ADMIN — VANCOMYCIN HYDROCHLORIDE 1250 MG: 1.25 INJECTION, POWDER, LYOPHILIZED, FOR SOLUTION INTRAVENOUS at 03:57

## 2025-04-24 RX ADMIN — INSULIN HUMAN 6 UNITS: 100 INJECTION, SUSPENSION SUBCUTANEOUS at 21:17

## 2025-04-24 RX ADMIN — INSULIN LISPRO 2 UNITS: 100 INJECTION, SOLUTION INTRAVENOUS; SUBCUTANEOUS at 13:50

## 2025-04-24 RX ADMIN — ASPIRIN 81 MG: 81 TABLET, CHEWABLE ORAL at 11:35

## 2025-04-24 RX ADMIN — SODIUM CHLORIDE, PRESERVATIVE FREE 10 ML: 5 INJECTION INTRAVENOUS at 21:12

## 2025-04-24 RX ADMIN — Medication 1 CAPSULE: at 11:35

## 2025-04-24 RX ADMIN — ENOXAPARIN SODIUM 80 MG: 100 INJECTION SUBCUTANEOUS at 03:57

## 2025-04-24 RX ADMIN — COLLAGENASE SANTYL: 250 OINTMENT TOPICAL at 19:10

## 2025-04-24 RX ADMIN — INSULIN LISPRO 2 UNITS: 100 INJECTION, SOLUTION INTRAVENOUS; SUBCUTANEOUS at 21:17

## 2025-04-24 ASSESSMENT — PAIN SCALES - GENERAL: PAINLEVEL_OUTOF10: 0

## 2025-04-24 NOTE — DISCHARGE INSTRUCTIONS
HOSPITALIST DISCHARGE INSTRUCTIONS    NAME: Errol Brandt   :  1945   MRN:  [id number]     Date/Time:  2025 10:52 AM    ADMIT DATE: 3/31/2025   DISCHARGE DATE: 2025     Attending Physician: Barber Jaeger MD      Medications: Per above medication reconciliation.    Recommended diet: regular diet    Recommended activity:  PT OT eval and treat    Wound care: Wound care will need to be done at 9:00 am each day for the LEFT HEEL:  Cleanse the heel with Normal saline and wipe the entire wound bed with NS soaked gauze - firmly to remove the old drainage and wound debris. Apply the Santyl Collagenase ointment to a NS moist Hydrofera Blue dressing and apply it to the heel. Cover with a high drainage pack ABD kit and wrap with the small roll laury. Date / time the dressing and FlOAT the heel. Discourage crossing of the legs (pt. Needs to be reminded a lot).    Indwelling devices:  None    Supplemental Oxygen: None    Required Lab work: Per SNF routine    Glucose management:  Accucheck ACHS with sliding scale     Code status: Full       Skin tear/DTI left elbo? Vs Ecchymosis non blanching.    Wound Care to start on 25 for the Left heel:  Cleanse the heel wound with Vashe solution, Wiping with gauze to remove the old drainage and wound debris.  Apply a Vashe moist gauze dressing to cover the entire open wound and then cover with a high drainage ABD kit.  Wrap with Laury, Tape and date the dressing. FLOAT the heels.    Please follow up with PCP, nephrology, pulmonology, vascular surgery, endocrinology outpatient.

## 2025-04-24 NOTE — CARE COORDINATION
Transition of Care Plan:     RUR: 19%  Prior Level of Functioning: independent  Disposition: [facility]   SHALONDA: 4/23/2025  If SNF or IPR: Date FOC offered:   Date FOC received:   Accepting facility:   Date authorization started with reference number: Auth restarted 4/23  Date authorization received and expires: 4/24  Follow up appointments:   DME needed: per rehab  Transportation at discharge: likely BLS  IM/IMM Medicare/ letter given: will need 2nd IMM  Is patient a  and connected with [facility]?               If yes, was  transfer form completed and [facility] notified?   Caregiver Contact: Julio Mclaughlin   [phone]      Discharge Caregiver contacted prior to discharge? Per patient  Care Conference needed?   Barriers to discharge: bed availability     Auth for SNF has been obtained.  CM will continue to follow.    12:27  SNF will f/u with CM this afternoon regarding bed availability for today.     Aida Key  Ext [phone]

## 2025-04-24 NOTE — DISCHARGE SUMMARY
DISCHARGE SUMMARY              Name: Errol Brandt  [id number]  YOB: 1945 (Age: 80 y.o.)   Date of Admission: 3/31/2025  Date of Discharge: 4/24/2025  Attending Physician: Barber Jaeger MD    DISCHARGE DIAGNOSIS:    Left foot cellulitis due to MRSA, POA  Left foot foreign body, POA s/p debridement  MRSA bacteremia, POA  Hx peripheral arterial disease with acute ischemia of left foot status post Left SFA=>TPT/PT bypass w/reversed GSV   Diabetes mellitus type 2 with labile blood sugars  Hypothyroidism  Hypertension  BPH  CKD stage II-III  Conjunctivitis, not POA      CONSULTATIONS:  PHARMACY TO DOSE VANCOMYCIN  IP CONSULT TO PODIATRY  IP CONSULT TO DIABETES MANAGEMENT  IP CONSULT TO INFECTIOUS DISEASES  IP CONSULT TO VASCULAR SURGERY  IP CONSULT TO CASE MANAGEMENT    Excerpted HPI from H&P of Barber Toro MD:  CHIEF COMPLAINT: left heel pain     HISTORY OF PRESENT ILLNESS:     Errol Brandt is a 80 y.o.  male with PMHx significant for poorly controlled DM  who presented to the emergency department today with complaint of left heel pain and multiple falls over the last several days.  Patient states he has had pain in his left heel for several days and then because it was so painful he fell yesterday and then fell again earlier today.  Patient denies any fevers chills body aches or swelling in the extremity.  States his right lower extremity is not affected.  Patient complains of the area is tender to touch     We were asked to admit for work up and evaluation of the above problems.          Brief Hospital Course by Main Problems:     Left foot cellulitis due to MRSA, POA  Left foot foreign body, POA s/p debridement  MRSA bacteremia, POA  Hx peripheral arterial disease with acute ischemia of left foot status post Left SFA=>TPT/PT bypass w/reversed GSV      -S/p IND on 4/2/25 and underwent debridement of fascia and removal of f.b. WBAT in post op shoe. 4/8- nursing reports

## 2025-04-25 LAB
ALBUMIN SERPL-MCNC: 2 G/DL (ref 3.5–5)
ANION GAP SERPL CALC-SCNC: 4 MMOL/L (ref 2–12)
BASOPHILS # BLD: 0.03 K/UL (ref 0–0.1)
BASOPHILS NFR BLD: 0.5 % (ref 0–1)
BUN SERPL-MCNC: 51 MG/DL (ref 6–20)
BUN/CREAT SERPL: 30 (ref 12–20)
CALCIUM SERPL-MCNC: 8.2 MG/DL (ref 8.5–10.1)
CHLORIDE SERPL-SCNC: 109 MMOL/L (ref 97–108)
CO2 SERPL-SCNC: 24 MMOL/L (ref 21–32)
CREAT SERPL-MCNC: 1.72 MG/DL (ref 0.7–1.3)
DIFFERENTIAL METHOD BLD: ABNORMAL
EOSINOPHIL # BLD: 0.24 K/UL (ref 0–0.4)
EOSINOPHIL NFR BLD: 3.7 % (ref 0–7)
ERYTHROCYTE [DISTWIDTH] IN BLOOD BY AUTOMATED COUNT: 13.7 % (ref 11.5–14.5)
GLUCOSE BLD STRIP.AUTO-MCNC: 120 MG/DL (ref 65–117)
GLUCOSE BLD STRIP.AUTO-MCNC: 228 MG/DL (ref 65–117)
GLUCOSE BLD STRIP.AUTO-MCNC: 239 MG/DL (ref 65–117)
GLUCOSE BLD STRIP.AUTO-MCNC: 272 MG/DL (ref 65–117)
GLUCOSE BLD STRIP.AUTO-MCNC: 286 MG/DL (ref 65–117)
GLUCOSE SERPL-MCNC: 128 MG/DL (ref 65–100)
HCT VFR BLD AUTO: 26.9 % (ref 36.6–50.3)
HGB BLD-MCNC: 8.5 G/DL (ref 12.1–17)
IMM GRANULOCYTES # BLD AUTO: 0.03 K/UL (ref 0–0.04)
IMM GRANULOCYTES NFR BLD AUTO: 0.5 % (ref 0–0.5)
LYMPHOCYTES # BLD: 1.84 K/UL (ref 0.8–3.5)
LYMPHOCYTES NFR BLD: 28.2 % (ref 12–49)
MCH RBC QN AUTO: 32.8 PG (ref 26–34)
MCHC RBC AUTO-ENTMCNC: 31.6 G/DL (ref 30–36.5)
MCV RBC AUTO: 103.9 FL (ref 80–99)
MONOCYTES # BLD: 0.84 K/UL (ref 0–1)
MONOCYTES NFR BLD: 12.9 % (ref 5–13)
NEUTS SEG # BLD: 3.55 K/UL (ref 1.8–8)
NEUTS SEG NFR BLD: 54.2 % (ref 32–75)
NRBC # BLD: 0 K/UL (ref 0–0.01)
NRBC BLD-RTO: 0 PER 100 WBC
PHOSPHATE SERPL-MCNC: 3.6 MG/DL (ref 2.6–4.7)
PLATELET # BLD AUTO: 407 K/UL (ref 150–400)
PMV BLD AUTO: 9.7 FL (ref 8.9–12.9)
POTASSIUM SERPL-SCNC: 4.5 MMOL/L (ref 3.5–5.1)
RBC # BLD AUTO: 2.59 M/UL (ref 4.1–5.7)
SERVICE CMNT-IMP: ABNORMAL
SODIUM SERPL-SCNC: 137 MMOL/L (ref 136–145)
WBC # BLD AUTO: 6.5 K/UL (ref 4.1–11.1)

## 2025-04-25 PROCEDURE — 6370000000 HC RX 637 (ALT 250 FOR IP): Performed by: INTERNAL MEDICINE

## 2025-04-25 PROCEDURE — 36415 COLL VENOUS BLD VENIPUNCTURE: CPT

## 2025-04-25 PROCEDURE — 2500000003 HC RX 250 WO HCPCS: Performed by: SURGERY

## 2025-04-25 PROCEDURE — 6360000002 HC RX W HCPCS: Performed by: SURGERY

## 2025-04-25 PROCEDURE — 6370000000 HC RX 637 (ALT 250 FOR IP): Performed by: SURGERY

## 2025-04-25 PROCEDURE — 85025 COMPLETE CBC W/AUTO DIFF WBC: CPT

## 2025-04-25 PROCEDURE — 82962 GLUCOSE BLOOD TEST: CPT

## 2025-04-25 PROCEDURE — 2580000003 HC RX 258: Performed by: INTERNAL MEDICINE

## 2025-04-25 PROCEDURE — 6360000002 HC RX W HCPCS: Performed by: INTERNAL MEDICINE

## 2025-04-25 PROCEDURE — 97530 THERAPEUTIC ACTIVITIES: CPT

## 2025-04-25 PROCEDURE — 80069 RENAL FUNCTION PANEL: CPT

## 2025-04-25 PROCEDURE — 2580000003 HC RX 258: Performed by: SURGERY

## 2025-04-25 PROCEDURE — 1100000000 HC RM PRIVATE

## 2025-04-25 RX ORDER — SODIUM CHLORIDE 9 MG/ML
INJECTION, SOLUTION INTRAVENOUS CONTINUOUS
Status: DISCONTINUED | OUTPATIENT
Start: 2025-04-25 | End: 2025-04-26

## 2025-04-25 RX ADMIN — INSULIN LISPRO 4 UNITS: 100 INJECTION, SOLUTION INTRAVENOUS; SUBCUTANEOUS at 17:25

## 2025-04-25 RX ADMIN — SODIUM CHLORIDE, PRESERVATIVE FREE 10 ML: 5 INJECTION INTRAVENOUS at 09:46

## 2025-04-25 RX ADMIN — MORPHINE SULFATE 2 MG: 2 INJECTION, SOLUTION INTRAMUSCULAR; INTRAVENOUS at 09:54

## 2025-04-25 RX ADMIN — MORPHINE SULFATE 2 MG: 2 INJECTION, SOLUTION INTRAMUSCULAR; INTRAVENOUS at 21:01

## 2025-04-25 RX ADMIN — VANCOMYCIN HYDROCHLORIDE 1250 MG: 1.25 INJECTION, POWDER, LYOPHILIZED, FOR SOLUTION INTRAVENOUS at 04:12

## 2025-04-25 RX ADMIN — INSULIN HUMAN 28 UNITS: 100 INJECTION, SUSPENSION SUBCUTANEOUS at 09:45

## 2025-04-25 RX ADMIN — COLLAGENASE SANTYL: 250 OINTMENT TOPICAL at 09:50

## 2025-04-25 RX ADMIN — SODIUM CHLORIDE: 0.9 INJECTION, SOLUTION INTRAVENOUS at 13:31

## 2025-04-25 RX ADMIN — ENOXAPARIN SODIUM 80 MG: 100 INJECTION SUBCUTANEOUS at 04:09

## 2025-04-25 RX ADMIN — ENOXAPARIN SODIUM 80 MG: 100 INJECTION SUBCUTANEOUS at 17:13

## 2025-04-25 RX ADMIN — ASPIRIN 81 MG: 81 TABLET, CHEWABLE ORAL at 09:45

## 2025-04-25 RX ADMIN — Medication 1 CAPSULE: at 09:45

## 2025-04-25 ASSESSMENT — PAIN SCALES - GENERAL
PAINLEVEL_OUTOF10: 8
PAINLEVEL_OUTOF10: 10
PAINLEVEL_OUTOF10: 1

## 2025-04-25 ASSESSMENT — PAIN DESCRIPTION - ORIENTATION
ORIENTATION: LEFT
ORIENTATION: LEFT

## 2025-04-25 ASSESSMENT — PAIN DESCRIPTION - DESCRIPTORS
DESCRIPTORS: SHARP
DESCRIPTORS: ACHING

## 2025-04-25 ASSESSMENT — PAIN DESCRIPTION - LOCATION
LOCATION: FOOT
LOCATION: FOOT

## 2025-04-25 ASSESSMENT — PAIN SCALES - WONG BAKER: WONGBAKER_NUMERICALRESPONSE: NO HURT

## 2025-04-25 ASSESSMENT — PAIN - FUNCTIONAL ASSESSMENT: PAIN_FUNCTIONAL_ASSESSMENT: PREVENTS OR INTERFERES SOME ACTIVE ACTIVITIES AND ADLS

## 2025-04-25 NOTE — CARE COORDINATION
Transition of Care Plan:     RUR: 20%  Prior Level of Functioning: independent  Disposition: [facility]   SHALONDA: 4/23/2025  If SNF or IPR: Date FOC offered:   Date FOC received:   Accepting facility:   Date authorization started with reference number: Auth restarted 4/23  Date authorization received and expires: 4/24  Follow up appointments:   DME needed: per rehab  Transportation at discharge: [facility]@3pm Sat. 4/26 - insurance ref#[id number], CM spoke w/Erin NEVES  IM/IMM Medicare/ letter given: 2nd IM given 4/25  Is patient a  and connected with [facility]?               If yes, was  transfer form completed and [facility] notified?   Caregiver Contact: Julio Mclaughlin   [phone]      Discharge Caregiver contacted prior to discharge? Per patient  Care Conference needed?   Barriers to discharge: medical stability    [facility] have [facility] bed available on Sunday.  CM offered pt a bed at another [facility] facility & pt declined.  CM will continue to follow.    2:52  Per MD, pt is not stable for d/c today.  CM scheduled transport with [facility] for tomorrow 4/26 at 3pm.  Weekend CM to call Erik at [phone] to verify bed availability.     Aida Key  Ext [phone]

## 2025-04-25 NOTE — DIABETES MGMT
[facility]ES HEALTH  DIABETES MANAGEMENT CONSULT    Consulted by Provider for advanced nursing evaluation and care for inpatient blood glucose management.    Evaluation and Action Plan   Errol Brandt Is a 80-year-old male with a history of type two diabetes. The patient was admitted after complaining of left heel pain which he reports resulted in falls over the last few days. The patient’s A1c is 9% which is up from the 6.8%. He had in January 2024. The patient’s diabetes was previously managed at home by his wife. However, the patient states that his wife is battling cancer. I suspect this has played a role in the elevated A1c. He reportedly takes 7030 combo insulin twice daily at home. The patient also resides with his son who reportedly prepares the meals in the home. Diabetes management has been consulted to assist with blood glucose management and assessment of home dosing. We will monitor blood glucose trends, and insulin requirements and make adjustments as needed.    Blood glucose pattern      Management Rationale Action Plan   Medication   Basal needs Using 0.3 units/kg/D based on weight  Use NPH 12 units Q 12 hours   Nutritional needs Covers carb load in meals    Corrective insulin Using medium dose sensitivity based on weight    Additional orders  Carb consistent diet (60g CHO/meal)       Diabetes Discharge Plan   Medication  TBD   Additional orders            Initial Presentation   Errol Brandt is a 80 y.o. male who presented to the ED on 3/31/25  LAB:Glucose 242. GFR 56.   Narrative & Impression  PRELIMINARY REPORT     Small linear foreign body along the plantar surface between the first and second  metatarsals. No definite evidence of osteomyelitis.     Preliminary report was provided by Dr. Schumacher, the on-call radiologist, at 7:55  p.m.     Final report to follow.     END PRELIMINARY REPORT    FINAL REPORT BELOW     EXAM: CT FOOT LEFT W CONTRAST     INDICATION: Left foot 
[facility]  DIABETES MANAGEMENT CONSULT    Consulted by Provider for advanced nursing evaluation and care for inpatient blood glucose management.    Evaluation and Action Plan   Errol Brandt Is a 80-year-old male with a history of type two diabetes. The patient was admitted after complaining of left heel pain which he reports resulted in falls over the last few days. The patient’s A1c is 9% which is up from the 6.8%. He had in January 2024. The patient’s diabetes was previously managed at home by his wife. However, the patient states that his wife is battling cancer. I suspect this has played a role in the elevated A1c. He reportedly takes 7030 combo insulin twice daily at home. The patient also resides with his son who reportedly prepares the meals in the home. Diabetes management has been consulted to assist with blood glucose management and assessment of home dosing. We will monitor blood glucose trends, and insulin requirements and make adjustments as needed.  BG's are elevated today. However his NPH insulin has been held by the attending. The patient would likely benefit from receiving the basal insulin as scheduled. The basal insulin is not likely to cause the patient hypoglycemia an the presence of his NPO status.     Blood glucose pattern      Management Rationale Action Plan   Medication   Basal needs Using 0.3 units/kg/D based on weight  Use NPH 12 units Q 12 hours   Nutritional needs Covers carb load in meals    Corrective insulin Using medium dose sensitivity based on weight    Additional orders  Carb consistent diet (60g CHO/meal)       Diabetes Discharge Plan   Medication  TBD   Additional orders            Initial Presentation   Errol Brandt is a 80 y.o. male who presented to the ED on 3/31/25  LAB:Glucose 242. GFR 56.   Narrative & Impression  PRELIMINARY REPORT     Small linear foreign body along the plantar surface between the first and second  metatarsals. No 
Diabetes Management Team to sign off at this point as patient's blood glucose remains stable on current ordered doses of insulin.  Pending IPR placement at this time.  Please re-consult us if patient needs change.  Thank you for including us in their care.    Please discharge on hospital doses of insulin.      PAPO DENNISON - [facility]   Program for Diabetes Health      
This is a 80-year-old gentleman with a history of type 2 diabetes mellitus, x 40 years, and diabetic peripheral neuropathy who presented to [facility] on 3/31/2025 with complaints of pain in his left heel.  Foot CT demonstrated foreign bodies in the left heel and at the plantar aspect of the MTPs.  No evidence of osteomyelitis.  Laboratory data notable for glucose of 242, creatinine 1.30, A1c 9.0, white count 11.1, hemoglobin 14.8.  On 4/2/2025, the patient underwent incision and drainage of the left heel with removal of the foreign body.  We are asked to assist in glucose management.     The patient was previously followed by Dr. Navarro and was reportedly on 24 units of 70/30 insulin in the morning and 22 units in the evening as well as metformin 1000 mg daily.  The patient however states that he has been taking 50 units of insulin once or twice daily.  The history is not entirely clear.  His wife who has cancer was previously administering the insulin but she is no longer able to do so which may explain the confusion.  He lives with his wife and his son.  He says he eats well.  Since admission, he has received minimal insulin.  Orders for 12 units of NPH twice daily were held and then restarted.  His blood sugars have been elevated despite the reinstitution of insulin therapy.    4/7: BG patterns noted over the weekend.  Noting fasting lower BG values <100 or <70 for one episode.  Will reduce PM NPH as per below and increase AM NPH as per below.  Patient voiced \"eating good meals\"   Procedure Summary         Date: 04/09/25 Room / Location: [facility] MAIN OR [facility] / [facility] MAIN OR     Anesthesia Start: 1109 Anesthesia Stop: 1321     Procedure: LEFT LEG ARTERIOGRAM, LEFT HEEL DEBIDEMENT (Left: Leg Lower) Diagnosis:       Lower limb ischemia      (Lower limb ischemia [I99.8])     Providers: Foster Garcia MD Responsible Provider: Raymond Muñoz MD     Anesthesia Type: General            FBG 76 this 
This is a 80-year-old gentleman with a history of type 2 diabetes mellitus, x 40 years, and diabetic peripheral neuropathy who presented to [facility] on 3/31/2025 with complaints of pain in his left heel.  Foot CT demonstrated foreign bodies in the left heel and at the plantar aspect of the MTPs.  No evidence of osteomyelitis.  Laboratory data notable for glucose of 242, creatinine 1.30, A1c 9.0, white count 11.1, hemoglobin 14.8.  On 4/2/2025, the patient underwent incision and drainage of the left heel with removal of the foreign body.  We are asked to assist in glucose management.     The patient was previously followed by Dr. Navarro and was reportedly on 24 units of 70/30 insulin in the morning and 22 units in the evening as well as metformin 1000 mg daily.  The patient however states that he has been taking 50 units of insulin once or twice daily.  The history is not entirely clear.  His wife who has cancer was previously administering the insulin but she is no longer able to do so which may explain the confusion.  He lives with his wife and his son.  He says he eats well.  Since admission, he has received minimal insulin.  Orders for 12 units of NPH twice daily were held and then restarted.  His blood sugars have been elevated despite the reinstitution of insulin therapy.    4/7: BG patterns noted over the weekend.  Noting fasting lower BG values <100 or <70 for one episode.  Will reduce PM NPH as per below and increase AM NPH as per below.  Patient voiced \"eating good meals\"     FBG 76 this morning. I have adjusted the insulin dosing. Patient smiling with pleasant mood.     Blood glucose patterns.      Examination  Vitals:    04/08/25 0933   BP: (!) 106/53   Pulse: 72   Resp: 17   Temp: 99 °F (37.2 °C)   SpO2: 99%        Impression  1.  Type 2 diabetes mellitus with poor blood sugar control  2.  Foreign body in the left heel status post I&D    Plan:  1. NPH insulin adjusted: NPH 24 units 
This is a 80-year-old gentleman with a history of type 2 diabetes mellitus, x 40 years, and diabetic peripheral neuropathy who presented to [facility] on 3/31/2025 with complaints of pain in his left heel.  Foot CT demonstrated foreign bodies in the left heel and at the plantar aspect of the MTPs.  No evidence of osteomyelitis.  Laboratory data notable for glucose of 242, creatinine 1.30, A1c 9.0, white count 11.1, hemoglobin 14.8.  On 4/2/2025, the patient underwent incision and drainage of the left heel with removal of the foreign body.  We are asked to assist in glucose management.     The patient was previously followed by Dr. Navarro and was reportedly on 24 units of 70/30 insulin in the morning and 22 units in the evening as well as metformin 1000 mg daily.  The patient however states that he has been taking 50 units of insulin once or twice daily.  The history is not entirely clear.  His wife who has cancer was previously administering the insulin but she is no longer able to do so which may explain the confusion.  He lives with his wife and his son.  He says he eats well.  Since admission, he has received minimal insulin.  Orders for 12 units of NPH twice daily were held and then restarted.  His blood sugars have been elevated despite the reinstitution of insulin therapy.    4/7: BG patterns noted over the weekend.  Noting fasting lower BG values <100 or <70 for one episode.  Will reduce PM NPH as per below and increase AM NPH as per below.  Patient voiced \"eating good meals\"     Blood glucose patterns.      Examination  Vitals:    04/07/25 0245   BP: (!) 116/49   Pulse: 62   Resp: 16   Temp: 97.7 °F (36.5 °C)   SpO2:         Impression  1.  Type 2 diabetes mellitus with poor blood sugar control  2.  Foreign body in the left heel status post I&D    Plan:  1. NPH insulin adjusted: NPH 24 units in AM, reduced PM NPH 15 units  2.  We will continue to follow with you  3.  Rest per 
This is a 80-year-old gentleman with a history of type 2 diabetes mellitus, x 40 years, and diabetic peripheral neuropathy who presented to [facility] on 3/31/2025 with complaints of pain in his left heel.  Foot CT demonstrated foreign bodies in the left heel and at the plantar aspect of the MTPs.  No evidence of osteomyelitis.  Laboratory data notable for glucose of 242, creatinine 1.30, A1c 9.0, white count 11.1, hemoglobin 14.8.  On 4/2/2025, the patient underwent incision and drainage of the left heel with removal of the foreign body.  We are asked to assist in glucose management.     The patient was previously followed by Dr. Navarro and was reportedly on 24 units of 70/30 insulin in the morning and 22 units in the evening as well as metformin 1000 mg daily.  The patient however states that he has been taking 50 units of insulin once or twice daily.  The history is not entirely clear.  His wife who has cancer was previously administering the insulin but she is no longer able to do so which may explain the confusion.  He lives with his wife and his son.  He says he eats well.  Since admission, he has received minimal insulin.  Orders for 12 units of NPH twice daily were held and then restarted.  His blood sugars have been elevated despite the reinstitution of insulin therapy.    Examination  Blood pressure 139/61  Pulse 91  Tmax 100  96% on room air    Recent laboratory data  Glucose 262 (range 152-324)  Creatinine 1.21  Bicarb 27    Impression  1.  Type 2 diabetes mellitus with poor blood sugar control  2.  Foreign body in the left heel status post I&D    Plan:  1.  I am increasing the NPH insulin and adding mealtime insulin  2.  We will continue to follow with you  3.  Rest per team,    Before making these care recommendations, I personally reviewed the hospitalization record, including notes, laboratory & diagnostic data and current medications, and examined the patient at the bedside. 
This is a 80-year-old gentleman with a history of type 2 diabetes mellitus, x 40 years, and diabetic peripheral neuropathy who presented to [facility] on 3/31/2025 with complaints of pain in his left heel.  Foot CT demonstrated foreign bodies in the left heel and at the plantar aspect of the MTPs.  No evidence of osteomyelitis.  Laboratory data notable for glucose of 242, creatinine 1.30, A1c 9.0, white count 11.1, hemoglobin 14.8.  On 4/2/2025, the patient underwent incision and drainage of the left heel with removal of the foreign body.  We are asked to assist in glucose management.    The patient was previously followed by Dr. Navarro and was reportedly on 24 units of 70/30 insulin in the morning and 22 units in the evening as well as metformin 1000 mg daily.  The patient however states that he has been taking 50 units of insulin once or twice daily.  The history is not entirely clear.  His wife who has cancer was previously administering the insulin but she is no longer able to do so which may explain the confusion.  He lives with his wife and his son.  He says he eats well.  Since admission, he has received minimal insulin.  Orders for 12 units of NPH twice daily have been held.    Examination  The gentleman is alert and oriented but hard of hearing  Blood pressure 98/54  Pulse 85  Afebrile  91% on 2 L  Weight 84 kg    Recent laboratory data  Glucose 259 (range 145-276)  Creatinine 1.1  Bicarb 25    Impression  1.  Type 2 diabetes mellitus with significant deterioration in glucose control likely related to inconsistent insulin dosing due to the illness of his wife  2.  Poor insight into his diabetes  3.  Foreign body in the left heel related to diabetic peripheral neuropathy s/p I&D    Plan:  1.  We will attempt to contact family regarding insulin dosing.  The dosing regimen of 24 units in the morning and 22 units in the evening appears to have been working well when he last saw Dr. Navarro  in 
This is a 80-year-old gentleman with a history of type 2 diabetes mellitus, x 40 years, and diabetic peripheral neuropathy who presented to [facility] on 3/31/2025 with complaints of pain in his left heel.  Foot CT demonstrated foreign bodies in the left heel and at the plantar aspect of the MTPs.  No evidence of osteomyelitis.  Laboratory data notable for glucose of 242, creatinine 1.30, A1c 9.0, white count 11.1, hemoglobin 14.8.  On 4/2/2025, the patient underwent incision and drainage of the left heel with removal of the foreign body.  We are asked to assist in glucose management.     The patient was previously followed by Dr. Navarro and was reportedly on 24 units of 70/30 insulin in the morning and 22 units in the evening as well as metformin 1000 mg daily.  The patient however states that he has been taking 50 units of insulin once or twice daily.  The history is not entirely clear.  His wife who has cancer was previously administering the insulin but she is no longer able to do so which may explain the confusion.  He lives with his wife and his son.  He says he eats well.  Since admission, he has received minimal insulin.  Orders for 12 units of NPH twice daily were held and then restarted.  His blood sugars have been elevated despite the reinstitution of insulin therapy.    4/7: BG patterns noted over the weekend.  Noting fasting lower BG values <100 or <70 for one episode.  Will reduce PM NPH as per below and increase AM NPH as per below.  Patient voiced \"eating good meals\"   Procedure Summary         Date: 04/09/25 Room / Location: [facility] [place] [facility] [place] [facility] [place] [place]     Anesthesia Start: 1109 Anesthesia Stop: 1321     Procedure: LEFT LEG ARTERIOGRAM, LEFT HEEL DEBIDEMENT (Left: Leg Lower) Diagnosis:       Lower limb ischemia      (Lower limb ischemia [I99.8])     Providers: Foster Garcia MD Responsible Provider: Raymond Muñoz MD     Anesthesia Type: General            FBG 76 this 
This is a 80-year-old gentleman with a history of type 2 diabetes mellitus, x 40 years, and diabetic peripheral neuropathy who presented to [facility] on 3/31/2025 with complaints of pain in his left heel.  Foot CT demonstrated foreign bodies in the left heel and at the plantar aspect of the MTPs.  No evidence of osteomyelitis.  Laboratory data notable for glucose of 242, creatinine 1.30, A1c 9.0, white count 11.1, hemoglobin 14.8.  On 4/2/2025, the patient underwent incision and drainage of the left heel with removal of the foreign body.  We are asked to assist in glucose management.     The patient was previously followed by Dr. Navarro and was reportedly on 24 units of 70/30 insulin in the morning and 22 units in the evening as well as metformin 1000 mg daily.  The patient however states that he has been taking 50 units of insulin once or twice daily.  The history is not entirely clear.  His wife who has cancer was previously administering the insulin but she is no longer able to do so which may explain the confusion.  He lives with his wife and his son.  He says he eats well.  Since admission, he has received minimal insulin.  Orders for 12 units of NPH twice daily were held and then restarted.  His blood sugars have been elevated despite the reinstitution of insulin therapy.    4/7: BG patterns noted over the weekend.  Noting fasting lower BG values <100 or <70 for one episode.  Will reduce PM NPH as per below and increase AM NPH as per below.  Patient voiced \"eating good meals\"   Procedure Summary         Date: 04/09/25 Room / Location: [facility] [place] [facility] MA[facility] [place]     Anesthesia Start: 1109 Anesthesia Stop: 1321     Procedure: LEFT LEG ARTERIOGRAM, LEFT HEEL DEBIDEMENT (Left: Leg Lower) Diagnosis:       Lower limb ischemia      (Lower limb ischemia [I99.8])     Providers: Foster Garcia MD Responsible Provider: Raymond Muñoz MD     Anesthesia Type: General            FBG 76 this 
morning. I have adjusted the insulin dosing. Patient smiling with pleasant mood.   BG's elevated this morning, however morning basal insulin dose was held. I suspect that the patient Bg's will stabilize if insulin dosing remains consistent.     Blood glucose patterns.    Anion gap 6  Creatine 1.51  Potassium 4.9  Sodium 132  Co2 23  Examination  Vitals:    04/15/25 1522   BP: (!) 118/55   Pulse: 77   Resp: 18   Temp: 99 °F (37.2 °C)   SpO2: 96%        Impression  1.  Type 2 diabetes mellitus with poor blood sugar control  2.  Foreign body in the left heel status post I&D    Plan:  1. NPH insulin adjusted: NPH 24 units in AM, reduced PM NPH 4 units  2.  We will continue to follow with you  3.  Rest per team,  Diabetes Discharge Plan: Noted IPR at discharge?  Continue hospital doses of insulin at hospital dscharge    Before making these care recommendations, I personally reviewed the hospitalization record, including notes, laboratory & diagnostic data and current medications, and examined the patient at the bedside. Total time    25 minutes,.

## 2025-04-26 ENCOUNTER — APPOINTMENT (OUTPATIENT)
Facility: HOSPITAL | Age: 80
DRG: 622 | End: 2025-04-26
Payer: MEDICARE

## 2025-04-26 LAB
ALBUMIN SERPL-MCNC: 2.1 G/DL (ref 3.5–5)
ANION GAP SERPL CALC-SCNC: 5 MMOL/L (ref 2–12)
ANION GAP SERPL CALC-SCNC: 7 MMOL/L (ref 2–12)
BASOPHILS # BLD: 0.04 K/UL (ref 0–0.1)
BASOPHILS # BLD: 0.04 K/UL (ref 0–0.1)
BASOPHILS NFR BLD: 0.5 % (ref 0–1)
BASOPHILS NFR BLD: 0.6 % (ref 0–1)
BUN SERPL-MCNC: 50 MG/DL (ref 6–20)
BUN SERPL-MCNC: 54 MG/DL (ref 6–20)
BUN/CREAT SERPL: 30 (ref 12–20)
BUN/CREAT SERPL: 33 (ref 12–20)
CALCIUM SERPL-MCNC: 7.7 MG/DL (ref 8.5–10.1)
CALCIUM SERPL-MCNC: 7.8 MG/DL (ref 8.5–10.1)
CHLORIDE SERPL-SCNC: 110 MMOL/L (ref 97–108)
CHLORIDE SERPL-SCNC: 110 MMOL/L (ref 97–108)
CO2 SERPL-SCNC: 22 MMOL/L (ref 21–32)
CO2 SERPL-SCNC: 23 MMOL/L (ref 21–32)
CREAT SERPL-MCNC: 1.65 MG/DL (ref 0.7–1.3)
CREAT SERPL-MCNC: 1.66 MG/DL (ref 0.7–1.3)
DIFFERENTIAL METHOD BLD: ABNORMAL
DIFFERENTIAL METHOD BLD: ABNORMAL
EOSINOPHIL # BLD: 0.26 K/UL (ref 0–0.4)
EOSINOPHIL # BLD: 0.29 K/UL (ref 0–0.4)
EOSINOPHIL NFR BLD: 3.4 % (ref 0–7)
EOSINOPHIL NFR BLD: 4.3 % (ref 0–7)
ERYTHROCYTE [DISTWIDTH] IN BLOOD BY AUTOMATED COUNT: 13.8 % (ref 11.5–14.5)
ERYTHROCYTE [DISTWIDTH] IN BLOOD BY AUTOMATED COUNT: 14.3 % (ref 11.5–14.5)
GLUCOSE BLD STRIP.AUTO-MCNC: 142 MG/DL (ref 65–117)
GLUCOSE BLD STRIP.AUTO-MCNC: 197 MG/DL (ref 65–117)
GLUCOSE SERPL-MCNC: 246 MG/DL (ref 65–100)
GLUCOSE SERPL-MCNC: 83 MG/DL (ref 65–100)
HCT VFR BLD AUTO: 26.6 % (ref 36.6–50.3)
HCT VFR BLD AUTO: 27.4 % (ref 36.6–50.3)
HGB BLD-MCNC: 8.3 G/DL (ref 12.1–17)
HGB BLD-MCNC: 8.6 G/DL (ref 12.1–17)
IMM GRANULOCYTES # BLD AUTO: 0.03 K/UL (ref 0–0.04)
IMM GRANULOCYTES # BLD AUTO: 0.03 K/UL (ref 0–0.04)
IMM GRANULOCYTES NFR BLD AUTO: 0.4 % (ref 0–0.5)
IMM GRANULOCYTES NFR BLD AUTO: 0.4 % (ref 0–0.5)
INR PPP: 0.9 (ref 0.9–1.1)
LYMPHOCYTES # BLD: 1.65 K/UL (ref 0.8–3.5)
LYMPHOCYTES # BLD: 1.77 K/UL (ref 0.8–3.5)
LYMPHOCYTES NFR BLD: 23.3 % (ref 12–49)
LYMPHOCYTES NFR BLD: 24.7 % (ref 12–49)
MCH RBC QN AUTO: 32.7 PG (ref 26–34)
MCH RBC QN AUTO: 33.1 PG (ref 26–34)
MCHC RBC AUTO-ENTMCNC: 31.2 G/DL (ref 30–36.5)
MCHC RBC AUTO-ENTMCNC: 31.4 G/DL (ref 30–36.5)
MCV RBC AUTO: 104.7 FL (ref 80–99)
MCV RBC AUTO: 105.4 FL (ref 80–99)
MONOCYTES # BLD: 0.97 K/UL (ref 0–1)
MONOCYTES # BLD: 1.06 K/UL (ref 0–1)
MONOCYTES NFR BLD: 12.8 % (ref 5–13)
MONOCYTES NFR BLD: 15.9 % (ref 5–13)
NEUTS SEG # BLD: 3.6 K/UL (ref 1.8–8)
NEUTS SEG # BLD: 4.53 K/UL (ref 1.8–8)
NEUTS SEG NFR BLD: 54.1 % (ref 32–75)
NEUTS SEG NFR BLD: 59.6 % (ref 32–75)
NRBC # BLD: 0 K/UL (ref 0–0.01)
NRBC # BLD: 0 K/UL (ref 0–0.01)
NRBC BLD-RTO: 0 PER 100 WBC
NRBC BLD-RTO: 0 PER 100 WBC
PHOSPHATE SERPL-MCNC: 3.6 MG/DL (ref 2.6–4.7)
PLATELET # BLD AUTO: 405 K/UL (ref 150–400)
PLATELET # BLD AUTO: 438 K/UL (ref 150–400)
PMV BLD AUTO: 9.6 FL (ref 8.9–12.9)
PMV BLD AUTO: 9.8 FL (ref 8.9–12.9)
POTASSIUM SERPL-SCNC: 4.4 MMOL/L (ref 3.5–5.1)
POTASSIUM SERPL-SCNC: 4.7 MMOL/L (ref 3.5–5.1)
PROTHROMBIN TIME: 10 SEC (ref 9.2–11.2)
RBC # BLD AUTO: 2.54 M/UL (ref 4.1–5.7)
RBC # BLD AUTO: 2.6 M/UL (ref 4.1–5.7)
RBC MORPH BLD: ABNORMAL
SERVICE CMNT-IMP: ABNORMAL
SERVICE CMNT-IMP: ABNORMAL
SODIUM SERPL-SCNC: 138 MMOL/L (ref 136–145)
SODIUM SERPL-SCNC: 139 MMOL/L (ref 136–145)
TROPONIN I SERPL HS-MCNC: 364 NG/L (ref 0–76)
WBC # BLD AUTO: 6.7 K/UL (ref 4.1–11.1)
WBC # BLD AUTO: 7.6 K/UL (ref 4.1–11.1)

## 2025-04-26 PROCEDURE — 6370000000 HC RX 637 (ALT 250 FOR IP): Performed by: INTERNAL MEDICINE

## 2025-04-26 PROCEDURE — 36415 COLL VENOUS BLD VENIPUNCTURE: CPT

## 2025-04-26 PROCEDURE — 2580000003 HC RX 258: Performed by: INTERNAL MEDICINE

## 2025-04-26 PROCEDURE — 6370000000 HC RX 637 (ALT 250 FOR IP): Performed by: SURGERY

## 2025-04-26 PROCEDURE — 82962 GLUCOSE BLOOD TEST: CPT

## 2025-04-26 PROCEDURE — 80069 RENAL FUNCTION PANEL: CPT

## 2025-04-26 PROCEDURE — 6360000002 HC RX W HCPCS: Performed by: INTERNAL MEDICINE

## 2025-04-26 PROCEDURE — 1100000000 HC RM PRIVATE

## 2025-04-26 PROCEDURE — 80048 BASIC METABOLIC PNL TOTAL CA: CPT

## 2025-04-26 PROCEDURE — 93005 ELECTROCARDIOGRAM TRACING: CPT | Performed by: INTERNAL MEDICINE

## 2025-04-26 PROCEDURE — 85610 PROTHROMBIN TIME: CPT

## 2025-04-26 PROCEDURE — 6370000000 HC RX 637 (ALT 250 FOR IP): Performed by: NURSE PRACTITIONER

## 2025-04-26 PROCEDURE — 2500000003 HC RX 250 WO HCPCS: Performed by: SURGERY

## 2025-04-26 PROCEDURE — 84484 ASSAY OF TROPONIN QUANT: CPT

## 2025-04-26 PROCEDURE — 71045 X-RAY EXAM CHEST 1 VIEW: CPT

## 2025-04-26 PROCEDURE — 85025 COMPLETE CBC W/AUTO DIFF WBC: CPT

## 2025-04-26 RX ORDER — SODIUM CHLORIDE 9 MG/ML
INJECTION, SOLUTION INTRAVENOUS CONTINUOUS
Status: ACTIVE | OUTPATIENT
Start: 2025-04-26 | End: 2025-04-27

## 2025-04-26 RX ORDER — ASPIRIN 325 MG
325 TABLET ORAL ONCE
Status: COMPLETED | OUTPATIENT
Start: 2025-04-26 | End: 2025-04-26

## 2025-04-26 RX ADMIN — SODIUM CHLORIDE, PRESERVATIVE FREE 10 ML: 5 INJECTION INTRAVENOUS at 21:39

## 2025-04-26 RX ADMIN — INSULIN HUMAN 6 UNITS: 100 INJECTION, SUSPENSION SUBCUTANEOUS at 21:37

## 2025-04-26 RX ADMIN — SODIUM CHLORIDE: 0.9 INJECTION, SOLUTION INTRAVENOUS at 20:08

## 2025-04-26 RX ADMIN — Medication 1 CAPSULE: at 09:18

## 2025-04-26 RX ADMIN — INSULIN HUMAN 28 UNITS: 100 INJECTION, SUSPENSION SUBCUTANEOUS at 09:18

## 2025-04-26 RX ADMIN — INSULIN LISPRO 2 UNITS: 100 INJECTION, SOLUTION INTRAVENOUS; SUBCUTANEOUS at 19:08

## 2025-04-26 RX ADMIN — ACETAMINOPHEN 650 MG: 325 TABLET ORAL at 18:58

## 2025-04-26 RX ADMIN — INSULIN LISPRO 2 UNITS: 100 INJECTION, SOLUTION INTRAVENOUS; SUBCUTANEOUS at 21:36

## 2025-04-26 RX ADMIN — ENOXAPARIN SODIUM 80 MG: 100 INJECTION SUBCUTANEOUS at 16:22

## 2025-04-26 RX ADMIN — COLLAGENASE SANTYL: 250 OINTMENT TOPICAL at 10:46

## 2025-04-26 RX ADMIN — VANCOMYCIN HYDROCHLORIDE 750 MG: 750 INJECTION, POWDER, LYOPHILIZED, FOR SOLUTION INTRAVENOUS at 04:39

## 2025-04-26 RX ADMIN — SODIUM CHLORIDE: 0.9 INJECTION, SOLUTION INTRAVENOUS at 04:38

## 2025-04-26 RX ADMIN — INSULIN HUMAN 6 UNITS: 100 INJECTION, SUSPENSION SUBCUTANEOUS at 00:38

## 2025-04-26 RX ADMIN — WHITE PETROLATUM 57.7 %-MINERAL OIL 31.9 % EYE OINTMENT: at 21:41

## 2025-04-26 RX ADMIN — ENOXAPARIN SODIUM 80 MG: 100 INJECTION SUBCUTANEOUS at 04:38

## 2025-04-26 RX ADMIN — ASPIRIN 325 MG: 325 TABLET ORAL at 22:02

## 2025-04-26 RX ADMIN — LIDOCAINE HYDROCHLORIDE 40 ML: 20 SOLUTION ORAL at 20:08

## 2025-04-26 RX ADMIN — INSULIN LISPRO 2 UNITS: 100 INJECTION, SOLUTION INTRAVENOUS; SUBCUTANEOUS at 00:38

## 2025-04-26 RX ADMIN — ASPIRIN 81 MG: 81 TABLET, CHEWABLE ORAL at 09:18

## 2025-04-26 RX ADMIN — DEXTRAN 70, GLYCERIN, HYPROMELLOSE 1 DROP: 1; 2; 3 SOLUTION/ DROPS OPHTHALMIC at 21:38

## 2025-04-26 RX ADMIN — WHITE PETROLATUM 57.7 %-MINERAL OIL 31.9 % EYE OINTMENT: at 00:39

## 2025-04-26 ASSESSMENT — PAIN DESCRIPTION - ORIENTATION: ORIENTATION: LEFT

## 2025-04-26 ASSESSMENT — PAIN DESCRIPTION - DESCRIPTORS: DESCRIPTORS: ACHING;NUMBNESS

## 2025-04-26 ASSESSMENT — PAIN SCALES - GENERAL: PAINLEVEL_OUTOF10: 5

## 2025-04-26 ASSESSMENT — PAIN - FUNCTIONAL ASSESSMENT: PAIN_FUNCTIONAL_ASSESSMENT: PREVENTS OR INTERFERES SOME ACTIVE ACTIVITIES AND ADLS

## 2025-04-26 ASSESSMENT — PAIN DESCRIPTION - LOCATION: LOCATION: FOOT

## 2025-04-26 NOTE — SIGNIFICANT EVENT
Nocturnist/cross cover provider called to Room 3114 at approximately 1930 as RRT was reported to be called for chest pain.     Patient Vitals for the past 8 hrs:   BP Temp Temp src Pulse Resp SpO2   04/26/25 1616 (!) 100/49 97.9 °F (36.6 °C) Oral 82 16 97 %       Intake/Output Summary (Last 24 hours) at 4/26/2025 1946  Last data filed at 4/26/2025 0550  Gross per 24 hour   Intake 1038.91 ml   Output 600 ml   Net 438.91 ml         BACKGROUND/ SITUATION:  Attending provider following patient: Edi De La Rosa MD     80 y.o. male h/o  has a past medical history of Diabetes (HCC), Hyperlipidemia, and PAD (peripheral artery disease).   admitted 3/31/2025 for Cellulitis of left foot [L03.116]  Retained foreign body of foot [M79.5]  Diabetic foot infection (HCC) [E11.628, L08.9]  Cellulitis and abscess of toe of left foot [L03.032, L02.612]  Other mucopurulent conjunctivitis of both eyes [H10.023].  See prior hospitalist group notes for complete details of course of treatment.      FINDINGS:  On presentation to the patient's room patient in no distress, on ra w/o c/o dyspnea, states having pain in midsternal/epigastric area, unable to describe further other than \"it hurts\". Vss, no inc WOB, lungs CTAB w/o adventitious lung sounds noted. -EKG upon my initial review revealed nsr no acute ischemic changes as compared to prior, artifact noted- awaiting final cardiology reading. Pt reported he ate meat and potatoes for dinner. No known h/o CAD. Chest/epigastric discomfort mildly reproducible on palpation. Pt on cmivf 125ml/hr as ordered by dayshift team, no obvious FVO noted on examination. Prior to leaving floor pt had GI cocktail, and reported pain was already improving.    Patient denies any other concerns or complaints on exam. No acute distress noted on exam. Discussed with patient and/or family RECs, plan of care as below. Risk vs benefits of admit/treatment vs non-treatment discussed with patient and/or family, they

## 2025-04-27 LAB
ANION GAP SERPL CALC-SCNC: 4 MMOL/L (ref 2–12)
BASOPHILS # BLD: 0.02 K/UL (ref 0–0.1)
BASOPHILS NFR BLD: 0.3 % (ref 0–1)
BUN SERPL-MCNC: 52 MG/DL (ref 6–20)
BUN/CREAT SERPL: 30 (ref 12–20)
CALCIUM SERPL-MCNC: 7.5 MG/DL (ref 8.5–10.1)
CHLORIDE SERPL-SCNC: 113 MMOL/L (ref 97–108)
CO2 SERPL-SCNC: 23 MMOL/L (ref 21–32)
CREAT SERPL-MCNC: 1.74 MG/DL (ref 0.7–1.3)
DIFFERENTIAL METHOD BLD: ABNORMAL
EOSINOPHIL # BLD: 0.34 K/UL (ref 0–0.4)
EOSINOPHIL NFR BLD: 4.4 % (ref 0–7)
ERYTHROCYTE [DISTWIDTH] IN BLOOD BY AUTOMATED COUNT: 14.2 % (ref 11.5–14.5)
GLUCOSE BLD STRIP.AUTO-MCNC: 143 MG/DL (ref 65–117)
GLUCOSE BLD STRIP.AUTO-MCNC: 166 MG/DL (ref 65–117)
GLUCOSE BLD STRIP.AUTO-MCNC: 222 MG/DL (ref 65–117)
GLUCOSE BLD STRIP.AUTO-MCNC: 243 MG/DL (ref 65–117)
GLUCOSE SERPL-MCNC: 128 MG/DL (ref 65–100)
HCT VFR BLD AUTO: 26.1 % (ref 36.6–50.3)
HGB BLD-MCNC: 8.2 G/DL (ref 12.1–17)
IMM GRANULOCYTES # BLD AUTO: 0.03 K/UL (ref 0–0.04)
IMM GRANULOCYTES NFR BLD AUTO: 0.4 % (ref 0–0.5)
LYMPHOCYTES # BLD: 1.6 K/UL (ref 0.8–3.5)
LYMPHOCYTES NFR BLD: 20.5 % (ref 12–49)
MCH RBC QN AUTO: 32.9 PG (ref 26–34)
MCHC RBC AUTO-ENTMCNC: 31.4 G/DL (ref 30–36.5)
MCV RBC AUTO: 104.8 FL (ref 80–99)
MONOCYTES # BLD: 1.02 K/UL (ref 0–1)
MONOCYTES NFR BLD: 13.1 % (ref 5–13)
NEUTS SEG # BLD: 4.8 K/UL (ref 1.8–8)
NEUTS SEG NFR BLD: 61.3 % (ref 32–75)
NRBC # BLD: 0 K/UL (ref 0–0.01)
NRBC BLD-RTO: 0 PER 100 WBC
PLATELET # BLD AUTO: 436 K/UL (ref 150–400)
PMV BLD AUTO: 9.9 FL (ref 8.9–12.9)
POTASSIUM SERPL-SCNC: 4.4 MMOL/L (ref 3.5–5.1)
RBC # BLD AUTO: 2.49 M/UL (ref 4.1–5.7)
SERVICE CMNT-IMP: ABNORMAL
SODIUM SERPL-SCNC: 140 MMOL/L (ref 136–145)
TROPONIN I SERPL HS-MCNC: 569 NG/L (ref 0–76)
WBC # BLD AUTO: 7.8 K/UL (ref 4.1–11.1)

## 2025-04-27 PROCEDURE — 6370000000 HC RX 637 (ALT 250 FOR IP): Performed by: SURGERY

## 2025-04-27 PROCEDURE — 80048 BASIC METABOLIC PNL TOTAL CA: CPT

## 2025-04-27 PROCEDURE — 82962 GLUCOSE BLOOD TEST: CPT

## 2025-04-27 PROCEDURE — 2580000003 HC RX 258: Performed by: INTERNAL MEDICINE

## 2025-04-27 PROCEDURE — 85025 COMPLETE CBC W/AUTO DIFF WBC: CPT

## 2025-04-27 PROCEDURE — 6370000000 HC RX 637 (ALT 250 FOR IP): Performed by: INTERNAL MEDICINE

## 2025-04-27 PROCEDURE — 6360000002 HC RX W HCPCS: Performed by: INTERNAL MEDICINE

## 2025-04-27 PROCEDURE — 2580000003 HC RX 258: Performed by: NURSE PRACTITIONER

## 2025-04-27 PROCEDURE — 1100000000 HC RM PRIVATE

## 2025-04-27 PROCEDURE — 2500000003 HC RX 250 WO HCPCS: Performed by: SURGERY

## 2025-04-27 PROCEDURE — 36415 COLL VENOUS BLD VENIPUNCTURE: CPT

## 2025-04-27 PROCEDURE — 84484 ASSAY OF TROPONIN QUANT: CPT

## 2025-04-27 RX ORDER — SODIUM CHLORIDE 9 MG/ML
INJECTION, SOLUTION INTRAVENOUS CONTINUOUS
Status: ACTIVE | OUTPATIENT
Start: 2025-04-27 | End: 2025-04-28

## 2025-04-27 RX ORDER — FAMOTIDINE 20 MG/1
20 TABLET, FILM COATED ORAL DAILY PRN
Status: DISCONTINUED | OUTPATIENT
Start: 2025-04-27 | End: 2025-05-27 | Stop reason: HOSPADM

## 2025-04-27 RX ADMIN — SODIUM CHLORIDE: 0.9 INJECTION, SOLUTION INTRAVENOUS at 17:13

## 2025-04-27 RX ADMIN — INSULIN LISPRO 2 UNITS: 100 INJECTION, SOLUTION INTRAVENOUS; SUBCUTANEOUS at 21:58

## 2025-04-27 RX ADMIN — ACETAMINOPHEN 650 MG: 325 TABLET ORAL at 22:05

## 2025-04-27 RX ADMIN — ASPIRIN 81 MG: 81 TABLET, CHEWABLE ORAL at 09:04

## 2025-04-27 RX ADMIN — VANCOMYCIN HYDROCHLORIDE 750 MG: 750 INJECTION, POWDER, LYOPHILIZED, FOR SOLUTION INTRAVENOUS at 03:57

## 2025-04-27 RX ADMIN — Medication 1 CAPSULE: at 09:04

## 2025-04-27 RX ADMIN — INSULIN LISPRO 2 UNITS: 100 INJECTION, SOLUTION INTRAVENOUS; SUBCUTANEOUS at 13:59

## 2025-04-27 RX ADMIN — INSULIN HUMAN 28 UNITS: 100 INJECTION, SUSPENSION SUBCUTANEOUS at 09:04

## 2025-04-27 RX ADMIN — SODIUM CHLORIDE, PRESERVATIVE FREE 10 ML: 5 INJECTION INTRAVENOUS at 21:59

## 2025-04-27 RX ADMIN — ENOXAPARIN SODIUM 80 MG: 100 INJECTION SUBCUTANEOUS at 03:53

## 2025-04-27 RX ADMIN — COLLAGENASE SANTYL: 250 OINTMENT TOPICAL at 09:05

## 2025-04-27 RX ADMIN — INSULIN HUMAN 6 UNITS: 100 INJECTION, SUSPENSION SUBCUTANEOUS at 21:57

## 2025-04-27 RX ADMIN — DEXTRAN 70, GLYCERIN, HYPROMELLOSE 1 DROP: 1; 2; 3 SOLUTION/ DROPS OPHTHALMIC at 21:58

## 2025-04-27 RX ADMIN — SODIUM CHLORIDE: 0.9 INJECTION, SOLUTION INTRAVENOUS at 03:55

## 2025-04-27 RX ADMIN — ENOXAPARIN SODIUM 80 MG: 100 INJECTION SUBCUTANEOUS at 15:32

## 2025-04-27 RX ADMIN — SODIUM CHLORIDE, PRESERVATIVE FREE 10 ML: 5 INJECTION INTRAVENOUS at 09:05

## 2025-04-27 ASSESSMENT — PAIN SCALES - GENERAL: PAINLEVEL_OUTOF10: 3

## 2025-04-27 NOTE — CARE COORDINATION
Chart reviewed. CM to check for bed availability to go to [facility].    [facility]. CM spoke with nurse for patient and she sent message to attending. Attending stating patient is not ready.    CM will follow up.    Delfina Morin RN  Care Manager   DISPLAY PLAN FREE TEXT

## 2025-04-27 NOTE — SIGNIFICANT EVENT
RAPID RESPONSE TEAM    Overhead rapid response paged to room #3114/01 at 1938    Reason for rapid response:  Chest pain    Background:  Patient admitted on 3/31/2025. The primary encounter diagnosis was Retained foreign body of foot. Diagnoses of Cellulitis of left foot, Other mucopurulent conjunctivitis of both eyes, Diabetic foot infection (HCC), MRSA infection, Cellulitis and abscess of toe of left foot, and Limb ischemia were also pertinent to this visit..       Assessment/Interventions:  Patient complaining of chest pain that started just prior to calling RRT. Patient complains of pain that is mid sternal and states that \"it hurts.\" Patient denies any further complaints at this time.    [place], NP at bedside, orders received for the following Interventions: CBC, troponin, BMP, PT/INR, CXR, EKG, and GI cocktail    Labs drawn and in process, EKG interpreted by provider.    Patient reports some relief with GI cocktail.       Outcome:  Patient to remain in room #3114/01    Rapid response ended at 2005.    Please call with any questions or concerns    Naya Pelayo RN  Rapid Response Team  Ext [phone]        Patient Vitals for the past 12 hrs:   BP Temp Temp src Pulse Resp SpO2   04/26/25 1949 117/60 -- -- 90 -- 98 %   04/26/25 1616 (!) 100/49 97.9 °F (36.6 °C) Oral 82 16 97 %        CHEMISTRY CBC/COAG POCT/BG   Lab Results   Component Value Date/Time     04/26/2025 06:27 AM    K 4.4 04/26/2025 06:27 AM    BUN 50 (H) 04/26/2025 06:27 AM    CREATININE 1.65 (H) 04/26/2025 06:27 AM    ANIONGAP 7 04/26/2025 06:27 AM    MG 2.1 04/05/2025 06:10 AM    GLUCOSE 83 04/26/2025 06:27 AM    CALCIUM 7.8 (L) 04/26/2025 06:27 AM    TROPONINI <0.05 06/28/2021 01:31 PM    TROPHS 13 03/26/2024 04:50 PM    PROBNP 1,525 (H) 01/04/2024 06:36 AM     Lab Results   Component Value Date/Time    WBC 6.7 04/26/2025 06:27 AM    RBC 2.60 (L) 04/26/2025 06:27 AM    HGB 8.6 (L) 04/26/2025 06:27 AM    HCT 27.4 (L) 04/26/2025 06:27 AM

## 2025-04-28 ENCOUNTER — APPOINTMENT (OUTPATIENT)
Facility: HOSPITAL | Age: 80
DRG: 622 | End: 2025-04-28
Payer: MEDICARE

## 2025-04-28 LAB
ANION GAP SERPL CALC-SCNC: 5 MMOL/L (ref 2–12)
BASOPHILS # BLD: 0.04 K/UL (ref 0–0.1)
BASOPHILS NFR BLD: 0.5 % (ref 0–1)
BUN SERPL-MCNC: 51 MG/DL (ref 6–20)
BUN/CREAT SERPL: 27 (ref 12–20)
CALCIUM SERPL-MCNC: 7.5 MG/DL (ref 8.5–10.1)
CHLORIDE SERPL-SCNC: 113 MMOL/L (ref 97–108)
CO2 SERPL-SCNC: 21 MMOL/L (ref 21–32)
CREAT SERPL-MCNC: 1.89 MG/DL (ref 0.7–1.3)
DIFFERENTIAL METHOD BLD: ABNORMAL
EKG ATRIAL RATE: 80 BPM
EKG DIAGNOSIS: NORMAL
EKG DIAGNOSIS: NORMAL
EKG P AXIS: 11 DEGREES
EKG P-R INTERVAL: 188 MS
EKG Q-T INTERVAL: 378 MS
EKG Q-T INTERVAL: 406 MS
EKG QRS DURATION: 76 MS
EKG QRS DURATION: 80 MS
EKG QTC CALCULATION (BAZETT): 459 MS
EKG QTC CALCULATION (BAZETT): 468 MS
EKG R AXIS: 25 DEGREES
EKG R AXIS: 46 DEGREES
EKG T AXIS: -83 DEGREES
EKG T AXIS: 165 DEGREES
EKG VENTRICULAR RATE: 80 BPM
EKG VENTRICULAR RATE: 89 BPM
EOSINOPHIL # BLD: 0.15 K/UL (ref 0–0.4)
EOSINOPHIL NFR BLD: 1.7 % (ref 0–7)
ERYTHROCYTE [DISTWIDTH] IN BLOOD BY AUTOMATED COUNT: 14.3 % (ref 11.5–14.5)
GLUCOSE BLD STRIP.AUTO-MCNC: 161 MG/DL (ref 65–117)
GLUCOSE BLD STRIP.AUTO-MCNC: 185 MG/DL (ref 65–117)
GLUCOSE BLD STRIP.AUTO-MCNC: 246 MG/DL (ref 65–117)
GLUCOSE BLD STRIP.AUTO-MCNC: 247 MG/DL (ref 65–117)
GLUCOSE SERPL-MCNC: 180 MG/DL (ref 65–100)
HCT VFR BLD AUTO: 27.8 % (ref 36.6–50.3)
HGB BLD-MCNC: 8.6 G/DL (ref 12.1–17)
IMM GRANULOCYTES # BLD AUTO: 0.06 K/UL (ref 0–0.04)
IMM GRANULOCYTES NFR BLD AUTO: 0.7 % (ref 0–0.5)
LYMPHOCYTES # BLD: 1.45 K/UL (ref 0.8–3.5)
LYMPHOCYTES NFR BLD: 16.8 % (ref 12–49)
MCH RBC QN AUTO: 33.1 PG (ref 26–34)
MCHC RBC AUTO-ENTMCNC: 30.9 G/DL (ref 30–36.5)
MCV RBC AUTO: 106.9 FL (ref 80–99)
MONOCYTES # BLD: 1.09 K/UL (ref 0–1)
MONOCYTES NFR BLD: 12.6 % (ref 5–13)
NEUTS SEG # BLD: 5.83 K/UL (ref 1.8–8)
NEUTS SEG NFR BLD: 67.7 % (ref 32–75)
NRBC # BLD: 0 K/UL (ref 0–0.01)
NRBC BLD-RTO: 0 PER 100 WBC
PLATELET # BLD AUTO: 448 K/UL (ref 150–400)
PMV BLD AUTO: 9.9 FL (ref 8.9–12.9)
POTASSIUM SERPL-SCNC: 4.5 MMOL/L (ref 3.5–5.1)
RBC # BLD AUTO: 2.6 M/UL (ref 4.1–5.7)
SERVICE CMNT-IMP: ABNORMAL
SODIUM SERPL-SCNC: 139 MMOL/L (ref 136–145)
TROPONIN I SERPL HS-MCNC: 933 NG/L (ref 0–76)
VANCOMYCIN SERPL-MCNC: 20.6 UG/ML
WBC # BLD AUTO: 8.6 K/UL (ref 4.1–11.1)

## 2025-04-28 PROCEDURE — 6370000000 HC RX 637 (ALT 250 FOR IP): Performed by: INTERNAL MEDICINE

## 2025-04-28 PROCEDURE — 93005 ELECTROCARDIOGRAM TRACING: CPT | Performed by: NURSE PRACTITIONER

## 2025-04-28 PROCEDURE — 6370000000 HC RX 637 (ALT 250 FOR IP): Performed by: SURGERY

## 2025-04-28 PROCEDURE — 2500000003 HC RX 250 WO HCPCS: Performed by: SURGERY

## 2025-04-28 PROCEDURE — 82962 GLUCOSE BLOOD TEST: CPT

## 2025-04-28 PROCEDURE — 1100000000 HC RM PRIVATE

## 2025-04-28 PROCEDURE — 2580000003 HC RX 258: Performed by: INTERNAL MEDICINE

## 2025-04-28 PROCEDURE — 85025 COMPLETE CBC W/AUTO DIFF WBC: CPT

## 2025-04-28 PROCEDURE — 71045 X-RAY EXAM CHEST 1 VIEW: CPT

## 2025-04-28 PROCEDURE — 84484 ASSAY OF TROPONIN QUANT: CPT

## 2025-04-28 PROCEDURE — 36415 COLL VENOUS BLD VENIPUNCTURE: CPT

## 2025-04-28 PROCEDURE — 6360000002 HC RX W HCPCS: Performed by: INTERNAL MEDICINE

## 2025-04-28 PROCEDURE — 6370000000 HC RX 637 (ALT 250 FOR IP): Performed by: STUDENT IN AN ORGANIZED HEALTH CARE EDUCATION/TRAINING PROGRAM

## 2025-04-28 PROCEDURE — 80202 ASSAY OF VANCOMYCIN: CPT

## 2025-04-28 PROCEDURE — 80048 BASIC METABOLIC PNL TOTAL CA: CPT

## 2025-04-28 RX ORDER — CLOPIDOGREL 300 MG/1
600 TABLET, FILM COATED ORAL ONCE
Status: COMPLETED | OUTPATIENT
Start: 2025-04-28 | End: 2025-04-28

## 2025-04-28 RX ORDER — ATENOLOL 25 MG/1
12.5 TABLET ORAL DAILY
Status: DISCONTINUED | OUTPATIENT
Start: 2025-04-28 | End: 2025-05-27 | Stop reason: HOSPADM

## 2025-04-28 RX ORDER — CLOPIDOGREL BISULFATE 75 MG/1
75 TABLET ORAL DAILY
Status: DISCONTINUED | OUTPATIENT
Start: 2025-04-29 | End: 2025-05-20

## 2025-04-28 RX ORDER — IPRATROPIUM BROMIDE AND ALBUTEROL SULFATE 2.5; .5 MG/3ML; MG/3ML
1 SOLUTION RESPIRATORY (INHALATION) EVERY 4 HOURS PRN
Status: DISCONTINUED | OUTPATIENT
Start: 2025-04-28 | End: 2025-05-27 | Stop reason: HOSPADM

## 2025-04-28 RX ADMIN — INSULIN LISPRO 2 UNITS: 100 INJECTION, SOLUTION INTRAVENOUS; SUBCUTANEOUS at 21:22

## 2025-04-28 RX ADMIN — ENOXAPARIN SODIUM 80 MG: 100 INJECTION SUBCUTANEOUS at 03:57

## 2025-04-28 RX ADMIN — ASPIRIN 81 MG: 81 TABLET, CHEWABLE ORAL at 11:12

## 2025-04-28 RX ADMIN — INSULIN HUMAN 6 UNITS: 100 INJECTION, SUSPENSION SUBCUTANEOUS at 21:15

## 2025-04-28 RX ADMIN — ACETAMINOPHEN 650 MG: 325 TABLET ORAL at 22:55

## 2025-04-28 RX ADMIN — CLOPIDOGREL BISULFATE 600 MG: 300 TABLET, FILM COATED ORAL at 22:48

## 2025-04-28 RX ADMIN — INSULIN LISPRO 2 UNITS: 100 INJECTION, SOLUTION INTRAVENOUS; SUBCUTANEOUS at 17:58

## 2025-04-28 RX ADMIN — SODIUM CHLORIDE: 0.9 INJECTION, SOLUTION INTRAVENOUS at 04:29

## 2025-04-28 RX ADMIN — INSULIN HUMAN 28 UNITS: 100 INJECTION, SUSPENSION SUBCUTANEOUS at 11:13

## 2025-04-28 RX ADMIN — SODIUM CHLORIDE, PRESERVATIVE FREE 10 ML: 5 INJECTION INTRAVENOUS at 21:19

## 2025-04-28 RX ADMIN — Medication 1 CAPSULE: at 11:13

## 2025-04-28 RX ADMIN — INSULIN LISPRO 2 UNITS: 100 INJECTION, SOLUTION INTRAVENOUS; SUBCUTANEOUS at 12:59

## 2025-04-28 RX ADMIN — ATENOLOL 12.5 MG: 25 TABLET ORAL at 21:14

## 2025-04-28 RX ADMIN — VANCOMYCIN HYDROCHLORIDE 750 MG: 750 INJECTION, POWDER, LYOPHILIZED, FOR SOLUTION INTRAVENOUS at 04:30

## 2025-04-28 RX ADMIN — COLLAGENASE SANTYL: 250 OINTMENT TOPICAL at 11:09

## 2025-04-28 RX ADMIN — WHITE PETROLATUM 57.7 %-MINERAL OIL 31.9 % EYE OINTMENT: at 23:30

## 2025-04-28 ASSESSMENT — PAIN - FUNCTIONAL ASSESSMENT: PAIN_FUNCTIONAL_ASSESSMENT: PREVENTS OR INTERFERES SOME ACTIVE ACTIVITIES AND ADLS

## 2025-04-28 ASSESSMENT — PAIN DESCRIPTION - ORIENTATION
ORIENTATION: RIGHT
ORIENTATION: RIGHT

## 2025-04-28 ASSESSMENT — PAIN DESCRIPTION - LOCATION
LOCATION: FOOT
LOCATION: FOOT

## 2025-04-28 ASSESSMENT — PAIN DESCRIPTION - DESCRIPTORS
DESCRIPTORS: ACHING
DESCRIPTORS: ACHING

## 2025-04-28 ASSESSMENT — PAIN SCALES - GENERAL
PAINLEVEL_OUTOF10: 4
PAINLEVEL_OUTOF10: 6

## 2025-04-29 ENCOUNTER — APPOINTMENT (OUTPATIENT)
Facility: HOSPITAL | Age: 80
DRG: 622 | End: 2025-04-29
Payer: MEDICARE

## 2025-04-29 LAB
GLUCOSE BLD STRIP.AUTO-MCNC: 110 MG/DL (ref 65–117)
GLUCOSE BLD STRIP.AUTO-MCNC: 239 MG/DL (ref 65–117)
GLUCOSE BLD STRIP.AUTO-MCNC: 239 MG/DL (ref 65–117)
GLUCOSE BLD STRIP.AUTO-MCNC: 280 MG/DL (ref 65–117)
SERVICE CMNT-IMP: ABNORMAL
SERVICE CMNT-IMP: NORMAL

## 2025-04-29 PROCEDURE — 6370000000 HC RX 637 (ALT 250 FOR IP): Performed by: INTERNAL MEDICINE

## 2025-04-29 PROCEDURE — 6370000000 HC RX 637 (ALT 250 FOR IP): Performed by: SURGERY

## 2025-04-29 PROCEDURE — 1100000000 HC RM PRIVATE

## 2025-04-29 PROCEDURE — 2500000003 HC RX 250 WO HCPCS: Performed by: SURGERY

## 2025-04-29 PROCEDURE — 2580000003 HC RX 258: Performed by: INTERNAL MEDICINE

## 2025-04-29 PROCEDURE — 6360000002 HC RX W HCPCS: Performed by: INTERNAL MEDICINE

## 2025-04-29 PROCEDURE — 76770 US EXAM ABDO BACK WALL COMP: CPT

## 2025-04-29 PROCEDURE — 97530 THERAPEUTIC ACTIVITIES: CPT

## 2025-04-29 PROCEDURE — 97535 SELF CARE MNGMENT TRAINING: CPT

## 2025-04-29 PROCEDURE — 6370000000 HC RX 637 (ALT 250 FOR IP): Performed by: STUDENT IN AN ORGANIZED HEALTH CARE EDUCATION/TRAINING PROGRAM

## 2025-04-29 PROCEDURE — 82962 GLUCOSE BLOOD TEST: CPT

## 2025-04-29 RX ORDER — SODIUM CHLORIDE 9 MG/ML
INJECTION, SOLUTION INTRAVENOUS CONTINUOUS
Status: DISCONTINUED | OUTPATIENT
Start: 2025-04-29 | End: 2025-05-03

## 2025-04-29 RX ADMIN — SODIUM CHLORIDE, PRESERVATIVE FREE 10 ML: 5 INJECTION INTRAVENOUS at 10:29

## 2025-04-29 RX ADMIN — ATENOLOL 12.5 MG: 25 TABLET ORAL at 10:34

## 2025-04-29 RX ADMIN — INSULIN LISPRO 2 UNITS: 100 INJECTION, SOLUTION INTRAVENOUS; SUBCUTANEOUS at 18:03

## 2025-04-29 RX ADMIN — INSULIN HUMAN 6 UNITS: 100 INJECTION, SUSPENSION SUBCUTANEOUS at 21:36

## 2025-04-29 RX ADMIN — INSULIN HUMAN 28 UNITS: 100 INJECTION, SUSPENSION SUBCUTANEOUS at 10:00

## 2025-04-29 RX ADMIN — ASPIRIN 81 MG: 81 TABLET, CHEWABLE ORAL at 10:00

## 2025-04-29 RX ADMIN — SODIUM CHLORIDE: 9 INJECTION, SOLUTION INTRAVENOUS at 18:05

## 2025-04-29 RX ADMIN — Medication 1 CAPSULE: at 10:00

## 2025-04-29 RX ADMIN — WHITE PETROLATUM 57.7 %-MINERAL OIL 31.9 % EYE OINTMENT: at 21:37

## 2025-04-29 RX ADMIN — INSULIN LISPRO 4 UNITS: 100 INJECTION, SOLUTION INTRAVENOUS; SUBCUTANEOUS at 21:35

## 2025-04-29 RX ADMIN — CLOPIDOGREL BISULFATE 75 MG: 75 TABLET, FILM COATED ORAL at 10:00

## 2025-04-29 RX ADMIN — COLLAGENASE SANTYL: 250 OINTMENT TOPICAL at 13:00

## 2025-04-29 RX ADMIN — VANCOMYCIN HYDROCHLORIDE 750 MG: 750 INJECTION, POWDER, LYOPHILIZED, FOR SOLUTION INTRAVENOUS at 04:55

## 2025-04-29 ASSESSMENT — PAIN SCALES - GENERAL
PAINLEVEL_OUTOF10: 0

## 2025-04-30 ENCOUNTER — APPOINTMENT (OUTPATIENT)
Facility: HOSPITAL | Age: 80
DRG: 622 | End: 2025-04-30
Attending: INTERNAL MEDICINE
Payer: MEDICARE

## 2025-04-30 LAB
ANION GAP SERPL CALC-SCNC: 7 MMOL/L (ref 2–12)
APPEARANCE UR: ABNORMAL
BACTERIA URNS QL MICRO: NEGATIVE /HPF
BASOPHILS # BLD: 0.04 K/UL (ref 0–0.1)
BASOPHILS NFR BLD: 0.3 % (ref 0–1)
BILIRUB UR QL: NEGATIVE
BUN SERPL-MCNC: 75 MG/DL (ref 6–20)
BUN/CREAT SERPL: 29 (ref 12–20)
CALCIUM SERPL-MCNC: 7.7 MG/DL (ref 8.5–10.1)
CHLORIDE SERPL-SCNC: 113 MMOL/L (ref 97–108)
CK SERPL-CCNC: 153 U/L (ref 39–308)
CO2 SERPL-SCNC: 20 MMOL/L (ref 21–32)
COLOR UR: ABNORMAL
CREAT SERPL-MCNC: 2.6 MG/DL (ref 0.7–1.3)
DIFFERENTIAL METHOD BLD: ABNORMAL
EOSINOPHIL # BLD: 0.03 K/UL (ref 0–0.4)
EOSINOPHIL NFR BLD: 0.3 % (ref 0–7)
EPITH CASTS URNS QL MICRO: ABNORMAL /LPF
ERYTHROCYTE [DISTWIDTH] IN BLOOD BY AUTOMATED COUNT: 15.3 % (ref 11.5–14.5)
GLUCOSE BLD STRIP.AUTO-MCNC: 103 MG/DL (ref 65–117)
GLUCOSE BLD STRIP.AUTO-MCNC: 109 MG/DL (ref 65–117)
GLUCOSE BLD STRIP.AUTO-MCNC: 170 MG/DL (ref 65–117)
GLUCOSE BLD STRIP.AUTO-MCNC: 206 MG/DL (ref 65–117)
GLUCOSE SERPL-MCNC: 108 MG/DL (ref 65–100)
GLUCOSE UR STRIP.AUTO-MCNC: NEGATIVE MG/DL
HCT VFR BLD AUTO: 27.6 % (ref 36.6–50.3)
HGB BLD-MCNC: 8.6 G/DL (ref 12.1–17)
HGB UR QL STRIP: ABNORMAL
IMM GRANULOCYTES # BLD AUTO: 0.12 K/UL (ref 0–0.04)
IMM GRANULOCYTES NFR BLD AUTO: 1 % (ref 0–0.5)
KETONES UR QL STRIP.AUTO: ABNORMAL MG/DL
LEUKOCYTE ESTERASE UR QL STRIP.AUTO: ABNORMAL
LYMPHOCYTES # BLD: 1.93 K/UL (ref 0.8–3.5)
LYMPHOCYTES NFR BLD: 16.4 % (ref 12–49)
MCH RBC QN AUTO: 33.6 PG (ref 26–34)
MCHC RBC AUTO-ENTMCNC: 31.2 G/DL (ref 30–36.5)
MCV RBC AUTO: 107.8 FL (ref 80–99)
MONOCYTES # BLD: 1.23 K/UL (ref 0–1)
MONOCYTES NFR BLD: 10.4 % (ref 5–13)
NEUTS SEG # BLD: 8.43 K/UL (ref 1.8–8)
NEUTS SEG NFR BLD: 71.6 % (ref 32–75)
NITRITE UR QL STRIP.AUTO: NEGATIVE
NRBC # BLD: 0.04 K/UL (ref 0–0.01)
NRBC BLD-RTO: 0.3 PER 100 WBC
PH UR STRIP: 5.5 (ref 5–8)
PLATELET # BLD AUTO: 511 K/UL (ref 150–400)
PMV BLD AUTO: 10.1 FL (ref 8.9–12.9)
POTASSIUM SERPL-SCNC: 4.9 MMOL/L (ref 3.5–5.1)
PROT UR STRIP-MCNC: >300 MG/DL
RBC # BLD AUTO: 2.56 M/UL (ref 4.1–5.7)
RBC #/AREA URNS HPF: ABNORMAL /HPF (ref 0–5)
SERVICE CMNT-IMP: ABNORMAL
SERVICE CMNT-IMP: ABNORMAL
SERVICE CMNT-IMP: NORMAL
SERVICE CMNT-IMP: NORMAL
SODIUM SERPL-SCNC: 140 MMOL/L (ref 136–145)
SP GR UR REFRACTOMETRY: 1.02
URINE CULTURE IF INDICATED: ABNORMAL
UROBILINOGEN UR QL STRIP.AUTO: 1 EU/DL (ref 0.2–1)
WBC # BLD AUTO: 11.8 K/UL (ref 4.1–11.1)
WBC URNS QL MICRO: ABNORMAL /HPF (ref 0–4)

## 2025-04-30 PROCEDURE — 2500000003 HC RX 250 WO HCPCS: Performed by: SURGERY

## 2025-04-30 PROCEDURE — 93971 EXTREMITY STUDY: CPT

## 2025-04-30 PROCEDURE — 80048 BASIC METABOLIC PNL TOTAL CA: CPT

## 2025-04-30 PROCEDURE — 6370000000 HC RX 637 (ALT 250 FOR IP): Performed by: SURGERY

## 2025-04-30 PROCEDURE — 6360000002 HC RX W HCPCS: Performed by: INTERNAL MEDICINE

## 2025-04-30 PROCEDURE — 2580000003 HC RX 258: Performed by: INTERNAL MEDICINE

## 2025-04-30 PROCEDURE — 81001 URINALYSIS AUTO W/SCOPE: CPT

## 2025-04-30 PROCEDURE — 97535 SELF CARE MNGMENT TRAINING: CPT

## 2025-04-30 PROCEDURE — 99233 SBSQ HOSP IP/OBS HIGH 50: CPT | Performed by: INTERNAL MEDICINE

## 2025-04-30 PROCEDURE — 6370000000 HC RX 637 (ALT 250 FOR IP): Performed by: STUDENT IN AN ORGANIZED HEALTH CARE EDUCATION/TRAINING PROGRAM

## 2025-04-30 PROCEDURE — 97530 THERAPEUTIC ACTIVITIES: CPT

## 2025-04-30 PROCEDURE — 6370000000 HC RX 637 (ALT 250 FOR IP): Performed by: INTERNAL MEDICINE

## 2025-04-30 PROCEDURE — 51702 INSERT TEMP BLADDER CATH: CPT

## 2025-04-30 PROCEDURE — 82550 ASSAY OF CK (CPK): CPT

## 2025-04-30 PROCEDURE — 51798 US URINE CAPACITY MEASURE: CPT

## 2025-04-30 PROCEDURE — 82962 GLUCOSE BLOOD TEST: CPT

## 2025-04-30 PROCEDURE — 36415 COLL VENOUS BLD VENIPUNCTURE: CPT

## 2025-04-30 PROCEDURE — 1100000000 HC RM PRIVATE

## 2025-04-30 PROCEDURE — 85025 COMPLETE CBC W/AUTO DIFF WBC: CPT

## 2025-04-30 RX ORDER — TAMSULOSIN HYDROCHLORIDE 0.4 MG/1
0.4 CAPSULE ORAL DAILY
Status: DISCONTINUED | OUTPATIENT
Start: 2025-04-30 | End: 2025-05-27 | Stop reason: HOSPADM

## 2025-04-30 RX ADMIN — SODIUM CHLORIDE, PRESERVATIVE FREE 10 ML: 5 INJECTION INTRAVENOUS at 20:29

## 2025-04-30 RX ADMIN — INSULIN HUMAN 6 UNITS: 100 INJECTION, SUSPENSION SUBCUTANEOUS at 20:29

## 2025-04-30 RX ADMIN — WHITE PETROLATUM 57.7 %-MINERAL OIL 31.9 % EYE OINTMENT: at 20:30

## 2025-04-30 RX ADMIN — ASPIRIN 81 MG: 81 TABLET, CHEWABLE ORAL at 09:32

## 2025-04-30 RX ADMIN — TAMSULOSIN HYDROCHLORIDE 0.4 MG: 0.4 CAPSULE ORAL at 16:36

## 2025-04-30 RX ADMIN — COLLAGENASE SANTYL: 250 OINTMENT TOPICAL at 09:33

## 2025-04-30 RX ADMIN — VANCOMYCIN HYDROCHLORIDE 750 MG: 750 INJECTION, POWDER, LYOPHILIZED, FOR SOLUTION INTRAVENOUS at 04:19

## 2025-04-30 RX ADMIN — DEXTRAN 70, GLYCERIN, HYPROMELLOSE 1 DROP: 1; 2; 3 SOLUTION/ DROPS OPHTHALMIC at 20:29

## 2025-04-30 RX ADMIN — CLOPIDOGREL BISULFATE 75 MG: 75 TABLET, FILM COATED ORAL at 09:32

## 2025-04-30 RX ADMIN — INSULIN LISPRO 2 UNITS: 100 INJECTION, SOLUTION INTRAVENOUS; SUBCUTANEOUS at 22:39

## 2025-04-30 RX ADMIN — SODIUM CHLORIDE, PRESERVATIVE FREE 10 ML: 5 INJECTION INTRAVENOUS at 09:32

## 2025-04-30 RX ADMIN — ATENOLOL 12.5 MG: 25 TABLET ORAL at 09:32

## 2025-04-30 RX ADMIN — SODIUM CHLORIDE: 9 INJECTION, SOLUTION INTRAVENOUS at 06:03

## 2025-04-30 RX ADMIN — SODIUM CHLORIDE: 9 INJECTION, SOLUTION INTRAVENOUS at 18:01

## 2025-04-30 RX ADMIN — DAPTOMYCIN 650 MG: 500 INJECTION, POWDER, LYOPHILIZED, FOR SOLUTION INTRAVENOUS at 18:01

## 2025-04-30 RX ADMIN — Medication 1 CAPSULE: at 09:32

## 2025-04-30 ASSESSMENT — PAIN SCALES - GENERAL: PAINLEVEL_OUTOF10: 0

## 2025-04-30 NOTE — WOUND CARE
Wound Care reassessment today for the left heel wound:  Chart reviewed and patient assessed. He is in a deep sleep, did not wake up during the wound care.   Assessment: the old dressing from yesterday was removed and the heel examined. The wound is covered about 3/4 with the brown subcutaneous tissue and slough that is completely dry. The wound is not currently improving.  The wound was documented as being malodorous yesterday but this is not the case today. Unknown if the wound care is being done as ordered. The documentation on the flowsheet does not include the Santyl ointment for most of the documentation. No photo taken today.   Treatment: the wound was cleansed with Vashe and wiped firmly with gauze to attempt removal of the wound debris and old drainage. Applied the Santyl ointment with the Hydrofera Blue moistened with the Vashe solution and covered with the heel foam dressing today to keep it more moist. It was then wrapped with the yon, taped and dated both dressings.   Orders for the Left heel dressing to be done DAILY by nursing in the mornings:    Wound care will need to be done at 9:00 am each day for the LEFT HEEL:  Cleanse the heel with Vashe solution and wipe the entire wound bed with the soaked gauze - firmly to remove the old drainage and wound debris. Apply the Santyl Collagenase ointment to a Vashe moist Hydrofera Blue dressing and apply it to the heel. Cover with a large foam dressing and then date the dressing. Wrap with one small roll of yon - not kerlix. Tape and date this dressing.   Document the Santyl ointment as \"Pharmaceutical\" in the flowsheet along with the rest of the dressing.   Plan: keep the heels off of the bed at all times. Pt. Needs to be mobilized out of the bed  Cornelia Dinh, RN, BSN, CWON

## 2025-05-01 LAB
CK SERPL-CCNC: 136 U/L (ref 39–308)
CREAT UR-MCNC: 157 MG/DL
GLUCOSE BLD STRIP.AUTO-MCNC: 158 MG/DL (ref 65–117)
GLUCOSE BLD STRIP.AUTO-MCNC: 209 MG/DL (ref 65–117)
GLUCOSE BLD STRIP.AUTO-MCNC: 236 MG/DL (ref 65–117)
GLUCOSE BLD STRIP.AUTO-MCNC: 264 MG/DL (ref 65–117)
PROT UR-MCNC: 870 MG/DL (ref 0–11.9)
PROT/CREAT UR-RTO: 5.5
SERVICE CMNT-IMP: ABNORMAL

## 2025-05-01 PROCEDURE — 2500000003 HC RX 250 WO HCPCS: Performed by: SURGERY

## 2025-05-01 PROCEDURE — 97530 THERAPEUTIC ACTIVITIES: CPT

## 2025-05-01 PROCEDURE — 84156 ASSAY OF PROTEIN URINE: CPT

## 2025-05-01 PROCEDURE — 6370000000 HC RX 637 (ALT 250 FOR IP): Performed by: SURGERY

## 2025-05-01 PROCEDURE — 1100000000 HC RM PRIVATE

## 2025-05-01 PROCEDURE — 36415 COLL VENOUS BLD VENIPUNCTURE: CPT

## 2025-05-01 PROCEDURE — 99233 SBSQ HOSP IP/OBS HIGH 50: CPT | Performed by: INTERNAL MEDICINE

## 2025-05-01 PROCEDURE — 2700000000 HC OXYGEN THERAPY PER DAY

## 2025-05-01 PROCEDURE — 6370000000 HC RX 637 (ALT 250 FOR IP): Performed by: FAMILY MEDICINE

## 2025-05-01 PROCEDURE — 97535 SELF CARE MNGMENT TRAINING: CPT | Performed by: OCCUPATIONAL THERAPIST

## 2025-05-01 PROCEDURE — 82550 ASSAY OF CK (CPK): CPT

## 2025-05-01 PROCEDURE — 6370000000 HC RX 637 (ALT 250 FOR IP): Performed by: INTERNAL MEDICINE

## 2025-05-01 PROCEDURE — 6370000000 HC RX 637 (ALT 250 FOR IP): Performed by: STUDENT IN AN ORGANIZED HEALTH CARE EDUCATION/TRAINING PROGRAM

## 2025-05-01 PROCEDURE — 51702 INSERT TEMP BLADDER CATH: CPT

## 2025-05-01 PROCEDURE — 82962 GLUCOSE BLOOD TEST: CPT

## 2025-05-01 PROCEDURE — 82570 ASSAY OF URINE CREATININE: CPT

## 2025-05-01 PROCEDURE — 94760 N-INVAS EAR/PLS OXIMETRY 1: CPT

## 2025-05-01 RX ORDER — IPRATROPIUM BROMIDE AND ALBUTEROL SULFATE 2.5; .5 MG/3ML; MG/3ML
1 SOLUTION RESPIRATORY (INHALATION)
Status: DISCONTINUED | OUTPATIENT
Start: 2025-05-01 | End: 2025-05-02

## 2025-05-01 RX ADMIN — CLOPIDOGREL BISULFATE 75 MG: 75 TABLET, FILM COATED ORAL at 11:32

## 2025-05-01 RX ADMIN — IPRATROPIUM BROMIDE AND ALBUTEROL SULFATE 1 DOSE: 2.5; .5 SOLUTION RESPIRATORY (INHALATION) at 20:01

## 2025-05-01 RX ADMIN — COLLAGENASE SANTYL: 250 OINTMENT TOPICAL at 09:00

## 2025-05-01 RX ADMIN — ACETAMINOPHEN 650 MG: 325 TABLET ORAL at 23:09

## 2025-05-01 RX ADMIN — INSULIN HUMAN 6 UNITS: 100 INJECTION, SUSPENSION SUBCUTANEOUS at 22:56

## 2025-05-01 RX ADMIN — INSULIN LISPRO 2 UNITS: 100 INJECTION, SOLUTION INTRAVENOUS; SUBCUTANEOUS at 22:56

## 2025-05-01 RX ADMIN — ASPIRIN 81 MG: 81 TABLET, CHEWABLE ORAL at 11:32

## 2025-05-01 RX ADMIN — Medication 1 CAPSULE: at 11:32

## 2025-05-01 RX ADMIN — INSULIN HUMAN 28 UNITS: 100 INJECTION, SUSPENSION SUBCUTANEOUS at 11:31

## 2025-05-01 RX ADMIN — WHITE PETROLATUM 57.7 %-MINERAL OIL 31.9 % EYE OINTMENT: at 22:57

## 2025-05-01 RX ADMIN — INSULIN LISPRO 4 UNITS: 100 INJECTION, SOLUTION INTRAVENOUS; SUBCUTANEOUS at 13:13

## 2025-05-01 RX ADMIN — TAMSULOSIN HYDROCHLORIDE 0.4 MG: 0.4 CAPSULE ORAL at 11:39

## 2025-05-01 RX ADMIN — INSULIN LISPRO 2 UNITS: 100 INJECTION, SOLUTION INTRAVENOUS; SUBCUTANEOUS at 18:40

## 2025-05-01 RX ADMIN — ACETAMINOPHEN 650 MG: 325 TABLET ORAL at 13:04

## 2025-05-01 RX ADMIN — IPRATROPIUM BROMIDE AND ALBUTEROL SULFATE 1 DOSE: 2.5; .5 SOLUTION RESPIRATORY (INHALATION) at 14:24

## 2025-05-01 RX ADMIN — SODIUM CHLORIDE, PRESERVATIVE FREE 10 ML: 5 INJECTION INTRAVENOUS at 09:00

## 2025-05-01 ASSESSMENT — PAIN SCALES - GENERAL
PAINLEVEL_OUTOF10: 0
PAINLEVEL_OUTOF10: 4
PAINLEVEL_OUTOF10: 0

## 2025-05-01 ASSESSMENT — PAIN DESCRIPTION - LOCATION
LOCATION: HEAD
LOCATION: FOOT

## 2025-05-01 ASSESSMENT — PAIN DESCRIPTION - DESCRIPTORS: DESCRIPTORS: ACHING

## 2025-05-01 ASSESSMENT — PAIN DESCRIPTION - ORIENTATION: ORIENTATION: ANTERIOR

## 2025-05-01 ASSESSMENT — PAIN - FUNCTIONAL ASSESSMENT: PAIN_FUNCTIONAL_ASSESSMENT: PREVENTS OR INTERFERES SOME ACTIVE ACTIVITIES AND ADLS

## 2025-05-01 NOTE — WOUND CARE
Wound care Reassessment for the left heel with Dr. Diaz also reassessing it.  Photo taken of the wound today.   Assessment:  Pt. Was quite somnolent this morning and he c/o not feeling well and tired. The heel wound care was done yesterday and today by the wound care nurse. Staff will take over tomorrow for this.     Pink wound bed under the fat layer / subcutaneous Tissue of the left heel. Some brown slough: This layer can be lifted to see the pink muscle layers.   Treatment: the heel was cleansed with Vashe solution and and wiped firmly with gauze to remove the old drainage and some of the wound debris. Collagenase was applied generously onto a Vashe moistened pad of Hydrofera Blue and applied to the wound. Covered with a high drainage pack kit ABD and then wrapped with yon. Taped and dated the dressings. Heel floated properly.   Plan: Wound care will need to be done about the same time every day for consistency. After the current tube of Collagenase the wound care can go to just a Vashe solution Wet gauze dressing and a high drainage pack cover / wrap with yon.   Collagen can start to form to heal the wound properly after this tube of the Santyl. A stop date of Sunday was put in by the pharmacist.   Cornelia Dinh RN, BSN, CWON

## 2025-05-02 LAB
ALBUMIN SERPL-MCNC: 2.2 G/DL (ref 3.5–5)
ANION GAP SERPL CALC-SCNC: 13 MMOL/L (ref 2–12)
ANION GAP SERPL CALC-SCNC: 6 MMOL/L (ref 2–12)
BUN SERPL-MCNC: 104 MG/DL (ref 6–20)
BUN SERPL-MCNC: 106 MG/DL (ref 6–20)
BUN/CREAT SERPL: 36 (ref 12–20)
BUN/CREAT SERPL: 37 (ref 12–20)
CALCIUM SERPL-MCNC: 7.5 MG/DL (ref 8.5–10.1)
CALCIUM SERPL-MCNC: 7.6 MG/DL (ref 8.5–10.1)
CHLORIDE SERPL-SCNC: 112 MMOL/L (ref 97–108)
CHLORIDE SERPL-SCNC: 114 MMOL/L (ref 97–108)
CO2 SERPL-SCNC: 17 MMOL/L (ref 21–32)
CO2 SERPL-SCNC: 19 MMOL/L (ref 21–32)
CREAT SERPL-MCNC: 2.9 MG/DL (ref 0.7–1.3)
CREAT SERPL-MCNC: 2.91 MG/DL (ref 0.7–1.3)
ECHO BSA: 2.05 M2
GLUCOSE BLD STRIP.AUTO-MCNC: 152 MG/DL (ref 65–117)
GLUCOSE BLD STRIP.AUTO-MCNC: 168 MG/DL (ref 65–117)
GLUCOSE BLD STRIP.AUTO-MCNC: 174 MG/DL (ref 65–117)
GLUCOSE BLD STRIP.AUTO-MCNC: 207 MG/DL (ref 65–117)
GLUCOSE SERPL-MCNC: 168 MG/DL (ref 65–100)
GLUCOSE SERPL-MCNC: 174 MG/DL (ref 65–100)
PHOSPHATE SERPL-MCNC: 4.2 MG/DL (ref 2.6–4.7)
POTASSIUM SERPL-SCNC: 4.8 MMOL/L (ref 3.5–5.1)
POTASSIUM SERPL-SCNC: 5 MMOL/L (ref 3.5–5.1)
SERVICE CMNT-IMP: ABNORMAL
SODIUM SERPL-SCNC: 139 MMOL/L (ref 136–145)
SODIUM SERPL-SCNC: 142 MMOL/L (ref 136–145)

## 2025-05-02 PROCEDURE — 80048 BASIC METABOLIC PNL TOTAL CA: CPT

## 2025-05-02 PROCEDURE — 99232 SBSQ HOSP IP/OBS MODERATE 35: CPT | Performed by: INTERNAL MEDICINE

## 2025-05-02 PROCEDURE — 1100000000 HC RM PRIVATE

## 2025-05-02 PROCEDURE — 6370000000 HC RX 637 (ALT 250 FOR IP): Performed by: SURGERY

## 2025-05-02 PROCEDURE — 2700000000 HC OXYGEN THERAPY PER DAY

## 2025-05-02 PROCEDURE — 94640 AIRWAY INHALATION TREATMENT: CPT

## 2025-05-02 PROCEDURE — 6370000000 HC RX 637 (ALT 250 FOR IP): Performed by: STUDENT IN AN ORGANIZED HEALTH CARE EDUCATION/TRAINING PROGRAM

## 2025-05-02 PROCEDURE — 2500000003 HC RX 250 WO HCPCS: Performed by: SURGERY

## 2025-05-02 PROCEDURE — 82962 GLUCOSE BLOOD TEST: CPT

## 2025-05-02 PROCEDURE — 6370000000 HC RX 637 (ALT 250 FOR IP): Performed by: FAMILY MEDICINE

## 2025-05-02 PROCEDURE — 97535 SELF CARE MNGMENT TRAINING: CPT

## 2025-05-02 PROCEDURE — 84165 PROTEIN E-PHORESIS SERUM: CPT

## 2025-05-02 PROCEDURE — 97530 THERAPEUTIC ACTIVITIES: CPT

## 2025-05-02 PROCEDURE — 2580000003 HC RX 258: Performed by: INTERNAL MEDICINE

## 2025-05-02 PROCEDURE — 36415 COLL VENOUS BLD VENIPUNCTURE: CPT

## 2025-05-02 PROCEDURE — 94760 N-INVAS EAR/PLS OXIMETRY 1: CPT

## 2025-05-02 PROCEDURE — 6370000000 HC RX 637 (ALT 250 FOR IP): Performed by: INTERNAL MEDICINE

## 2025-05-02 PROCEDURE — 6360000002 HC RX W HCPCS: Performed by: INTERNAL MEDICINE

## 2025-05-02 PROCEDURE — 51702 INSERT TEMP BLADDER CATH: CPT

## 2025-05-02 PROCEDURE — 80069 RENAL FUNCTION PANEL: CPT

## 2025-05-02 PROCEDURE — 94761 N-INVAS EAR/PLS OXIMETRY MLT: CPT

## 2025-05-02 RX ORDER — IPRATROPIUM BROMIDE AND ALBUTEROL SULFATE 2.5; .5 MG/3ML; MG/3ML
1 SOLUTION RESPIRATORY (INHALATION)
Status: DISCONTINUED | OUTPATIENT
Start: 2025-05-02 | End: 2025-05-05

## 2025-05-02 RX ADMIN — IPRATROPIUM BROMIDE AND ALBUTEROL SULFATE 1 DOSE: 2.5; .5 SOLUTION RESPIRATORY (INHALATION) at 08:33

## 2025-05-02 RX ADMIN — CLOPIDOGREL BISULFATE 75 MG: 75 TABLET, FILM COATED ORAL at 11:18

## 2025-05-02 RX ADMIN — SODIUM CHLORIDE, PRESERVATIVE FREE 10 ML: 5 INJECTION INTRAVENOUS at 11:24

## 2025-05-02 RX ADMIN — ASPIRIN 81 MG: 81 TABLET, CHEWABLE ORAL at 11:19

## 2025-05-02 RX ADMIN — SODIUM CHLORIDE, PRESERVATIVE FREE 10 ML: 5 INJECTION INTRAVENOUS at 19:39

## 2025-05-02 RX ADMIN — SODIUM CHLORIDE: 9 INJECTION, SOLUTION INTRAVENOUS at 22:43

## 2025-05-02 RX ADMIN — IPRATROPIUM BROMIDE AND ALBUTEROL SULFATE 1 DOSE: 2.5; .5 SOLUTION RESPIRATORY (INHALATION) at 19:38

## 2025-05-02 RX ADMIN — INSULIN LISPRO 2 UNITS: 100 INJECTION, SOLUTION INTRAVENOUS; SUBCUTANEOUS at 21:12

## 2025-05-02 RX ADMIN — ATENOLOL 12.5 MG: 25 TABLET ORAL at 11:18

## 2025-05-02 RX ADMIN — DAPTOMYCIN 650 MG: 500 INJECTION, POWDER, LYOPHILIZED, FOR SOLUTION INTRAVENOUS at 17:34

## 2025-05-02 RX ADMIN — WHITE PETROLATUM 57.7 %-MINERAL OIL 31.9 % EYE OINTMENT: at 19:39

## 2025-05-02 RX ADMIN — TAMSULOSIN HYDROCHLORIDE 0.4 MG: 0.4 CAPSULE ORAL at 11:24

## 2025-05-02 RX ADMIN — INSULIN HUMAN 28 UNITS: 100 INJECTION, SUSPENSION SUBCUTANEOUS at 11:17

## 2025-05-02 RX ADMIN — ACETAMINOPHEN 650 MG: 325 TABLET ORAL at 11:18

## 2025-05-02 RX ADMIN — SODIUM CHLORIDE: 9 INJECTION, SOLUTION INTRAVENOUS at 11:46

## 2025-05-02 RX ADMIN — INSULIN HUMAN 6 UNITS: 100 INJECTION, SUSPENSION SUBCUTANEOUS at 21:12

## 2025-05-02 RX ADMIN — ACETAMINOPHEN 650 MG: 325 TABLET ORAL at 19:38

## 2025-05-02 RX ADMIN — Medication 1 CAPSULE: at 11:19

## 2025-05-02 RX ADMIN — IPRATROPIUM BROMIDE AND ALBUTEROL SULFATE 1 DOSE: 2.5; .5 SOLUTION RESPIRATORY (INHALATION) at 14:06

## 2025-05-02 ASSESSMENT — PAIN DESCRIPTION - DESCRIPTORS
DESCRIPTORS: BURNING
DESCRIPTORS: ACHING

## 2025-05-02 ASSESSMENT — PAIN DESCRIPTION - LOCATION
LOCATION: LEG
LOCATION: OTHER (COMMENT)

## 2025-05-02 ASSESSMENT — PAIN SCALES - GENERAL
PAINLEVEL_OUTOF10: 5
PAINLEVEL_OUTOF10: 10

## 2025-05-02 ASSESSMENT — PAIN DESCRIPTION - ORIENTATION
ORIENTATION: LEFT
ORIENTATION: LEFT;POSTERIOR

## 2025-05-02 ASSESSMENT — PAIN - FUNCTIONAL ASSESSMENT: PAIN_FUNCTIONAL_ASSESSMENT: PREVENTS OR INTERFERES SOME ACTIVE ACTIVITIES AND ADLS

## 2025-05-02 NOTE — CARE COORDINATION
Transition of Care Plan:     RUR: 22%  Prior Level of Functioning: independent  Disposition: [facility]  SHALONDA: 4/23/2025  If SNF or IPR: Date FOC offered:   Date FOC received:   Accepting facility:   Date authorization started with reference number: Auth restarted 4/23  Date authorization received and expires: 4/24  Follow up appointments:   DME needed: per rehab  Transportation at discharge: [facility]3pm Sat. 4/26 - insurance ref#[id number], CM spoke w/Erin NEVES  IM/IMM Medicare/ letter given: 2nd IM given 4/25  Is patient a  and connected with VA?               If yes, was  transfer form completed and VA notified?   Caregiver Contact: Julio Mclaughlin   [phone]      Discharge Caregiver contacted prior to discharge? Per patient  Care Conference needed?   Barriers to discharge: medical stability    [employer] offered P2P.  CM lead sent P2P info to MD.  CM will continue to follow.    4:08  P2P completed.   Auth has been obtained.   Auth is only good for a couple of days.   CM to contact Get hernandez/Mickie tomorrow if pt is ready for d/c.    Aida Key  Ext [phone]

## 2025-05-03 ENCOUNTER — APPOINTMENT (OUTPATIENT)
Facility: HOSPITAL | Age: 80
DRG: 622 | End: 2025-05-03
Payer: MEDICARE

## 2025-05-03 LAB
ALBUMIN SERPL-MCNC: 2.1 G/DL (ref 3.5–5)
ANION GAP SERPL CALC-SCNC: 11 MMOL/L (ref 2–12)
BUN SERPL-MCNC: 101 MG/DL (ref 6–20)
BUN/CREAT SERPL: 38 (ref 12–20)
CALCIUM SERPL-MCNC: 7.4 MG/DL (ref 8.5–10.1)
CHLORIDE SERPL-SCNC: 112 MMOL/L (ref 97–108)
CO2 SERPL-SCNC: 15 MMOL/L (ref 21–32)
CREAT SERPL-MCNC: 2.67 MG/DL (ref 0.7–1.3)
GLUCOSE BLD STRIP.AUTO-MCNC: 120 MG/DL (ref 65–117)
GLUCOSE BLD STRIP.AUTO-MCNC: 129 MG/DL (ref 65–117)
GLUCOSE BLD STRIP.AUTO-MCNC: 179 MG/DL (ref 65–117)
GLUCOSE BLD STRIP.AUTO-MCNC: 95 MG/DL (ref 65–117)
GLUCOSE SERPL-MCNC: 151 MG/DL (ref 65–100)
PHOSPHATE SERPL-MCNC: 4.1 MG/DL (ref 2.6–4.7)
POTASSIUM SERPL-SCNC: 4.6 MMOL/L (ref 3.5–5.1)
SERVICE CMNT-IMP: ABNORMAL
SERVICE CMNT-IMP: NORMAL
SODIUM SERPL-SCNC: 138 MMOL/L (ref 136–145)

## 2025-05-03 PROCEDURE — 2580000003 HC RX 258: Performed by: INTERNAL MEDICINE

## 2025-05-03 PROCEDURE — 6370000000 HC RX 637 (ALT 250 FOR IP): Performed by: SURGERY

## 2025-05-03 PROCEDURE — 71045 X-RAY EXAM CHEST 1 VIEW: CPT

## 2025-05-03 PROCEDURE — 6370000000 HC RX 637 (ALT 250 FOR IP): Performed by: STUDENT IN AN ORGANIZED HEALTH CARE EDUCATION/TRAINING PROGRAM

## 2025-05-03 PROCEDURE — 6370000000 HC RX 637 (ALT 250 FOR IP): Performed by: INTERNAL MEDICINE

## 2025-05-03 PROCEDURE — 36415 COLL VENOUS BLD VENIPUNCTURE: CPT

## 2025-05-03 PROCEDURE — 51702 INSERT TEMP BLADDER CATH: CPT

## 2025-05-03 PROCEDURE — 80069 RENAL FUNCTION PANEL: CPT

## 2025-05-03 PROCEDURE — 6370000000 HC RX 637 (ALT 250 FOR IP): Performed by: FAMILY MEDICINE

## 2025-05-03 PROCEDURE — 94640 AIRWAY INHALATION TREATMENT: CPT

## 2025-05-03 PROCEDURE — 82962 GLUCOSE BLOOD TEST: CPT

## 2025-05-03 PROCEDURE — 1100000000 HC RM PRIVATE

## 2025-05-03 PROCEDURE — 2500000003 HC RX 250 WO HCPCS: Performed by: SURGERY

## 2025-05-03 RX ORDER — SODIUM CHLORIDE, SODIUM LACTATE, POTASSIUM CHLORIDE, CALCIUM CHLORIDE 600; 310; 30; 20 MG/100ML; MG/100ML; MG/100ML; MG/100ML
INJECTION, SOLUTION INTRAVENOUS CONTINUOUS
Status: DISCONTINUED | OUTPATIENT
Start: 2025-05-03 | End: 2025-05-04

## 2025-05-03 RX ORDER — SODIUM BICARBONATE 650 MG/1
650 TABLET ORAL 3 TIMES DAILY
Status: DISCONTINUED | OUTPATIENT
Start: 2025-05-03 | End: 2025-05-11

## 2025-05-03 RX ADMIN — ACETAMINOPHEN 650 MG: 325 TABLET ORAL at 03:36

## 2025-05-03 RX ADMIN — CLOPIDOGREL BISULFATE 75 MG: 75 TABLET, FILM COATED ORAL at 10:05

## 2025-05-03 RX ADMIN — INSULIN HUMAN 28 UNITS: 100 INJECTION, SUSPENSION SUBCUTANEOUS at 10:06

## 2025-05-03 RX ADMIN — SODIUM CHLORIDE: 9 INJECTION, SOLUTION INTRAVENOUS at 10:23

## 2025-05-03 RX ADMIN — ACETAMINOPHEN 650 MG: 325 TABLET ORAL at 20:41

## 2025-05-03 RX ADMIN — SODIUM CHLORIDE, SODIUM LACTATE, POTASSIUM CHLORIDE, AND CALCIUM CHLORIDE: .6; .31; .03; .02 INJECTION, SOLUTION INTRAVENOUS at 13:54

## 2025-05-03 RX ADMIN — SODIUM BICARBONATE 650 MG: 650 TABLET ORAL at 13:59

## 2025-05-03 RX ADMIN — SODIUM CHLORIDE, PRESERVATIVE FREE 10 ML: 5 INJECTION INTRAVENOUS at 10:06

## 2025-05-03 RX ADMIN — ACETAMINOPHEN 650 MG: 325 TABLET ORAL at 13:55

## 2025-05-03 RX ADMIN — TAMSULOSIN HYDROCHLORIDE 0.4 MG: 0.4 CAPSULE ORAL at 10:05

## 2025-05-03 RX ADMIN — ASPIRIN 81 MG: 81 TABLET, CHEWABLE ORAL at 10:05

## 2025-05-03 RX ADMIN — IPRATROPIUM BROMIDE AND ALBUTEROL SULFATE 1 DOSE: 2.5; .5 SOLUTION RESPIRATORY (INHALATION) at 07:59

## 2025-05-03 RX ADMIN — COLLAGENASE SANTYL: 250 OINTMENT TOPICAL at 10:10

## 2025-05-03 RX ADMIN — Medication 1 CAPSULE: at 10:05

## 2025-05-03 RX ADMIN — IPRATROPIUM BROMIDE AND ALBUTEROL SULFATE 1 DOSE: 2.5; .5 SOLUTION RESPIRATORY (INHALATION) at 20:44

## 2025-05-03 RX ADMIN — ATENOLOL 12.5 MG: 25 TABLET ORAL at 10:05

## 2025-05-03 RX ADMIN — IPRATROPIUM BROMIDE AND ALBUTEROL SULFATE 1 DOSE: 2.5; .5 SOLUTION RESPIRATORY (INHALATION) at 13:31

## 2025-05-03 RX ADMIN — SODIUM BICARBONATE 650 MG: 650 TABLET ORAL at 20:41

## 2025-05-03 ASSESSMENT — PAIN DESCRIPTION - DESCRIPTORS
DESCRIPTORS: PATIENT UNABLE TO DESCRIBE
DESCRIPTORS: ACHING;DISCOMFORT

## 2025-05-03 ASSESSMENT — PAIN DESCRIPTION - LOCATION
LOCATION: FOOT

## 2025-05-03 ASSESSMENT — PAIN SCALES - GENERAL
PAINLEVEL_OUTOF10: 4
PAINLEVEL_OUTOF10: 10
PAINLEVEL_OUTOF10: 10

## 2025-05-03 ASSESSMENT — PAIN DESCRIPTION - ORIENTATION
ORIENTATION: LEFT
ORIENTATION: LEFT

## 2025-05-03 ASSESSMENT — PAIN SCALES - WONG BAKER: WONGBAKER_NUMERICALRESPONSE: NO HURT

## 2025-05-03 NOTE — CARE COORDINATION
Transition of Care Plan:     RUR: 23%  Prior Level of Functioning: independent  Disposition: [facility]   SHALONDA: 4/23/2025  If SNF or IPR: Date FOC offered:   Date FOC received:   Accepting facility:   Date authorization started with reference number: Auth restarted 4/23  Date authorization received and expires: 5/2 - 5/6  Follow up appointments:   DME needed: per rehab  Transportation at discharge: [facility] Sat. 4/26 - insurance ref#[id number], CM spoke w/Erin NEVES  IM/IMM Medicare/ letter given: 2nd IM given 4/25  Is patient a [place] and connected with VA?               If yes, was  transfer form completed and VA notified?   Caregiver Contact: Julio Mclaughlin   [phone]      Discharge Caregiver contacted prior to discharge? Per patient  Care Conference needed?   Barriers to discharge: Nephrology clearance    Per Mya Castro is good through 5/6.   Per MD, possible d/c on Monday.  CM will continue to follow.    11:29  Possible d/c tomorrow.   CM to call Get at [phone] if pt is ready for d/c.    Aida Key  Ext [phone]

## 2025-05-04 ENCOUNTER — APPOINTMENT (OUTPATIENT)
Facility: HOSPITAL | Age: 80
DRG: 622 | End: 2025-05-04
Payer: MEDICARE

## 2025-05-04 ENCOUNTER — TELEPHONE (OUTPATIENT)
Age: 80
End: 2025-05-04

## 2025-05-04 LAB
ALBUMIN SERPL-MCNC: 2.1 G/DL (ref 3.5–5)
ALBUMIN SERPL-MCNC: 2.2 G/DL (ref 3.5–5)
ALBUMIN/GLOB SERPL: 0.4 (ref 1.1–2.2)
ALP SERPL-CCNC: 230 U/L (ref 45–117)
ALT SERPL-CCNC: 463 U/L (ref 12–78)
ANION GAP SERPL CALC-SCNC: 8 MMOL/L (ref 2–12)
ANION GAP SERPL CALC-SCNC: 9 MMOL/L (ref 2–12)
AST SERPL-CCNC: 116 U/L (ref 15–37)
BILIRUB SERPL-MCNC: 0.5 MG/DL (ref 0.2–1)
BUN SERPL-MCNC: 93 MG/DL (ref 6–20)
BUN SERPL-MCNC: 97 MG/DL (ref 6–20)
BUN/CREAT SERPL: 36 (ref 12–20)
BUN/CREAT SERPL: 38 (ref 12–20)
CALCIUM SERPL-MCNC: 7.3 MG/DL (ref 8.5–10.1)
CALCIUM SERPL-MCNC: 7.4 MG/DL (ref 8.5–10.1)
CHLORIDE SERPL-SCNC: 111 MMOL/L (ref 97–108)
CHLORIDE SERPL-SCNC: 114 MMOL/L (ref 97–108)
CO2 SERPL-SCNC: 17 MMOL/L (ref 21–32)
CO2 SERPL-SCNC: 19 MMOL/L (ref 21–32)
CREAT SERPL-MCNC: 2.53 MG/DL (ref 0.7–1.3)
CREAT SERPL-MCNC: 2.59 MG/DL (ref 0.7–1.3)
ERYTHROCYTE [DISTWIDTH] IN BLOOD BY AUTOMATED COUNT: 16.6 % (ref 11.5–14.5)
GLOBULIN SER CALC-MCNC: 4.9 G/DL (ref 2–4)
GLUCOSE BLD STRIP.AUTO-MCNC: 116 MG/DL (ref 65–117)
GLUCOSE BLD STRIP.AUTO-MCNC: 124 MG/DL (ref 65–117)
GLUCOSE BLD STRIP.AUTO-MCNC: 170 MG/DL (ref 65–117)
GLUCOSE BLD STRIP.AUTO-MCNC: 97 MG/DL (ref 65–117)
GLUCOSE SERPL-MCNC: 133 MG/DL (ref 65–100)
GLUCOSE SERPL-MCNC: 173 MG/DL (ref 65–100)
HCT VFR BLD AUTO: 27.9 % (ref 36.6–50.3)
HGB BLD-MCNC: 8.3 G/DL (ref 12.1–17)
LACTATE SERPL-SCNC: 1.3 MMOL/L (ref 0.4–2)
MCH RBC QN AUTO: 33.3 PG (ref 26–34)
MCHC RBC AUTO-ENTMCNC: 29.7 G/DL (ref 30–36.5)
MCV RBC AUTO: 112 FL (ref 80–99)
NRBC # BLD: 0.05 K/UL (ref 0–0.01)
NRBC BLD-RTO: 0.4 PER 100 WBC
PHOSPHATE SERPL-MCNC: 4.1 MG/DL (ref 2.6–4.7)
PLATELET # BLD AUTO: 342 K/UL (ref 150–400)
PMV BLD AUTO: 10.6 FL (ref 8.9–12.9)
POTASSIUM SERPL-SCNC: 4.4 MMOL/L (ref 3.5–5.1)
POTASSIUM SERPL-SCNC: 4.7 MMOL/L (ref 3.5–5.1)
PROT SERPL-MCNC: 7.1 G/DL (ref 6.4–8.2)
RBC # BLD AUTO: 2.49 M/UL (ref 4.1–5.7)
SERVICE CMNT-IMP: ABNORMAL
SERVICE CMNT-IMP: ABNORMAL
SERVICE CMNT-IMP: NORMAL
SERVICE CMNT-IMP: NORMAL
SODIUM SERPL-SCNC: 138 MMOL/L (ref 136–145)
SODIUM SERPL-SCNC: 140 MMOL/L (ref 136–145)
TROPONIN I SERPL HS-MCNC: 525 NG/L (ref 0–76)
TROPONIN I SERPL HS-MCNC: 596 NG/L (ref 0–76)
WBC # BLD AUTO: 11.6 K/UL (ref 4.1–11.1)

## 2025-05-04 PROCEDURE — 82803 BLOOD GASES ANY COMBINATION: CPT

## 2025-05-04 PROCEDURE — 2500000003 HC RX 250 WO HCPCS: Performed by: SURGERY

## 2025-05-04 PROCEDURE — 6360000002 HC RX W HCPCS: Performed by: INTERNAL MEDICINE

## 2025-05-04 PROCEDURE — 36415 COLL VENOUS BLD VENIPUNCTURE: CPT

## 2025-05-04 PROCEDURE — 2580000003 HC RX 258: Performed by: INTERNAL MEDICINE

## 2025-05-04 PROCEDURE — 6370000000 HC RX 637 (ALT 250 FOR IP): Performed by: STUDENT IN AN ORGANIZED HEALTH CARE EDUCATION/TRAINING PROGRAM

## 2025-05-04 PROCEDURE — 82962 GLUCOSE BLOOD TEST: CPT

## 2025-05-04 PROCEDURE — 36600 WITHDRAWAL OF ARTERIAL BLOOD: CPT

## 2025-05-04 PROCEDURE — 85027 COMPLETE CBC AUTOMATED: CPT

## 2025-05-04 PROCEDURE — 51702 INSERT TEMP BLADDER CATH: CPT

## 2025-05-04 PROCEDURE — 6370000000 HC RX 637 (ALT 250 FOR IP): Performed by: INTERNAL MEDICINE

## 2025-05-04 PROCEDURE — 2060000000 HC ICU INTERMEDIATE R&B

## 2025-05-04 PROCEDURE — 94640 AIRWAY INHALATION TREATMENT: CPT

## 2025-05-04 PROCEDURE — 6370000000 HC RX 637 (ALT 250 FOR IP): Performed by: SURGERY

## 2025-05-04 PROCEDURE — 71045 X-RAY EXAM CHEST 1 VIEW: CPT

## 2025-05-04 PROCEDURE — 83605 ASSAY OF LACTIC ACID: CPT

## 2025-05-04 PROCEDURE — 94761 N-INVAS EAR/PLS OXIMETRY MLT: CPT

## 2025-05-04 PROCEDURE — 84484 ASSAY OF TROPONIN QUANT: CPT

## 2025-05-04 PROCEDURE — 93005 ELECTROCARDIOGRAM TRACING: CPT | Performed by: INTERNAL MEDICINE

## 2025-05-04 PROCEDURE — 6370000000 HC RX 637 (ALT 250 FOR IP): Performed by: FAMILY MEDICINE

## 2025-05-04 PROCEDURE — 80053 COMPREHEN METABOLIC PANEL: CPT

## 2025-05-04 PROCEDURE — 2500000003 HC RX 250 WO HCPCS

## 2025-05-04 PROCEDURE — 80069 RENAL FUNCTION PANEL: CPT

## 2025-05-04 RX ORDER — INDOMETHACIN 25 MG/1
CAPSULE ORAL
Status: COMPLETED
Start: 2025-05-04 | End: 2025-05-04

## 2025-05-04 RX ORDER — INDOMETHACIN 25 MG/1
100 CAPSULE ORAL ONCE
Status: COMPLETED | OUTPATIENT
Start: 2025-05-04 | End: 2025-05-04

## 2025-05-04 RX ORDER — FUROSEMIDE 10 MG/ML
20 INJECTION INTRAMUSCULAR; INTRAVENOUS ONCE
Status: COMPLETED | OUTPATIENT
Start: 2025-05-04 | End: 2025-05-04

## 2025-05-04 RX ADMIN — IPRATROPIUM BROMIDE AND ALBUTEROL SULFATE 1 DOSE: 2.5; .5 SOLUTION RESPIRATORY (INHALATION) at 09:24

## 2025-05-04 RX ADMIN — INSULIN LISPRO 8 UNITS: 100 INJECTION, SOLUTION INTRAVENOUS; SUBCUTANEOUS at 12:15

## 2025-05-04 RX ADMIN — SODIUM CHLORIDE, PRESERVATIVE FREE 10 ML: 5 INJECTION INTRAVENOUS at 09:53

## 2025-05-04 RX ADMIN — ASPIRIN 81 MG: 81 TABLET, CHEWABLE ORAL at 09:52

## 2025-05-04 RX ADMIN — INSULIN HUMAN 28 UNITS: 100 INJECTION, SUSPENSION SUBCUTANEOUS at 09:51

## 2025-05-04 RX ADMIN — COLLAGENASE SANTYL: 250 OINTMENT TOPICAL at 09:54

## 2025-05-04 RX ADMIN — ATENOLOL 12.5 MG: 25 TABLET ORAL at 09:51

## 2025-05-04 RX ADMIN — SODIUM BICARBONATE 650 MG: 650 TABLET ORAL at 09:51

## 2025-05-04 RX ADMIN — CLOPIDOGREL BISULFATE 75 MG: 75 TABLET, FILM COATED ORAL at 09:51

## 2025-05-04 RX ADMIN — IPRATROPIUM BROMIDE AND ALBUTEROL SULFATE 1 DOSE: 2.5; .5 SOLUTION RESPIRATORY (INHALATION) at 19:36

## 2025-05-04 RX ADMIN — SODIUM CHLORIDE, SODIUM LACTATE, POTASSIUM CHLORIDE, AND CALCIUM CHLORIDE: .6; .31; .03; .02 INJECTION, SOLUTION INTRAVENOUS at 00:45

## 2025-05-04 RX ADMIN — SODIUM BICARBONATE 650 MG: 650 TABLET ORAL at 20:58

## 2025-05-04 RX ADMIN — SODIUM CHLORIDE, PRESERVATIVE FREE 10 ML: 5 INJECTION INTRAVENOUS at 20:58

## 2025-05-04 RX ADMIN — Medication 1 CAPSULE: at 09:51

## 2025-05-04 RX ADMIN — FUROSEMIDE 20 MG: 10 INJECTION, SOLUTION INTRAMUSCULAR; INTRAVENOUS at 12:15

## 2025-05-04 RX ADMIN — SODIUM BICARBONATE 100 MEQ: 84 INJECTION, SOLUTION INTRAVENOUS at 14:23

## 2025-05-04 RX ADMIN — SODIUM CHLORIDE 650 MG: 9 INJECTION INTRAMUSCULAR; INTRAVENOUS; SUBCUTANEOUS at 17:57

## 2025-05-04 RX ADMIN — IPRATROPIUM BROMIDE AND ALBUTEROL SULFATE 1 DOSE: .5; 3 SOLUTION RESPIRATORY (INHALATION) at 11:21

## 2025-05-04 RX ADMIN — TAMSULOSIN HYDROCHLORIDE 0.4 MG: 0.4 CAPSULE ORAL at 09:51

## 2025-05-04 RX ADMIN — INDOMETHACIN 100 MEQ: 25 CAPSULE ORAL at 14:23

## 2025-05-04 ASSESSMENT — PAIN SCALES - GENERAL: PAINLEVEL_OUTOF10: 0

## 2025-05-04 NOTE — TELEPHONE ENCOUNTER
Please give patient appointment on 5/20 at 4 PM.  Please cancel patient's appointment on 5/6.  Thanks

## 2025-05-05 ENCOUNTER — APPOINTMENT (OUTPATIENT)
Facility: HOSPITAL | Age: 80
DRG: 622 | End: 2025-05-05
Payer: MEDICARE

## 2025-05-05 LAB
ALBUMIN SERPL-MCNC: 2.1 G/DL (ref 3.5–5)
AMMONIA PLAS-SCNC: 25 UMOL/L
ANION GAP SERPL CALC-SCNC: 8 MMOL/L (ref 2–12)
BASE EXCESS BLDA CALC-SCNC: 0.5 MMOL/L
BDY SITE: ABNORMAL
BUN SERPL-MCNC: 98 MG/DL (ref 6–20)
BUN/CREAT SERPL: 36 (ref 12–20)
CALCIUM SERPL-MCNC: 7.7 MG/DL (ref 8.5–10.1)
CHLORIDE SERPL-SCNC: 112 MMOL/L (ref 97–108)
CO2 SERPL-SCNC: 20 MMOL/L (ref 21–32)
CREAT SERPL-MCNC: 2.71 MG/DL (ref 0.7–1.3)
GLUCOSE BLD STRIP.AUTO-MCNC: 104 MG/DL (ref 65–117)
GLUCOSE BLD STRIP.AUTO-MCNC: 161 MG/DL (ref 65–117)
GLUCOSE BLD STRIP.AUTO-MCNC: 276 MG/DL (ref 65–117)
GLUCOSE BLD STRIP.AUTO-MCNC: 79 MG/DL (ref 65–117)
GLUCOSE SERPL-MCNC: 105 MG/DL (ref 65–100)
HCO3 BLDA-SCNC: 24 MMOL/L (ref 22–26)
MAGNESIUM SERPL-MCNC: 2.9 MG/DL (ref 1.6–2.4)
NT PRO BNP: ABNORMAL PG/ML
PCO2 BLDA: 34 MMHG (ref 35–45)
PH BLDA: 7.46 (ref 7.35–7.45)
PHOSPHATE SERPL-MCNC: 4.7 MG/DL (ref 2.6–4.7)
PO2 BLDA: 97 MMHG (ref 80–100)
POTASSIUM SERPL-SCNC: 4.7 MMOL/L (ref 3.5–5.1)
SAO2 % BLD: 98 % (ref 92–97)
SAO2% DEVICE SAO2% SENSOR NAME: ABNORMAL
SERVICE CMNT-IMP: ABNORMAL
SERVICE CMNT-IMP: ABNORMAL
SERVICE CMNT-IMP: NORMAL
SERVICE CMNT-IMP: NORMAL
SODIUM SERPL-SCNC: 140 MMOL/L (ref 136–145)
SPECIMEN SITE: ABNORMAL

## 2025-05-05 PROCEDURE — 6370000000 HC RX 637 (ALT 250 FOR IP): Performed by: SURGERY

## 2025-05-05 PROCEDURE — 86037 ANCA TITER EACH ANTIBODY: CPT

## 2025-05-05 PROCEDURE — 80069 RENAL FUNCTION PANEL: CPT

## 2025-05-05 PROCEDURE — 86225 DNA ANTIBODY NATIVE: CPT

## 2025-05-05 PROCEDURE — 6370000000 HC RX 637 (ALT 250 FOR IP): Performed by: INTERNAL MEDICINE

## 2025-05-05 PROCEDURE — P9047 ALBUMIN (HUMAN), 25%, 50ML: HCPCS | Performed by: INTERNAL MEDICINE

## 2025-05-05 PROCEDURE — 82962 GLUCOSE BLOOD TEST: CPT

## 2025-05-05 PROCEDURE — 99233 SBSQ HOSP IP/OBS HIGH 50: CPT | Performed by: INTERNAL MEDICINE

## 2025-05-05 PROCEDURE — 83883 ASSAY NEPHELOMETRY NOT SPEC: CPT

## 2025-05-05 PROCEDURE — 2700000000 HC OXYGEN THERAPY PER DAY

## 2025-05-05 PROCEDURE — 6360000002 HC RX W HCPCS: Performed by: INTERNAL MEDICINE

## 2025-05-05 PROCEDURE — 36415 COLL VENOUS BLD VENIPUNCTURE: CPT

## 2025-05-05 PROCEDURE — 82140 ASSAY OF AMMONIA: CPT

## 2025-05-05 PROCEDURE — 84165 PROTEIN E-PHORESIS SERUM: CPT

## 2025-05-05 PROCEDURE — 6370000000 HC RX 637 (ALT 250 FOR IP): Performed by: STUDENT IN AN ORGANIZED HEALTH CARE EDUCATION/TRAINING PROGRAM

## 2025-05-05 PROCEDURE — 83516 IMMUNOASSAY NONANTIBODY: CPT

## 2025-05-05 PROCEDURE — 70450 CT HEAD/BRAIN W/O DYE: CPT

## 2025-05-05 PROCEDURE — 86038 ANTINUCLEAR ANTIBODIES: CPT

## 2025-05-05 PROCEDURE — 83880 ASSAY OF NATRIURETIC PEPTIDE: CPT

## 2025-05-05 PROCEDURE — 2500000003 HC RX 250 WO HCPCS: Performed by: SURGERY

## 2025-05-05 PROCEDURE — 2060000000 HC ICU INTERMEDIATE R&B

## 2025-05-05 PROCEDURE — 83735 ASSAY OF MAGNESIUM: CPT

## 2025-05-05 RX ORDER — ALBUMIN (HUMAN) 12.5 G/50ML
25 SOLUTION INTRAVENOUS EVERY 8 HOURS
Status: COMPLETED | OUTPATIENT
Start: 2025-05-05 | End: 2025-05-06

## 2025-05-05 RX ORDER — FUROSEMIDE 10 MG/ML
40 INJECTION INTRAMUSCULAR; INTRAVENOUS 2 TIMES DAILY
Status: DISCONTINUED | OUTPATIENT
Start: 2025-05-05 | End: 2025-05-08

## 2025-05-05 RX ADMIN — Medication 1 CAPSULE: at 08:53

## 2025-05-05 RX ADMIN — SODIUM BICARBONATE 650 MG: 650 TABLET ORAL at 20:34

## 2025-05-05 RX ADMIN — INSULIN HUMAN 6 UNITS: 100 INJECTION, SUSPENSION SUBCUTANEOUS at 20:34

## 2025-05-05 RX ADMIN — FUROSEMIDE 40 MG: 10 INJECTION, SOLUTION INTRAMUSCULAR; INTRAVENOUS at 09:14

## 2025-05-05 RX ADMIN — SODIUM BICARBONATE 650 MG: 650 TABLET ORAL at 08:53

## 2025-05-05 RX ADMIN — SODIUM CHLORIDE, PRESERVATIVE FREE 10 ML: 5 INJECTION INTRAVENOUS at 08:53

## 2025-05-05 RX ADMIN — ALBUMIN (HUMAN) 25 G: 0.25 INJECTION, SOLUTION INTRAVENOUS at 14:44

## 2025-05-05 RX ADMIN — ALBUMIN (HUMAN) 25 G: 0.25 INJECTION, SOLUTION INTRAVENOUS at 22:38

## 2025-05-05 RX ADMIN — WHITE PETROLATUM 57.7 %-MINERAL OIL 31.9 % EYE OINTMENT: at 20:35

## 2025-05-05 RX ADMIN — CLOPIDOGREL BISULFATE 75 MG: 75 TABLET, FILM COATED ORAL at 08:53

## 2025-05-05 RX ADMIN — SODIUM BICARBONATE 650 MG: 650 TABLET ORAL at 14:45

## 2025-05-05 RX ADMIN — FUROSEMIDE 40 MG: 10 INJECTION, SOLUTION INTRAMUSCULAR; INTRAVENOUS at 17:21

## 2025-05-05 RX ADMIN — SODIUM CHLORIDE, PRESERVATIVE FREE 10 ML: 5 INJECTION INTRAVENOUS at 20:34

## 2025-05-05 RX ADMIN — ASPIRIN 81 MG: 81 TABLET, CHEWABLE ORAL at 08:53

## 2025-05-05 RX ADMIN — TAMSULOSIN HYDROCHLORIDE 0.4 MG: 0.4 CAPSULE ORAL at 08:53

## 2025-05-05 RX ADMIN — INSULIN LISPRO 4 UNITS: 100 INJECTION, SOLUTION INTRAVENOUS; SUBCUTANEOUS at 20:34

## 2025-05-05 ASSESSMENT — PAIN SCALES - GENERAL
PAINLEVEL_OUTOF10: 0
PAINLEVEL_OUTOF10: 0

## 2025-05-05 ASSESSMENT — PAIN SCALES - WONG BAKER: WONGBAKER_NUMERICALRESPONSE: NO HURT

## 2025-05-05 NOTE — CARE COORDINATION
Transition of Care Plan:    RUR: 23% (high RUR)  Prior Level of Functioning: Independent  Disposition: [facility], then return home afterwards  SHALONDA: 5/6/2025  If SNF or IPR: Date FOC offered: 4/3/2025  Date FOC received: 4/3/2025  [facility]  Date authorization started with reference number: 4/23, Ref ID # unknown  Date authorization received and expires: 5/2/2025 to 5/6/2025, Auth ID # unknown  Follow up appointments: defer to SNF.  DME needed: defer to SNF.  Patient has a RW, wheelchair, grab bars and shower chair at home.  Transportation at discharge: [facility] anticipated  IM/IMM Medicare/ letter given: 2nd IM needed prior to discharge.  Is patient a  and connected with VA? No.   If yes, was  transfer form completed and VA notified? N/A  Caregiver Contact: Julio eastman - [phone]   Discharge Caregiver contacted prior to discharge? CM to contact, if needed.  Care Conference needed? No.  Barriers to discharge: specialists clearance, IV abx line    0957 - IV abx orders uploaded in Link for [facility].      Lauren Mccullough, MSN, RN    Care Management  [phone]

## 2025-05-05 NOTE — SIGNIFICANT EVENT
RAPID RESPONSE TEAM    Overhead rapid response paged to room #2123/01 at 0741    Reason for rapid response:  Alerted Mental Status, Code Stroke    Background:  Patient admitted on 3/31/2025. The primary encounter diagnosis was Retained foreign body of foot. Diagnoses of Cellulitis of left foot, Other mucopurulent conjunctivitis of both eyes, Diabetic foot infection (HCC), MRSA infection, Cellulitis and abscess of toe of left foot, Limb ischemia, and Eschar of heel were also pertinent to this visit..   Chief Complaint: had concerns including Ankle Injury.     Patient  has a past medical history of Diabetes (HCC), Hyperlipidemia, and PAD (peripheral artery disease).; Patient  has a past surgical history that includes vascular surgery (2012); other surgical history (2011); other surgical history; Colonoscopy (N/A, 4/27/2022); ERCP (N/A, 12/21/2023); Cholecystectomy, laparoscopic (N/A, 12/21/2023); ERCP (N/A, 12/22/2023); Cardiac procedure (N/A, 01/03/2024); Cardiac procedure (N/A, 01/05/2024); Cardiac procedure (N/A, 01/05/2024); Cardiac procedure (N/A, 01/05/2024); invasive vascular (N/A, 01/05/2024); Cardiac procedure (N/A, 01/05/2024); Foot Debridement (Left, 04/02/2025); angioplasty (Left, 04/09/2025); Femoral-tibial Bypass Graft (Left, 04/14/2025); and Leg Surgery (Left, 04/18/2025).        Assessment/Interventions:  Patient is lethargic, speech is garbled, patient is breathing normally, skin is warm pink and dry. Patient movement and sensation is equal in all extremities.     Dr. Gentile at bedside, orders received for the following Interventions: Code Stroke protocol    Patient transported for CT of head - Dr. Gentile called RRT to notify he had spoken to Dr. Zendejas and teleneurology - code stroke cancelled, patient is at baseline mental status for this visit. Patient returned to room.       Outcome:  Patient to remain in room #2123/01    Please call with any questions or concerns    Gomez Taylor RN  Rapid

## 2025-05-05 NOTE — OP NOTE
[facility]              [address] [id number]                            OPERATIVE REPORT      PATIENT NAME: IRAIDA KENNEDY               : 1945  MED REC NO: [id number]                       ROOM: 2123  ACCOUNT NO: [id number]                       ADMIT DATE: 2025  PROVIDER: Foster Garcia MD    DATE OF SERVICE:  2025    PREOPERATIVE DIAGNOSES:  Left heel wound.    POSTOPERATIVE DIAGNOSES:  Left heel wound.    PROCEDURES PERFORMED:  Excisional debridement of left heel wound.    SURGEON:  Foster Garcia MD    ANESTHESIA:  General.    ESTIMATED BLOOD LOSS:  Minimal.    COMPLICATIONS:  None.    INDICATIONS:  The patient is a debilitated 80-year-old gentleman, who earlier in the week had undergone elective left leg bypass for revascularization.  He has now returned to the operating room and placed in the prone position for heel debridement.    DESCRIPTION OF PROCEDURE:  After informed consent, the patient was placed supine on the operating table.  After adequate induction of general anesthesia, signing patient's confirmation, confirmation of ongoing antibiotics for infection, the patient was repositioned prone with appropriate position and padding.    The left heel was sharply debrided off all necrotic nonviable debris and eschar.  The resulting wound was 6 x 4 cm in size and 5 to 7-mm in depth.  The wound bled nicely and there was no exposed calcaneal bone.  A sterile nonadherent dressing was applied and a bulky wrap.        MD NIK PERAZA/AQS  D:  2025 13:02:59  T:  2025 13:51:48  JOB #:  [id number]    CC:   Foster Garcia MD

## 2025-05-06 LAB
ANA SER QL: NEGATIVE
ANION GAP SERPL CALC-SCNC: 7 MMOL/L (ref 2–12)
BUN SERPL-MCNC: 105 MG/DL (ref 6–20)
BUN/CREAT SERPL: 36 (ref 12–20)
C-ANCA TITR SER IF: NORMAL TITER
CALCIUM SERPL-MCNC: 7.4 MG/DL (ref 8.5–10.1)
CHLORIDE SERPL-SCNC: 112 MMOL/L (ref 97–108)
CK SERPL-CCNC: 115 U/L (ref 39–308)
CO2 SERPL-SCNC: 21 MMOL/L (ref 21–32)
CREAT SERPL-MCNC: 2.93 MG/DL (ref 0.7–1.3)
DSDNA AB SER-ACNC: <1 IU/ML (ref 0–9)
GBM IGG SER-ACNC: <0.2 UNITS (ref 0–0.9)
GLUCOSE BLD STRIP.AUTO-MCNC: 118 MG/DL (ref 65–117)
GLUCOSE BLD STRIP.AUTO-MCNC: 235 MG/DL (ref 65–117)
GLUCOSE BLD STRIP.AUTO-MCNC: 78 MG/DL (ref 65–117)
GLUCOSE SERPL-MCNC: 194 MG/DL (ref 65–100)
MYELOPEROXIDASE AB SER IA-ACNC: <0.2 UNITS (ref 0–0.9)
P-ANCA ATYPICAL TITR SER IF: NORMAL TITER
P-ANCA TITR SER IF: NORMAL TITER
POTASSIUM SERPL-SCNC: 5.1 MMOL/L (ref 3.5–5.1)
PROTEINASE3 AB SER IA-ACNC: <0.2 UNITS (ref 0–0.9)
SERVICE CMNT-IMP: ABNORMAL
SERVICE CMNT-IMP: ABNORMAL
SERVICE CMNT-IMP: NORMAL
SODIUM SERPL-SCNC: 140 MMOL/L (ref 136–145)

## 2025-05-06 PROCEDURE — 6370000000 HC RX 637 (ALT 250 FOR IP): Performed by: STUDENT IN AN ORGANIZED HEALTH CARE EDUCATION/TRAINING PROGRAM

## 2025-05-06 PROCEDURE — 6370000000 HC RX 637 (ALT 250 FOR IP): Performed by: INTERNAL MEDICINE

## 2025-05-06 PROCEDURE — 86038 ANTINUCLEAR ANTIBODIES: CPT

## 2025-05-06 PROCEDURE — 6370000000 HC RX 637 (ALT 250 FOR IP): Performed by: SURGERY

## 2025-05-06 PROCEDURE — 2060000000 HC ICU INTERMEDIATE R&B

## 2025-05-06 PROCEDURE — P9047 ALBUMIN (HUMAN), 25%, 50ML: HCPCS | Performed by: INTERNAL MEDICINE

## 2025-05-06 PROCEDURE — 6360000002 HC RX W HCPCS: Performed by: INTERNAL MEDICINE

## 2025-05-06 PROCEDURE — 82550 ASSAY OF CK (CPK): CPT

## 2025-05-06 PROCEDURE — 6370000000 HC RX 637 (ALT 250 FOR IP): Performed by: HOSPITALIST

## 2025-05-06 PROCEDURE — 2700000000 HC OXYGEN THERAPY PER DAY

## 2025-05-06 PROCEDURE — 86225 DNA ANTIBODY NATIVE: CPT

## 2025-05-06 PROCEDURE — 80048 BASIC METABOLIC PNL TOTAL CA: CPT

## 2025-05-06 PROCEDURE — 97530 THERAPEUTIC ACTIVITIES: CPT

## 2025-05-06 PROCEDURE — 36415 COLL VENOUS BLD VENIPUNCTURE: CPT

## 2025-05-06 PROCEDURE — 97535 SELF CARE MNGMENT TRAINING: CPT

## 2025-05-06 PROCEDURE — 2580000003 HC RX 258: Performed by: INTERNAL MEDICINE

## 2025-05-06 PROCEDURE — 82962 GLUCOSE BLOOD TEST: CPT

## 2025-05-06 PROCEDURE — 2500000003 HC RX 250 WO HCPCS: Performed by: SURGERY

## 2025-05-06 RX ORDER — CASTOR OIL AND BALSAM, PERU 788; 87 MG/G; MG/G
OINTMENT TOPICAL 2 TIMES DAILY
Status: DISCONTINUED | OUTPATIENT
Start: 2025-05-06 | End: 2025-05-27 | Stop reason: HOSPADM

## 2025-05-06 RX ADMIN — SODIUM CHLORIDE, PRESERVATIVE FREE 10 ML: 5 INJECTION INTRAVENOUS at 20:57

## 2025-05-06 RX ADMIN — SODIUM BICARBONATE 650 MG: 650 TABLET ORAL at 18:45

## 2025-05-06 RX ADMIN — SODIUM BICARBONATE 650 MG: 650 TABLET ORAL at 10:20

## 2025-05-06 RX ADMIN — Medication 1 CAPSULE: at 10:20

## 2025-05-06 RX ADMIN — SODIUM BICARBONATE 650 MG: 650 TABLET ORAL at 20:56

## 2025-05-06 RX ADMIN — WHITE PETROLATUM 57.7 %-MINERAL OIL 31.9 % EYE OINTMENT: at 20:56

## 2025-05-06 RX ADMIN — TAMSULOSIN HYDROCHLORIDE 0.4 MG: 0.4 CAPSULE ORAL at 10:20

## 2025-05-06 RX ADMIN — ALBUMIN (HUMAN) 25 G: 0.25 INJECTION, SOLUTION INTRAVENOUS at 10:23

## 2025-05-06 RX ADMIN — SODIUM CHLORIDE, PRESERVATIVE FREE 10 ML: 5 INJECTION INTRAVENOUS at 10:25

## 2025-05-06 RX ADMIN — ATENOLOL 12.5 MG: 25 TABLET ORAL at 10:20

## 2025-05-06 RX ADMIN — ACETAMINOPHEN 650 MG: 325 TABLET ORAL at 12:48

## 2025-05-06 RX ADMIN — Medication: at 18:41

## 2025-05-06 RX ADMIN — CLOPIDOGREL BISULFATE 75 MG: 75 TABLET, FILM COATED ORAL at 10:20

## 2025-05-06 RX ADMIN — SODIUM CHLORIDE 650 MG: 9 INJECTION INTRAMUSCULAR; INTRAVENOUS; SUBCUTANEOUS at 18:42

## 2025-05-06 RX ADMIN — INSULIN HUMAN 28 UNITS: 100 INJECTION, SUSPENSION SUBCUTANEOUS at 10:20

## 2025-05-06 RX ADMIN — ALBUMIN (HUMAN) 25 G: 0.25 INJECTION, SOLUTION INTRAVENOUS at 16:23

## 2025-05-06 RX ADMIN — ASPIRIN 81 MG: 81 TABLET, CHEWABLE ORAL at 10:20

## 2025-05-06 RX ADMIN — INSULIN LISPRO 2 UNITS: 100 INJECTION, SOLUTION INTRAVENOUS; SUBCUTANEOUS at 12:49

## 2025-05-06 RX ADMIN — FUROSEMIDE 40 MG: 10 INJECTION, SOLUTION INTRAMUSCULAR; INTRAVENOUS at 10:21

## 2025-05-06 ASSESSMENT — PAIN SCALES - GENERAL
PAINLEVEL_OUTOF10: 10
PAINLEVEL_OUTOF10: 0
PAINLEVEL_OUTOF10: 4
PAINLEVEL_OUTOF10: 0

## 2025-05-06 ASSESSMENT — PAIN SCALES - WONG BAKER
WONGBAKER_NUMERICALRESPONSE: NO HURT
WONGBAKER_NUMERICALRESPONSE: NO HURT

## 2025-05-06 ASSESSMENT — PAIN DESCRIPTION - ORIENTATION: ORIENTATION: OTHER (COMMENT)

## 2025-05-06 ASSESSMENT — PAIN DESCRIPTION - LOCATION: LOCATION: GENERALIZED

## 2025-05-06 ASSESSMENT — PAIN DESCRIPTION - DESCRIPTORS: DESCRIPTORS: ACHING

## 2025-05-06 NOTE — CARE COORDINATION
1249 to 1258 - TIFFANIE spoke with Erik at [facility] who said they should be able to take daptomycin.  She is aware that patient's auth ends today but patient is not medically ready.  TIFFANIE contacted son, Julio, who would like to pursue [facility] for discharge and LTAC as a back up option if SNF is not approved.        Lauren Mccullough, MSN, RN    Care Management  [phone]

## 2025-05-07 ENCOUNTER — APPOINTMENT (OUTPATIENT)
Facility: HOSPITAL | Age: 80
DRG: 622 | End: 2025-05-07
Payer: MEDICARE

## 2025-05-07 LAB
ALBUMIN SERPL-MCNC: 2.7 G/DL (ref 3.5–5)
ANA SER QL: NEGATIVE
ANION GAP SERPL CALC-SCNC: 9 MMOL/L (ref 2–12)
BASOPHILS # BLD: 0.02 K/UL (ref 0–0.1)
BASOPHILS NFR BLD: 0.3 % (ref 0–1)
BUN SERPL-MCNC: 108 MG/DL (ref 6–20)
BUN/CREAT SERPL: 37 (ref 12–20)
CALCIUM SERPL-MCNC: 7.7 MG/DL (ref 8.5–10.1)
CHLORIDE SERPL-SCNC: 112 MMOL/L (ref 97–108)
CK SERPL-CCNC: 84 U/L (ref 39–308)
CO2 SERPL-SCNC: 19 MMOL/L (ref 21–32)
CREAT SERPL-MCNC: 2.95 MG/DL (ref 0.7–1.3)
DIFFERENTIAL METHOD BLD: ABNORMAL
DSDNA AB SER-ACNC: <1 IU/ML (ref 0–9)
EKG ATRIAL RATE: 70 BPM
EKG DIAGNOSIS: NORMAL
EKG P AXIS: -22 DEGREES
EKG P-R INTERVAL: 170 MS
EKG Q-T INTERVAL: 462 MS
EKG QRS DURATION: 80 MS
EKG QTC CALCULATION (BAZETT): 498 MS
EKG R AXIS: 47 DEGREES
EKG T AXIS: 247 DEGREES
EKG VENTRICULAR RATE: 70 BPM
EOSINOPHIL # BLD: 0.2 K/UL (ref 0–0.4)
EOSINOPHIL NFR BLD: 2.9 % (ref 0–7)
ERYTHROCYTE [DISTWIDTH] IN BLOOD BY AUTOMATED COUNT: 17.5 % (ref 11.5–14.5)
GLUCOSE BLD STRIP.AUTO-MCNC: 166 MG/DL (ref 65–117)
GLUCOSE BLD STRIP.AUTO-MCNC: 199 MG/DL (ref 65–117)
GLUCOSE BLD STRIP.AUTO-MCNC: 238 MG/DL (ref 65–117)
GLUCOSE BLD STRIP.AUTO-MCNC: 81 MG/DL (ref 65–117)
GLUCOSE SERPL-MCNC: 67 MG/DL (ref 65–100)
HCT VFR BLD AUTO: 30.1 % (ref 36.6–50.3)
HGB BLD-MCNC: 8.6 G/DL (ref 12.1–17)
IMM GRANULOCYTES # BLD AUTO: 0.04 K/UL (ref 0–0.04)
IMM GRANULOCYTES NFR BLD AUTO: 0.6 % (ref 0–0.5)
LYMPHOCYTES # BLD: 1.58 K/UL (ref 0.8–3.5)
LYMPHOCYTES NFR BLD: 23.3 % (ref 12–49)
MCH RBC QN AUTO: 33.1 PG (ref 26–34)
MCHC RBC AUTO-ENTMCNC: 28.6 G/DL (ref 30–36.5)
MCV RBC AUTO: 115.8 FL (ref 80–99)
MONOCYTES # BLD: 0.86 K/UL (ref 0–1)
MONOCYTES NFR BLD: 12.7 % (ref 5–13)
NEUTS SEG # BLD: 4.1 K/UL (ref 1.8–8)
NEUTS SEG NFR BLD: 60.2 % (ref 32–75)
NRBC # BLD: 0.03 K/UL (ref 0–0.01)
NRBC BLD-RTO: 0.4 PER 100 WBC
PHOSPHATE SERPL-MCNC: 5.4 MG/DL (ref 2.6–4.7)
PLATELET # BLD AUTO: 287 K/UL (ref 150–400)
PMV BLD AUTO: 10.7 FL (ref 8.9–12.9)
POTASSIUM SERPL-SCNC: 5 MMOL/L (ref 3.5–5.1)
RBC # BLD AUTO: 2.6 M/UL (ref 4.1–5.7)
RBC MORPH BLD: ABNORMAL
RBC MORPH BLD: ABNORMAL
SERVICE CMNT-IMP: ABNORMAL
SERVICE CMNT-IMP: NORMAL
SODIUM SERPL-SCNC: 140 MMOL/L (ref 136–145)
WBC # BLD AUTO: 6.8 K/UL (ref 4.1–11.1)

## 2025-05-07 PROCEDURE — 6370000000 HC RX 637 (ALT 250 FOR IP): Performed by: INTERNAL MEDICINE

## 2025-05-07 PROCEDURE — 36415 COLL VENOUS BLD VENIPUNCTURE: CPT

## 2025-05-07 PROCEDURE — 6370000000 HC RX 637 (ALT 250 FOR IP): Performed by: SURGERY

## 2025-05-07 PROCEDURE — 2060000000 HC ICU INTERMEDIATE R&B

## 2025-05-07 PROCEDURE — 82550 ASSAY OF CK (CPK): CPT

## 2025-05-07 PROCEDURE — 6370000000 HC RX 637 (ALT 250 FOR IP): Performed by: STUDENT IN AN ORGANIZED HEALTH CARE EDUCATION/TRAINING PROGRAM

## 2025-05-07 PROCEDURE — 85025 COMPLETE CBC W/AUTO DIFF WBC: CPT

## 2025-05-07 PROCEDURE — 2500000003 HC RX 250 WO HCPCS: Performed by: SURGERY

## 2025-05-07 PROCEDURE — 80069 RENAL FUNCTION PANEL: CPT

## 2025-05-07 PROCEDURE — 82962 GLUCOSE BLOOD TEST: CPT

## 2025-05-07 PROCEDURE — 71045 X-RAY EXAM CHEST 1 VIEW: CPT

## 2025-05-07 PROCEDURE — 2700000000 HC OXYGEN THERAPY PER DAY

## 2025-05-07 RX ORDER — MIDODRINE HYDROCHLORIDE 5 MG/1
5 TABLET ORAL 3 TIMES DAILY
Status: DISCONTINUED | OUTPATIENT
Start: 2025-05-07 | End: 2025-05-16

## 2025-05-07 RX ADMIN — ATENOLOL 12.5 MG: 25 TABLET ORAL at 10:41

## 2025-05-07 RX ADMIN — SODIUM CHLORIDE, PRESERVATIVE FREE 10 ML: 5 INJECTION INTRAVENOUS at 20:56

## 2025-05-07 RX ADMIN — MIDODRINE HYDROCHLORIDE 5 MG: 5 TABLET ORAL at 10:41

## 2025-05-07 RX ADMIN — CLOPIDOGREL BISULFATE 75 MG: 75 TABLET, FILM COATED ORAL at 10:41

## 2025-05-07 RX ADMIN — SODIUM BICARBONATE 650 MG: 650 TABLET ORAL at 17:17

## 2025-05-07 RX ADMIN — MIDODRINE HYDROCHLORIDE 5 MG: 5 TABLET ORAL at 20:56

## 2025-05-07 RX ADMIN — WHITE PETROLATUM 57.7 %-MINERAL OIL 31.9 % EYE OINTMENT: at 20:57

## 2025-05-07 RX ADMIN — MIDODRINE HYDROCHLORIDE 5 MG: 5 TABLET ORAL at 17:17

## 2025-05-07 RX ADMIN — SODIUM ZIRCONIUM CYCLOSILICATE 10 G: 10 POWDER, FOR SUSPENSION ORAL at 10:39

## 2025-05-07 RX ADMIN — ASPIRIN 81 MG: 81 TABLET, CHEWABLE ORAL at 10:41

## 2025-05-07 RX ADMIN — Medication: at 10:40

## 2025-05-07 RX ADMIN — INSULIN LISPRO 2 UNITS: 100 INJECTION, SOLUTION INTRAVENOUS; SUBCUTANEOUS at 20:55

## 2025-05-07 RX ADMIN — SODIUM BICARBONATE 650 MG: 650 TABLET ORAL at 10:41

## 2025-05-07 RX ADMIN — TAMSULOSIN HYDROCHLORIDE 0.4 MG: 0.4 CAPSULE ORAL at 10:41

## 2025-05-07 RX ADMIN — APIXABAN 5 MG: 5 TABLET, FILM COATED ORAL at 20:56

## 2025-05-07 RX ADMIN — SODIUM CHLORIDE, PRESERVATIVE FREE 10 ML: 5 INJECTION INTRAVENOUS at 10:41

## 2025-05-07 RX ADMIN — INSULIN LISPRO 2 UNITS: 100 INJECTION, SOLUTION INTRAVENOUS; SUBCUTANEOUS at 17:17

## 2025-05-07 RX ADMIN — POLYETHYLENE GLYCOL 3350 17 G: 17 POWDER, FOR SOLUTION ORAL at 10:39

## 2025-05-07 RX ADMIN — SODIUM BICARBONATE 650 MG: 650 TABLET ORAL at 20:56

## 2025-05-07 RX ADMIN — INSULIN HUMAN 28 UNITS: 100 INJECTION, SUSPENSION SUBCUTANEOUS at 10:40

## 2025-05-07 RX ADMIN — Medication: at 20:57

## 2025-05-07 RX ADMIN — Medication 1 CAPSULE: at 10:41

## 2025-05-07 ASSESSMENT — PAIN SCALES - GENERAL
PAINLEVEL_OUTOF10: 0

## 2025-05-07 NOTE — CARE COORDINATION
1003 - TIFFANIE spoke with son, Julio, who is aware that CM can re-start SNF auth closer to SHALONDA.  He had no further questions for CM at this time.      Lauren Mccullough, MSN, RN    Care Management  [phone]

## 2025-05-08 LAB
ALBUMIN SERPL-MCNC: 2.5 G/DL (ref 3.5–5)
ANION GAP SERPL CALC-SCNC: 7 MMOL/L (ref 2–12)
BASOPHILS # BLD: 0.02 K/UL (ref 0–0.1)
BASOPHILS NFR BLD: 0.3 % (ref 0–1)
BUN SERPL-MCNC: 118 MG/DL (ref 6–20)
BUN/CREAT SERPL: 39 (ref 12–20)
CALCIUM SERPL-MCNC: 7.8 MG/DL (ref 8.5–10.1)
CHLORIDE SERPL-SCNC: 112 MMOL/L (ref 97–108)
CO2 SERPL-SCNC: 21 MMOL/L (ref 21–32)
CREAT SERPL-MCNC: 2.99 MG/DL (ref 0.7–1.3)
DIFFERENTIAL METHOD BLD: ABNORMAL
EOSINOPHIL # BLD: 0.21 K/UL (ref 0–0.4)
EOSINOPHIL NFR BLD: 2.8 % (ref 0–7)
ERYTHROCYTE [DISTWIDTH] IN BLOOD BY AUTOMATED COUNT: 17.5 % (ref 11.5–14.5)
GLUCOSE BLD STRIP.AUTO-MCNC: 108 MG/DL (ref 65–117)
GLUCOSE BLD STRIP.AUTO-MCNC: 116 MG/DL (ref 65–117)
GLUCOSE BLD STRIP.AUTO-MCNC: 124 MG/DL (ref 65–117)
GLUCOSE BLD STRIP.AUTO-MCNC: 87 MG/DL (ref 65–117)
GLUCOSE SERPL-MCNC: 82 MG/DL (ref 65–100)
HCT VFR BLD AUTO: 31.7 % (ref 36.6–50.3)
HGB BLD-MCNC: 8.9 G/DL (ref 12.1–17)
IMM GRANULOCYTES # BLD AUTO: 0.04 K/UL (ref 0–0.04)
IMM GRANULOCYTES NFR BLD AUTO: 0.5 % (ref 0–0.5)
LYMPHOCYTES # BLD: 1.49 K/UL (ref 0.8–3.5)
LYMPHOCYTES NFR BLD: 19.6 % (ref 12–49)
MCH RBC QN AUTO: 32.6 PG (ref 26–34)
MCHC RBC AUTO-ENTMCNC: 28.1 G/DL (ref 30–36.5)
MCV RBC AUTO: 116.1 FL (ref 80–99)
MONOCYTES # BLD: 0.94 K/UL (ref 0–1)
MONOCYTES NFR BLD: 12.4 % (ref 5–13)
NEUTS SEG # BLD: 4.89 K/UL (ref 1.8–8)
NEUTS SEG NFR BLD: 64.4 % (ref 32–75)
NRBC # BLD: 0.03 K/UL (ref 0–0.01)
NRBC BLD-RTO: 0.4 PER 100 WBC
NT PRO BNP: ABNORMAL PG/ML
PHOSPHATE SERPL-MCNC: 5.4 MG/DL (ref 2.6–4.7)
PLATELET # BLD AUTO: 258 K/UL (ref 150–400)
PMV BLD AUTO: 10.5 FL (ref 8.9–12.9)
POTASSIUM SERPL-SCNC: 5.3 MMOL/L (ref 3.5–5.1)
RBC # BLD AUTO: 2.73 M/UL (ref 4.1–5.7)
RBC MORPH BLD: ABNORMAL
SERVICE CMNT-IMP: ABNORMAL
SERVICE CMNT-IMP: NORMAL
SODIUM SERPL-SCNC: 140 MMOL/L (ref 136–145)
WBC # BLD AUTO: 7.6 K/UL (ref 4.1–11.1)

## 2025-05-08 PROCEDURE — 97535 SELF CARE MNGMENT TRAINING: CPT

## 2025-05-08 PROCEDURE — 6370000000 HC RX 637 (ALT 250 FOR IP): Performed by: INTERNAL MEDICINE

## 2025-05-08 PROCEDURE — 94761 N-INVAS EAR/PLS OXIMETRY MLT: CPT

## 2025-05-08 PROCEDURE — 51798 US URINE CAPACITY MEASURE: CPT

## 2025-05-08 PROCEDURE — 83880 ASSAY OF NATRIURETIC PEPTIDE: CPT

## 2025-05-08 PROCEDURE — 6370000000 HC RX 637 (ALT 250 FOR IP): Performed by: SURGERY

## 2025-05-08 PROCEDURE — 6360000002 HC RX W HCPCS: Performed by: INTERNAL MEDICINE

## 2025-05-08 PROCEDURE — 80069 RENAL FUNCTION PANEL: CPT

## 2025-05-08 PROCEDURE — 2060000000 HC ICU INTERMEDIATE R&B

## 2025-05-08 PROCEDURE — 85025 COMPLETE CBC W/AUTO DIFF WBC: CPT

## 2025-05-08 PROCEDURE — 2700000000 HC OXYGEN THERAPY PER DAY

## 2025-05-08 PROCEDURE — 36415 COLL VENOUS BLD VENIPUNCTURE: CPT

## 2025-05-08 PROCEDURE — 82962 GLUCOSE BLOOD TEST: CPT

## 2025-05-08 PROCEDURE — 97530 THERAPEUTIC ACTIVITIES: CPT

## 2025-05-08 PROCEDURE — 6370000000 HC RX 637 (ALT 250 FOR IP): Performed by: STUDENT IN AN ORGANIZED HEALTH CARE EDUCATION/TRAINING PROGRAM

## 2025-05-08 PROCEDURE — 2580000003 HC RX 258: Performed by: INTERNAL MEDICINE

## 2025-05-08 PROCEDURE — 2500000003 HC RX 250 WO HCPCS: Performed by: SURGERY

## 2025-05-08 RX ORDER — BUMETANIDE 0.25 MG/ML
1 INJECTION, SOLUTION INTRAMUSCULAR; INTRAVENOUS DAILY
Status: DISCONTINUED | OUTPATIENT
Start: 2025-05-08 | End: 2025-05-09

## 2025-05-08 RX ADMIN — ASPIRIN 81 MG: 81 TABLET, CHEWABLE ORAL at 09:40

## 2025-05-08 RX ADMIN — APIXABAN 5 MG: 5 TABLET, FILM COATED ORAL at 21:59

## 2025-05-08 RX ADMIN — SODIUM ZIRCONIUM CYCLOSILICATE 10 G: 10 POWDER, FOR SUSPENSION ORAL at 09:55

## 2025-05-08 RX ADMIN — Medication 1 CAPSULE: at 09:40

## 2025-05-08 RX ADMIN — SODIUM BICARBONATE 650 MG: 650 TABLET ORAL at 14:39

## 2025-05-08 RX ADMIN — CLOPIDOGREL BISULFATE 75 MG: 75 TABLET, FILM COATED ORAL at 09:39

## 2025-05-08 RX ADMIN — TAMSULOSIN HYDROCHLORIDE 0.4 MG: 0.4 CAPSULE ORAL at 09:40

## 2025-05-08 RX ADMIN — MIDODRINE HYDROCHLORIDE 5 MG: 5 TABLET ORAL at 14:39

## 2025-05-08 RX ADMIN — EPOETIN ALFA-EPBX 3000 UNITS: 3000 INJECTION, SOLUTION INTRAVENOUS; SUBCUTANEOUS at 21:58

## 2025-05-08 RX ADMIN — Medication: at 09:42

## 2025-05-08 RX ADMIN — SODIUM BICARBONATE 650 MG: 650 TABLET ORAL at 09:40

## 2025-05-08 RX ADMIN — APIXABAN 5 MG: 5 TABLET, FILM COATED ORAL at 09:40

## 2025-05-08 RX ADMIN — MIDODRINE HYDROCHLORIDE 5 MG: 5 TABLET ORAL at 09:39

## 2025-05-08 RX ADMIN — BUMETANIDE 1 MG: 0.25 INJECTION INTRAMUSCULAR; INTRAVENOUS at 11:34

## 2025-05-08 RX ADMIN — WHITE PETROLATUM 57.7 %-MINERAL OIL 31.9 % EYE OINTMENT: at 22:05

## 2025-05-08 RX ADMIN — INSULIN HUMAN 28 UNITS: 100 INJECTION, SUSPENSION SUBCUTANEOUS at 09:45

## 2025-05-08 RX ADMIN — ACETAMINOPHEN 650 MG: 325 TABLET ORAL at 02:54

## 2025-05-08 RX ADMIN — SODIUM CHLORIDE, PRESERVATIVE FREE 10 ML: 5 INJECTION INTRAVENOUS at 09:42

## 2025-05-08 RX ADMIN — SODIUM BICARBONATE 650 MG: 650 TABLET ORAL at 22:02

## 2025-05-08 RX ADMIN — Medication: at 22:03

## 2025-05-08 RX ADMIN — SODIUM CHLORIDE 650 MG: 9 INJECTION INTRAMUSCULAR; INTRAVENOUS; SUBCUTANEOUS at 17:40

## 2025-05-08 RX ADMIN — DEXTRAN 70, GLYCERIN, HYPROMELLOSE 1 DROP: 1; 2; 3 SOLUTION/ DROPS OPHTHALMIC at 22:05

## 2025-05-08 RX ADMIN — MIDODRINE HYDROCHLORIDE 5 MG: 5 TABLET ORAL at 22:02

## 2025-05-08 RX ADMIN — SODIUM CHLORIDE, PRESERVATIVE FREE 10 ML: 5 INJECTION INTRAVENOUS at 22:02

## 2025-05-08 RX ADMIN — ATENOLOL 12.5 MG: 25 TABLET ORAL at 09:39

## 2025-05-08 ASSESSMENT — PAIN DESCRIPTION - ORIENTATION: ORIENTATION: LEFT

## 2025-05-08 ASSESSMENT — PAIN DESCRIPTION - LOCATION: LOCATION: LEG

## 2025-05-08 ASSESSMENT — PAIN SCALES - GENERAL: PAINLEVEL_OUTOF10: 3

## 2025-05-08 ASSESSMENT — PAIN - FUNCTIONAL ASSESSMENT: PAIN_FUNCTIONAL_ASSESSMENT: ACTIVITIES ARE NOT PREVENTED

## 2025-05-08 NOTE — CARE COORDINATION
Transition of Care Plan:    RUR: 24% (high RUR)  Prior Level of Functioning: Independent  Disposition: [facility] (auth approved) vs. LTAC as a back up plan, then return home afterwards  SHALONDA: 5/10/2025  If SNF or IPR: Date FOC offered: 4/3/2025  Date FOC received: 4/3/2025  Accepting facility: [facility]  Date authorization started with reference number:  Ref ID #: [id number]  Date authorization received and expires: 5/8/2025, for 5/9/2025 to 5/13/2025, Auth ID # pending.  Follow up appointments: defer to SNF.  DME needed: defer to SNF.  Patient has a RW, wheelchair, grab bars and shower chair at home.  Transportation at discharge: [facility] anticipated  IM/IMM Medicare/ letter given: 2nd IM needed prior to discharge.  Is patient a  and connected with VA? No.   If yes, was [place] transfer form completed and VA notified? N/A  Caregiver Contact: Julio eastman - [phone]   Discharge Caregiver contacted prior to discharge? CM to contact, if needed.  Care Conference needed? No.  Barriers to discharge: specialists clearance, IV abx line, IV bumex    1243 - Attending feels patient will be medically ready tomorrow.  CM Team to begin new SNF auth.    1431 - SNF auth approved.    1513 - Erik with [facility] notified of auth approval and request for a bed tomorrow.      Lauren Mccullough, MSN, RN    Care Management  [phone]

## 2025-05-09 LAB
ALBUMIN SERPL-MCNC: 2.3 G/DL (ref 3.5–5)
ANION GAP SERPL CALC-SCNC: 9 MMOL/L (ref 2–12)
BASOPHILS # BLD: 0.03 K/UL (ref 0–0.1)
BASOPHILS NFR BLD: 0.5 % (ref 0–1)
BUN SERPL-MCNC: 112 MG/DL (ref 6–20)
BUN/CREAT SERPL: 40 (ref 12–20)
CALCIUM SERPL-MCNC: 7.7 MG/DL (ref 8.5–10.1)
CHLORIDE SERPL-SCNC: 112 MMOL/L (ref 97–108)
CO2 SERPL-SCNC: 22 MMOL/L (ref 21–32)
CREAT SERPL-MCNC: 2.78 MG/DL (ref 0.7–1.3)
DIFFERENTIAL METHOD BLD: ABNORMAL
EOSINOPHIL # BLD: 0.2 K/UL (ref 0–0.4)
EOSINOPHIL NFR BLD: 3 % (ref 0–7)
ERYTHROCYTE [DISTWIDTH] IN BLOOD BY AUTOMATED COUNT: 17.3 % (ref 11.5–14.5)
GLUCOSE BLD STRIP.AUTO-MCNC: 100 MG/DL (ref 65–117)
GLUCOSE BLD STRIP.AUTO-MCNC: 137 MG/DL (ref 65–117)
GLUCOSE BLD STRIP.AUTO-MCNC: 138 MG/DL (ref 65–117)
GLUCOSE BLD STRIP.AUTO-MCNC: 57 MG/DL (ref 65–117)
GLUCOSE SERPL-MCNC: 53 MG/DL (ref 65–100)
HCT VFR BLD AUTO: 30.9 % (ref 36.6–50.3)
HGB BLD-MCNC: 9.1 G/DL (ref 12.1–17)
IMM GRANULOCYTES # BLD AUTO: 0.03 K/UL (ref 0–0.04)
IMM GRANULOCYTES NFR BLD AUTO: 0.5 % (ref 0–0.5)
LYMPHOCYTES # BLD: 1.02 K/UL (ref 0.8–3.5)
LYMPHOCYTES NFR BLD: 15.4 % (ref 12–49)
MCH RBC QN AUTO: 33.1 PG (ref 26–34)
MCHC RBC AUTO-ENTMCNC: 29.4 G/DL (ref 30–36.5)
MCV RBC AUTO: 112.4 FL (ref 80–99)
MONOCYTES # BLD: 0.69 K/UL (ref 0–1)
MONOCYTES NFR BLD: 10.5 % (ref 5–13)
NEUTS SEG # BLD: 4.63 K/UL (ref 1.8–8)
NEUTS SEG NFR BLD: 70.1 % (ref 32–75)
NRBC # BLD: 0 K/UL (ref 0–0.01)
NRBC BLD-RTO: 0 PER 100 WBC
PHOSPHATE SERPL-MCNC: 5.2 MG/DL (ref 2.6–4.7)
PLATELET # BLD AUTO: 254 K/UL (ref 150–400)
PMV BLD AUTO: 10.1 FL (ref 8.9–12.9)
POTASSIUM SERPL-SCNC: 4.5 MMOL/L (ref 3.5–5.1)
RBC # BLD AUTO: 2.75 M/UL (ref 4.1–5.7)
RBC MORPH BLD: ABNORMAL
RBC MORPH BLD: ABNORMAL
SERVICE CMNT-IMP: ABNORMAL
SERVICE CMNT-IMP: NORMAL
SODIUM SERPL-SCNC: 143 MMOL/L (ref 136–145)
WBC # BLD AUTO: 6.6 K/UL (ref 4.1–11.1)

## 2025-05-09 PROCEDURE — 99223 1ST HOSP IP/OBS HIGH 75: CPT

## 2025-05-09 PROCEDURE — 6370000000 HC RX 637 (ALT 250 FOR IP): Performed by: STUDENT IN AN ORGANIZED HEALTH CARE EDUCATION/TRAINING PROGRAM

## 2025-05-09 PROCEDURE — 6370000000 HC RX 637 (ALT 250 FOR IP): Performed by: INTERNAL MEDICINE

## 2025-05-09 PROCEDURE — 85025 COMPLETE CBC W/AUTO DIFF WBC: CPT

## 2025-05-09 PROCEDURE — 36415 COLL VENOUS BLD VENIPUNCTURE: CPT

## 2025-05-09 PROCEDURE — 2060000000 HC ICU INTERMEDIATE R&B

## 2025-05-09 PROCEDURE — 6370000000 HC RX 637 (ALT 250 FOR IP): Performed by: SURGERY

## 2025-05-09 PROCEDURE — 2500000003 HC RX 250 WO HCPCS: Performed by: SURGERY

## 2025-05-09 PROCEDURE — 6360000002 HC RX W HCPCS: Performed by: INTERNAL MEDICINE

## 2025-05-09 PROCEDURE — 80069 RENAL FUNCTION PANEL: CPT

## 2025-05-09 PROCEDURE — 51798 US URINE CAPACITY MEASURE: CPT

## 2025-05-09 PROCEDURE — 82962 GLUCOSE BLOOD TEST: CPT

## 2025-05-09 RX ORDER — BUMETANIDE 1 MG/1
1 TABLET ORAL DAILY
Status: DISCONTINUED | OUTPATIENT
Start: 2025-05-09 | End: 2025-05-18

## 2025-05-09 RX ADMIN — APIXABAN 5 MG: 5 TABLET, FILM COATED ORAL at 20:59

## 2025-05-09 RX ADMIN — TAMSULOSIN HYDROCHLORIDE 0.4 MG: 0.4 CAPSULE ORAL at 08:32

## 2025-05-09 RX ADMIN — CLOPIDOGREL BISULFATE 75 MG: 75 TABLET, FILM COATED ORAL at 08:31

## 2025-05-09 RX ADMIN — SODIUM CHLORIDE, PRESERVATIVE FREE 10 ML: 5 INJECTION INTRAVENOUS at 08:32

## 2025-05-09 RX ADMIN — SODIUM BICARBONATE 650 MG: 650 TABLET ORAL at 20:59

## 2025-05-09 RX ADMIN — Medication: at 20:59

## 2025-05-09 RX ADMIN — BUMETANIDE 1 MG: 0.25 INJECTION INTRAMUSCULAR; INTRAVENOUS at 08:32

## 2025-05-09 RX ADMIN — SODIUM BICARBONATE 650 MG: 650 TABLET ORAL at 08:32

## 2025-05-09 RX ADMIN — Medication 16 G: at 07:55

## 2025-05-09 RX ADMIN — ATENOLOL 12.5 MG: 25 TABLET ORAL at 08:31

## 2025-05-09 RX ADMIN — SODIUM BICARBONATE 650 MG: 650 TABLET ORAL at 18:28

## 2025-05-09 RX ADMIN — MIDODRINE HYDROCHLORIDE 5 MG: 5 TABLET ORAL at 20:59

## 2025-05-09 RX ADMIN — DEXTRAN 70, GLYCERIN, HYPROMELLOSE 1 DROP: 1; 2; 3 SOLUTION/ DROPS OPHTHALMIC at 21:00

## 2025-05-09 RX ADMIN — Medication: at 09:00

## 2025-05-09 RX ADMIN — APIXABAN 5 MG: 5 TABLET, FILM COATED ORAL at 08:32

## 2025-05-09 RX ADMIN — MIDODRINE HYDROCHLORIDE 5 MG: 5 TABLET ORAL at 08:31

## 2025-05-09 RX ADMIN — SODIUM CHLORIDE, PRESERVATIVE FREE 10 ML: 5 INJECTION INTRAVENOUS at 21:03

## 2025-05-09 RX ADMIN — Medication 1 CAPSULE: at 08:31

## 2025-05-09 RX ADMIN — MIDODRINE HYDROCHLORIDE 5 MG: 5 TABLET ORAL at 16:23

## 2025-05-09 RX ADMIN — ASPIRIN 81 MG: 81 TABLET, CHEWABLE ORAL at 08:32

## 2025-05-09 RX ADMIN — WHITE PETROLATUM 57.7 %-MINERAL OIL 31.9 % EYE OINTMENT: at 21:00

## 2025-05-09 NOTE — CARE COORDINATION
Transition of Care Plan:    RUR: 24% (high RUR)  Prior Level of Functioning: Independent  Disposition: [facility] (auth approved) vs. LTAC as a back up plan, then return home afterwards  SHALONDA: 5/10/2025  If SNF or IPR: Date FOC offered: 4/3/2025  Date FOC received: 4/3/2025  Accepting facility: [facility]  Date authorization started with reference number:  Ref ID #: [id number]  Date authorization received and expires: 5/8/2025, for 5/9/2025 to 5/13/2025, Auth ID # pending.  Follow up appointments: defer to SNF.  DME needed: defer to SNF.  Patient has a RW, wheelchair, grab bars and shower chair at home.  Transportation at discharge: [facility] anticipated  IM/IMM Medicare/ letter given: 2nd IM done 5/9/2025.  Is patient a  and connected with VA? No.   If yes, was [place] transfer form completed and VA notified? N/A  Caregiver Contact: Julio esatman - [phone] - [email or web address]  Discharge Caregiver contacted prior to discharge? CM to contact, if needed.  Care Conference needed? No.  Barriers to discharge: specialists clearance, IV abx line    0917 - Message sent to [facility] to determine if they have a bed for the patient today.    1235 - CM spoke with Hussain at [facility] ([phone]).  The call ref # is: [id number]. She was provided with CPT code of:  and said authorization is not required for transport.     1607 - CM spoke with sonJulio, who was notified of anticipated discharge tomorrow.      Lauren Mccullough, MSN, RN    Care Management  [phone]

## 2025-05-10 LAB
ANION GAP SERPL CALC-SCNC: 4 MMOL/L (ref 2–12)
BUN SERPL-MCNC: 111 MG/DL (ref 6–20)
BUN/CREAT SERPL: 37 (ref 12–20)
CALCIUM SERPL-MCNC: 7.8 MG/DL (ref 8.5–10.1)
CHLORIDE SERPL-SCNC: 114 MMOL/L (ref 97–108)
CO2 SERPL-SCNC: 24 MMOL/L (ref 21–32)
CREAT SERPL-MCNC: 3.02 MG/DL (ref 0.7–1.3)
ERYTHROCYTE [DISTWIDTH] IN BLOOD BY AUTOMATED COUNT: 17.3 % (ref 11.5–14.5)
GLUCOSE BLD STRIP.AUTO-MCNC: 114 MG/DL (ref 65–117)
GLUCOSE BLD STRIP.AUTO-MCNC: 149 MG/DL (ref 65–117)
GLUCOSE BLD STRIP.AUTO-MCNC: 160 MG/DL (ref 65–117)
GLUCOSE BLD STRIP.AUTO-MCNC: 195 MG/DL (ref 65–117)
GLUCOSE SERPL-MCNC: 180 MG/DL (ref 65–100)
HCT VFR BLD AUTO: 31.7 % (ref 36.6–50.3)
HGB BLD-MCNC: 9.4 G/DL (ref 12.1–17)
MAGNESIUM SERPL-MCNC: 3.3 MG/DL (ref 1.6–2.4)
MCH RBC QN AUTO: 33 PG (ref 26–34)
MCHC RBC AUTO-ENTMCNC: 29.7 G/DL (ref 30–36.5)
MCV RBC AUTO: 111.2 FL (ref 80–99)
NRBC # BLD: 0 K/UL (ref 0–0.01)
NRBC BLD-RTO: 0 PER 100 WBC
PHOSPHATE SERPL-MCNC: 5.7 MG/DL (ref 2.6–4.7)
PLATELET # BLD AUTO: 238 K/UL (ref 150–400)
PMV BLD AUTO: 9.7 FL (ref 8.9–12.9)
POTASSIUM SERPL-SCNC: 4.8 MMOL/L (ref 3.5–5.1)
RBC # BLD AUTO: 2.85 M/UL (ref 4.1–5.7)
SERVICE CMNT-IMP: ABNORMAL
SERVICE CMNT-IMP: NORMAL
SODIUM SERPL-SCNC: 142 MMOL/L (ref 136–145)
WBC # BLD AUTO: 7.4 K/UL (ref 4.1–11.1)

## 2025-05-10 PROCEDURE — 36415 COLL VENOUS BLD VENIPUNCTURE: CPT

## 2025-05-10 PROCEDURE — 2060000000 HC ICU INTERMEDIATE R&B

## 2025-05-10 PROCEDURE — 6370000000 HC RX 637 (ALT 250 FOR IP): Performed by: INTERNAL MEDICINE

## 2025-05-10 PROCEDURE — 6360000002 HC RX W HCPCS: Performed by: INTERNAL MEDICINE

## 2025-05-10 PROCEDURE — 85027 COMPLETE CBC AUTOMATED: CPT

## 2025-05-10 PROCEDURE — 6370000000 HC RX 637 (ALT 250 FOR IP): Performed by: PHYSICIAN ASSISTANT

## 2025-05-10 PROCEDURE — 84100 ASSAY OF PHOSPHORUS: CPT

## 2025-05-10 PROCEDURE — 80048 BASIC METABOLIC PNL TOTAL CA: CPT

## 2025-05-10 PROCEDURE — 2580000003 HC RX 258: Performed by: INTERNAL MEDICINE

## 2025-05-10 PROCEDURE — 6370000000 HC RX 637 (ALT 250 FOR IP): Performed by: STUDENT IN AN ORGANIZED HEALTH CARE EDUCATION/TRAINING PROGRAM

## 2025-05-10 PROCEDURE — 6370000000 HC RX 637 (ALT 250 FOR IP): Performed by: HOSPITALIST

## 2025-05-10 PROCEDURE — 6370000000 HC RX 637 (ALT 250 FOR IP): Performed by: SURGERY

## 2025-05-10 PROCEDURE — 83735 ASSAY OF MAGNESIUM: CPT

## 2025-05-10 PROCEDURE — 82962 GLUCOSE BLOOD TEST: CPT

## 2025-05-10 PROCEDURE — 2500000003 HC RX 250 WO HCPCS: Performed by: SURGERY

## 2025-05-10 RX ORDER — SEVELAMER CARBONATE 800 MG/1
800 TABLET, FILM COATED ORAL
Status: DISCONTINUED | OUTPATIENT
Start: 2025-05-10 | End: 2025-05-16

## 2025-05-10 RX ADMIN — INSULIN HUMAN 28 UNITS: 100 INJECTION, SUSPENSION SUBCUTANEOUS at 08:48

## 2025-05-10 RX ADMIN — SODIUM BICARBONATE 650 MG: 650 TABLET ORAL at 21:10

## 2025-05-10 RX ADMIN — SODIUM BICARBONATE 650 MG: 650 TABLET ORAL at 13:46

## 2025-05-10 RX ADMIN — DEXTRAN 70, GLYCERIN, HYPROMELLOSE 1 DROP: 1; 2; 3 SOLUTION/ DROPS OPHTHALMIC at 21:11

## 2025-05-10 RX ADMIN — Medication 1 CAPSULE: at 08:46

## 2025-05-10 RX ADMIN — SODIUM BICARBONATE 650 MG: 650 TABLET ORAL at 08:45

## 2025-05-10 RX ADMIN — MIDODRINE HYDROCHLORIDE 5 MG: 5 TABLET ORAL at 21:10

## 2025-05-10 RX ADMIN — Medication: at 09:16

## 2025-05-10 RX ADMIN — SODIUM CHLORIDE, PRESERVATIVE FREE 10 ML: 5 INJECTION INTRAVENOUS at 21:10

## 2025-05-10 RX ADMIN — SODIUM CHLORIDE 650 MG: 9 INJECTION INTRAMUSCULAR; INTRAVENOUS; SUBCUTANEOUS at 17:39

## 2025-05-10 RX ADMIN — BUMETANIDE 1 MG: 1 TABLET ORAL at 08:46

## 2025-05-10 RX ADMIN — Medication: at 21:11

## 2025-05-10 RX ADMIN — ASPIRIN 81 MG: 81 TABLET, CHEWABLE ORAL at 08:46

## 2025-05-10 RX ADMIN — SEVELAMER CARBONATE 800 MG: 800 TABLET, FILM COATED ORAL at 13:46

## 2025-05-10 RX ADMIN — WHITE PETROLATUM 57.7 %-MINERAL OIL 31.9 % EYE OINTMENT: at 21:16

## 2025-05-10 RX ADMIN — INSULIN LISPRO 2 UNITS: 100 INJECTION, SOLUTION INTRAVENOUS; SUBCUTANEOUS at 13:46

## 2025-05-10 RX ADMIN — SEVELAMER CARBONATE 800 MG: 800 TABLET, FILM COATED ORAL at 17:39

## 2025-05-10 RX ADMIN — ATENOLOL 12.5 MG: 25 TABLET ORAL at 08:46

## 2025-05-10 RX ADMIN — MIDODRINE HYDROCHLORIDE 5 MG: 5 TABLET ORAL at 13:46

## 2025-05-10 RX ADMIN — EPOETIN ALFA-EPBX 3000 UNITS: 3000 INJECTION, SOLUTION INTRAVENOUS; SUBCUTANEOUS at 21:10

## 2025-05-10 RX ADMIN — APIXABAN 5 MG: 5 TABLET, FILM COATED ORAL at 21:10

## 2025-05-10 RX ADMIN — MIDODRINE HYDROCHLORIDE 5 MG: 5 TABLET ORAL at 08:46

## 2025-05-10 RX ADMIN — CLOPIDOGREL BISULFATE 75 MG: 75 TABLET, FILM COATED ORAL at 08:46

## 2025-05-10 RX ADMIN — APIXABAN 5 MG: 5 TABLET, FILM COATED ORAL at 08:46

## 2025-05-10 RX ADMIN — SODIUM CHLORIDE, PRESERVATIVE FREE 10 ML: 5 INJECTION INTRAVENOUS at 09:16

## 2025-05-10 RX ADMIN — TAMSULOSIN HYDROCHLORIDE 0.4 MG: 0.4 CAPSULE ORAL at 08:46

## 2025-05-10 ASSESSMENT — PAIN SCALES - GENERAL
PAINLEVEL_OUTOF10: 0
PAINLEVEL_OUTOF10: 0

## 2025-05-10 ASSESSMENT — PAIN SCALES - WONG BAKER
WONGBAKER_NUMERICALRESPONSE: NO HURT
WONGBAKER_NUMERICALRESPONSE: NO HURT

## 2025-05-11 LAB
ALBUMIN SERPL-MCNC: 2.2 G/DL (ref 3.5–5)
ANION GAP SERPL CALC-SCNC: 7 MMOL/L (ref 2–12)
BUN SERPL-MCNC: 116 MG/DL (ref 6–20)
BUN/CREAT SERPL: 35 (ref 12–20)
CALCIUM SERPL-MCNC: 7.6 MG/DL (ref 8.5–10.1)
CHLORIDE SERPL-SCNC: 115 MMOL/L (ref 97–108)
CO2 SERPL-SCNC: 18 MMOL/L (ref 21–32)
CREAT SERPL-MCNC: 3.3 MG/DL (ref 0.7–1.3)
ERYTHROCYTE [DISTWIDTH] IN BLOOD BY AUTOMATED COUNT: 16.9 % (ref 11.5–14.5)
GLUCOSE BLD STRIP.AUTO-MCNC: 102 MG/DL (ref 65–117)
GLUCOSE BLD STRIP.AUTO-MCNC: 103 MG/DL (ref 65–117)
GLUCOSE BLD STRIP.AUTO-MCNC: 190 MG/DL (ref 65–117)
GLUCOSE BLD STRIP.AUTO-MCNC: 233 MG/DL (ref 65–117)
GLUCOSE BLD STRIP.AUTO-MCNC: 54 MG/DL (ref 65–117)
GLUCOSE BLD STRIP.AUTO-MCNC: 56 MG/DL (ref 65–117)
GLUCOSE SERPL-MCNC: 70 MG/DL (ref 65–100)
HCT VFR BLD AUTO: 34.2 % (ref 36.6–50.3)
HGB BLD-MCNC: 9.7 G/DL (ref 12.1–17)
MAGNESIUM SERPL-MCNC: 3.2 MG/DL (ref 1.6–2.4)
MCH RBC QN AUTO: 32.8 PG (ref 26–34)
MCHC RBC AUTO-ENTMCNC: 28.4 G/DL (ref 30–36.5)
MCV RBC AUTO: 115.5 FL (ref 80–99)
NRBC # BLD: 0 K/UL (ref 0–0.01)
NRBC BLD-RTO: 0 PER 100 WBC
PHOSPHATE SERPL-MCNC: 5.9 MG/DL (ref 2.6–4.7)
PLATELET # BLD AUTO: 198 K/UL (ref 150–400)
PMV BLD AUTO: 10.4 FL (ref 8.9–12.9)
POTASSIUM SERPL-SCNC: 5.2 MMOL/L (ref 3.5–5.1)
RBC # BLD AUTO: 2.96 M/UL (ref 4.1–5.7)
SERVICE CMNT-IMP: ABNORMAL
SERVICE CMNT-IMP: NORMAL
SERVICE CMNT-IMP: NORMAL
SODIUM SERPL-SCNC: 140 MMOL/L (ref 136–145)
WBC # BLD AUTO: 8.1 K/UL (ref 4.1–11.1)

## 2025-05-11 PROCEDURE — P9047 ALBUMIN (HUMAN), 25%, 50ML: HCPCS | Performed by: INTERNAL MEDICINE

## 2025-05-11 PROCEDURE — 2060000000 HC ICU INTERMEDIATE R&B

## 2025-05-11 PROCEDURE — 6370000000 HC RX 637 (ALT 250 FOR IP): Performed by: INTERNAL MEDICINE

## 2025-05-11 PROCEDURE — 6370000000 HC RX 637 (ALT 250 FOR IP): Performed by: STUDENT IN AN ORGANIZED HEALTH CARE EDUCATION/TRAINING PROGRAM

## 2025-05-11 PROCEDURE — 2580000003 HC RX 258: Performed by: SURGERY

## 2025-05-11 PROCEDURE — 80069 RENAL FUNCTION PANEL: CPT

## 2025-05-11 PROCEDURE — 36415 COLL VENOUS BLD VENIPUNCTURE: CPT

## 2025-05-11 PROCEDURE — 83735 ASSAY OF MAGNESIUM: CPT

## 2025-05-11 PROCEDURE — 6370000000 HC RX 637 (ALT 250 FOR IP): Performed by: SURGERY

## 2025-05-11 PROCEDURE — 6370000000 HC RX 637 (ALT 250 FOR IP): Performed by: PHYSICIAN ASSISTANT

## 2025-05-11 PROCEDURE — 82962 GLUCOSE BLOOD TEST: CPT

## 2025-05-11 PROCEDURE — 2500000003 HC RX 250 WO HCPCS: Performed by: SURGERY

## 2025-05-11 PROCEDURE — 6360000002 HC RX W HCPCS: Performed by: INTERNAL MEDICINE

## 2025-05-11 PROCEDURE — 85027 COMPLETE CBC AUTOMATED: CPT

## 2025-05-11 RX ORDER — ALBUMIN (HUMAN) 12.5 G/50ML
25 SOLUTION INTRAVENOUS EVERY 8 HOURS
Status: COMPLETED | OUTPATIENT
Start: 2025-05-11 | End: 2025-05-12

## 2025-05-11 RX ORDER — SODIUM BICARBONATE 650 MG/1
1300 TABLET ORAL 3 TIMES DAILY
Status: DISCONTINUED | OUTPATIENT
Start: 2025-05-11 | End: 2025-05-16

## 2025-05-11 RX ADMIN — SEVELAMER CARBONATE 800 MG: 800 TABLET, FILM COATED ORAL at 08:25

## 2025-05-11 RX ADMIN — SODIUM CHLORIDE, PRESERVATIVE FREE 10 ML: 5 INJECTION INTRAVENOUS at 08:31

## 2025-05-11 RX ADMIN — INSULIN LISPRO 2 UNITS: 100 INJECTION, SOLUTION INTRAVENOUS; SUBCUTANEOUS at 21:03

## 2025-05-11 RX ADMIN — Medication: at 08:36

## 2025-05-11 RX ADMIN — BUMETANIDE 1 MG: 1 TABLET ORAL at 08:25

## 2025-05-11 RX ADMIN — MIDODRINE HYDROCHLORIDE 5 MG: 5 TABLET ORAL at 17:01

## 2025-05-11 RX ADMIN — TAMSULOSIN HYDROCHLORIDE 0.4 MG: 0.4 CAPSULE ORAL at 08:25

## 2025-05-11 RX ADMIN — ASPIRIN 81 MG: 81 TABLET, CHEWABLE ORAL at 08:25

## 2025-05-11 RX ADMIN — DEXTROSE 125 ML: 10 SOLUTION INTRAVENOUS at 08:24

## 2025-05-11 RX ADMIN — MIDODRINE HYDROCHLORIDE 5 MG: 5 TABLET ORAL at 08:25

## 2025-05-11 RX ADMIN — ALBUMIN (HUMAN) 12.5 G: 0.25 INJECTION, SOLUTION INTRAVENOUS at 23:52

## 2025-05-11 RX ADMIN — Medication 1 CAPSULE: at 08:25

## 2025-05-11 RX ADMIN — APIXABAN 5 MG: 5 TABLET, FILM COATED ORAL at 21:02

## 2025-05-11 RX ADMIN — INSULIN LISPRO 2 UNITS: 100 INJECTION, SOLUTION INTRAVENOUS; SUBCUTANEOUS at 17:01

## 2025-05-11 RX ADMIN — ALBUMIN (HUMAN) 25 G: 0.25 INJECTION, SOLUTION INTRAVENOUS at 17:39

## 2025-05-11 RX ADMIN — SODIUM BICARBONATE 1300 MG: 650 TABLET ORAL at 21:02

## 2025-05-11 RX ADMIN — CLOPIDOGREL BISULFATE 75 MG: 75 TABLET, FILM COATED ORAL at 08:25

## 2025-05-11 RX ADMIN — WHITE PETROLATUM 57.7 %-MINERAL OIL 31.9 % EYE OINTMENT: at 21:03

## 2025-05-11 RX ADMIN — Medication: at 21:03

## 2025-05-11 RX ADMIN — SODIUM CHLORIDE, PRESERVATIVE FREE 10 ML: 5 INJECTION INTRAVENOUS at 21:03

## 2025-05-11 RX ADMIN — ALBUMIN (HUMAN) 12.5 G: 0.25 INJECTION, SOLUTION INTRAVENOUS at 23:11

## 2025-05-11 RX ADMIN — MIDODRINE HYDROCHLORIDE 5 MG: 5 TABLET ORAL at 21:02

## 2025-05-11 RX ADMIN — SODIUM BICARBONATE 1300 MG: 650 TABLET ORAL at 17:00

## 2025-05-11 RX ADMIN — SODIUM BICARBONATE 650 MG: 650 TABLET ORAL at 08:25

## 2025-05-11 RX ADMIN — SEVELAMER CARBONATE 800 MG: 800 TABLET, FILM COATED ORAL at 17:01

## 2025-05-11 RX ADMIN — APIXABAN 5 MG: 5 TABLET, FILM COATED ORAL at 08:25

## 2025-05-11 ASSESSMENT — PAIN SCALES - WONG BAKER
WONGBAKER_NUMERICALRESPONSE: NO HURT

## 2025-05-11 ASSESSMENT — PAIN SCALES - GENERAL
PAINLEVEL_OUTOF10: 0

## 2025-05-12 LAB
ALBUMIN SERPL ELPH-MCNC: 2.2 G/DL (ref 2.9–4.4)
ALBUMIN SERPL-MCNC: 2.8 G/DL (ref 3.5–5)
ALBUMIN/GLOB SERPL: 0.6 (ref 0.7–1.7)
ALPHA1 GLOB SERPL ELPH-MCNC: 0.4 G/DL (ref 0–0.4)
ALPHA2 GLOB SERPL ELPH-MCNC: 0.6 G/DL (ref 0.4–1)
ANION GAP SERPL CALC-SCNC: 5 MMOL/L (ref 2–12)
B-GLOBULIN SERPL ELPH-MCNC: 1.3 G/DL (ref 0.7–1.3)
BUN SERPL-MCNC: 118 MG/DL (ref 6–20)
BUN/CREAT SERPL: 30 (ref 12–20)
CALCIUM SERPL-MCNC: 7.9 MG/DL (ref 8.5–10.1)
CHLORIDE SERPL-SCNC: 113 MMOL/L (ref 97–108)
CHLORIDE UR-SCNC: 46 MMOL/L
CO2 SERPL-SCNC: 26 MMOL/L (ref 21–32)
COMMENT: NORMAL
CREAT SERPL-MCNC: 3.87 MG/DL (ref 0.7–1.3)
CREAT UR-MCNC: 70.9 MG/DL
ERYTHROCYTE [DISTWIDTH] IN BLOOD BY AUTOMATED COUNT: 17 % (ref 11.5–14.5)
GAMMA GLOB SERPL ELPH-MCNC: 1.4 G/DL (ref 0.4–1.8)
GLOBULIN SER CALC-MCNC: 3.7 G/DL (ref 2.2–3.9)
GLUCOSE BLD STRIP.AUTO-MCNC: 106 MG/DL (ref 65–117)
GLUCOSE BLD STRIP.AUTO-MCNC: 125 MG/DL (ref 65–117)
GLUCOSE BLD STRIP.AUTO-MCNC: 139 MG/DL (ref 65–117)
GLUCOSE BLD STRIP.AUTO-MCNC: 147 MG/DL (ref 65–117)
GLUCOSE SERPL-MCNC: 127 MG/DL (ref 65–100)
HCT VFR BLD AUTO: 31.2 % (ref 36.6–50.3)
HGB BLD-MCNC: 8.9 G/DL (ref 12.1–17)
KAPPA LC FREE SER-MCNC: 114.8 MG/L (ref 3.3–19.4)
KAPPA LC FREE/LAMBDA FREE SER: 0.85 (ref 0.26–1.65)
LABORATORY COMMENT REPORT: ABNORMAL
LAMBDA LC FREE SERPL-MCNC: 134.5 MG/L (ref 5.7–26.3)
M PROTEIN SERPL ELPH-MCNC: ABNORMAL G/DL
MCH RBC QN AUTO: 32.6 PG (ref 26–34)
MCHC RBC AUTO-ENTMCNC: 28.5 G/DL (ref 30–36.5)
MCV RBC AUTO: 114.3 FL (ref 80–99)
NRBC # BLD: 0 K/UL (ref 0–0.01)
NRBC BLD-RTO: 0 PER 100 WBC
PHOSPHATE SERPL-MCNC: 6.8 MG/DL (ref 2.6–4.7)
PLATELET # BLD AUTO: 193 K/UL (ref 150–400)
PMV BLD AUTO: 10.3 FL (ref 8.9–12.9)
POTASSIUM SERPL-SCNC: 4.6 MMOL/L (ref 3.5–5.1)
PROT SERPL-MCNC: 5.9 G/DL (ref 6–8.5)
RBC # BLD AUTO: 2.73 M/UL (ref 4.1–5.7)
REFLEX TESTING: ABNORMAL
SERVICE CMNT-IMP: ABNORMAL
SERVICE CMNT-IMP: NORMAL
SODIUM SERPL-SCNC: 144 MMOL/L (ref 136–145)
SODIUM UR-SCNC: 41 MMOL/L
WBC # BLD AUTO: 9.9 K/UL (ref 4.1–11.1)

## 2025-05-12 PROCEDURE — 36415 COLL VENOUS BLD VENIPUNCTURE: CPT

## 2025-05-12 PROCEDURE — 82570 ASSAY OF URINE CREATININE: CPT

## 2025-05-12 PROCEDURE — 84300 ASSAY OF URINE SODIUM: CPT

## 2025-05-12 PROCEDURE — 97530 THERAPEUTIC ACTIVITIES: CPT

## 2025-05-12 PROCEDURE — 2060000000 HC ICU INTERMEDIATE R&B

## 2025-05-12 PROCEDURE — 6370000000 HC RX 637 (ALT 250 FOR IP): Performed by: SURGERY

## 2025-05-12 PROCEDURE — 80069 RENAL FUNCTION PANEL: CPT

## 2025-05-12 PROCEDURE — 6370000000 HC RX 637 (ALT 250 FOR IP): Performed by: HOSPITALIST

## 2025-05-12 PROCEDURE — 6370000000 HC RX 637 (ALT 250 FOR IP): Performed by: INTERNAL MEDICINE

## 2025-05-12 PROCEDURE — 2580000003 HC RX 258: Performed by: INTERNAL MEDICINE

## 2025-05-12 PROCEDURE — P9047 ALBUMIN (HUMAN), 25%, 50ML: HCPCS | Performed by: INTERNAL MEDICINE

## 2025-05-12 PROCEDURE — 85027 COMPLETE CBC AUTOMATED: CPT

## 2025-05-12 PROCEDURE — 6360000002 HC RX W HCPCS: Performed by: INTERNAL MEDICINE

## 2025-05-12 PROCEDURE — 6370000000 HC RX 637 (ALT 250 FOR IP): Performed by: PHYSICIAN ASSISTANT

## 2025-05-12 PROCEDURE — 99232 SBSQ HOSP IP/OBS MODERATE 35: CPT

## 2025-05-12 PROCEDURE — 2500000003 HC RX 250 WO HCPCS: Performed by: SURGERY

## 2025-05-12 PROCEDURE — 82962 GLUCOSE BLOOD TEST: CPT

## 2025-05-12 PROCEDURE — 82436 ASSAY OF URINE CHLORIDE: CPT

## 2025-05-12 RX ORDER — SODIUM CHLORIDE 450 MG/100ML
INJECTION, SOLUTION INTRAVENOUS CONTINUOUS
Status: DISCONTINUED | OUTPATIENT
Start: 2025-05-12 | End: 2025-05-13

## 2025-05-12 RX ORDER — ALBUMIN (HUMAN) 12.5 G/50ML
25 SOLUTION INTRAVENOUS ONCE
Status: COMPLETED | OUTPATIENT
Start: 2025-05-12 | End: 2025-05-12

## 2025-05-12 RX ORDER — SODIUM CHLORIDE 9 MG/ML
INJECTION, SOLUTION INTRAVENOUS CONTINUOUS
Status: DISCONTINUED | OUTPATIENT
Start: 2025-05-12 | End: 2025-05-12

## 2025-05-12 RX ADMIN — BUMETANIDE 1 MG: 1 TABLET ORAL at 09:05

## 2025-05-12 RX ADMIN — SODIUM CHLORIDE, PRESERVATIVE FREE 10 ML: 5 INJECTION INTRAVENOUS at 20:47

## 2025-05-12 RX ADMIN — SEVELAMER CARBONATE 800 MG: 800 TABLET, FILM COATED ORAL at 09:05

## 2025-05-12 RX ADMIN — Medication: at 10:50

## 2025-05-12 RX ADMIN — MIDODRINE HYDROCHLORIDE 5 MG: 5 TABLET ORAL at 15:01

## 2025-05-12 RX ADMIN — ALBUMIN (HUMAN) 25 G: 0.25 INJECTION, SOLUTION INTRAVENOUS at 09:11

## 2025-05-12 RX ADMIN — TAMSULOSIN HYDROCHLORIDE 0.4 MG: 0.4 CAPSULE ORAL at 09:05

## 2025-05-12 RX ADMIN — SODIUM CHLORIDE 650 MG: 9 INJECTION INTRAMUSCULAR; INTRAVENOUS; SUBCUTANEOUS at 17:12

## 2025-05-12 RX ADMIN — SODIUM BICARBONATE 1300 MG: 650 TABLET ORAL at 09:05

## 2025-05-12 RX ADMIN — MIDODRINE HYDROCHLORIDE 5 MG: 5 TABLET ORAL at 20:32

## 2025-05-12 RX ADMIN — INSULIN HUMAN 28 UNITS: 100 INJECTION, SUSPENSION SUBCUTANEOUS at 09:32

## 2025-05-12 RX ADMIN — Medication 1 CAPSULE: at 09:05

## 2025-05-12 RX ADMIN — SODIUM CHLORIDE: 0.45 INJECTION, SOLUTION INTRAVENOUS at 13:54

## 2025-05-12 RX ADMIN — WHITE PETROLATUM 57.7 %-MINERAL OIL 31.9 % EYE OINTMENT: at 22:56

## 2025-05-12 RX ADMIN — SODIUM CHLORIDE, PRESERVATIVE FREE 10 ML: 5 INJECTION INTRAVENOUS at 09:12

## 2025-05-12 RX ADMIN — MIDODRINE HYDROCHLORIDE 5 MG: 5 TABLET ORAL at 09:05

## 2025-05-12 RX ADMIN — Medication: at 20:33

## 2025-05-12 ASSESSMENT — PAIN SCALES - GENERAL
PAINLEVEL_OUTOF10: 0

## 2025-05-12 ASSESSMENT — PAIN SCALES - WONG BAKER: WONGBAKER_NUMERICALRESPONSE: NO HURT

## 2025-05-13 LAB
ALBUMIN SERPL-MCNC: 2.5 G/DL (ref 3.5–5)
ANION GAP SERPL CALC-SCNC: 7 MMOL/L (ref 2–12)
ANION GAP SERPL CALC-SCNC: 7 MMOL/L (ref 2–12)
BUN SERPL-MCNC: 122 MG/DL (ref 6–20)
BUN SERPL-MCNC: 125 MG/DL (ref 6–20)
BUN/CREAT SERPL: 26 (ref 12–20)
BUN/CREAT SERPL: 27 (ref 12–20)
CALCIUM SERPL-MCNC: 7.3 MG/DL (ref 8.5–10.1)
CALCIUM SERPL-MCNC: 7.6 MG/DL (ref 8.5–10.1)
CHLORIDE SERPL-SCNC: 112 MMOL/L (ref 97–108)
CHLORIDE SERPL-SCNC: 112 MMOL/L (ref 97–108)
CO2 SERPL-SCNC: 21 MMOL/L (ref 21–32)
CO2 SERPL-SCNC: 22 MMOL/L (ref 21–32)
CREAT SERPL-MCNC: 4.59 MG/DL (ref 0.7–1.3)
CREAT SERPL-MCNC: 4.65 MG/DL (ref 0.7–1.3)
ERYTHROCYTE [DISTWIDTH] IN BLOOD BY AUTOMATED COUNT: 17.1 % (ref 11.5–14.5)
GLUCOSE BLD STRIP.AUTO-MCNC: 116 MG/DL (ref 65–117)
GLUCOSE BLD STRIP.AUTO-MCNC: 135 MG/DL (ref 65–117)
GLUCOSE BLD STRIP.AUTO-MCNC: 73 MG/DL (ref 65–117)
GLUCOSE BLD STRIP.AUTO-MCNC: 99 MG/DL (ref 65–117)
GLUCOSE SERPL-MCNC: 82 MG/DL (ref 65–100)
GLUCOSE SERPL-MCNC: 85 MG/DL (ref 65–100)
HCT VFR BLD AUTO: 29.7 % (ref 36.6–50.3)
HGB BLD-MCNC: 8.6 G/DL (ref 12.1–17)
MAGNESIUM SERPL-MCNC: 3.3 MG/DL (ref 1.6–2.4)
MCH RBC QN AUTO: 33 PG (ref 26–34)
MCHC RBC AUTO-ENTMCNC: 29 G/DL (ref 30–36.5)
MCV RBC AUTO: 113.8 FL (ref 80–99)
NRBC # BLD: 0 K/UL (ref 0–0.01)
NRBC BLD-RTO: 0 PER 100 WBC
PHOSPHATE SERPL-MCNC: 8.1 MG/DL (ref 2.6–4.7)
PHOSPHATE SERPL-MCNC: 8.2 MG/DL (ref 2.6–4.7)
PLATELET # BLD AUTO: 209 K/UL (ref 150–400)
PMV BLD AUTO: 11 FL (ref 8.9–12.9)
POTASSIUM SERPL-SCNC: 4.6 MMOL/L (ref 3.5–5.1)
POTASSIUM SERPL-SCNC: 5.4 MMOL/L (ref 3.5–5.1)
POTASSIUM SERPL-SCNC: ABNORMAL MMOL/L (ref 3.5–5.1)
RBC # BLD AUTO: 2.61 M/UL (ref 4.1–5.7)
SERVICE CMNT-IMP: ABNORMAL
SERVICE CMNT-IMP: NORMAL
SODIUM SERPL-SCNC: 140 MMOL/L (ref 136–145)
SODIUM SERPL-SCNC: 141 MMOL/L (ref 136–145)
WBC # BLD AUTO: 7.8 K/UL (ref 4.1–11.1)

## 2025-05-13 PROCEDURE — 6370000000 HC RX 637 (ALT 250 FOR IP): Performed by: INTERNAL MEDICINE

## 2025-05-13 PROCEDURE — 82962 GLUCOSE BLOOD TEST: CPT

## 2025-05-13 PROCEDURE — 84132 ASSAY OF SERUM POTASSIUM: CPT

## 2025-05-13 PROCEDURE — 83735 ASSAY OF MAGNESIUM: CPT

## 2025-05-13 PROCEDURE — 85027 COMPLETE CBC AUTOMATED: CPT

## 2025-05-13 PROCEDURE — 80048 BASIC METABOLIC PNL TOTAL CA: CPT

## 2025-05-13 PROCEDURE — 2700000000 HC OXYGEN THERAPY PER DAY

## 2025-05-13 PROCEDURE — 36415 COLL VENOUS BLD VENIPUNCTURE: CPT

## 2025-05-13 PROCEDURE — 6370000000 HC RX 637 (ALT 250 FOR IP): Performed by: SURGERY

## 2025-05-13 PROCEDURE — 2060000000 HC ICU INTERMEDIATE R&B

## 2025-05-13 PROCEDURE — 80069 RENAL FUNCTION PANEL: CPT

## 2025-05-13 PROCEDURE — 84100 ASSAY OF PHOSPHORUS: CPT

## 2025-05-13 PROCEDURE — 2500000003 HC RX 250 WO HCPCS: Performed by: SURGERY

## 2025-05-13 PROCEDURE — 6370000000 HC RX 637 (ALT 250 FOR IP): Performed by: STUDENT IN AN ORGANIZED HEALTH CARE EDUCATION/TRAINING PROGRAM

## 2025-05-13 PROCEDURE — 97530 THERAPEUTIC ACTIVITIES: CPT

## 2025-05-13 RX ADMIN — MIDODRINE HYDROCHLORIDE 5 MG: 5 TABLET ORAL at 22:55

## 2025-05-13 RX ADMIN — ASPIRIN 81 MG: 81 TABLET, CHEWABLE ORAL at 09:09

## 2025-05-13 RX ADMIN — Medication 1 CAPSULE: at 09:09

## 2025-05-13 RX ADMIN — SODIUM CHLORIDE, PRESERVATIVE FREE 10 ML: 5 INJECTION INTRAVENOUS at 09:09

## 2025-05-13 RX ADMIN — MIDODRINE HYDROCHLORIDE 5 MG: 5 TABLET ORAL at 09:09

## 2025-05-13 RX ADMIN — SODIUM CHLORIDE, PRESERVATIVE FREE 10 ML: 5 INJECTION INTRAVENOUS at 22:55

## 2025-05-13 RX ADMIN — TAMSULOSIN HYDROCHLORIDE 0.4 MG: 0.4 CAPSULE ORAL at 09:09

## 2025-05-13 RX ADMIN — Medication: at 22:54

## 2025-05-13 RX ADMIN — Medication: at 09:09

## 2025-05-13 RX ADMIN — CLOPIDOGREL BISULFATE 75 MG: 75 TABLET, FILM COATED ORAL at 09:09

## 2025-05-13 RX ADMIN — INSULIN HUMAN 28 UNITS: 100 INJECTION, SUSPENSION SUBCUTANEOUS at 09:09

## 2025-05-13 RX ADMIN — WHITE PETROLATUM 57.7 %-MINERAL OIL 31.9 % EYE OINTMENT: at 22:55

## 2025-05-13 RX ADMIN — MIDODRINE HYDROCHLORIDE 5 MG: 5 TABLET ORAL at 15:31

## 2025-05-13 ASSESSMENT — PAIN SCALES - GENERAL
PAINLEVEL_OUTOF10: 0

## 2025-05-13 ASSESSMENT — PAIN SCALES - PAIN ASSESSMENT IN ADVANCED DEMENTIA (PAINAD)
BREATHING: NORMAL
TOTALSCORE: 0
BODYLANGUAGE: RELAXED
FACIALEXPRESSION: SMILING OR INEXPRESSIVE
CONSOLABILITY: NO NEED TO CONSOLE

## 2025-05-13 NOTE — CARE COORDINATION
Transition of Care Plan:    RUR: 25% (high RUR)  Prior Level of Functioning: Independent  Disposition: [facility] (auth approved) vs. LTAC as a back up plan, then return home afterwards  SHALONDA: 5/14/2025  If SNF or IPR: Date FOC offered: 4/3/2025  Date FOC received: 4/3/2025  Accepting facility: [facility]  Date authorization started with reference number:  Ref ID #: [id number]  Date authorization received and expires: 5/8/2025, for 5/9/2025 to 5/13/2025, Auth ID # pending.  Follow up appointments: defer to SNF.  DME needed: defer to SNF.  Patient has a RW, wheelchair, grab bars and shower chair at home.  Transportation at discharge: [facility] anticipated  IM/IMM Medicare/ letter given: 2nd IM needed prior to discharge.  Is patient a  and connected with VA? No.   If yes, was [place] transfer form completed and VA notified? N/A  Caregiver Contact: Julio eastman - [phone] - [email or web address]  Discharge Caregiver contacted prior to discharge? CM to contact, if needed.  Care Conference needed? No.  Barriers to discharge: Cards/pulm/nephrology clearance, BG stability, IV abx line, Echo      Lauren Mccullough, KRISTINE, RN    Care Management  [phone]

## 2025-05-14 ENCOUNTER — APPOINTMENT (OUTPATIENT)
Facility: HOSPITAL | Age: 80
DRG: 622 | End: 2025-05-14
Payer: MEDICARE

## 2025-05-14 ENCOUNTER — HOSPITAL ENCOUNTER (INPATIENT)
Facility: HOSPITAL | Age: 80
Discharge: HOME | DRG: 622 | End: 2025-05-17
Payer: MEDICARE

## 2025-05-14 VITALS
OXYGEN SATURATION: 98 % | RESPIRATION RATE: 22 BRPM | HEART RATE: 55 BPM | SYSTOLIC BLOOD PRESSURE: 135 MMHG | DIASTOLIC BLOOD PRESSURE: 59 MMHG

## 2025-05-14 LAB
ALBUMIN SERPL-MCNC: 2.6 G/DL (ref 3.5–5)
ANION GAP SERPL CALC-SCNC: 6 MMOL/L (ref 2–12)
ANION GAP SERPL CALC-SCNC: 7 MMOL/L (ref 2–12)
BUN SERPL-MCNC: 131 MG/DL (ref 6–20)
BUN SERPL-MCNC: 136 MG/DL (ref 6–20)
BUN/CREAT SERPL: 25 (ref 12–20)
BUN/CREAT SERPL: 25 (ref 12–20)
CALCIUM SERPL-MCNC: 7.4 MG/DL (ref 8.5–10.1)
CALCIUM SERPL-MCNC: 7.7 MG/DL (ref 8.5–10.1)
CHLORIDE SERPL-SCNC: 113 MMOL/L (ref 97–108)
CHLORIDE SERPL-SCNC: 114 MMOL/L (ref 97–108)
CK SERPL-CCNC: 51 U/L (ref 39–308)
CO2 SERPL-SCNC: 24 MMOL/L (ref 21–32)
CO2 SERPL-SCNC: 24 MMOL/L (ref 21–32)
CREAT SERPL-MCNC: 5.24 MG/DL (ref 0.7–1.3)
CREAT SERPL-MCNC: 5.5 MG/DL (ref 0.7–1.3)
ERYTHROCYTE [DISTWIDTH] IN BLOOD BY AUTOMATED COUNT: 16.6 % (ref 11.5–14.5)
GLUCOSE BLD STRIP.AUTO-MCNC: 109 MG/DL (ref 65–117)
GLUCOSE BLD STRIP.AUTO-MCNC: 138 MG/DL (ref 65–117)
GLUCOSE BLD STRIP.AUTO-MCNC: 36 MG/DL (ref 65–117)
GLUCOSE BLD STRIP.AUTO-MCNC: 38 MG/DL (ref 65–117)
GLUCOSE BLD STRIP.AUTO-MCNC: 40 MG/DL (ref 65–117)
GLUCOSE BLD STRIP.AUTO-MCNC: 44 MG/DL (ref 65–117)
GLUCOSE BLD STRIP.AUTO-MCNC: 49 MG/DL (ref 65–117)
GLUCOSE BLD STRIP.AUTO-MCNC: 78 MG/DL (ref 65–117)
GLUCOSE BLD STRIP.AUTO-MCNC: 82 MG/DL (ref 65–117)
GLUCOSE SERPL-MCNC: 71 MG/DL (ref 65–100)
GLUCOSE SERPL-MCNC: 73 MG/DL (ref 65–100)
HBV SURFACE AB SER QL: NONREACTIVE
HBV SURFACE AB SER-ACNC: <3.1 MIU/ML
HBV SURFACE AG SER QL: 0.16 INDEX
HBV SURFACE AG SER QL: NEGATIVE
HCT VFR BLD AUTO: 31.1 % (ref 36.6–50.3)
HGB BLD-MCNC: 8.8 G/DL (ref 12.1–17)
MAGNESIUM SERPL-MCNC: 3.4 MG/DL (ref 1.6–2.4)
MCH RBC QN AUTO: 32.6 PG (ref 26–34)
MCHC RBC AUTO-ENTMCNC: 28.3 G/DL (ref 30–36.5)
MCV RBC AUTO: 115.2 FL (ref 80–99)
NRBC # BLD: 0 K/UL (ref 0–0.01)
NRBC BLD-RTO: 0 PER 100 WBC
PHOSPHATE SERPL-MCNC: 8.6 MG/DL (ref 2.6–4.7)
PHOSPHATE SERPL-MCNC: 8.8 MG/DL (ref 2.6–4.7)
PLATELET # BLD AUTO: 212 K/UL (ref 150–400)
PMV BLD AUTO: 10.6 FL (ref 8.9–12.9)
POTASSIUM SERPL-SCNC: 5 MMOL/L (ref 3.5–5.1)
POTASSIUM SERPL-SCNC: 5.2 MMOL/L (ref 3.5–5.1)
RBC # BLD AUTO: 2.7 M/UL (ref 4.1–5.7)
SERVICE CMNT-IMP: ABNORMAL
SERVICE CMNT-IMP: NORMAL
SODIUM SERPL-SCNC: 144 MMOL/L (ref 136–145)
SODIUM SERPL-SCNC: 144 MMOL/L (ref 136–145)
WBC # BLD AUTO: 6.3 K/UL (ref 4.1–11.1)

## 2025-05-14 PROCEDURE — 6370000000 HC RX 637 (ALT 250 FOR IP): Performed by: STUDENT IN AN ORGANIZED HEALTH CARE EDUCATION/TRAINING PROGRAM

## 2025-05-14 PROCEDURE — 84100 ASSAY OF PHOSPHORUS: CPT

## 2025-05-14 PROCEDURE — 80069 RENAL FUNCTION PANEL: CPT

## 2025-05-14 PROCEDURE — 6370000000 HC RX 637 (ALT 250 FOR IP): Performed by: INTERNAL MEDICINE

## 2025-05-14 PROCEDURE — 6360000002 HC RX W HCPCS: Performed by: INTERNAL MEDICINE

## 2025-05-14 PROCEDURE — 6370000000 HC RX 637 (ALT 250 FOR IP): Performed by: SURGERY

## 2025-05-14 PROCEDURE — 71045 X-RAY EXAM CHEST 1 VIEW: CPT

## 2025-05-14 PROCEDURE — 2580000003 HC RX 258: Performed by: SURGERY

## 2025-05-14 PROCEDURE — 90935 HEMODIALYSIS ONE EVALUATION: CPT

## 2025-05-14 PROCEDURE — 86706 HEP B SURFACE ANTIBODY: CPT

## 2025-05-14 PROCEDURE — 6360000002 HC RX W HCPCS: Performed by: STUDENT IN AN ORGANIZED HEALTH CARE EDUCATION/TRAINING PROGRAM

## 2025-05-14 PROCEDURE — 82962 GLUCOSE BLOOD TEST: CPT

## 2025-05-14 PROCEDURE — 2580000003 HC RX 258

## 2025-05-14 PROCEDURE — 2500000003 HC RX 250 WO HCPCS: Performed by: SURGERY

## 2025-05-14 PROCEDURE — 2060000000 HC ICU INTERMEDIATE R&B

## 2025-05-14 PROCEDURE — 83735 ASSAY OF MAGNESIUM: CPT

## 2025-05-14 PROCEDURE — 87340 HEPATITIS B SURFACE AG IA: CPT

## 2025-05-14 PROCEDURE — 82550 ASSAY OF CK (CPK): CPT

## 2025-05-14 PROCEDURE — 51702 INSERT TEMP BLADDER CATH: CPT

## 2025-05-14 PROCEDURE — 86704 HEP B CORE ANTIBODY TOTAL: CPT

## 2025-05-14 PROCEDURE — 2580000003 HC RX 258: Performed by: INTERNAL MEDICINE

## 2025-05-14 PROCEDURE — 80048 BASIC METABOLIC PNL TOTAL CA: CPT

## 2025-05-14 PROCEDURE — 77001 FLUOROGUIDE FOR VEIN DEVICE: CPT

## 2025-05-14 PROCEDURE — 85027 COMPLETE CBC AUTOMATED: CPT

## 2025-05-14 PROCEDURE — 36415 COLL VENOUS BLD VENIPUNCTURE: CPT

## 2025-05-14 RX ORDER — MIDODRINE HYDROCHLORIDE 5 MG/1
TABLET ORAL
Status: DISCONTINUED
Start: 2025-05-14 | End: 2025-05-14 | Stop reason: WASHOUT

## 2025-05-14 RX ORDER — DEXTROSE MONOHYDRATE 100 MG/ML
INJECTION, SOLUTION INTRAVENOUS CONTINUOUS
Status: ACTIVE | OUTPATIENT
Start: 2025-05-14 | End: 2025-05-15

## 2025-05-14 RX ORDER — LIDOCAINE HYDROCHLORIDE AND EPINEPHRINE 10; 10 MG/ML; UG/ML
20 INJECTION, SOLUTION INFILTRATION; PERINEURAL ONCE
Status: COMPLETED | OUTPATIENT
Start: 2025-05-14 | End: 2025-05-14

## 2025-05-14 RX ORDER — HEPARIN SODIUM 5000 [USP'U]/ML
10000 INJECTION, SOLUTION INTRAVENOUS; SUBCUTANEOUS ONCE
Status: COMPLETED | OUTPATIENT
Start: 2025-05-14 | End: 2025-05-14

## 2025-05-14 RX ORDER — HEPARIN SODIUM 200 [USP'U]/100ML
200 INJECTION, SOLUTION INTRAVENOUS ONCE
Status: DISCONTINUED | OUTPATIENT
Start: 2025-05-14 | End: 2025-05-18 | Stop reason: HOSPADM

## 2025-05-14 RX ADMIN — DEXTROSE 250 ML: 10 SOLUTION INTRAVENOUS at 23:00

## 2025-05-14 RX ADMIN — INSULIN HUMAN 28 UNITS: 100 INJECTION, SUSPENSION SUBCUTANEOUS at 09:05

## 2025-05-14 RX ADMIN — ASPIRIN 81 MG: 81 TABLET, CHEWABLE ORAL at 09:04

## 2025-05-14 RX ADMIN — DEXTROSE: 10 SOLUTION INTRAVENOUS at 23:28

## 2025-05-14 RX ADMIN — Medication 1 CAPSULE: at 09:04

## 2025-05-14 RX ADMIN — SODIUM CHLORIDE, PRESERVATIVE FREE 10 ML: 5 INJECTION INTRAVENOUS at 09:05

## 2025-05-14 RX ADMIN — MIDODRINE HYDROCHLORIDE 5 MG: 5 TABLET ORAL at 09:04

## 2025-05-14 RX ADMIN — MIDODRINE HYDROCHLORIDE 5 MG: 5 TABLET ORAL at 21:07

## 2025-05-14 RX ADMIN — DEXTROSE 125 ML: 10 SOLUTION INTRAVENOUS at 20:23

## 2025-05-14 RX ADMIN — HEPARIN SODIUM 3600 UNITS: 5000 INJECTION, SOLUTION INTRAVENOUS; SUBCUTANEOUS at 14:29

## 2025-05-14 RX ADMIN — WHITE PETROLATUM 57.7 %-MINERAL OIL 31.9 % EYE OINTMENT: at 21:01

## 2025-05-14 RX ADMIN — LIDOCAINE HYDROCHLORIDE,EPINEPHRINE BITARTRATE 20 ML: 10; .01 INJECTION, SOLUTION INFILTRATION; PERINEURAL at 14:28

## 2025-05-14 RX ADMIN — CLOPIDOGREL BISULFATE 75 MG: 75 TABLET, FILM COATED ORAL at 09:04

## 2025-05-14 RX ADMIN — TAMSULOSIN HYDROCHLORIDE 0.4 MG: 0.4 CAPSULE ORAL at 09:04

## 2025-05-14 RX ADMIN — Medication: at 09:06

## 2025-05-14 RX ADMIN — Medication: at 21:02

## 2025-05-14 RX ADMIN — SODIUM CHLORIDE 650 MG: 9 INJECTION INTRAMUSCULAR; INTRAVENOUS; SUBCUTANEOUS at 19:29

## 2025-05-14 NOTE — FLOWSHEET NOTE
Primary RN SBAR: Marquita Machado RN  Incapacitated Nurse [facility]. provided: yes  Patient Education provided: to son - consent for HD treatment via phone; patient AMS upon arrival; just prior to end of treatment reorientation provided  Preferred Education method and Primary language: verbal/ English  Dialysis consent: obtained from Son via phone to [address] [place] [address] General Consent Verified: yes, says verbal  Hospital associated wait time; reason: 48min transport from [facility] to HD suite after line placement; 45min back to room from suite  Hep B Ag and Ab: out of date; Drawn 5/14/25; awating results; Machine bleached post treatment    Pre-HD   05/14/25 1510   Observations & Evaluations   Level of Consciousness 1   Oriented X 0   Heart Rhythm Regular   O2 Device Nasal cannula  (2L O2)   Bilateral Breath Sounds Diminished   Skin Condition/Temp Cool;Dry;Flaky;Swollen/edematous   Edema Generalized Trace   RLE Edema +1   LLE Edema +1   Vital Signs   BP (!) 105/46   Temp 97.7 °F (36.5 °C)   Pulse 54   Respirations 20   SpO2 100 %   Pain Assessment   Pain Assessment None - Denies Pain   Technical Checks   Dialysis Machine No. [id number]   RO Machine Number [id number]   Dialyzer Lot No. [id number]   Tubing Lot Number [id number]   All Connections Secure Yes   NS Bag Yes   Saline Line Double Clamped Yes   Dialyzer Nipro ELISIO   Prime Volume (mL) 200 mL   ICEBOAT I;C;E;B;O;A;T   RO Machine Log Sheet Completed Yes   Machine Alarm Self Test Completed;Passed   Air Foam Detector Tested;Proper Function   Extracorporeal Circuit Tested for Integrity Yes   Machine Conductivity 13.8   Machine Ph 7.4   Bleach Test (Neg) Yes   Bath Temperature 96.8 °F (36 °C)   Dialysis Bath   K+ (Potassium) 2   Ca+ (Calcium) 2.5   Na+ (Sodium) 138   HCO3 (Bicarb) 40     Start of HD      05/14/25 1511   Treatment   Time On 1511   Treatment Goal 0ml in 2hr; 1st treatment   Vital Signs   BP (!) 100/47   Temp 97.7 °F (36.5 °C)   Pulse 54   Respirations 20

## 2025-05-15 ENCOUNTER — APPOINTMENT (OUTPATIENT)
Facility: HOSPITAL | Age: 80
DRG: 622 | End: 2025-05-15
Attending: HOSPITALIST
Payer: MEDICARE

## 2025-05-15 LAB
ALBUMIN SERPL-MCNC: 2.4 G/DL (ref 3.5–5)
ANION GAP SERPL CALC-SCNC: 8 MMOL/L (ref 2–12)
ARTERIAL PATENCY WRIST A: YES
ARTERIAL PATENCY WRIST A: YES
BASE DEFICIT BLDA-SCNC: 1.6 MMOL/L
BASE DEFICIT BLDA-SCNC: 2.5 MMOL/L
BDY SITE: ABNORMAL
BDY SITE: ABNORMAL
BUN SERPL-MCNC: 96 MG/DL (ref 6–20)
BUN/CREAT SERPL: 20 (ref 12–20)
CALCIUM SERPL-MCNC: 7.1 MG/DL (ref 8.5–10.1)
CHLORIDE SERPL-SCNC: 104 MMOL/L (ref 97–108)
CO2 SERPL-SCNC: 25 MMOL/L (ref 21–32)
CREAT SERPL-MCNC: 4.69 MG/DL (ref 0.7–1.3)
ECHO AV AREA PEAK VELOCITY: 1.6 CM2
ECHO AV AREA VTI: 1.6 CM2
ECHO AV AREA/BSA PEAK VELOCITY: 0.8 CM2/M2
ECHO AV AREA/BSA VTI: 0.8 CM2/M2
ECHO AV CUSP MM: 1.4 CM
ECHO AV MEAN GRADIENT: 11 MMHG
ECHO AV MEAN VELOCITY: 1.6 M/S
ECHO AV PEAK GRADIENT: 18 MMHG
ECHO AV PEAK VELOCITY: 2.2 M/S
ECHO AV VELOCITY RATIO: 0.45
ECHO AV VTI: 61.7 CM
ECHO BSA: 2.04 M2
ECHO LA DIAMETER INDEX: 2.33 CM/M2
ECHO LA DIAMETER: 4.7 CM
ECHO LA VOL A-L A2C: 104 ML (ref 18–58)
ECHO LA VOL A-L A4C: 56 ML (ref 18–58)
ECHO LA VOL MOD A2C: 100 ML (ref 18–58)
ECHO LA VOL MOD A4C: 53 ML (ref 18–58)
ECHO LA VOLUME AREA LENGTH: 85 ML
ECHO LA VOLUME INDEX A-L A2C: 51 ML/M2 (ref 16–34)
ECHO LA VOLUME INDEX A-L A4C: 28 ML/M2 (ref 16–34)
ECHO LA VOLUME INDEX AREA LENGTH: 42 ML/M2 (ref 16–34)
ECHO LA VOLUME INDEX MOD A2C: 50 ML/M2 (ref 16–34)
ECHO LA VOLUME INDEX MOD A4C: 26 ML/M2 (ref 16–34)
ECHO LV E' LATERAL VELOCITY: 5.61 CM/S
ECHO LV E' SEPTAL VELOCITY: 3.68 CM/S
ECHO LV EDV A2C: 105 ML
ECHO LV EDV A4C: 98 ML
ECHO LV EDV BP: 102 ML (ref 67–155)
ECHO LV EDV INDEX A4C: 49 ML/M2
ECHO LV EDV INDEX BP: 50 ML/M2
ECHO LV EDV NDEX A2C: 52 ML/M2
ECHO LV EF PHYSICIAN: 45 %
ECHO LV EJECTION FRACTION A2C: 51 %
ECHO LV EJECTION FRACTION A4C: 41 %
ECHO LV EJECTION FRACTION BIPLANE: 45 % (ref 55–100)
ECHO LV ESV A2C: 51 ML
ECHO LV ESV A4C: 58 ML
ECHO LV ESV BP: 57 ML (ref 22–58)
ECHO LV ESV INDEX A2C: 25 ML/M2
ECHO LV ESV INDEX A4C: 29 ML/M2
ECHO LV ESV INDEX BP: 28 ML/M2
ECHO LV FRACTIONAL SHORTENING: 26 % (ref 28–44)
ECHO LV INTERNAL DIMENSION DIASTOLE INDEX: 2.62 CM/M2
ECHO LV INTERNAL DIMENSION DIASTOLIC: 5.3 CM (ref 4.2–5.9)
ECHO LV INTERNAL DIMENSION SYSTOLIC INDEX: 1.93 CM/M2
ECHO LV INTERNAL DIMENSION SYSTOLIC: 3.9 CM
ECHO LV IVSD: 1 CM (ref 0.6–1)
ECHO LV MASS 2D: 242 G (ref 88–224)
ECHO LV MASS INDEX 2D: 119.8 G/M2 (ref 49–115)
ECHO LV POSTERIOR WALL DIASTOLIC: 1.3 CM (ref 0.6–1)
ECHO LV RELATIVE WALL THICKNESS RATIO: 0.49
ECHO LVOT AREA: 3.1 CM2
ECHO LVOT AV VTI INDEX: 0.49
ECHO LVOT DIAM: 2 CM
ECHO LVOT MEAN GRADIENT: 3 MMHG
ECHO LVOT PEAK GRADIENT: 5 MMHG
ECHO LVOT PEAK VELOCITY: 1 M/S
ECHO LVOT STROKE VOLUME INDEX: 46.9 ML/M2
ECHO LVOT SV: 94.8 ML
ECHO LVOT VTI: 30.2 CM
ECHO MV A VELOCITY: 0.84 M/S
ECHO MV AREA VTI: 2.8 CM2
ECHO MV E DECELERATION TIME (DT): 240 MS
ECHO MV E VELOCITY: 0.81 M/S
ECHO MV E/A RATIO: 0.96
ECHO MV E/E' LATERAL: 14.44
ECHO MV E/E' RATIO (AVERAGED): 18.22
ECHO MV E/E' SEPTAL: 22.01
ECHO MV LVOT VTI INDEX: 1.11
ECHO MV MAX VELOCITY: 0.8 M/S
ECHO MV MEAN GRADIENT: 1 MMHG
ECHO MV MEAN VELOCITY: 0.5 M/S
ECHO MV PEAK GRADIENT: 3 MMHG
ECHO MV VTI: 33.4 CM
ECHO RA VOLUME: 33 ML
ECHO RA VOLUME: 35 ML
ECHO RV FREE WALL PEAK S': 4.7 CM/S
ECHO RV INTERNAL DIMENSION: 3.7 CM
ECHO RV TAPSE: 1.2 CM (ref 1.7–?)
ECHO TV REGURGITANT MAX VELOCITY: 2.22 M/S
ECHO TV REGURGITANT PEAK GRADIENT: 20 MMHG
FIO2 ON VENT: 40 %
GLUCOSE BLD STRIP.AUTO-MCNC: 76 MG/DL (ref 65–117)
GLUCOSE BLD STRIP.AUTO-MCNC: 78 MG/DL (ref 65–117)
GLUCOSE BLD STRIP.AUTO-MCNC: 81 MG/DL (ref 65–117)
GLUCOSE BLD STRIP.AUTO-MCNC: 83 MG/DL (ref 65–117)
GLUCOSE BLD STRIP.AUTO-MCNC: 83 MG/DL (ref 65–117)
GLUCOSE BLD STRIP.AUTO-MCNC: 84 MG/DL (ref 65–117)
GLUCOSE BLD STRIP.AUTO-MCNC: 84 MG/DL (ref 65–117)
GLUCOSE BLD STRIP.AUTO-MCNC: 85 MG/DL (ref 65–117)
GLUCOSE BLD STRIP.AUTO-MCNC: 85 MG/DL (ref 65–117)
GLUCOSE BLD STRIP.AUTO-MCNC: 86 MG/DL (ref 65–117)
GLUCOSE BLD STRIP.AUTO-MCNC: 87 MG/DL (ref 65–117)
GLUCOSE BLD STRIP.AUTO-MCNC: 88 MG/DL (ref 65–117)
GLUCOSE BLD STRIP.AUTO-MCNC: 91 MG/DL (ref 65–117)
GLUCOSE BLD STRIP.AUTO-MCNC: 94 MG/DL (ref 65–117)
GLUCOSE SERPL-MCNC: 77 MG/DL (ref 65–100)
HBV CORE AB SERPL QL IA: NEGATIVE
HCO3 BLDA-SCNC: 25 MMOL/L (ref 22–26)
HCO3 BLDA-SCNC: 26 MMOL/L (ref 22–26)
PCO2 BLDA: 50 MMHG (ref 35–45)
PCO2 BLDA: 63 MMHG (ref 35–45)
PH BLDA: 7.24 (ref 7.35–7.45)
PH BLDA: 7.32 (ref 7.35–7.45)
PHOSPHATE SERPL-MCNC: 6.5 MG/DL (ref 2.6–4.7)
PO2 BLDA: 144 MMHG (ref 80–100)
PO2 BLDA: 164 MMHG (ref 80–100)
POTASSIUM SERPL-SCNC: 4.2 MMOL/L (ref 3.5–5.1)
SAO2 % BLD: 98 % (ref 92–97)
SAO2 % BLD: 99 % (ref 92–97)
SAO2% DEVICE SAO2% SENSOR NAME: ABNORMAL
SAO2% DEVICE SAO2% SENSOR NAME: ABNORMAL
SERVICE CMNT-IMP: ABNORMAL
SERVICE CMNT-IMP: NORMAL
SODIUM SERPL-SCNC: 137 MMOL/L (ref 136–145)
SPECIMEN SITE: ABNORMAL
SPECIMEN SITE: ABNORMAL

## 2025-05-15 PROCEDURE — 6370000000 HC RX 637 (ALT 250 FOR IP): Performed by: INTERNAL MEDICINE

## 2025-05-15 PROCEDURE — 82803 BLOOD GASES ANY COMBINATION: CPT

## 2025-05-15 PROCEDURE — 97530 THERAPEUTIC ACTIVITIES: CPT

## 2025-05-15 PROCEDURE — 6360000002 HC RX W HCPCS: Performed by: INTERNAL MEDICINE

## 2025-05-15 PROCEDURE — 2500000003 HC RX 250 WO HCPCS: Performed by: INTERNAL MEDICINE

## 2025-05-15 PROCEDURE — 36600 WITHDRAWAL OF ARTERIAL BLOOD: CPT

## 2025-05-15 PROCEDURE — 90935 HEMODIALYSIS ONE EVALUATION: CPT

## 2025-05-15 PROCEDURE — 80069 RENAL FUNCTION PANEL: CPT

## 2025-05-15 PROCEDURE — 2500000003 HC RX 250 WO HCPCS: Performed by: SURGERY

## 2025-05-15 PROCEDURE — 6360000002 HC RX W HCPCS: Performed by: HOSPITALIST

## 2025-05-15 PROCEDURE — 36415 COLL VENOUS BLD VENIPUNCTURE: CPT

## 2025-05-15 PROCEDURE — 82962 GLUCOSE BLOOD TEST: CPT

## 2025-05-15 PROCEDURE — 94660 CPAP INITIATION&MGMT: CPT

## 2025-05-15 PROCEDURE — 6360000004 HC RX CONTRAST MEDICATION: Performed by: HOSPITALIST

## 2025-05-15 PROCEDURE — 6370000000 HC RX 637 (ALT 250 FOR IP): Performed by: SURGERY

## 2025-05-15 PROCEDURE — C8929 TTE W OR WO FOL WCON,DOPPLER: HCPCS

## 2025-05-15 PROCEDURE — 2580000003 HC RX 258: Performed by: HOSPITALIST

## 2025-05-15 PROCEDURE — 2000000000 HC ICU R&B

## 2025-05-15 PROCEDURE — 6370000000 HC RX 637 (ALT 250 FOR IP): Performed by: STUDENT IN AN ORGANIZED HEALTH CARE EDUCATION/TRAINING PROGRAM

## 2025-05-15 RX ORDER — NOREPINEPHRINE BITARTRATE 0.06 MG/ML
1-100 INJECTION, SOLUTION INTRAVENOUS CONTINUOUS
Status: DISCONTINUED | OUTPATIENT
Start: 2025-05-15 | End: 2025-05-15

## 2025-05-15 RX ORDER — HYDROCORTISONE SODIUM SUCCINATE 100 MG/2ML
50 INJECTION INTRAMUSCULAR; INTRAVENOUS EVERY 6 HOURS
Status: DISCONTINUED | OUTPATIENT
Start: 2025-05-15 | End: 2025-05-18

## 2025-05-15 RX ORDER — DEXTROSE MONOHYDRATE 100 MG/ML
INJECTION, SOLUTION INTRAVENOUS CONTINUOUS
Status: DISCONTINUED | OUTPATIENT
Start: 2025-05-15 | End: 2025-05-19

## 2025-05-15 RX ORDER — ETOMIDATE 2 MG/ML
20 INJECTION INTRAVENOUS ONCE
Status: DISCONTINUED | OUTPATIENT
Start: 2025-05-15 | End: 2025-05-17

## 2025-05-15 RX ORDER — NOREPINEPHRINE BITARTRATE 0.06 MG/ML
.5-2 INJECTION, SOLUTION INTRAVENOUS CONTINUOUS
Status: ACTIVE | OUTPATIENT
Start: 2025-05-15 | End: 2025-05-17

## 2025-05-15 RX ORDER — FUROSEMIDE 10 MG/ML
80 INJECTION INTRAMUSCULAR; INTRAVENOUS ONCE
Status: COMPLETED | OUTPATIENT
Start: 2025-05-15 | End: 2025-05-15

## 2025-05-15 RX ORDER — ROCURONIUM BROMIDE 50 MG/5 ML
100 SYRINGE (ML) INTRAVENOUS ONCE
Status: DISCONTINUED | OUTPATIENT
Start: 2025-05-15 | End: 2025-05-19

## 2025-05-15 RX ADMIN — WHITE PETROLATUM 57.7 %-MINERAL OIL 31.9 % EYE OINTMENT: at 21:18

## 2025-05-15 RX ADMIN — Medication: at 09:02

## 2025-05-15 RX ADMIN — CLOPIDOGREL BISULFATE 75 MG: 75 TABLET, FILM COATED ORAL at 09:04

## 2025-05-15 RX ADMIN — SODIUM CHLORIDE, PRESERVATIVE FREE 10 ML: 5 INJECTION INTRAVENOUS at 20:52

## 2025-05-15 RX ADMIN — MIDODRINE HYDROCHLORIDE 5 MG: 5 TABLET ORAL at 21:19

## 2025-05-15 RX ADMIN — Medication 1 AMPULE: at 20:52

## 2025-05-15 RX ADMIN — SODIUM CHLORIDE, PRESERVATIVE FREE 10 ML: 5 INJECTION INTRAVENOUS at 09:02

## 2025-05-15 RX ADMIN — HYDROCORTISONE SODIUM SUCCINATE 50 MG: 100 INJECTION, POWDER, FOR SOLUTION INTRAMUSCULAR; INTRAVENOUS at 14:37

## 2025-05-15 RX ADMIN — SULFUR HEXAFLUORIDE 2 ML: KIT at 11:53

## 2025-05-15 RX ADMIN — LEVOTHYROXINE SODIUM ANHYDROUS 50 MCG: 100 INJECTION, POWDER, LYOPHILIZED, FOR SOLUTION INTRAVENOUS at 11:53

## 2025-05-15 RX ADMIN — HYDROCORTISONE SODIUM SUCCINATE 50 MG: 100 INJECTION, POWDER, FOR SOLUTION INTRAMUSCULAR; INTRAVENOUS at 20:51

## 2025-05-15 RX ADMIN — Medication 1 CAPSULE: at 09:03

## 2025-05-15 RX ADMIN — ASPIRIN 81 MG: 81 TABLET, CHEWABLE ORAL at 09:04

## 2025-05-15 RX ADMIN — TAMSULOSIN HYDROCHLORIDE 0.4 MG: 0.4 CAPSULE ORAL at 09:04

## 2025-05-15 RX ADMIN — Medication 4 MCG/MIN: at 14:51

## 2025-05-15 RX ADMIN — DEXTROSE: 10 SOLUTION INTRAVENOUS at 12:20

## 2025-05-15 RX ADMIN — MIDODRINE HYDROCHLORIDE 5 MG: 5 TABLET ORAL at 09:06

## 2025-05-15 RX ADMIN — FUROSEMIDE 80 MG: 10 INJECTION, SOLUTION INTRAMUSCULAR; INTRAVENOUS at 15:22

## 2025-05-15 RX ADMIN — EPOETIN ALFA-EPBX 3000 UNITS: 3000 INJECTION, SOLUTION INTRAVENOUS; SUBCUTANEOUS at 22:03

## 2025-05-15 RX ADMIN — ATENOLOL 12.5 MG: 25 TABLET ORAL at 09:04

## 2025-05-15 ASSESSMENT — PAIN SCALES - GENERAL
PAINLEVEL_OUTOF10: 4
PAINLEVEL_OUTOF10: 4
PAINLEVEL_OUTOF10: 0
PAINLEVEL_OUTOF10: 0

## 2025-05-15 ASSESSMENT — PAIN SCALES - WONG BAKER: WONGBAKER_NUMERICALRESPONSE: NO HURT

## 2025-05-15 NOTE — SIGNIFICANT EVENT
Rapid Called at 1112    Responded to RRT at 1112 for Altered mental status    Provider at bedside: Yes  Interventions ordered: synthroid IVpush  Sepsis Suspected: No  Transfer to Higher Level of Care: no at the moment  Blood Glucose: 94     Situation  Pt is hard to wake up by pain stimuli, sindi and bradypnea. RRT was called.     Background  PAD, cellulitis, sepsis, hypothyroidism,     Assessment  Sindi, bradypnea, AMS,     Intervention/Recommendation  Synthroid IV push, abg    Reevaluation  Writer transferred pt to ICU at 1406  RRT verbally is released by primary RN.       Vitals:    05/15/25 1430   BP: (!) 98/44   Pulse: 50   Resp: 16   Temp: (!) 96.1 °F (35.6 °C)   SpO2: 100%        Rapid Ended at        RRT RN assisted with transport to accepting unit Yes.    Caty Segura, RN

## 2025-05-15 NOTE — PROCEDURES
Bedside Lung/pleural ultrasound      Patient: Errol Brandt                     YOB: 1945        Date- 5/15/2025                           Admit Date: 3/31/2025                Media Information    Document Information    Clinical Unknown   Small to moderate right pleural effusion   05/15/2025 10:19   Attached To:   Hospital Encounter on 3/31/25   Source Information    Maryana Canseco MD  Mrm 2 Cardiac Medical Step Down   Document History        Interpretation: Small to moderate right pleural effusion      Maryana Canseco MD  Pulmonology  [place], [place]  [phone]  5/15/2025

## 2025-05-15 NOTE — DIALYSIS
Hep B verification performed by (RN): GINA Chandra RN  Hep B results, dates, and Primary Source: Negative/Susceptible 5/14/2025 from Epic  Hepatitis B Surface Ag   Date/Time Value Ref Range Status   05/14/2025 02:30 PM 0.16 Index Final     Hep B S Ag Interp   Date/Time Value Ref Range Status   05/14/2025 02:30 PM Negative NEG   Final     Hep B S Ab   Date/Time Value Ref Range Status   05/14/2025 02:30 PM <3.10 mIU/mL Final     Hep B S Ab Interp   Date/Time Value Ref Range Status   05/14/2025 02:30 PM NONREACTIVE NR   Final     Comment:     (NOTE)  The ADVIA Centaur Anti-HBs2 assay is traceable to the World Health   Organization (WHO) Hepatitis B Immunoglobulin 1st International   Reference Preparation (1977). Samples with a calculated value of 10   mIU/mL or greater are considered reactive (protective) in accordance   with the CDC guidelines. The accepted criteria for immunity to HBV is   anti-HBs activity greater than or equal to 10 mIU/mL, as defined by   the WHO International Reference Preparation.  Assay performance has not been established in pregnant women,   patients who are immunosuppressed or immunocompromised, nor have   performance characteristics been established in conjunction with   other 's assays for specific HBV serologic markers. This   assay does not differentiate between vaccine induced immune response   and a response due to infection with HBV. Passively acquired anti-HBs   may be identified following patient transfusion, receipt of   immunoglobulin products, etc.       Machine disinfect process:Acid/Heat

## 2025-05-15 NOTE — FLOWSHEET NOTE
Primary RN SBAR: Milagro Melara, RN  Incapacitated Nurse [facility]. provided: yes  Patient Education provided: Avoid touching dressing and line keep clean, dry and intact at all times. Notify healthcare team if you notice an redness, swelling, drainage or if dressing becomes loose.  Preferred Education method and Primary language: verbal and english  Dialysis consent: yes  Hospital General Consent Verified: yes  Hospital associated wait time; reason: 0  Hepatitis B Surface Ag   Date/Time Value Ref Range Status   05/14/2025 02:30 PM 0.16 Index Final     Hep B S Ag Interp   Date/Time Value Ref Range Status   05/14/2025 02:30 PM Negative NEG   Final     Hep B S Ab   Date/Time Value Ref Range Status   05/14/2025 02:30 PM <3.10 mIU/mL Final     Hep B S Ab Interp   Date/Time Value Ref Range Status   05/14/2025 02:30 PM NONREACTIVE NR   Final     Comment:     (NOTE)  The ADVIA Centaur Anti-HBs2 assay is traceable to the World Health   Organization (WHO) Hepatitis B Immunoglobulin 1st International   Reference Preparation (1977). Samples with a calculated value of 10   mIU/mL or greater are considered reactive (protective) in accordance   with the CDC guidelines. The accepted criteria for immunity to HBV is   anti-HBs activity greater than or equal to 10 mIU/mL, as defined by   the WHO International Reference Preparation.  Assay performance has not been established in pregnant women,   patients who are immunosuppressed or immunocompromised, nor have   performance characteristics been established in conjunction with   other 's assays for specific HBV serologic markers. This   assay does not differentiate between vaccine induced immune response   and a response due to infection with HBV. Passively acquired anti-HBs   may be identified following patient transfusion, receipt of   immunoglobulin products, etc.          Pre TX-       05/15/25 1700   Observations & Evaluations   Level of Consciousness 1   Oriented X 0

## 2025-05-16 ENCOUNTER — APPOINTMENT (OUTPATIENT)
Facility: HOSPITAL | Age: 80
DRG: 622 | End: 2025-05-16
Payer: MEDICARE

## 2025-05-16 PROBLEM — J96.01 ACUTE RESPIRATORY FAILURE WITH HYPOXIA (HCC): Status: ACTIVE | Noted: 2021-05-18

## 2025-05-16 PROBLEM — Z51.5 PALLIATIVE CARE ENCOUNTER: Status: ACTIVE | Noted: 2025-05-16

## 2025-05-16 PROBLEM — R53.1 GENERALIZED WEAKNESS: Status: ACTIVE | Noted: 2025-05-16

## 2025-05-16 LAB
ALBUMIN SERPL-MCNC: 2.4 G/DL (ref 3.5–5)
ANION GAP SERPL CALC-SCNC: 10 MMOL/L (ref 2–12)
BUN SERPL-MCNC: 62 MG/DL (ref 6–20)
BUN/CREAT SERPL: 16 (ref 12–20)
CALCIUM SERPL-MCNC: 7.1 MG/DL (ref 8.5–10.1)
CHLORIDE SERPL-SCNC: 98 MMOL/L (ref 97–108)
CO2 SERPL-SCNC: 25 MMOL/L (ref 21–32)
CREAT SERPL-MCNC: 3.81 MG/DL (ref 0.7–1.3)
EKG ATRIAL RATE: 50 BPM
EKG DIAGNOSIS: NORMAL
EKG P AXIS: 90 DEGREES
EKG P-R INTERVAL: 194 MS
EKG Q-T INTERVAL: 538 MS
EKG QRS DURATION: 86 MS
EKG QTC CALCULATION (BAZETT): 490 MS
EKG R AXIS: 27 DEGREES
EKG T AXIS: -79 DEGREES
EKG VENTRICULAR RATE: 50 BPM
GLUCOSE BLD STRIP.AUTO-MCNC: 142 MG/DL (ref 65–117)
GLUCOSE BLD STRIP.AUTO-MCNC: 149 MG/DL (ref 65–117)
GLUCOSE BLD STRIP.AUTO-MCNC: 159 MG/DL (ref 65–117)
GLUCOSE BLD STRIP.AUTO-MCNC: 165 MG/DL (ref 65–117)
GLUCOSE BLD STRIP.AUTO-MCNC: 222 MG/DL (ref 65–117)
GLUCOSE SERPL-MCNC: 165 MG/DL (ref 65–100)
PHOSPHATE SERPL-MCNC: 5.9 MG/DL (ref 2.6–4.7)
POTASSIUM SERPL-SCNC: 4.2 MMOL/L (ref 3.5–5.1)
SERVICE CMNT-IMP: ABNORMAL
SODIUM SERPL-SCNC: 133 MMOL/L (ref 136–145)

## 2025-05-16 PROCEDURE — 94660 CPAP INITIATION&MGMT: CPT

## 2025-05-16 PROCEDURE — 6370000000 HC RX 637 (ALT 250 FOR IP): Performed by: SURGERY

## 2025-05-16 PROCEDURE — 82962 GLUCOSE BLOOD TEST: CPT

## 2025-05-16 PROCEDURE — 6360000002 HC RX W HCPCS: Performed by: INTERNAL MEDICINE

## 2025-05-16 PROCEDURE — 6370000000 HC RX 637 (ALT 250 FOR IP): Performed by: STUDENT IN AN ORGANIZED HEALTH CARE EDUCATION/TRAINING PROGRAM

## 2025-05-16 PROCEDURE — 2000000000 HC ICU R&B

## 2025-05-16 PROCEDURE — 2580000003 HC RX 258: Performed by: HOSPITALIST

## 2025-05-16 PROCEDURE — 80069 RENAL FUNCTION PANEL: CPT

## 2025-05-16 PROCEDURE — 6360000002 HC RX W HCPCS: Performed by: HOSPITALIST

## 2025-05-16 PROCEDURE — 2580000003 HC RX 258: Performed by: INTERNAL MEDICINE

## 2025-05-16 PROCEDURE — 71045 X-RAY EXAM CHEST 1 VIEW: CPT

## 2025-05-16 PROCEDURE — 99232 SBSQ HOSP IP/OBS MODERATE 35: CPT

## 2025-05-16 PROCEDURE — 6370000000 HC RX 637 (ALT 250 FOR IP): Performed by: HOSPITALIST

## 2025-05-16 PROCEDURE — 90935 HEMODIALYSIS ONE EVALUATION: CPT

## 2025-05-16 PROCEDURE — 2700000000 HC OXYGEN THERAPY PER DAY

## 2025-05-16 PROCEDURE — 36415 COLL VENOUS BLD VENIPUNCTURE: CPT

## 2025-05-16 PROCEDURE — 6370000000 HC RX 637 (ALT 250 FOR IP): Performed by: INTERNAL MEDICINE

## 2025-05-16 PROCEDURE — 2500000003 HC RX 250 WO HCPCS: Performed by: SURGERY

## 2025-05-16 RX ORDER — FUROSEMIDE 10 MG/ML
80 INJECTION INTRAMUSCULAR; INTRAVENOUS DAILY
Status: COMPLETED | OUTPATIENT
Start: 2025-05-17 | End: 2025-05-18

## 2025-05-16 RX ORDER — LIDOCAINE HYDROCHLORIDE 10 MG/ML
5 INJECTION, SOLUTION EPIDURAL; INFILTRATION; INTRACAUDAL; PERINEURAL ONCE
Status: COMPLETED | OUTPATIENT
Start: 2025-05-16 | End: 2025-05-16

## 2025-05-16 RX ORDER — MIDODRINE HYDROCHLORIDE 5 MG/1
10 TABLET ORAL 3 TIMES DAILY
Status: DISCONTINUED | OUTPATIENT
Start: 2025-05-16 | End: 2025-05-17

## 2025-05-16 RX ADMIN — SODIUM CHLORIDE, PRESERVATIVE FREE 10 ML: 5 INJECTION INTRAVENOUS at 20:14

## 2025-05-16 RX ADMIN — MIDODRINE HYDROCHLORIDE 10 MG: 5 TABLET ORAL at 15:35

## 2025-05-16 RX ADMIN — Medication: at 09:10

## 2025-05-16 RX ADMIN — TAMSULOSIN HYDROCHLORIDE 0.4 MG: 0.4 CAPSULE ORAL at 09:21

## 2025-05-16 RX ADMIN — IRON SUCROSE 200 MG: 20 INJECTION, SOLUTION INTRAVENOUS at 11:07

## 2025-05-16 RX ADMIN — LIDOCAINE HYDROCHLORIDE 5 ML: 10 INJECTION, SOLUTION EPIDURAL; INFILTRATION; INTRACAUDAL; PERINEURAL at 11:33

## 2025-05-16 RX ADMIN — HYDROCORTISONE SODIUM SUCCINATE 50 MG: 100 INJECTION, POWDER, FOR SOLUTION INTRAMUSCULAR; INTRAVENOUS at 23:08

## 2025-05-16 RX ADMIN — LEVOTHYROXINE SODIUM ANHYDROUS 50 MCG: 100 INJECTION, POWDER, LYOPHILIZED, FOR SOLUTION INTRAVENOUS at 08:27

## 2025-05-16 RX ADMIN — Medication 1 AMPULE: at 20:13

## 2025-05-16 RX ADMIN — HYDROCORTISONE SODIUM SUCCINATE 50 MG: 100 INJECTION, POWDER, FOR SOLUTION INTRAMUSCULAR; INTRAVENOUS at 05:06

## 2025-05-16 RX ADMIN — WHITE PETROLATUM 57.7 %-MINERAL OIL 31.9 % EYE OINTMENT: at 20:12

## 2025-05-16 RX ADMIN — CLOPIDOGREL BISULFATE 75 MG: 75 TABLET, FILM COATED ORAL at 09:09

## 2025-05-16 RX ADMIN — SODIUM CHLORIDE 650 MG: 9 INJECTION INTRAMUSCULAR; INTRAVENOUS; SUBCUTANEOUS at 19:59

## 2025-05-16 RX ADMIN — INSULIN LISPRO 2 UNITS: 100 INJECTION, SOLUTION INTRAVENOUS; SUBCUTANEOUS at 11:06

## 2025-05-16 RX ADMIN — Medication 1 CAPSULE: at 09:09

## 2025-05-16 RX ADMIN — Medication: at 20:13

## 2025-05-16 RX ADMIN — HYDROCORTISONE SODIUM SUCCINATE 50 MG: 100 INJECTION, POWDER, FOR SOLUTION INTRAMUSCULAR; INTRAVENOUS at 17:36

## 2025-05-16 RX ADMIN — HYDROCORTISONE SODIUM SUCCINATE 50 MG: 100 INJECTION, POWDER, FOR SOLUTION INTRAMUSCULAR; INTRAVENOUS at 11:03

## 2025-05-16 RX ADMIN — MIDODRINE HYDROCHLORIDE 5 MG: 5 TABLET ORAL at 09:09

## 2025-05-16 RX ADMIN — MIDODRINE HYDROCHLORIDE 10 MG: 5 TABLET ORAL at 21:08

## 2025-05-16 RX ADMIN — SODIUM CHLORIDE, PRESERVATIVE FREE 40 ML: 5 INJECTION INTRAVENOUS at 09:11

## 2025-05-16 RX ADMIN — ASPIRIN 81 MG: 81 TABLET, CHEWABLE ORAL at 09:09

## 2025-05-16 RX ADMIN — Medication 1 AMPULE: at 09:11

## 2025-05-16 ASSESSMENT — PAIN SCALES - PAIN ASSESSMENT IN ADVANCED DEMENTIA (PAINAD)
BODYLANGUAGE: TENSE, DISTRESSED PACING, FIDGETING
CONSOLABILITY: NO NEED TO CONSOLE
BODYLANGUAGE: RELAXED
BODYLANGUAGE: TENSE, DISTRESSED PACING, FIDGETING
TOTALSCORE: 1
TOTALSCORE: 1
FACIALEXPRESSION: SMILING OR INEXPRESSIVE
FACIALEXPRESSION: SMILING OR INEXPRESSIVE
CONSOLABILITY: NO NEED TO CONSOLE
CONSOLABILITY: NO NEED TO CONSOLE
TOTALSCORE: 0
FACIALEXPRESSION: SMILING OR INEXPRESSIVE
BREATHING: NORMAL

## 2025-05-16 ASSESSMENT — PAIN SCALES - GENERAL
PAINLEVEL_OUTOF10: 4
PAINLEVEL_OUTOF10: 0

## 2025-05-16 ASSESSMENT — PAIN SCALES - WONG BAKER: WONGBAKER_NUMERICALRESPONSE: NO HURT

## 2025-05-16 NOTE — FLOWSHEET NOTE
Primary RN SBAR:  Milagro Melara, RN   Incapacitated Nurse [facility]. provided: yes  Patient Education provided: Avoid touching dressing and line keep clean, dry and intact at all times. Notify healthcare team if you notice an redness, swelling, drainage or if dressing becomes loose.  Preferred Education method and Primary language: verbal and english  Dialysis consent: yes  Hospital General Consent Verified: yes  Hospital associated wait time; reason: 0  Hepatitis B Surface Ag   Date/Time Value Ref Range Status   05/14/2025 02:30 PM 0.16 Index Final     Hep B S Ag Interp   Date/Time Value Ref Range Status   05/14/2025 02:30 PM Negative NEG   Final     Hep B S Ab   Date/Time Value Ref Range Status   05/14/2025 02:30 PM <3.10 mIU/mL Final     Hep B S Ab Interp   Date/Time Value Ref Range Status   05/14/2025 02:30 PM NONREACTIVE NR   Final     Comment:     (NOTE)  The ADVIA Centaur Anti-HBs2 assay is traceable to the World Health   Organization (WHO) Hepatitis B Immunoglobulin 1st International   Reference Preparation (1977). Samples with a calculated value of 10   mIU/mL or greater are considered reactive (protective) in accordance   with the CDC guidelines. The accepted criteria for immunity to HBV is   anti-HBs activity greater than or equal to 10 mIU/mL, as defined by   the WHO International Reference Preparation.  Assay performance has not been established in pregnant women,   patients who are immunosuppressed or immunocompromised, nor have   performance characteristics been established in conjunction with   other 's assays for specific HBV serologic markers. This   assay does not differentiate between vaccine induced immune response   and a response due to infection with HBV. Passively acquired anti-HBs   may be identified following patient transfusion, receipt of   immunoglobulin products, etc.        Pre TX-         05/16/25 1615   Observations & Evaluations   Level of Consciousness 1   Oriented X 0

## 2025-05-16 NOTE — DIALYSIS
Hep B verification performed by (RN): GELY Rodriguez  Hep B results, dates, and Primary Source: Negative/Susceptible  Hepatitis B Surface Ag   Date/Time Value Ref Range Status   05/14/2025 02:30 PM 0.16 Index Final     Hep B S Ag Interp   Date/Time Value Ref Range Status   05/14/2025 02:30 PM Negative NEG   Final     Hep B S Ab   Date/Time Value Ref Range Status   05/14/2025 02:30 PM <3.10 mIU/mL Final     Hep B S Ab Interp   Date/Time Value Ref Range Status   05/14/2025 02:30 PM NONREACTIVE NR   Final     Comment:     (NOTE)  The ADVIA Centaur Anti-HBs2 assay is traceable to the World Health   Organization (WHO) Hepatitis B Immunoglobulin 1st International   Reference Preparation (1977). Samples with a calculated value of 10   mIU/mL or greater are considered reactive (protective) in accordance   with the CDC guidelines. The accepted criteria for immunity to HBV is   anti-HBs activity greater than or equal to 10 mIU/mL, as defined by   the WHO International Reference Preparation.  Assay performance has not been established in pregnant women,   patients who are immunosuppressed or immunocompromised, nor have   performance characteristics been established in conjunction with   other 's assays for specific HBV serologic markers. This   assay does not differentiate between vaccine induced immune response   and a response due to infection with HBV. Passively acquired anti-HBs   may be identified following patient transfusion, receipt of   immunoglobulin products, etc.       Machine disinfect process: Acid/heat disinfect and exterior bleach wipe.

## 2025-05-16 NOTE — INTERDISCIPLINARY ROUNDS
Critical care interdisciplinary rounds today.  Following members present:  Nursing, Nutrition, Pharmacy, and Physician. Remains confused. Soler in for I/O.  Plan of care discussed.  See clinical pathway for plan of care and interventions and desired outcomes.

## 2025-05-17 LAB
ALBUMIN SERPL-MCNC: 2.3 G/DL (ref 3.5–5)
ALBUMIN/GLOB SERPL: 0.6 (ref 1.1–2.2)
ALP SERPL-CCNC: 100 U/L (ref 45–117)
ALT SERPL-CCNC: 30 U/L (ref 12–78)
ANION GAP SERPL CALC-SCNC: 7 MMOL/L (ref 2–12)
AST SERPL-CCNC: 22 U/L (ref 15–37)
BASE DEFICIT BLDV-SCNC: 1.9 MMOL/L
BDY SITE: ABNORMAL
BILIRUB DIRECT SERPL-MCNC: 0.2 MG/DL (ref 0–0.2)
BILIRUB SERPL-MCNC: 0.5 MG/DL (ref 0.2–1)
BUN SERPL-MCNC: 48 MG/DL (ref 6–20)
BUN/CREAT SERPL: 15 (ref 12–20)
CALCIUM SERPL-MCNC: 6.9 MG/DL (ref 8.5–10.1)
CHLORIDE SERPL-SCNC: 97 MMOL/L (ref 97–108)
CO2 SERPL-SCNC: 30 MMOL/L (ref 21–32)
CREAT SERPL-MCNC: 3.29 MG/DL (ref 0.7–1.3)
ERYTHROCYTE [DISTWIDTH] IN BLOOD BY AUTOMATED COUNT: 15.9 % (ref 11.5–14.5)
GAS FLOW.O2 O2 DELIVERY SYS: 3 L/MIN
GLOBULIN SER CALC-MCNC: 3.9 G/DL (ref 2–4)
GLUCOSE BLD STRIP.AUTO-MCNC: 152 MG/DL (ref 65–117)
GLUCOSE BLD STRIP.AUTO-MCNC: 197 MG/DL (ref 65–117)
GLUCOSE BLD STRIP.AUTO-MCNC: 207 MG/DL (ref 65–117)
GLUCOSE BLD STRIP.AUTO-MCNC: 212 MG/DL (ref 65–117)
GLUCOSE SERPL-MCNC: 142 MG/DL (ref 65–100)
HCO3 BLDV-SCNC: 23 MMOL/L (ref 23–28)
HCT VFR BLD AUTO: 27.3 % (ref 36.6–50.3)
HGB BLD-MCNC: 8.3 G/DL (ref 12.1–17)
MAGNESIUM SERPL-MCNC: 2.2 MG/DL (ref 1.6–2.4)
MCH RBC QN AUTO: 32.9 PG (ref 26–34)
MCHC RBC AUTO-ENTMCNC: 30.4 G/DL (ref 30–36.5)
MCV RBC AUTO: 108.3 FL (ref 80–99)
NRBC # BLD: 0 K/UL (ref 0–0.01)
NRBC BLD-RTO: 0 PER 100 WBC
PCO2 BLDV: 40.8 MMHG (ref 41–51)
PH BLDV: 7.37 (ref 7.32–7.42)
PHOSPHATE SERPL-MCNC: 6 MG/DL (ref 2.6–4.7)
PLATELET # BLD AUTO: 200 K/UL (ref 150–400)
PMV BLD AUTO: 10.1 FL (ref 8.9–12.9)
PO2 BLDV: 76 MMHG (ref 25–40)
POTASSIUM SERPL-SCNC: 4.1 MMOL/L (ref 3.5–5.1)
PROT SERPL-MCNC: 6.2 G/DL (ref 6.4–8.2)
RBC # BLD AUTO: 2.52 M/UL (ref 4.1–5.7)
SAO2 % BLDV: 95 % (ref 65–88)
SAO2% DEVICE SAO2% SENSOR NAME: ABNORMAL
SERVICE CMNT-IMP: ABNORMAL
SODIUM SERPL-SCNC: 134 MMOL/L (ref 136–145)
SPECIMEN SITE: ABNORMAL
WBC # BLD AUTO: 8.6 K/UL (ref 4.1–11.1)

## 2025-05-17 PROCEDURE — 82803 BLOOD GASES ANY COMBINATION: CPT

## 2025-05-17 PROCEDURE — 36415 COLL VENOUS BLD VENIPUNCTURE: CPT

## 2025-05-17 PROCEDURE — 2500000003 HC RX 250 WO HCPCS: Performed by: SURGERY

## 2025-05-17 PROCEDURE — 80076 HEPATIC FUNCTION PANEL: CPT

## 2025-05-17 PROCEDURE — 2700000000 HC OXYGEN THERAPY PER DAY

## 2025-05-17 PROCEDURE — 2580000003 HC RX 258: Performed by: HOSPITALIST

## 2025-05-17 PROCEDURE — 6360000002 HC RX W HCPCS: Performed by: INTERNAL MEDICINE

## 2025-05-17 PROCEDURE — 85027 COMPLETE CBC AUTOMATED: CPT

## 2025-05-17 PROCEDURE — 6370000000 HC RX 637 (ALT 250 FOR IP): Performed by: SURGERY

## 2025-05-17 PROCEDURE — 2000000000 HC ICU R&B

## 2025-05-17 PROCEDURE — 6370000000 HC RX 637 (ALT 250 FOR IP): Performed by: STUDENT IN AN ORGANIZED HEALTH CARE EDUCATION/TRAINING PROGRAM

## 2025-05-17 PROCEDURE — 6370000000 HC RX 637 (ALT 250 FOR IP): Performed by: HOSPITALIST

## 2025-05-17 PROCEDURE — 6370000000 HC RX 637 (ALT 250 FOR IP): Performed by: INTERNAL MEDICINE

## 2025-05-17 PROCEDURE — 83735 ASSAY OF MAGNESIUM: CPT

## 2025-05-17 PROCEDURE — 6360000002 HC RX W HCPCS: Performed by: HOSPITALIST

## 2025-05-17 PROCEDURE — 82962 GLUCOSE BLOOD TEST: CPT

## 2025-05-17 PROCEDURE — 84100 ASSAY OF PHOSPHORUS: CPT

## 2025-05-17 PROCEDURE — 80048 BASIC METABOLIC PNL TOTAL CA: CPT

## 2025-05-17 RX ORDER — MIDODRINE HYDROCHLORIDE 5 MG/1
15 TABLET ORAL 3 TIMES DAILY
Status: DISCONTINUED | OUTPATIENT
Start: 2025-05-17 | End: 2025-05-27 | Stop reason: HOSPADM

## 2025-05-17 RX ADMIN — FUROSEMIDE 80 MG: 10 INJECTION, SOLUTION INTRAMUSCULAR; INTRAVENOUS at 09:07

## 2025-05-17 RX ADMIN — MIDODRINE HYDROCHLORIDE 10 MG: 5 TABLET ORAL at 09:07

## 2025-05-17 RX ADMIN — HYDROCORTISONE SODIUM SUCCINATE 50 MG: 100 INJECTION, POWDER, FOR SOLUTION INTRAMUSCULAR; INTRAVENOUS at 23:00

## 2025-05-17 RX ADMIN — MIDODRINE HYDROCHLORIDE 15 MG: 5 TABLET ORAL at 20:04

## 2025-05-17 RX ADMIN — INSULIN LISPRO 2 UNITS: 100 INJECTION, SOLUTION INTRAVENOUS; SUBCUTANEOUS at 18:05

## 2025-05-17 RX ADMIN — ASPIRIN 81 MG: 81 TABLET, CHEWABLE ORAL at 09:07

## 2025-05-17 RX ADMIN — HYDROCORTISONE SODIUM SUCCINATE 50 MG: 100 INJECTION, POWDER, FOR SOLUTION INTRAMUSCULAR; INTRAVENOUS at 05:54

## 2025-05-17 RX ADMIN — MIDODRINE HYDROCHLORIDE 15 MG: 5 TABLET ORAL at 14:22

## 2025-05-17 RX ADMIN — LEVOTHYROXINE SODIUM ANHYDROUS 50 MCG: 100 INJECTION, POWDER, LYOPHILIZED, FOR SOLUTION INTRAVENOUS at 10:35

## 2025-05-17 RX ADMIN — Medication 1 AMPULE: at 20:04

## 2025-05-17 RX ADMIN — Medication 1 AMPULE: at 09:11

## 2025-05-17 RX ADMIN — Medication: at 09:17

## 2025-05-17 RX ADMIN — SODIUM CHLORIDE, PRESERVATIVE FREE 10 ML: 5 INJECTION INTRAVENOUS at 09:08

## 2025-05-17 RX ADMIN — Medication 1 CAPSULE: at 09:07

## 2025-05-17 RX ADMIN — HYDROCORTISONE SODIUM SUCCINATE 50 MG: 100 INJECTION, POWDER, FOR SOLUTION INTRAMUSCULAR; INTRAVENOUS at 10:57

## 2025-05-17 RX ADMIN — WHITE PETROLATUM 57.7 %-MINERAL OIL 31.9 % EYE OINTMENT: at 20:04

## 2025-05-17 RX ADMIN — Medication: at 20:04

## 2025-05-17 RX ADMIN — INSULIN LISPRO 2 UNITS: 100 INJECTION, SOLUTION INTRAVENOUS; SUBCUTANEOUS at 20:18

## 2025-05-17 RX ADMIN — SODIUM CHLORIDE, PRESERVATIVE FREE 10 ML: 5 INJECTION INTRAVENOUS at 20:04

## 2025-05-17 RX ADMIN — TAMSULOSIN HYDROCHLORIDE 0.4 MG: 0.4 CAPSULE ORAL at 09:07

## 2025-05-17 RX ADMIN — INSULIN LISPRO 2 UNITS: 100 INJECTION, SOLUTION INTRAVENOUS; SUBCUTANEOUS at 10:57

## 2025-05-17 RX ADMIN — INSULIN HUMAN 28 UNITS: 100 INJECTION, SUSPENSION SUBCUTANEOUS at 09:04

## 2025-05-17 RX ADMIN — HYDROCORTISONE SODIUM SUCCINATE 50 MG: 100 INJECTION, POWDER, FOR SOLUTION INTRAMUSCULAR; INTRAVENOUS at 18:05

## 2025-05-17 RX ADMIN — CLOPIDOGREL BISULFATE 75 MG: 75 TABLET, FILM COATED ORAL at 09:07

## 2025-05-17 ASSESSMENT — PAIN SCALES - GENERAL
PAINLEVEL_OUTOF10: 0

## 2025-05-18 ENCOUNTER — APPOINTMENT (OUTPATIENT)
Facility: HOSPITAL | Age: 80
DRG: 622 | End: 2025-05-18
Payer: MEDICARE

## 2025-05-18 LAB
ANION GAP SERPL CALC-SCNC: 7 MMOL/L (ref 2–12)
ARTERIAL PATENCY WRIST A: ABNORMAL
BASE DEFICIT BLDA-SCNC: 2.2 MMOL/L
BDY SITE: ABNORMAL
BUN SERPL-MCNC: 63 MG/DL (ref 6–20)
BUN/CREAT SERPL: 15 (ref 12–20)
CALCIUM SERPL-MCNC: 6.8 MG/DL (ref 8.5–10.1)
CHLORIDE SERPL-SCNC: 97 MMOL/L (ref 97–108)
CO2 SERPL-SCNC: 29 MMOL/L (ref 21–32)
CREAT SERPL-MCNC: 4.24 MG/DL (ref 0.7–1.3)
ERYTHROCYTE [DISTWIDTH] IN BLOOD BY AUTOMATED COUNT: 16.2 % (ref 11.5–14.5)
GAS FLOW.O2 O2 DELIVERY SYS: 3 L/MIN
GLUCOSE BLD STRIP.AUTO-MCNC: 115 MG/DL (ref 65–117)
GLUCOSE BLD STRIP.AUTO-MCNC: 144 MG/DL (ref 65–117)
GLUCOSE BLD STRIP.AUTO-MCNC: 154 MG/DL (ref 65–117)
GLUCOSE BLD STRIP.AUTO-MCNC: 169 MG/DL (ref 65–117)
GLUCOSE SERPL-MCNC: 118 MG/DL (ref 65–100)
HCO3 BLDA-SCNC: 27 MMOL/L (ref 22–26)
HCT VFR BLD AUTO: 28 % (ref 36.6–50.3)
HGB BLD-MCNC: 8.4 G/DL (ref 12.1–17)
MAGNESIUM SERPL-MCNC: 2.4 MG/DL (ref 1.6–2.4)
MCH RBC QN AUTO: 33.1 PG (ref 26–34)
MCHC RBC AUTO-ENTMCNC: 30 G/DL (ref 30–36.5)
MCV RBC AUTO: 110.2 FL (ref 80–99)
NRBC # BLD: 0 K/UL (ref 0–0.01)
NRBC BLD-RTO: 0 PER 100 WBC
PCO2 BLDA: 63 MMHG (ref 35–45)
PH BLDA: 7.25 (ref 7.35–7.45)
PHOSPHATE SERPL-MCNC: 7.5 MG/DL (ref 2.6–4.7)
PLATELET # BLD AUTO: 233 K/UL (ref 150–400)
PMV BLD AUTO: 9.7 FL (ref 8.9–12.9)
PO2 BLDA: 71 MMHG (ref 80–100)
POTASSIUM SERPL-SCNC: 4.5 MMOL/L (ref 3.5–5.1)
RBC # BLD AUTO: 2.54 M/UL (ref 4.1–5.7)
SAO2 % BLD: 91 % (ref 92–97)
SAO2% DEVICE SAO2% SENSOR NAME: ABNORMAL
SERVICE CMNT-IMP: ABNORMAL
SERVICE CMNT-IMP: NORMAL
SODIUM SERPL-SCNC: 133 MMOL/L (ref 136–145)
SPECIMEN SITE: ABNORMAL
WBC # BLD AUTO: 10.1 K/UL (ref 4.1–11.1)

## 2025-05-18 PROCEDURE — 6360000002 HC RX W HCPCS: Performed by: INTERNAL MEDICINE

## 2025-05-18 PROCEDURE — 2580000003 HC RX 258: Performed by: HOSPITALIST

## 2025-05-18 PROCEDURE — 82962 GLUCOSE BLOOD TEST: CPT

## 2025-05-18 PROCEDURE — 84100 ASSAY OF PHOSPHORUS: CPT

## 2025-05-18 PROCEDURE — P9047 ALBUMIN (HUMAN), 25%, 50ML: HCPCS | Performed by: INTERNAL MEDICINE

## 2025-05-18 PROCEDURE — 6370000000 HC RX 637 (ALT 250 FOR IP): Performed by: INTERNAL MEDICINE

## 2025-05-18 PROCEDURE — 2700000000 HC OXYGEN THERAPY PER DAY

## 2025-05-18 PROCEDURE — 90935 HEMODIALYSIS ONE EVALUATION: CPT

## 2025-05-18 PROCEDURE — 85027 COMPLETE CBC AUTOMATED: CPT

## 2025-05-18 PROCEDURE — 6370000000 HC RX 637 (ALT 250 FOR IP): Performed by: HOSPITALIST

## 2025-05-18 PROCEDURE — 82803 BLOOD GASES ANY COMBINATION: CPT

## 2025-05-18 PROCEDURE — 80048 BASIC METABOLIC PNL TOTAL CA: CPT

## 2025-05-18 PROCEDURE — 6360000002 HC RX W HCPCS: Performed by: HOSPITALIST

## 2025-05-18 PROCEDURE — 83735 ASSAY OF MAGNESIUM: CPT

## 2025-05-18 PROCEDURE — 6370000000 HC RX 637 (ALT 250 FOR IP): Performed by: STUDENT IN AN ORGANIZED HEALTH CARE EDUCATION/TRAINING PROGRAM

## 2025-05-18 PROCEDURE — 2000000000 HC ICU R&B

## 2025-05-18 PROCEDURE — 2500000003 HC RX 250 WO HCPCS: Performed by: HOSPITALIST

## 2025-05-18 PROCEDURE — 36600 WITHDRAWAL OF ARTERIAL BLOOD: CPT

## 2025-05-18 PROCEDURE — 2500000003 HC RX 250 WO HCPCS: Performed by: SURGERY

## 2025-05-18 PROCEDURE — 6370000000 HC RX 637 (ALT 250 FOR IP): Performed by: NURSE PRACTITIONER

## 2025-05-18 PROCEDURE — 36415 COLL VENOUS BLD VENIPUNCTURE: CPT

## 2025-05-18 PROCEDURE — 94660 CPAP INITIATION&MGMT: CPT

## 2025-05-18 PROCEDURE — 6370000000 HC RX 637 (ALT 250 FOR IP): Performed by: SURGERY

## 2025-05-18 PROCEDURE — 71045 X-RAY EXAM CHEST 1 VIEW: CPT

## 2025-05-18 PROCEDURE — 2580000003 HC RX 258: Performed by: INTERNAL MEDICINE

## 2025-05-18 RX ORDER — NOREPINEPHRINE BITARTRATE 0.06 MG/ML
1-15 INJECTION, SOLUTION INTRAVENOUS CONTINUOUS
Status: DISCONTINUED | OUTPATIENT
Start: 2025-05-18 | End: 2025-05-20

## 2025-05-18 RX ORDER — BUMETANIDE 0.25 MG/ML
4 INJECTION, SOLUTION INTRAMUSCULAR; INTRAVENOUS ONCE
Status: COMPLETED | OUTPATIENT
Start: 2025-05-18 | End: 2025-05-18

## 2025-05-18 RX ORDER — ALBUMIN (HUMAN) 12.5 G/50ML
25 SOLUTION INTRAVENOUS AS NEEDED
Status: DISCONTINUED | OUTPATIENT
Start: 2025-05-18 | End: 2025-05-19

## 2025-05-18 RX ORDER — HYDROCORTISONE SODIUM SUCCINATE 100 MG/2ML
50 INJECTION INTRAMUSCULAR; INTRAVENOUS EVERY 12 HOURS
Status: DISCONTINUED | OUTPATIENT
Start: 2025-05-19 | End: 2025-05-19

## 2025-05-18 RX ADMIN — HEPARIN SODIUM 1800 UNITS: 1000 INJECTION INTRAVENOUS; SUBCUTANEOUS at 21:37

## 2025-05-18 RX ADMIN — Medication 2 MCG/MIN: at 11:52

## 2025-05-18 RX ADMIN — SODIUM CHLORIDE, PRESERVATIVE FREE 10 ML: 5 INJECTION INTRAVENOUS at 09:00

## 2025-05-18 RX ADMIN — Medication 1 AMPULE: at 11:28

## 2025-05-18 RX ADMIN — FUROSEMIDE 80 MG: 10 INJECTION, SOLUTION INTRAMUSCULAR; INTRAVENOUS at 11:29

## 2025-05-18 RX ADMIN — BUMETANIDE 4 MG: 0.25 INJECTION INTRAMUSCULAR; INTRAVENOUS at 14:44

## 2025-05-18 RX ADMIN — ASPIRIN 81 MG: 81 TABLET, CHEWABLE ORAL at 11:30

## 2025-05-18 RX ADMIN — HYDROCORTISONE SODIUM SUCCINATE 50 MG: 100 INJECTION, POWDER, FOR SOLUTION INTRAMUSCULAR; INTRAVENOUS at 12:17

## 2025-05-18 RX ADMIN — HYDROCORTISONE SODIUM SUCCINATE 50 MG: 100 INJECTION, POWDER, FOR SOLUTION INTRAMUSCULAR; INTRAVENOUS at 05:11

## 2025-05-18 RX ADMIN — SODIUM CHLORIDE 650 MG: 9 INJECTION INTRAMUSCULAR; INTRAVENOUS; SUBCUTANEOUS at 16:57

## 2025-05-18 RX ADMIN — MIDODRINE HYDROCHLORIDE 15 MG: 5 TABLET ORAL at 19:48

## 2025-05-18 RX ADMIN — HYDROCORTISONE SODIUM SUCCINATE 50 MG: 100 INJECTION, POWDER, FOR SOLUTION INTRAMUSCULAR; INTRAVENOUS at 23:51

## 2025-05-18 RX ADMIN — MIDODRINE HYDROCHLORIDE 15 MG: 5 TABLET ORAL at 11:30

## 2025-05-18 RX ADMIN — Medication 1 AMPULE: at 19:40

## 2025-05-18 RX ADMIN — ALBUMIN (HUMAN) 25 G: 0.25 INJECTION, SOLUTION INTRAVENOUS at 19:17

## 2025-05-18 RX ADMIN — INSULIN HUMAN 28 UNITS: 100 INJECTION, SUSPENSION SUBCUTANEOUS at 11:31

## 2025-05-18 RX ADMIN — Medication: at 19:41

## 2025-05-18 RX ADMIN — Medication: at 09:00

## 2025-05-18 RX ADMIN — WHITE PETROLATUM 57.7 %-MINERAL OIL 31.9 % EYE OINTMENT: at 19:41

## 2025-05-18 RX ADMIN — CLOPIDOGREL BISULFATE 75 MG: 75 TABLET, FILM COATED ORAL at 11:29

## 2025-05-18 RX ADMIN — FAMOTIDINE 20 MG: 20 TABLET, FILM COATED ORAL at 11:30

## 2025-05-18 RX ADMIN — LEVOTHYROXINE SODIUM ANHYDROUS 50 MCG: 100 INJECTION, POWDER, LYOPHILIZED, FOR SOLUTION INTRAVENOUS at 05:12

## 2025-05-18 RX ADMIN — Medication 1 CAPSULE: at 11:30

## 2025-05-18 RX ADMIN — SODIUM CHLORIDE, PRESERVATIVE FREE 10 ML: 5 INJECTION INTRAVENOUS at 19:41

## 2025-05-18 ASSESSMENT — PAIN SCALES - GENERAL
PAINLEVEL_OUTOF10: 0

## 2025-05-19 ENCOUNTER — TELEPHONE (OUTPATIENT)
Age: 80
End: 2025-05-19

## 2025-05-19 LAB
ANION GAP SERPL CALC-SCNC: 5 MMOL/L (ref 2–12)
BUN SERPL-MCNC: 44 MG/DL (ref 6–20)
BUN/CREAT SERPL: 13 (ref 12–20)
CALCIUM SERPL-MCNC: 7.1 MG/DL (ref 8.5–10.1)
CHLORIDE SERPL-SCNC: 98 MMOL/L (ref 97–108)
CO2 SERPL-SCNC: 32 MMOL/L (ref 21–32)
CREAT SERPL-MCNC: 3.42 MG/DL (ref 0.7–1.3)
ERYTHROCYTE [DISTWIDTH] IN BLOOD BY AUTOMATED COUNT: 16 % (ref 11.5–14.5)
GLUCOSE BLD STRIP.AUTO-MCNC: 102 MG/DL (ref 65–117)
GLUCOSE BLD STRIP.AUTO-MCNC: 103 MG/DL (ref 65–117)
GLUCOSE BLD STRIP.AUTO-MCNC: 105 MG/DL (ref 65–117)
GLUCOSE BLD STRIP.AUTO-MCNC: 117 MG/DL (ref 65–117)
GLUCOSE SERPL-MCNC: 112 MG/DL (ref 65–100)
HCT VFR BLD AUTO: 26.7 % (ref 36.6–50.3)
HGB BLD-MCNC: 8 G/DL (ref 12.1–17)
MAGNESIUM SERPL-MCNC: 2.2 MG/DL (ref 1.6–2.4)
MCH RBC QN AUTO: 32.8 PG (ref 26–34)
MCHC RBC AUTO-ENTMCNC: 30 G/DL (ref 30–36.5)
MCV RBC AUTO: 109.4 FL (ref 80–99)
NRBC # BLD: 0 K/UL (ref 0–0.01)
NRBC BLD-RTO: 0 PER 100 WBC
PHOSPHATE SERPL-MCNC: 5.3 MG/DL (ref 2.6–4.7)
PLATELET # BLD AUTO: 216 K/UL (ref 150–400)
PMV BLD AUTO: 9.9 FL (ref 8.9–12.9)
POTASSIUM SERPL-SCNC: 4.1 MMOL/L (ref 3.5–5.1)
RBC # BLD AUTO: 2.44 M/UL (ref 4.1–5.7)
SERVICE CMNT-IMP: NORMAL
SODIUM SERPL-SCNC: 135 MMOL/L (ref 136–145)
T3FREE SERPL-MCNC: 0.5 PG/ML (ref 2.2–4)
T4 FREE SERPL-MCNC: 0.3 NG/DL (ref 0.8–1.5)
TSH SERPL DL<=0.05 MIU/L-ACNC: 12.4 UIU/ML (ref 0.36–3.74)
WBC # BLD AUTO: 8 K/UL (ref 4.1–11.1)

## 2025-05-19 PROCEDURE — 85027 COMPLETE CBC AUTOMATED: CPT

## 2025-05-19 PROCEDURE — 6370000000 HC RX 637 (ALT 250 FOR IP): Performed by: HOSPITALIST

## 2025-05-19 PROCEDURE — 6360000002 HC RX W HCPCS: Performed by: INTERNAL MEDICINE

## 2025-05-19 PROCEDURE — 2000000000 HC ICU R&B

## 2025-05-19 PROCEDURE — 6370000000 HC RX 637 (ALT 250 FOR IP): Performed by: SURGERY

## 2025-05-19 PROCEDURE — 2500000003 HC RX 250 WO HCPCS: Performed by: SURGERY

## 2025-05-19 PROCEDURE — 84439 ASSAY OF FREE THYROXINE: CPT

## 2025-05-19 PROCEDURE — 2500000003 HC RX 250 WO HCPCS: Performed by: HOSPITALIST

## 2025-05-19 PROCEDURE — P9047 ALBUMIN (HUMAN), 25%, 50ML: HCPCS | Performed by: INTERNAL MEDICINE

## 2025-05-19 PROCEDURE — 2580000003 HC RX 258: Performed by: HOSPITALIST

## 2025-05-19 PROCEDURE — 6370000000 HC RX 637 (ALT 250 FOR IP): Performed by: INTERNAL MEDICINE

## 2025-05-19 PROCEDURE — 36415 COLL VENOUS BLD VENIPUNCTURE: CPT

## 2025-05-19 PROCEDURE — 84443 ASSAY THYROID STIM HORMONE: CPT

## 2025-05-19 PROCEDURE — 84481 FREE ASSAY (FT-3): CPT

## 2025-05-19 PROCEDURE — 6360000002 HC RX W HCPCS: Performed by: HOSPITALIST

## 2025-05-19 PROCEDURE — 82962 GLUCOSE BLOOD TEST: CPT

## 2025-05-19 PROCEDURE — 94660 CPAP INITIATION&MGMT: CPT

## 2025-05-19 PROCEDURE — 83735 ASSAY OF MAGNESIUM: CPT

## 2025-05-19 PROCEDURE — 90935 HEMODIALYSIS ONE EVALUATION: CPT

## 2025-05-19 PROCEDURE — 84100 ASSAY OF PHOSPHORUS: CPT

## 2025-05-19 PROCEDURE — 80048 BASIC METABOLIC PNL TOTAL CA: CPT

## 2025-05-19 PROCEDURE — 6370000000 HC RX 637 (ALT 250 FOR IP): Performed by: STUDENT IN AN ORGANIZED HEALTH CARE EDUCATION/TRAINING PROGRAM

## 2025-05-19 PROCEDURE — P9045 ALBUMIN (HUMAN), 5%, 250 ML: HCPCS | Performed by: HOSPITALIST

## 2025-05-19 PROCEDURE — 2700000000 HC OXYGEN THERAPY PER DAY

## 2025-05-19 RX ORDER — ALBUMIN HUMAN 50 G/1000ML
12.5 SOLUTION INTRAVENOUS ONCE
Status: CANCELLED | OUTPATIENT
Start: 2025-05-19 | End: 2025-05-19

## 2025-05-19 RX ORDER — ALBUMIN HUMAN 50 G/1000ML
25 SOLUTION INTRAVENOUS ONCE
Status: COMPLETED | OUTPATIENT
Start: 2025-05-19 | End: 2025-05-19

## 2025-05-19 RX ORDER — LEVOTHYROXINE SODIUM 50 UG/1
50 TABLET ORAL EVERY MORNING
Status: DISCONTINUED | OUTPATIENT
Start: 2025-05-20 | End: 2025-05-27 | Stop reason: HOSPADM

## 2025-05-19 RX ORDER — ALBUMIN (HUMAN) 12.5 G/50ML
12.5 SOLUTION INTRAVENOUS ONCE
Status: DISCONTINUED | OUTPATIENT
Start: 2025-05-19 | End: 2025-05-19

## 2025-05-19 RX ORDER — METOPROLOL TARTRATE 1 MG/ML
2.5 INJECTION, SOLUTION INTRAVENOUS ONCE
Status: COMPLETED | OUTPATIENT
Start: 2025-05-19 | End: 2025-05-19

## 2025-05-19 RX ORDER — LEVOTHYROXINE SODIUM 75 UG/1
37.5 TABLET ORAL EVERY MORNING
Status: DISCONTINUED | OUTPATIENT
Start: 2025-05-20 | End: 2025-05-19

## 2025-05-19 RX ADMIN — Medication 1 AMPULE: at 08:51

## 2025-05-19 RX ADMIN — SODIUM CHLORIDE, PRESERVATIVE FREE 10 ML: 5 INJECTION INTRAVENOUS at 08:55

## 2025-05-19 RX ADMIN — Medication 1 AMPULE: at 21:44

## 2025-05-19 RX ADMIN — Medication: at 21:46

## 2025-05-19 RX ADMIN — TAMSULOSIN HYDROCHLORIDE 0.4 MG: 0.4 CAPSULE ORAL at 08:52

## 2025-05-19 RX ADMIN — ALBUMIN (HUMAN) 25 G: 12.5 INJECTION, SOLUTION INTRAVENOUS at 15:29

## 2025-05-19 RX ADMIN — MIDODRINE HYDROCHLORIDE 15 MG: 5 TABLET ORAL at 14:46

## 2025-05-19 RX ADMIN — HEPARIN SODIUM 1800 UNITS: 1000 INJECTION INTRAVENOUS; SUBCUTANEOUS at 15:06

## 2025-05-19 RX ADMIN — EPOETIN ALFA-EPBX 10000 UNITS: 10000 INJECTION, SOLUTION INTRAVENOUS; SUBCUTANEOUS at 21:46

## 2025-05-19 RX ADMIN — INSULIN HUMAN 28 UNITS: 100 INJECTION, SUSPENSION SUBCUTANEOUS at 08:54

## 2025-05-19 RX ADMIN — Medication: at 08:54

## 2025-05-19 RX ADMIN — AMIODARONE HYDROCHLORIDE 150 MG: 1.5 INJECTION, SOLUTION INTRAVENOUS at 17:59

## 2025-05-19 RX ADMIN — LEVOTHYROXINE SODIUM ANHYDROUS 50 MCG: 100 INJECTION, POWDER, LYOPHILIZED, FOR SOLUTION INTRAVENOUS at 09:59

## 2025-05-19 RX ADMIN — ALBUMIN (HUMAN) 25 G: 0.25 INJECTION, SOLUTION INTRAVENOUS at 14:47

## 2025-05-19 RX ADMIN — MIDODRINE HYDROCHLORIDE 15 MG: 5 TABLET ORAL at 08:54

## 2025-05-19 RX ADMIN — CLOPIDOGREL BISULFATE 75 MG: 75 TABLET, FILM COATED ORAL at 08:52

## 2025-05-19 RX ADMIN — MIDODRINE HYDROCHLORIDE 15 MG: 5 TABLET ORAL at 21:45

## 2025-05-19 RX ADMIN — ASPIRIN 81 MG: 81 TABLET, CHEWABLE ORAL at 08:52

## 2025-05-19 RX ADMIN — METOPROLOL TARTRATE 2.5 MG: 5 INJECTION INTRAVENOUS at 15:07

## 2025-05-19 RX ADMIN — Medication 1 CAPSULE: at 08:52

## 2025-05-19 RX ADMIN — APIXABAN 5 MG: 5 TABLET, FILM COATED ORAL at 21:45

## 2025-05-19 RX ADMIN — AMIODARONE HYDROCHLORIDE 1 MG/MIN: 50 INJECTION, SOLUTION INTRAVENOUS at 18:12

## 2025-05-19 RX ADMIN — WHITE PETROLATUM 57.7 %-MINERAL OIL 31.9 % EYE OINTMENT: at 21:48

## 2025-05-19 RX ADMIN — SODIUM CHLORIDE, PRESERVATIVE FREE 10 ML: 5 INJECTION INTRAVENOUS at 21:46

## 2025-05-19 ASSESSMENT — PAIN SCALES - GENERAL
PAINLEVEL_OUTOF10: 0

## 2025-05-19 NOTE — FLOWSHEET NOTE
Pre Dialysis   05/18/25 1820   Treatment   Treatment Goal 2.5L in 3 hrs   Observations & Evaluations   Level of Consciousness 0   Heart Rhythm Regular   Respiratory Quality/Effort Dyspnea at rest   O2 Device Nasal cannula   Bilateral Breath Sounds Diminished   Skin Condition/Temp Dry;Warm   Abdomen Inspection Soft;Obese   Edema Left lower extremity;Right lower extremity   RLE Edema +3   LLE Edema +3   Vital Signs   BP (!) 113/42   Temp 97.3 °F (36.3 °C)   Pulse 67   Respirations 27   SpO2 96 %   Pain Assessment   Pain Assessment None - Denies Pain   Hemodialysis Central Access - Permanent/Tunneled Right Neck   Placement Date/Time: 05/14/25 1414   Present on Admission/Arrival: No  Inserted by: Yanelis Garsia NP  Insertion Practices: Chlorohexadine skin antisepsis;Optimal catheter site selection;Sterile ultrasound technique;Hand hygiene;Maximal barrier precaut...   Continued need for line? Yes   Site Assessment Clean, dry & intact   Blue Lumen Status Brisk blood return;Flushed;Alcohol cap present   Red Lumen Status Brisk blood return;Flushed;Alcohol cap present   Line Care Connections checked and tightened;Ports disinfected   Dressing Type Antimicrobial;Sterile dressing, transparent;Gauze   Date of Last Dressing Change 05/16/25   Dressing Status Clean, dry & intact   Technical Checks   Dialysis Machine No. [id number]   RO Machine Number [id number]   Dialyzer Lot No. [id number]   Tubing Lot Number [id number]   All Connections Secure Yes   NS Bag Yes   Saline Line Double Clamped Yes   Dialyzer Nipro ELISIO   Prime Volume (mL) 200 mL   ICEBOAT I;C;E;B;O;A;T   RO Machine Log Sheet Completed Yes   Machine Alarm Self Test Completed;Passed   Air Foam Detector Tested;Proper Function;pH Reading   Extracorporeal Circuit Tested for Integrity Yes   Machine Conductivity 13.7   Manual Ph 7.4   Bleach Test (Neg) Yes   Bath Temperature 96.8 °F (36 °C)   Dialysis Bath   K+ (Potassium) 3   Ca+ (Calcium) 2.5   Na+ (Sodium) 138   HCO3 (Bicarb) 38

## 2025-05-19 NOTE — FLOWSHEET NOTE
COMPLETED HD TX SUMMARY: 2.5hrs HD completed with UF = 1031mL. Patient went in and out of SVT despire lowering BFR & DFR, stopping UF, giving 100mL albumin and 200mL NS bolus. STOPPED HD per Dr. Park.    PRE HD   05/19/25 1215   Treatment   Treatment Goal 2.5L   Observations & Evaluations   Level of Consciousness 1   Oriented X 1   Heart Rhythm Regular   Respiratory Quality/Effort Unlabored   O2 Device None (Room air)   Bilateral Breath Sounds Diminished   Skin Color Pale   Skin Condition/Temp Dry;Warm   Abdomen Inspection Soft;Obese   Edema Generalized Trace   Vital Signs   BP (!) 118/47   Temp 97.6 °F (36.4 °C)   Pulse 59   Respirations 23   SpO2 95 %   Pain Assessment   Pain Assessment None - Denies Pain   Pain Level 0   Hemodialysis Central Access - Permanent/Tunneled Right Neck   Placement Date/Time: 05/14/25 1414   Present on Admission/Arrival: No  Inserted by: Yanelis Garsia NP  Insertion Practices: Chlorohexadine skin antisepsis;Optimal catheter site selection;Sterile ultrasound technique;Hand hygiene;Maximal barrier precaut...   Continued need for line? Yes   Site Assessment Clean, dry & intact   Blue Lumen Status Flushed;Brisk blood return   Red Lumen Status Brisk blood return;Flushed   Line Care Chlorhexidine wipes;Ports disinfected   Dressing Type Bacteriocidal;Sterile dressing, transparent   Date of Last Dressing Change 05/16/25   Dressing Status Clean, dry & intact   Technical Checks   Dialysis Machine No. [id number]   RO Machine Number [id number]   Dialyzer Lot No. [id number]   Tubing Lot Number [id number]   All Connections Secure Yes   NS Bag Yes   Saline Line Double Clamped Yes   Dialyzer Nipro ELISIO   Prime Volume (mL) 200 mL   ICEBOAT I;C;E;B;O;A;T   RO Machine Log Sheet Completed Yes   Machine Alarm Self Test Completed;Passed   Air Foam Detector Tested;Proper Function;pH Reading   Extracorporeal Circuit Tested for Integrity Yes   Machine Conductivity 13.7   Manual Conductivity 13.6   Manual Ph 7.4

## 2025-05-19 NOTE — TELEPHONE ENCOUNTER
Do you want to take patient of off schedule for tomorrow due to him still being in hospital. When do you want to re-schedule?

## 2025-05-19 NOTE — INTERDISCIPLINARY ROUNDS
Interdisciplinary team rounds were held 5/19/2025 with the following team members: Physician, Nursing, Dietitian, Pharmacist, , and the CCU Charge RN.    Plan of care discussed. See clinical pathway and/or care plan for interventions and desired outcomes.    Goals of the Day: Continue with curiel catheter for urinary retention.

## 2025-05-19 NOTE — CARE COORDINATION
Transition of Care Plan:     RUR: 25% (high RUR)  Prior Level of Functioning: Independent  Disposition: [facility] (auth approved) vs. LTAC as a back up plan, then return home afterwards  SHALONDA: 5/24/25/  If SNF or IPR: Date FOC offered: 4/3/2025  Date FOC received: 4/3/2025  Accepting facility: [facility]  Date authorization started with reference number:  Ref ID #: [id number]  Date authorization received and expires: 5/8/2025, for 5/9/2025 to 5/13/2025, Auth ID # pending.--pt. Moved to ccu  Follow up appointments: defer to SNF.  DME needed: defer to SNF.  Patient has a RW, wheelchair, grab bars and shower chair at home.  Transportation at discharge: [facility] anticipated  IM/IMM Medicare/ letter given: 2nd IM needed prior to discharge.  Is patient a [place] and connected with [facility]? No.              If yes, was [place] transfer form completed and [facility] notified? N/A  Caregiver Contact: Julio eastman - [phone] Laure [email or web address]  Discharge Caregiver contacted prior to discharge? CM to contact, if needed.  Care Conference needed? No.  Barriers to discharge: Cards/pulm/nephrology clearance, BG stability, IV abx line, Echo       Patient discussed in rounds. No family at bedside. Dr Park--nephrology following.         Taisha Abbasi RN BSN CRM        [phone]

## 2025-05-19 NOTE — DIALYSIS
ETA for bedside HD is around 1200.    Call with questions!    Marquita Hickey RN  [facility]  [phone]

## 2025-05-20 ENCOUNTER — APPOINTMENT (OUTPATIENT)
Facility: HOSPITAL | Age: 80
DRG: 622 | End: 2025-05-20
Payer: MEDICARE

## 2025-05-20 LAB
ANION GAP SERPL CALC-SCNC: 4 MMOL/L (ref 2–12)
BUN SERPL-MCNC: 31 MG/DL (ref 6–20)
BUN/CREAT SERPL: 10 (ref 12–20)
CALCIUM SERPL-MCNC: 7.3 MG/DL (ref 8.5–10.1)
CHLORIDE SERPL-SCNC: 98 MMOL/L (ref 97–108)
CO2 SERPL-SCNC: 32 MMOL/L (ref 21–32)
CREAT SERPL-MCNC: 3.02 MG/DL (ref 0.7–1.3)
ERYTHROCYTE [DISTWIDTH] IN BLOOD BY AUTOMATED COUNT: 15.9 % (ref 11.5–14.5)
GLUCOSE BLD STRIP.AUTO-MCNC: 115 MG/DL (ref 65–117)
GLUCOSE BLD STRIP.AUTO-MCNC: 122 MG/DL (ref 65–117)
GLUCOSE BLD STRIP.AUTO-MCNC: 41 MG/DL (ref 65–117)
GLUCOSE BLD STRIP.AUTO-MCNC: 62 MG/DL (ref 65–117)
GLUCOSE BLD STRIP.AUTO-MCNC: 69 MG/DL (ref 65–117)
GLUCOSE BLD STRIP.AUTO-MCNC: 74 MG/DL (ref 65–117)
GLUCOSE BLD STRIP.AUTO-MCNC: 85 MG/DL (ref 65–117)
GLUCOSE SERPL-MCNC: 100 MG/DL (ref 65–100)
HCT VFR BLD AUTO: 26.7 % (ref 36.6–50.3)
HGB BLD-MCNC: 8.1 G/DL (ref 12.1–17)
MAGNESIUM SERPL-MCNC: 1.9 MG/DL (ref 1.6–2.4)
MCH RBC QN AUTO: 32.3 PG (ref 26–34)
MCHC RBC AUTO-ENTMCNC: 30.3 G/DL (ref 30–36.5)
MCV RBC AUTO: 106.4 FL (ref 80–99)
NRBC # BLD: 0 K/UL (ref 0–0.01)
NRBC BLD-RTO: 0 PER 100 WBC
PHOSPHATE SERPL-MCNC: 3.5 MG/DL (ref 2.6–4.7)
PLATELET # BLD AUTO: 216 K/UL (ref 150–400)
PMV BLD AUTO: 9.8 FL (ref 8.9–12.9)
POTASSIUM SERPL-SCNC: 3.7 MMOL/L (ref 3.5–5.1)
RBC # BLD AUTO: 2.51 M/UL (ref 4.1–5.7)
SERVICE CMNT-IMP: ABNORMAL
SERVICE CMNT-IMP: NORMAL
SODIUM SERPL-SCNC: 134 MMOL/L (ref 136–145)
WBC # BLD AUTO: 9.8 K/UL (ref 4.1–11.1)

## 2025-05-20 PROCEDURE — 6370000000 HC RX 637 (ALT 250 FOR IP): Performed by: STUDENT IN AN ORGANIZED HEALTH CARE EDUCATION/TRAINING PROGRAM

## 2025-05-20 PROCEDURE — 6370000000 HC RX 637 (ALT 250 FOR IP): Performed by: HOSPITALIST

## 2025-05-20 PROCEDURE — 6370000000 HC RX 637 (ALT 250 FOR IP): Performed by: SURGERY

## 2025-05-20 PROCEDURE — 80048 BASIC METABOLIC PNL TOTAL CA: CPT

## 2025-05-20 PROCEDURE — 2060000000 HC ICU INTERMEDIATE R&B

## 2025-05-20 PROCEDURE — 2580000003 HC RX 258: Performed by: HOSPITALIST

## 2025-05-20 PROCEDURE — 6370000000 HC RX 637 (ALT 250 FOR IP): Performed by: INTERNAL MEDICINE

## 2025-05-20 PROCEDURE — 71045 X-RAY EXAM CHEST 1 VIEW: CPT

## 2025-05-20 PROCEDURE — 83735 ASSAY OF MAGNESIUM: CPT

## 2025-05-20 PROCEDURE — 36415 COLL VENOUS BLD VENIPUNCTURE: CPT

## 2025-05-20 PROCEDURE — 2580000003 HC RX 258: Performed by: INTERNAL MEDICINE

## 2025-05-20 PROCEDURE — 82962 GLUCOSE BLOOD TEST: CPT

## 2025-05-20 PROCEDURE — 85027 COMPLETE CBC AUTOMATED: CPT

## 2025-05-20 PROCEDURE — 6360000002 HC RX W HCPCS: Performed by: INTERNAL MEDICINE

## 2025-05-20 PROCEDURE — 94660 CPAP INITIATION&MGMT: CPT

## 2025-05-20 PROCEDURE — 97530 THERAPEUTIC ACTIVITIES: CPT | Performed by: OCCUPATIONAL THERAPIST

## 2025-05-20 PROCEDURE — 84100 ASSAY OF PHOSPHORUS: CPT

## 2025-05-20 PROCEDURE — 2500000003 HC RX 250 WO HCPCS: Performed by: SURGERY

## 2025-05-20 PROCEDURE — 99232 SBSQ HOSP IP/OBS MODERATE 35: CPT

## 2025-05-20 PROCEDURE — 6360000002 HC RX W HCPCS: Performed by: HOSPITALIST

## 2025-05-20 RX ADMIN — APIXABAN 5 MG: 5 TABLET, FILM COATED ORAL at 10:39

## 2025-05-20 RX ADMIN — Medication 1 AMPULE: at 10:47

## 2025-05-20 RX ADMIN — Medication 16 G: at 21:38

## 2025-05-20 RX ADMIN — ASPIRIN 81 MG: 81 TABLET, CHEWABLE ORAL at 10:40

## 2025-05-20 RX ADMIN — MIDODRINE HYDROCHLORIDE 15 MG: 5 TABLET ORAL at 10:40

## 2025-05-20 RX ADMIN — SODIUM CHLORIDE, PRESERVATIVE FREE 10 ML: 5 INJECTION INTRAVENOUS at 10:47

## 2025-05-20 RX ADMIN — Medication: at 21:27

## 2025-05-20 RX ADMIN — LEVOTHYROXINE SODIUM 50 MCG: 0.05 TABLET ORAL at 10:41

## 2025-05-20 RX ADMIN — ATENOLOL 12.5 MG: 25 TABLET ORAL at 10:52

## 2025-05-20 RX ADMIN — AMIODARONE HYDROCHLORIDE 1 MG/MIN: 50 INJECTION, SOLUTION INTRAVENOUS at 02:00

## 2025-05-20 RX ADMIN — CLOPIDOGREL BISULFATE 75 MG: 75 TABLET, FILM COATED ORAL at 10:39

## 2025-05-20 RX ADMIN — Medication 1 CAPSULE: at 10:41

## 2025-05-20 RX ADMIN — SODIUM CHLORIDE, PRESERVATIVE FREE 10 ML: 5 INJECTION INTRAVENOUS at 21:57

## 2025-05-20 RX ADMIN — TAMSULOSIN HYDROCHLORIDE 0.4 MG: 0.4 CAPSULE ORAL at 10:47

## 2025-05-20 RX ADMIN — SODIUM CHLORIDE 650 MG: 9 INJECTION INTRAMUSCULAR; INTRAVENOUS; SUBCUTANEOUS at 16:03

## 2025-05-20 RX ADMIN — Medication: at 08:30

## 2025-05-20 RX ADMIN — MIDODRINE HYDROCHLORIDE 15 MG: 5 TABLET ORAL at 16:03

## 2025-05-20 RX ADMIN — APIXABAN 5 MG: 5 TABLET, FILM COATED ORAL at 21:27

## 2025-05-20 RX ADMIN — WHITE PETROLATUM 57.7 %-MINERAL OIL 31.9 % EYE OINTMENT: at 23:09

## 2025-05-20 RX ADMIN — Medication 1 AMPULE: at 21:58

## 2025-05-20 RX ADMIN — INSULIN HUMAN 28 UNITS: 100 INJECTION, SUSPENSION SUBCUTANEOUS at 10:39

## 2025-05-20 RX ADMIN — MIDODRINE HYDROCHLORIDE 15 MG: 5 TABLET ORAL at 21:27

## 2025-05-20 RX ADMIN — AMIODARONE HYDROCHLORIDE 0.5 MG/MIN: 50 INJECTION, SOLUTION INTRAVENOUS at 10:38

## 2025-05-20 RX ADMIN — ACETAMINOPHEN 650 MG: 325 TABLET ORAL at 21:57

## 2025-05-20 ASSESSMENT — PAIN SCALES - WONG BAKER
WONGBAKER_NUMERICALRESPONSE: NO HURT
WONGBAKER_NUMERICALRESPONSE: NO HURT

## 2025-05-20 ASSESSMENT — PAIN DESCRIPTION - ORIENTATION: ORIENTATION: RIGHT;LEFT

## 2025-05-20 ASSESSMENT — PAIN SCALES - GENERAL
PAINLEVEL_OUTOF10: 0
PAINLEVEL_OUTOF10: 0
PAINLEVEL_OUTOF10: 4

## 2025-05-20 ASSESSMENT — PAIN DESCRIPTION - LOCATION: LOCATION: LEG

## 2025-05-21 LAB
ANION GAP SERPL CALC-SCNC: 6 MMOL/L (ref 2–12)
BASOPHILS # BLD: 0.01 K/UL (ref 0–0.1)
BASOPHILS NFR BLD: 0.1 % (ref 0–1)
BUN SERPL-MCNC: 47 MG/DL (ref 6–20)
BUN/CREAT SERPL: 12 (ref 12–20)
CALCIUM SERPL-MCNC: 7.3 MG/DL (ref 8.5–10.1)
CHLORIDE SERPL-SCNC: 96 MMOL/L (ref 97–108)
CO2 SERPL-SCNC: 28 MMOL/L (ref 21–32)
CREAT SERPL-MCNC: 3.97 MG/DL (ref 0.7–1.3)
DIFFERENTIAL METHOD BLD: ABNORMAL
EOSINOPHIL # BLD: 0.03 K/UL (ref 0–0.4)
EOSINOPHIL NFR BLD: 0.4 % (ref 0–7)
ERYTHROCYTE [DISTWIDTH] IN BLOOD BY AUTOMATED COUNT: 16.6 % (ref 11.5–14.5)
GLUCOSE BLD STRIP.AUTO-MCNC: 117 MG/DL (ref 65–117)
GLUCOSE BLD STRIP.AUTO-MCNC: 175 MG/DL (ref 65–117)
GLUCOSE BLD STRIP.AUTO-MCNC: 32 MG/DL (ref 65–117)
GLUCOSE BLD STRIP.AUTO-MCNC: 41 MG/DL (ref 65–117)
GLUCOSE BLD STRIP.AUTO-MCNC: 99 MG/DL (ref 65–117)
GLUCOSE SERPL-MCNC: 75 MG/DL (ref 65–100)
HCT VFR BLD AUTO: 27.9 % (ref 36.6–50.3)
HGB BLD-MCNC: 8.7 G/DL (ref 12.1–17)
IMM GRANULOCYTES # BLD AUTO: 0.05 K/UL (ref 0–0.04)
IMM GRANULOCYTES NFR BLD AUTO: 0.6 % (ref 0–0.5)
LYMPHOCYTES # BLD: 2.22 K/UL (ref 0.8–3.5)
LYMPHOCYTES NFR BLD: 28 % (ref 12–49)
MCH RBC QN AUTO: 33.1 PG (ref 26–34)
MCHC RBC AUTO-ENTMCNC: 31.2 G/DL (ref 30–36.5)
MCV RBC AUTO: 106.1 FL (ref 80–99)
MONOCYTES # BLD: 1.15 K/UL (ref 0–1)
MONOCYTES NFR BLD: 14.5 % (ref 5–13)
NEUTS SEG # BLD: 4.48 K/UL (ref 1.8–8)
NEUTS SEG NFR BLD: 56.4 % (ref 32–75)
NRBC # BLD: 0 K/UL (ref 0–0.01)
NRBC BLD-RTO: 0 PER 100 WBC
PLATELET # BLD AUTO: 208 K/UL (ref 150–400)
PMV BLD AUTO: 10.3 FL (ref 8.9–12.9)
POTASSIUM SERPL-SCNC: 4.3 MMOL/L (ref 3.5–5.1)
RBC # BLD AUTO: 2.63 M/UL (ref 4.1–5.7)
SERVICE CMNT-IMP: ABNORMAL
SERVICE CMNT-IMP: NORMAL
SERVICE CMNT-IMP: NORMAL
SODIUM SERPL-SCNC: 130 MMOL/L (ref 136–145)
WBC # BLD AUTO: 7.9 K/UL (ref 4.1–11.1)

## 2025-05-21 PROCEDURE — 51798 US URINE CAPACITY MEASURE: CPT

## 2025-05-21 PROCEDURE — 85025 COMPLETE CBC W/AUTO DIFF WBC: CPT

## 2025-05-21 PROCEDURE — 97530 THERAPEUTIC ACTIVITIES: CPT | Performed by: PHYSICAL THERAPIST

## 2025-05-21 PROCEDURE — 36415 COLL VENOUS BLD VENIPUNCTURE: CPT

## 2025-05-21 PROCEDURE — 2580000003 HC RX 258: Performed by: STUDENT IN AN ORGANIZED HEALTH CARE EDUCATION/TRAINING PROGRAM

## 2025-05-21 PROCEDURE — 6370000000 HC RX 637 (ALT 250 FOR IP): Performed by: HOSPITALIST

## 2025-05-21 PROCEDURE — 6370000000 HC RX 637 (ALT 250 FOR IP): Performed by: SURGERY

## 2025-05-21 PROCEDURE — 80048 BASIC METABOLIC PNL TOTAL CA: CPT

## 2025-05-21 PROCEDURE — 6370000000 HC RX 637 (ALT 250 FOR IP): Performed by: INTERNAL MEDICINE

## 2025-05-21 PROCEDURE — 2060000000 HC ICU INTERMEDIATE R&B

## 2025-05-21 PROCEDURE — 99232 SBSQ HOSP IP/OBS MODERATE 35: CPT

## 2025-05-21 PROCEDURE — 2500000003 HC RX 250 WO HCPCS: Performed by: STUDENT IN AN ORGANIZED HEALTH CARE EDUCATION/TRAINING PROGRAM

## 2025-05-21 PROCEDURE — 90935 HEMODIALYSIS ONE EVALUATION: CPT

## 2025-05-21 PROCEDURE — 2580000003 HC RX 258: Performed by: INTERNAL MEDICINE

## 2025-05-21 PROCEDURE — 2500000003 HC RX 250 WO HCPCS: Performed by: SURGERY

## 2025-05-21 PROCEDURE — 94660 CPAP INITIATION&MGMT: CPT

## 2025-05-21 PROCEDURE — 82962 GLUCOSE BLOOD TEST: CPT

## 2025-05-21 PROCEDURE — 6360000002 HC RX W HCPCS: Performed by: HOSPITALIST

## 2025-05-21 RX ORDER — DEXTROSE MONOHYDRATE 25 G/50ML
25 INJECTION, SOLUTION INTRAVENOUS PRN
Status: DISCONTINUED | OUTPATIENT
Start: 2025-05-21 | End: 2025-05-27 | Stop reason: HOSPADM

## 2025-05-21 RX ORDER — DEXTROSE MONOHYDRATE AND SODIUM CHLORIDE 5; .45 G/100ML; G/100ML
INJECTION, SOLUTION INTRAVENOUS CONTINUOUS
Status: DISCONTINUED | OUTPATIENT
Start: 2025-05-21 | End: 2025-05-21

## 2025-05-21 RX ORDER — DEXTROSE MONOHYDRATE 100 MG/ML
INJECTION, SOLUTION INTRAVENOUS CONTINUOUS
Status: DISCONTINUED | OUTPATIENT
Start: 2025-05-21 | End: 2025-05-22

## 2025-05-21 RX ADMIN — LEVOTHYROXINE SODIUM 50 MCG: 0.05 TABLET ORAL at 08:58

## 2025-05-21 RX ADMIN — MIDODRINE HYDROCHLORIDE 15 MG: 5 TABLET ORAL at 08:58

## 2025-05-21 RX ADMIN — EPOETIN ALFA-EPBX 10000 UNITS: 10000 INJECTION, SOLUTION INTRAVENOUS; SUBCUTANEOUS at 21:50

## 2025-05-21 RX ADMIN — DEXTROSE MONOHYDRATE 25 G: 25 INJECTION, SOLUTION INTRAVENOUS at 06:13

## 2025-05-21 RX ADMIN — DEXTROSE AND SODIUM CHLORIDE: 5; .45 INJECTION, SOLUTION INTRAVENOUS at 06:17

## 2025-05-21 RX ADMIN — Medication: at 21:51

## 2025-05-21 RX ADMIN — Medication 1 AMPULE: at 21:50

## 2025-05-21 RX ADMIN — Medication 1 CAPSULE: at 08:58

## 2025-05-21 RX ADMIN — Medication: at 08:59

## 2025-05-21 RX ADMIN — DEXTROSE: 10 SOLUTION INTRAVENOUS at 10:13

## 2025-05-21 RX ADMIN — WHITE PETROLATUM 57.7 %-MINERAL OIL 31.9 % EYE OINTMENT: at 21:50

## 2025-05-21 RX ADMIN — Medication 1 AMPULE: at 10:14

## 2025-05-21 RX ADMIN — SODIUM CHLORIDE, PRESERVATIVE FREE 10 ML: 5 INJECTION INTRAVENOUS at 08:58

## 2025-05-21 RX ADMIN — MIDODRINE HYDROCHLORIDE 15 MG: 5 TABLET ORAL at 21:51

## 2025-05-21 RX ADMIN — APIXABAN 5 MG: 5 TABLET, FILM COATED ORAL at 21:51

## 2025-05-21 RX ADMIN — TAMSULOSIN HYDROCHLORIDE 0.4 MG: 0.4 CAPSULE ORAL at 08:58

## 2025-05-21 RX ADMIN — APIXABAN 5 MG: 5 TABLET, FILM COATED ORAL at 08:58

## 2025-05-21 ASSESSMENT — PAIN SCALES - GENERAL
PAINLEVEL_OUTOF10: 0
PAINLEVEL_OUTOF10: 0

## 2025-05-21 ASSESSMENT — PAIN SCALES - WONG BAKER
WONGBAKER_NUMERICALRESPONSE: NO HURT
WONGBAKER_NUMERICALRESPONSE: NO HURT

## 2025-05-21 NOTE — FLOWSHEET NOTE
Primary RN SBAR: Dana Hughes RN  Incapacitated Nurse [facility]. provided: yes  Patient Education provided: need for HD procedure; procedure goals and orders  Preferred Education method and Primary language: verbal/ English  Dialysis consent: on file  Hospital General Consent Verified: yes  Hospital associated wait time; reason: 29min to suite; 29min back to room  Hepatitis B Surface Ag   Date/Time Value Ref Range Status   05/14/2025 02:30 PM 0.16 Index Final     Hep B S Ag Interp   Date/Time Value Ref Range Status   05/14/2025 02:30 PM Negative NEG   Final     Hep B S Ab   Date/Time Value Ref Range Status   05/14/2025 02:30 PM <3.10 mIU/mL Final     Hep B S Ab Interp   Date/Time Value Ref Range Status   05/14/2025 02:30 PM NONREACTIVE NR   Final     Comment:     (NOTE)  The ADVIA Centaur Anti-HBs2 assay is traceable to the World Health   Organization (WHO) Hepatitis B Immunoglobulin 1st International   Reference Preparation (1977). Samples with a calculated value of 10   mIU/mL or greater are considered reactive (protective) in accordance   with the CDC guidelines. The accepted criteria for immunity to HBV is   anti-HBs activity greater than or equal to 10 mIU/mL, as defined by   the WHO International Reference Preparation.  Assay performance has not been established in pregnant women,   patients who are immunosuppressed or immunocompromised, nor have   performance characteristics been established in conjunction with   other 's assays for specific HBV serologic markers. This   assay does not differentiate between vaccine induced immune response   and a response due to infection with HBV. Passively acquired anti-HBs   may be identified following patient transfusion, receipt of   immunoglobulin products, etc.        Pre-HD   05/21/25 1300   Observations & Evaluations   Level of Consciousness 0   Oriented X 1   Heart Rhythm Regular   Respiratory Quality/Effort Unlabored   O2 Device Nasal cannula   Bilateral

## 2025-05-22 LAB
ANION GAP SERPL CALC-SCNC: 6 MMOL/L (ref 2–12)
BASOPHILS # BLD: 0.01 K/UL (ref 0–0.1)
BASOPHILS NFR BLD: 0.1 % (ref 0–1)
BUN SERPL-MCNC: 29 MG/DL (ref 6–20)
BUN/CREAT SERPL: 9 (ref 12–20)
CALCIUM SERPL-MCNC: 7.2 MG/DL (ref 8.5–10.1)
CHLORIDE SERPL-SCNC: 93 MMOL/L (ref 97–108)
CO2 SERPL-SCNC: 30 MMOL/L (ref 21–32)
CREAT SERPL-MCNC: 3.16 MG/DL (ref 0.7–1.3)
DIFFERENTIAL METHOD BLD: ABNORMAL
EOSINOPHIL # BLD: 0.09 K/UL (ref 0–0.4)
EOSINOPHIL NFR BLD: 1.3 % (ref 0–7)
ERYTHROCYTE [DISTWIDTH] IN BLOOD BY AUTOMATED COUNT: 16.7 % (ref 11.5–14.5)
GLUCOSE BLD STRIP.AUTO-MCNC: 147 MG/DL (ref 65–117)
GLUCOSE BLD STRIP.AUTO-MCNC: 163 MG/DL (ref 65–117)
GLUCOSE BLD STRIP.AUTO-MCNC: 178 MG/DL (ref 65–117)
GLUCOSE BLD STRIP.AUTO-MCNC: 213 MG/DL (ref 65–117)
GLUCOSE SERPL-MCNC: 130 MG/DL (ref 65–100)
HCT VFR BLD AUTO: 27.6 % (ref 36.6–50.3)
HGB BLD-MCNC: 8.4 G/DL (ref 12.1–17)
IMM GRANULOCYTES # BLD AUTO: 0.04 K/UL (ref 0–0.04)
IMM GRANULOCYTES NFR BLD AUTO: 0.6 % (ref 0–0.5)
LYMPHOCYTES # BLD: 1.65 K/UL (ref 0.8–3.5)
LYMPHOCYTES NFR BLD: 23.8 % (ref 12–49)
MAGNESIUM SERPL-MCNC: 1.8 MG/DL (ref 1.6–2.4)
MCH RBC QN AUTO: 32.2 PG (ref 26–34)
MCHC RBC AUTO-ENTMCNC: 30.4 G/DL (ref 30–36.5)
MCV RBC AUTO: 105.7 FL (ref 80–99)
MONOCYTES # BLD: 0.8 K/UL (ref 0–1)
MONOCYTES NFR BLD: 11.6 % (ref 5–13)
NEUTS SEG # BLD: 4.33 K/UL (ref 1.8–8)
NEUTS SEG NFR BLD: 62.6 % (ref 32–75)
NRBC # BLD: 0 K/UL (ref 0–0.01)
NRBC BLD-RTO: 0 PER 100 WBC
PHOSPHATE SERPL-MCNC: 3.6 MG/DL (ref 2.6–4.7)
PLATELET # BLD AUTO: 197 K/UL (ref 150–400)
PMV BLD AUTO: 10.1 FL (ref 8.9–12.9)
POTASSIUM SERPL-SCNC: 3.8 MMOL/L (ref 3.5–5.1)
RBC # BLD AUTO: 2.61 M/UL (ref 4.1–5.7)
SERVICE CMNT-IMP: ABNORMAL
SODIUM SERPL-SCNC: 129 MMOL/L (ref 136–145)
WBC # BLD AUTO: 6.9 K/UL (ref 4.1–11.1)

## 2025-05-22 PROCEDURE — 6370000000 HC RX 637 (ALT 250 FOR IP): Performed by: INTERNAL MEDICINE

## 2025-05-22 PROCEDURE — 6370000000 HC RX 637 (ALT 250 FOR IP): Performed by: HOSPITALIST

## 2025-05-22 PROCEDURE — 84100 ASSAY OF PHOSPHORUS: CPT

## 2025-05-22 PROCEDURE — 82962 GLUCOSE BLOOD TEST: CPT

## 2025-05-22 PROCEDURE — 6370000000 HC RX 637 (ALT 250 FOR IP): Performed by: SURGERY

## 2025-05-22 PROCEDURE — 99221 1ST HOSP IP/OBS SF/LOW 40: CPT | Performed by: INTERNAL MEDICINE

## 2025-05-22 PROCEDURE — 94660 CPAP INITIATION&MGMT: CPT

## 2025-05-22 PROCEDURE — 83735 ASSAY OF MAGNESIUM: CPT

## 2025-05-22 PROCEDURE — 85025 COMPLETE CBC W/AUTO DIFF WBC: CPT

## 2025-05-22 PROCEDURE — 97530 THERAPEUTIC ACTIVITIES: CPT

## 2025-05-22 PROCEDURE — 2500000003 HC RX 250 WO HCPCS: Performed by: SURGERY

## 2025-05-22 PROCEDURE — 36415 COLL VENOUS BLD VENIPUNCTURE: CPT

## 2025-05-22 PROCEDURE — 2060000000 HC ICU INTERMEDIATE R&B

## 2025-05-22 PROCEDURE — 80048 BASIC METABOLIC PNL TOTAL CA: CPT

## 2025-05-22 PROCEDURE — 97535 SELF CARE MNGMENT TRAINING: CPT

## 2025-05-22 PROCEDURE — 2580000003 HC RX 258: Performed by: INTERNAL MEDICINE

## 2025-05-22 PROCEDURE — 2700000000 HC OXYGEN THERAPY PER DAY

## 2025-05-22 RX ADMIN — APIXABAN 5 MG: 5 TABLET, FILM COATED ORAL at 21:19

## 2025-05-22 RX ADMIN — WHITE PETROLATUM 57.7 %-MINERAL OIL 31.9 % EYE OINTMENT: at 21:21

## 2025-05-22 RX ADMIN — APIXABAN 5 MG: 5 TABLET, FILM COATED ORAL at 08:15

## 2025-05-22 RX ADMIN — SODIUM CHLORIDE, PRESERVATIVE FREE 10 ML: 5 INJECTION INTRAVENOUS at 21:22

## 2025-05-22 RX ADMIN — MIDODRINE HYDROCHLORIDE 15 MG: 5 TABLET ORAL at 08:15

## 2025-05-22 RX ADMIN — DEXTROSE 1000 ML: 10 SOLUTION INTRAVENOUS at 06:56

## 2025-05-22 RX ADMIN — Medication 1 AMPULE: at 21:25

## 2025-05-22 RX ADMIN — Medication 1 AMPULE: at 14:09

## 2025-05-22 RX ADMIN — Medication: at 08:16

## 2025-05-22 RX ADMIN — MIDODRINE HYDROCHLORIDE 15 MG: 5 TABLET ORAL at 21:19

## 2025-05-22 RX ADMIN — MIDODRINE HYDROCHLORIDE 15 MG: 5 TABLET ORAL at 14:39

## 2025-05-22 RX ADMIN — LEVOTHYROXINE SODIUM 50 MCG: 0.05 TABLET ORAL at 08:15

## 2025-05-22 RX ADMIN — Medication: at 21:21

## 2025-05-22 RX ADMIN — INSULIN HUMAN 10 UNITS: 100 INJECTION, SUSPENSION SUBCUTANEOUS at 08:14

## 2025-05-22 RX ADMIN — INSULIN LISPRO 2 UNITS: 100 INJECTION, SOLUTION INTRAVENOUS; SUBCUTANEOUS at 21:19

## 2025-05-22 RX ADMIN — Medication 1 CAPSULE: at 08:15

## 2025-05-22 RX ADMIN — TAMSULOSIN HYDROCHLORIDE 0.4 MG: 0.4 CAPSULE ORAL at 08:15

## 2025-05-22 ASSESSMENT — PAIN SCALES - GENERAL
PAINLEVEL_OUTOF10: 0

## 2025-05-22 ASSESSMENT — PAIN SCALES - WONG BAKER
WONGBAKER_NUMERICALRESPONSE: NO HURT

## 2025-05-22 NOTE — CARE COORDINATION
Transition of Care Plan:    RUR: 23%  Prior Level of Functioning:   Disposition: [facility] on site  SHALONDA: 5/23  If SNF or IPR: Date FOC offered: [facility] offered 5/22 as alternative to [facility] as he needs transportation to dialysis which would cost family $945.00 a week  Date FOC received: 9/22  Accepting facility: [facility]  Date authorization started with reference number: [id number]  Date authorization received and expires:   Follow up appointments: facility  DME needed: he has rolling walker, wheelchair, grab bars, shower chair  Transportation at discharge: to be arranged  IM/IMM Medicare/ letter given: to be signed  Is patient a  and connected with [facility]?    If yes, was [place] transfer form completed and VA notified?   Caregiver Contact:     Julio Brandt (Child)  [phone]     Discharge Caregiver contacted prior to discharge?   Care Conference needed?   Barriers to discharge: tentative DC 5/23 After dialysis  DC pending auth  approval of insurance and Den acceptance at facility for dialysis  He has been accepted to [facility] on [address] [place], [place] for Monday Wednesday, and Friday dialysis 3rd shift when he is discharged from SNF facility  CM sent doctor update. Son is in agreement. Discussed plans with patient.  English Tolliver RN CM [id number]

## 2025-05-23 LAB
ANION GAP SERPL CALC-SCNC: 6 MMOL/L (ref 2–12)
BASOPHILS # BLD: 0 K/UL (ref 0–0.1)
BASOPHILS NFR BLD: 0 % (ref 0–1)
BUN SERPL-MCNC: 40 MG/DL (ref 6–20)
BUN/CREAT SERPL: 10 (ref 12–20)
CALCIUM SERPL-MCNC: 7.2 MG/DL (ref 8.5–10.1)
CHLORIDE SERPL-SCNC: 94 MMOL/L (ref 97–108)
CO2 SERPL-SCNC: 31 MMOL/L (ref 21–32)
CREAT SERPL-MCNC: 4.06 MG/DL (ref 0.7–1.3)
DIFFERENTIAL METHOD BLD: ABNORMAL
EOSINOPHIL # BLD: 0.13 K/UL (ref 0–0.4)
EOSINOPHIL NFR BLD: 1.7 % (ref 0–7)
ERYTHROCYTE [DISTWIDTH] IN BLOOD BY AUTOMATED COUNT: 16.7 % (ref 11.5–14.5)
GLUCOSE BLD STRIP.AUTO-MCNC: 108 MG/DL (ref 65–117)
GLUCOSE BLD STRIP.AUTO-MCNC: 127 MG/DL (ref 65–117)
GLUCOSE BLD STRIP.AUTO-MCNC: 171 MG/DL (ref 65–117)
GLUCOSE SERPL-MCNC: 81 MG/DL (ref 65–100)
HCT VFR BLD AUTO: 28.3 % (ref 36.6–50.3)
HGB BLD-MCNC: 8.6 G/DL (ref 12.1–17)
IMM GRANULOCYTES # BLD AUTO: 0.04 K/UL (ref 0–0.04)
IMM GRANULOCYTES NFR BLD AUTO: 0.5 % (ref 0–0.5)
LYMPHOCYTES # BLD: 1.46 K/UL (ref 0.8–3.5)
LYMPHOCYTES NFR BLD: 18.6 % (ref 12–49)
MAGNESIUM SERPL-MCNC: 2.1 MG/DL (ref 1.6–2.4)
MCH RBC QN AUTO: 32.8 PG (ref 26–34)
MCHC RBC AUTO-ENTMCNC: 30.4 G/DL (ref 30–36.5)
MCV RBC AUTO: 108 FL (ref 80–99)
MONOCYTES # BLD: 0.98 K/UL (ref 0–1)
MONOCYTES NFR BLD: 12.5 % (ref 5–13)
NEUTS SEG # BLD: 5.26 K/UL (ref 1.8–8)
NEUTS SEG NFR BLD: 66.7 % (ref 32–75)
NRBC # BLD: 0 K/UL (ref 0–0.01)
NRBC BLD-RTO: 0 PER 100 WBC
PHOSPHATE SERPL-MCNC: 4.6 MG/DL (ref 2.6–4.7)
PLATELET # BLD AUTO: 203 K/UL (ref 150–400)
PMV BLD AUTO: 10.1 FL (ref 8.9–12.9)
POTASSIUM SERPL-SCNC: 3.9 MMOL/L (ref 3.5–5.1)
RBC # BLD AUTO: 2.62 M/UL (ref 4.1–5.7)
SERVICE CMNT-IMP: ABNORMAL
SERVICE CMNT-IMP: ABNORMAL
SERVICE CMNT-IMP: NORMAL
SODIUM SERPL-SCNC: 131 MMOL/L (ref 136–145)
WBC # BLD AUTO: 7.9 K/UL (ref 4.1–11.1)

## 2025-05-23 PROCEDURE — 6370000000 HC RX 637 (ALT 250 FOR IP): Performed by: HOSPITALIST

## 2025-05-23 PROCEDURE — 6370000000 HC RX 637 (ALT 250 FOR IP): Performed by: INTERNAL MEDICINE

## 2025-05-23 PROCEDURE — 2500000003 HC RX 250 WO HCPCS: Performed by: SURGERY

## 2025-05-23 PROCEDURE — 97530 THERAPEUTIC ACTIVITIES: CPT

## 2025-05-23 PROCEDURE — 82962 GLUCOSE BLOOD TEST: CPT

## 2025-05-23 PROCEDURE — 6360000002 HC RX W HCPCS: Performed by: HOSPITALIST

## 2025-05-23 PROCEDURE — 90935 HEMODIALYSIS ONE EVALUATION: CPT

## 2025-05-23 PROCEDURE — 83735 ASSAY OF MAGNESIUM: CPT

## 2025-05-23 PROCEDURE — 85025 COMPLETE CBC W/AUTO DIFF WBC: CPT

## 2025-05-23 PROCEDURE — 99231 SBSQ HOSP IP/OBS SF/LOW 25: CPT

## 2025-05-23 PROCEDURE — 80048 BASIC METABOLIC PNL TOTAL CA: CPT

## 2025-05-23 PROCEDURE — 2060000000 HC ICU INTERMEDIATE R&B

## 2025-05-23 PROCEDURE — 97535 SELF CARE MNGMENT TRAINING: CPT

## 2025-05-23 PROCEDURE — 36415 COLL VENOUS BLD VENIPUNCTURE: CPT

## 2025-05-23 PROCEDURE — 84100 ASSAY OF PHOSPHORUS: CPT

## 2025-05-23 PROCEDURE — 6370000000 HC RX 637 (ALT 250 FOR IP): Performed by: SURGERY

## 2025-05-23 RX ORDER — TAMSULOSIN HYDROCHLORIDE 0.4 MG/1
0.4 CAPSULE ORAL DAILY
Qty: 30 CAPSULE | Refills: 3 | Status: SHIPPED | OUTPATIENT
Start: 2025-05-24

## 2025-05-23 RX ORDER — INSULIN HUMAN 100 [IU]/ML
10 INJECTION, SUSPENSION SUBCUTANEOUS EVERY MORNING
Qty: 5 ADJUSTABLE DOSE PRE-FILLED PEN SYRINGE | Refills: 0 | Status: SHIPPED | OUTPATIENT
Start: 2025-05-23

## 2025-05-23 RX ORDER — FAMOTIDINE 20 MG/1
20 TABLET, FILM COATED ORAL DAILY PRN
Qty: 60 TABLET | Refills: 0 | Status: SHIPPED | OUTPATIENT
Start: 2025-05-23

## 2025-05-23 RX ORDER — MIDODRINE HYDROCHLORIDE 5 MG/1
15 TABLET ORAL 3 TIMES DAILY
Qty: 270 TABLET | Refills: 0 | Status: SHIPPED | OUTPATIENT
Start: 2025-05-23

## 2025-05-23 RX ORDER — ATENOLOL 25 MG/1
12.5 TABLET ORAL DAILY
Qty: 30 TABLET | Refills: 0 | Status: SHIPPED | OUTPATIENT
Start: 2025-05-24

## 2025-05-23 RX ADMIN — TAMSULOSIN HYDROCHLORIDE 0.4 MG: 0.4 CAPSULE ORAL at 08:49

## 2025-05-23 RX ADMIN — Medication 1 AMPULE: at 08:52

## 2025-05-23 RX ADMIN — APIXABAN 5 MG: 5 TABLET, FILM COATED ORAL at 21:24

## 2025-05-23 RX ADMIN — APIXABAN 5 MG: 5 TABLET, FILM COATED ORAL at 08:49

## 2025-05-23 RX ADMIN — EPOETIN ALFA-EPBX 10000 UNITS: 10000 INJECTION, SOLUTION INTRAVENOUS; SUBCUTANEOUS at 21:24

## 2025-05-23 RX ADMIN — MIDODRINE HYDROCHLORIDE 15 MG: 5 TABLET ORAL at 08:49

## 2025-05-23 RX ADMIN — Medication: at 08:48

## 2025-05-23 RX ADMIN — Medication 1 AMPULE: at 21:24

## 2025-05-23 RX ADMIN — SODIUM CHLORIDE, PRESERVATIVE FREE 10 ML: 5 INJECTION INTRAVENOUS at 21:25

## 2025-05-23 RX ADMIN — Medication: at 21:25

## 2025-05-23 RX ADMIN — Medication 1 CAPSULE: at 08:49

## 2025-05-23 RX ADMIN — INSULIN HUMAN 10 UNITS: 100 INJECTION, SUSPENSION SUBCUTANEOUS at 08:49

## 2025-05-23 RX ADMIN — LEVOTHYROXINE SODIUM 50 MCG: 0.05 TABLET ORAL at 08:49

## 2025-05-23 RX ADMIN — SODIUM CHLORIDE, PRESERVATIVE FREE 10 ML: 5 INJECTION INTRAVENOUS at 08:49

## 2025-05-23 RX ADMIN — WHITE PETROLATUM 57.7 %-MINERAL OIL 31.9 % EYE OINTMENT: at 21:25

## 2025-05-23 RX ADMIN — MIDODRINE HYDROCHLORIDE 15 MG: 5 TABLET ORAL at 21:24

## 2025-05-23 ASSESSMENT — PAIN SCALES - WONG BAKER
WONGBAKER_NUMERICALRESPONSE: NO HURT

## 2025-05-23 ASSESSMENT — PAIN SCALES - GENERAL
PAINLEVEL_OUTOF10: 0

## 2025-05-23 NOTE — FLOWSHEET NOTE
Primary RN SBAR: WANDY Santana RN  Incapacitated Nurse [facility]. provided: yes  Patient Education provided: Infection Prevention  Preferred Education method and Primary language: Verbal,English  Dialysis consent: yes  Hospital General Consent Verified: yes  Hospital associated wait time; reason: Transport  Hepatitis B Surface Ag   Date/Time Value Ref Range Status   05/14/2025 02:30 PM 0.16 Index Final     Hep B S Ag Interp   Date/Time Value Ref Range Status   05/14/2025 02:30 PM Negative NEG   Final     Hep B S Ab   Date/Time Value Ref Range Status   05/14/2025 02:30 PM <3.10 mIU/mL Final     Hep B S Ab Interp   Date/Time Value Ref Range Status   05/14/2025 02:30 PM NONREACTIVE NR   Final     Comment:     (NOTE)  The ADVIA Centaur Anti-HBs2 assay is traceable to the World Health   Organization (WHO) Hepatitis B Immunoglobulin 1st International   Reference Preparation (1977). Samples with a calculated value of 10   mIU/mL or greater are considered reactive (protective) in accordance   with the CDC guidelines. The accepted criteria for immunity to HBV is   anti-HBs activity greater than or equal to 10 mIU/mL, as defined by   the WHO International Reference Preparation.  Assay performance has not been established in pregnant women,   patients who are immunosuppressed or immunocompromised, nor have   performance characteristics been established in conjunction with   other 's assays for specific HBV serologic markers. This   assay does not differentiate between vaccine induced immune response   and a response due to infection with HBV. Passively acquired anti-HBs   may be identified following patient transfusion, receipt of   immunoglobulin products, etc.          05/23/25 1415   Observations & Evaluations   Level of Consciousness 0   Oriented X 2   Heart Rhythm Regular   Respiratory Quality/Effort Unlabored   O2 Device None (Room air)   Bilateral Breath Sounds Clear;Diminished   RUE Edema +1;Non-pitting   LUE

## 2025-05-23 NOTE — CARE COORDINATION
CM Note:  Auth for admission to [facility] was approved for the facility but we are still waiting for the Den dialysis to accept  [facility] auth ID is [id number]  Reference # [id number]  Service dates 5/23 to 5/27  I spoke with the rep for [facility], and she will let me know if we hear acceptance from the Dialysis den at [facility] today. We have to have approval for both parts DNF and dialysis.    4:07 called son to update on SNF and dialysis auth. I will let him know when we get final approval. Patient needs a post op boot before DC.  He will be set  up with [employer] for facility and will make an appt with [facility] prior to DC from SNF to arrange a sleep study. Spoke with Raisa Cartagena of this company this am and she will arrange for them to call patient to arrange appt. She approved the settings of his BIPAP here to be the settings for the BIPAP at the facility. I sent them the settings. English Sharee HONG CM [id number]

## 2025-05-23 NOTE — DISCHARGE SUMMARY
Discharge Summary    Name: Errol Brandt  [id number]  YOB: 1945 (Age: 80 y.o.)   Date of Admission: 3/31/2025  Date of Discharge: 5/23/2025  Attending Physician: Sujata Espitia MD    Discharge Diagnosis:   Cellulitis of left foot  MRSA bacteremia  ESRD on HD MWF continue HD patient  D[facility]ELLE on CKD stage III   ATN from vancomycin toxicity, urinary retention  Anemia of chronic disease  Nephrotic range proteinuria  Acute metabolic encephalopathy  Acute hypoxic respiratory failure S/p BIPAP with improved mentation  Volume overload  Suspected diastolic heart failure  Pulmonary edema  Myxedema  Hypothyroidism  Suspected severe hypothyroid state  Bradycardia  Atrial fibrillation with RVR vs SVT  NSTEMI  Hypoglycemia  Diabetes mellitus  Acute hyperkalemia  Acute urinary retention s/p curiel         Guarded prognosis  5/21: Patient continues to be in the hospital for more than 50 days at this point.  His mentation has improved a little bit with hemodialysis.  He has already completed course of IV daptomycin for MRSA bacteremia.  He still has guarded prognosis.  There is a family meeting with palliative team this afternoon.  Will continue to follow-up.  5/22: Son would like him to be full code.      Consultations:  PHARMACY TO DOSE VANCOMYCIN  IP CONSULT TO PODIATRY  IP CONSULT TO DIABETES MANAGEMENT  IP CONSULT TO INFECTIOUS DISEASES  IP CONSULT TO VASCULAR SURGERY  IP CONSULT TO CASE MANAGEMENT  IP CONSULT TO CARDIOLOGY  IP CONSULT TO NEPHROLOGY  IP CONSULT TO VASCULAR ACCESS TEAM  IP CONSULT TO CASE MANAGEMENT  IP CONSULT TO PULMONOLOGY  IP CONSULT TO PALLIATIVE CARE  IP CONSULT TO UROLOGY  IP CONSULT TO INTERVENTIONAL RADIOLOGY  IP CONSULT TO INTENSIVIST  IP CONSULT TO PALLIATIVE CARE  IP CONSULT TO CARDIOLOGY  IP CONSULT TO DIABETES MANAGEMENT      Brief Admission History/Reason for Admission Per Barber Toro MD: Errol Brandt is a 80 y.o.  male with PMHx significant

## 2025-05-24 LAB
ANION GAP SERPL CALC-SCNC: 8 MMOL/L (ref 2–12)
BUN SERPL-MCNC: 25 MG/DL (ref 6–20)
BUN/CREAT SERPL: 8 (ref 12–20)
CALCIUM SERPL-MCNC: 7.3 MG/DL (ref 8.5–10.1)
CHLORIDE SERPL-SCNC: 96 MMOL/L (ref 97–108)
CO2 SERPL-SCNC: 28 MMOL/L (ref 21–32)
CREAT SERPL-MCNC: 3.09 MG/DL (ref 0.7–1.3)
ERYTHROCYTE [DISTWIDTH] IN BLOOD BY AUTOMATED COUNT: 17.3 % (ref 11.5–14.5)
GLUCOSE BLD STRIP.AUTO-MCNC: 128 MG/DL (ref 65–117)
GLUCOSE BLD STRIP.AUTO-MCNC: 171 MG/DL (ref 65–117)
GLUCOSE BLD STRIP.AUTO-MCNC: 176 MG/DL (ref 65–117)
GLUCOSE BLD STRIP.AUTO-MCNC: 183 MG/DL (ref 65–117)
GLUCOSE SERPL-MCNC: 138 MG/DL (ref 65–100)
HCT VFR BLD AUTO: 28.2 % (ref 36.6–50.3)
HGB BLD-MCNC: 8.6 G/DL (ref 12.1–17)
MAGNESIUM SERPL-MCNC: 2 MG/DL (ref 1.6–2.4)
MCH RBC QN AUTO: 32.7 PG (ref 26–34)
MCHC RBC AUTO-ENTMCNC: 30.5 G/DL (ref 30–36.5)
MCV RBC AUTO: 107.2 FL (ref 80–99)
NRBC # BLD: 0 K/UL (ref 0–0.01)
NRBC BLD-RTO: 0 PER 100 WBC
PHOSPHATE SERPL-MCNC: 3.3 MG/DL (ref 2.6–4.7)
PLATELET # BLD AUTO: 200 K/UL (ref 150–400)
PMV BLD AUTO: 10.2 FL (ref 8.9–12.9)
POTASSIUM SERPL-SCNC: 3.7 MMOL/L (ref 3.5–5.1)
RBC # BLD AUTO: 2.63 M/UL (ref 4.1–5.7)
SERVICE CMNT-IMP: ABNORMAL
SODIUM SERPL-SCNC: 132 MMOL/L (ref 136–145)
WBC # BLD AUTO: 6.5 K/UL (ref 4.1–11.1)

## 2025-05-24 PROCEDURE — 6370000000 HC RX 637 (ALT 250 FOR IP): Performed by: INTERNAL MEDICINE

## 2025-05-24 PROCEDURE — 6370000000 HC RX 637 (ALT 250 FOR IP): Performed by: HOSPITALIST

## 2025-05-24 PROCEDURE — 82962 GLUCOSE BLOOD TEST: CPT

## 2025-05-24 PROCEDURE — 2060000000 HC ICU INTERMEDIATE R&B

## 2025-05-24 PROCEDURE — 6370000000 HC RX 637 (ALT 250 FOR IP): Performed by: SURGERY

## 2025-05-24 PROCEDURE — 83735 ASSAY OF MAGNESIUM: CPT

## 2025-05-24 PROCEDURE — 2500000003 HC RX 250 WO HCPCS: Performed by: SURGERY

## 2025-05-24 PROCEDURE — 94660 CPAP INITIATION&MGMT: CPT

## 2025-05-24 PROCEDURE — 36415 COLL VENOUS BLD VENIPUNCTURE: CPT

## 2025-05-24 PROCEDURE — 80048 BASIC METABOLIC PNL TOTAL CA: CPT

## 2025-05-24 PROCEDURE — 93005 ELECTROCARDIOGRAM TRACING: CPT | Performed by: INTERNAL MEDICINE

## 2025-05-24 PROCEDURE — 84100 ASSAY OF PHOSPHORUS: CPT

## 2025-05-24 PROCEDURE — 85027 COMPLETE CBC AUTOMATED: CPT

## 2025-05-24 RX ORDER — POLYETHYLENE GLYCOL 3350 17 G/17G
17 POWDER, FOR SOLUTION ORAL DAILY
Status: DISCONTINUED | OUTPATIENT
Start: 2025-05-24 | End: 2025-05-27 | Stop reason: HOSPADM

## 2025-05-24 RX ORDER — BISACODYL 10 MG
10 SUPPOSITORY, RECTAL RECTAL DAILY PRN
Status: DISCONTINUED | OUTPATIENT
Start: 2025-05-24 | End: 2025-05-27 | Stop reason: HOSPADM

## 2025-05-24 RX ADMIN — BISACODYL 10 MG: 10 SUPPOSITORY RECTAL at 11:58

## 2025-05-24 RX ADMIN — MIDODRINE HYDROCHLORIDE 15 MG: 5 TABLET ORAL at 15:03

## 2025-05-24 RX ADMIN — MIDODRINE HYDROCHLORIDE 15 MG: 5 TABLET ORAL at 22:30

## 2025-05-24 RX ADMIN — ATENOLOL 12.5 MG: 25 TABLET ORAL at 10:02

## 2025-05-24 RX ADMIN — Medication 1 AMPULE: at 22:31

## 2025-05-24 RX ADMIN — LEVOTHYROXINE SODIUM 50 MCG: 0.05 TABLET ORAL at 10:03

## 2025-05-24 RX ADMIN — SODIUM CHLORIDE, PRESERVATIVE FREE 10 ML: 5 INJECTION INTRAVENOUS at 22:31

## 2025-05-24 RX ADMIN — WHITE PETROLATUM 57.7 %-MINERAL OIL 31.9 % EYE OINTMENT: at 22:31

## 2025-05-24 RX ADMIN — INSULIN HUMAN 10 UNITS: 100 INJECTION, SUSPENSION SUBCUTANEOUS at 10:03

## 2025-05-24 RX ADMIN — Medication: at 22:31

## 2025-05-24 RX ADMIN — MIDODRINE HYDROCHLORIDE 15 MG: 5 TABLET ORAL at 10:01

## 2025-05-24 RX ADMIN — Medication: at 10:08

## 2025-05-24 RX ADMIN — APIXABAN 5 MG: 5 TABLET, FILM COATED ORAL at 10:01

## 2025-05-24 RX ADMIN — Medication 1 CAPSULE: at 10:03

## 2025-05-24 RX ADMIN — Medication 1 AMPULE: at 10:11

## 2025-05-24 RX ADMIN — APIXABAN 5 MG: 5 TABLET, FILM COATED ORAL at 22:31

## 2025-05-24 RX ADMIN — POLYETHYLENE GLYCOL 3350 17 G: 17 POWDER, FOR SOLUTION ORAL at 11:57

## 2025-05-24 RX ADMIN — TAMSULOSIN HYDROCHLORIDE 0.4 MG: 0.4 CAPSULE ORAL at 10:03

## 2025-05-24 RX ADMIN — SODIUM CHLORIDE, PRESERVATIVE FREE 10 ML: 5 INJECTION INTRAVENOUS at 10:06

## 2025-05-24 RX ADMIN — INSULIN LISPRO 2 UNITS: 100 INJECTION, SOLUTION INTRAVENOUS; SUBCUTANEOUS at 11:57

## 2025-05-24 ASSESSMENT — PAIN SCALES - GENERAL
PAINLEVEL_OUTOF10: 0

## 2025-05-24 ASSESSMENT — PAIN SCALES - WONG BAKER
WONGBAKER_NUMERICALRESPONSE: NO HURT

## 2025-05-24 NOTE — CARE COORDINATION
CM Note   I checked with the rep from [facility]. [facility] has accepted but still waiting for the dialysis den to accept.   English Sharee HONG CM [id number]

## 2025-05-25 LAB
EKG ATRIAL RATE: 58 BPM
EKG DIAGNOSIS: NORMAL
EKG P AXIS: 125 DEGREES
EKG P-R INTERVAL: 194 MS
EKG Q-T INTERVAL: 410 MS
EKG QRS DURATION: 92 MS
EKG QTC CALCULATION (BAZETT): 402 MS
EKG R AXIS: 40 DEGREES
EKG T AXIS: 234 DEGREES
EKG VENTRICULAR RATE: 58 BPM
GLUCOSE BLD STRIP.AUTO-MCNC: 138 MG/DL (ref 65–117)
GLUCOSE BLD STRIP.AUTO-MCNC: 190 MG/DL (ref 65–117)
GLUCOSE BLD STRIP.AUTO-MCNC: 195 MG/DL (ref 65–117)
GLUCOSE BLD STRIP.AUTO-MCNC: 69 MG/DL (ref 65–117)
SERVICE CMNT-IMP: ABNORMAL
SERVICE CMNT-IMP: NORMAL

## 2025-05-25 PROCEDURE — 6370000000 HC RX 637 (ALT 250 FOR IP): Performed by: HOSPITALIST

## 2025-05-25 PROCEDURE — 6370000000 HC RX 637 (ALT 250 FOR IP): Performed by: INTERNAL MEDICINE

## 2025-05-25 PROCEDURE — 2060000000 HC ICU INTERMEDIATE R&B

## 2025-05-25 PROCEDURE — 82962 GLUCOSE BLOOD TEST: CPT

## 2025-05-25 PROCEDURE — 2500000003 HC RX 250 WO HCPCS: Performed by: SURGERY

## 2025-05-25 PROCEDURE — 6370000000 HC RX 637 (ALT 250 FOR IP): Performed by: SURGERY

## 2025-05-25 PROCEDURE — 94660 CPAP INITIATION&MGMT: CPT

## 2025-05-25 RX ADMIN — APIXABAN 5 MG: 5 TABLET, FILM COATED ORAL at 21:29

## 2025-05-25 RX ADMIN — INSULIN LISPRO 2 UNITS: 100 INJECTION, SOLUTION INTRAVENOUS; SUBCUTANEOUS at 16:56

## 2025-05-25 RX ADMIN — INSULIN HUMAN 10 UNITS: 100 INJECTION, SUSPENSION SUBCUTANEOUS at 09:27

## 2025-05-25 RX ADMIN — SODIUM CHLORIDE, PRESERVATIVE FREE 10 ML: 5 INJECTION INTRAVENOUS at 09:27

## 2025-05-25 RX ADMIN — ATENOLOL 12.5 MG: 25 TABLET ORAL at 09:28

## 2025-05-25 RX ADMIN — SODIUM CHLORIDE, PRESERVATIVE FREE 10 ML: 5 INJECTION INTRAVENOUS at 21:33

## 2025-05-25 RX ADMIN — INSULIN LISPRO 2 UNITS: 100 INJECTION, SOLUTION INTRAVENOUS; SUBCUTANEOUS at 21:28

## 2025-05-25 RX ADMIN — LEVOTHYROXINE SODIUM 50 MCG: 0.05 TABLET ORAL at 09:28

## 2025-05-25 RX ADMIN — WHITE PETROLATUM 57.7 %-MINERAL OIL 31.9 % EYE OINTMENT: at 21:30

## 2025-05-25 RX ADMIN — Medication: at 21:28

## 2025-05-25 RX ADMIN — TAMSULOSIN HYDROCHLORIDE 0.4 MG: 0.4 CAPSULE ORAL at 09:28

## 2025-05-25 RX ADMIN — MIDODRINE HYDROCHLORIDE 15 MG: 5 TABLET ORAL at 14:58

## 2025-05-25 RX ADMIN — Medication 1 CAPSULE: at 09:28

## 2025-05-25 RX ADMIN — MIDODRINE HYDROCHLORIDE 15 MG: 5 TABLET ORAL at 21:29

## 2025-05-25 RX ADMIN — APIXABAN 5 MG: 5 TABLET, FILM COATED ORAL at 09:28

## 2025-05-25 RX ADMIN — Medication 1 AMPULE: at 09:27

## 2025-05-25 RX ADMIN — MIDODRINE HYDROCHLORIDE 15 MG: 5 TABLET ORAL at 09:28

## 2025-05-25 RX ADMIN — POLYETHYLENE GLYCOL 3350 17 G: 17 POWDER, FOR SOLUTION ORAL at 09:29

## 2025-05-25 RX ADMIN — Medication: at 09:27

## 2025-05-25 RX ADMIN — Medication 1 AMPULE: at 21:28

## 2025-05-25 ASSESSMENT — PAIN SCALES - WONG BAKER: WONGBAKER_NUMERICALRESPONSE: NO HURT

## 2025-05-25 ASSESSMENT — PAIN SCALES - GENERAL: PAINLEVEL_OUTOF10: 0

## 2025-05-26 ENCOUNTER — APPOINTMENT (OUTPATIENT)
Facility: HOSPITAL | Age: 80
DRG: 622 | End: 2025-05-26
Payer: MEDICARE

## 2025-05-26 LAB
ANION GAP SERPL CALC-SCNC: 6 MMOL/L (ref 2–12)
BASOPHILS # BLD: 0 K/UL (ref 0–0.1)
BASOPHILS NFR BLD: 0 % (ref 0–1)
BUN SERPL-MCNC: 38 MG/DL (ref 6–20)
BUN/CREAT SERPL: 9 (ref 12–20)
CALCIUM SERPL-MCNC: 7.3 MG/DL (ref 8.5–10.1)
CHLORIDE SERPL-SCNC: 100 MMOL/L (ref 97–108)
CO2 SERPL-SCNC: 28 MMOL/L (ref 21–32)
CREAT SERPL-MCNC: 4.42 MG/DL (ref 0.7–1.3)
DIFFERENTIAL METHOD BLD: ABNORMAL
EOSINOPHIL # BLD: 0.03 K/UL (ref 0–0.4)
EOSINOPHIL NFR BLD: 0.5 % (ref 0–7)
ERYTHROCYTE [DISTWIDTH] IN BLOOD BY AUTOMATED COUNT: 17.7 % (ref 11.5–14.5)
GLUCOSE BLD STRIP.AUTO-MCNC: 128 MG/DL (ref 65–117)
GLUCOSE BLD STRIP.AUTO-MCNC: 129 MG/DL (ref 65–117)
GLUCOSE BLD STRIP.AUTO-MCNC: 48 MG/DL (ref 65–117)
GLUCOSE BLD STRIP.AUTO-MCNC: 50 MG/DL (ref 65–117)
GLUCOSE BLD STRIP.AUTO-MCNC: 66 MG/DL (ref 65–117)
GLUCOSE BLD STRIP.AUTO-MCNC: 72 MG/DL (ref 65–117)
GLUCOSE BLD STRIP.AUTO-MCNC: 74 MG/DL (ref 65–117)
GLUCOSE BLD STRIP.AUTO-MCNC: 91 MG/DL (ref 65–117)
GLUCOSE SERPL-MCNC: 79 MG/DL (ref 65–100)
HCT VFR BLD AUTO: 25.5 % (ref 36.6–50.3)
HGB BLD-MCNC: 7.7 G/DL (ref 12.1–17)
IMM GRANULOCYTES # BLD AUTO: 0.03 K/UL (ref 0–0.04)
IMM GRANULOCYTES NFR BLD AUTO: 0.5 % (ref 0–0.5)
LYMPHOCYTES # BLD: 0.74 K/UL (ref 0.8–3.5)
LYMPHOCYTES NFR BLD: 12.6 % (ref 12–49)
MCH RBC QN AUTO: 33 PG (ref 26–34)
MCHC RBC AUTO-ENTMCNC: 30.2 G/DL (ref 30–36.5)
MCV RBC AUTO: 109.4 FL (ref 80–99)
MONOCYTES # BLD: 0.74 K/UL (ref 0–1)
MONOCYTES NFR BLD: 12.6 % (ref 5–13)
NEUTS SEG # BLD: 4.36 K/UL (ref 1.8–8)
NEUTS SEG NFR BLD: 73.8 % (ref 32–75)
NRBC # BLD: 0 K/UL (ref 0–0.01)
NRBC BLD-RTO: 0 PER 100 WBC
PLATELET # BLD AUTO: 175 K/UL (ref 150–400)
PMV BLD AUTO: 9.9 FL (ref 8.9–12.9)
POTASSIUM SERPL-SCNC: 4.2 MMOL/L (ref 3.5–5.1)
RBC # BLD AUTO: 2.33 M/UL (ref 4.1–5.7)
RBC MORPH BLD: ABNORMAL
SERVICE CMNT-IMP: ABNORMAL
SERVICE CMNT-IMP: NORMAL
SODIUM SERPL-SCNC: 134 MMOL/L (ref 136–145)
WBC # BLD AUTO: 5.9 K/UL (ref 4.1–11.1)

## 2025-05-26 PROCEDURE — 71045 X-RAY EXAM CHEST 1 VIEW: CPT

## 2025-05-26 PROCEDURE — 6370000000 HC RX 637 (ALT 250 FOR IP): Performed by: INTERNAL MEDICINE

## 2025-05-26 PROCEDURE — 6370000000 HC RX 637 (ALT 250 FOR IP): Performed by: HOSPITALIST

## 2025-05-26 PROCEDURE — 6360000002 HC RX W HCPCS: Performed by: HOSPITALIST

## 2025-05-26 PROCEDURE — 85025 COMPLETE CBC W/AUTO DIFF WBC: CPT

## 2025-05-26 PROCEDURE — 2060000000 HC ICU INTERMEDIATE R&B

## 2025-05-26 PROCEDURE — 80048 BASIC METABOLIC PNL TOTAL CA: CPT

## 2025-05-26 PROCEDURE — 36415 COLL VENOUS BLD VENIPUNCTURE: CPT

## 2025-05-26 PROCEDURE — 90935 HEMODIALYSIS ONE EVALUATION: CPT

## 2025-05-26 PROCEDURE — 2500000003 HC RX 250 WO HCPCS: Performed by: SURGERY

## 2025-05-26 PROCEDURE — 82962 GLUCOSE BLOOD TEST: CPT

## 2025-05-26 RX ORDER — ALBUMIN (HUMAN) 12.5 G/50ML
25 SOLUTION INTRAVENOUS PRN
Status: DISCONTINUED | OUTPATIENT
Start: 2025-05-26 | End: 2025-05-27 | Stop reason: HOSPADM

## 2025-05-26 RX ADMIN — SODIUM CHLORIDE, PRESERVATIVE FREE 10 ML: 5 INJECTION INTRAVENOUS at 21:16

## 2025-05-26 RX ADMIN — APIXABAN 5 MG: 5 TABLET, FILM COATED ORAL at 21:13

## 2025-05-26 RX ADMIN — EPOETIN ALFA-EPBX 10000 UNITS: 10000 INJECTION, SOLUTION INTRAVENOUS; SUBCUTANEOUS at 21:13

## 2025-05-26 RX ADMIN — WHITE PETROLATUM 57.7 %-MINERAL OIL 31.9 % EYE OINTMENT: at 21:18

## 2025-05-26 RX ADMIN — MIDODRINE HYDROCHLORIDE 15 MG: 5 TABLET ORAL at 15:19

## 2025-05-26 RX ADMIN — Medication: at 21:18

## 2025-05-26 RX ADMIN — SODIUM CHLORIDE, PRESERVATIVE FREE 10 ML: 5 INJECTION INTRAVENOUS at 08:00

## 2025-05-26 RX ADMIN — MIDODRINE HYDROCHLORIDE 15 MG: 5 TABLET ORAL at 21:13

## 2025-05-26 RX ADMIN — Medication: at 08:00

## 2025-05-26 RX ADMIN — Medication 1 AMPULE: at 21:19

## 2025-05-26 ASSESSMENT — PAIN SCALES - WONG BAKER
WONGBAKER_NUMERICALRESPONSE: NO HURT
WONGBAKER_NUMERICALRESPONSE: NO HURT

## 2025-05-26 ASSESSMENT — PAIN SCALES - GENERAL
PAINLEVEL_OUTOF10: 0
PAINLEVEL_OUTOF10: 0

## 2025-05-26 NOTE — FLOWSHEET NOTE
Primary RN SBAR: GELY Dobbins  Incapacitated Nurse [facility]. provided: yes  Patient Education provided: Infection Prevention  Preferred Education method and Primary language: Verbal,English  Dialysis consent: yes  Hospital General Consent Verified: yes  Hospital associated wait time; reason: Transport  Hepatitis B Surface Ag   Date/Time Value Ref Range Status   05/14/2025 02:30 PM 0.16 Index Final     Hep B S Ag Interp   Date/Time Value Ref Range Status   05/14/2025 02:30 PM Negative NEG   Final     Hep B S Ab   Date/Time Value Ref Range Status   05/14/2025 02:30 PM <3.10 mIU/mL Final     Hep B S Ab Interp   Date/Time Value Ref Range Status   05/14/2025 02:30 PM NONREACTIVE NR   Final     Comment:     (NOTE)  The ADVIA Centaur Anti-HBs2 assay is traceable to the World Health   Organization (WHO) Hepatitis B Immunoglobulin 1st International   Reference Preparation (1977). Samples with a calculated value of 10   mIU/mL or greater are considered reactive (protective) in accordance   with the CDC guidelines. The accepted criteria for immunity to HBV is   anti-HBs activity greater than or equal to 10 mIU/mL, as defined by   the WHO International Reference Preparation.  Assay performance has not been established in pregnant women,   patients who are immunosuppressed or immunocompromised, nor have   performance characteristics been established in conjunction with   other 's assays for specific HBV serologic markers. This   assay does not differentiate between vaccine induced immune response   and a response due to infection with HBV. Passively acquired anti-HBs   may be identified following patient transfusion, receipt of   immunoglobulin products, etc.          05/26/25 0845   Observations & Evaluations   Level of Consciousness 0   Oriented X 4   Heart Rhythm Regular   Respiratory Quality/Effort Unlabored   O2 Device None (Room air)   Bilateral Breath Sounds Diminished   RUE Edema +1;Non-pitting   LUE Edema

## 2025-05-26 NOTE — CARE COORDINATION
Transition of Care Plan:    RUR: 25%  Prior Level of Functioning:   Disposition: [facility]  SHALONDA: 5/27  If SNF or IPR: Date FOC offered:   Date FOC received:   Accepting facility: [facility]  Date authorization started with reference number:   Date authorization received and expires:   Follow up appointments: facility  DME needed: facility  Transportation at discharge: TBA  IM/IMM Medicare/ letter given: family to sign  Is patient a  and connected with [facility]?    If yes, was [place] transfer form completed and [facility] notified?   Caregiver Contact:   Discharge Caregiver contacted prior to discharge?   Care Conference needed?   Barriers to discharge:   [facility] on [address], [place] for Monday Wednesday, and Friday dialysis 3rd shift when he is discharged from SNF facility   [facility] will accept patient on Tuesday, auth extends to Tuesday  They need to get his BIPAP in on Tuesday so ride should be arranged for about 2 pm.  I called admissions today and they let me know this information.  He is for dialysis MWF. He got dialysis on Monday here.  I spoke with his son who is in agreement with DC tomorrow  English Sharee HONG CM [id number]

## 2025-05-27 VITALS
SYSTOLIC BLOOD PRESSURE: 97 MMHG | BODY MASS INDEX: 31.14 KG/M2 | RESPIRATION RATE: 24 BRPM | HEART RATE: 59 BPM | HEIGHT: 70 IN | DIASTOLIC BLOOD PRESSURE: 56 MMHG | WEIGHT: 217.5 LBS | TEMPERATURE: 97.7 F | OXYGEN SATURATION: 95 %

## 2025-05-27 LAB
GLUCOSE BLD STRIP.AUTO-MCNC: 173 MG/DL (ref 65–117)
GLUCOSE BLD STRIP.AUTO-MCNC: 80 MG/DL (ref 65–117)
GLUCOSE BLD STRIP.AUTO-MCNC: 96 MG/DL (ref 65–117)
GLUCOSE BLD STRIP.AUTO-MCNC: NORMAL MG/DL (ref 65–117)
HCT VFR BLD AUTO: 28.2 % (ref 36.6–50.3)
HGB BLD-MCNC: 8.4 G/DL (ref 12.1–17)
SERVICE CMNT-IMP: ABNORMAL
SERVICE CMNT-IMP: NORMAL

## 2025-05-27 PROCEDURE — 6370000000 HC RX 637 (ALT 250 FOR IP): Performed by: HOSPITALIST

## 2025-05-27 PROCEDURE — 85018 HEMOGLOBIN: CPT

## 2025-05-27 PROCEDURE — 6370000000 HC RX 637 (ALT 250 FOR IP): Performed by: SURGERY

## 2025-05-27 PROCEDURE — 85014 HEMATOCRIT: CPT

## 2025-05-27 PROCEDURE — 6370000000 HC RX 637 (ALT 250 FOR IP): Performed by: INTERNAL MEDICINE

## 2025-05-27 PROCEDURE — 36415 COLL VENOUS BLD VENIPUNCTURE: CPT

## 2025-05-27 PROCEDURE — 82962 GLUCOSE BLOOD TEST: CPT

## 2025-05-27 PROCEDURE — 5A09557 ASSISTANCE WITH RESPIRATORY VENTILATION, GREATER THAN 96 CONSECUTIVE HOURS, CONTINUOUS POSITIVE AIRWAY PRESSURE: ICD-10-PCS | Performed by: SURGERY

## 2025-05-27 PROCEDURE — 99231 SBSQ HOSP IP/OBS SF/LOW 25: CPT

## 2025-05-27 PROCEDURE — 2500000003 HC RX 250 WO HCPCS: Performed by: SURGERY

## 2025-05-27 RX ADMIN — MIDODRINE HYDROCHLORIDE 15 MG: 5 TABLET ORAL at 16:11

## 2025-05-27 RX ADMIN — ATENOLOL 12.5 MG: 25 TABLET ORAL at 09:15

## 2025-05-27 RX ADMIN — SODIUM CHLORIDE, PRESERVATIVE FREE 10 ML: 5 INJECTION INTRAVENOUS at 09:17

## 2025-05-27 RX ADMIN — APIXABAN 5 MG: 5 TABLET, FILM COATED ORAL at 09:15

## 2025-05-27 RX ADMIN — LEVOTHYROXINE SODIUM 50 MCG: 0.05 TABLET ORAL at 09:15

## 2025-05-27 RX ADMIN — Medication 1 CAPSULE: at 09:14

## 2025-05-27 RX ADMIN — Medication: at 09:17

## 2025-05-27 RX ADMIN — MIDODRINE HYDROCHLORIDE 15 MG: 5 TABLET ORAL at 09:14

## 2025-05-27 RX ADMIN — TAMSULOSIN HYDROCHLORIDE 0.4 MG: 0.4 CAPSULE ORAL at 09:14

## 2025-05-27 ASSESSMENT — PAIN SCALES - WONG BAKER: WONGBAKER_NUMERICALRESPONSE: NO HURT

## 2025-05-27 ASSESSMENT — PAIN SCALES - GENERAL
PAINLEVEL_OUTOF10: 0
PAINLEVEL_OUTOF10: 0

## 2025-05-27 NOTE — PROGRESS NOTES
Critical Care Progress Note  Alma Foster MD          Date of Service:  2025  NAME:  Errol Brandt  :  1945  MRN:  [id number]      Subjective/Hospital course:      : Patient is now off of BiPAP. Looks more awake today.   : Patient remains confused and disoriented. Currently on 2 shari/min of levophed.        Active Problems Being Managed:     --Acute metabolic encephalopathy.  --Acute hypoxemic and hypercapnic respiratory failure.  --Hypothyroidism.  --ESRD.  --Left foot cellulitis.  --MRSA bacteremia.  --NSTEMI.    Assessment/Plan:     The patient is a 80/M who we are seeing in consultation at the request of Dr. Hollis for evaluation of acute hypercapnia.      Acute hypoxemic and hypercapnia:  - likely in the setting of acute pulmonary edema and pleural effusion, which has been worsening, causing difficulty with ventilation. Patient already has HD catheter in place.   - Continue BiPAP PRN.  - Volume removal as tolerated.      Bradycardia/hypothermia/hypotension/hypoglycemia:  - the differential diagnosis is sepsis vs myxedema coma. Patient has history of hypothyroidism and is not on levothyroxine.   - Continue Levothyroxine, add hydrocortisone 50 mg every 6 hours, Start weaning tomorrow.  - Levophed gtt to keep MAP 65  -Increase midodrine dose to 15mg TID.  - Cont. Dextrose gtt  - Hold beta-blockers, he is on Atenolol.   - Cont. Antibiotics.      Acute metabolic encephalopathy.  - Closely monitor mental status.  - Delirium precautions.  - Correct underlying metabolic issues.      Left foot cellulitis due to MRSA, POA  Left foot foreign body, POA s/p debridement  MRSA bacteremia, POA -continue daptomycin.    A fib:  -rate controlled, holding BB.  -Holding eliquis due to ongoing bleeding issues from tunneled HD catheter site.      Other medical problems    Hx peripheral arterial disease with acute ischemia of left foot status post Left SFA=>TPT/PT bypass w/reversed GSV 
                 Critical Care Progress Note  Mendoza Dudley MD          Date of Service:  2025  NAME:  Errol Brandt  :  1945  MRN:  [id number]      Subjective/Hospital course:        Patient was admitted under hospitalist service for left foot cellulitis due to MRSA with bacteremia, on daptomycin. He has a long and complicated hospital stay with DANIELLE.   5/15/25 - Patient was transferred to ICU for altered mental status, hypoxia, lethargy, hypoglycemia, hypothermia and hypercapnia. Started on BiPAP. Started on IV synthyroid and IV hydrocortisone for suspected myxedema  : Patient is now off of BiPAP. Looks more awake today.   : Patient remains confused and disoriented. Currently on 2 shari/min of levophed.     25 - Patient is very lethargic this AM. Not waking up. On 2L, sating > 94%. On no pressors. Has had 3 loose bowel movements. Has made only 160 ml of urine, curiel in place which is chronic prior to admission. BUN rising, 63 this AM.  ABG showed hypercapnia, started on BiPAP resulting in improved mentation.  Had dialysis in the evening    2025-patient used BiPAP overnight, changed to nasal cannula this morning on 3 L.  He is awake and alert.  Eating his breakfast.  Continue to appear ill.  On no pressors.  Does not appear in respiratory distress.        Active Problems Being Managed:     --Acute metabolic encephalopathy.  --Acute hypoxemic and hypercapnic respiratory failure.  --Hypothyroidism.  --ESRD.  --Left foot cellulitis.  --MRSA bacteremia.  --NSTEMI.    Assessment/Plan:     The patient is a 80/M who we are seeing in consultation at the request of Dr. Hollis for evaluation of acute hypercapnia.      Acute metabolic encephalopathy.  Metabolic secondary to uremia and hypercapnia, both conditions have resolved resulting in improved mentation.  Patient is at risk of developing hypercapnia  Plan  -- Use BiPAP at night    Acute respiratory insufficiency with hypoxemic and 
                 Critical Care Progress Note  Mendoza Dudley MD          Date of Service:  2025  NAME:  Errol Brandt  :  1945  MRN:  [id number]      Subjective/Hospital course:        Patient was admitted under hospitalist service for left foot cellulitis due to MRSA with bacteremia, on daptomycin. He has a long and complicated hospital stay with DANIELLE.   5/15/25 - Patient was transferred to ICU for altered mental status, hypoxia, lethargy, hypoglycemia, hypothermia and hypercapnia. Started on BiPAP. Started on IV synthyroid and IV hydrocortisone for suspected myxedema  : Patient is now off of BiPAP. Looks more awake today.   : Patient remains confused and disoriented. Currently on 2 shari/min of levophed.     25 - Patient is very lethargic this AM. Not waking up. On 2L, sating > 94%. On no pressors. Has had 3 loose bowel movements. Has made only 160 ml of urine, curiel in place which is chronic prior to admission. BUN rising, 63 this AM.        Active Problems Being Managed:     --Acute metabolic encephalopathy.  --Acute hypoxemic and hypercapnic respiratory failure.  --Hypothyroidism.  --ESRD.  --Left foot cellulitis.  --MRSA bacteremia.  --NSTEMI.    Assessment/Plan:     The patient is a 80/M who we are seeing in consultation at the request of Dr. Hollis for evaluation of acute hypercapnia.      Acute metabolic encephalopathy. Suspecting worsening this AM from uremia and may be hypercapnia again  Plan  -- Check ABG  -- HD today to correct uremia   - Closely monitor mental status.  - Delirium precautions.  - Correct underlying metabolic issues.    Acute respiratory insufficiency with hypoxemic and hypercapnia:  Anasraca  - likely in the setting of acute pulmonary edema and pleural effusion, which has been fluctuating, causing difficulty with ventilation. Patient already has HD catheter in place on intermittent dialysis   Plan  - check ABG   - Continue BiPAP PRN.  - Volume removal as 
        Pulmonary Progress Note        Assessment   Acute hypoxic respiratory failure- likely due to volume overload.  Pulmonary edema  Pleural effusions  Metabolic encephalopathy  CKD  Cellulitis of the left foot        Plan/Recommendations:   He was requiring up to 3 L supplemental oxygen.  Currently on 1.5 L.  Chest x-ray on 5/4 demonstrated stable findings favoring bilateral pleural effusions and no significant pulmonary edema.  Received Lasix 20 mg IV x 1.  Nephrology following.  proBNP greater than 31,000.  Continue diuresis per renal team  Avoid narcotics and benzodiazepines  Patient receiving daptomycin. Vancomycin was D/C'd.  ID following.         Raisa Cartagena, FNP-C  [facility]   [phone] [facility]  [phone] ([id number]) Office  [phone] Fax        Hospital Course:  5/5: Patient sleeping in bed.  Code stroke was called this morning because of slurred speech and lethargy, patient head CT was negative.   5/6: Patient sitting comfortably in bed watching TV.  He is much more responsive today and answers my questions.  He is currently on 1.5 L oxygen.      Review of Systems: All other systems have been reviewed and are negative except per HPI    Allergies:  No Known Allergies    Medications:  Current Facility-Administered Medications   Medication Dose Route Frequency Provider Last Rate Last Admin    furosemide (LASIX) injection 40 mg  40 mg IntraVENous BID Sheng Park MD   40 mg at 05/05/25 1721    albumin human 25% IV solution 25 g  25 g IntraVENous Q8H Antony Zendejas MD   Stopped at 05/05/25 2333    DAPTOmycin (CUBICIN) 650 mg in sodium chloride (PF) 0.9 % 13 mL IV syringe  8 mg/kg IntraVENous Q48H Dale Zendejas MD   650 mg at 05/04/25 1757    sodium bicarbonate tablet 650 mg  650 mg Oral TID Dale Zendejas MD   650 mg at 05/05/25 2034    tamsulosin (FLOMAX) capsule 0.4 mg  0.4 mg Oral Daily Edi De La Rosa MD   0.4 mg at 05/05/25 0853    [Held by provider] apixaban (ELIQUIS) tablet 5 
        Pulmonary Progress Note        Assessment   Acute hypoxic respiratory failure- likely due to volume overload.  Pulmonary edema  Pleural effusions  Metabolic encephalopathy  CKD  Cellulitis of the left foot        Plan/Recommendations:   He was requiring up to 3 L supplemental oxygen.  Currently on 2 L oxygen.  Chest x-ray on 5/4 demonstrated stable bilateral pleural effusions and no significant pulmonary edema.  Chest x-ray today showed increase in right pleural effusion and increased pulmonary edema.  Lasix is currently being held.  Nephrology following.  proBNP greater than 31,000.  Continue diuresis per renal team  Avoid narcotics and benzodiazepines  Blood culture 3/31+ for MRSA 3/4. Repeat blood cultures 4/3-no growth. Patient receiving daptomycin.   ID following.      Raisa Cartagena, ANNITAP-C  [facility]   [phone] [facility]  [phone] ([id number]) Office  [phone] Fax        Hospital Course:  5/5: Patient sleeping in bed.  Code stroke was called this morning because of slurred speech and lethargy, patient head CT was negative.   5/6: Patient sitting comfortably in bed watching TV.  He is much more responsive today and answers my questions.  He is currently on 1.5 L oxygen.  5/7 -patient sleeping in bed, a little more lethargic today.  He is currently on 2L oxygen     Review of Systems: All other systems have been reviewed and are negative except per HPI    Allergies:  No Known Allergies    Medications:  Current Facility-Administered Medications   Medication Dose Route Frequency Provider Last Rate Last Admin    midodrine (PROAMATINE) tablet 5 mg  5 mg Oral TID Sheng Park MD        sodium zirconium cyclosilicate (LOKELMA) oral suspension 10 g  10 g Oral Once Sheng Park MD balsum peru-castor oil (VENELEX) ointment   Topical BID Raman Edwards MD   Given at 05/06/25 1841    [Held by provider] furosemide (LASIX) injection 40 mg  40 mg IntraVENous BID Sheng Park 
        Pulmonary Progress Note        Assessment   Acute hypoxic respiratory failure- likely due to volume overload.  Pulmonary edema  Pleural effusions  Metabolic encephalopathy  CKD  Cellulitis of the left foot        Plan/Recommendations:  Continue supplemental oxygen to keep sats above 88%.    Chest x-ray on 5/4 demonstrated stable bilateral pleural effusions and no significant pulmonary edema.  Chest x-ray 5/7 showed increase in right pleural effusion and increased pulmonary edema.   proBNP greater than 31,000.  Diuretics have been held, nephrology managing.  Watch patient closely for volume overload.  Avoid narcotics and benzodiazepines  Blood culture 3/31+ for MRSA 3/4. Repeat blood cultures 4/3-no growth. Patient receiving daptomycin. ID following.        Raisa Cartagena, JACLYN-CARYL  [facility]   [phone] [facility]  [id number] ([id number]) Office  [phone] Fax        Hospital Course:  5/5: Patient sleeping in bed.  Code stroke was called this morning because of slurred speech and lethargy, patient head CT was negative.   5/6: Patient sitting comfortably in bed watching TV.  He is much more responsive today and answers my questions.  He is currently on 1.5 L oxygen.  5/7 -patient sleeping in bed, a little more lethargic today.  He is currently on 2L oxygen  5/8 -patient resting in bed with saturating 78-82% on room air, I placed 2L oxygen on patient sats are now 97%    Review of Systems: All other systems have been reviewed and are negative except per HPI    Allergies:  No Known Allergies    Medications:  Current Facility-Administered Medications   Medication Dose Route Frequency Provider Last Rate Last Admin    epoetin shayy-epbx (RETACRIT) injection 3,000 Units  3,000 Units SubCUTAneous Once per day on Tuesday Thursday Saturday Sheng Park MD        bumetanide (BUMEX) injection 1 mg  1 mg IntraVENous Daily Sheng Park MD        midodrine (PROAMATINE) tablet 5 mg  5 mg Oral TID Sheng Park 
        Pulmonary Progress Note        Assessment   Acute hypoxic respiratory failure- likely due to volume overload.  Pulmonary edema  Pleural effusions  Metabolic encephalopathy  CKD  Cellulitis of the left foot        Plan/Recommendations:  Continue supplemental oxygen to keep sats above 88%.    Chest x-ray on 5/4 demonstrated stable bilateral pleural effusions and no significant pulmonary edema.  Chest x-ray 5/7 showed increase in right pleural effusion and increased pulmonary edema.   proBNP increased to 35,000.  Diuretics were restarted, nephrology managing.    Avoid narcotics and benzodiazepines  Blood culture 3/31+ for MRSA 3/4. Repeat blood cultures 4/3-no growth. Patient receiving daptomycin. ID following.    Raisa Cartagena, JACLYN-CARYL  [facility]   [phone] [facility]  [id number]--[phone] ([id number]) Office  [phone] Fax        Hospital Course:  5/5: Patient sleeping in bed.  Code stroke was called this morning because of slurred speech and lethargy, patient head CT was negative.   5/6: Patient sitting comfortably in bed watching TV.  He is much more responsive today and answers my questions.  He is currently on 1.5 L oxygen.  5/7 -patient sleeping in bed, a little more lethargic today.  He is currently on 2L oxygen  5/8 -patient resting in bed with saturating 78-82% on room air, I placed 2L oxygen on patient sats are now 97%  5/9 -the patient is comfortably resting in bed.  He is back on room air and saturating 93%    Review of Systems: All other systems have been reviewed and are negative except per HPI    Allergies:  No Known Allergies    Medications:  Current Facility-Administered Medications   Medication Dose Route Frequency Provider Last Rate Last Admin    bumetanide (BUMEX) tablet 1 mg  1 mg Oral Daily Sheng Park MD        epoetin shayy-epbx (RETACRIT) injection 3,000 Units  3,000 Units SubCUTAneous Once per day on Tuesday Thursday Saturday Sheng Park MD   3,000 Units at 05/08/25 0169    
        Pulmonary Progress Note        Assessment   Acute hypoxic respiratory failure- likely due to volume overload.  Pulmonary edema  Pleural effusions  Metabolic encephalopathy  CKD  Cellulitis of the left foot        Plan/Recommendations:  Patient previously on 2 to 3 L supplemental oxygen, currently 93% sat on room air.  TTE was ordered to rule out endocarditis.  Chest x-ray on 5/4 demonstrated stable bilateral pleural effusions and no significant pulmonary edema.  Chest x-ray 5/7 showed increase in right pleural effusion and increased pulmonary edema.  Strongly recommend ongoing diuresis for fluid removal.  proBNP increased to 35,000.  Diuretics were restarted, nephrology managing.    Avoid narcotics and benzodiazepines  Blood culture 3/31+ for MRSA 3/4. Repeat blood cultures 4/3 negative. Patient receiving daptomycin. ID following.      NICOLE Javed  [facility]   [phone] [facility]  [phone] ([id number]) Office  [phone] Fax        Hospital Course:  5/5: Patient sleeping in bed.  Code stroke was called this morning because of slurred speech and lethargy, patient head CT was negative.   5/6: Patient sitting comfortably in bed watching TV.  He is much more responsive today and answers my questions.  He is currently on 1.5 L oxygen.  5/7 -patient sleeping in bed, a little more lethargic today.  He is currently on 2L oxygen  5/8 -patient resting in bed with saturating 78-82% on room air, placed 2L oxygen on patient sats are now 97%  5/9 -the patient is comfortably resting in bed.  He is back on room air and saturating 93%  5/10 -patient was sitting up in bed, on room air.  No acute event overnight.  5/11 -patient sleeping in bed, did not wake up for me.  Tech in room getting blood sugar stated his sugars were in the 50s this morning and she was rechecking it.  A few minutes later nurse went in room and patient woke up and talked to him.      Review of Systems: All other systems have been reviewed and 
        Pulmonary Progress Note        Assessment   Acute hypoxic respiratory failure- likely due to volume overload.  Pulmonary edema  Pleural effusions  Metabolic encephalopathy  CKD  Cellulitis of the left foot       Plan/Recommendations:   He is requiring 2 to 3 L supplemental oxygen.  Currently on 3L.  Chest x-ray on 5/4 demonstrated stable findings favoring bilateral pleural effusions and no significant pulmonary edema.  Received Lasix 20 mg IV x 1.  Nephrology following.  proBNP greater than 31,000.  Continue diuresis per renal team  Avoid narcotics and benzodiazepines  Patient receiving daptomycin. Vancomycin was D/C'd.  ID following.        Raisa Cartagena, ANNITAP-CARYL  [facility]   [phone] [facility]  [phone] ([id number]) Office  [phone] Fax        Hospital Course:    5/5: Patient sleeping in bed.  Code stroke was called this morning because of slurred speech and lethargy patient head CT was negative.    Review of Systems: All other systems have been reviewed and are negative except per HPI    Allergies:  No Known Allergies    Medications:  Current Facility-Administered Medications   Medication Dose Route Frequency Provider Last Rate Last Admin    furosemide (LASIX) injection 40 mg  40 mg IntraVENous BID Sheng Park MD   40 mg at 05/05/25 0914    albumin human 25% IV solution 25 g  25 g IntraVENous Q8H Antony Zendejas  mL/hr at 05/05/25 1444 25 g at 05/05/25 1444    DAPTOmycin (CUBICIN) 650 mg in sodium chloride (PF) 0.9 % 13 mL IV syringe  8 mg/kg IntraVENous Q48H Dale Zendejas MD   650 mg at 05/04/25 1757    sodium bicarbonate tablet 650 mg  650 mg Oral TID Dale Zendejas MD   650 mg at 05/05/25 1445    tamsulosin (FLOMAX) capsule 0.4 mg  0.4 mg Oral Daily Edi De La Rosa MD   0.4 mg at 05/05/25 0853    [Held by provider] apixaban (ELIQUIS) tablet 5 mg  5 mg Oral BID Edi De La Rosa MD        ipratropium 0.5 mg-albuterol 2.5 mg (DUONEB) nebulizer solution 1 Dose  1 Dose Inhalation 
        Pulmonary Progress Note        Assessment   Acute hypoxic/hypercapnic respiratory failure- likely due to volume overload.  Pulmonary edema  Pleural effusions  Cellulitis of left foot with MRSA bacteremia  Metabolic encephalopathy  Myxedema  ESRD  Urinary retention. S/p Soler     Plan/Recommendations:   Continue BiPAP as needed, currently on 2 L nasal cannula.  Volume removal as tolerated.  This is likely in setting of acute pulmonary edema and pleural effusion which has been worsening and causing difficulty with ventilation.  Patient is getting HD, but not tolerating this well.    Chest x-ray yesterday showed increasing pulmonary edema.  Continue fluid removal by hemodialysis.  Bedside ultrasound exam on 5/15 revealed small to moderate right pleural effusion.  Thoracentesis considered, high risk due to current mental status at this time.  Recommend fluid removal with dialysis.  DANIELLE on CKD with volume overload, patient getting dialysis, nephrology following.  Avoid narcotics and benzodiazepines  Blood culture 3/31+ for MRSA 3/4. Repeat blood cultures 4/3 negative. TTE last month negative. Plan is for daptomycin till 5/21    Palliative care was consulted      NICOLE Javed  [facility]   [phone] [facility]  [phone] ([id number]) Office  [phone] Fax        Hospital Course  5/15 - Transferred to ICU for acute hypoxic hypercapnic respiratory failure. He was obtunded with bradycardia, hypothermia and hypoglycemia. Plan was made to transfer to ICU and intubate. However, as soon as patient came to ICU he woke up and started to follow commands.   5/16: Patient is now off of BiPAP. Looks more awake today.   5/17: Patient remains confused and disoriented. Currently on 2 shari/min of levophed.     5/19 - patient sitting in bed on room air, he is getting dialysis.  He is awake but confused.  5/20 -transferred out of ICU, continues on BiPAP.  Asleep when we walked in the room, during assessment patient was 
        Pulmonary Progress Note        Assessment   Acute hypoxic/hypercapnic respiratory failure- likely due to volume overload.  Pulmonary edema  Pleural effusions  Cellulitis of left foot with MRSA bacteremia  Metabolic encephalopathy  Myxedema  ESRD  Urinary retention. S/p Soler     Plan/Recommendations:   Continue BiPAP as needed, currently on room air.  Uses BiPAP nightly.  Volume removal as tolerated.  This is likely in setting of acute pulmonary edema and pleural effusion which has been worsening and causing difficulty with ventilation.  Patient is getting HD.  Chest x-ray on 5/20 showed increasing pulmonary edema.  Continue fluid removal by hemodialysis.  Bedside ultrasound exam on 5/15 revealed small to moderate right pleural effusion.  Thoracentesis considered, high risk due to current mental status at this time.  Recommend fluid removal with dialysis.  The patient would benefit from sleep testing as outpatient given his level of hypercapnia.  DANIELLE on CKD with volume overload, patient getting dialysis, nephrology following. Plan for HD today  Avoid narcotics and benzodiazepines  Blood culture 3/31+ for MRSA 3/4. Repeat blood cultures 4/3 negative. TTE last month negative. Daptomycin completed on 5/21    Palliative care was consulted    The patient needs to follow-up with [facility] after discharge for sleep testing.    Raisa Cartagena, JACLYN-C  [facility]   [phone] [facility]  [phone] ([id number]) Office  [phone] Fax        Hospital Course  5/15 - Transferred to ICU for acute hypoxic hypercapnic respiratory failure. He was obtunded with bradycardia, hypothermia and hypoglycemia. Plan was made to transfer to ICU and intubate. However, as soon as patient came to ICU he woke up and started to follow commands.   5/16: Patient is now off of BiPAP. Looks more awake today.   5/17: Patient remains confused and disoriented. Currently on 2 shari/min of levophed.     5/19 - patient sitting in bed on room air, he 
        Pulmonary Progress Note        Assessment   Acute hypoxic/hypercapnic respiratory failure- likely due to volume overload.  Pulmonary edema  Pleural effusions  Cellulitis of left foot with MRSA bacteremia  Metabolic encephalopathy  Myxedema  ESRD  Urinary retention. S/p Soler     Plan/Recommendations:   Continue BiPAP as needed, currently on room air.  Uses BiPAP nightly.  Volume removal as tolerated.  This is likely in setting of acute pulmonary edema and pleural effusion which has been worsening and causing difficulty with ventilation.  Patient is getting HD.  Chest x-ray on 5/20 showed increasing pulmonary edema.  Continue fluid removal by hemodialysis.  Bedside ultrasound exam on 5/15 revealed small to moderate right pleural effusion.  Thoracentesis considered, high risk due to current mental status at this time.  Recommend ongoing fluid removal with dialysis.  The patient would benefit from sleep testing as outpatient given his level of hypercapnia.   Strongly recommend to remain on qhs bipap until sleep study can be preformed.  DANIELLE on CKD with volume overload, patient getting dialysis, nephrology following.   Avoid narcotics and benzodiazepines  Blood culture 3/31+ for MRSA 3/4. Repeat blood cultures 4/3 negative. TTE last month negative. Daptomycin completed on 5/21    Palliative care was consulted    The patient needs to follow-up with [facility] after discharge for sleep testing.    Raisa Cartagena, JACLYN-CARYL  [facility]   [phone] [facility]  [phone]--[phone]-[facility] [place] [place] ([id number]) Office  [phone] Fax        Hospital Course  5/15 - Transferred to ICU for acute hypoxic hypercapnic respiratory failure. He was obtunded with bradycardia, hypothermia and hypoglycemia. Plan was made to transfer to ICU and intubate. However, as soon as patient came to ICU he woke up and started to follow commands.   5/16: Patient is now off of BiPAP. Looks more awake today.   5/17: Patient remains confused and disoriented. Currently on 2 
        Pulmonary Progress Note        Assessment   Acute hypoxic/hypercapnic respiratory failure- likely due to volume overload.  Pulmonary edema  Pleural effusions  Cellulitis of left foot with MRSA bacteremia  Metabolic encephalopathy  Myxedema  ESRD  Urinary retention. S/p Soler     Plan/Recommendations:   Continue BiPAP as needed, currently on room air.  Uses BiPAP nightly.  Volume removal as tolerated.  This is likely in setting of acute pulmonary edema and pleural effusion which has been worsening and causing difficulty with ventilation.  Patient is getting HD.  Chest x-ray on 5/20 showed increasing pulmonary edema.  Continue fluid removal by hemodialysis.  Bedside ultrasound exam on 5/15 revealed small to moderate right pleural effusion.  Thoracentesis considered, high risk due to current mental status at this time.  Recommend ongoing fluid removal with dialysis.  The patient would benefit from sleep testing as outpatient given his level of hypercapnia.   Strongly recommend to remain on qhs bipap until sleep study can be preformed.  DANIELLE on CKD with volume overload, patient getting dialysis, nephrology following.   Avoid narcotics and benzodiazepines  Blood culture 3/31+ for MRSA 3/4. Repeat blood cultures 4/3 negative. TTE last month negative. Daptomycin completed on 5/21    Palliative care was consulted    The patient needs to follow-up with [facility] after discharge for sleep testing.    Raisa Cartagena, JACLYN-CARYL  [facility]   [phone] [facility]  [phone] [place] ([id number]) Office  [phone] Fax        Hospital Course  5/15 - Transferred to ICU for acute hypoxic hypercapnic respiratory failure. He was obtunded with bradycardia, hypothermia and hypoglycemia. Plan was made to transfer to ICU and intubate. However, as soon as patient came to ICU he woke up and started to follow commands.   5/16: Patient is now off of BiPAP. Looks more awake today.   5/17: Patient remains confused and disoriented. Currently on 2 
        Pulmonary Progress Note        Assessment   Acute hypoxic/hypercapnic respiratory failure- likely due to volume overload.  Pulmonary edema  Pleural effusions  Cellulitis of left foot with MRSA bacteremia  Metabolic encephalopathy  Myxedema  ESRD  Urinary retention. S/p Soler     Plan/Recommendations:   Continue BiPAP as needed, currently on room air.  Volume removal as tolerated.  This is likely in setting of acute pulmonary edema and pleural effusion which has been worsening and causing difficulty with ventilation.  Patient is getting HD.  Chest x-ray on 5/20 showed increasing pulmonary edema.  Continue fluid removal by hemodialysis.  Bedside ultrasound exam on 5/15 revealed small to moderate right pleural effusion.  Thoracentesis considered, high risk due to current mental status at this time.  Recommend fluid removal with dialysis.  DANIELLE on CKD with volume overload, patient getting dialysis, nephrology following. Plan for HD tomorrow  Avoid narcotics and benzodiazepines  Blood culture 3/31+ for MRSA 3/4. Repeat blood cultures 4/3 negative. TTE last month negative. Daptomycin completed on 5/21    Palliative care was consulted      NICOLE Javed  [facility]   [phone] [facility]  [phone] ([id number]) Office  [phone] Fax        Hospital Course  5/15 - Transferred to ICU for acute hypoxic hypercapnic respiratory failure. He was obtunded with bradycardia, hypothermia and hypoglycemia. Plan was made to transfer to ICU and intubate. However, as soon as patient came to ICU he woke up and started to follow commands.   5/16: Patient is now off of BiPAP. Looks more awake today.   5/17: Patient remains confused and disoriented. Currently on 2 shari/min of levophed.     5/19 - patient sitting in bed on room air, he is getting dialysis.  He is awake but confused.  5/20 -transferred out of ICU, continues on BiPAP.  Asleep when we walked in the room, during assessment patient was trying to adjust mask.  5/21 
        Pulmonary Progress Note        Assessment   Acute hypoxic/hypercapnic respiratory failure- likely due to volume overload.  Pulmonary edema  Pleural effusions  Cellulitis of left foot with MRSA bacteremia  Metabolic encephalopathy  Myxedema  ESRD  Urinary retention. S/p Soler     Plan/Recommendations:   Continue BiPAP as needed, currently on sats in the 90s  Volume removal as tolerated.  This is likely in setting of acute pulmonary edema and pleural effusion which has been worsening and causing difficulty with ventilation.  Patient is getting HD.    Mild improvement of pulmonary edema on CXR 5/18.  Small to moderate right pleural effusion on bedside ultrasound exam.  Thoracentesis considered, high risk due to current mental status at this time.  Recommend fluid removal with dialysis.  DANIELLE on CKD with volume overload, patient getting dialysis, nephrology following.  Avoid narcotics and benzodiazepines  Blood culture 3/31+ for MRSA 3/4. Repeat blood cultures 4/3 negative. TTE last month negative. Plan is for daptomycin till 5/21    Palliative care was consulted    NICOLE Javed  [facility]   [phone] [facility]  [phone] ([id number]) Office  [phone] Fax        Hospital Course  5/15 - Transferred to ICU for acute hypoxic hypercapnic respiratory failure. He was obtunded with bradycardia, hypothermia and hypoglycemia. Plan was made to transfer to ICU and intubate. However, as soon as patient came to ICU he woke up and started to follow commands.   5/16: Patient is now off of BiPAP. Looks more awake today.   5/17: Patient remains confused and disoriented. Currently on 2 shari/min of levophed.     5/19 - patient sitting in bed on room air, he is getting dialysis.  He is awake but confused.  5/20 -transferred out of ICU, continues on BiPAP.  Asleep when we walked in the room, during assessment patient was trying to adjust mask.    Review of Systems: All other systems have been reviewed and are negative 
        Pulmonary Progress Note    Patient: Errol Brandt                     YOB: 1945        Date- 5/12/2025                           Admit Date: 3/31/2025       CC: Follow up for pulmonary edema and pleural effusions         IMPRESSION & PLAN:     Acute hypoxic respiratory failure- likely due to volume overload.  Pulmonary edema  Pleural effusions  Cellulitis of left foot with MRSA bacteremia  Metabolic encephalopathy  DANIELLE on CKD  Urinary retention     Plan/Recommendations:    Acute hypoxia requiring up to 3 L. Currently on 2 L.   Chest x-ray 5/7 showed increased pulmonary edema and right pleural effusion.  Continue bumetanide.  Repeat chest x-ray.  Creatinine trending up, nephrology plans to hold diuretics and give IV fluids.  Avoid narcotics and benzodiazepines  Blood culture 3/31+ for MRSA 3/4. Repeat blood cultures 4/3 negative. Patient receiving daptomycin.  ID following. TTE pending.     Total time on encounter > 50 min        Subjective:    Interval History:    Chart reviewed. Patient seen by her team for hypoxia, felt to have pulmonary edema and pleural effusions.  Diuresis was recommended, nephrology managing this.  Patient apparently had a code stroke earlier this admission and head CT was negative.    5/4- ABG pH 7.46, pCO2 34, pO2 97, sats 98%    Patient is confused and speech is difficult to understand.  Admits shortness of breath.  On nasal cannula 2 LPM.    Medications:  Current Facility-Administered Medications   Medication Dose Route Frequency Provider Last Rate Last Admin    sodium bicarbonate tablet 1,300 mg  1,300 mg Oral TID Peyman Jin MD   1,300 mg at 05/12/25 0905    sevelamer (RENVELA) tablet 800 mg  800 mg Oral TID  Julio Moffett PA-C   800 mg at 05/12/25 0905    bumetanide (BUMEX) tablet 1 mg  1 mg Oral Daily Sheng Park MD   1 mg at 05/12/25 0905    epoetin shayy-epbx (RETACRIT) injection 3,000 Units  3,000 Units SubCUTAneous Once per day on Tuesday 
        Pulmonary Progress Note    Patient: Errol Brandt                     YOB: 1945        Date- 5/13/2025                           Admit Date: 3/31/2025       CC: Follow up for pulmonary edema and pleural effusions         IMPRESSION & PLAN:     Acute hypoxic respiratory failure- likely due to volume overload.  Pulmonary edema  Pleural effusions  Cellulitis of left foot with MRSA bacteremia  Metabolic encephalopathy  DANIELLE on CKD  Urinary retention. S/p Soler     Plan/Recommendations:    Acute hypoxia requiring up to 3 L. Currently on 1 L.   Chest x-ray 5/7 showed increased pulmonary edema and right pleural effusion.  Continue bumetanide.  Repeat chest x-ray.  Creatinine trending up, nephrology plans to start dialysis.  Avoid narcotics and benzodiazepines  Blood culture 3/31+ for MRSA 3/4. Repeat blood cultures 4/3 negative. Patient receiving daptomycin.  TTE last month was negative. ID following.     Total time on encounter > 35 min        Subjective:    Interval History:    Patient more alert today.  Admits shortness of breath.  Remains on minimal oxygen supplementation.    Serum creatinine continues to trend up.    Medications:  Current Facility-Administered Medications   Medication Dose Route Frequency Provider Last Rate Last Admin    [Held by provider] sodium bicarbonate tablet 1,300 mg  1,300 mg Oral TID Peyman Jin MD   1,300 mg at 05/12/25 0905    [Held by provider] sevelamer (RENVELA) tablet 800 mg  800 mg Oral TID  Julio Moffett PA-C   800 mg at 05/12/25 0905    [Held by provider] bumetanide (BUMEX) tablet 1 mg  1 mg Oral Daily Sheng Park MD   1 mg at 05/12/25 0905    epoetin shayy-epbx (RETACRIT) injection 3,000 Units  3,000 Units SubCUTAneous Once per day on Tuesday Thursday Saturday Sheng Park MD   3,000 Units at 05/10/25 2110    midodrine (PROAMATINE) tablet 5 mg  5 mg Oral TID Sheng Park MD   5 mg at 05/13/25 0909    [Held by provider] 
        Pulmonary Progress Note    Patient: Errol Brandt                     YOB: 1945        Date- 5/14/2025                           Admit Date: 3/31/2025       CC: Follow up for pulmonary edema and pleural effusions         IMPRESSION & PLAN:     Acute hypoxic respiratory failure- likely due to volume overload.  Pulmonary edema  Pleural effusions  Cellulitis of left foot with MRSA bacteremia  Metabolic encephalopathy  DANIELLE on CKD  Urinary retention. S/p Soler     Plan/Recommendations:    Acute hypoxia requiring up to 3 L. Currently on 1 L.   Increased pulmonary edema pleural effusions on CXR today  Creatinine trending up, getting permacath, nephrology planning dialysis today.  Avoid narcotics and benzodiazepines  Blood culture 3/31+ for MRSA 3/4. Repeat blood cultures 4/3 negative. TTE negative. Plan is for daptomycin till 5/21      Total time on encounter > 35 min        Subjective:    Interval History:    Pr is confused.  Remains on minimal oxygen supplementation.    Serum creatinine continues to trend up.  Off diuretics, blood pressure is soft.    Chest x-ray today shows worsening pulmonary edema and pleural effusions.    Medications:  Current Facility-Administered Medications   Medication Dose Route Frequency Provider Last Rate Last Admin    [Held by provider] sodium bicarbonate tablet 1,300 mg  1,300 mg Oral TID Peyman Jin MD   1,300 mg at 05/12/25 0905    [Held by provider] sevelamer (RENVELA) tablet 800 mg  800 mg Oral TID  Julio Moffett PA-C   800 mg at 05/12/25 0905    [Held by provider] bumetanide (BUMEX) tablet 1 mg  1 mg Oral Daily Sheng Park MD   1 mg at 05/12/25 0905    epoetin shayy-epbx (RETACRIT) injection 3,000 Units  3,000 Units SubCUTAneous Once per day on Tuesday Thursday Saturday Sheng Park MD   3,000 Units at 05/10/25 2110    midodrine (PROAMATINE) tablet 5 mg  5 mg Oral TID Sheng Park MD   5 mg at 05/14/25 0904    [Held by 
        Pulmonary Progress Note    Patient: Errol Brandt                     YOB: 1945        Date- 5/15/2025                           Admit Date: 3/31/2025       CC: Follow up for pulmonary edema and pleural effusions         IMPRESSION & PLAN:     Acute hypoxic respiratory failure- likely due to volume overload.  Pulmonary edema  Pleural effusions  Cellulitis of left foot with MRSA bacteremia  Metabolic encephalopathy  DANIELLE on CKD  Urinary retention. S/p Soler     Plan/Recommendations:    Acute hypoxia, requiring 3 L oxygen.  Increasing oxygen needs with pulmonary edema   Increased pulmonary edema pleural effusions on CXR 5/14.  Small to moderate right pleural effusion on bedside ultrasound exam.  Thoracentesis considered, high risk due to current mental status at this time.  Recommend fluid removal with dialysis.  DANIELLE on CKD with volume overload, nephrology planning dialysis.  Avoid narcotics and benzodiazepines  Blood culture 3/31+ for MRSA 3/4. Repeat blood cultures 4/3 negative. TTE last month negative. Plan is for daptomycin till 5/21      Medical Decision Making  Problems Addressed:  Acute pulmonary edema (HCC): acute illness or injury  Acute respiratory failure with hypoxia (HCC): acute illness or injury  Pleural effusion: acute illness or injury    Amount and/or Complexity of Data Reviewed  External Data Reviewed: labs, radiology and notes.     Details: Multiple labs, chest x-ray, nephrology  Radiology: ordered.     Details: Pleural ultrasound  Discussion of management or test interpretation with external provider(s): Hospitalist    Risk  Risk Details: Thoracentesis deferred due to high risk.  High risk for decompensation       .       Subjective:    Interval History:    Pt is confused and not following commands.  Currently on 3 L oxygen.    Patient has had episodes of hypoglycemia last night.    Serum creatinine improved today.     Medications:  Current Facility-Administered Medications 
        Pulmonary Progress Note    Patient: Errol Brandt                     YOB: 1945        Date- 5/27/2025                           Admit Date: 3/31/2025       CC: Follow up for respiratory failure, pulmonary edema and pleural effusion    IMPRESSION & PLAN:     Acute hypoxic/hypercapnic respiratory failure requiring BiPAP therapy  Pulmonary edema  CHF  Pleural effusions  Cellulitis of left foot with MRSA bacteremia  Metabolic encephalopathy  Myxedema  ESRD  Urinary retention. S/p Soler     Plan/Recommendations:     Acute respiratory failure much improved.  Currently on room air.  Continue BiPAP as needed.  Stable pulmonary edema and pleural effusions on chest x-ray today  Bedside ultrasound exam on 5/15 revealed small to moderate right pleural effusion.  Thoracentesis was felt high risk due to altered mental status.  The patient would benefit from sleep testing as outpatient given his level of hypercapnia. Strongly recommend qhs bipap until sleep study can be preformed.  DANIELLE on CKD with volume overload, patient getting dialysis, nephrology following.   Avoid narcotics and benzodiazepines  Blood culture 3/31+ for MRSA 3/4. Repeat blood cultures 4/3 negative. TTE was negative. Daptomycin completed on 5/21      Spoke with son at bedside.  Discussed the benefits of BiPAP therapy at night.  Risk of readmission and CODE STATUS discussed, son wants aggressive care with full code.  Offered follow-up at [facility], son wants to call after patient is discharged from acute rehab.     Medical Decision Making-high complexity of problems and high amount of data reviewed or ordered.       Subjective:    Interval History:    Remains on room air.  RT attempted BiPAP last night, patient did not keep.  H&H today similar to 2 days ago.  Pulmonary edema and pleural effusions on chest x-ray today similar to that last week.      Medications:  Current Facility-Administered Medications   Medication Dose Route 
      Hospitalist Progress Note        Demographics    Patient Name  Errol Brandt   Date of Birth 1945   Medical Record Number  [id number]      Age  80 y.o.   PCP Gary Motley MD   Admit date:  3/31/2025  1:33 PM     Room Number  1135/01  @ [facility]           Assessment and plan:      Left foot cellulitis due to MRSA, POA  Left foot foreign body, POA  MRSA bacteremia, POA, cleared  CT shows bilateral metallic foreign bodies in the soft tissue but no fracture or osteomyelitis  Blood cultures both bottles growing MRSA. Repeat blood cultures 4/3 NGTD  Wound culture growing MRSA  Echo done, poor endocardial visualization, no obvious valve involvement.  Podiatry consulted. Pt to OR  on 4/2/25 for I&D, debridement of fascia and removal of f.b. WBAT in post op shoe. Will reassess today 4/9 given concerns for post-op healing- likely affected by poor blood flow/arterial disease, see below  Infectious disease consulted, expertise appreciated - Continue IV Vanc and topical mupirocin.   4/8- nursing reports black eschar -like discoloration of tissue on dressing change today. See below  PT OT recommending SNF- placement pending     Diabetes mellitus type 2  Continue home NPH - 8 units qHS and 24 units qD  Continue moderate dose sliding scale insulin only for now  A1c is 9.0%  DM educator consulted for insulin/glucose management     Hypothyroidism  Hypertension  BPH  Hx peripheral arterial disease with acute worsening of arterial flow  Continue PTA levothyroxine, metoprolol, Flomax  Hold home aspirin and Effient in anticipation for surgery, resume once cleared by podiatry  Vascular surgery consulted to assess for arterial disease possibly contributing to limitation of wound healing. Check arterial duplex/ABIs. Appreciate further recommendations. Poor pedal pulses noted exam; angio today 4/9, \"less than satisfactory result\". Left heel debridement performed. Plan for tibial artery bypass- date TBD. 
      Hospitalist Progress Note        Demographics    Patient Name  Errol Brandt   Date of Birth 1945   Medical Record Number  [id number]      Age  80 y.o.   PCP Gary Motley MD   Admit date:  3/31/2025  1:33 PM     Room Number  1135/01  @ [facility]           Assessment and plan:      Left foot cellulitis due to MRSA, POA  Left foot foreign body, POA  CT shows bilateral metallic foreign bodies in the soft tissue but no fracture or osteomyelitis  Blood cultures both bottles growing MRSA  Wound culture growing MRSA  Echo done, poor endocardial visualization, no obvious valve involvement.  Podiatry consulted. Pt to OR  on 4/2/25 for I&D, debridement of fascia and removal of f.b. WBAT in post op shoe. Will ask to reassess tomorrow 4/9 given concerns for post-op healing  Infectious disease consulted, expertise appreciated - Continue IV Vanc and topical mupirocin.   4/8- nursing reports black eschar -like discoloration of tissue on dressing change today. Will reassess tomorrow and take picture  PT OT recommending SNF- placement pending     Diabetes mellitus type 2  Continue home NPH - 8 units qHS and 24 units qD  Continue moderate dose sliding scale insulin only for now  A1c is 9.0%  DM educator consulted for insulin/glucose management     Hypothyroidism  Hypertension  BPH  Peripheral arterial disease  Continue PTA levothyroxine, metoprolol, Flomax  Hold home aspirin and Effient in anticipation for surgery, resume once cleared by podiatry  Vascular surgery consulted to assess limitation of wound healing. Update arterial duplex/ABIs. Appreciate further recommendations                       CODE STATUS   Full   Functional Status  Deconditioned/debility due to foot surg    Surrogate decision maker:  Spouse- Chelsi Brandt     Prophylaxis   Lovenox   Discharge Plan:  [facility]/Rehab,     Misc Inpatient  Payor: [employer] MEDICARE / Plan: [employer] GOLD PLUS HMO / Product Type: *No Product type* /   Contact 
      Hospitalist Progress Note        Demographics    Patient Name  Errol Brandt   Date of Birth 1945   Medical Record Number  [id number]      Age  80 y.o.   PCP Gary Motley MD   Admit date:  3/31/2025  1:33 PM     Room Number  1135/01  @ [facility]           Assessment and plan:      Left foot cellulitis due to MRSA, POA  Left foot foreign body, POA  MRSA bacteremia, POA, cleared  CT shows bilateral metallic foreign bodies in the soft tissue but no fracture or osteomyelitis  Blood cultures both bottles growing MRSA. Repeat blood cultures 4/3 NGTD  Wound culture growing MRSA  Echo done, poor endocardial visualization, no obvious valve involvement.  Podiatry consulted. Pt to OR  on 4/2/25 for I&D, debridement of fascia and removal of f.b. WBAT in post op shoe. 4/8- nursing reports black eschar -like discoloration of tissue on dressing change today. Reassessed 4/9 given concerns for post-op healing- likely affected by poor blood flow/arterial disease, see below  Wound care consulted for topical management of heel wound  Infectious disease consulted, expertise appreciated - Continue IV Vanc and topical mupirocin.   PT OT recommending SNF. Will need new post-op PT/OT evals after bypass     Diabetes mellitus type 2 with labile blood sugars  Continue home NPH - 4 units qHS and 24 units qD  Continue moderate dose sliding scale insulin for now  A1c is 9.0%  DM educator consulted for insulin/glucose management     Hypothyroidism  Hypertension  BPH  Hx peripheral arterial disease with acute ischemia of left foot  Continue PTA levothyroxine, metoprolol, Flomax  Hold home aspirin and Effient in anticipation for surgery, resume once cleared by podiatry  Vascular surgery consulted to assess for arterial disease possibly contributing to limitation of wound healing. Check arterial duplex/ABIs. Appreciate further recommendations. Poor pedal pulses noted exam; angio 4/9, \"less than satisfactory 
      Hospitalist Progress Note    NAME:   Errol Brandt   : 1945   MRN: [id number]     Date/Time: 2025 12:35 PM  Patient PCP: Gary Motley MD    Estimated discharge date:      Assessment / Plan:    Left foot cellulitis due to MRSA, POA  Left foot foreign body, POA s/p debridement  MRSA bacteremia, POA  Hx peripheral arterial disease with acute ischemia of left foot status post Left SFA=>TPT/PT bypass w/reversed GSV      -S/p IND on 25 and underwent debridement of fascia and removal of f.b. WBAT in post op shoe. - nursing reports black eschar -like discoloration of tissue on dressing change today. Reassessed  given concerns for post-op healing- likely affected by poor blood flow/arterial disease, see below  -Status post Left SFA=>TPT/PT bypass w/reversed GSV   - Status post left heel debridement on .  Continue Lovenox 1mg/kg  per Dr. Garcia.  Pain control.  Appreciate recommendations from vasc surgery.  Follow up as OP  -Continue vancomycin.  ID final recs noted  -Continue PT/OT while in-house. Pending placement     Antimicrobial orders for discharge  - Vancomycin 1.2 g    IV daily end date   -Pull line at end of therapy and after ID evaluation.    -Weekly CBC, CMP, vancomycin trough-  -Fax reports to [phone], call with critical labs  at [phone]  -Call ID if progressive rise in creatinine is noted  -Encourage adequate fluids, daily probiotic/yogurt  -If line malfunction occurs and home health cannot reposition  please send patient to ED immediately  -ID follow-up - at 1230  - If persistent side effects occur stop antibiotic and call-ID/PCP     Vascular and podiatry cleared for discharge     Diabetes mellitus type 2 with labile blood sugars  Diabetic management following  Planning NPH 26 units in the morning and 6 unit nightly continue sliding scale insulin with blood sugar checks     Hypothyroidism  Hypertension  BPH  Continue PTA levothyroxine, metoprolol, Flomax     CKD stage 
      Hospitalist Progress Note    NAME:   Errol Brandt   : 1945   MRN: [id number]     Date/Time: 5/3/2025   Patient PCP: Gary Motley MD    Estimated discharge date:  or   Barriers: Acidosis and nephrology clearance      Assessment / Plan:    Left foot cellulitis due to MRSA, POA  Left foot foreign body, POA s/p debridement  MRSA bacteremia, POA  Hx peripheral arterial disease with acute ischemia of left foot status post Left SFA=>TPT/PT bypass w/reversed GSV      -S/p IND on 25 and underwent debridement of fascia and removal of f.b. WBAT in post op shoe. - nursing reports black eschar -like discoloration of tissue on dressing change today. Reassessed  given concerns for post-op healing- likely affected by poor blood flow/arterial disease, see below  -Status post Left SFA=>TPT/PT bypass w/reversed GSV   - Status post left heel debridement on .    Pain control.  Appreciate recommendations from vasc surgery.  Follow up as OP  -Continue vancomycin.  ID final recs noted  -Dr. Garcia recommended starting him on Eliquis for PAD and he will follow-up on outpatient basis.  -Continue PT/OT while in-house. Pending placement  5/3: As per case management, patient has been accepted.  Discussed with ID about antibiotic on discharge.     Antimicrobial orders for discharge  - Vancomycin 1.2 g    IV daily end date   -Pull line at end of therapy and after ID evaluation.    -Weekly CBC, CMP, vancomycin trough-  -Fax reports to [phone], call with critical labs  at [phone]  -Call ID if progressive rise in creatinine is noted  -Encourage adequate fluids, daily probiotic/yogurt  -If line malfunction occurs and home health cannot reposition  please send patient to ED immediately  -ID follow-up - at 1230  - If persistent side effects occur stop antibiotic and call-ID/PCP     -Vascular and podiatry cleared for discharge.  Per Dr. Garcia foot wound is healing well and he cleared the patient for 
      Hospitalist Progress Note    NAME:   Errol Brandt   : 1945   MRN: [id number]     Date/Time: 2025   Patient PCP: Gary Motley MD    Estimated discharge date:   Barriers: Wheezing, acidosis and nephrology clearance      Assessment / Plan:    Left foot cellulitis due to MRSA, POA  Left foot foreign body, POA s/p debridement  MRSA bacteremia, POA  Hx peripheral arterial disease with acute ischemia of left foot status post Left SFA=>TPT/PT bypass w/reversed GSV      -S/p IND on 25 and underwent debridement of fascia and removal of f.b. WBAT in post op shoe. - nursing reports black eschar -like discoloration of tissue on dressing change today. Reassessed  given concerns for post-op healing- likely affected by poor blood flow/arterial disease, see below  -Status post Left SFA=>TPT/PT bypass w/reversed GSV   - Status post left heel debridement on .    Pain control.  Appreciate recommendations from vasc surgery.  Follow up as OP  -Continue vancomycin.  ID final recs noted  -Dr. Garcia recommended starting him on Eliquis for PAD and he will follow-up on outpatient basis.  -Continue PT/OT while in-house. Pending placement  5/3: As per case management, patient has been accepted.  Discussed with ID about antibiotic on discharge.  : Patient is waiting to get midline versus PICC line based on nephrology clearance.     Antimicrobial orders for discharge  - Vancomycin 1.2 g    IV daily end date   -Pull line at end of therapy and after ID evaluation.    -Weekly CBC, CMP, vancomycin trough-  -Fax reports to [phone], call with critical labs  at [phone]  -Call ID if progressive rise in creatinine is noted  -Encourage adequate fluids, daily probiotic/yogurt  -If line malfunction occurs and home health cannot reposition  please send patient to ED immediately  -ID follow-up - at 1230  - If persistent side effects occur stop antibiotic and call-ID/PCP     -Vascular and podiatry cleared for 
      Hospitalist Progress Note    NAME:   Errol Brandt   : 1945   MRN: [id number]     Date/Time: 2025 12:58 PM  Patient PCP: Gary Motley MD    Estimated discharge date:   Barriers: Improvement of creatinine, IV antibiotic changes, cardiology clearance      Assessment / Plan:    Left foot cellulitis due to MRSA, POA  Left foot foreign body, POA s/p debridement  MRSA bacteremia, POA  Hx peripheral arterial disease with acute ischemia of left foot status post Left SFA=>TPT/PT bypass w/reversed GSV      -S/p IND on 25 and underwent debridement of fascia and removal of f.b. WBAT in post op shoe. - nursing reports black eschar -like discoloration of tissue on dressing change today. Reassessed  given concerns for post-op healing- likely affected by poor blood flow/arterial disease, see below  -Status post Left SFA=>TPT/PT bypass w/reversed GSV   - Status post left heel debridement on .    Pain control.  Appreciate recommendations from vasc surgery.  Follow up as OP  -Continue vancomycin.  ID final recs noted  -Dr. Garcia recommended starting him on Eliquis for PAD and he will follow-up on outpatient basis.  -Continue PT/OT while in-house. Pending placement     Antimicrobial orders for discharge  - Vancomycin 1.2 g    IV daily end date   -Pull line at end of therapy and after ID evaluation.    -Weekly CBC, CMP, vancomycin trough-  -Fax reports to [phone], call with critical labs  at [phone]  -Call ID if progressive rise in creatinine is noted  -Encourage adequate fluids, daily probiotic/yogurt  -If line malfunction occurs and home health cannot reposition  please send patient to ED immediately  -ID follow-up - at 1230  - If persistent side effects occur stop antibiotic and call-ID/PCP     -Vascular and podiatry cleared for discharge.  Per Dr. Garcia foot wound is healing well and he cleared the patient for discharge.  -His creatinine continues to trend up and is currently 2.60 and I 
      Hospitalist Progress Note    NAME:   Errol Brandt   : 1945   MRN: [id number]     Date/Time: 2025 4:42 PM  Patient PCP: Gary Motley MD    Estimated discharge date:   Barriers:     Errol Brandt is a 80 y.o.  male with PMHx significant for poorly controlled DM  who presented to the emergency department today with complaint of left heel pain and multiple falls over the last several days.  Patient states he has had pain in his left heel for several days and then because it was so painful he fell yesterday and then fell again earlier today.  Patient denies any fevers chills body aches or swelling in the extremity.  States his right lower extremity is not affected.  Patient complains of the area is tender to touch \"      Assessment / Plan:      Left foot cellulitis due to MRSA, POA  Left foot foreign body, POA s/p debridement  MRSA bacteremia, POA  Hx peripheral arterial disease with acute ischemia of left foot status post Left SFA=>TPT/PT bypass w/reversed GSV     -S/p IND on 25 and underwent debridement of fascia and removal of f.b. WBAT in post op shoe. - nursing reports black eschar -like discoloration of tissue on dressing change today. Reassessed  given concerns for post-op healing- likely affected by poor blood flow/arterial disease, see below  -Status post Left SFA=>TPT/PT bypass w/reversed GSV   - Status post left heel debridement on .  Resume Lovenox.  Pain control.  Check CBC and BMP in a.m. tomorrow.  Appreciate recommendations from last surgery.  -Continue vancomycin.  ID following.  -Continue Lovenox and aspirin per Dr. Garcia  - Check CBC and BMP in a.m. tomorrow     Diabetes mellitus type 2 with labile blood sugars  - Continue NPH 24 units in the morning and 40 units in the evening.  Continue sliding scale insulin as well     Hypothyroidism  Hypertension  BPH  Continue PTA levothyroxine, metoprolol, Flomax    CKD stage II-III  - Baseline creatinine appears to be around 
      Hospitalist Progress Note    NAME:   Errol Brandt   : 1945   MRN: [id number]     Date/Time: 2025 2:30 PM  Patient PCP: Gary Motley MD    Estimated discharge date:   Barriers: heart rate stability, nephrology clearance, family meeting      Assessment / Plan:      Cellulitis of left foot  MRSA bacteremia  TTE negative  Plan for daptomycin till   Blood culture 3/31 + MRSA, repeat culture 4/3 negative  Continue probiotics     DANIELLE on CKD stage III   ATN from vancomycin toxicity, urinary retention  Anemia of chronic disease  Nephrotic range proteinuria  -HD as per nephrology on schedule for MWF  -continue epogen for anemia        Acute metabolic encephalopathy  S/s uremia, hypercapnea  BIPAP at night  Volume removal with HD as tolerated     Acute hypoxic respiratory failure  Volume overload  Suspected diastolic heart failure  Pulmonary edema  Fluid removal as per HD        Acute hypercapneic respiratory failure  S/p BIPAP with improved mentation        Myxedema:  Suspected severe hypothyroid state  Bradycardia, hypothermia, hypotension and hypoglycemia  Resolved  Hypothyroidism  S/p  iv synthyroid,   Continue po levothyroxine  S/p  hydrocortisone  Continue midodrine 15 mg po TID     Atrial fibrillation with RVR vs SVT  Eliquis was held due to ongoing bleeding issues from tunneled HD catheter site  Resume eliquis  Heart rate better controlled today  Discontinue amiodarone drip           NSTEMI  Continue atenolol  Aspirin and plavix discontinued as patient is now on eliquis        Diabetes mellitus:  Continue NPH 28 units sc daily  Continue lispor sliding scale     Acute hyperkalemia  resolved     Acute urinary retention s/p curiel  Continue tamsulosin        Medical Decision Making:   I personally reviewed labs:cbc, BMP  I personally reviewed imaging:  I personally reviewed EKG:  Toxic drug monitoring:   Discussed case with: patient, RN        Code Status: full  DVT Prophylaxis:eliquis   GI 
      Hospitalist Progress Note    NAME:   Errol Brandt   : 1945   MRN: [id number]     Date/Time: 2025 2:03 PM  Patient PCP: Gary Motley MD    Estimated discharge date:   Barriers: Vascular surgery clearance and ID    Assessment / Plan:      Left foot cellulitis due to MRSA, POA  Left foot foreign body, POA s/p debridement  MRSA bacteremia, POA  Hx peripheral arterial disease with acute ischemia of left foot status post Left SFA=>TPT/PT bypass w/reversed GSV     -S/p IND on 25 and underwent debridement of fascia and removal of f.b. WBAT in post op shoe. - nursing reports black eschar -like discoloration of tissue on dressing change today. Reassessed  given concerns for post-op healing- likely affected by poor blood flow/arterial disease, see below  -Status post Left SFA=>TPT/PT bypass w/reversed GSV   - Status post left heel debridement on .  Continue Lovenox per Dr. Garcia.  Pain control.  Check CBC and BMP in a.m. tomorrow.  Appreciate recommendations from last surgery.  -Continue vancomycin.  ID following.   - Check CBC and BMP daily  -Awaiting final antibiotic plan from ID    Diabetes mellitus type 2 with labile blood sugars  -Increase insulin NPH to 28 units and also evening NPH insulin dose to 60 units.  Continue sliding scale insulin with blood sugar checks     Hypothyroidism  Hypertension  BPH  Continue PTA levothyroxine, metoprolol, Flomax    CKD stage II-III  - Baseline creatinine appears to be around 1-1.2.  Currently creatinine is 1.5.    - following crt daily        Conjunctivitis, not POA  continue lobo/poly/dex gtts bilaterally x 5 days (last day 4/15)        Medical Decision Making:   I personally reviewed labs: CBC, BMP  I personally reviewed imaging:  I personally reviewed EKG: Telemetry  Toxic drug monitoring: Monitor blood sugar due to risk of hypoglycemia while on insulin, continue vancomycin per pharmacy dosing  Discussed case with: Pharmacy        Code 
      Hospitalist Progress Note    NAME:   Errol Brandt   : 1945   MRN: [id number]     Date/Time: 2025 3:15 PM  Patient PCP: Gary Motley MD    Estimated discharge date: -  Barriers: Negative blood cultures, echo, eventually IV antibiotics recommendations      Assessment / Plan:    Left foot cellulitis due to uncontrolled diabetes mellitus  Left foot foreign body  CT shows bilateral metallic foreign bodies in the soft tissue but no fracture or osteomyelitis  Blood cultures both bottles growing MRSA  Wound culture growing MRSA  Echo done, poor endocardial visualization, no obvious valve involvement.    Continue vancomycin   Infectious disease consulted, expertise appreciated - antibiotic selection depending on repeat cultures   Podiatry on board, expertise appreciated- weightbearing as tolerated in postop shoe on left heel.   PT OT recommending SNF  Daily labs    Diabetes mellitus type 2  Continue home NPH   Continue sliding scale insulin only for now    Hypothyroidism  Hypertension  BPH  Peripheral arterial disease  Continue PTA levothyroxine, metoprolol, Flomax  Hold home aspirin and Effient in anticipation for surgery, resume once cleared by podiatry    Medical Decision Making:   I personally reviewed labs: CBC, BMP  I personally reviewed imaging:    I personally reviewed EKG:  Toxic drug monitoring: Monitor vancomycin levels while on IV vancomycin  Discussed case with:        Code Status: Full code  DVT Prophylaxis: SCDs due to surgery  GI Prophylaxis:    Subjective:     Chief Complaint / Reason for Physician Visit  Woke from nap, doing well. No new complaints.  Discussed with nursing overnight events.      Objective:     VITALS:   Last 24hrs VS reviewed since prior progress note. Most recent are:  Patient Vitals for the past 24 hrs:   BP Temp Temp src Pulse Resp SpO2   25 0745 90/65 97.7 °F (36.5 °C) Oral 66 17 96 %   25 0332 108/61 100 °F (37.8 °C) Oral 72 18 94 %   25 
      Hospitalist Progress Note    NAME:   Errol Brandt   : 1945   MRN: [id number]     Date/Time: 2025 9:47 PM  Patient PCP: Gary Motley MD    Estimated discharge date:      Assessment / Plan:    Left foot cellulitis due to MRSA, POA  Left foot foreign body, POA s/p debridement  MRSA bacteremia, POA  Hx peripheral arterial disease with acute ischemia of left foot status post Left SFA=>TPT/PT bypass w/reversed GSV      -S/p IND on 25 and underwent debridement of fascia and removal of f.b. WBAT in post op shoe. - nursing reports black eschar -like discoloration of tissue on dressing change today. Reassessed  given concerns for post-op healing- likely affected by poor blood flow/arterial disease, see below  -Status post Left SFA=>TPT/PT bypass w/reversed GSV   - Status post left heel debridement on .  Continue Lovenox 1mg/kg  per Dr. Garcia.  Pain control.  Appreciate recommendations from vasc surgery.  Follow up as OP  -Continue vancomycin.  ID final recs noted  -Continue PT/OT while in-house. Pending placement     Antimicrobial orders for discharge  - Vancomycin 1.2 g    IV daily end date   -Pull line at end of therapy and after ID evaluation.    -Weekly CBC, CMP, vancomycin trough-  -Fax reports to [phone], call with critical labs  at [phone]  -Call ID if progressive rise in creatinine is noted  -Encourage adequate fluids, daily probiotic/yogurt  -If line malfunction occurs and home health cannot reposition  please send patient to ED immediately  -ID follow-up - at 1230  - If persistent side effects occur stop antibiotic and call-ID/PCP     Vascular and podiatry cleared for discharge     Diabetes mellitus type 2 with labile blood sugars  Diabetic management following  Planning NPH 26 units in the morning and 6 unit nightly continue sliding scale insulin with blood sugar checks     Hypothyroidism  Hypertension  BPH  Continue PTA levothyroxine, metoprolol, Flomax     CKD stage 
      Hospitalist Progress Note    NAME:   Errol Brandt   : 1945   MRN: [id number]     Date/Time: 4/3/2025 6:35 PM  Patient PCP: Gary Motley MD    Estimated discharge date:   Barriers: OR, podiatry recs, cultures?      Assessment / Plan:    Left foot cellulitis due to uncontrolled diabetes mellitus  Left foot foreign body  CT shows bilateral metallic foreign bodies in the soft tissue but no fracture or osteomyelitis  Blood cultures both bottles growing Staph aureus-sensitivity to follow  Wound culture growing Staph aureus-sensitivity to follow    Continue vancomycin and Zosyn-infectious disease consult for positive blood cultures/gram-positive bacteremia  Limited echo  Podiatry on board, expertise appreciated- weightbearing as tolerated in postop shoe on left heel.   PT OT recommending SNF  Daily labs    Diabetes mellitus type 2  Continue home NPH   Continue sliding scale insulin only for now    Hypothyroidism  Hypertension  BPH  Peripheral arterial disease  Continue PTA levothyroxine, metoprolol, Flomax  Hold home aspirin and Effient in anticipation for surgery, resume once cleared by podiatry    Medical Decision Making:   I personally reviewed labs: CBC, BMP  I personally reviewed imaging:    I personally reviewed EKG:  Toxic drug monitoring: Monitor vancomycin levels while on IV vancomycin  Discussed case with: 4/3 discussed with case management and PT OT with regards to weightbearing recommendations from podiatry.  Nursing never called for positive blood cultures, checked in the evening and ordered repeat blood cultures with ID consult and echo.        Code Status: Full code  DVT Prophylaxis: SCDs due to surgery  GI Prophylaxis:    Subjective:     Chief Complaint / Reason for Physician Visit  Alert and oriented x 3.  Denies any pain.  Overnight events noted      Objective:     VITALS:   Last 24hrs VS reviewed since prior progress note. Most recent are:  Patient Vitals for the past 24 hrs:   BP 
      Hospitalist Progress Note    NAME:   Errol Brandt   : 1945   MRN: [id number]     Date/Time: 2025 8:12 AM  Patient PCP: Gary Motley MD    Estimated discharge date:   Barriers: Improvement in creatinine, may need hemodialysis, nephrology clearance, hematuria, Hb stability, urology clearance, glucose stability, PICC line, placement, guarded prognosis      Assessment / Plan:  Left foot cellulitis due to MRSA, POA  Left foot foreign body, POA s/p debridement  MRSA bacteremia, POA  Hx peripheral arterial disease with acute ischemia of left foot status post Left SFA=>TPT/PT bypass w/reversed GSV   -Blood culture 3/31+ for MRSA 3/4  Repeat blood cultures 4/3-no growth  Echo cardiogram-negative.  CT of foot + for metallic foreign bodies in plantar soft tissue of L/foot near fibular hallux sesamoid & in heel pad.  Linear metallic FB is adjacent to R/5th MT base and plantar to R/2nd , 3rd MT shafts.No fracture, OM or abscess.  -S/p IND on 25 and underwent debridement of fascia and removal of f.b. WBAT in post op shoe. - nursing reports black eschar -like discoloration of tissue on dressing change today. Reassessed  given concerns for post-op healing- likely affected by poor blood flow/arterial disease  -Status post Left SFA=>TPT/PT bypass w/reversed GSV   - Status post left heel debridement on .     -S/p  vancomycin.   -Dr. Garcia recommended starting him on Eliquis for PAD and he will follow-up on outpatient basis. Per Dr. Garcia foot wound is healing well.   : Afebrile, leucocytosis resolved. Continue with current antibiotics       Antimicrobial orders for discharge  -Daptomycin 672 mg   IV every 48 hours end date   -Pull line at end of therapy.    -Weekly CBC, CMP, CK-  -Fax reports to [phone], call with critical labs  at [phone]  -Encourage adequate fluids, daily probiotic/yogurt  -If line malfunction occurs and home health cannot reposition  please send patient to ED 
      Hospitalist Progress Note    NAME:   Errol Brandt   : 1945   MRN: [id number]     Date/Time: 2025 3:02 PM  Patient PCP: Gary Motley MD    Estimated discharge date:  Barriers:       Assessment / Plan:    Left foot cellulitis due to uncontrolled diabetes mellitus  Left foot foreign body  -CT shows bilateral metallic foreign bodies in the soft tissue but no fracture or osteomyelitis  -Blood cultures negative so far  -Continue vancomycin and Zosyn.  Pending evaluation by podiatry.  Notified podiatry.  -Hold Lovenox in anticipation for procedure  -Check CBC and BMP in a.m. tomorrow  -Patient kept n.p.o. by podiatry in anticipation for surgery    Diabetes mellitus type 2  -Since patient is currently n.p.o., will hold insulin NPH.  Continue sliding scale insulin only for now    Hypothyroidism  Hypertension  BPH  Peripheral arterial disease  -Continue PTA levothyroxine, metoprolol, Flomax  -Hold home aspirin and Effient in anticipation for surgery and resume from tomorrow if okay with podiatry            Medical Decision Making:   I personally reviewed labs: CBC, BMP  I personally reviewed imaging: CT of leg  I personally reviewed EKG:  Toxic drug monitoring: Monitor vancomycin levels while on IV vancomycin  Discussed case with: Podiatry        Code Status: Full code  DVT Prophylaxis: SCDs due to surgery  GI Prophylaxis:    Subjective:     Chief Complaint / Reason for Physician Visit  Reports left foot pain and also redness.  No fever.  No diarrhea.  Overnight events noted      Objective:     VITALS:   Last 24hrs VS reviewed since prior progress note. Most recent are:  Patient Vitals for the past 24 hrs:   BP Temp Temp src Pulse Resp SpO2   25 0749 136/71 98.4 °F (36.9 °C) Oral 90 19 97 %   25 0309 134/71 98.1 °F (36.7 °C) Oral 90 18 96 %   25 2030 (!) 149/91 97.9 °F (36.6 °C) Oral 88 16 94 %   25 1645 (!) 130/50 99.2 °F (37.3 °C) Oral 85 14 92 %       No intake or output data 
      Hospitalist Progress Note    NAME:   Errol Brandt   : 1945   MRN: [id number]     Date/Time: 2025 7:48 AM  Patient PCP: Gary Motley MD    Estimated discharge date:   Barriers: glucose stability, on D10 infusion, nephrology clearance, family meeting, placement      Assessment / Plan:  Cellulitis of left foot  MRSA bacteremia  TTE negative  Plan for daptomycin till   Blood culture 3/31 + MRSA, repeat culture 4/3 negative  Continue probiotics  : Patient completed course of IV daptomycin for MRSA bacteremia.     ESRD on HD MWF continue HD patient  DANIELLE on CKD stage III   ATN from vancomycin toxicity, urinary retention  Anemia of chronic disease  Nephrotic range proteinuria  -HD as per nephrology on schedule for MWF  -continue epogen for anemia  : Inpatient plan for HD today.     Acute metabolic encephalopathy  S/s uremia, hypercapnea  BIPAP at night  Volume removal with HD as tolerated  : Patient is AO x 1 this morning.     Acute hypoxic respiratory failure S/p BIPAP with improved mentation  Volume overload  Suspected diastolic heart failure  Pulmonary edema  Fluid removal as per HD     Myxedema  Hypothyroidism  Suspected severe hypothyroid state  Bradycardia, hypothermia, hypotension and hypoglycemia Resolved  S/p  iv synthyroid,   Continue po levothyroxine  S/p  hydrocortisone  Continue midodrine 15 mg po TID     Atrial fibrillation with RVR vs SVT  Eliquis was held due to ongoing bleeding issues from tunneled HD catheter site  Resume eliquis  Heart rate better controlled today  Discontinue amiodarone drip  : Patient is currently rate controlled with heart rate between 50-60.     NSTEMI  Continue atenolol  Aspirin and plavix discontinued as patient is now on eliquis     Hypoglycemia  Diabetes mellitus  Continue NPH 28 units sc daily  Continue lispor sliding scale  : Patient has glucose trend overnight 41, 85, 32, 41, 99.  Will hold NPH for now.  Patient is currently on 
      Hospitalist Progress Note    NAME:   Errol Brandt   : 1945   MRN: [id number]     Date/Time: 2025 3:51 PM  Patient PCP: Gary Motley MD    Estimated discharge date:   Barriers: Vascular surgery clearance    Errol Brandt is a 80 y.o.  male with PMHx significant for poorly controlled DM  who presented to the emergency department today with complaint of left heel pain and multiple falls over the last several days.  Patient states he has had pain in his left heel for several days and then because it was so painful he fell yesterday and then fell again earlier today.  Patient denies any fevers chills body aches or swelling in the extremity.  States his right lower extremity is not affected.  Patient complains of the area is tender to touch \"      Assessment / Plan:      Left foot cellulitis due to MRSA, POA  Left foot foreign body, POA s/p debridement  MRSA bacteremia, POA  Hx peripheral arterial disease with acute ischemia of left foot status post Left SFA=>TPT/PT bypass w/reversed GSV     -S/p IND on 25 and underwent debridement of fascia and removal of f.b. WBAT in post op shoe. - nursing reports black eschar -like discoloration of tissue on dressing change today. Reassessed  given concerns for post-op healing- likely affected by poor blood flow/arterial disease, see below  -Status post Left SFA=>TPT/PT bypass w/reversed GSV   - Status post left heel debridement on .  Continue Lovenox per Dr. Garcia.  Pain control.  Check CBC and BMP in a.m. tomorrow.  Appreciate recommendations from last surgery.  -Continue vancomycin.  ID following.  Discuss final antibiotic recommendations with ID in a.m. tomorrow  -Possible dressing change by Dr. Garcia in a.m. tomorrow  - Check CBC and BMP in a.m. tomorrow     Diabetes mellitus type 2 with labile blood sugars  -Increase insulin NPH to 28 units and also evening NPH insulin dose to 60 units.  Continue sliding scale insulin with blood sugar 
      Hospitalist Progress Note    NAME:   Errol Brandt   : 1945   MRN: [id number]     Date/Time: 2025 4:18 PM  Patient PCP: Gary Motley MD    Estimated discharge date:    Barriers:  respiratory improvement, Renal clearance       Assessment / Plan:  Left foot cellulitis due to MRSA, POA  Left foot foreign body, POA s/p debridement  MRSA bacteremia, POA  Hx peripheral arterial disease with acute ischemia of left foot status post Left SFA=>TPT/PT bypass w/reversed GSV     -Blood culture 3/31+ for MRSA 3/4  Repeat blood cultures 4/3-no growth  Echo cardiogram-negative.  CT of foot + for metallic foreign bodies in plantar soft tissue of L/foot near fibular hallux sesamoid & in heel pad.  Linear metallic FB is adjacent to R/5th MT base and plantar to R/2nd , 3rd MT shafts.No fracture, OM or abscess.     -S/p IND on 25 and underwent debridement of fascia and removal of f.b. WBAT in post op shoe. - nursing reports black eschar -like discoloration of tissue on dressing change today. Reassessed  given concerns for post-op healing- likely affected by poor blood flow/arterial disease  -Status post Left SFA=>TPT/PT bypass w/reversed GSV   - Status post left heel debridement on .     -S/p  vancomycin.   -Dr. Garcia recommended starting him on Eliquis for PAD and he will follow-up on outpatient basis. Per Dr. Garcia foot wound is healing well.      : Afebrile, leucocytosis resolved. Continue with current antibiotics         Antimicrobial orders for discharge  -Daptomycin 672 mg   IV every 48 hours end date   -Pull line at end of therapy.    -Weekly CBC, CMP, CK-  -Fax reports to [phone], call with critical labs  at [phone]  -Encourage adequate fluids, daily probiotic/yogurt  -If line malfunction occurs and home health cannot reposition  please send patient to ED immediately  -ID follow-up - at 4 PM  - If persistent side effects occur stop antibiotic and call-ID/PCP.    Acute hypoxic 
      Hospitalist Progress Note    NAME:   Errol Brandt   : 1945   MRN: [id number]     Date/Time: 2025 9:20 AM  Patient PCP: Gary Motley MD    Estimated discharge date: 5/15  Barriers: Improvement in creatinine, needs hemodialysis, nephrology clearance, hematuria, Hb stability, urology clearance, glucose stability, PICC line, placement, guarded prognosis      Assessment / Plan:  Left foot cellulitis due to MRSA, POA  Left foot foreign body, POA s/p debridement  MRSA bacteremia, POA  Hx peripheral arterial disease with acute ischemia of left foot status post Left SFA=>TPT/PT bypass w/reversed GSV   -Blood culture 3/31+ for MRSA 3/4  Repeat blood cultures 4/3-no growth  Echo cardiogram-negative.  CT of foot + for metallic foreign bodies in plantar soft tissue of L/foot near fibular hallux sesamoid & in heel pad.  Linear metallic FB is adjacent to R/5th MT base and plantar to R/2nd , 3rd MT shafts.No fracture, OM or abscess.  -S/p IND on 25 and underwent debridement of fascia and removal of f.b. WBAT in post op shoe. - nursing reports black eschar -like discoloration of tissue on dressing change today. Reassessed  given concerns for post-op healing- likely affected by poor blood flow/arterial disease  -Status post Left SFA=>TPT/PT bypass w/reversed GSV   - Status post left heel debridement on .     -S/p  vancomycin.   -Dr. Garcia recommended starting him on Eliquis for PAD and he will follow-up on outpatient basis. Per Dr. Garcia foot wound is healing well.   : Afebrile, leucocytosis resolved. Continue with current antibiotics  : Will get midline for venous access for continuation of IV antibiotics.     Antimicrobial orders for discharge  -Daptomycin 672 mg   IV every 48 hours end date   -Pull line at end of therapy.    -Weekly CBC, CMP, CK-  -Fax reports to [phone], call with critical labs  at [phone]  -Encourage adequate fluids, daily probiotic/yogurt  -If line malfunction 
      Hospitalist Progress Note    NAME:   Errol Brandt   : 1945   MRN: [id number]     Date/Time: 2025 4:21 PM  Patient PCP: Gary Motley MD    Estimated discharge date: 5/15  Barriers: Improvement in creatinine, needs hemodialysis, nephrology clearance, hematuria, Hb stability, urology clearance, glucose stability, PICC line, placement, guarded prognosis      Assessment / Plan:  Left foot cellulitis due to MRSA, POA  Left foot foreign body, POA s/p debridement  MRSA bacteremia, POA  Hx peripheral arterial disease with acute ischemia of left foot status post Left SFA=>TPT/PT bypass w/reversed GSV   -Blood culture 3/31+ for MRSA 3/4  Repeat blood cultures 4/3-no growth  Echo cardiogram-negative.  CT of foot + for metallic foreign bodies in plantar soft tissue of L/foot near fibular hallux sesamoid & in heel pad.  Linear metallic FB is adjacent to R/5th MT base and plantar to R/2nd , 3rd MT shafts.No fracture, OM or abscess.  -S/p IND on 25 and underwent debridement of fascia and removal of f.b. WBAT in post op shoe. - nursing reports black eschar -like discoloration of tissue on dressing change today. Reassessed  given concerns for post-op healing- likely affected by poor blood flow/arterial disease  -Status post Left SFA=>TPT/PT bypass w/reversed GSV   - Status post left heel debridement on .     -S/p  vancomycin.   -Dr. Garcia recommended starting him on Eliquis for PAD and he will follow-up on outpatient basis. Per Dr. Garcia foot wound is healing well.   : Afebrile, leucocytosis resolved. Continue with current antibiotics  : Will get midline for venous access for continuation of IV antibiotics.  : Continue daptomycin till      Antimicrobial orders for discharge  -Daptomycin 672 mg   IV every 48 hours end date   -Pull line at end of therapy.    -Weekly CBC, CMP, CK-  -Fax reports to [phone], call with critical labs  at [phone]  -Encourage adequate fluids, daily 
      Hospitalist Progress Note    NAME:   Errol Brandt   : 1945   MRN: [id number]     Date/Time: 2025 2:53 PM  Patient PCP: Gary Motley MD    Estimated discharge date:   Barriers: Vascular surgery recommendations, needs more IND, final ID antibiotic recommendations    Errol Brandt is a 80 y.o.  male with PMHx significant for poorly controlled DM  who presented to the emergency department today with complaint of left heel pain and multiple falls over the last several days.  Patient states he has had pain in his left heel for several days and then because it was so painful he fell yesterday and then fell again earlier today.  Patient denies any fevers chills body aches or swelling in the extremity.  States his right lower extremity is not affected.  Patient complains of the area is tender to touch \"      Assessment / Plan:      Left foot cellulitis due to MRSA, POA  Left foot foreign body, POA  MRSA bacteremia, POA, cleared  Hx peripheral arterial disease with acute ischemia of left foot  CT shows bilateral metallic foreign bodies in the soft tissue but no fracture or osteomyelitis  Blood cultures both bottles growing MRSA. Repeat blood cultures 4/3 NGTD  Wound culture growing MRSA  Echo done, poor endocardial visualization, no obvious valve involvement.  Podiatry consulted. Pt to OR  on 25 for I&D, debridement of fascia and removal of f.b. WBAT in post op shoe. - nursing reports black eschar -like discoloration of tissue on dressing change today. Reassessed  given concerns for post-op healing- likely affected by poor blood flow/arterial disease, see below  Wound care consulted for topical management of heel wound  Infectious disease consulted, expertise appreciated - Continue IV Vanc and topical mupirocin.   PT OT recommending SNF. Will need new post-op PT/OT evals after bypass  Hold home aspirin and Effient in anticipation for surgery, resume once cleared by podiatry  Vascular 
      Hospitalist Progress Note    NAME:   Errol Brandt   : 1945   MRN: [id number]     Date/Time: 2025 4:55 PM  Patient PCP: Gary Motley MD    Estimated discharge date:   Barriers: OR, podiatry recs, cultures?      Assessment / Plan:    Left foot cellulitis due to uncontrolled diabetes mellitus  Left foot foreign body  CT shows bilateral metallic foreign bodies in the soft tissue but no fracture or osteomyelitis  Blood cultures NGTD  Continue vancomycin and Zosyn.   Podiatry on board, expertise appreciated-plan for or intervention   Hold Lovenox in anticipation for procedure  Daily labs    Diabetes mellitus type 2  Hold home NPH-resume postop once tolerating diet  Continue sliding scale insulin only for now    Hypothyroidism  Hypertension  BPH  Peripheral arterial disease  Continue PTA levothyroxine, metoprolol, Flomax  Hold home aspirin and Effient in anticipation for surgery, resume once cleared by podiatry    Medical Decision Making:   I personally reviewed labs: CBC, BMP  I personally reviewed imaging:    I personally reviewed EKG:  Toxic drug monitoring: Monitor vancomycin levels while on IV vancomycin  Discussed case with: -discussed with patient, nursing director, and case management during interdisciplinary bedside rounds.        Code Status: Full code  DVT Prophylaxis: SCDs due to surgery  GI Prophylaxis:    Subjective:     Chief Complaint / Reason for Physician Visit  Patient alert and oriented x 3.  Denies new complaints.  Aware he is going to the OR sometime today.  Denies issues with his eyes, agreeable to trial eyedrops.  He is uncertain of which eyedrops he takes at home.  Overnight events noted      Objective:     VITALS:   Last 24hrs VS reviewed since prior progress note. Most recent are:  Patient Vitals for the past 24 hrs:   BP Temp Temp src Pulse Resp SpO2   25 0751 127/63 98.2 °F (36.8 °C) Oral 75 17 96 %   25 0341 (!) 120/57 99.7 °F (37.6 °C) -- 79 18 90 % 
      Hospitalist Progress Note    NAME:   Errol Brandt   : 1945   MRN: [id number]     Date/Time: 2025 1:02 PM  Patient PCP: Gary Motley MD    Estimated discharge date:   Barriers: Negative blood cultures, placement Monday hopefully      Assessment / Plan:    Left foot cellulitis due to uncontrolled diabetes mellitus  Left foot foreign body  CT shows bilateral metallic foreign bodies in the soft tissue but no fracture or osteomyelitis  Blood cultures both bottles growing MRSA  Wound culture growing MRSA  Echo done, poor endocardial visualization, no obvious valve involvement.    Continue vancomycin   Infectious disease consulted, expertise appreciated - antibiotic selection depending on repeat cultures   Podiatry on board, expertise appreciated- weightbearing as tolerated in postop shoe on left heel.   PT OT recommending SNF  Daily labs    Diabetes mellitus type 2  Continue home NPH   Continue sliding scale insulin only for now    Hypothyroidism  Hypertension  BPH  Peripheral arterial disease  Continue PTA levothyroxine, metoprolol, Flomax  Hold home aspirin and Effient in anticipation for surgery, resume once cleared by podiatry    Medical Decision Making:   I personally reviewed labs: CBC, BMP  I personally reviewed imaging:    I personally reviewed EKG:  Toxic drug monitoring: Monitor vancomycin levels while on IV vancomycin  Discussed case with:        Code Status: Full code  DVT Prophylaxis: SCDs due to surgery  GI Prophylaxis:    Subjective:     Chief Complaint / Reason for Physician Visit  Doing well.  Observed wound today during nursing dressing change. Black/discolored heel, but not oozing, wound C/D/I.  Discussed with nursing overnight events.      Objective:     VITALS:   Last 24hrs VS reviewed since prior progress note. Most recent are:  Patient Vitals for the past 24 hrs:   BP Temp Temp src Pulse Resp SpO2   25 0723 (!) 140/70 97.7 °F (36.5 °C) Oral 72 16 95 %   25 0320 
      Hospitalist Progress Note    NAME:   Errol Brandt   : 1945   MRN: [id number]     Date/Time: 2025 3:30 PM  Patient PCP: Gary Motley MD    Estimated discharge date: , called p2p 5  Barriers: Improvement of creatinine, IV antibiotic changes, cardiology clearance      Assessment / Plan:    Left foot cellulitis due to MRSA, POA  Left foot foreign body, POA s/p debridement  MRSA bacteremia, POA  Hx peripheral arterial disease with acute ischemia of left foot status post Left SFA=>TPT/PT bypass w/reversed GSV      -S/p IND on 25 and underwent debridement of fascia and removal of f.b. WBAT in post op shoe. - nursing reports black eschar -like discoloration of tissue on dressing change today. Reassessed  given concerns for post-op healing- likely affected by poor blood flow/arterial disease, see below  -Status post Left SFA=>TPT/PT bypass w/reversed GSV   - Status post left heel debridement on .    Pain control.  Appreciate recommendations from vasc surgery.  Follow up as OP  -Continue vancomycin.  ID final recs noted  -Dr. Garcia recommended starting him on Eliquis for PAD and he will follow-up on outpatient basis.  -Continue PT/OT while in-house. Pending placement     Antimicrobial orders for discharge  - Vancomycin 1.2 g    IV daily end date   -Pull line at end of therapy and after ID evaluation.    -Weekly CBC, CMP, vancomycin trough-  -Fax reports to [phone], call with critical labs  at [phone]  -Call ID if progressive rise in creatinine is noted  -Encourage adequate fluids, daily probiotic/yogurt  -If line malfunction occurs and home health cannot reposition  please send patient to ED immediately  -ID follow-up - at 1230  - If persistent side effects occur stop antibiotic and call-ID/PCP     -Vascular and podiatry cleared for discharge.  Per Dr. Gacria foot wound is healing well and he cleared the patient for discharge.  -His creatinine continues to trend up and is 
      Hospitalist Progress Note    NAME:   Errol Brandt   : 1945   MRN: [id number]     Date/Time: 2025 3:54 PM  Patient PCP: Gary Motley MD    Estimated discharge date:      Assessment / Plan:    Left foot cellulitis due to MRSA, POA  Left foot foreign body, POA s/p debridement  MRSA bacteremia, POA  Hx peripheral arterial disease with acute ischemia of left foot status post Left SFA=>TPT/PT bypass w/reversed GSV      -S/p IND on 25 and underwent debridement of fascia and removal of f.b. WBAT in post op shoe. - nursing reports black eschar -like discoloration of tissue on dressing change today. Reassessed  given concerns for post-op healing- likely affected by poor blood flow/arterial disease, see below  -Status post Left SFA=>TPT/PT bypass w/reversed GSV   - Status post left heel debridement on .    Pain control.  Appreciate recommendations from vasc surgery.  Follow up as OP  -Continue vancomycin.  ID final recs noted  -Dr. Garcia recommended starting him on Eliquis for PAD and he will follow-up on outpatient basis.  -Continue PT/OT while in-house. Pending placement     Antimicrobial orders for discharge  - Vancomycin 1.2 g    IV daily end date   -Pull line at end of therapy and after ID evaluation.    -Weekly CBC, CMP, vancomycin trough-  -Fax reports to [phone], call with critical labs  at [phone]  -Call ID if progressive rise in creatinine is noted  -Encourage adequate fluids, daily probiotic/yogurt  -If line malfunction occurs and home health cannot reposition  please send patient to ED immediately  -ID follow-up - at 1230  - If persistent side effects occur stop antibiotic and call-ID/PCP     Vascular and podiatry cleared for discharge.  Dr. Garcia will remove rossy likely today.  Notified ID about uptrending creatinine and they will see today and provide recommendations with respect to antibiotics.  Continue vancomycin for now.     Diabetes mellitus type 2 with labile 
      Hospitalist Progress Note    NAME:   Errol Brandt   : 1945   MRN: [id number]     Date/Time: 2025 9:31 AM  Patient PCP: Gary Motley MD    Estimated discharge date:   Barriers: Improvement in creatinine, may need hemodialysis, nephrology clearance, glucose stability, PICC line, placement      Assessment / Plan:  Left foot cellulitis due to MRSA, POA  Left foot foreign body, POA s/p debridement  MRSA bacteremia, POA  Hx peripheral arterial disease with acute ischemia of left foot status post Left SFA=>TPT/PT bypass w/reversed GSV   -Blood culture 3/31+ for MRSA 3/4  Repeat blood cultures 4/3-no growth  Echo cardiogram-negative.  CT of foot + for metallic foreign bodies in plantar soft tissue of L/foot near fibular hallux sesamoid & in heel pad.  Linear metallic FB is adjacent to R/5th MT base and plantar to R/2nd , 3rd MT shafts.No fracture, OM or abscess.  -S/p IND on 25 and underwent debridement of fascia and removal of f.b. WBAT in post op shoe. - nursing reports black eschar -like discoloration of tissue on dressing change today. Reassessed  given concerns for post-op healing- likely affected by poor blood flow/arterial disease  -Status post Left SFA=>TPT/PT bypass w/reversed GSV   - Status post left heel debridement on .     -S/p  vancomycin.   -Dr. Garcia recommended starting him on Eliquis for PAD and he will follow-up on outpatient basis. Per Dr. Garcia foot wound is healing well.   : Afebrile, leucocytosis resolved. Continue with current antibiotics       Antimicrobial orders for discharge  -Daptomycin 672 mg   IV every 48 hours end date   -Pull line at end of therapy.    -Weekly CBC, CMP, CK-  -Fax reports to [phone], call with critical labs  at [phone]  -Encourage adequate fluids, daily probiotic/yogurt  -If line malfunction occurs and home health cannot reposition  please send patient to ED immediately  -ID follow-up - at 4 PM  - If persistent side 
      Hospitalist Progress Note    NAME:   Errol Brandt   : 1945   MRN: [id number]     Date/Time: 2025 3:43 PM  Patient PCP: Gary Motley MD    Estimated discharge date:      Assessment / Plan:    Left foot cellulitis due to MRSA, POA  Left foot foreign body, POA s/p debridement  MRSA bacteremia, POA  Hx peripheral arterial disease with acute ischemia of left foot status post Left SFA=>TPT/PT bypass w/reversed GSV      -S/p IND on 25 and underwent debridement of fascia and removal of f.b. WBAT in post op shoe. - nursing reports black eschar -like discoloration of tissue on dressing change today. Reassessed  given concerns for post-op healing- likely affected by poor blood flow/arterial disease, see below  -Status post Left SFA=>TPT/PT bypass w/reversed GSV   - Status post left heel debridement on .  Continue Lovenox 1mg/kg  per Dr. Garcia.  Pain control.  Appreciate recommendations from vasc surgery.  Follow up as OP  -Continue vancomycin.  ID final recs noted  -Dr. Garcia recommended starting him on Eliquis for PAD and he will follow-up on outpatient basis.  -Continue PT/OT while in-house. Pending placement     Antimicrobial orders for discharge  - Vancomycin 1.2 g    IV daily end date   -Pull line at end of therapy and after ID evaluation.    -Weekly CBC, CMP, vancomycin trough-  -Fax reports to [phone], call with critical labs  at [phone]  -Call ID if progressive rise in creatinine is noted  -Encourage adequate fluids, daily probiotic/yogurt  -If line malfunction occurs and home health cannot reposition  please send patient to ED immediately  -ID follow-up - at 1230  - If persistent side effects occur stop antibiotic and call-ID/PCP     Vascular and podiatry cleared for discharge.  Dr. Garcia will remove rossy in a.m. tomorrow     Diabetes mellitus type 2 with labile blood sugars  Diabetic management following  -Continue NPH 26 units in the morning and 6 unit nightly continue 
      Hospitalist Progress Note    NAME:   Errol Brandt   : 1945   MRN: [id number]     Date/Time: 2025 2:41 PM  Patient PCP: Gary Motley MD    Estimated discharge date:   Barriers: Vascular surgery recommendations, needs more IND, final ID antibiotic recommendations    Errol Brandt is a 80 y.o.  male with PMHx significant for poorly controlled DM  who presented to the emergency department today with complaint of left heel pain and multiple falls over the last several days.  Patient states he has had pain in his left heel for several days and then because it was so painful he fell yesterday and then fell again earlier today.  Patient denies any fevers chills body aches or swelling in the extremity.  States his right lower extremity is not affected.  Patient complains of the area is tender to touch \"      Assessment / Plan:      Left foot cellulitis due to MRSA, POA  Left foot foreign body, POA  MRSA bacteremia, POA, cleared  Hx peripheral arterial disease with acute ischemia of left foot  CT shows bilateral metallic foreign bodies in the soft tissue but no fracture or osteomyelitis  Blood cultures both bottles growing MRSA. Repeat blood cultures 4/3 NGTD  Wound culture growing MRSA  Echo done, poor endocardial visualization, no obvious valve involvement.  Podiatry consulted. Pt to OR  on 25 for I&D, debridement of fascia and removal of f.b. WBAT in post op shoe. - nursing reports black eschar -like discoloration of tissue on dressing change today. Reassessed  given concerns for post-op healing- likely affected by poor blood flow/arterial disease, see below  Wound care consulted for topical management of heel wound  Infectious disease consulted, expertise appreciated - Continue IV Vanc and topical mupirocin.   PT OT recommending SNF. Will need new post-op PT/OT evals after bypass  Hold home aspirin and Effient in anticipation for surgery, resume once cleared by podiatry  Vascular 
      Hospitalist Progress Note    NAME:   Errol Brandt   : 1945   MRN: [id number]     Date/Time: 2025 3:29 PM  Patient PCP: Gary Motley MD    Estimated discharge date:   Barriers: Vascular surgery clearance and ID    Errol Brandt is a 80 y.o.  male with PMHx significant for poorly controlled DM  who presented to the emergency department today with complaint of left heel pain and multiple falls over the last several days.  Patient states he has had pain in his left heel for several days and then because it was so painful he fell yesterday and then fell again earlier today.  Patient denies any fevers chills body aches or swelling in the extremity.  States his right lower extremity is not affected.  Patient complains of the area is tender to touch \"      Assessment / Plan:      Left foot cellulitis due to MRSA, POA  Left foot foreign body, POA s/p debridement  MRSA bacteremia, POA  Hx peripheral arterial disease with acute ischemia of left foot status post Left SFA=>TPT/PT bypass w/reversed GSV     -S/p IND on 25 and underwent debridement of fascia and removal of f.b. WBAT in post op shoe. - nursing reports black eschar -like discoloration of tissue on dressing change today. Reassessed  given concerns for post-op healing- likely affected by poor blood flow/arterial disease, see below  -Status post Left SFA=>TPT/PT bypass w/reversed GSV   - Status post left heel debridement on .  Continue Lovenox per Dr. Garcia.  Pain control.  Check CBC and BMP in a.m. tomorrow.  Appreciate recommendations from last surgery.  -Continue vancomycin.  ID following.   - Check CBC and BMP daily  -Awaiting final antibiotic plan    Diabetes mellitus type 2 with labile blood sugars  -Increase insulin NPH to 28 units and also evening NPH insulin dose to 60 units.  Continue sliding scale insulin with blood sugar checks     Hypothyroidism  Hypertension  BPH  Continue PTA levothyroxine, metoprolol, 
      Hospitalist Progress Note    NAME:   Errol Brandt   : 1945   MRN: [id number]     Date/Time: 2025 2:28 PM  Patient PCP: Gary Motley MD    Estimated discharge date:   Barriers: picc and SNF placement and authorization    Vascular cleared    Assessment / Plan:      Left foot cellulitis due to MRSA, POA  Left foot foreign body, POA s/p debridement  MRSA bacteremia, POA  Hx peripheral arterial disease with acute ischemia of left foot status post Left SFA=>TPT/PT bypass w/reversed GSV     -S/p IND on 25 and underwent debridement of fascia and removal of f.b. WBAT in post op shoe. - nursing reports black eschar -like discoloration of tissue on dressing change today. Reassessed  given concerns for post-op healing- likely affected by poor blood flow/arterial disease, see below  -Status post Left SFA=>TPT/PT bypass w/reversed GSV   - Status post left heel debridement on .  Continue Lovenox per Dr. Garcia.  Pain control.  Check CBC and BMP in a.m. tomorrow.  Appreciate recommendations from last surgery.  -Continue vancomycin.  ID following.   - Check CBC and BMP daily    Antimicrobial orders for discharge  - Vancomycin 1.2 g    IV daily end date   -Pull line at end of therapy and after ID evaluation.    -Weekly CBC, CMP, vancomycin trough-  -Fax reports to [phone], call with critical labs  at [phone]  -Call ID if progressive rise in creatinine is noted  -Encourage adequate fluids, daily probiotic/yogurt  -If line malfunction occurs and home health cannot reposition  please send patient to ED immediately  -ID follow-up - at 1230  - If persistent side effects occur stop antibiotic and call-ID/PCP    Vascular cleared for discharge  Discussed with the podiatry cleared for discharge      Diabetes mellitus type 2 with labile blood sugars  Diabetic management following  Planning NPH 26 units in the morning and 6 unit nightly continue sliding scale insulin with blood sugar checks   
      Hospitalist Progress Note    NAME:   Errol Brandt   : 1945   MRN: [id number]     Date/Time: 2025   Patient PCP: Gary Motley MD    Estimated discharge date:   Barriers: Wheezing, acidosis and nephrology clearance      Assessment / Plan:    Left foot cellulitis due to MRSA, POA  Left foot foreign body, POA s/p debridement  MRSA bacteremia, POA  Hx peripheral arterial disease with acute ischemia of left foot status post Left SFA=>TPT/PT bypass w/reversed GSV      -S/p IND on 25 and underwent debridement of fascia and removal of f.b. WBAT in post op shoe. - nursing reports black eschar -like discoloration of tissue on dressing change today. Reassessed  given concerns for post-op healing- likely affected by poor blood flow/arterial disease, see below  -Status post Left SFA=>TPT/PT bypass w/reversed GSV   - Status post left heel debridement on .    Pain control.  Appreciate recommendations from vasc surgery.  Follow up as OP  -Continue vancomycin.  ID final recs noted  -Dr. Garcia recommended starting him on Eliquis for PAD and he will follow-up on outpatient basis.  -Continue PT/OT while in-house. Pending placement  5/3: As per case management, patient has been accepted.  Discussed with ID about antibiotic on discharge.  : Patient is waiting to get midline versus PICC line based on nephrology clearance.     Antimicrobial orders for discharge  - Vancomycin 1.2 g    IV daily end date   -Pull line at end of therapy and after ID evaluation.    -Weekly CBC, CMP, vancomycin trough-  -Fax reports to [phone], call with critical labs  at [phone]  -Call ID if progressive rise in creatinine is noted  -Encourage adequate fluids, daily probiotic/yogurt  -If line malfunction occurs and home health cannot reposition  please send patient to ED immediately  -ID follow-up - at 1230  - If persistent side effects occur stop antibiotic and call-ID/PCP     -Vascular and podiatry cleared for 
      Hospitalist Progress Note    NAME:   Errol Brandt   : 1945   MRN: [id number]     Date/Time: 2025 4:39 PM  Patient PCP: Gary Motley MD    Estimated discharge date: -  Barriers: Negative blood cultures, echo, eventually IV antibiotics recommendations      Assessment / Plan:    Left foot cellulitis due to uncontrolled diabetes mellitus  Left foot foreign body  CT shows bilateral metallic foreign bodies in the soft tissue but no fracture or osteomyelitis  Blood cultures both bottles growing MRSA  Wound culture growing MRSA    Continue vancomycin and Zosyn  Infectious disease consulted, expertise appreciated-hopefully blood cultures are negative over the weekend and on Monday we can move forward with final antibiotics plan.    Limited echo done, read pending  Podiatry on board, expertise appreciated- weightbearing as tolerated in postop shoe on left heel.   PT OT recommending SNF  Daily labs    Diabetes mellitus type 2  Continue home NPH   Continue sliding scale insulin only for now    Hypothyroidism  Hypertension  BPH  Peripheral arterial disease  Continue PTA levothyroxine, metoprolol, Flomax  Hold home aspirin and Effient in anticipation for surgery, resume once cleared by podiatry    Medical Decision Making:   I personally reviewed labs: CBC, BMP  I personally reviewed imaging:    I personally reviewed EKG:  Toxic drug monitoring: Monitor vancomycin levels while on IV vancomycin  Discussed case with:        Code Status: Full code  DVT Prophylaxis: SCDs due to surgery  GI Prophylaxis:    Subjective:     Chief Complaint / Reason for Physician Visit  Patient alert and oriented x 3.  Very hard of hearing.  Had just met with infectious disease and was concerned about the curability of MRSA.  Reassured we will treat the infection in his foot and his blood but that he may be colonized with MRSA for quite some time.  Discussed with nursing overnight events.      Objective:     VITALS:   Last 
      Hospitalist Progress Note    NAME:   Errol Brandt   : 1945   MRN: [id number]     Date/Time: 4/15/2025 2:44 PM  Patient PCP: Gary Motley MD    Estimated discharge date:  or   Barriers: Podiatry recommendations, final ID antibiotic recommendations    Errol Brandt is a 80 y.o.  male with PMHx significant for poorly controlled DM  who presented to the emergency department today with complaint of left heel pain and multiple falls over the last several days.  Patient states he has had pain in his left heel for several days and then because it was so painful he fell yesterday and then fell again earlier today.  Patient denies any fevers chills body aches or swelling in the extremity.  States his right lower extremity is not affected.  Patient complains of the area is tender to touch \"      Assessment / Plan:      Left foot cellulitis due to MRSA, POA  Left foot foreign body, POA  MRSA bacteremia, POA, cleared  Hx peripheral arterial disease with acute ischemia of left foot  CT shows bilateral metallic foreign bodies in the soft tissue but no fracture or osteomyelitis  Blood cultures both bottles growing MRSA. Repeat blood cultures 4/3 NGTD  Wound culture growing MRSA  Echo done, poor endocardial visualization, no obvious valve involvement.  Podiatry consulted. Pt to OR  on 25 for I&D, debridement of fascia and removal of f.b. WBAT in post op shoe. - nursing reports black eschar -like discoloration of tissue on dressing change today. Reassessed  given concerns for post-op healing- likely affected by poor blood flow/arterial disease, see below  Wound care consulted for topical management of heel wound  Infectious disease consulted, expertise appreciated - Continue IV Vanc and topical mupirocin.   PT OT recommending SNF. Will need new post-op PT/OT evals after bypass  Hold home aspirin and Effient in anticipation for surgery, resume once cleared by podiatry  Vascular surgery consulted to 
      Hospitalist Progress Note    NAME:   Errol Brandt   : 1945   MRN: [id number]     Date/Time: 2025 2:18 PM  Patient PCP: Gary Motley MD    Estimated discharge date:   Barriers:     Errol Brandt is a 80 y.o.  male with PMHx significant for poorly controlled DM  who presented to the emergency department today with complaint of left heel pain and multiple falls over the last several days.  Patient states he has had pain in his left heel for several days and then because it was so painful he fell yesterday and then fell again earlier today.  Patient denies any fevers chills body aches or swelling in the extremity.  States his right lower extremity is not affected.  Patient complains of the area is tender to touch \"      Assessment / Plan:      Left foot cellulitis due to MRSA, POA  Left foot foreign body, POA s/p debridement  MRSA bacteremia, POA  Hx peripheral arterial disease with acute ischemia of left foot status post Left SFA=>TPT/PT bypass w/reversed GSV     -S/p IND on 25 and underwent debridement of fascia and removal of f.b. WBAT in post op shoe. - nursing reports black eschar -like discoloration of tissue on dressing change today. Reassessed  given concerns for post-op healing- likely affected by poor blood flow/arterial disease, see below  -Status post Left SFA=>TPT/PT bypass w/reversed GSV   -Going for left heel debridement today.  Hold Lovenox.  Pain control.  Check CBC and BMP in a.m. tomorrow.  Appreciate recommendations from last surgery.  -Continue vancomycin.  ID following.  -Holding Lovenox due to procedure and will resume when okay with Dr. Garcia.  Continue aspirin.  - Check CBC and BMP in a.m. tomorrow     Diabetes mellitus type 2 with labile blood sugars  -Holding morning dose of insulin NPH due to surgery but will continue evening dose.  Resume morning dose from tomorrow.  Continue sliding scale insulin blood sugar checks   
      Hospitalist Progress Note    NAME:   Errol Brandt   : 1945   MRN: [id number]     Date/Time: 2025 8:42 PM  Patient PCP: Gary Motley MD    Estimated discharge date:      Assessment / Plan:    Left foot cellulitis due to MRSA, POA  Left foot foreign body, POA s/p debridement  MRSA bacteremia, POA  Hx peripheral arterial disease with acute ischemia of left foot status post Left SFA=>TPT/PT bypass w/reversed GSV      -S/p IND on 25 and underwent debridement of fascia and removal of f.b. WBAT in post op shoe. - nursing reports black eschar -like discoloration of tissue on dressing change today. Reassessed  given concerns for post-op healing- likely affected by poor blood flow/arterial disease, see below  -Status post Left SFA=>TPT/PT bypass w/reversed GSV   - Status post left heel debridement on .  Continue Lovenox 1mg/kg  per Dr. Garcia.  Pain control.  Appreciate recommendations from vasc surgery.  Follow up as OP  -Continue vancomycin.  ID final recs noted  -Continue PT/OT while in-house. Pending placement     Antimicrobial orders for discharge  - Vancomycin 1.2 g    IV daily end date   -Pull line at end of therapy and after ID evaluation.    -Weekly CBC, CMP, vancomycin trough-  -Fax reports to [phone], call with critical labs  at [phone]  -Call ID if progressive rise in creatinine is noted  -Encourage adequate fluids, daily probiotic/yogurt  -If line malfunction occurs and home health cannot reposition  please send patient to ED immediately  -ID follow-up - at 1230  - If persistent side effects occur stop antibiotic and call-ID/PCP     Vascular and podiatry cleared for discharge     Diabetes mellitus type 2 with labile blood sugars  Diabetic management following  Planning NPH 26 units in the morning and 6 unit nightly continue sliding scale insulin with blood sugar checks     Hypothyroidism  Hypertension  BPH  Continue PTA levothyroxine, metoprolol, Flomax     CKD stage 
      Hospitalist Progress Note    NAME:   Errol Brandt   : 1945   MRN: [id number]     Date/Time: 2025   Patient PCP: Gary Motley MD    Estimated discharge date:   Barriers: Wheezing, acidosis and nephrology clearance      Assessment / Plan:    Left foot cellulitis due to MRSA, POA  Left foot foreign body, POA s/p debridement  MRSA bacteremia, POA  Hx peripheral arterial disease with acute ischemia of left foot status post Left SFA=>TPT/PT bypass w/reversed GSV      -S/p IND on 25 and underwent debridement of fascia and removal of f.b. WBAT in post op shoe. - nursing reports black eschar -like discoloration of tissue on dressing change today. Reassessed  given concerns for post-op healing- likely affected by poor blood flow/arterial disease, see below  -Status post Left SFA=>TPT/PT bypass w/reversed GSV   - Status post left heel debridement on .    Pain control.  Appreciate recommendations from vasc surgery.  Follow up as OP  -Continue vancomycin.  ID final recs noted  -Dr. Garcia recommended starting him on Eliquis for PAD and he will follow-up on outpatient basis.  -Continue PT/OT while in-house. Pending placement  5/3: As per case management, patient has been accepted.  Discussed with ID about antibiotic on discharge.  : Patient is waiting to get midline versus PICC line based on nephrology clearance.     Antimicrobial orders for discharge  - Vancomycin 1.2 g    IV daily end date   -Pull line at end of therapy and after ID evaluation.    -Weekly CBC, CMP, vancomycin trough-  -Fax reports to [phone], call with critical labs  at [phone]  -Call ID if progressive rise in creatinine is noted  -Encourage adequate fluids, daily probiotic/yogurt  -If line malfunction occurs and home health cannot reposition  please send patient to ED immediately  -ID follow-up - at 1230  - If persistent side effects occur stop antibiotic and call-ID/PCP     -Vascular and podiatry cleared for 
      Hospitalist Progress Note    NAME:   Errol Brandt   : 1945   MRN: [id number]     Date/Time: 2025 1:59 PM  Patient PCP: Gary Motley MD    Estimated discharge date:   Barriers: glucose stability, bradycardia, PermCath on , nephrology clearance, placement      Assessment / Plan:  Cellulitis of left foot  MRSA bacteremia  TTE negative  Plan for daptomycin till   Blood culture 3/31 + MRSA, repeat culture 4/3 negative  Continue probiotics  : Patient completed course of IV daptomycin for MRSA bacteremia.     ESRD on HD MWF continue HD patient  DANIELLE on CKD stage III   ATN from vancomycin toxicity, urinary retention  Anemia of chronic disease  Nephrotic range proteinuria  -HD as per nephrology on schedule for MWF  -continue epogen for anemia  : Inpatient plan for HD today.  : I spoke with nephrology Dr. Park, plan is for PermCath tomorrow.     Acute metabolic encephalopathy  S/s uremia, hypercapnea  BIPAP at night  Volume removal with HD as tolerated  : Patient is AO x 1 this morning.     Acute hypoxic respiratory failure S/p BIPAP with improved mentation  Volume overload  Suspected diastolic heart failure  Pulmonary edema  Fluid removal as per HD     Myxedema  Hypothyroidism  Suspected severe hypothyroid state  Bradycardia, hypothermia, hypotension and hypoglycemia Resolved  S/p  iv synthyroid,   Continue po levothyroxine  S/p  hydrocortisone  Continue midodrine 15 mg po TID     Bradycardia  Atrial fibrillation with RVR vs SVT  Eliquis was held due to ongoing bleeding issues from tunneled HD catheter site  Resume eliquis  Heart rate better controlled today  Discontinue amiodarone drip  : Patient is currently rate controlled with heart rate between 50-60.  : Patient's heart rate in the 40s with normal MAP.  Patient is asymptomatic.  I spoke with cardio Dr. Garzon and will hold atenolol and monitor for now.     NSTEMI  Continue atenolol  Aspirin and plavix discontinued 
      Hospitalist Progress Note    NAME:   Errol Brandt   : 1945   MRN: [id number]     Date/Time: 2025 2:48 PM  Patient PCP: Gary Motley MD    Estimated discharge date:   Barriers: Improvement of creatinine, IV antibiotic changes, cardiology clearance      Assessment / Plan:    Left foot cellulitis due to MRSA, POA  Left foot foreign body, POA s/p debridement  MRSA bacteremia, POA  Hx peripheral arterial disease with acute ischemia of left foot status post Left SFA=>TPT/PT bypass w/reversed GSV      -S/p IND on 25 and underwent debridement of fascia and removal of f.b. WBAT in post op shoe. - nursing reports black eschar -like discoloration of tissue on dressing change today. Reassessed  given concerns for post-op healing- likely affected by poor blood flow/arterial disease, see below  -Status post Left SFA=>TPT/PT bypass w/reversed GSV   - Status post left heel debridement on .    Pain control.  Appreciate recommendations from vasc surgery.  Follow up as OP  -Continue vancomycin.  ID final recs noted  -Dr. Garcia recommended starting him on Eliquis for PAD and he will follow-up on outpatient basis.  -Continue PT/OT while in-house. Pending placement     Antimicrobial orders for discharge  - Vancomycin 1.2 g    IV daily end date   -Pull line at end of therapy and after ID evaluation.    -Weekly CBC, CMP, vancomycin trough-  -Fax reports to [phone], call with critical labs  at [phone]  -Call ID if progressive rise in creatinine is noted  -Encourage adequate fluids, daily probiotic/yogurt  -If line malfunction occurs and home health cannot reposition  please send patient to ED immediately  -ID follow-up - at 1230  - If persistent side effects occur stop antibiotic and call-ID/PCP     -Vascular and podiatry cleared for discharge.  Per Dr. Garcia foot wound is healing well and he cleared the patient for discharge.  -His creatinine continues to trend up and is currently 2.60 and I 
      Hospitalist Progress Note    NAME:   Errol Brandt   : 1945   MRN: [id number]     Date/Time: 2025 7:32 AM  Patient PCP: Gary Motley MD    Estimated discharge date:  Barriers:       Assessment / Plan:  Left foot cellulitis due to MRSA, POA  Left foot foreign body, POA s/p debridement  MRSA bacteremia, POA  Hx peripheral arterial disease with acute ischemia of left foot status post Left SFA=>TPT/PT bypass w/reversed GSV     -Blood culture 3/31+ for MRSA 3/4  Repeat blood cultures 4/3-no growth  Echo cardiogram-negative.  CT of foot + for metallic foreign bodies in plantar soft tissue of L/foot near fibular hallux sesamoid & in heel pad.  Linear metallic FB is adjacent to R/5th MT base and plantar to R/2nd , 3rd MT shafts.No fracture, OM or abscess.     -S/p IND on 25 and underwent debridement of fascia and removal of f.b. WBAT in post op shoe. - nursing reports black eschar -like discoloration of tissue on dressing change today. Reassessed  given concerns for post-op healing- likely affected by poor blood flow/arterial disease  -Status post Left SFA=>TPT/PT bypass w/reversed GSV   - Status post left heel debridement on .     -S/p  vancomycin.   -Dr. Garcia recommended starting him on Eliquis for PAD and he will follow-up on outpatient basis. Per Dr. Garcia foot wound is healing well.     : Afebrile, leucocytosis resolved. Continue with current antibiotics         Antimicrobial orders for discharge  -Daptomycin 672 mg   IV every 48 hours end date   -Pull line at end of therapy.    -Weekly CBC, CMP, CK-  -Fax reports to [phone], call with critical labs  at [phone]  -Encourage adequate fluids, daily probiotic/yogurt  -If line malfunction occurs and home health cannot reposition  please send patient to ED immediately  -ID follow-up - at 4 PM  - If persistent side effects occur stop antibiotic and call-ID/PCP.       Diabetes mellitus type 2 with labile blood sugars,  Diabetic 
     End of Shift Note    Bedside shift change report given to ruddy RN (oncoming nurse) by Nadia Bernstein RN (offgoing nurse).  Report included the following information SBAR, ED Summary, MAR, and Cardiac Rhythm Sinus Davon    Shift worked:  3814-2051     Shift summary and any significant changes:     No significant shift events to note.      Concerns for physician to address:  Am labs     Zone phone for oncoming shift:   N/A       Activity:  Level of Assistance: Maximum assist, patient does 25-49%  Number times ambulated in hallways past shift: 0  Number of times OOB to chair past shift: 0    Cardiac:   Cardiac Monitoring: Yes      Cardiac Rhythm: Sinus davon    Access:  Current line(s): PIV     Genitourinary:   Urinary Status: Draining, Soler    Respiratory:   O2 Device: None (Room air)  Chronic home O2 use?: NO  Incentive spirometer at bedside: YES    GI:  Last BM (including prior to admit): 05/09/25  Current diet:  ADULT DIET; Regular; 3 carb choices (45 gm/meal)  Passing flatus: YES    Pain Management:   Patient states pain is manageable on current regimen: YES    Skin:  Luis Scale Score: 16  Interventions: Wound Offloading (Prevention Methods): Bed, pressure reduction mattress, Elevate heels, Pillows, Repositioning, Turning    Patient Safety:  Fall Risk: Nursing Judgement-Fall Risk High(Add Comments): Yes  Fall Risk Interventions  Nursing Judgement-Fall Risk High(Add Comments): Yes  Toilet Every 2 Hours-In Advance of Need: Yes  Hourly Visual Checks: Eyes closed, In bed  Fall Visual Posted: Armband  Room Door Open: Yes  Alarm On: Bed  Patient Moved Closer to Nursing Station: No    Active Consults:   PHARMACY TO DOSE VANCOMYCIN  IP CONSULT TO PODIATRY  IP CONSULT TO DIABETES MANAGEMENT  IP CONSULT TO INFECTIOUS DISEASES  IP CONSULT TO VASCULAR SURGERY  IP CONSULT TO CASE MANAGEMENT  IP CONSULT TO CARDIOLOGY  IP CONSULT TO NEPHROLOGY  IP CONSULT TO VASCULAR ACCESS TEAM  IP CONSULT TO CASE 
     End of Shift Note    Bedside shift change report given to ruddy RN (oncoming nurse) by Nadia Bernstein RN (offgoing nurse).  Report included the following information SBAR, ED Summary, MAR, and Cardiac Rhythm Sinus Davon    Shift worked:  8892-6691     Shift summary and any significant changes:     No significant shift events to note.      Concerns for physician to address:  Am labs     Zone phone for oncoming shift:   N/A       Activity:  Level of Assistance: Maximum assist, patient does 25-49%  Number times ambulated in hallways past shift: 0  Number of times OOB to chair past shift: 0    Cardiac:   Cardiac Monitoring: Yes      Cardiac Rhythm: 1° AV Block    Access:  Current line(s): PIV     Genitourinary:   Urinary Status: Draining, Soler    Respiratory:   O2 Device: None (Room air)  Chronic home O2 use?: NO  Incentive spirometer at bedside: YES    GI:  Last BM (including prior to admit): 05/11/25  Current diet:  ADULT DIET; Regular; 3 carb choices (45 gm/meal); Low Potassium (Less than 3000 mg/day)  Passing flatus: YES    Pain Management:   Patient states pain is manageable on current regimen: YES    Skin:  Luis Scale Score: 14  Interventions: Wound Offloading (Prevention Methods): Repositioning    Patient Safety:  Fall Risk: Nursing Judgement-Fall Risk High(Add Comments): Yes  Fall Risk Interventions  Nursing Judgement-Fall Risk High(Add Comments): Yes  Toilet Every 2 Hours-In Advance of Need: Yes  Hourly Visual Checks: Awake, In bed  Fall Visual Posted: Armband  Room Door Open: Yes  Alarm On: Bed  Patient Moved Closer to Nursing Station: No    Active Consults:   PHARMACY TO DOSE VANCOMYCIN  IP CONSULT TO PODIATRY  IP CONSULT TO DIABETES MANAGEMENT  IP CONSULT TO INFECTIOUS DISEASES  IP CONSULT TO VASCULAR SURGERY  IP CONSULT TO CASE MANAGEMENT  IP CONSULT TO CARDIOLOGY  IP CONSULT TO NEPHROLOGY  IP CONSULT TO VASCULAR ACCESS TEAM  IP CONSULT TO CASE MANAGEMENT  IP CONSULT TO PULMONOLOGY  IP 
  Physician Progress Note      PATIENT:               IRAIDA KENNEDY  CSN #:                  [id number]  :                       1945  ADMIT DATE:       3/31/2025 1:33 PM  DISCH DATE:  RESPONDING  PROVIDER #:        Eid De La Rosa MD          QUERY TEXT:    Based on your medical judgment, please clarify these findings and document if   any of the following are being evaluated and/or treated:    The clinical indicators include:  25 05:01 Creatinine: 1.16, 25 03:09 Creatinine: 1.37, 25   05:26 Creatinine: 1.46, 25 04:24 Creatinine: 1.72    25 05:01 Est, Glom Filt Rate: 64, 25 03:09 Est, Glom Filt Rate:   52, 25 05:26 Est, Glom Filt Rate: 49, 25 04:24 Est, Glom Filt   Rate: 40       IM Progress Note: \"CKD stage II-III -cr is uptrending 1.7 today,   baseline   1-1.2.  will start gentle IVF x24 hrs, encourage po intake.- BMP in the AM\"                labs, IV fluids, monitor  Options provided:  -- Acute kidney injury, not POA  -- Other - I will add my own diagnosis  -- Disagree - Not applicable / Not valid  -- Disagree - Clinically unable to determine / Unknown  -- Refer to Clinical Documentation Reviewer    PROVIDER RESPONSE TEXT:    This patient has an Acute kidney injury, not POA.    Query created by: Holli Miranda on 2025 5:58 PM      Electronically signed by:  Edi De La Rosa MD 2025 10:13 AM          
 Received and verbally repeated the following test results Lab: CO[facility] 15    Couser,  (NAME OF TECHNICIAN) on 5/3/2025 , at 6:19 AM.    Chayito FARLEY (NAME OF PROVIDER) was notified and provided a verbal readback of the results listed above on 6:19 AM.     Orders were received at this time No        Additional comments: N/A    Michael Pruett RN   
. Md was notified. Orders was to give 8 units and NPH 24 u now. Md stated he unheld orders to be given.   
.End of Shift Note    Bedside shift change report given to GELY Ashraf (oncoming nurse) by ZAHRAA ZHANG LPN (offgoing nurse).  Report included the following information SBAR, Kardex, OR Summary, Procedure Summary, Intake/Output, MAR, Recent Results, and Cardiac Rhythm SR    Shift worked:  7-7     Shift summary and any significant changes:     Patient continues to be on bedrest, sat at side of bed with PT, with some difficulty reporting feeling dizzy and sick. Oxygen on today at 2 liters. Patient now receives Respiratory treatments on schedule for audible wheezing. Creatinine being monitored as has been elevated. [place]er patient with low urinary output. Wound care completed today by Wound care nurse to left heel. Urology consult completed.      Concerns for physician to address:  Plan of care, monitor labs     Zone phone for oncoming shift:   [phone]       Activity:  Level of Assistance: Maximum assist, patient does 25-49%  Number times ambulated in hallways past shift: 0  Number of times OOB to chair past shift: 0    Cardiac:   Cardiac Monitoring: Yes      Cardiac Rhythm: Sinus rhythm    Access:  Current line(s): PIV     Genitourinary:   Urinary Status: Soler    Respiratory:   O2 Device: Nasal cannula  Chronic home O2 use?: YES  Incentive spirometer at bedside: YES    GI:  Last BM (including prior to admit):  (unknown)  Current diet:  ADULT DIET; Regular; 3 carb choices (45 gm/meal)  ADULT ORAL NUTRITION SUPPLEMENT; Breakfast, Dinner; Low Calorie/High Protein Oral Supplement  ADULT ORAL NUTRITION SUPPLEMENT; Breakfast, Lunch; Wound Healing Oral Supplement  Passing flatus: YES    Pain Management:   Patient states pain is manageable on current regimen: YES    Skin:  Luis Scale Score: 16  Interventions: Wound Offloading (Prevention Methods): Elevate heels    Patient Safety:  Fall Risk: Nursing Judgement-Fall Risk High(Add Comments): Yes  Fall Risk Interventions  Nursing Judgement-Fall Risk High(Add Comments): 
.End of Shift Note    Bedside shift change report given to GELY Ashraf (oncoming nurse) by ZAHRAA ZHANG LPN (offgoing nurse).  Report included the following information SBAR, Kardex, OR Summary, Procedure Summary, Intake/Output, MAR, and Recent Results    Shift worked:  7-7     Shift summary and any significant changes:     Patient up to chair with PT, tolerated sitting in chair few hours. Patient continues to have occasional wheezes, on respiratory treatments. Continues on IV fluids, curiel draining light duc colored urine.Patient has dressing to left heel , intact. Small foam applied to cyst on back, noted small break in skin.      Concerns for physician to address:  Monitor labs     Zone phone for oncoming shift:   [phone]       Activity:  Level of Assistance: Moderate assist, patient does 50-74%  Number times ambulated in hallways past shift: 0  Number of times OOB to chair past shift: 1    Cardiac:   Cardiac Monitoring: No      Cardiac Rhythm: Sinus rhythm    Access:  Current line(s): PIV     Genitourinary:   Urinary Status: Curiel, Draining    Respiratory:   O2 Device: None (Room air)  Chronic home O2 use?: NO  Incentive spirometer at bedside: NO    GI:  Last BM (including prior to admit): 04/30/25  Current diet:  ADULT DIET; Regular; 3 carb choices (45 gm/meal)  ADULT ORAL NUTRITION SUPPLEMENT; Breakfast, Dinner; Low Calorie/High Protein Oral Supplement  ADULT ORAL NUTRITION SUPPLEMENT; Breakfast, Lunch; Wound Healing Oral Supplement  Passing flatus: YES    Pain Management:   Patient states pain is manageable on current regimen: YES    Skin:  Luis Scale Score: 15  Interventions: Wound Offloading (Prevention Methods): Bed, pressure reduction mattress, Elevate heels, Pillows, Repositioning (pt not tolerating heel boots/pads)    Patient Safety:  Fall Risk: Nursing Judgement-Fall Risk High(Add Comments): Yes  Fall Risk Interventions  Nursing Judgement-Fall Risk High(Add Comments): Yes  Toilet Every 2 Hours-In 
.End of Shift Note    Bedside shift change report given to GELY Jose (oncoming nurse) by ZAHRAA ZHANG LPN (offgoing nurse).  Report included the following information SBAR, Kardex, OR Summary, Procedure Summary, Intake/Output, MAR, and Recent Results    Shift worked:  7-7     Shift summary and any significant changes:     Post op plan of care. Dressing changed to left foot today, wound care to heel done. Patient medicated for pain. Out of bed with PT today, bed to chair . Tolerated activity. Patient has Male wick  catheter on for incontinence. BM today. Barrier ointment to rectum as skin has  blanchable redness .     Concerns for physician to address:  Plan of care     Zone phone for oncoming shift:   [phone]       Activity:  Level of Assistance: Moderate assist, patient does 50-74%  Number times ambulated in hallways past shift: 0  Number of times OOB to chair past shift: 1    Cardiac:   Cardiac Monitoring: No      Cardiac Rhythm: Sinus rhythm    Access:  Current line(s): PIV     Genitourinary:   Urinary Status: Voiding, External catheter    Respiratory:   O2 Device: None (Room air)  Chronic home O2 use?: NO  Incentive spirometer at bedside: YES    GI:  Last BM (including prior to admit): 04/23/25  Current diet:  ADULT DIET; Regular; 3 carb choices (45 gm/meal)  ADULT ORAL NUTRITION SUPPLEMENT; Breakfast, Dinner; Low Calorie/High Protein Oral Supplement  ADULT ORAL NUTRITION SUPPLEMENT; Breakfast, Lunch; Wound Healing Oral Supplement  Passing flatus: YES    Pain Management:   Patient states pain is manageable on current regimen: YES    Skin:  Luis Scale Score: 17  Interventions: Wound Offloading (Prevention Methods): Bed, pressure reduction mattress, Elevate heels, Repositioning, Pillows, Turning    Patient Safety:  Fall Risk: Nursing Judgement-Fall Risk High(Add Comments): Yes  Fall Risk Interventions  Nursing Judgement-Fall Risk High(Add Comments): Yes  Toilet Every 2 Hours-In Advance of Need: Yes  Hourly 
.End of Shift Note    Bedside shift change report given to GELY Roberson (oncoming nurse) by ZAHRAA ZHANG LPN (offgoing nurse).  Report included the following information SBAR, Kardex, OR Summary, Procedure Summary, Intake/Output, MAR, and Recent Results    Shift worked:  7-7     Shift summary and any significant changes:     Patient in bed this shift. Encouraged turning I bed. Blanchable redness to inner buttocks , Zinc applied. Continues on Man Wick catheter for urinary incontinence. No BM's. Wound care done to left heel, new dressing applied. Patient tolerates diet , good appetite. PIV intact.      Concerns for physician to address:  Plan of care, Discharge planning     Zone phone for oncoming shift:   [phone]       Activity:  Level of Assistance: Moderate assist, patient does 50-74%  Number times ambulated in hallways past shift: 0  Number of times OOB to chair past shift: 0    Cardiac:   Cardiac Monitoring: No      Cardiac Rhythm: Sinus rhythm    Access:  Current line(s): PIV     Genitourinary:   Urinary Status: Voiding, External catheter    Respiratory:   O2 Device: None (Room air)  Chronic home O2 use?: NO  Incentive spirometer at bedside: NO    GI:  Last BM (including prior to admit): 04/23/25  Current diet:  ADULT DIET; Regular; 3 carb choices (45 gm/meal)  ADULT ORAL NUTRITION SUPPLEMENT; Breakfast, Dinner; Low Calorie/High Protein Oral Supplement  ADULT ORAL NUTRITION SUPPLEMENT; Breakfast, Lunch; Wound Healing Oral Supplement  Passing flatus: YES    Pain Management:   Patient states pain is manageable on current regimen: YES    Skin:  Luis Scale Score: 17  Interventions: Wound Offloading (Prevention Methods): Bed, pressure reduction mattress, Pillows, Repositioning    Patient Safety:  Fall Risk: Nursing Judgement-Fall Risk High(Add Comments): Yes  Fall Risk Interventions  Nursing Judgement-Fall Risk High(Add Comments): Yes  Toilet Every 2 Hours-In Advance of Need: Yes  Hourly Visual Checks: In bed, 
.End of Shift Note    Bedside shift change report given to GELY Roberson (oncoming nurse) by ZAHRAA ZHANG LPN (offgoing nurse).  Report included the following information SBAR, Kardex, OR Summary, Procedure Summary, Intake/Output, MAR, and Recent Results    Shift worked:  7-7     Shift summary and any significant changes:     Patient recovering from Debreedmenrt of left heel. Tolerating pain well, no requests for pain meds post procedure. Foot has bulky dressing and elevated. Tolerating diet. Glucose stable.      Concerns for physician to address:  Plan of care     Zone phone for oncoming shift:   [phone]       Activity:  Level of Assistance: Moderate assist, patient does 50-74%  Number times ambulated in hallways past shift: 0  Number of times OOB to chair past shift: 0    Cardiac:   Cardiac Monitoring: No      Cardiac Rhythm: Sinus rhythm    Access:  Current line(s): PIV     Genitourinary:   Urinary Status: Soler    Respiratory:   O2 Device: None (Room air)  Chronic home O2 use?: NO  Incentive spirometer at bedside: YES    GI:  Last BM (including prior to admit): 04/16/25  Current diet:  ADULT DIET; Regular; 3 carb choices (45 gm/meal)  ADULT ORAL NUTRITION SUPPLEMENT; Breakfast, Dinner; Low Calorie/High Protein Oral Supplement  ADULT ORAL NUTRITION SUPPLEMENT; Breakfast, Lunch; Wound Healing Oral Supplement  Passing flatus: YES    Pain Management:   Patient states pain is manageable on current regimen: YES    Skin:  Luis Scale Score: 17  Interventions: Wound Offloading (Prevention Methods): Repositioning, Elevate heels, Bed, pressure reduction mattress    Patient Safety:  Fall Risk: Nursing Judgement-Fall Risk High(Add Comments): Yes  Fall Risk Interventions  Nursing Judgement-Fall Risk High(Add Comments): Yes  Toilet Every 2 Hours-In Advance of Need: Yes  Hourly Visual Checks: In bed, Awake, Quiet  Fall Visual Posted: Socks, Fall sign posted  Room Door Open: Yes  Alarm On: Bed  Patient Moved Closer to Nursing 
0700  Bedside shift change report received from GELY Kraft (offgoing nurse). Assumed care of pt at this time. Report included the following information SBAR, Kardex, Intake/Output, MAR, Recent Results, Heart rhythm and Medications.  Sitter at BS.    0800  Mitts renewed order. Sitter at BS.    0920  Ate well. Took po meds. Lasix 80mg IV given.  /67, SR 64    1015  Restarted Levo at 2mcg, BP 89/31 (51)    1200  BP returned to normal parameters and LEVO gtt stopped.   sitter for safety.  Scant output from Lasix    1257  Pt has remained calm for most of the shift.   Will try relieving sitter and using bed alarm and mitts for line and fall safety.    1430  Nephrology in to consult. No HD today or tomorrow.    1438  D/C sitter. Able to contain lines with use of MITTS.    1530  Visitors at bedside, 2 sons of pt. Updated on POC today and progress.      
0700: Bedside and Verbal shift change report given to Sugey RN (oncoming nurse) by May RN (offgoing nurse). Report included the following information Nurse Handoff Report, Index, Intake/Output, MAR, Recent Results, and Cardiac Rhythm NSR-SB .     End of Shift Note    Bedside shift change report given to May RN (oncoming nurse) by Sugey Ruffin RN (offgoing nurse).  Report included the following information SBAR, Kardex, Intake/Output, MAR, Recent Results, and Cardiac Rhythm NSR-SB    Shift worked:  7802-1537     Shift summary and any significant changes:     Uneventful shift- VSS. Wound care completed on this shift, pt. Has had good urine output on this shift, no complaints of pain.      Concerns for physician to address:  None     Zone phone for oncoming shift:   [phone]       Activity:  Level of Assistance: Maximum assist, patient does 25-49%  Number times ambulated in hallways past shift: 0  Number of times OOB to chair past shift: 0    Cardiac:   Cardiac Monitoring: Yes      Cardiac Rhythm: Sinus sindi    Access:  Current line(s): PIV     Genitourinary:   Urinary Status: Soler    Respiratory:   O2 Device: Nasal cannula  Chronic home O2 use?: NO  Incentive spirometer at bedside: YES    GI:  Last BM (including prior to admit): 05/05/25  Current diet:  ADULT DIET; Regular; 3 carb choices (45 gm/meal)  Passing flatus: YES    Pain Management:   Patient states pain is manageable on current regimen: YES    Skin:  Luis Scale Score: 14  Interventions: Wound Offloading (Prevention Methods): Bed, pressure reduction mattress, Elevate heels, Pillows, Repositioning, Turning    Patient Safety:  Fall Risk: Nursing Judgement-Fall Risk High(Add Comments): Yes  Fall Risk Interventions  Nursing Judgement-Fall Risk High(Add Comments): Yes  Toilet Every 2 Hours-In Advance of Need: Yes  Hourly Visual Checks: Awake, In bed  Fall Visual Posted: Armband, Socks  Room Door Open: Yes  Alarm On: Bed  Patient Moved Closer to Nursing 
0700: Bedside and Verbal shift change report given to Sugey RN (oncoming nurse) by Nadia RN (offgoing nurse). Report included the following information Nurse Handoff Report, Index, Intake/Output, MAR, Recent Results, and Cardiac Rhythm NSR-SB .     End of Shift Note    Bedside shift change report given to May RN (oncoming nurse) by Sugey Ruffin RN (offgoing nurse).  Report included the following information SBAR, Kardex, Intake/Output, MAR, Recent Results, and Cardiac Rhythm NSR-SB    Shift worked:  5239-1398     Shift summary and any significant changes:     Pt. Switched to isoair bed today, turned appropriately every 2 hours. Pt. C/o generalized pain one time today, received one PRN dose of tylenol, please refer to MAR.      Concerns for physician to address:  .     Zone phone for oncoming shift:   .       Activity:  Level of Assistance: Maximum assist, patient does 25-49%  Number times ambulated in hallways past shift: 0  Number of times OOB to chair past shift: 0    Cardiac:   Cardiac Monitoring: Yes      Cardiac Rhythm: Sinus sindi    Access:  Current line(s): PIV     Genitourinary:   Urinary Status: Soler    Respiratory:   O2 Device: Nasal cannula  Chronic home O2 use?: NO  Incentive spirometer at bedside: YES    GI:  Last BM (including prior to admit): 05/05/25  Current diet:  ADULT DIET; Regular; 3 carb choices (45 gm/meal)  ADULT ORAL NUTRITION SUPPLEMENT; Breakfast, Dinner; Low Calorie/High Protein Oral Supplement  ADULT ORAL NUTRITION SUPPLEMENT; Breakfast, Lunch; Wound Healing Oral Supplement  Passing flatus: YES    Pain Management:   Patient states pain is manageable on current regimen: YES    Skin:  Luis Scale Score: 14  Interventions: Wound Offloading (Prevention Methods): Bed, pressure reduction mattress, Elevate heels, Pillows, Repositioning, Turning    Patient Safety:  Fall Risk: Nursing Judgement-Fall Risk High(Add Comments): Yes  Fall Risk Interventions  Nursing Judgement-Fall Risk 
0700: Bedside and Verbal shift change report given to Veronica Jennings RN (oncoming nurse) by GELY Porter (offgoing nurse). Report included the following information Nurse Handoff Report, Adult Overview, Intake/Output, MAR, and Recent Results.     0817:  Pt BG low this am resulting at 53 with morning labs rechecked again at 7:46 reading 57. oral glucose tabs administered.MD Nupur notified pt alert. Pt didnt finish chewing all 4 tabs until 8:17am will recheck sugar in 15mins at 8:32.     0823: VORB to reinsert curiel cath per MD Nupur d/t urinary retention lasting greater than 24hrs post previous removal.    0832:     0900: VORB by MD Nupur to hold NPH insulin d/t hypoglycemia this AM.    1050: GELY Martin inserted Curiel cath with GELY Mariano accompanying. 250ml out upon insertion.    1900: Bedside and Verbal shift change report given to GELY Porter (oncoming nurse) by Veronica Jennings RN (offgoing nurse). Report included the following information Nurse Handoff Report, Adult Overview, Intake/Output, MAR, and Recent Results.     
0700: Bedside and Verbal shift change report given to Veronica Jennings RN (oncoming nurse) by GELY Porter (offgoing nurse). Report included the following information Nurse Handoff Report, Adult Overview, Intake/Output, MAR, and Recent Results.     Uneventful shift pt needs picc for dapto at home.     1905: Bedside and Verbal shift change report given to GELY Zuniga (oncoming nurse) by Veronica Jennings RN (offgoing nurse). Report included the following information Nurse Handoff Report, Adult Overview, Intake/Output, MAR, and Recent Results.     
0715: Bedside and Verbal shift change report given to Baljeet CUMMINGS RN (oncoming nurse) by Jose Miguel RIVERO RN (offgoing nurse). Report included the following information Nurse Handoff Report, ED Encounter Summary, ED SBAR, Adult Overview, Intake/Output, MAR, Recent Results, and Cardiac Rhythm SB .    0800: Shift assessment complete. Bladder irrigation complete, 50 mL in 60 mL out.   0945: IDR at this time, plan for transfer.  1200: Reassessment complete, no acute changes to note.  4541-5855: This RN called to the bedside, patient in SVT with . MD aware and at the bedside. Verbal orders for albumin 25%. Given per Dialysis RN.   1445: Patient resumed NSR.  6789-0125: Patient back in SVT, max .  1453: Patient resumed NSR.   2123-5219: Patient back in SVT, MD requested at the bedside. Metoprolol 2.5 ordered per MD.   1511: Patient displaying Afib rhythm, HR in the 130's.  1529: Per MD, verbal order for albumin 5%.   1600: MD aware of patient rhythm, no new orders at this time. Spoke with MD, patient will stay in CCU for the time being.  1630: Reassessment complete, patient remains in afib. No other acute changes to note.   1654: Endocrinology consult called, message given, awaiting response.   1752: Spoke with MD regarding patient sustaining Afib RVR and declining BP. Verbal orders for amiodarone bolus and gtt.   1900: Bedside shift report given to GELY Kraft.        
0720  Bedside shift change report received from GELY Gillespie (offgoing nurse). Assumed care of pt at this time. Report included the following information SBAR, Kardex, Intake/Output, MAR, Recent Results, Heart rhythm (SB 58).  FiO2 to 4 L/M via NC for SPO2 80's.    0730  Spo2 97%  Sleeping,withdraws to pain. Mouth open and drooling. Significant change from yesterday morning.      0750  Dr. Donaldson at BS. ABG ordered.    0813  ABG in progress.      0833  CO2 62, BiPAP started.    1115  Awake, pulling bipap mask off.  O2 3L NC applied.  Eating breakfast.  Dr. Donaldson aware.  Discussed using LEVO gtt with Lasix 80mg IV (pt unable to take am midodrine).    1200  D/C Mitts      1400  Somnolent to verbal stimulus. Placed on BiPAP again. MD aware.   Nephrology on unit: Stat CXR, Bumex 4mg IV and decided to do HD today.  
0730  Bedside and Verbal shift change report given to GELY Vasquez (oncoming nurse) by GELY Kraft (offgoing nurse). Report included the following information Adult Overview, Intake/Output, MAR, Recent Results, and Cardiac Rhythm NSR .     0830  Assessment as per flow.    1000  Patient ate breakfast~75% of tray.    1130  Report called to GELY Mosley on CPC.    1240  Patient transferred to 2133.  GELY Mosley assisted with transfer.    
0730 - Bedside verbal report received from off going shift.     0800 - Assessment complete, see summary for details.  Right SC dialysis catheter dressing saturated with blood, quick clott and pressure dressing applied.  Dr. Foster aware of continued bleeding from site (will assess site and place suture if needed).  Patient awake and restless, bilateral mitts intact, 1:1  at bedside.  Oriented to person only, follows simple commands.  Bilateral Potus boots removed secondary to patient complaint/restlessness, heels up pillow applied.  Linin change complete.  Left elbow dressing changed and wound documented.  Taking PO without S/S aspiration.  Denies discomfort/SOB.  Re-positioned for comfort, call bell within reach.     0825 - Dr. Foster updated on condition, aware of current BG = 159, Insulin gtt stopped, orders noted.     0925 - Dr. PURA Zendejas at bedside, updated on condition, verbal order noted given to hold AM dose NPH, written orders noted.    1000 - Dr. Raya at bedside for interdisciplinary rounds, updated on condition, orders noted.     1135 - Dr. Foster at bedside, aware of continued bleeding from tunnelled catheter site, placed suture at site under sterile conditions.    1200 - Re-assessment complete, no changes noted from previous assessment.  Re-positioned for comfort.  1:1 sitter remains at bedside.  Bilateral mitts intact.    1552 - MAP sustained <65, restarted Levophed gtt at 4 mcg prior to initiating dialysis.  Will titrate as needed to maintain MAP >65.     1555 - Dialysis catheter site continues oozing blood, Dr. Foster aware, spoke with IR for continued treatment, no further treatment options to stop bleeding offered.  Dr. Foster updated on IR's recommendations to stop blood thinners and change out catheter next week.  Quick clot and central line dressing applied.       1600 - Assessment complete, no changes noted from previous assessment.  Re-positioned for comfort, 
0730: Bedside and Verbal shift change report given to Delfina Patrick RN (oncoming nurse) by Gely Ko (offgoing nurse). Report included the following information Nurse Handoff Report, Index, Intake/Output, MAR, Recent Results, and Cardiac Rhythm Sinus sindi .     1930: End of Shift Note    Bedside shift change report given to GELY Ko (oncoming nurse) by Delfina Patrick RN (offgoing nurse).  Report included the following information SBAR, Kardex, Intake/Output, MAR, and Recent Results    Shift worked:  8897-5198     Shift summary and any significant changes:     Pt to be NPO at midnight for permacath placement and dialysis 5/14.   Midline not yet placed, waiting for nephrology approval.   Pt still requiring 1-2 L NC.   Very little output from curiel this shift, urine duc/brown.   Wound care completed on LLE.   Phone consent obtained from pts son with Tanya HUTSON RN for hemodialysis and permacath placement 5/14.   Concerns for physician to address:       Zone phone for oncoming shift:          Activity:  Level of Assistance: Maximum assist, patient does 25-49%  Number times ambulated in hallways past shift: 0  Number of times OOB to chair past shift: 0    Cardiac:   Cardiac Monitoring: Yes      Cardiac Rhythm: Sinus sindi    Access:  Current line(s): PIV     Genitourinary:   Urinary Status: Curiel, Draining    Respiratory:   O2 Device: Nasal cannula  Chronic home O2 use?: NO  Incentive spirometer at bedside: YES    GI:  Last BM (including prior to admit): 05/13/25  Current diet:  ADULT DIET; Regular; 3 carb choices (45 gm/meal); Low Potassium (Less than 3000 mg/day)  Diet NPO  Passing flatus: YES    Pain Management:   Patient states pain is manageable on current regimen: YES    Skin:  Luis Scale Score: 12  Interventions: Wound Offloading (Prevention Methods): Pillows, Repositioning, Turning, Elevate heels    Patient Safety:  Fall Risk: Nursing Judgement-Fall Risk High(Add Comments): Yes  Fall Risk 
0735: Nurse received report from GELY Whitmore. Reports unsure of patient's baseline, but patient presented with garbled and slurred speech on the start of her shift. Patient not known to staff as he was transferred to floor recently, but previous documentation from 5/4 shows that patient is AAO X4 with clear speech. Code stroke called as patient still presents with garbled, slurred speech and oriented to self and place only. Dr. Alejandre responded to alert and instructed staff to proceed with CVA work up. Patient transported down for CT scan and code stroke cancelled this time.     0835: Dr. Zendejas at bedside. States patient seems encephalopathic. Orders to hold nph at this time.   
0800: Soler removed per orders.     1440: Bladder scan showed 490 ml.     1520: Straight cath done, 480 ml drained.   
0929 Call placed to Nephrology re: OK for midline with elevated creatinine?  1058 2nd call for above question.  1103 Spoke with Dr. Turner, he will review the chart.   
0958 Cardiology consult called to answering service, Dr. Razo on call and is aware of consult  
1115 Pt with bilateral wheezing, c/o chest discomfort.  Dr. PRIMO Zendejas aware, IV fluids stopped,   Resp aware of wheezing, will come and evaluate patient.   
1203 Von with Care Management aware Discharge is cancelled for today.  
1304 - Bedside verbal report received from off going shift.     1330 - Assessment complete, see summary for details. Eyes open spontaneously, mouths words but not answering questions, restless and pulling at BiPAP mask, re-oriented to place and situation without effect.  Moves all extremities spontaneously.  Admission bath and linin change complete.  Left elbow skin tear bleeding, dressing changed (wound not documented) ? DTI, wound care consult placed.     1445 - 1:1 sitter at bedside secondary to agitation/pulling at BiPAP, unable to sedate or restrain secondary to bradycardia.      1451 - Sustained MAP <65, initiated Levophed gtt at 4 mcg via right FA/PIV, will titrate gtt as needed to maintain MAP >65.  Dr. Topete at bedside, updated son, questions answered.  Son with poor understanding of DX/prognosis and plan of care.  Orders noted.     1710 - HD initiated.    1900 - Bedside verbal report given to oncoming shift.                 
1440: Report received from GELY Swift from PACU.    1500: Patient still in PACU. Verbal shift change report given to Anastasia (oncoming nurse) by Minnie (offgoing nurse). Report included the following information Nurse Handoff Report.     
1852 Call placed to Nephrology re: swelling.   
1910 Bedside and Verbal shift change report given to GELY Gillespie (oncoming nurse) by GELY Og (offgoing nurse). Report included the following information Nurse Handoff Report, Intake/Output, MAR, Recent Results, Cardiac Rhythm NSR, and Alarm Parameters.      1930 Shift assessment done. Patient oriented only to self, responds to voice and follows commands. Ongoing hemodialysis. GELY Leija at bedside. On O2 support at 3lpm/nasal cannula. 2 peripheral IV access noted on bilateral forearm. Levophed drip titrated accordingly. Patient oliguric, voiding via Soler to duc/brown, cloudy output. Dual skin check done with GELY Og. See flowsheets/MAR for details.    0000 Reassessment done. Julio RT at bedside. O2 support shifted to Bipap FiO2 35% I/E 16/6. See flowsheets for details.    0400 Reassessment done. No change to previous assessment.    0500 1 PIV at patient's right AC inserted by GELY Joiner. Patient stated that he wanted a break from the Bipap. RT Julio at bedside. Shifted to 3lpm/nasal cannula.    0730 Bedside and Verbal shift change report given to GELY Delong (oncoming nurse) by GELY Gillespie (offgoing nurse). Report included the following information Nurse Handoff Report, Intake/Output, MAR, Recent Results, Cardiac Rhythm Sinus Bradycardia, and Alarm Parameters.    
1910 Bedside and Verbal shift change report given to GELY Gillespie (oncoming nurse) by GELY Og (offgoing nurse). Report included the following information Nurse Handoff Report, Intake/Output, MAR, Recent Results, Cardiac Rhythm NSR/Sinus Bradycardia, and Alarm Parameters.      1930 Shift assessment done. Patient only oriented to self, responds to voice and follows commands. NSR/Sinus sindi on cardiac monitor, pulses palpable on all distal pulses. On O2 support at 3lpm/nc, lungs are diminished. 2 peripheral IV access noted on bilateral forearm. Right subclavian HD permacath noted. Patient voiding via Soler to brown cloudy output. Dual skin assessment with GELY Og done. See flowsheets for details.    2200 MARTINE Welch ordered VBG for concern of patient being hypercapnic. Specimen drawn, RT Julio notified.    2213 MARTINE Welch made aware of VBG result by RT Julio. pH 7.37, pCO2 40.8, pO2 76, HCO3 23.    0000 Reassessment done. No change to previous assessment. Bladder irrigation with 50ml normal saline done. Output 80ml. Total UO 30ml. See flowsheets for details.    0400 Reassessment done. No acute changes noted.    0710 Bedside and Verbal shift change report given to GELY Og (oncoming nurse) by GELY Gillespie (offgoing nurse). Report included the following information Nurse Handoff Report, Intake/Output, MAR, Recent Results, Cardiac Rhythm NSR/Sinus Bradycardia, and Alarm Parameters.    
2330     Attempted to place patient on bipap. Tried to pull off the mask before leaving th room. Patient confused. Did not want to anger the patient so let him sleep without the bipap.   [id number]   [id number]     [id number]              dmg                
79 yo diabetic with necrotic left heel wound and no L popliteal or pedal pulses. Left ROSIE in Jan 2024 was 0.7 - doubt it is that now.  Will need revascularization to avoid limb loss. Plan angio today.  
ABG results    pH 7.459  pCO2 34.2  pO2 97.4  HCO3 23.7  BE 0.5  o2SAT 97.8%  
ADULT PROTOCOL: JET AEROSOL ASSESSMENT    Patient  Errol Brandt     80 y.o.   male     5/2/2025  8:48 AM    Breath Sounds Pre Procedure: Breath Sounds Pre-Tx THOMAS: Diminished                                  Breath Sounds Pre-Tx LLL: Diminished        Breath Sounds Pre-Tx RUL: Diminished        Breath Sounds Pre-Tx RML: Diminished        Breath Sounds Pre-Tx RLL: Diminished  Breath Sounds Post Procedure: Breath Sounds Post-Tx THOMAS: Diminished          Breath Sounds Post-Tx LLL: Diminished          Breath Sounds Post-Tx RUL: Diminished          Breath Sounds Post-Tx RML: Diminished          Breath Sounds Post-Tx RLL: Diminished                                       Heart Rate: Pre procedure Pre-Tx Pulse: 59           Post procedure Post-Tx Pulse: 62    Resp Rate: Pre procedure Pre-Tx Resps: 18           Post procedure Post-Tx Resps: 18    Oxygen: O2 Therapy: Room air        Changed: No    SpO2:  SpO2: 97 %   without Oxygen                Nebulizer Therapy: Current medications Medications: Albuterol/Ipratropium      Changed: Yes      Problem List:   Patient Active Problem List   Diagnosis    Sepsis (HCC)    Tobacco abuse    Hypoglycemia    CKD (chronic kidney disease) stage 3, GFR 30-59 ml/min (HCC)    DANIELLE (acute kidney injury)    Hyperlipidemia    HTN (hypertension)    DM (diabetes mellitus) (HCC)    Abscess of back    PAD (peripheral artery disease)    Lactic acid acidosis    Acute hypoxemic respiratory failure (HCC)    Obstructive jaundice (HCC)    Biliary stricture (HCC)    NSTEMI (non-ST elevated myocardial infarction) (HCC)    Bilateral carotid artery stenosis    Gross hematuria    COVID    COVID-19    Unproductive cough    Goals of care, counseling/discussion    DNR (do not resuscitate) discussion    Cellulitis and abscess of toe of left foot    Diabetic foot infection (HCC)    Retained foreign body of foot    MRSA bacteremia    Cellulitis of left foot    MRSA infection    Poorly controlled diabetes mellitus 
Attempted to draw AM labs x2 on different arms, both unsuccessful.    0530 Another attempt successful, tube sent down.  
Attempted to see pt for OT services.  Pt was off the floor for further debridement of left heel.  Please provide weight bearing status once surgery is complete.  Will defer but continue to follow.   
Bedside and Verbal shift change report given to GELY Porter (oncoming nurse) by GELY Dow (offgoing nurse). Report included the following information Nurse Handoff Report, ED SBAR, MAR, and Cardiac Rhythm Sinus Davon .      End of Shift Note    Bedside shift change report given to GELY Martin (oncoming nurse) by German Forrester RN (offgoing nurse). Report included the following information SBAR, ED Summary, MAR, and Cardiac Rhythm Sinus Davon    Shift worked:  1900-0730     Shift summary and any significant changes:    Bladder scan pt twice on my shift. First time showed 538, straight cath pt since pt could not void, drained 525. Bladder scan pt again and it was 574, straight cath pt and drained 450ml of urine. Advised dayshift nurse Veronica.      Concerns for physician to address:  N/A     Zone phone for oncoming shift:   N/A       Activity:  Level of Assistance: Maximum assist, patient does 25-49%  Number times ambulated in hallways past shift: 0  Number of times OOB to chair past shift: 0    Cardiac:   Cardiac Monitoring: Yes      Cardiac Rhythm: Sinus davon    Access:  Current line(s): PIV     Genitourinary:   Urinary Status: Has not voided (curiel removed for voiding trial)    Respiratory:   O2 Device: None (Room air)  Chronic home O2 use?: NO  Incentive spirometer at bedside: NO    GI:  Last BM (including prior to admit): 05/05/25  Current diet:  ADULT DIET; Regular; 3 carb choices (45 gm/meal)  Passing flatus: YES    Pain Management:   Patient states pain is manageable on current regimen: YES    Skin:  Luis Scale Score: 15  Interventions: Wound Offloading (Prevention Methods): Bed, pressure reduction mattress, Elevate heels, Pillows, Repositioning, Turning    Patient Safety:  Fall Risk: Nursing Judgement-Fall Risk High(Add Comments): Yes  Fall Risk Interventions  Nursing Judgement-Fall Risk High(Add Comments): Yes  Toilet Every 2 Hours-In Advance of Need: Yes  Hourly Visual Checks: Awake, In bed  Fall 
Bedside and Verbal shift change report given to GELY Porter (oncoming nurse) by GELY Martin (offgoing nurse). Report included the following information Nurse Handoff Report, ED SBAR, MAR, and Cardiac Rhythm Sinus Davon .      End of Shift Note    Bedside shift change report given to GELY Martin (oncoming nurse) by German Forrester RN (offgoing nurse).  Report included the following information SBAR, ED Summary, MAR, and Cardiac Rhythm Sinus Davon    Shift worked:  9121-7141     Shift summary and any significant changes:     N/A     Concerns for physician to address:  N/A     Zone phone for oncoming shift:   N/A       Activity:  Level of Assistance: Maximum assist, patient does 25-49%  Number times ambulated in hallways past shift: 0  Number of times OOB to chair past shift: 0    Cardiac:   Cardiac Monitoring: Yes      Cardiac Rhythm: Sinus davon    Access:  Current line(s): PIV     Genitourinary:   Urinary Status: Draining, Soler    Respiratory:   O2 Device: None (Room air)  Chronic home O2 use?: NO  Incentive spirometer at bedside: YES    GI:  Last BM (including prior to admit): 05/09/25  Current diet:  ADULT DIET; Regular; 3 carb choices (45 gm/meal)  Passing flatus: YES    Pain Management:   Patient states pain is manageable on current regimen: YES    Skin:  Luis Scale Score: 15  Interventions: Wound Offloading (Prevention Methods): Repositioning, Pillows, Turning, Elevate heels    Patient Safety:  Fall Risk: Nursing Judgement-Fall Risk High(Add Comments): Yes  Fall Risk Interventions  Nursing Judgement-Fall Risk High(Add Comments): Yes  Toilet Every 2 Hours-In Advance of Need: Yes  Hourly Visual Checks: Awake, In bed  Fall Visual Posted: Armband, Fall sign posted, Socks  Room Door Open: Yes  Alarm On: Bed  Patient Moved Closer to Nursing Station: No    Active Consults:   PHARMACY TO DOSE VANCOMYCIN  IP CONSULT TO PODIATRY  IP CONSULT TO DIABETES MANAGEMENT  IP CONSULT TO INFECTIOUS DISEASES  IP CONSULT TO 
Bedside and Verbal shift change report given to Manolo RN (oncoming nurse) by Nadia HONG (offgoing nurse). Report included the following information Nurse Handoff Report, Index, Adult Overview, Intake/Output, MAR, Recent Results, and Cardiac Rhythm 1st AV block/bradycardia .      803 MD notified of increased blood in urine and CBC requested     End of Shift Note    Bedside shift change report given to Maikel RN (oncoming nurse) by Manolo Bustos RN (offgoing nurse).  Report included the following information SBAR, Kardex, Intake/Output, MAR, Recent Results, and Cardiac Rhythm Sinus sindi 1 AV block    Shift worked:  700-1930     Shift summary and any significant changes:     Elevated BUN and Cr, 1/2 normal saline infusion started, urology consulted     Concerns for physician to address:  Kidney function     Zone phone for oncoming shift:          Activity:  Level of Assistance: Maximum assist, patient does 25-49%  Number times ambulated in hallways past shift: 0  Number of times OOB to chair past shift: 0    Cardiac:   Cardiac Monitoring: Yes      Cardiac Rhythm: Sinus rhythm, 1° AV Block, Sinus sindi    Access:  Current line(s): PIV     Genitourinary:   Urinary Status: Draining    Respiratory:   O2 Device: Nasal cannula  Chronic home O2 use?: YES  Incentive spirometer at bedside: YES    GI:  Last BM (including prior to admit): 05/11/25  Current diet:  ADULT DIET; Regular; 3 carb choices (45 gm/meal); Low Potassium (Less than 3000 mg/day)  Passing flatus: YES    Pain Management:   Patient states pain is manageable on current regimen: YES    Skin:  Luis Scale Score: 13  Interventions: Wound Offloading (Prevention Methods): Repositioning    Patient Safety:  Fall Risk: Nursing Judgement-Fall Risk High(Add Comments): Yes  Fall Risk Interventions  Nursing Judgement-Fall Risk High(Add Comments): Yes  Toilet Every 2 Hours-In Advance of Need: Yes  Hourly Visual Checks: Awake, In bed  Fall Visual Posted: Armband  Room Door 
Bedside and Verbal shift change report given to Manolo RN (oncoming nurse) by Nadia RN (offgoing nurse). Report included the following information Nurse Handoff Report, Index, Adult Overview, Intake/Output, MAR, Recent Results, and Cardiac Rhythm NSR/sinus sindi    0839 MD notified for low BG and bradycardia. D10 bolus administered and BG rechecked at 102. Atenolol held per provider.     End of Shift Note    Bedside shift change report given to Nadia RN (oncoming nurse) by Manolo Bustos RN (offgoing nurse).  Report included the following information SBAR, Kardex, Intake/Output, MAR, Recent Results, and Cardiac Rhythm NSR/sinus sindi    Shift worked:  2737-5569     Shift summary and any significant changes:     Wound care completed     Concerns for physician to address:  PICC placement     Zone phone for oncoming shift:          Activity:  Level of Assistance: Maximum assist, patient does 25-49%  Number times ambulated in hallways past shift: 0  Number of times OOB to chair past shift: 0    Cardiac:   Cardiac Monitoring: Yes      Cardiac Rhythm: 1° AV Block, sinus sindi    Access:  Current line(s): PIV     Genitourinary:   Urinary Status: Draining, Soler    Respiratory:   O2 Device: None (Room air)  Chronic home O2 use?: NO  Incentive spirometer at bedside: NO    GI:  Last BM (including prior to admit): 05/09/25  Current diet:  ADULT DIET; Regular; 3 carb choices (45 gm/meal); Low Potassium (Less than 3000 mg/day)  Passing flatus: YES    Pain Management:   Patient states pain is manageable on current regimen: YES    Skin:  Luis Scale Score: 12  Interventions: Wound Offloading (Prevention Methods): Repositioning, Turning, Elevate heels    Patient Safety:  Fall Risk: Nursing Judgement-Fall Risk High(Add Comments): Yes  Fall Risk Interventions  Nursing Judgement-Fall Risk High(Add Comments): Yes  Toilet Every 2 Hours-In Advance of Need: Yes  Hourly Visual Checks: Eyes closed, In bed  Fall Visual Posted: 
Bedside shift change report given to GELY Vega (oncoming nurse) by GELY Romero (offgoing nurse). Report included the following information Nurse Handoff Report, ED SBAR, Adult Overview, Surgery Report, Intake/Output, MAR, and Recent Results.     
Chart reviewed for PT treatment. Note that patient is scheduled for left foot debridement tomorrow, 4/18/2024. Also note that patient is unable to observe TTWB precautions and requires moderate assist x 2 for transfer. At time of attempt, nursing has just left room and states that patient did not stir from sleeping as she performed vitals. Attempted to rouse patient with loud voice (calling his name) from doorway, and patient continues to sleep. Will defer for now and continue to follow.  
Chart reviewed. Attempted to see pt for PT intervention however, pt currently receiving dialysis at the bedside. Will defer however continue to follow.    Yara Gates, PT, DPT  
Code stroke called. Nurse reported pt very lethargic and minimal responsiveness. Agree with nursing to hold.   
Completed [id number] review, insurance authorization completed, valid for only a couple of days  
Comprehensive Nutrition Assessment    Type and Reason for Visit:  Initial, LOS    Nutrition Recommendations/Plan:   Continue current diet, okay for double portions of protein  Rafiq BID and Ensure high protein BID     Malnutrition Assessment:  Malnutrition Status:  No malnutrition (04/10/25 1506)    Context:  Acute Illness     Findings of the 6 clinical characteristics of malnutrition:  Energy Intake:  No decrease in energy intake  Weight Loss:  No weight loss     Body Fat Loss:  No body fat loss     Muscle Mass Loss:  No muscle mass loss    Fluid Accumulation:  No fluid accumulation     Strength:  Not Performed    Nutrition Assessment:     Chart reviewed for LOS. Pt admitted with L foot cellulitis with foreign body, bacteremia, T2DM, hypothyroidism, HTN. Pt seen awake in bed. He is [place]. He reports a consistently good appetite, confirmed with nursing documentation. Pt agreeable to supplements to support wound healing. Noted plans for LLE vein bypass next week to improve wound healing. Will continue monitoring.     Patient Vitals for the past 120 hrs:   PO Meals Eaten (%)   04/10/25 1255 76 - 100%   04/10/25 1031 76 - 100%   04/08/25 1645 76 - 100%   04/08/25 1247 76 - 100%   04/08/25 0937 76 - 100%     Wt Readings from Last 5 Encounters:   04/09/25 84 kg (185 lb 3 oz)   10/07/24 79.4 kg (175 lb)   06/24/24 78 kg (172 lb)   06/05/24 77.1 kg (170 lb)   04/01/24 88 kg (194 lb 0.1 oz)   ]    Nutrition Related Findings:    Labs: Na 133, -356-352, A1c 9.0.   Meds: humalog, humilin, culturelle, vancomycin, NS.   Edema: +1 LLE.   BM 4/9.   Wound Type: Surgical Incision       Current Nutrition Intake & Therapies:    Average Meal Intake: %  Average Supplements Intake: None Ordered  ADULT ORAL NUTRITION SUPPLEMENT; Breakfast, Dinner; Wound Healing Oral Supplement  ADULT ORAL NUTRITION SUPPLEMENT; Lunch, Dinner; Low Calorie/High Protein Oral Supplement  ADULT DIET; Regular; 4 carb choices (60 gm/meal); Low 
Comprehensive Nutrition Assessment    Type and Reason for Visit:  Reassess    Nutrition Recommendations/Plan:   Continue consistent carb diet   Continue Nepro shakes daily  Please document % meals and supplements consumed in flowsheet I/O's under intake      Malnutrition Assessment:  Malnutrition Status:  No malnutrition (04/10/25 1506)    Context:  Acute Illness     Findings of the 6 clinical characteristics of malnutrition:  Energy Intake:  No decrease in energy intake  Weight Loss:  No weight loss     Body Fat Loss:  No body fat loss     Muscle Mass Loss:  No muscle mass loss    Fluid Accumulation:  No fluid accumulation     Strength:  Not Performed    Nutrition Assessment:    Chart reviewed and case discussed during IDR. Pt unavailable during attempts to visit today. Family meeting with palliative team anticipated today. Pt's meal intake decreased yesterday leading to hypoglycemia overnight, requiring a dextrose drip today. Continue to encourage intake of meals and supplements. Will continue monitoring goals of care conversations.     Patient Vitals for the past 120 hrs:   PO Meals Eaten (%)   05/20/25 1602 1 - 25%   05/19/25 1330 76 - 100%   05/19/25 1023 76 - 100%   05/18/25 1700 76 - 100%   05/17/25 1313 76 - 100%   05/17/25 1000 76 - 100%   05/17/25 0915 76 - 100%     Patient Vitals for the past 120 hrs:   PO Supplement (%)   05/18/25 1700 76 - 100%   05/17/25 1313 26 - 50%     Wt Readings from Last 5 Encounters:   05/15/25 83.9 kg (185 lb)   10/07/24 79.4 kg (175 lb)   06/24/24 78 kg (172 lb)   06/05/24 77.1 kg (170 lb)   04/01/24 88 kg (194 lb 0.1 oz)   ]    Nutrition Related Findings:    Labs: Na 130, Cr 3.97, BUN 47, BG 99-41-32.   Meds: culturelle, synthroid, D10.   Edema: +1 all extremities, +2 perineal.   BM 5/19.   Wound Type: Unstageable (L heel)       Current Nutrition Intake & Therapies:    Average Meal Intake: 1-25%, 51-75%  Average Supplements Intake: Unable to assess  ADULT ORAL NUTRITION 
Comprehensive Nutrition Assessment    Type and Reason for Visit:  Reassess    Nutrition Recommendations/Plan:   Continue current diet  D/c supplements as pt is refusing to take them. Fortunately he is eating very well.  Please document % meals consumed in flowsheet I/O's under intake      Malnutrition Assessment:  Malnutrition Status:  No malnutrition (04/10/25 1506)    Context:  Acute Illness     Findings of the 6 clinical characteristics of malnutrition:  Energy Intake:  No decrease in energy intake  Weight Loss:  No weight loss     Body Fat Loss:  No body fat loss     Muscle Mass Loss:  No muscle mass loss    Fluid Accumulation:  No fluid accumulation     Strength:  Not Performed    Nutrition Assessment:    Chart reviewed for follow up. Pt working on lunch during time of visit. He continues to eat great, but reports he is not interested in the supplements. Will discontinue them. Phos is newly elevated, so will continue monitoring if diet restriction will be needed.     Patient Vitals for the past 120 hrs:   PO Meals Eaten (%)   05/05/25 1803 76 - 100%   05/05/25 1222 76 - 100%   05/04/25 1804 76 - 100%   05/02/25 1734 51 - 75%     Wt Readings from Last 5 Encounters:   04/14/25 84 kg (185 lb 3 oz)   10/07/24 79.4 kg (175 lb)   06/24/24 78 kg (172 lb)   06/05/24 77.1 kg (170 lb)   04/01/24 88 kg (194 lb 0.1 oz)   ]    Nutrition Related Findings:    Labs: Cr 2.95, , Phos 5.4, -81-78, Hgb 8.6.   Meds: aspirin, daptomycin, humalog, NPH, culturelle, Na bicarb, glycolax.   Edema: +1 LUE, +1 pitting BLE.   BM 5/5.   Wound Type: Full Thickness, Surgical Incision       Current Nutrition Intake & Therapies:    Average Meal Intake: %  Average Supplements Intake: Refusing to take  ADULT DIET; Regular; 3 carb choices (45 gm/meal)    Anthropometric Measures:  Height: 180.3 cm (5' 10.98\")  Ideal Body Weight (IBW): 172 lbs (78 kg)       Current Body Weight: 84 kg (185 lb 3 oz), 107.7 % IBW. Weight Source: 
Comprehensive Nutrition Assessment    Type and Reason for Visit:  Reassess    Nutrition Recommendations/Plan:   Continue current diet  Ensure HP 2x/day, marleny 2x/day  Monitor and record PO intakes, supplement acceptance, and Bms in I/Os     Malnutrition Assessment:  Malnutrition Status:  No malnutrition (04/10/25 1506)    Context:  Acute Illness     Findings of the 6 clinical characteristics of malnutrition:  Energy Intake:  No decrease in energy intake  Weight Loss:  No weight loss     Body Fat Loss:  No body fat loss     Muscle Mass Loss:  No muscle mass loss    Fluid Accumulation:  No fluid accumulation     Strength:  Not Performed    Nutrition Assessment:    Follow up. Pt continues with good intakes, >50%. Plan to continue diet/ONS.    Nutrition Related Findings:    Labs: Na 140, K 4.9, BUN 75, Creat 2.6, Gluc 264. Meds: insulin lispro, insulin NPH, lactobacillus. 1+ LUE, trace LLE edema. BM 4/30. Wound Type: Surgical Incision       Current Nutrition Intake & Therapies:    Average Meal Intake: 51-75%, %  Average Supplements Intake: %  ADULT DIET; Regular; 3 carb choices (45 gm/meal)  ADULT ORAL NUTRITION SUPPLEMENT; Breakfast, Dinner; Low Calorie/High Protein Oral Supplement  ADULT ORAL NUTRITION SUPPLEMENT; Breakfast, Lunch; Wound Healing Oral Supplement    Anthropometric Measures:  Height: 180.3 cm (5' 10.98\")  Ideal Body Weight (IBW): 172 lbs (78 kg)    Current Body Weight: 84 kg (185 lb 3 oz), 107.7 % IBW. Weight Source: Standing scale  Current BMI (kg/m2): 25.8  Weight Adjustment For: No Adjustment  BMI Categories: Overweight (BMI 25.0-29.9)    Estimated Daily Nutrient Needs:  Energy Requirements Based On: Formula  Weight Used for Energy Requirements: Current  Energy (kcal/day): 2050 kcals (BMR x 1.3AF)  Weight Used for Protein Requirements: Current  Protein (g/day): 101g (1.2 g/kg bw)  Method Used for Fluid Requirements: 1 ml/kcal  Fluid (ml/day): 2050mL    Nutrition Diagnosis:   Increased 
Comprehensive Nutrition Assessment    Type and Reason for Visit:  Reassess    Nutrition Recommendations/Plan:   Continue current diet  Nepro shakes BID  Please document % meals and supplements consumed in flowsheet I/O's under intake   Please obtain updated weight     Malnutrition Assessment:  Malnutrition Status:  At risk for malnutrition (05/26/25 1448)    Context:  Acute Illness     Findings of the 6 clinical characteristics of malnutrition:  Energy Intake:  Mild decrease in energy intake  Weight Loss:  Unable to assess     Body Fat Loss:  Mild body fat loss Orbital   Muscle Mass Loss:  Mild muscle mass loss Temples (temporalis)  Fluid Accumulation:  Mild Extremities   Strength:  Not Performed    Nutrition Assessment:    Chart reviewed and pt seen at bedside. He reports his appetite has been doing fairly well recently. Limited PO intake documented over the last several days. Pt reports drinking the Nepro shakes lately, so will continue them and increase to BID. Temporal wasting appears to have progressed compared to earlier in admission. He has not had an updated weight taken since 5/15 which appears to have been copied from previous entries back to 4/9. Ordered updated weight to be taken. Otherwise pt is medically stable for discharge pending HD den acceptance at [facility].     Patient Vitals for the past 120 hrs:   PO Meals Eaten (%)   05/26/25 1406 51 - 75%   05/24/25 2250 0%   05/24/25 1914 76 - 100%   05/24/25 0241 76 - 100%   05/23/25 2259 0%   05/23/25 2001 76 - 100%   05/23/25 0314 0%   05/22/25 2321 0%   05/22/25 1929 0%   05/22/25 0254 0%   05/21/25 2351 0%   05/21/25 2009 0%     Weight History Weight - Scale Weight - Scale Weight Method   5/15/2025 185 lbs 83.9 kg -   4/14/2025 185 lbs 3 oz 84 kg -   4/9/2025 185 lbs 3 oz 84 kg Standing scale   3/31/2025 185 lbs 83.9 kg Stated       Nutrition Related Findings:    Labs: Na 134, Cr 4.42, BUN 38, BG 08-60-77-50-48.   Meds: reviewed.   Edema: +1 
Comprehensive Nutrition Assessment    Type and Reason for Visit:  Reassess    Nutrition Recommendations/Plan:   Continue current diet  Rafiq 2x/day, ensure HP 2x/day  Monitor and record PO intakes, supplement acceptance, and Bms in I/Os     Malnutrition Assessment:  Malnutrition Status:  No malnutrition (04/10/25 1506)    Context:  Acute Illness     Findings of the 6 clinical characteristics of malnutrition:  Energy Intake:  No decrease in energy intake  Weight Loss:  No weight loss     Body Fat Loss:  No body fat loss     Muscle Mass Loss:  No muscle mass loss    Fluid Accumulation:  No fluid accumulation     Strength:  Not Performed    Nutrition Assessment:    Follow up. Pt ACE at OR (heel debridement) at interview attempt. Documented intakes good, >76%. Plan to add supplements back to support wound healing.    Nutrition Related Findings:    Labs: Na 137, K 3.9, BU N27, Creat 1.17, Gluc 166. Meds: insulin lispro, insulin NPH, lactobacillus. 1+ nonpitting LLE edema. BM 4/16. Wound Type: Surgical Incision       Current Nutrition Intake & Therapies:    Average Meal Intake: %  Average Supplements Intake: None Ordered  ADULT DIET; Regular; 3 carb choices (45 gm/meal)    Anthropometric Measures:  Height: 180.3 cm (5' 10.98\")  Ideal Body Weight (IBW): 172 lbs (78 kg)    Current Body Weight: 84 kg (185 lb 3 oz), 107.7 % IBW. Weight Source: Standing scale  Current BMI (kg/m2): 25.8  Weight Adjustment For: No Adjustment  BMI Categories: Overweight (BMI 25.0-29.9)    Estimated Daily Nutrient Needs:  Energy Requirements Based On: Formula  Weight Used for Energy Requirements: Current  Energy (kcal/day): 2050 kcals (BMR x 1.3AF)  Weight Used for Protein Requirements: Current  Protein (g/day): 101-126g (1.2-1.5g/kg)  Method Used for Fluid Requirements: 1 ml/kcal  Fluid (ml/day): 2050mL    Nutrition Diagnosis:   Increased nutrient needs related to catabolic illness as evidenced by wounds    Nutrition Interventions: 
Comprehensive Nutrition Assessment    Type and Reason for Visit:  Reassess    Nutrition Recommendations/Plan:   Continue current diet and supplements      Malnutrition Assessment:  Malnutrition Status:  No malnutrition (04/10/25 1506)    Context:  Acute Illness     Findings of the 6 clinical characteristics of malnutrition:  Energy Intake:  No decrease in energy intake  Weight Loss:  No weight loss     Body Fat Loss:  No body fat loss     Muscle Mass Loss:  No muscle mass loss    Fluid Accumulation:  No fluid accumulation     Strength:  Not Performed    Nutrition Assessment:    Chart reviewed; patient receiving a carb controlled diet with ensure high protein and Rafiq for supplementation. He reports a good appetite and eating well. No nutrition questions reported. Stable for discharge pending bed availability.     Patient Vitals for the past 120 hrs:   PO Meals Eaten (%)   04/23/25 0755 51 - 75%   04/22/25 1048 76 - 100%     Nutrition Related Findings:    [phone]   BM 4/23   2+ edema LLE   Humalog, NPH, Probiotic   Wound Type: Surgical Incision       Current Nutrition Intake & Therapies:    Average Meal Intake: 51-75%, %  Average Supplements Intake: Unable to assess  ADULT DIET; Regular; 3 carb choices (45 gm/meal)  ADULT ORAL NUTRITION SUPPLEMENT; Breakfast, Dinner; Low Calorie/High Protein Oral Supplement  ADULT ORAL NUTRITION SUPPLEMENT; Breakfast, Lunch; Wound Healing Oral Supplement    Anthropometric Measures:  Height: 180.3 cm (5' 10.98\")  Ideal Body Weight (IBW): 172 lbs (78 kg)       Current Body Weight: 84 kg (185 lb 3 oz), 107.7 % IBW. Weight Source: Standing scale  Current BMI (kg/m2): 25.8           Weight Adjustment For: No Adjustment                 BMI Categories: Overweight (BMI 25.0-29.9)    Estimated Daily Nutrient Needs:  Energy Requirements Based On: Formula  Weight Used for Energy Requirements: Current  Energy (kcal/day): 2050 kcals (BMR x 1.3AF)  Weight Used for Protein 
Comprehensive Nutrition Assessment    Type and Reason for Visit:  Reassess    Nutrition Recommendations/Plan:   Continue diet as tolerated  RD to add Nepro daily  Please document % meals and supplements consumed in flowsheet I/O's under intake   Recommend weaning off D10 drip as pt is eating now     Malnutrition Assessment:  Malnutrition Status:  No malnutrition (04/10/25 1506)    Context:  Acute Illness     Findings of the 6 clinical characteristics of malnutrition:  Energy Intake:  No decrease in energy intake  Weight Loss:  No weight loss     Body Fat Loss:  No body fat loss     Muscle Mass Loss:  No muscle mass loss    Fluid Accumulation:  No fluid accumulation     Strength:  Not Performed    Nutrition Assessment:  Chart reviewed, case discussed during CCU rounds.  Pt tx to CCU due to sepsis vs myxedema coma.  Levo at 3.  He is more awake and alert today and was able to consume 100% of his bfast tray per RN.  He is disoriented x 4 with mitts on, sitter at the bedside but no family to speak with.  Decline in appetite noted over the past week but he ate significantly better this morning.  Will resume PO supplement given poor appetite over the last few days.  He is s/p HD yesterday.  BG , hypoglycemic yesterday so D10 drip started but as he is eating now and hyperglycemic this can be weaned off.  K 4.2 and phos 5.9, can consider adding phos binder and/or phos rxn upon F/U if it remains high and he continues to eat well.    Patient Vitals for the past 120 hrs:   PO Meals Eaten (%)   05/16/25 0930 76 - 100%   05/13/25 1845 26 - 50%   05/13/25 1250 51 - 75%   05/12/25 1936 1 - 25%   05/12/25 1354 26 - 50%   05/12/25 0911 76 - 100%   05/11/25 1850 51 - 75%   05/11/25 1612 1 - 25%       Nutrition Related Findings:    Meds: cubicin, lasix, culturelle, solucortef, lispro, insulin NPH, synthroid, D10 @ 50mL/h.    Drips: levo  Edema: trace-generalized, nonpitting-LUE, +1-BLE.    BM: 5/16-smear   Wound Type: 
Consult called in for [facility] access for midline placement for antibiotic therapy s/p discharge, screening tool in pt's bin.    0500 Dynamic access called back and asked whether nephro approved midline placement, nothing in yesterday's notes.  
DANIELLE better,  CO2 worse.  Change NS to LR  
Discussed need for LLE vein bypass with pt and son.  Plan GSV mapping and OR Monday  
Discussed with patient's son Mr. Kim over the phone.  Patient has not had any further episodes of chest discomfort.  Recommend medical management.  Okay for outpatient follow-up from cardiology standpoint.  
End of Shift Note    Bedside shift change report given to (oncoming nurse) by Cristiano Berry RN (offgoing nurse).  Report included the following information SBAR, ED Summary, Recent Results, and Quality Measures    Shift worked:  0212-7743     Shift summary and any significant changes:     N/a     Concerns for physician to address:  N/a     Zone phone for oncoming shift:   [phone]       Activity:  Level of Assistance: Maximum assist, patient does 25-49%  Number times ambulated in hallways past shift: 0  Number of times OOB to chair past shift: 0    Cardiac:   Cardiac Monitoring: Yes      Cardiac Rhythm: Sinus rhythm, Sinus sindi    Access:  Current line(s): PIV     Genitourinary:   Urinary Status: Oliguria, External catheter    Respiratory:   O2 Device: None (Room air)  Chronic home O2 use?: NO  Incentive spirometer at bedside: YES    GI:  Last BM (including prior to admit): 05/24/25  Current diet:  ADULT ORAL NUTRITION SUPPLEMENT; Lunch; Renal Oral Supplement  ADULT DIET; Regular; 4 carb choices (60 gm/meal)  Passing flatus: YES    Pain Management:   Patient states pain is manageable on current regimen: YES    Skin:  Luis Scale Score: 14  Interventions: Wound Offloading (Prevention Methods): Bed, pressure reduction mattress, Elevate heels, Heel boots, Pillows, Repositioning, Turning    Patient Safety:  Fall Risk: Nursing Judgement-Fall Risk High(Add Comments): Yes  Fall Risk Interventions  Nursing Judgement-Fall Risk High(Add Comments): Yes  Toilet Every 2 Hours-In Advance of Need: Yes  Hourly Visual Checks: Awake, Confused, In bed, Quiet  Fall Visual Posted: Armband, Fall sign posted  Room Door Open: Yes  Alarm On: Bed  Patient Moved Closer to Nursing Station: No    Active Consults:   PHARMACY TO DOSE VANCOMYCIN  IP CONSULT TO PODIATRY  IP CONSULT TO DIABETES MANAGEMENT  IP CONSULT TO INFECTIOUS DISEASES  IP CONSULT TO VASCULAR SURGERY  IP CONSULT TO CASE MANAGEMENT  IP CONSULT TO CARDIOLOGY  IP CONSULT TO 
End of Shift Note    Bedside shift change report given to (oncoming nurse) by Cristiano Berry RN (offgoing nurse).  Report included the following information SBAR, ED Summary, and Quality Measures    Shift worked:  8700-2050     Shift summary and any significant changes:     N/a     Concerns for physician to address:  N/a     Zone phone for oncoming shift:   [phone]       Activity:  Level of Assistance: Maximum assist, patient does 25-49%  Number times ambulated in hallways past shift: 0  Number of times OOB to chair past shift: 0    Cardiac:   Cardiac Monitoring: Yes      Cardiac Rhythm: Sinus sindi    Access:  Current line(s): PIV     Genitourinary:   Urinary Status: Oliguria    Respiratory:   O2 Device: PAP (positive airway pressure)  Chronic home O2 use?: NO  Incentive spirometer at bedside: YES    GI:  Last BM (including prior to admit): 05/19/25  Current diet:  ADULT ORAL NUTRITION SUPPLEMENT; Lunch; Renal Oral Supplement  ADULT DIET; Regular; 4 carb choices (60 gm/meal)  Passing flatus: YES    Pain Management:   Patient states pain is manageable on current regimen: YES    Skin:  Luis Scale Score: 12  Interventions: Wound Offloading (Prevention Methods): Bed, pressure reduction mattress, Elevate heels, Heel boots, Pillows    Patient Safety:  Fall Risk: Nursing Judgement-Fall Risk High(Add Comments): Yes  Fall Risk Interventions  Nursing Judgement-Fall Risk High(Add Comments): Yes  Toilet Every 2 Hours-In Advance of Need: Yes  Hourly Visual Checks: Awake, In bed  Fall Visual Posted: Armband, Socks  Room Door Open: Yes  Alarm On: Bed, Chair  Patient Moved Closer to Nursing Station: No    Active Consults:   PHARMACY TO DOSE VANCOMYCIN  IP CONSULT TO PODIATRY  IP CONSULT TO DIABETES MANAGEMENT  IP CONSULT TO INFECTIOUS DISEASES  IP CONSULT TO VASCULAR SURGERY  IP CONSULT TO CASE MANAGEMENT  IP CONSULT TO CARDIOLOGY  IP CONSULT TO NEPHROLOGY  IP CONSULT TO VASCULAR ACCESS TEAM  IP CONSULT TO CASE MANAGEMENT  IP 
End of Shift Note    Bedside shift change report given to (oncoming nurse) by Cristiano Berry RN (offgoing nurse).  Report included the following information SBAR, ED Summary, and Recent Results    Shift worked:  1383-2769     Shift summary and any significant changes:     0519: pt went into bigeminy to A fib. 's-120s sustaining. RRT RN called to bedside. EKG ordered+performed. Pt asymptomatic.      Concerns for physician to address:  Review labs and recent EKG.     Zone phone for oncoming shift:   [phone]       Activity:  Level of Assistance: Maximum assist, patient does 25-49%  Number times ambulated in hallways past shift: 0  Number of times OOB to chair past shift: 0    Cardiac:   Cardiac Monitoring: Yes      Cardiac Rhythm: Sinus rhythm, Sinus sindi    Access:  Current line(s): PIV  and port accessed    Genitourinary:   Urinary Status: Oliguria    Respiratory:   O2 Device: None (Room air)  Chronic home O2 use?: NO  Incentive spirometer at bedside: NO    GI:  Last BM (including prior to admit): 05/22/25  Current diet:  ADULT ORAL NUTRITION SUPPLEMENT; Lunch; Renal Oral Supplement  ADULT DIET; Regular; 4 carb choices (60 gm/meal)  Passing flatus: YES    Pain Management:   Patient states pain is manageable on current regimen: YES    Skin:  Luis Scale Score: 15  Interventions: Wound Offloading (Prevention Methods): Bed, pressure reduction mattress, Elevate heels, Pillows    Patient Safety:  Fall Risk: Nursing Judgement-Fall Risk High(Add Comments): Yes  Fall Risk Interventions  Nursing Judgement-Fall Risk High(Add Comments): Yes  Toilet Every 2 Hours-In Advance of Need: Yes  Hourly Visual Checks: Awake, Confused, In bed, Quiet  Fall Visual Posted: Armband, Fall sign posted  Room Door Open: Yes  Alarm On: Bed  Patient Moved Closer to Nursing Station: No    Active Consults:   PHARMACY TO DOSE VANCOMYCIN  IP CONSULT TO PODIATRY  IP CONSULT TO DIABETES MANAGEMENT  IP CONSULT TO INFECTIOUS DISEASES  IP CONSULT TO 
End of Shift Note    Bedside shift change report given to (oncoming nurse) by Parag Schultz RN (offgoing nurse).  Report included the following information SBAR    Shift worked:   7pm to 7am     Shift summary and any significant changes:    Had bipap at night, 2 episodes of hypoglycemia, this morning 32 repeat 41 , after dextrose 50% 25g and dextrose 5% with half saline 100ml /hr continuous  repeat 99   Concerns for physician to address:     Zone phone for oncoming shift:        Activity:  Level of Assistance: Maximum assist, patient does 25-49%  Number times ambulated in hallways past shift: 0  Number of times OOB to chair past shift: 0    Cardiac:   Cardiac Monitoring: Yes      Cardiac Rhythm: Sinus sindi    Access:  Current line(s): PIV     Genitourinary:   Urinary Status: Oliguria    Respiratory:   O2 Device: PAP (positive airway pressure)  Chronic home O2 use?: NO  Incentive spirometer at bedside: NO    GI:  Last BM (including prior to admit): 05/19/25  Current diet:  ADULT DIET; Regular; 3 carb choices (45 gm/meal)  ADULT ORAL NUTRITION SUPPLEMENT; Lunch; Renal Oral Supplement  Passing flatus: YES    Pain Management:   Patient states pain is manageable on current regimen: YES    Skin:  Luis Scale Score: 13  Interventions: Wound Offloading (Prevention Methods): Bed, pressure redistribution/air    Patient Safety:  Fall Risk: Nursing Judgement-Fall Risk High(Add Comments): Yes  Fall Risk Interventions  Nursing Judgement-Fall Risk High(Add Comments): Yes  Toilet Every 2 Hours-In Advance of Need: Yes  Hourly Visual Checks: In bed  Fall Visual Posted: Armband, Fall sign posted, Socks  Room Door Open: Yes  Alarm On: Bed  Patient Moved Closer to Nursing Station: No    Active Consults:   PHARMACY TO DOSE VANCOMYCIN  IP CONSULT TO PODIATRY  IP CONSULT TO DIABETES MANAGEMENT  IP CONSULT TO INFECTIOUS DISEASES  IP CONSULT TO VASCULAR SURGERY  IP CONSULT TO CASE MANAGEMENT  IP CONSULT TO CARDIOLOGY  IP CONSULT TO 
End of Shift Note    Bedside shift change report given to , RN (oncoming nurse) by Nadia Bernstein RN (offgoing nurse).  Report included the following information SBAR, Kardex, Intake/Output, MAR, Recent Results, and Cardiac Rhythm NSR    Shift worked:  7p-7a     Shift summary and any significant changes:     Albumin scheduled for 6:45 awaiting dose from pharmacy NO shift events to note.      Concerns for physician to address:  Stool softener please      Zone phone for oncoming shift:   [phone]       Activity:  Level of Assistance: Maximum assist, patient does 25-49%  Number times ambulated in hallways past shift: 0  Number of times OOB to chair past shift: 0    Cardiac:   Cardiac Monitoring: Yes      Cardiac Rhythm: Sinus rhythm    Access:  Current line(s): PIV     Genitourinary:   Urinary Status: Soler    Respiratory:   O2 Device: Nasal cannula  Chronic home O2 use?: NO  Incentive spirometer at bedside: YES    GI:  Last BM (including prior to admit): 05/05/25  Current diet:  ADULT DIET; Regular; 3 carb choices (45 gm/meal)  ADULT ORAL NUTRITION SUPPLEMENT; Breakfast, Dinner; Low Calorie/High Protein Oral Supplement  ADULT ORAL NUTRITION SUPPLEMENT; Breakfast, Lunch; Wound Healing Oral Supplement  Passing flatus: YES    Pain Management:   Patient states pain is manageable on current regimen: YES    Skin:  Luis Scale Score: 15  Interventions: Wound Offloading (Prevention Methods): Repositioning    Patient Safety:  Fall Risk: Nursing Judgement-Fall Risk High(Add Comments): Yes  Fall Risk Interventions  Nursing Judgement-Fall Risk High(Add Comments): Yes  Toilet Every 2 Hours-In Advance of Need: Yes  Hourly Visual Checks: Awake, In bed  Fall Visual Posted: Armband  Room Door Open: Yes  Alarm On: Bed  Patient Moved Closer to Nursing Station: No    Active Consults:   PHARMACY TO DOSE VANCOMYCIN  IP CONSULT TO PODIATRY  IP CONSULT TO DIABETES MANAGEMENT  IP CONSULT TO INFECTIOUS DISEASES  IP CONSULT TO VASCULAR SURGERY  IP 
End of Shift Note    Bedside shift change report given to Abril RN (oncoming nurse) by Karol Lepe RN (offgoing nurse).  Report included the following information SBAR, Intake/Output, MAR, and Recent Results    Shift worked:  7pm-7am     Shift summary and any significant changes:    Tylenol was given 1x for pain. L heel dressing is still clean, dry and intact. Had a BM twice. VS stable. Uneventful   Concerns for physician to address:    Zone phone for oncoming shift:       Activity:  Level of Assistance: Moderate assist, patient does 50-74%  Number times ambulated in hallways past shift: 0  Number of times OOB to chair past shift: 0    Cardiac:   Cardiac Monitoring: No      Cardiac Rhythm: Sinus rhythm    Access:  Current line(s): PIV     Genitourinary:   Urinary Status: Soler    Respiratory:   O2 Device: None (Room air)  Chronic home O2 use?: NO  Incentive spirometer at bedside: YES    GI:  Last BM (including prior to admit): 04/16/25  Current diet:  ADULT DIET; Regular; 3 carb choices (45 gm/meal)  ADULT ORAL NUTRITION SUPPLEMENT; Breakfast, Dinner; Low Calorie/High Protein Oral Supplement  ADULT ORAL NUTRITION SUPPLEMENT; Breakfast, Lunch; Wound Healing Oral Supplement  Passing flatus: YES    Pain Management:   Patient states pain is manageable on current regimen: YES    Skin:  Luis Scale Score: 17  Interventions: Wound Offloading (Prevention Methods): Bed, pressure reduction mattress, Pillows, Repositioning    Patient Safety:  Fall Risk: Nursing Judgement-Fall Risk High(Add Comments): Yes  Fall Risk Interventions  Nursing Judgement-Fall Risk High(Add Comments): Yes  Toilet Every 2 Hours-In Advance of Need: Yes  Hourly Visual Checks: In bed, Awake, Quiet  Fall Visual Posted: Socks, Fall sign posted  Room Door Open: Yes  Alarm On: Bed  Patient Moved Closer to Nursing Station: No    Active Consults:   PHARMACY TO DOSE VANCOMYCIN  IP CONSULT TO PODIATRY  IP CONSULT TO DIABETES MANAGEMENT  IP CONSULT TO 
End of Shift Note    Bedside shift change report given to Alexia(oncoming nurse) by Inessa Lei RN (offgoing nurse).  Report included the following information SBAR, Intake/Output, MAR, and Recent Results    Shift worked:  7a-7pm     Shift summary and any significant changes:     Patient is oriented to person and place but has poor insight into hospitalization..patient denies any pain or distress. Patient tolerates diet well. Patient reports frequent need to use bed pan but only had one medium BM and several smears. No acute signs or symptoms of distress.     Concerns for physician to address:       Zone phone for oncoming shift:          Activity:  Level of Assistance: Moderate assist, patient does 50-74%  Number times ambulated in hallways past shift: 0  Number of times OOB to chair past shift: 0    Cardiac:   Cardiac Monitoring: Yes      Cardiac Rhythm: Sinus rhythm    Access:  Current line(s): PIV    Genitourinary:   Urinary Status: External catheter    Respiratory:   O2 Device: None (Room air)  Chronic home O2 use?: NO  Incentive spirometer at bedside: YES    GI:  Last BM (including prior to admit): 04/28/25  Current diet:  ADULT DIET; Regular; 3 carb choices (45 gm/meal)  ADULT ORAL NUTRITION SUPPLEMENT; Breakfast, Dinner; Low Calorie/High Protein Oral Supplement  ADULT ORAL NUTRITION SUPPLEMENT; Breakfast, Lunch; Wound Healing Oral Supplement  Passing flatus: YES    Pain Management:   Patient states pain is manageable on current regimen: YES    Skin:  Luis Scale Score: 15  Interventions: Wound Offloading (Prevention Methods): Pillows, Repositioning, Turning    Patient Safety:  Fall Risk: Nursing Judgement-Fall Risk High(Add Comments): Yes  Fall Risk Interventions  Nursing Judgement-Fall Risk High(Add Comments): Yes  Toilet Every 2 Hours-In Advance of Need: Yes  Hourly Visual Checks: In bed, Awake  Fall Visual Posted: Fall sign posted, Socks  Room Door Open: Deferred to decrease stimulation  Alarm On: 
End of Shift Note    Bedside shift change report given to Brittany CALLEJAS (oncoming nurse) by Elsy Andrade RN (offgoing nurse).  Report included the following information SBAR, Kardex, and MAR    Shift worked:  9710-3740     Shift summary and any significant changes:     Seen by Dr Ivey continue plan of care wound care will start tomorrow AM, all needs attended     Concerns for physician to address:  none           Activity:  Level of Assistance: Moderate assist, patient does 50-74%  Number times ambulated in hallways past shift: 0  Number of times OOB to chair past shift: 0    Cardiac:   Cardiac Monitoring: No      Cardiac Rhythm: Sinus rhythm    Access:  Current line(s): PIV     Genitourinary:   Urinary Status: Voiding, External catheter    Respiratory:   O2 Device: None (Room air)  Chronic home O2 use?: NO  Incentive spirometer at bedside: NO    GI:     Current diet:  ADULT DIET; Regular; 5 carb choices (75 gm/meal)  Passing flatus: YES    Pain Management:   Patient states pain is manageable on current regimen: YES    Skin:  Luis Scale Score: 19  Interventions: Wound Offloading (Prevention Methods): Turning    Patient Safety:  Fall Risk: Nursing Judgement-Fall Risk High(Add Comments): Yes  Fall Risk Interventions  Nursing Judgement-Fall Risk High(Add Comments): Yes  Toilet Every 2 Hours-In Advance of Need: Yes  Hourly Visual Checks: In bed, Awake  Fall Visual Posted: Armband  Room Door Open: Yes  Alarm On: Bed  Patient Moved Closer to Nursing Station: No    Active Consults:   PHARMACY TO DOSE VANCOMYCIN  IP CONSULT TO PODIATRY  IP CONSULT TO DIABETES MANAGEMENT    Length of Stay:  Expected LOS: 4  Actual LOS: 3    Elsy Andrade RN                           
End of Shift Note    Bedside shift change report given to Cristiano HONG (oncoming nurse) by Naya Santana RN (offgoing nurse).  Report included the following information SBAR, Recent Results, and Cardiac Rhythm      Shift worked:  7a     Shift summary and any significant changes:     Sb this AM  Bipap PM  NPO pMN for procedure 5/23     Concerns for physician to address:       Zone phone for oncoming shift:   [phone]       Activity:  Level of Assistance: Maximum assist, patient does 25-49%  Number times ambulated in hallways past shift: 0  Number of times OOB to chair past shift: 0    Cardiac:   Cardiac Monitoring: Yes      Cardiac Rhythm: Sinus sindi    Access:  Current line(s): PIV    Genitourinary:   Urinary Status: Oliguria    Respiratory:   O2 Device: None (Room air)  Chronic home O2 use?: NO  Incentive spirometer at bedside:     GI:  Last BM (including prior to admit): 05/22/25  Current diet:  ADULT ORAL NUTRITION SUPPLEMENT; Lunch; Renal Oral Supplement  ADULT DIET; Regular; 4 carb choices (60 gm/meal)  Passing flatus: YES    Pain Management:   Patient states pain is manageable on current regimen: N/A    Skin:  Luis Scale Score: 12  Interventions: Wound Offloading (Prevention Methods): Bed, pressure reduction mattress    Patient Safety:  Fall Risk: Nursing Judgement-Fall Risk High(Add Comments): Yes  Fall Risk Interventions  Nursing Judgement-Fall Risk High(Add Comments): Yes  Toilet Every 2 Hours-In Advance of Need: Yes  Hourly Visual Checks: Awake, In bed  Fall Visual Posted: Armband, Socks  Room Door Open: Yes  Alarm On: Bed, Chair  Patient Moved Closer to Nursing Station: No    Active Consults:   PHARMACY TO DOSE VANCOMYCIN  IP CONSULT TO PODIATRY  IP CONSULT TO DIABETES MANAGEMENT  IP CONSULT TO INFECTIOUS DISEASES  IP CONSULT TO VASCULAR SURGERY  IP CONSULT TO CASE MANAGEMENT  IP CONSULT TO CARDIOLOGY  IP CONSULT TO NEPHROLOGY  IP CONSULT TO VASCULAR ACCESS TEAM  IP CONSULT TO CASE MANAGEMENT  IP CONSULT TO 
End of Shift Note    Bedside shift change report given to Daria (oncoming nurse) by Rebeca Toro RN (offgoing nurse).  Report included the following information SBAR    Shift worked:  7p-7a     Shift summary and any significant changes:    No complaints of pain  Dressing Dry and Intact.  Had BM X1.  Soler patent and draining well.         Concerns for physician to address:       Zone phone for oncoming shift:          Activity:  Level of Assistance: Moderate assist, patient does 50-74%  Number times ambulated in hallways past shift: 0  Number of times OOB to chair past shift: 0    Cardiac:   Cardiac Monitoring: No      Cardiac Rhythm: Sinus rhythm    Access:  Current line(s): PIV     Genitourinary:   Urinary Status: Soler    Respiratory:   O2 Device: None (Room air)  Chronic home O2 use?: NO  Incentive spirometer at bedside: YES    GI:  Last BM (including prior to admit): 04/16/25  Current diet:  ADULT DIET; Regular; 3 carb choices (45 gm/meal)  ADULT ORAL NUTRITION SUPPLEMENT; Breakfast, Dinner; Low Calorie/High Protein Oral Supplement  ADULT ORAL NUTRITION SUPPLEMENT; Breakfast, Lunch; Wound Healing Oral Supplement  Passing flatus: YES    Pain Management:   Patient states pain is manageable on current regimen: YES    Skin:  Luis Scale Score: 17  Interventions: Wound Offloading (Prevention Methods): Repositioning, Pillows, Bed, pressure reduction mattress    Patient Safety:  Fall Risk: Nursing Judgement-Fall Risk High(Add Comments): Yes  Fall Risk Interventions  Nursing Judgement-Fall Risk High(Add Comments): Yes  Toilet Every 2 Hours-In Advance of Need: Yes  Hourly Visual Checks: In bed, Awake, Quiet  Fall Visual Posted: Armband  Room Door Open: Yes  Alarm On: Bed  Patient Moved Closer to Nursing Station: No    Active Consults:   PHARMACY TO DOSE VANCOMYCIN  IP CONSULT TO PODIATRY  IP CONSULT TO DIABETES MANAGEMENT  IP CONSULT TO INFECTIOUS DISEASES  IP CONSULT TO VASCULAR SURGERY    Length of 
End of Shift Note    Bedside shift change report given to Elsy HONG (oncoming nurse) by Brittany Navas LPN (offgoing nurse).  Report included the following information SBAR and Kardex    Shift worked:  1900 TO 0700     Shift summary and any significant changes:     Patient aaox4 hard of hearing but able to make needs known. Patient took all medications on shift as per orders. Bed is at its lowest position for safety. Patient had no complaints on shift. Patient pull IV out at about 0355 this morning . Staff did attempt to replace the IV  with no success. Day shift made aware of no IV in place.      Concerns for physician to address:  None      Zone phone for oncoming shift:            Brittany Navas LPN                            
End of Shift Note    Bedside shift change report given to Elsy HONG (oncoming nurse) by Brittany Navas LPN (offgoing nurse).  Report included the following information SBAR, Kardex, and MAR    Shift worked:  1900 to 0700     Shift summary and any significant changes:     Patient aaox4 vital signs stable ,patient in no distress. Patient was received from [facility]  stable, had and I&d was done of the left foot heel. To remove foreign object. Patient came back to the unit resting peacefully sleeping. Patient easily awaked took all medication as per orders on shift. Safety precautions maintained for the patient on shift.      Concerns for physician to address:    none   Zone phone for oncoming shift:            Brittany Navas LPN                            
End of Shift Note    Bedside shift change report given to GELY Ambriz (oncoming nurse) by Karol Lepe RN (offgoing nurse).  Report included the following information SBAR, Intake/Output, MAR, and Recent Results    Shift worked:  7pm-7am     Shift summary and any significant changes:    VS stable. No report of pain or any concerns during the shift. Had 1 BM.   IV abx administered as ordered.   Awaiting for bed availability in the facility.   Concerns for physician to address:    Zone phone for oncoming shift:       Activity:  Level of Assistance: Moderate assist, patient does 50-74%  Number times ambulated in hallways past shift: 0  Number of times OOB to chair past shift: 0    Cardiac:   Cardiac Monitoring: No      Cardiac Rhythm: Sinus rhythm    Access:  Current line(s): PIV     Genitourinary:   Urinary Status: Voiding, External catheter    Respiratory:   O2 Device: None (Room air)  Chronic home O2 use?: NO  Incentive spirometer at bedside: N/A    GI:  Last BM (including prior to admit): 04/23/25  Current diet:  ADULT DIET; Regular; 3 carb choices (45 gm/meal)  ADULT ORAL NUTRITION SUPPLEMENT; Breakfast, Dinner; Low Calorie/High Protein Oral Supplement  ADULT ORAL NUTRITION SUPPLEMENT; Breakfast, Lunch; Wound Healing Oral Supplement  Passing flatus: YES    Pain Management:   Patient states pain is manageable on current regimen: YES    Skin:  Luis Scale Score: 17  Interventions: Wound Offloading (Prevention Methods): Bed, pressure reduction mattress, Pillows, Repositioning    Patient Safety:  Fall Risk: Nursing Judgement-Fall Risk High(Add Comments): Yes  Fall Risk Interventions  Nursing Judgement-Fall Risk High(Add Comments): Yes  Toilet Every 2 Hours-In Advance of Need: Yes  Hourly Visual Checks: In bed, Awake, Quiet  Fall Visual Posted: Socks, Fall sign posted, Armband  Room Door Open: Deferred to promote rest  Alarm On: Bed  Patient Moved Closer to Nursing Station: No    Active Consults:   PHARMACY TO DOSE 
End of Shift Note    Bedside shift change report given to GELY Arias (oncoming nurse) by Alexia Lamar LPN (offgoing nurse).  Report included the following information SBAR, Kardex, and MAR    Shift worked:  7p-7:30     Shift summary and any significant changes:     Pt tolerated care fairly well. All pt medications administered per MAR. Pt complained of pain in L lower extrem, pt requested tylenol, pt care provided, I/O's charted. Heel boots placed on pt, he removes it , heel elevated. routine rounding completed.      Concerns for physician to address:       Zone phone for oncoming shift:          Activity:  Level of Assistance: Moderate assist, patient does 50-74%  Number times ambulated in hallways past shift: 0  Number of times OOB to chair past shift: 0    Cardiac:   Cardiac Monitoring: Yes      Cardiac Rhythm: Sinus rhythm    Access:  Current line(s): PIV     Genitourinary:   Urinary Status: External catheter    Respiratory:   O2 Device: None (Room air)  Chronic home O2 use?: NO  Incentive spirometer at bedside: YES    GI:  Last BM (including prior to admit): 04/28/25  Current diet:  ADULT DIET; Regular; 3 carb choices (45 gm/meal)  ADULT ORAL NUTRITION SUPPLEMENT; Breakfast, Dinner; Low Calorie/High Protein Oral Supplement  ADULT ORAL NUTRITION SUPPLEMENT; Breakfast, Lunch; Wound Healing Oral Supplement  Passing flatus: YES    Pain Management:   Patient states pain is manageable on current regimen: YES    Skin:  Luis Scale Score: 16  Interventions: Wound Offloading (Prevention Methods): Pillows, Repositioning, Turning    Patient Safety:  Fall Risk: Nursing Judgement-Fall Risk High(Add Comments): Yes  Fall Risk Interventions  Nursing Judgement-Fall Risk High(Add Comments): Yes  Toilet Every 2 Hours-In Advance of Need: Yes  Hourly Visual Checks: In bed, Awake  Fall Visual Posted: Fall sign posted, Socks  Room Door Open: Deferred to decrease stimulation  Alarm On: Bed  Patient Moved Closer to Nursing Station: 
End of Shift Note    Bedside shift change report given to GELY Berry (oncoming nurse) by FAIZA MARTINEZ LPN (offgoing nurse).  Report included the following information SBAR    Shift worked:  8629-8865     Shift summary and any significant changes:     Medications given per MAR. PRN Tylenol x1  for pain. 4 units for coverage overnight. Q2 Turns complete.Q4 Neuro vascular checks complete.  AM labs & wound care complete. CHG Complete. Care and safety rounds complete. Pt rested well through out the night.     Plan :  4/14/25 Monday Tibial Artery Bypass LLE     Zone phone for oncoming shift:          Activity:  Level of Assistance: Moderate assist, patient does 50-74%  Number times ambulated in hallways past shift: 0  Number of times OOB to chair past shift: 0    Cardiac:   Cardiac Monitoring: No    Cardiac Rhythm: Sinus rhythm    Access:  Current line(s): PIV     Genitourinary:   Urinary Status: Voiding, External catheter    Respiratory:   O2 Device: None (Room air)  Chronic home O2 use?: NO  Incentive spirometer at bedside: NO    GI:  Last BM (including prior to admit): 04/12/25  Current diet:  ADULT ORAL NUTRITION SUPPLEMENT; Breakfast, Dinner; Wound Healing Oral Supplement  ADULT ORAL NUTRITION SUPPLEMENT; Lunch, Dinner; Low Calorie/High Protein Oral Supplement  ADULT DIET; Regular; 4 carb choices (60 gm/meal); Low Fat/Low Chol/High Fiber/WU; Double Protein Portions; Chocolate low carb supplements  Passing flatus: YES    Pain Management:   Patient states pain is manageable on current regimen: YES    Skin:  Luis Scale Score: 18  Interventions: Wound Offloading (Prevention Methods): Bed, pressure reduction mattress    Patient Safety:  Fall Risk: Nursing Judgement-Fall Risk High(Add Comments): No  Fall Risk Interventions  Nursing Judgement-Fall Risk High(Add Comments): No  Toilet Every 2 Hours-In Advance of Need: Yes  Hourly Visual Checks: Awake, In bed, Eyes closed, Quiet  Fall Visual Posted: Fall sign posted, 
End of Shift Note    Bedside shift change report given to GELY Cameron (oncoming nurse) by Ary Ramirez RN (offgoing nurse).  Report included the following information SBAR, Kardex, and Quality Measures    Shift worked:  4602-2419     Shift summary and any significant changes:    This shift was eventful for patient having 6 BM, patient remains on contract for MRSA. Patient had Soler taken out, however was incontinent with urine, RN put an external catheter so measure how much patient was voiding, patient was also bladder scanned post void and was 380cc. Patient then went ahead and voided 200cc. No other concerns overnight.      Concerns for physician to address:  None     Zone phone for oncoming shift:   [phone]       Activity:  Level of Assistance: Moderate assist, patient does 50-74%  Number times ambulated in hallways past shift: 0  Number of times OOB to chair past shift: 0  Cardiac:   Cardiac Monitoring: No      Cardiac Rhythm: Sinus rhythm  Access:  Current line(s): PIV   Genitourinary:   Urinary Status: Voiding, Incontinent  Respiratory:   O2 Device: None (Room air)  Chronic home O2 use?: NO  Incentive spirometer at bedside: YES  GI:  Last BM (including prior to admit): 04/22/25  Current diet:  ADULT DIET; Regular; 3 carb choices (45 gm/meal)  ADULT ORAL NUTRITION SUPPLEMENT; Breakfast, Dinner; Low Calorie/High Protein Oral Supplement  ADULT ORAL NUTRITION SUPPLEMENT; Breakfast, Lunch; Wound Healing Oral Supplement  Passing flatus: YES    Pain Management:   Patient states pain is manageable on current regimen: YES    Skin:  Luis Scale Score: 18  Interventions: Wound Offloading (Prevention Methods): Heel boots, Pillows, Repositioning, Turning    Patient Safety:  Fall Risk: Nursing Judgement-Fall Risk High(Add Comments): Yes  Fall Risk Interventions  Nursing Judgement-Fall Risk High(Add Comments): Yes  Toilet Every 2 Hours-In Advance of Need: Yes  Hourly Visual Checks: Awake, In bed  Fall Visual Posted: Fall 
End of Shift Note    Bedside shift change report given to GELY Chin  (oncoming nurse) by FAIZA MARTINEZ LPN (offgoing nurse).  Report included the following information SBAR    Shift worked:  2531-2247     Shift summary and any significant changes:      Medications given per MAR. No Insulin coverage overnight needed. Neuro checks complete, Care and safety rounds complete.      Concerns for physician to address:       Zone phone for oncoming shift:          Activity:  Level of Assistance: Minimal assist, patient does 75% or more  Number times ambulated in hallways past shift: 0  Number of times OOB to chair past shift: 0    Cardiac:   Cardiac Monitoring: Yes      Cardiac Rhythm: Sinus rhythm    Access:  Current line(s): PIV     Genitourinary:   Urinary Status: Voiding, External catheter    Respiratory:   O2 Device: None (Room air)  Chronic home O2 use?: NO  Incentive spirometer at bedside: YES    GI:     Current diet:  ADULT DIET; Regular; 5 carb choices (75 gm/meal)  Passing flatus: YES    Pain Management:   Patient states pain is manageable on current regimen: N/A    Skin:  Luis Scale Score: 18  Interventions: Wound Offloading (Prevention Methods): Repositioning, Turning    Patient Safety:  Fall Risk: Nursing Judgement-Fall Risk High(Add Comments): Yes  Fall Risk Interventions  Nursing Judgement-Fall Risk High(Add Comments): Yes  Toilet Every 2 Hours-In Advance of Need: Yes  Hourly Visual Checks: Awake, In bed  Fall Visual Posted: Socks  Room Door Open: Yes  Alarm On: Bed  Patient Moved Closer to Nursing Station: Yes    Active Consults:   PHARMACY TO DOSE VANCOMYCIN  IP CONSULT TO PODIATRY  IP CONSULT TO DIABETES MANAGEMENT  IP CONSULT TO INFECTIOUS DISEASES    Length of Stay:  Expected LOS: 8  Actual LOS: 5    FAIZA MARTINEZ LPN                           
End of Shift Note    Bedside shift change report given to GELY De La Cruz (oncoming nurse) by Alina Shelley LPN (offgoing nurse).  Report included the following information SBAR, Kardex, Intake/Output, MAR, and Recent Results    Shift worked:  7p-920a     Shift summary and any significant changes:     Patient family came to visit around 10pm, left number on board for easy contact. Patient VSS. No s/s of distress, discomfort or pain. Patient tolerated all medications and was able to rest well throughout sift. Patient had 2 small formed bowel movement. Voiding well. No order events happened during shift.       Concerns for physician to address:  none     Zone phone for oncoming shift:            Activity:     Number times ambulated in hallways past shift: 0  Number of times OOB to chair past shift: 0    Cardiac:   Cardiac Monitoring: No           Access:   Current line(s): Peripheral IV    Genitourinary:   Urinary status: Patient is voiding without difficulty.    Respiratory:      Chronic home O2 use?: NO  Incentive spirometer at bedside: YES       GI:     Current diet:  ADULT DIET; Regular; 3 carb choices (45 gm/meal)  ADULT ORAL NUTRITION SUPPLEMENT; Breakfast, Dinner; Low Calorie/High Protein Oral Supplement  ADULT ORAL NUTRITION SUPPLEMENT; Breakfast, Lunch; Wound Healing Oral Supplement  Passing flatus: Yes  Tolerating current diet: Yes       Pain Management:   Patient states pain is manageable on current regimen: Yes    Skin:     Interventions: No new skin issues or concerns    Patient Safety:  Fall Score: PA Fall Risk Score: 97.51    Interventions:  Bed alarm         Length of Stay:  Expected LOS: 26  Actual LOS: 27      Alina Shelley LPN                             
End of Shift Note    Bedside shift change report given to GELY Humphrey   (oncoming nurse) by Yara Forrester RN (offgoing nurse).  Report included the following information SBAR, Intake/Output, Recent Results, Med Rec Status, and Cardiac Rhythm Sinus Dvaon    Shift worked:  1396-9397       Shift summary and any significant changes:     Permacath  site  bleeding  BiPap off transitioned to 4L NC     Concerns for physician to address:  Permacath bleed  Pressor in PIV >12h   Zone phone for oncoming shift:   UNK       Activity:  Level of Assistance: Maximum assist, patient does 25-49%  Number times ambulated in hallways past shift: 0  Number of times OOB to chair past shift: 0    Cardiac:   Cardiac Monitoring: Yes      Cardiac Rhythm: Sinus davon    Access:  Current line(s): PIV    Genitourinary:   Urinary Status: Draining, Soler    Respiratory:   O2 Device: Nasal cannula  Chronic home O2 use?: NO  Incentive spirometer at bedside: NO    GI:  Last BM (including prior to admit): 05/15/25  Current diet:  ADULT DIET; Regular; 3 carb choices (45 gm/meal)  Passing flatus: YES    Pain Management:   Patient states pain is manageable on current regimen: N/A    Skin:  Luis Scale Score: 15  Interventions: Wound Offloading (Prevention Methods): Bed, pressure reduction mattress, Elevate heels, Heel boots, Repositioning, Pillows, Special mattress, Turning    Patient Safety:  Fall Risk: Nursing Judgement-Fall Risk High(Add Comments): Yes  Fall Risk Interventions  Nursing Judgement-Fall Risk High(Add Comments): Yes  Toilet Every 2 Hours-In Advance of Need: Yes  Hourly Visual Checks: Awake, Confused, In bed  Fall Visual Posted: Fall sign posted  Room Door Open: Yes  Alarm On: Bed  Patient Moved Closer to Nursing Station: No    Active Consults:   PHARMACY TO DOSE VANCOMYCIN  IP CONSULT TO PODIATRY  IP CONSULT TO DIABETES MANAGEMENT  IP CONSULT TO INFECTIOUS DISEASES  IP CONSULT TO VASCULAR SURGERY  IP CONSULT TO CASE MANAGEMENT  IP 
End of Shift Note    Bedside shift change report given to GELY Jose (oncoming nurse) by Nisha Viramontes LPN (offgoing nurse).  Report included the following information SBAR    Shift worked:  7a-7a     Shift summary and any significant changes:     Pt rested in bed. PT/OT got pt up to bedside commode. Wound care complete. Pt had some complaints of pain, prn pain med was given with positive effects. Pt tolerating diet.      Concerns for physician to address:  none     Zone phone for oncoming shift:   [phone]         Nisha Viramontes LPN                            
End of Shift Note    Bedside shift change report given to GELY Juárez (oncoming nurse) by Maikel Blackwell RN (offgoing nurse).  Report included the following information SBAR, ED Summary, Intake/Output, MAR, Cardiac Rhythm NSR, and Quality Measures    Shift worked:  7P-7A     Shift summary and any significant changes:     No acute events, VSS.     Concerns for physician to address:  Urine output 200mL.      Zone phone for oncoming shift:   [phone]       Activity:  Level of Assistance: Maximum assist, patient does 25-49%  Number times ambulated in hallways past shift: 0  Number of times OOB to chair past shift: 0    Cardiac:   Cardiac Monitoring: Yes      Cardiac Rhythm: Sinus sindi    Access:  Current line(s): PIV    Genitourinary:   Urinary Status: Soler, Oliguria    Respiratory:   O2 Device: Nasal cannula  Chronic home O2 use?: NO  Incentive spirometer at bedside: NO    GI:  Last BM (including prior to admit): 05/13/25  Current diet:  Diet NPO  Passing flatus: YES    Pain Management:   Patient states pain is manageable on current regimen: N/A    Skin:  Luis Scale Score: 17  Interventions: Wound Offloading (Prevention Methods): Pillows, Repositioning, Turning, Elevate heels    Patient Safety:  Fall Risk: Nursing Judgement-Fall Risk High(Add Comments): Yes  Fall Risk Interventions  Nursing Judgement-Fall Risk High(Add Comments): Yes  Toilet Every 2 Hours-In Advance of Need: Yes  Hourly Visual Checks: In bed, Awake, Quiet  Fall Visual Posted: Armband, Fall sign posted  Room Door Open: Yes  Alarm On: Bed  Patient Moved Closer to Nursing Station: Yes    Active Consults:   PHARMACY TO DOSE VANCOMYCIN  IP CONSULT TO PODIATRY  IP CONSULT TO DIABETES MANAGEMENT  IP CONSULT TO INFECTIOUS DISEASES  IP CONSULT TO VASCULAR SURGERY  IP CONSULT TO CASE MANAGEMENT  IP CONSULT TO CARDIOLOGY  IP CONSULT TO NEPHROLOGY  IP CONSULT TO VASCULAR ACCESS TEAM  IP CONSULT TO CASE MANAGEMENT  IP CONSULT TO PULMONOLOGY  IP CONSULT TO PALLIATIVE 
End of Shift Note    Bedside shift change report given to GELY Marques  (oncoming nurse) by FAIZA MARTINEZ LPN (offgoing nurse).  Report included the following information SBAR    Shift worked:  1022-7422     Shift summary and any significant changes:    Medications given per MAR.  C/o pain PRN x1 Tylenol given. Numbness/ tingling left foot. CHG Bath complete. Neuro checks complete. Q2 and care/safety rounds complete. CHG complete.     Concerns for physician to address:       Zone phone for oncoming shift:          Activity:  Level of Assistance: Minimal assist, patient does 75% or more  Number times ambulated in hallways past shift: 0  Number of times OOB to chair past shift: 1    Cardiac:   Cardiac Monitoring: Yes      Cardiac Rhythm: Sinus rhythm (SV)    Access:  Current line(s): PIV     Genitourinary:   Urinary Status: Voiding    Respiratory:   O2 Device: None (Room air)  Chronic home O2 use?: YES  Incentive spirometer at bedside: NO    GI:  Last BM (including prior to admit): 04/06/25  Current diet:  ADULT DIET; Regular; 5 carb choices (75 gm/meal)  Passing flatus: YES    Pain Management:   Patient states pain is manageable on current regimen: YES    Skin:  Luis Scale Score: 18  Interventions: Wound Offloading (Prevention Methods): Turning, Repositioning    Patient Safety:  Fall Risk: Nursing Judgement-Fall Risk High(Add Comments): Yes  Fall Risk Interventions  Nursing Judgement-Fall Risk High(Add Comments): Yes  Toilet Every 2 Hours-In Advance of Need: Yes  Hourly Visual Checks: Eyes closed, In bed  Fall Visual Posted: Socks, Fall sign posted  Room Door Open: Deferred to decrease stimulation  Alarm On: Bed  Patient Moved Closer to Nursing Station: No    Active Consults:   PHARMACY TO DOSE VANCOMYCIN  IP CONSULT TO PODIATRY  IP CONSULT TO DIABETES MANAGEMENT  IP CONSULT TO INFECTIOUS DISEASES    Length of Stay:  Expected LOS: 8  Actual LOS: 7    FAIZA MARTINEZ LPN                           
End of Shift Note    Bedside shift change report given to GELY Martino (oncoming nurse) by FAIZA MARTINEZ LPN (offgoing nurse).  Report included the following information SBAR    Shift worked:  8995-7426     Shift summary and any significant changes:     Medications given per MAR. Care and safety rounds complete. Neuro Checks complete.     Concerns for physician to address:     Zone phone for oncoming shift:          Activity:  Level of Assistance: Minimal assist, patient does 75% or more  Number times ambulated in hallways past shift: 0  Number of times OOB to chair past shift: 0    Cardiac:   Cardiac Monitoring: Yes      Cardiac Rhythm: Sinus rhythm    Access:  Current line(s): Other      Genitourinary:   Urinary Status: Voiding, External catheter    Respiratory:   O2 Device: None (Room air)  Chronic home O2 use?: N/A  Incentive spirometer at bedside: N/A    GI:     Current diet:  ADULT DIET; Regular; 5 carb choices (75 gm/meal)  Passing flatus: YES    Pain Management:   Patient states pain is manageable on current regimen: NO    Skin:  Luis Scale Score: 18  Interventions: Wound Offloading (Prevention Methods): Repositioning, Pillows    Patient Safety:  Fall Risk: Nursing Judgement-Fall Risk High(Add Comments): Yes  Fall Risk Interventions  Nursing Judgement-Fall Risk High(Add Comments): Yes  Toilet Every 2 Hours-In Advance of Need: Yes  Hourly Visual Checks: Awake, In bed  Fall Visual Posted: Socks  Room Door Open: Yes  Alarm On: Bed  Patient Moved Closer to Nursing Station: Yes    Active Consults:   PHARMACY TO DOSE VANCOMYCIN  IP CONSULT TO PODIATRY  IP CONSULT TO DIABETES MANAGEMENT  IP CONSULT TO INFECTIOUS DISEASES    Length of Stay:  Expected LOS: 8  Actual LOS: 6    FIAZA MARTINEZ LPN                           
End of Shift Note    Bedside shift change report given to GELY Mathis (oncoming nurse) by Maikel Blackwell RN (offgoing nurse).  Report included the following information SBAR, ED Summary, Cardiac Rhythm Sinus bradycardia, and Quality Measures    Shift worked:  7p-7a     Shift summary and any significant changes:     Pt had 2 large bowel movements.     Concerns for physician to address:  Kidney function worsening.     Zone phone for oncoming shift:   [phone]       Activity:  Level of Assistance: Maximum assist, patient does 25-49%  Number times ambulated in hallways past shift: 0  Number of times OOB to chair past shift: 0    Cardiac:   Cardiac Monitoring: Yes      Cardiac Rhythm: Sinus sindi    Access:  Current line(s): PIV    Genitourinary:   Urinary Status: Voiding, Soler    Respiratory:   O2 Device: Nasal cannula  Chronic home O2 use?: NO  Incentive spirometer at bedside: NO    GI:  Last BM (including prior to admit): 05/13/25  Current diet:  ADULT DIET; Regular; 3 carb choices (45 gm/meal); Low Potassium (Less than 3000 mg/day)  Passing flatus: YES    Pain Management:   Patient states pain is manageable on current regimen: N/A    Skin:  Luis Scale Score: 15  Interventions: Wound Offloading (Prevention Methods): Bed, pressure reduction mattress, Pillows, Repositioning    Patient Safety:  Fall Risk: Nursing Judgement-Fall Risk High(Add Comments): Yes  Fall Risk Interventions  Nursing Judgement-Fall Risk High(Add Comments): Yes  Toilet Every 2 Hours-In Advance of Need: Yes  Hourly Visual Checks: In bed, Awake, Quiet  Fall Visual Posted: Armband, Socks, Fall sign posted  Room Door Open: Yes  Alarm On: Bed  Patient Moved Closer to Nursing Station: Yes    Active Consults:   PHARMACY TO DOSE VANCOMYCIN  IP CONSULT TO PODIATRY  IP CONSULT TO DIABETES MANAGEMENT  IP CONSULT TO INFECTIOUS DISEASES  IP CONSULT TO VASCULAR SURGERY  IP CONSULT TO CASE MANAGEMENT  IP CONSULT TO CARDIOLOGY  IP CONSULT TO NEPHROLOGY  IP CONSULT TO 
End of Shift Note    Bedside shift change report given to GELY Mcallister (oncoming nurse) by Lisa Ovalle RN (offgoing nurse).  Report included the following information SBAR, Intake/Output, and MAR    Shift worked:  7a-7p     Shift summary and any significant changes:     Cooperative pt, a/ox4. Soler removed today during shift. LBM 4/21. VSS. Good appetite, tolerating. No complaints of pain.  Bed locked and in lowest position. Call bell within reach. No questions or concerns presented at this time.       Concerns for physician to address:  none     Zone phone for oncoming shift:   [phone]       Activity:  Level of Assistance: Moderate assist, patient does 50-74%  Number times ambulated in hallways past shift: 0  Number of times OOB to chair past shift: 0    Cardiac:   Cardiac Monitoring: No      Cardiac Rhythm: Sinus rhythm    Access:  Current line(s): PIV     Genitourinary:   Urinary Status: Soler, Draining    Respiratory:   O2 Device: None (Room air)  Chronic home O2 use?: NO  Incentive spirometer at bedside: NO    GI:  Last BM (including prior to admit): 04/21/25  Current diet:  ADULT DIET; Regular; 3 carb choices (45 gm/meal)  ADULT ORAL NUTRITION SUPPLEMENT; Breakfast, Dinner; Low Calorie/High Protein Oral Supplement  ADULT ORAL NUTRITION SUPPLEMENT; Breakfast, Lunch; Wound Healing Oral Supplement  Passing flatus: YES    Pain Management:   Patient states pain is manageable on current regimen: YES    Skin:  Luis Scale Score: 18  Interventions: Wound Offloading (Prevention Methods): Pillows, Repositioning, Turning    Patient Safety:  Fall Risk: Nursing Judgement-Fall Risk High(Add Comments): Yes  Fall Risk Interventions  Nursing Judgement-Fall Risk High(Add Comments): Yes  Toilet Every 2 Hours-In Advance of Need: Yes  Hourly Visual Checks: Awake, In bed  Fall Visual Posted: Armband, Socks  Room Door Open: Deferred to promote rest  Alarm On: Bed  Patient Moved Closer to Nursing Station: No    Active Consults: 
End of Shift Note    Bedside shift change report given to GELY Nguyen (oncoming nurse) by Parveen Marte RN (offgoing nurse).  Report included the following information SBAR, Kardex, and MAR    Shift worked:  7a-7p     Shift summary and any significant changes:     A&O x3. VSS. Pt denies any pain. Blood sugar rechecked 70 x2, Dextrose bolus 10% given. BS rechecked 177. Pt had L foot debridement today. Pt on O2 @ 2.5L, 100% sat. Pt is on bedrest per order & pt is aware. Pt on manwick placed. Medications given per MAR. Q2 turns & safety rounding completed.     Concerns for physician to address:  none     Zone phone for oncoming shift:   [phone]       Activity:  Level of Assistance: Minimal assist, patient does 75% or more      Cardiac:   Cardiac Monitoring: NO       Access:  Current line(s): PIV    Genitourinary:   Urinary Status: Voiding    Respiratory:   O2 Device: Nasal cannula  Chronic home O2 use?: NO  Incentive spirometer at bedside: N/A    GI:  Last BM (including prior to admit): 04/09/25  Current diet:  ADULT DIET; Regular; 4 carb choices (60 gm/meal); Low Fat/Low Chol/High Fiber/WU  Passing flatus: YES    Pain Management:   Patient states pain is manageable on current regimen: YES    Skin:  Luis Scale Score: 19  Interventions: Wound Offloading (Prevention Methods): Bed, pressure reduction mattress, Elevate heels, Repositioning, Pillows, Turning    Patient Safety:  Fall Risk: Nursing Judgement-Fall Risk High(Add Comments): Yes  Fall Risk Interventions  Nursing Judgement-Fall Risk High(Add Comments): Yes  Toilet Every 2 Hours-In Advance of Need: Yes  Hourly Visual Checks: Awake, In bed  Fall Visual Posted: Armband, Fall sign posted, Socks  Room Door Open: Deferred to promote rest  Alarm On: Bed  Patient Moved Closer to Nursing Station: No    Active Consults:   PHARMACY TO DOSE VANCOMYCIN  IP CONSULT TO PODIATRY  IP CONSULT TO DIABETES MANAGEMENT  IP CONSULT TO INFECTIOUS DISEASES  IP CONSULT TO VASCULAR 
End of Shift Note    Bedside shift change report given to GELY Ochoa (oncoming nurse) by Rebeca Toro RN (offgoing nurse).  Report included the following information SBAR    Shift worked:  7P-7A     Shift summary and any significant changes:    No complaints of pain.  Has periodic confusion.  Uneventful     Concerns for physician to address:       Zone phone for oncoming shift:          Activity:  Level of Assistance: Maximum assist, patient does 25-49%  Number times ambulated in hallways past shift: 0  Number of times OOB to chair past shift: 0    Cardiac:   Cardiac Monitoring: No      Cardiac Rhythm: Sinus rhythm    Access:  Current line(s): PIV     Genitourinary:   Urinary Status: Soler    Respiratory:   O2 Device: None (Room air)  Chronic home O2 use?: NO  Incentive spirometer at bedside: YES    GI:  Last BM (including prior to admit): 04/15/25  Current diet:  ADULT DIET; Regular; 3 carb choices (45 gm/meal)  Passing flatus: YES    Pain Management:   Patient states pain is manageable on current regimen: YES    Skin:  Luis Scale Score: 18  Interventions: Wound Offloading (Prevention Methods): Pillows, Repositioning    Patient Safety:  Fall Risk: Nursing Judgement-Fall Risk High(Add Comments): Yes  Fall Risk Interventions  Nursing Judgement-Fall Risk High(Add Comments): Yes  Toilet Every 2 Hours-In Advance of Need: Yes  Hourly Visual Checks: Awake, In bed  Fall Visual Posted: Socks, Fall sign posted  Room Door Open: Yes  Alarm On: Bed  Patient Moved Closer to Nursing Station: No    Active Consults:   PHARMACY TO DOSE VANCOMYCIN  IP CONSULT TO PODIATRY  IP CONSULT TO DIABETES MANAGEMENT  IP CONSULT TO INFECTIOUS DISEASES  IP CONSULT TO VASCULAR SURGERY    Length of Stay:  Expected LOS: 18  Actual LOS: 16    Rebeca Toro RN                            
End of Shift Note    Bedside shift change report given to GELY Ochoa (oncoming nurse) by Rebeca Toro RN (offgoing nurse).  Report included the following information SBAR    Shift worked:  7P-7A     Shift summary and any significant changes:    Patient given tylenol X1 for pain.  Soler patent and draining well.  Uneventful.     Concerns for physician to address:       Zone phone for oncoming shift:          Activity:  Level of Assistance: Maximum assist, patient does 25-49%  Number times ambulated in hallways past shift: 0  Number of times OOB to chair past shift: 0    Cardiac:   Cardiac Monitoring: No      Cardiac Rhythm: Sinus rhythm    Access:  Current line(s): PIV     Genitourinary:   Urinary Status: Soler    Respiratory:   O2 Device: None (Room air)  Chronic home O2 use?: NO  Incentive spirometer at bedside: YES    GI:  Last BM (including prior to admit): 04/16/25  Current diet:  ADULT DIET; Regular; 3 carb choices (45 gm/meal)  Diet NPO Exceptions are: Sips of Water with Meds  Passing flatus: YES    Pain Management:   Patient states pain is manageable on current regimen: YES    Skin:  Luis Scale Score: 17  Interventions: Wound Offloading (Prevention Methods): Pillows, Repositioning, Turning    Patient Safety:  Fall Risk: Nursing Judgement-Fall Risk High(Add Comments): Yes  Fall Risk Interventions  Nursing Judgement-Fall Risk High(Add Comments): Yes  Toilet Every 2 Hours-In Advance of Need: Yes  Hourly Visual Checks: In bed, Awake  Fall Visual Posted: Fall sign posted, Socks  Room Door Open: Yes  Alarm On: Bed  Patient Moved Closer to Nursing Station: Yes    Active Consults:   PHARMACY TO DOSE VANCOMYCIN  IP CONSULT TO PODIATRY  IP CONSULT TO DIABETES MANAGEMENT  IP CONSULT TO INFECTIOUS DISEASES  IP CONSULT TO VASCULAR SURGERY    Length of Stay:  Expected LOS: 21  Actual LOS: 17    Rebeca Toro RN                            
End of Shift Note    Bedside shift change report given to GELY Pimentel (oncoming nurse) by Michael Pruett RN (offgoing nurse).  Report included the following information SBAR, Intake/Output, MAR, and Recent Results    Shift worked:  8889-0350     Shift summary and any significant changes:     VSS, uneventful night. Curiel with sluggish drainage, only emptied mL. Scrotum still swollen, elevated and zinc paste placed for excoriation prevention. Lost PIV access around 0430, attempted to call for dynamic access for midline placement, need nephro clearance. Maintenance fluid on hold, PO fluid intake encouraged. Pain managed with PO tylenol, pt usually calls when needed. LUE with pitting edema, might have been from infiltrated IV.     Concerns for physician to address: -- Nephro to clear for midline placement.  -- Intermittent sluggish draining of curiel, urine with some sediment.  -- Scrotal edema and penile retraction, pt having urge to void.     Zone phone for oncoming shift:          Activity:  Level of Assistance: Maximum assist, patient does 25-49%  Number times ambulated in hallways past shift: 0  Number of times OOB to chair past shift: 0    Cardiac:   Cardiac Monitoring: No        Access:  Current line(s): NONE, awaiting dynamic access for midline placement    Genitourinary:   Urinary Status: Curiel    Respiratory:   O2 Device: None (Room air)  Chronic home O2 use?: N/A  Incentive spirometer at bedside: YES    GI:  Last BM (including prior to admit): 05/03/25  Current diet:  ADULT DIET; Regular; 3 carb choices (45 gm/meal)  ADULT ORAL NUTRITION SUPPLEMENT; Breakfast, Dinner; Low Calorie/High Protein Oral Supplement  ADULT ORAL NUTRITION SUPPLEMENT; Breakfast, Lunch; Wound Healing Oral Supplement  Passing flatus: YES    Pain Management:   Patient states pain is manageable on current regimen: YES    Skin:  Luis Scale Score: 16  Interventions: Wound Offloading (Prevention Methods): Bed, pressure reduction 
End of Shift Note    Bedside shift change report given to GELY Romero (oncoming nurse) by Pb Bedolla RN (offgoing nurse).  Report included the following information SBAR, Kardex, Procedure Summary, Intake/Output, MAR, and Recent Results    Shift worked:  7a-730p     Shift summary and any significant changes:     Most of day in chair. Morphine given x1. Tolerating diet. Voiding in [place]. D/c tomorrow.        Concerns for physician to address:       Zone phone for oncoming shift:          Activity:     Number times ambulated in hallways past shift: 0  Number of times OOB to chair past shift: 1    Cardiac:   Cardiac Monitoring: No           Access:   Current line(s): Peripheral IV    Genitourinary:   Urinary status: Patient is voiding without difficulty.    Respiratory:      Chronic home O2 use?: NO  Incentive spirometer at bedside: NO       GI:     Current diet:  ADULT DIET; Regular; 3 carb choices (45 gm/meal)  ADULT ORAL NUTRITION SUPPLEMENT; Breakfast, Dinner; Low Calorie/High Protein Oral Supplement  ADULT ORAL NUTRITION SUPPLEMENT; Breakfast, Lunch; Wound Healing Oral Supplement  Passing flatus: Yes  Tolerating current diet: Yes       Pain Management:   Patient states pain is manageable on current regimen: Yes    Skin:     Interventions:     Patient Safety:  Fall Score: PA Fall Risk Score: 97.88    Interventions:           Length of Stay:  Expected LOS: 26  Actual LOS: 25      Pb Bedolla RN                            
End of Shift Note    Bedside shift change report given to GELY Storey (oncoming nurse) by Lauren Hardy, GELY (offgoing nurse).  Report included the following information SBAR, Kardex, and MAR    Shift worked:  Days     Shift summary and any significant changes:    Pt transferred from CCU  Pt's HR SB on amio gtt, perfect served MD Antoine. Amio gtt d/c    1627: BG 62 & 69, 8 Oz of OJ given. Repeat BG 74    CXR ordered     Concerns for physician to address:  -     Zone phone for oncoming shift:   -       Active Consults:   PHARMACY TO DOSE VANCOMYCIN  IP CONSULT TO PODIATRY  IP CONSULT TO DIABETES MANAGEMENT  IP CONSULT TO INFECTIOUS DISEASES  IP CONSULT TO VASCULAR SURGERY  IP CONSULT TO CASE MANAGEMENT  IP CONSULT TO CARDIOLOGY  IP CONSULT TO NEPHROLOGY  IP CONSULT TO VASCULAR ACCESS TEAM  IP CONSULT TO CASE MANAGEMENT  IP CONSULT TO PULMONOLOGY  IP CONSULT TO PALLIATIVE CARE  IP CONSULT TO UROLOGY  IP CONSULT TO INTERVENTIONAL RADIOLOGY  IP CONSULT TO INTENSIVIST  IP CONSULT TO PALLIATIVE CARE  IP CONSULT TO CARDIOLOGY    Length of Stay:  Expected LOS: 54  Actual LOS: 50    Lauren Hardy, RN                            
End of Shift Note    Bedside shift change report given to GELY Stroud (oncoming nurse) by MARCELINO BOWMAN RN (offgoing nurse).  Report included the following information SBAR, Kardex, MAR, and Recent Results    Shift worked:  7p-7a     Shift summary and any significant changes:     Pt still  has labored breathing.  Pt won't keep on NC.  Pt very restless. Pt speech is difficulty for me to understand.     Concerns for physician to address:  Not sure what pt's baseline speech in like as pt is new to this unit.  Please assess if changes in speech from baseline.     Zone phone for oncoming shift:          Activity:     Number times ambulated in hallways past shift: 0  Number of times OOB to chair past shift: 0    Cardiac:   Cardiac Monitoring: Yes           Access:  Current line(s): PIV     Genitourinary:   Urinary status: voiding    Respiratory:      Chronic home O2 use?: NO  Incentive spirometer at bedside: YES       GI:     Current diet:  ADULT DIET; Regular; 3 carb choices (45 gm/meal)  ADULT ORAL NUTRITION SUPPLEMENT; Breakfast, Dinner; Low Calorie/High Protein Oral Supplement  ADULT ORAL NUTRITION SUPPLEMENT; Breakfast, Lunch; Wound Healing Oral Supplement  Passing flatus: YES  Tolerating current diet: YES       Pain Management:   Patient states pain is manageable on current regimen: YES    Skin:     Interventions: turn team and increase time out of bed    Patient Safety:  Fall Score:    Interventions: bed/chair alarm and gripper socks       Length of Stay:  Expected LOS: 35  Actual LOS: 35      MARCELINO BOWMAN RN                             
End of Shift Note    Bedside shift change report given to GELY Talbot (oncoming nurse) by Michael Pruett RN (offgoing nurse).  Report included the following information SBAR, Intake/Output, MAR, Recent Results, and Cardiac Rhythm NSR    Shift worked:  0107-7263     Shift summary and any significant changes:     VSS, uneventful night. Medicated for pain with PO tylenol x2. Son was at bedside briefly, opportunity to address questions/concerns provided. Pt still not tolerating heel boots/pillows, he kicks them off the bed.   Concerns for physician to address:  -- Curiel reinserted for retention on 4/30, son wanted to know if pt is getting discharged with curiel or will have another voiding trial?  -- Curiel intermittently gets air bubbles stuck near the tamper seal and then pt will c/o urge to void...  -- Critical CO2 of 15   Zone phone for oncoming shift:          Activity:  Level of Assistance: Moderate assist, patient does 50-74%  Number times ambulated in hallways past shift: 0  Number of times OOB to chair past shift: 0    Cardiac:   Cardiac Monitoring: Yes      Cardiac Rhythm: Sinus rhythm    Access:  Current line(s): PIV    Genitourinary:   Urinary Status: Curiel    Respiratory:   O2 Device: None (Room air)  Chronic home O2 use?: N/A  Incentive spirometer at bedside: NO    GI:  Last BM (including prior to admit): 05/02/25  Current diet:  ADULT DIET; Regular; 3 carb choices (45 gm/meal)  ADULT ORAL NUTRITION SUPPLEMENT; Breakfast, Dinner; Low Calorie/High Protein Oral Supplement  ADULT ORAL NUTRITION SUPPLEMENT; Breakfast, Lunch; Wound Healing Oral Supplement  Passing flatus: YES    Pain Management:   Patient states pain is manageable on current regimen: YES    Skin:  Luis Scale Score: 16  Interventions: Wound Offloading (Prevention Methods): Bed, pressure reduction mattress, Elevate heels, Pillows, Repositioning    Patient Safety:  Fall Risk: Nursing Judgement-Fall Risk High(Add Comments): Yes  Fall Risk 
End of Shift Note    Bedside shift change report given to GELY Wylie  (oncoming nurse) by FAIZA MARTINEZ LPN (offgoing nurse).  Report included the following information SBAR    Shift worked:  9159-6317     Shift summary and any significant changes:     Medications given per MAR.  PRN x1 Tylenol for pain 4 Units of insulin given overnight for coverage. Care and safety rounds complete. Q2 turns complete. CHG complete   NPO at MN for Tibial bypass procedure     Concerns for physician to address:       Zone phone for oncoming shift:          Activity:  Level of Assistance: Moderate assist, patient does 50-74%  Number times ambulated in hallways past shift: 0  Number of times OOB to chair past shift: 0    Cardiac:   Cardiac Monitoring: No      Cardiac Rhythm: Sinus rhythm    Access:  Current line(s): PIV     Genitourinary:   Urinary Status: Voiding, External catheter    Respiratory:   O2 Device: None (Room air)  Chronic home O2 use?: NO  Incentive spirometer at bedside: N/A    GI:  Last BM (including prior to admit): 04/12/25  Current diet:  Diet NPO  Passing flatus: YES    Pain Management:   Patient states pain is manageable on current regimen: YES    Skin:  Luis Scale Score: 18  Interventions: Wound Offloading (Prevention Methods): Bed, pressure redistribution/air, Bed, pressure reduction mattress, Turning, Pillows    Patient Safety:  Fall Risk: Nursing Judgement-Fall Risk High(Add Comments): No  Fall Risk Interventions  Nursing Judgement-Fall Risk High(Add Comments): No  Toilet Every 2 Hours-In Advance of Need: Yes  Hourly Visual Checks: Awake, In bed, Eyes closed, Quiet  Fall Visual Posted: Fall sign posted, Socks  Room Door Open: Yes  Alarm On: Bed  Patient Moved Closer to Nursing Station: No    Active Consults:   PHARMACY TO DOSE VANCOMYCIN  IP CONSULT TO PODIATRY  IP CONSULT TO DIABETES MANAGEMENT  IP CONSULT TO INFECTIOUS DISEASES  IP CONSULT TO VASCULAR SURGERY    Length of Stay:  Expected LOS: 15  Actual 
End of Shift Note    Bedside shift change report given to GELY Wylie (oncoming nurse) by MARCELINO BOWMAN RN (offgoing nurse).  Report included the following information SBAR, Kardex, and MAR    Shift worked:  7p-7a     Shift summary and any significant changes:     Pt rested well.  Pt cleaned up and changed multiple times.       Concerns for physician to address:  none           Activity:  Level of Assistance: Minimal assist, patient does 75% or more  Number times ambulated in hallways past shift: 0  Number of times OOB to chair past shift: 0    Cardiac:   Cardiac Monitoring: Yes      Cardiac Rhythm: Sinus rhythm    Access:  Current line(s): PIV     Genitourinary:   Urinary Status: Voiding    Respiratory:   O2 Device: None (Room air)  Chronic home O2 use?: NO  Incentive spirometer at bedside: NO    GI:  Last BM (including prior to admit): 04/06/25  Current diet:  ADULT DIET; Regular; 5 carb choices (75 gm/meal)  Passing flatus: YES    Pain Management:   Patient states pain is manageable on current regimen: NO    Skin:  Luis Scale Score: 17  Interventions: Wound Offloading (Prevention Methods): Bed, pressure reduction mattress, Pillows, Repositioning    Patient Safety:  Fall Risk: Nursing Judgement-Fall Risk High(Add Comments): Yes  Fall Risk Interventions  Nursing Judgement-Fall Risk High(Add Comments): Yes  Toilet Every 2 Hours-In Advance of Need: Yes  Hourly Visual Checks: In bed  Fall Visual Posted: Fall sign posted, Socks  Room Door Open: Yes  Alarm On: Bed  Patient Moved Closer to Nursing Station: No    Active Consults:   PHARMACY TO DOSE VANCOMYCIN  IP CONSULT TO PODIATRY  IP CONSULT TO DIABETES MANAGEMENT  IP CONSULT TO INFECTIOUS DISEASES    Length of Stay:  Expected LOS: 8  Actual LOS: 8    MARCELINO BOWMAN, GELY                           
End of Shift Note    Bedside shift change report given to GELY Wylie (oncoming nurse) by PRADEEP CONTRERAS RN (offgoing nurse).  Report included the following information SBAR, Kardex, and MAR    Shift worked:  7p-7a     Shift summary and any significant changes:     AAOx4, VSS, no complaint of pain, BS 51 got report from Rosemary at 0706, 4oz OJ given, nurse made aware to do BS in 15 minutes, Q2 turns done, no gowns available, pt put in isolation gown, safety rounding completed.      Concerns for physician to address:  no     Zone phone for oncoming shift:   [phone]       Activity:  Level of Assistance: Minimal assist, patient does 75% or more    Cardiac:   Cardiac Monitoring:  no    Access:  Current line(s): PIV     Genitourinary:   Urinary Status: Voiding, External catheter    Respiratory:   O2 Device: None (Room air)    GI:  Current diet: Diet NPO    Pain Management:   Patient states pain is manageable on current regimen: N/A    Skin:  Luis Scale Score: 17  Interventions: Wound Offloading (Prevention Methods): Bed, pressure reduction mattress, Elevate heels, Pillows, Repositioning, Turning    Patient Safety:  Fall Score: [place] Total Score: 85  Fall Risk Interventions  Nursing Judgement-Fall Risk High(Add Comments): Yes  Toilet Every 2 Hours-In Advance of Need: Yes  Hourly Visual Checks: Awake, In bed  Fall Visual Posted: Armband, Fall sign posted, Socks  Room Door Open: Deferred to promote rest  Alarm On: Bed  Patient Moved Closer to Nursing Station: No    Active Consults:  PHARMACY TO DOSE VANCOMYCIN  IP CONSULT TO PODIATRY  IP CONSULT TO DIABETES MANAGEMENT  IP CONSULT TO INFECTIOUS DISEASES  IP CONSULT TO VASCULAR SURGERY    Length of Stay:  Expected LOS: 9  Actual LOS: 9      PRADEEP CONTRERAS RN    
End of Shift Note    Bedside shift change report given to GELY Wylie (oncoming nurse) by PRADEEP CONTRERAS RN (offgoing nurse).  Report included the following information SBAR, Kardex, and MAR    Shift worked:  7p-7a     Shift summary and any significant changes:     AAOx4, VSS, pain medication given per MAR, Q2 turns done, safety rounding completed.     Concerns for physician to address:  none     Zone phone for oncoming shift:   [phone]       Activity:  Level of Assistance: Minimal assist, patient does 75% or more    Cardiac:   Cardiac Monitoring:  no    Access:  Current line(s): PIV     Genitourinary:   Urinary Status: Voiding    Respiratory:   O2 Device: Nasal cannula    GI:  Current diet: ADULT DIET; Regular; 4 carb choices (60 gm/meal); Low Fat/Low Chol/High Fiber/WU    Pain Management:   Patient states pain is manageable on current regimen: YES    Skin:  Luis Scale Score: 19  Interventions: Wound Offloading (Prevention Methods): Bed, pressure reduction mattress, Elevate heels, Pillows, Repositioning, Turning    Patient Safety:  Fall Score: [place] Total Score: 85  Fall Risk Interventions  Nursing Judgement-Fall Risk High(Add Comments): Yes  Toilet Every 2 Hours-In Advance of Need: Yes  Hourly Visual Checks: Awake, In bed  Fall Visual Posted: Armband, Fall sign posted, Socks  Room Door Open: Deferred to promote rest  Alarm On: Bed  Patient Moved Closer to Nursing Station: No    Active Consults:  PHARMACY TO DOSE VANCOMYCIN  IP CONSULT TO PODIATRY  IP CONSULT TO DIABETES MANAGEMENT  IP CONSULT TO INFECTIOUS DISEASES  IP CONSULT TO VASCULAR SURGERY    Length of Stay:  Expected LOS: 14  Actual LOS: 10      PRADEEP CONTRERAS RN    
End of Shift Note    Bedside shift change report given to GELY Zuniga (oncoming nurse) by Tanya Crum RN (offgoing nurse).  Report included the following information SBAR, Kardex, Intake/Output, MAR, Recent Results, and Cardiac Rhythm NSR    Shift worked:  1017-2383     Shift summary and any significant changes:     Episode of hypotension - required albumin, lasix increased by nephrology, Code stroke called this AM for slurred speech and lethargy - CT negative, mental status improved this afternoon, severe orthopnea noted - unable to tolerate laying flat, patient with hard formed stool this evening, patient weaned to 2L NC, wound care completed      Concerns for physician to address:  Stool softener?     Zone phone for oncoming shift:   [phone]       Activity:  Level of Assistance: Moderate assist, patient does 50-74%  Number times ambulated in hallways past shift: 0  Number of times OOB to chair past shift: 0    Cardiac:   Cardiac Monitoring: Yes      Cardiac Rhythm: Sinus sindi    Access:  Current line(s): PIV     Genitourinary:   Urinary Status: Soler, Draining    Respiratory:   O2 Device: Nasal cannula  Chronic home O2 use?: NO  Incentive spirometer at bedside: YES    GI:  Last BM (including prior to admit): 05/05/25  Current diet:  ADULT DIET; Regular; 3 carb choices (45 gm/meal)  ADULT ORAL NUTRITION SUPPLEMENT; Breakfast, Dinner; Low Calorie/High Protein Oral Supplement  ADULT ORAL NUTRITION SUPPLEMENT; Breakfast, Lunch; Wound Healing Oral Supplement  Passing flatus: YES    Pain Management:   Patient states pain is manageable on current regimen: YES    Skin:  Luis Scale Score: 15  Interventions: Wound Offloading (Prevention Methods): Bed, pressure reduction mattress, Blankets, Pillows, Repositioning, Turning    Patient Safety:  Fall Risk: Nursing Judgement-Fall Risk High(Add Comments): Yes  Fall Risk Interventions  Nursing Judgement-Fall Risk High(Add Comments): Yes  Toilet Every 2 Hours-In Advance of 
End of Shift Note    Bedside shift change report given to Haim HONG (oncoming nurse) by Elsy Andrade RN (offgoing nurse).  Report included the following information SBAR, Kardex, and MAR    Shift worked:  1934-5929     Shift summary and any significant changes:     All needs attended     Concerns for physician to address:  none           Activity:  Level of Assistance: Minimal assist, patient does 75% or more  Number times ambulated in hallways past shift: 0  Number of times OOB to chair past shift: 0    Cardiac:   Cardiac Monitoring: Yes      Cardiac Rhythm: Sinus rhythm    Access:  Current line(s): PIV     Genitourinary:   Urinary Status: Voiding    Respiratory:   O2 Device: None (Room air)  Chronic home O2 use?: NO  Incentive spirometer at bedside: NO    GI:  Last BM (including prior to admit): 04/06/25  Current diet:  ADULT DIET; Regular; 5 carb choices (75 gm/meal)  Passing flatus: YES    Pain Management:   Patient states pain is manageable on current regimen: NO    Skin:  Luis Scale Score: 17  Interventions: Wound Offloading (Prevention Methods): Turning, Repositioning    Patient Safety:  Fall Risk: Nursing Judgement-Fall Risk High(Add Comments): Yes  Fall Risk Interventions  Nursing Judgement-Fall Risk High(Add Comments): Yes  Toilet Every 2 Hours-In Advance of Need: Yes  Hourly Visual Checks: In chair  Fall Visual Posted: Armband, Socks  Room Door Open: Yes  Alarm On: Bed  Patient Moved Closer to Nursing Station: No    Active Consults:   PHARMACY TO DOSE VANCOMYCIN  IP CONSULT TO PODIATRY  IP CONSULT TO DIABETES MANAGEMENT  IP CONSULT TO INFECTIOUS DISEASES    Length of Stay:  Expected LOS: 8  Actual LOS: 7    Elsy Andrade RN                           
End of Shift Note    Bedside shift change report given to Haim HONG (oncoming nurse) by Katy Quinteros, RN (offgoing nurse).  Report included the following information SBAR, Kardex, MAR, and Recent Results    Shift worked:  7a-7p     Shift summary and any significant changes:     Tx from surg tele, for audible wheezing, pt's has labored breathing, ABG's normal, on O2 @2L, Pt [place], wound care completed. Q2hr turns     Concerns for physician to address:  none     Zone phone for oncoming shift:          Activity:     Number times ambulated in hallways past shift: 0  Number of times OOB to chair past shift: 0    Cardiac:   Cardiac Monitoring: Yes           Access:  Current line(s): PIV     Genitourinary:   Urinary status: voiding    Respiratory:      Chronic home O2 use?: NO  Incentive spirometer at bedside: YES       GI:     Current diet:  ADULT DIET; Regular; 3 carb choices (45 gm/meal)  ADULT ORAL NUTRITION SUPPLEMENT; Breakfast, Dinner; Low Calorie/High Protein Oral Supplement  ADULT ORAL NUTRITION SUPPLEMENT; Breakfast, Lunch; Wound Healing Oral Supplement  Passing flatus: YES  Tolerating current diet: YES       Pain Management:   Patient states pain is manageable on current regimen: YES    Skin:     Interventions: turn team and increase time out of bed    Patient Safety:  Fall Score:    Interventions: bed/chair alarm and gripper socks       Length of Stay:  Expected LOS: 35  Actual LOS: 34      Katy Quinteros, RN                             
End of Shift Note    Bedside shift change report given to JÚNIOR Cameron (oncoming nurse) by Melinda Mcmillan RN (offgoing nurse).  Report included the following information SBAR    Shift worked:  3341-3282     Shift summary and any significant changes:    Pt requested Tylenol  FC draining and patent  Vanc abx given  Otherwise uneventful shift     Concerns for physician to address:    Zone phone for oncoming shift:           Melinda Mcmillan, RN                            
End of Shift Note    Bedside shift change report given to JÚNIOR Fuller (oncoming nurse) by Pb Bedolla RN (offgoing nurse).  Report included the following information SBAR, Kardex, Procedure Summary, Intake/Output, MAR, and Recent Results    Shift worked:  7a-730p     Shift summary and any significant changes:     Pt in bed through shift. Redness on sacrum, q2hr turned. Tylenol given once for pain. Wound care done. PIV replaced, IVF increased to 125ml/hr. Tolerating diet. At EOS, RR called for chest pain-orders completed (labs, EKG, chest xr, and GI cocktail).  Pt did not d/c due to elevated Cr.      Concerns for physician to address:       Zone phone for oncoming shift:          Activity:     Number times ambulated in hallways past shift: 0  Number of times OOB to chair past shift: 0    Cardiac:   Cardiac Monitoring: No           Access:   Current line(s): Peripheral IV    Genitourinary:   Urinary status: Patient is voiding without difficulty.    Respiratory:      Chronic home O2 use?: NO  Incentive spirometer at bedside: YES       GI:     Current diet:  ADULT DIET; Regular; 3 carb choices (45 gm/meal)  ADULT ORAL NUTRITION SUPPLEMENT; Breakfast, Dinner; Low Calorie/High Protein Oral Supplement  ADULT ORAL NUTRITION SUPPLEMENT; Breakfast, Lunch; Wound Healing Oral Supplement  Passing flatus: Yes  Tolerating current diet: Yes       Pain Management:   Patient states pain is manageable on current regimen: Yes    Skin:     Interventions:     Patient Safety:  Fall Score: PA Fall Risk Score: 96.02    Interventions:           Length of Stay:  Expected LOS: 26  Actual LOS: 26      Pb Bedolla RN                            
End of Shift Note    Bedside shift change report given to JÚNIOR Holliday (oncoming nurse) by Karol Lepe RN (offgoing nurse).  Report included the following information SBAR, Intake/Output, MAR, and Recent Results    Shift worked:  7pm-7am     Shift summary and any significant changes:    Patient's NPO since midnight for L heel debridement today.   IV abx given. Pt had no complaints of pain, no n/v during the night.  Dressing on L heel still CDI.      Concerns for physician to address:       Zone phone for oncoming shift:          Activity:  Level of Assistance: Moderate assist, patient does 50-74%  Number times ambulated in hallways past shift: 0  Number of times OOB to chair past shift: 0    Cardiac:   Cardiac Monitoring: No      Cardiac Rhythm: Sinus rhythm    Access:  Current line(s): PIV     Genitourinary:   Urinary Status: Soler, Draining    Respiratory:   O2 Device: None (Room air)  Chronic home O2 use?: N/A  Incentive spirometer at bedside: YES    GI:  Last BM (including prior to admit): 04/16/25  Current diet:  Diet NPO Exceptions are: Sips of Water with Meds  Passing flatus: YES    Pain Management:   Patient states pain is manageable on current regimen: YES    Skin:  Luis Scale Score: 17  Interventions: Wound Offloading (Prevention Methods): Repositioning, Elevate heels, Bed, pressure reduction mattress    Patient Safety:  Fall Risk: Nursing Judgement-Fall Risk High(Add Comments): Yes  Fall Risk Interventions  Nursing Judgement-Fall Risk High(Add Comments): Yes  Toilet Every 2 Hours-In Advance of Need: Yes  Hourly Visual Checks: Awake, In bed  Fall Visual Posted: Socks, Fall sign posted  Room Door Open: Yes  Alarm On: Bed  Patient Moved Closer to Nursing Station: No    Active Consults:   PHARMACY TO DOSE VANCOMYCIN  IP CONSULT TO PODIATRY  IP CONSULT TO DIABETES MANAGEMENT  IP CONSULT TO INFECTIOUS DISEASES  IP CONSULT TO VASCULAR SURGERY    Length of Stay:  Expected LOS: 21  Actual LOS: 18    Karol 
End of Shift Note    Bedside shift change report given to JÚNIOR Holliday (oncoming nurse) by Michael Pruett RN (offgoing nurse).  Report included the following information SBAR, Intake/Output, MAR, and Cardiac Rhythm NSR    Shift worked:  7422-3707     Shift summary and any significant changes:     VSS, uneventful night. Pt started off lethargic, progressively got better. Medicated for pain x1 with PO tylenol, no issues with swallowing. Complete linen change provided, CHG bath and curiel care provided. Weaned down to RA, still gets dyspneic with exertion. Attempted Q2 turns, but pt slides back on his back. Also attempted to keep L heel elevated, pt kicks pillows and heels boots off.      Concerns for physician to address:  Maintenance fluid ordered, but pt has hx of CHF(?) and has episodes of wheezing/labored breathing.     Zone phone for oncoming shift:          Activity:  Level of Assistance: Moderate assist, patient does 50-74%  Number times ambulated in hallways past shift: 0  Number of times OOB to chair past shift: 0    Cardiac:   Cardiac Monitoring: Yes      Cardiac Rhythm: Sinus rhythm    Access:  Current line(s): PIV    Genitourinary:   Urinary Status: Curiel, Draining    Respiratory:   O2 Device: None (Room air)  Chronic home O2 use?: NO  Incentive spirometer at bedside: NO    GI:  Last BM (including prior to admit): 04/30/25  Current diet:  ADULT DIET; Regular; 3 carb choices (45 gm/meal)  ADULT ORAL NUTRITION SUPPLEMENT; Breakfast, Dinner; Low Calorie/High Protein Oral Supplement  ADULT ORAL NUTRITION SUPPLEMENT; Breakfast, Lunch; Wound Healing Oral Supplement  Passing flatus: YES    Pain Management:   Patient states pain is manageable on current regimen: YES    Skin:  Luis Scale Score: 15  Interventions: Wound Offloading (Prevention Methods): Bed, pressure reduction mattress, Elevate heels, Pillows, Repositioning (pt not tolerating heel boots/pads)    Patient Safety:  Fall Risk: Nursing Judgement-Fall 
End of Shift Note    Bedside shift change report given to JÚNIOR Holliday (oncoming nurse) by Rebeca Toro RN (offgoing nurse).  Report included the following information SBAR    Shift worked:  7P-7A     Shift summary and any significant changes:    No complaints of pain.  Had BM X1, incontinence care completed.  IV on the Right extremity infiltrated while infusing Vancomycin, cold compress applied as per pharmacy and Extremity elevated.     Concerns for physician to address:       Zone phone for oncoming shift:          Activity:  Level of Assistance: Moderate assist, patient does 50-74%  Number times ambulated in hallways past shift: 0  Number of times OOB to chair past shift: 0    Cardiac:   Cardiac Monitoring: No      Cardiac Rhythm: Sinus rhythm    Access:  Current line(s): PIV     Genitourinary:   Urinary Status: Voiding, External catheter    Respiratory:   O2 Device: None (Room air)  Chronic home O2 use?: NO  Incentive spirometer at bedside: YES    GI:  Last BM (including prior to admit): 04/23/25  Current diet:  ADULT DIET; Regular; 3 carb choices (45 gm/meal)  ADULT ORAL NUTRITION SUPPLEMENT; Breakfast, Dinner; Low Calorie/High Protein Oral Supplement  ADULT ORAL NUTRITION SUPPLEMENT; Breakfast, Lunch; Wound Healing Oral Supplement  Passing flatus: YES    Pain Management:   Patient states pain is manageable on current regimen: YES    Skin:  Luis Scale Score: 17  Interventions: Wound Offloading (Prevention Methods): Pillows, Repositioning, Turning    Patient Safety:  Fall Risk: Nursing Judgement-Fall Risk High(Add Comments): Yes  Fall Risk Interventions  Nursing Judgement-Fall Risk High(Add Comments): Yes  Toilet Every 2 Hours-In Advance of Need: Yes  Hourly Visual Checks: In bed  Fall Visual Posted: Socks, Fall sign posted, Armband  Room Door Open: Yes  Alarm On: Bed  Patient Moved Closer to Nursing Station: No    Active Consults:   PHARMACY TO DOSE VANCOMYCIN  IP CONSULT TO PODIATRY  IP CONSULT TO 
End of Shift Note    Bedside shift change report given to JÚNIOR Soto (oncoming nurse) by Parveen Marte RN (offgoing nurse).  Report included the following information SBAR, Kardex, and MAR    Shift worked:  7a-7p     Shift summary and any significant changes:     A&0 x3. Room air with 100% sat. Pt denies any pain. Medications given per MAR. Safety rounding completed.     Concerns for physician to address:  none     Zone phone for oncoming shift:          Activity:  Level of Assistance: Minimal assist, patient does 75% or more      Cardiac:   Cardiac Monitoring:NO      Access:  Current line(s): PIV     Genitourinary:   Urinary Status: Voiding, External catheter    Respiratory:   O2 Device: None (Room air)  Chronic home O2 use?: NO  Incentive spirometer at bedside: N/A    GI:  Last BM (including prior to admit): 04/09/25  Current diet:  ADULT ORAL NUTRITION SUPPLEMENT; Breakfast, Dinner; Wound Healing Oral Supplement  ADULT ORAL NUTRITION SUPPLEMENT; Lunch, Dinner; Low Calorie/High Protein Oral Supplement  ADULT DIET; Regular; 4 carb choices (60 gm/meal); Low Fat/Low Chol/High Fiber/WU; Double Protein Portions; Chocolate low carb supplements  Passing flatus: YES    Pain Management:   Patient states pain is manageable on current regimen: N/A    Skin:  Luis Scale Score: 19  Interventions: Wound Offloading (Prevention Methods): Bed, pressure reduction mattress, Elevate heels, Pillows, Repositioning, Turning    Patient Safety:  Fall Risk: Nursing Judgement-Fall Risk High(Add Comments): Yes  Fall Risk Interventions  Nursing Judgement-Fall Risk High(Add Comments): Yes  Toilet Every 2 Hours-In Advance of Need: Yes  Hourly Visual Checks: Awake, In bed  Fall Visual Posted: Armband, Fall sign posted, Socks  Room Door Open: Deferred to promote rest  Alarm On: Bed  Patient Moved Closer to Nursing Station: No    Active Consults:   PHARMACY TO DOSE VANCOMYCIN  IP CONSULT TO PODIATRY  IP CONSULT TO DIABETES MANAGEMENT  IP CONSULT 
End of Shift Note    Bedside shift change report given to Karol (oncoming nurse) by Don Deal RN (offgoing nurse).  Report included the following information SBAR, Kardex, Procedure Summary, Intake/Output, and MAR    Shift worked:  7a-7p     Shift summary and any significant changes:     VSS. Patient received prn tylenol for pain in L leg. Incisions C/D/I. Woundcare completed during shift. Tolerating diet. Uneventful. Awaiting procedure tomorrow     Concerns for physician to address:      Zone phone for oncoming shift:          Activity:  Level of Assistance: Moderate assist, patient does 50-74%  Number times ambulated in hallways past shift: 0  Number of times OOB to chair past shift: 0    Cardiac:   Cardiac Monitoring: No      Cardiac Rhythm: Sinus rhythm    Access:  Current line(s): PIV     Genitourinary:   Urinary Status: Soler    Respiratory:   O2 Device: None (Room air)  Chronic home O2 use?: N/A  Incentive spirometer at bedside: N/A    GI:  Last BM (including prior to admit): 04/16/25  Current diet:  ADULT DIET; Regular; 3 carb choices (45 gm/meal)  Diet NPO Exceptions are: Sips of Water with Meds  Passing flatus:     Pain Management:   Patient states pain is manageable on current regimen: N/A    Skin:  Luis Scale Score: 17  Interventions: Wound Offloading (Prevention Methods): Repositioning, Elevate heels    Patient Safety:  Fall Risk: Nursing Judgement-Fall Risk High(Add Comments): Yes  Fall Risk Interventions  Nursing Judgement-Fall Risk High(Add Comments): Yes  Toilet Every 2 Hours-In Advance of Need: Yes  Hourly Visual Checks: Awake, In bed  Fall Visual Posted: Socks, Fall sign posted  Room Door Open: Yes  Alarm On: Bed  Patient Moved Closer to Nursing Station: No    Active Consults:   PHARMACY TO DOSE VANCOMYCIN  IP CONSULT TO PODIATRY  IP CONSULT TO DIABETES MANAGEMENT  IP CONSULT TO INFECTIOUS DISEASES  IP CONSULT TO VASCULAR SURGERY    Length of Stay:  Expected LOS: 21  Actual LOS: 
End of Shift Note    Bedside shift change report given to Kenan RN (oncoming nurse) by Sri Bledsoe RN (offgoing nurse).  Report included the following information SBAR, Intake/Output, and Med Rec Status    Shift worked:  1900-0730     Shift summary and any significant changes:     No significant events this shift . Wound care performed      Concerns for physician to address:  discharge     Zone phone for oncoming shift:   [phone]       Activity:  Level of Assistance: Maximum assist, patient does 25-49%  Number times ambulated in hallways past shift: 0  Number of times OOB to chair past shift: 0    Cardiac:   Cardiac Monitoring: Yes      Cardiac Rhythm: Sinus sindi    Access:  Current line(s): PIV     Genitourinary:   Urinary Status: Oliguria    Respiratory:   O2 Device: None (Room air)  Chronic home O2 use?: YES  Incentive spirometer at bedside: NO    GI:  Last BM (including prior to admit): 02/27/25  Current diet:  ADULT DIET; Regular; 4 carb choices (60 gm/meal)  ADULT ORAL NUTRITION SUPPLEMENT; Lunch, Dinner; Renal Oral Supplement  Passing flatus: YES    Pain Management:   Patient states pain is manageable on current regimen: YES    Skin:  Luis Scale Score: 17  Interventions: Wound Offloading (Prevention Methods): Bed, pressure reduction mattress, Elevate heels, Pillows, Repositioning    Patient Safety:  Fall Risk: Nursing Judgement-Fall Risk High(Add Comments): Yes  Fall Risk Interventions  Nursing Judgement-Fall Risk High(Add Comments): Yes  Toilet Every 2 Hours-In Advance of Need: No (Comment)  Hourly Visual Checks: Awake, Confused, In bed, Quiet  Fall Visual Posted: Armband, Fall sign posted  Room Door Open: Yes  Alarm On: Bed  Patient Moved Closer to Nursing Station: No    Active Consults:   PHARMACY TO DOSE VANCOMYCIN  IP CONSULT TO PODIATRY  IP CONSULT TO DIABETES MANAGEMENT  IP CONSULT TO INFECTIOUS DISEASES  IP CONSULT TO VASCULAR SURGERY  IP CONSULT TO CASE MANAGEMENT  IP CONSULT TO CARDIOLOGY  IP 
End of Shift Note    Bedside shift change report given to Kristi HONG (oncoming nurse) by Brittany Navas LPN (offgoing nurse).  Report included the following information SBAR, Kardex, and MAR    Shift worked: 1900 to  0700     Shift summary and any significant changes:     Patient was received aox3, v/s stable in no distress. Patient has treatment to left foot was completed on shift. Patient took all medications as per orders on shift. Safety precautions maintained on shift report was given to on coming shift.      Concerns for physician to address:  none     Zone phone for oncoming shift:   [phone]         Brittany Navas LPN                            
End of Shift Note    Bedside shift change report given to Lisa HONG (oncoming nurse) by Frances Lester RN (offgoing nurse).  Report included the following information SBAR, Intake/Output, MAR, and Recent Results    Shift worked:  7pm-7am     Shift summary and any significant changes:     Pt A&Ox4 on RA.  Voiding via curiel.  Had a BM this morning.  No acute distress or complain noticed.     Concerns for physician to address:  ..     Zone phone for oncoming shift:   ...       Activity:  Level of Assistance: Moderate assist, patient does 50-74%  Number times ambulated in hallways past shift: 0  Number of times OOB to chair past shift: 0    Cardiac:   Cardiac Monitoring: No      Cardiac Rhythm: Sinus rhythm    Access:  Current line(s): PIV     Genitourinary:   Urinary Status: Curiel    Respiratory:   O2 Device: None (Room air)  Chronic home O2 use?: NO  Incentive spirometer at bedside: YES    GI:  Last BM (including prior to admit): 04/16/25  Current diet:  ADULT DIET; Regular; 3 carb choices (45 gm/meal)  ADULT ORAL NUTRITION SUPPLEMENT; Breakfast, Dinner; Low Calorie/High Protein Oral Supplement  ADULT ORAL NUTRITION SUPPLEMENT; Breakfast, Lunch; Wound Healing Oral Supplement  Passing flatus: YES    Pain Management:   Patient states pain is manageable on current regimen: YES    Skin:  Luis Scale Score: 18  Interventions: Wound Offloading (Prevention Methods): Repositioning, Pillows    Patient Safety:  Fall Risk: Nursing Judgement-Fall Risk High(Add Comments): Yes  Fall Risk Interventions  Nursing Judgement-Fall Risk High(Add Comments): Yes  Toilet Every 2 Hours-In Advance of Need: Yes  Hourly Visual Checks: Awake, In bed  Fall Visual Posted: Armband, Socks  Room Door Open: Deferred to promote rest  Alarm On: Bed  Patient Moved Closer to Nursing Station: No    Active Consults:   PHARMACY TO DOSE VANCOMYCIN  IP CONSULT TO PODIATRY  IP CONSULT TO DIABETES MANAGEMENT  IP CONSULT TO INFECTIOUS DISEASES  IP CONSULT TO 
End of Shift Note    Bedside shift change report given to MAURICIO (oncoming nurse) by Daria Kim RN (offgoing nurse).  Report included the following information SBAR    Shift worked:  DAYS     Shift summary and any significant changes:     -CHANGED LEG DRESSING ONLY   -PATIENT SAT IN CHAIR TODAY     Concerns for physician to address:  NONE     Zone phone for oncoming shift:   [phone]        Activity:  Level of Assistance: Moderate assist, patient does 50-74%  Number times ambulated in hallways past shift: 0  Number of times OOB to chair past shift: 0    Cardiac:   Cardiac Monitoring: No      Cardiac Rhythm: Sinus rhythm    Access:  Current line(s): PIV     Genitourinary:   Urinary Status: Soler    Respiratory:   O2 Device: None (Room air)  Chronic home O2 use?: NO  Incentive spirometer at bedside: YES    GI:  Last BM (including prior to admit): 04/16/25  Current diet:  ADULT DIET; Regular; 3 carb choices (45 gm/meal)  ADULT ORAL NUTRITION SUPPLEMENT; Breakfast, Dinner; Low Calorie/High Protein Oral Supplement  ADULT ORAL NUTRITION SUPPLEMENT; Breakfast, Lunch; Wound Healing Oral Supplement  Passing flatus: YES    Pain Management:   Patient states pain is manageable on current regimen: YES    Skin:  Luis Scale Score: 18  Interventions: Wound Offloading (Prevention Methods): Pillows    Patient Safety:  Fall Risk: Nursing Judgement-Fall Risk High(Add Comments): Yes  Fall Risk Interventions  Nursing Judgement-Fall Risk High(Add Comments): Yes  Toilet Every 2 Hours-In Advance of Need: Yes  Hourly Visual Checks: Awake  Fall Visual Posted: Fall sign posted  Room Door Open: Yes  Alarm On: Bed, Chair  Patient Moved Closer to Nursing Station: No    Active Consults:   PHARMACY TO DOSE VANCOMYCIN  IP CONSULT TO PODIATRY  IP CONSULT TO DIABETES MANAGEMENT  IP CONSULT TO INFECTIOUS DISEASES  IP CONSULT TO VASCULAR SURGERY    Length of Stay:  Expected LOS: 22  Actual LOS: 20    Daria Kim, RN                            
End of Shift Note    Bedside shift change report given to Melinda (oncoming nurse) by Inessa Lei RN (offgoing nurse).  Report included the following information SBAR, Procedure Summary, Intake/Output, MAR, Recent Results, and Med Rec Status    Shift worked:  7a-7pm     Shift summary and any significant changes:      Patient received from PACU this afternoon s/p left femoral/ tibial bypass. Patient is oriented to person . Patient is heard of hearing and reports pain to left heel; left heel placed in heel protector. Soler in place and draining clear, yellow urine. No acute signs or symptoms of distress noted or reported.     Concerns for physician to address:       Zone phone for oncoming shift:          Activity:  Level of Assistance: Moderate assist, patient does 50-74%  Number times ambulated in hallways past shift: 0  Number of times OOB to chair past shift: 0    Cardiac:   Cardiac Monitoring: No      Cardiac Rhythm: Sinus rhythm    Access:  Current line(s): PIV    Genitourinary:   Urinary Status: Draining, [place]    Respiratory:   O2 Device: Nasal cannula  Chronic home O2 use?: NO  Incentive spirometer at bedside: YES    GI:  Last BM (including prior to admit): 04/13/25  Current diet:  ADULT DIET; Regular; 3 carb choices (45 gm/meal)  Passing flatus: YES    Pain Management:   Patient states pain is manageable on current regimen: YES    Skin:  Luis Scale Score: 17  Interventions: Wound Offloading (Prevention Methods): Turning    Patient Safety:  Fall Risk: Nursing Judgement-Fall Risk High(Add Comments): No  Fall Risk Interventions  Nursing Judgement-Fall Risk High(Add Comments): No  Toilet Every 2 Hours-In Advance of Need: Yes  Hourly Visual Checks: Awake, In bed, Quiet  Fall Visual Posted: Fall sign posted, Socks  Room Door Open: Yes  Alarm On: Bed  Patient Moved Closer to Nursing Station: No    Active Consults:   PHARMACY TO DOSE VANCOMYCIN  IP CONSULT TO PODIATRY  IP CONSULT TO DIABETES MANAGEMENT  IP 
End of Shift Note    Bedside shift change report given to Michael HONG, (oncoming nurse) by Brittany Navas LPN (offgoing nurse).  Report included the following information SBAR, Kardex, and MAR    Shift worked: 0900 TO 0700     Shift summary and any significant changes:     Patient was received aox3  to 4, v/s , in no distress. Patient took all medications as per orders on shift. Safety precautions maintained on shift. Report was given to on coming shift nurse.      Concerns for physician to address:    NONE   Zone phone for oncoming shift:     [phone]       Brittany Navas LPN                            
End of Shift Note    Bedside shift change report given to Miya (oncoming nurse) by Nadine Thomas RN (offgoing nurse).  Report included the following information SBAR, Kardex, and MAR    Shift worked:  7p-7a     Shift summary and any significant changes:     Scheduled medications were given, see MAR.  Humalog was held due to the patient's blood glucose level, see MAR.  A new IV was restarted.  Patient had a bowel movement during my shift.  Morning lab was done.  Patient teaching and routine rounding has been done.     Concerns for physician to address:       Zone phone for oncoming shift:          Activity:  Level of Assistance: Moderate assist, patient does 50-74%  Number times ambulated in hallways past shift: 0  Number of times OOB to chair past shift: 0    Cardiac:   Cardiac Monitoring: No      Cardiac Rhythm: Sinus rhythm    Access:  Current line(s): PIV     Genitourinary:   Urinary Status: Voiding    Respiratory:   O2 Device: None (Room air)  Chronic home O2 use?: NO  Incentive spirometer at bedside: NO    GI:  Last BM (including prior to admit): 04/23/25  Current diet:  ADULT DIET; Regular; 3 carb choices (45 gm/meal)  ADULT ORAL NUTRITION SUPPLEMENT; Breakfast, Dinner; Low Calorie/High Protein Oral Supplement  ADULT ORAL NUTRITION SUPPLEMENT; Breakfast, Lunch; Wound Healing Oral Supplement  Passing flatus: YES    Pain Management:   Patient states pain is manageable on current regimen: YES    Skin:  Luis Scale Score: 18  Interventions: Wound Offloading (Prevention Methods): Blankets, Pillows, Repositioning, Turning    Patient Safety:  Fall Risk: Nursing Judgement-Fall Risk High(Add Comments): Yes  Fall Risk Interventions  Nursing Judgement-Fall Risk High(Add Comments): Yes  Toilet Every 2 Hours-In Advance of Need: Yes  Hourly Visual Checks: In bed, Awake  Fall Visual Posted: Socks  Room Door Open: Yes  Alarm On: Bed  Patient Moved Closer to Nursing Station: No    Active Consults:   PHARMACY TO DOSE 
End of Shift Note    Bedside shift change report given to OneydaRN (oncoming nurse) by Nisha Viramontes LPN (offgoing nurse).  Report included the following information SBAR    Shift worked:  7a-7p     Shift summary and any significant changes:    Pt tolerating meals. Pt denies any complaints of pain. Incotinence care given for BM 1x. Pt resting for most of the day, night shift nurse reported him not getting any rest overnight.      Concerns for physician to address:  none     Zone phone for oncoming shift:   [phone]       Nisha Viramontes LPN                              
End of Shift Note    Bedside shift change report given to Rebeca (oncoming nurse) by Inessa Lei RN (offgoing nurse).  Report included the following information SBAR, Intake/Output, MAR, Recent Results, and Med Rec Status    Shift worked:  7a-7p     Shift summary and any significant changes:     Patient is oriented to person and place. Patient has good appetite and tolerating diet well. Patient is able to turn and reposition self in bed but needs reinforcement. Patient denies any pain or distress.     Concerns for physician to address:       Zone phone for oncoming shift:          Activity:  Level of Assistance: Maximum assist, patient does 25-49%  Number times ambulated in hallways past shift: 0  Number of times OOB to chair past shift: 1    Cardiac:   Cardiac Monitoring: Yes      Cardiac Rhythm: Sinus rhythm    Access:  Current line(s): PIV    Genitourinary:   Urinary Status: Soler    Respiratory:   O2 Device: None (Room air)  Chronic home O2 use?: NO  Incentive spirometer at bedside: YES    GI:  Last BM (including prior to admit): 04/15/25  Current diet:  ADULT DIET; Regular; 3 carb choices (45 gm/meal)  Passing flatus: YES    Pain Management:   Patient states pain is manageable on current regimen: YES    Skin:  Luis Scale Score: 18  Interventions: Wound Offloading (Prevention Methods): Pillows, Repositioning, Turning    Patient Safety:  Fall Risk: Nursing Judgement-Fall Risk High(Add Comments): Yes  Fall Risk Interventions  Nursing Judgement-Fall Risk High(Add Comments): Yes  Toilet Every 2 Hours-In Advance of Need: Yes  Hourly Visual Checks: Awake, In bed  Fall Visual Posted: Socks, Fall sign posted  Room Door Open: Yes  Alarm On: Bed  Patient Moved Closer to Nursing Station: No    Active Consults:   PHARMACY TO DOSE VANCOMYCIN  IP CONSULT TO PODIATRY  IP CONSULT TO DIABETES MANAGEMENT  IP CONSULT TO INFECTIOUS DISEASES  IP CONSULT TO VASCULAR SURGERY    Length of Stay:  Expected LOS: 21  Actual LOS: 
End of Shift Note    Bedside shift change report given to Rosina CALLEJAS (oncoming nurse) by Elsy Andrade RN (offgoing nurse).  Report included the following information SBAR, Kardex, and MAR    Shift worked:  7245-4420     Shift summary and any significant changes:    1130H- Pt/ Ot session done  1330H- seen by Dr Bhakta  1400H-echo done  1745H- seen by ID  1900H- all needs attended         Concerns for physician to address:  none           Activity:  Level of Assistance: Minimal assist, patient does 75% or more  Number times ambulated in hallways past shift: 0  Number of times OOB to chair past shift: 2    Cardiac:   Cardiac Monitoring: Yes      Cardiac Rhythm: Sinus rhythm    Access:  Current line(s): PIV     Genitourinary:   Urinary Status: Voiding    Respiratory:   O2 Device: None (Room air)  Chronic home O2 use?: NO  Incentive spirometer at bedside: NO    GI:     Current diet:  ADULT DIET; Regular; 5 carb choices (75 gm/meal)  Passing flatus: YES    Pain Management:   Patient states pain is manageable on current regimen: YES    Skin:  Luis Scale Score: 18  Interventions: Wound Offloading (Prevention Methods): Pillows, Repositioning    Patient Safety:  Fall Risk: Nursing Judgement-Fall Risk High(Add Comments): Yes  Fall Risk Interventions  Nursing Judgement-Fall Risk High(Add Comments): Yes  Toilet Every 2 Hours-In Advance of Need: Yes  Hourly Visual Checks: Awake, In bed  Fall Visual Posted: Socks  Room Door Open: Yes  Alarm On: Bed  Patient Moved Closer to Nursing Station: Yes    Active Consults:   PHARMACY TO DOSE VANCOMYCIN  IP CONSULT TO PODIATRY  IP CONSULT TO DIABETES MANAGEMENT  IP CONSULT TO INFECTIOUS DISEASES    Length of Stay:  Expected LOS: 8  Actual LOS: 4    Elsy Andrade, GELY                           
End of Shift Note    Bedside shift change report given to Rosina HONG (oncoming nurse) by Elsy Andrade RN (offgoing nurse).  Report included the following information SBAR, Kardex, and MAR    Shift worked:  8792-8112     Shift summary and any significant changes:     Seen by Dr Ivey, continue care plan,all needs attended     Concerns for physician to address:  None           Activity:  Level of Assistance: Minimal assist, patient does 75% or more  Number times ambulated in hallways past shift: 0  Number of times OOB to chair past shift: 1    Cardiac:   Cardiac Monitoring: Yes      Cardiac Rhythm: Sinus rhythm    Access:  Current line(s): PIV     Genitourinary:   Urinary Status: Voiding, External catheter    Respiratory:   O2 Device: None (Room air)  Chronic home O2 use?: NO  Incentive spirometer at bedside: NO    GI:     Current diet:  ADULT DIET; Regular; 5 carb choices (75 gm/meal)  Passing flatus: YES    Pain Management:   Patient states pain is manageable on current regimen: YES    Skin:  Luis Scale Score: 18  Interventions: Wound Offloading (Prevention Methods): Repositioning, Pillows    Patient Safety:  Fall Risk: Nursing Judgement-Fall Risk High(Add Comments): Yes  Fall Risk Interventions  Nursing Judgement-Fall Risk High(Add Comments): Yes  Toilet Every 2 Hours-In Advance of Need: Yes  Hourly Visual Checks: Awake, In bed  Fall Visual Posted: Socks  Room Door Open: Yes  Alarm On: Bed  Patient Moved Closer to Nursing Station: Yes    Active Consults:   PHARMACY TO DOSE VANCOMYCIN  IP CONSULT TO PODIATRY  IP CONSULT TO DIABETES MANAGEMENT  IP CONSULT TO INFECTIOUS DISEASES    Length of Stay:  Expected LOS: 8  Actual LOS: 5    Elsy Andrade RN                           
End of Shift Note    Bedside shift change report given to Sri HONG (oncoming nurse) by WILLIAM FOY RN (offgoing nurse).  Report included the following information SBAR    Shift worked:  7am-7pm     Shift summary and any significant changes:       No significant change noted HD done patient tolerated well . Left heel wound care done. Possible D/C tomorrow   Concerns for physician to address:  None      Zone phone for oncoming shift:          Activity:  Level of Assistance: Maximum assist, patient does 25-49%  Number times ambulated in hallways past shift: 0  Number of times OOB to chair past shift: 0    Cardiac:   Cardiac Monitoring: Yes      Cardiac Rhythm: Sinus sindi    Access:  Current line(s): PIV     Genitourinary:   Urinary Status: Oliguria    Respiratory:   O2 Device: None (Room air)  Chronic home O2 use?: YES  Incentive spirometer at bedside: NO    GI:  Last BM (including prior to admit): 05/25/25  Current diet:  ADULT DIET; Regular; 4 carb choices (60 gm/meal)  ADULT ORAL NUTRITION SUPPLEMENT; Lunch, Dinner; Renal Oral Supplement  Passing flatus: YES    Pain Management:   Patient states pain is manageable on current regimen: YES    Skin:  Luis Scale Score: 15  Interventions: Wound Offloading (Prevention Methods): Pillows, Repositioning    Patient Safety:  Fall Risk: Nursing Judgement-Fall Risk High(Add Comments): Yes  Fall Risk Interventions  Nursing Judgement-Fall Risk High(Add Comments): Yes  Toilet Every 2 Hours-In Advance of Need: No (Comment)  Hourly Visual Checks: Awake, Confused, In bed, Quiet  Fall Visual Posted: Armband, Fall sign posted  Room Door Open: Yes  Alarm On: Bed  Patient Moved Closer to Nursing Station: No    Active Consults:   PHARMACY TO DOSE VANCOMYCIN  IP CONSULT TO PODIATRY  IP CONSULT TO DIABETES MANAGEMENT  IP CONSULT TO INFECTIOUS DISEASES  IP CONSULT TO VASCULAR SURGERY  IP CONSULT TO CASE MANAGEMENT  IP CONSULT TO CARDIOLOGY  IP CONSULT TO NEPHROLOGY  IP CONSULT TO 
End of Shift Note    Bedside shift change report given to Tresa HONG (oncoming nurse) by Ivelisse Zavala RN (offgoing nurse).  Report included the following information SBAR    Shift worked:  7p-7a     Shift summary and any significant changes:     No significant changes or events. Patient restless and constantly turning in bed, kicking pillows off bed that feet were to be elevated on , orientation waxes and wanes. Nurse and phlebotomy unable to obtain labs this am.     Concerns for physician to address:  As per above     Zone phone for oncoming shift:          Activity:  Level of Assistance: Maximum assist, patient does 25-49%  Number times ambulated in hallways past shift: 0  Number of times OOB to chair past shift: 0    Cardiac:   Cardiac Monitoring: Yes      Cardiac Rhythm: Sinus rhythm    Access:  Current line(s): PIV     Genitourinary:   Urinary Status: Voiding, External catheter    Respiratory:   O2 Device: None (Room air)  Chronic home O2 use?: NO  Incentive spirometer at bedside: YES    GI:  Last BM (including prior to admit): 04/29/25  Current diet:  ADULT DIET; Regular; 3 carb choices (45 gm/meal)  ADULT ORAL NUTRITION SUPPLEMENT; Breakfast, Dinner; Low Calorie/High Protein Oral Supplement  ADULT ORAL NUTRITION SUPPLEMENT; Breakfast, Lunch; Wound Healing Oral Supplement  Passing flatus: YES    Pain Management:   Patient states pain is manageable on current regimen: YES    Skin:  Luis Scale Score: 17  Interventions: Wound Offloading (Prevention Methods): Bed, pressure reduction mattress, Pillows, Repositioning, Turning    Patient Safety:  Fall Risk: Nursing Judgement-Fall Risk High(Add Comments): Yes  Fall Risk Interventions  Nursing Judgement-Fall Risk High(Add Comments): Yes  Toilet Every 2 Hours-In Advance of Need: Yes  Hourly Visual Checks: Awake, In bed  Fall Visual Posted: Armband, Fall sign posted  Room Door Open: Yes  Alarm On: Bed  Patient Moved Closer to Nursing Station: No    Active Consults: 
End of Shift Note    Bedside shift change report given to Valdemar (oncoming nurse) by Lauren Campos RN (offgoing nurse).  Report included the following information SBAR, Kardex, Intake/Output, MAR, and Recent Results    Shift worked:  1900-0730     Shift summary and any significant changes:     No acute changes. NP [place] notified of elevated troponin overnight, stat EKG obtained. No reports of CP. Pt removed IV, new 22G placed and continuous fluids in place.      Concerns for physician to address:  none     Zone phone for oncoming shift:          Activity:  Level of Assistance: Moderate assist, patient does 50-74%  Number times ambulated in hallways past shift: 0  Number of times OOB to chair past shift: 0    Cardiac:   Cardiac Monitoring: Yes      Cardiac Rhythm: Sinus rhythm    Access:  Current line(s): PIV     Genitourinary:   Urinary Status: Voiding, External catheter    Respiratory:   O2 Device: None (Room air)  Chronic home O2 use?: NO  Incentive spirometer at bedside: YES    GI:  Last BM (including prior to admit): 04/26/25  Current diet:  ADULT DIET; Regular; 3 carb choices (45 gm/meal)  ADULT ORAL NUTRITION SUPPLEMENT; Breakfast, Dinner; Low Calorie/High Protein Oral Supplement  ADULT ORAL NUTRITION SUPPLEMENT; Breakfast, Lunch; Wound Healing Oral Supplement  Passing flatus: YES    Pain Management:   Patient states pain is manageable on current regimen: N/A    Skin:  Luis Scale Score: 16  Interventions: Wound Offloading (Prevention Methods): Bed, pressure reduction mattress, Elevate heels, Pillows, Repositioning, Turning    Patient Safety:  Fall Risk: Nursing Judgement-Fall Risk High(Add Comments): Yes  Fall Risk Interventions  Nursing Judgement-Fall Risk High(Add Comments): Yes  Toilet Every 2 Hours-In Advance of Need: Yes  Hourly Visual Checks: Awake, In bed, Quiet  Fall Visual Posted: Fall sign posted, Socks, Armband  Room Door Open: Deferred to promote rest  Alarm On: Bed  Patient Moved Closer to 
Foot/heel dressed and not examined  Left leg bypass patent w/good graft pulse  Staple line looks good  Will get staples out just before discharge  Plans for cath noted  
From my standpoint, can go at any time with office and wound healing center follow up  
Good graft pulse left thigh  Incisions well-healed  Heel clean and odor free  Beginning to granulate    Need to get staples out  Con't heel wound care  I can follow up in office after discharge.    
HYPOGLYCEMIC EPISODE DOCUMENTATION    Patient with hypoglycemic episode(s) at 2015(time) on 5/14/2025 (date).    BG value(s) pre-treatment 36    Was patient symptomatic? [x] yes, [] no  Patient was treated with the following rescue medications/treatments: [x] D10                [] Glucose tablets                [] Glucagon                [] 4oz juice                [] 6oz reg soda                [] 8oz low fat milk  BG value post-treatment: 109    Name of MD notified:MARTINE Stratton   The following orders were received: Monitor BG in a couple of hours.      HYPOGLYCEMIC EPISODE DOCUMENTATION    Patient with hypoglycemic episode(s) at 2300 (time) on 05/14/2025 (date).    BG value(s) pre-treatment 40   Was patient symptomatic? [x] yes, [] no  Patient was treated with the following rescue medications/treatments: [x] D10                [] Glucose tablets                [] Glucagon                [] 4oz juice                [] 6oz reg soda                [] 8oz low fat milk  BG value post-treatment: 138  Name of MD notified:MARTINE Stratton  The following orders were received: D10 @ 50mL/HR and Q hours BG checks     End of Shift Note    Bedside shift change report given to  Ruby HONG    (oncoming nurse) by Nahomy Marie RN (offgoing nurse).  Report included the following information Nurse Handoff Report    Shift worked:  Night shift      Shift summary and any significant changes:     Episode of hypoglycemia, see note above.       Messaged provider around 8:16 PM regarding a low sugar reading. RN started hypoglycemia protocol, provider said to recheck sugar in 15 minutes. Bolus completed per protocol, provider said to keep an eye on sugars overnight.     12:!2 AM: RN was assisting tech with getting vitals and positioning patient. Thermometer was not reading a temp on patient axillary or oral. Messaged provider. Rectal temp obtained and it was 95. Filiberto hugger applied.     0445: Patient's temperature still low. 95.8 
Have asked wound care team to suggest oupt strategies for heel wound  
Heel photos from dressing change reviewed: appears to be improving  Following at a distance  Staple out next week for sure  
Hospitalist    Code stroke called    Pt slower to awake and oriented only x 1 per NS  Speech altered    When I saw, slow but arousable  Speech slow but fluent  Able to tell me his birthday, where he was, current year  Following commands    4+/5 strength bilaterally, symmetric    Discussed with Dr Shields with tele neuro, less likely ischemic event    I suspect encephalopathy    Check head CT and reassess post head CT    Hold tele neuro consult for now    Jagdeep Gentile Jr, MD      
Hospitalist    Code stroke called    Pt slower to awake and oriented only x 1 per NS  Speech altered    When I saw, slow but arousable  Speech slow but fluent  Able to tell me his birthday, where he was, current year  Following commands    4+/5 strength bilaterally, symmetric    Discussed with Dr Shields with tele neuro, less likely ischemic event    I suspect encephalopathy    Check head CT and reassess post head CT    Hold tele neuro consult for now    Last known well 4 am    Jagdeep Gentile Jr, MD      
I called pt's son and updated on plan of care.   
I have reviewed and am in agreement with the assessment and documentation as performed by my orientee, GELY French. Please refer to Manolo's progress note for update on pt's daily events.    
I have reviewed and am in agreement with the assessment and documentation as performed by my orientee, GELY French. Please refer to Manolo's progress note for update on pt's daily events.    
I spoke to patient's son and updated on plan of care.  
ID     Antimicrobial orders for discharge  -Daptomycin 672 mg   IV every 48 hours end date 5/21  -Pull line at end of therapy.    -Weekly CBC, CMP, CK-  -Fax reports to [phone], call with critical labs  at [phone]  -Encourage adequate fluids, daily probiotic/yogurt  -If line malfunction occurs and home health cannot reposition  please send patient to ED immediately  -ID follow-up -5/20 at 4 PM  - If persistent side effects occur stop antibiotic and call-ID/PCP.    Possible adverse effects of long term antibiotics are inclusive of but not limited to following  BM suppression, neutropenia , cytopenias , aplastic anemia hemorrhage liver & renal dysfunction/ liver , renal failure  , GI dysfunction- N, V  Diarrhea,C.difficile disease, rash , allergy , anaphylaxis.toxic epidermal necrolysis  Neuro toxicity , seizure disorder  Side effects tend to be more pronounced in the elderly.      
IInfectious Disease Progress        Impression    MRSA bacteremia  Blood culture 3/31+ for MRSA 3/4  Repeat blood cultures 4/3-no growth  Echo cardiogram-negative.    Diabetic left foot infection  Cellulitis of left foot  Foreign body in left foot  CT of foot + for metallic foreign bodies in plantar soft tissue of L/foot near fibular hallux sesamoid  & in heel pad.  Linear metallic FB is adjacent to R/5th MT base and plantar to R/2nd , 3rd MT shafts  No fracture, OM or abscess.  Left foot I&D, removal of foreign body& debridement of deep tissue necrotic areas by podiatry 4/2  IntraOp culture+ for moderate MRSA.    Left heel eschar / gangrene  Surrounding pustules, macerated skin  S/p debridement by Vascular 4/9.  Wound culture-4/10 + for few MRSA  S/p debridement 4/18. No exposed bone, bled well per op note.  Heel wound exam done with wound care and nursing.  No purulence noted.    DANIELLE on CKD 3  Worsening creatinine 2.9   S/p DC vancomycin 4/30    Diabetes type 2 poorly controlled  Elevated blood sugars  A1c 9.    PAD moderately decreased L/TBI  S/p LLE arteriogram 4/9  S/p L/ SFA to TPT/ PT bypass with reversed GSV 4/14    Tobacco abuse  Cessation recommended    Hypertension  Continue home meds    Hypothyroidism  Continue home meds    Plan    Continue Daptomycin   No statin while on daptomycin  Monitor CK  Pharmacy to dose per creatinine clearance  Adequate fluids, daily probiotic  MRSA decolonization-nasal mupirocin, Hibiclens skin wipes  Continue wound care per vascular, wound care team.  MRSA coverage x 6 weeks  4/9-5/21   ID service to follow.      Possible adverse effects of long term antibiotics are inclusive of but not limited to following  BM suppression, neutropenia , cytopenias , aplastic anemia hemorrhage liver & renal dysfunction/ liver , renal failure  , GI dysfunction- N, V  Diarrhea,C.difficile disease, rash , allergy , anaphylaxis.toxic epidermal necrolysis  Neuro toxicity , seizure disorder  Side 
Infectious Disease Progress        Impression    MRSA bacteremia  Blood culture 3/31+ for MRSA 3/4  Repeat blood cultures 4/3-no growth  Echo cardiogram-negative    Diabetic left foot infection  Cellulitis of left foot  Foreign body in left foot  CT of foot + for metallic foreign bodies in plantar soft tissue of L/foot near fibular hallux sesamoid  & in heel pad.  Linear metallic FB is adjacent to R/5th MT base and plantar to R/2nd , 3rd MT shafts  No fracture, OM or abscess.  Left foot I&D, removal of foreign body& debridement of deep tissue necrotic areas by podiatry 4/2  IntraOp culture+ for moderate MRSA      Diabetes type 2 poorly controlled  Elevated blood sugars  A1c 9.    PAD moderately decreased L/TBI  S/p LLE arteriogram 4/9  Plan is for LLE vein bypass    Left heel eschar   Surrounding pustules, macerated skin  Wound photos reviewed  S/p debridement by Vascular 4/9    Tobacco abuse  Cessation recommended    Hypertension  Continue home meds    Hypothyroidism  Continue home meds    Plan    Continue vancomycin IV  Plan is for 2 weeks of antimicrobial therapy from negative blood culture end date 4/17  May require extension of antibiotic therapy per clinical course of left foot and heel wound  Adequate fluids, daily probiotic  MRSA decolonization-nasal mupirocin, Hibiclens skin wipes  Wound care left foot & heel  Wound cultures from areas of purulence     ID service to follow    Abx  Zosyn-3/31-4/4  Vancomycin 3/31-    Extensive review of chart notes, labs, imaging, cultures done  Additionally review of done: Recent reports-Labs, cultures, imaging  D/w -hospitalist, RN    Patient seen today.  Sitting up in bed.  Denies complaints.  D/w RN events of the day, discussed with hospitalist.    Past Medical History:   Diagnosis Date    Diabetes (HCC)     Hyperlipidemia     PAD (peripheral artery disease)        Past Surgical History:   Procedure Laterality Date    ANGIOPLASTY Left 4/9/2025    LEFT LEG ARTERIOGRAM, 
Infectious Disease Progress        Impression    MRSA bacteremia  Blood culture 3/31+ for MRSA 3/4  Repeat blood cultures 4/3-no growth  Echo cardiogram-negative    Diabetic left foot infection  Cellulitis of left foot  Foreign body in left foot  CT of foot + for metallic foreign bodies in plantar soft tissue of L/foot near fibular hallux sesamoid  & in heel pad.  Linear metallic FB is adjacent to R/5th MT base and plantar to R/2nd , 3rd MT shafts  No fracture, OM or abscess.  Left foot I&D, removal of foreign body& debridement of deep tissue necrotic areas by podiatry 4/2  IntraOp culture+ for moderate MRSA.    Left heel eschar   Surrounding pustules, macerated skin  Wound photos reviewed  S/p debridement by Vascular 4/9.  Wound culture-4/10 pending      Diabetes type 2 poorly controlled  Elevated blood sugars  A1c 9.    PAD moderately decreased L/TBI  S/p LLE arteriogram 4/9  Plan is for LLE vein bypass    Tobacco abuse  Cessation recommended    Hypertension  Continue home meds    Hypothyroidism  Continue home meds    Plan    Continue vancomycin IV  Plan is for 2 weeks of antimicrobial therapy from negative blood culture end date 4/17  Anticipate pt will require require further extension of antibiotic therapy per clinical course of left foot   and heel wound  Adequate fluids, daily probiotic  MRSA decolonization-nasal mupirocin, Hibiclens skin wipes  Wound cultures from areas of purulence on heel pending     ID service to follow.  Please contact ID on-call with questions, concerns    Abx  Zosyn-3/31-4/4  Vancomycin 3/31-    Extensive review of chart notes, labs, imaging, cultures done  Additionally review of done: Recent reports-Labs, cultures, imaging  D/w -hospitalist, RN    Patient seen today.  Sitting up in bed.  Denies complaints.  D/w RN events of the day.    Past Medical History:   Diagnosis Date    Diabetes (HCC)     Hyperlipidemia     PAD (peripheral artery disease)        Past Surgical History: 
Infectious Disease Progress        Impression    MRSA bacteremia  Blood culture 3/31+ for MRSA 3/4  Repeat blood cultures 4/3-no growth  Echo cardiogram-negative    Diabetic left foot infection  Cellulitis of left foot  Foreign body in left foot  CT of foot + for metallic foreign bodies in plantar soft tissue of L/foot near fibular hallux sesamoid  & in heel pad.  Linear metallic FB is adjacent to R/5th MT base and plantar to R/2nd , 3rd MT shafts  No fracture, OM or abscess.  Left foot I&D, removal of foreign body& debridement of deep tissue necrotic areas by podiatry 4/2  IntraOp culture+ for moderate MRSA.    Left heel eschar / gangrene  Surrounding pustules, macerated skin  Wound photos reviewed  S/p debridement by Vascular 4/9.  Wound culture-4/10 + for few MRSA  Wound photos reviewed  Plan is for repeat debridement  Friday by vascular.      Diabetes type 2 poorly controlled  Elevated blood sugars  A1c 9.    PAD moderately decreased L/TBI  S/p LLE arteriogram 4/9  S/p L/ SFA to TPT/ PT bypass with reversed GSV 4/14    Tobacco abuse  Cessation recommended    Hypertension  Continue home meds    Hypothyroidism  Continue home meds    Plan    Continue vancomycin IV  Pharmacy to dose per creatinine clearance  Anticipate discharge on vancomycin IV  Adequate fluids, daily probiotic  MRSA decolonization-nasal mupirocin, Hibiclens skin wipes       ID service to follow.      Abx  Zosyn-3/31-4/4  Vancomycin 3/31-    Extensive review of chart notes, labs, imaging, cultures done  Additionally review of done: Recent reports-Labs, cultures, imaging  D/w -hospitalist, RN    Patient seen today.  Awake, denies complaints.D/w hospitalist    Past Medical History:   Diagnosis Date    Diabetes (HCC)     Hyperlipidemia     PAD (peripheral artery disease)        Past Surgical History:   Procedure Laterality Date    ANGIOPLASTY Left 4/9/2025    LEFT LEG ARTERIOGRAM, LEFT HEEL DEBIDEMENT performed by Foster Garcia MD at [facility] OR 
Infectious Disease Progress        Impression    MRSA bacteremia  Blood culture 3/31+ for MRSA 3/4  Repeat blood cultures 4/3-no growth  Echo cardiogram-negative    Diabetic left foot infection  Cellulitis of left foot  Foreign body in left foot  CT of foot + for metallic foreign bodies in plantar soft tissue of L/foot near fibular hallux sesamoid  & in heel pad.  Linear metallic FB is adjacent to R/5th MT base and plantar to R/2nd , 3rd MT shafts  No fracture, OM or abscess.  Left foot I&D, removal of foreign body& debridement of deep tissue necrotic areas by podiatry 4/2  IntraOp culture+ for moderate MRSA.    Left heel eschar / gangrene  Surrounding pustules, macerated skin  Wound photos reviewed  S/p debridement by Vascular 4/9.  Wound culture-4/10 + for few MRSA  Wound photos reviewed  Plan is for repeat debridement  Friday by vascular.      Diabetes type 2 poorly controlled  Elevated blood sugars  A1c 9.    PAD moderately decreased L/TBI  S/p LLE arteriogram 4/9  S/p L/ SFA to TPT/ PT bypass with reversed GSV 4/14    Tobacco abuse  Cessation recommended    Hypertension  Continue home meds    Hypothyroidism  Continue home meds    Plan    Continue vancomycin IV  Pharmacy to dose per creatinine clearance  Anticipate discharge on vancomycin IV  Adequate fluids, daily probiotic  MRSA decolonization-nasal mupirocin, Hibiclens skin wipes  Heel debridement by vascular tomorrow     ID service to follow.      Abx  Zosyn-3/31-4/4  Vancomycin 3/31-    Extensive review of chart notes, labs, imaging, cultures done  Additionally review of done: Recent reports-Labs, cultures, imaging  D/w -hospitalist, RN    Patient seen today.  Resting, appears comfortable.  Left foot dressing present  Past Medical History:   Diagnosis Date    Diabetes (HCC)     Hyperlipidemia     PAD (peripheral artery disease)        Past Surgical History:   Procedure Laterality Date    ANGIOPLASTY Left 4/9/2025    LEFT LEG ARTERIOGRAM, LEFT HEEL 
Infectious Disease Progress        Impression    MRSA bacteremia  Blood culture 3/31+ for MRSA 3/4  Repeat blood cultures 4/3-no growth  Echo cardiogram-negative    Diabetic left foot infection  Cellulitis of left foot  Foreign body in left foot  CT of foot + for metallic foreign bodies in plantar soft tissue of L/foot near fibular hallux sesamoid  & in heel pad.  Linear metallic FB is adjacent to R/5th MT base and plantar to R/2nd , 3rd MT shafts  No fracture, OM or abscess.  Left foot I&D, removal of foreign body& debridement of deep tissue necrotic areas by podiatry 4/2  IntraOp culture+ for moderate MRSA.    Left heel eschar / gangrene  Surrounding pustules, macerated skin  Wound photos reviewed  S/p debridement by Vascular 4/9.  Wound culture-4/10 + for few MRSA  Wound photos reviewed, D/w nursing.      Diabetes type 2 poorly controlled  Elevated blood sugars  A1c 9.    PAD moderately decreased L/TBI  S/p LLE arteriogram 4/9  S/p L/ SFA to TPT/ PT bypass with reversed GSV 4/14    Tobacco abuse  Cessation recommended    Hypertension  Continue home meds    Hypothyroidism  Continue home meds    Plan    Continue vancomycin IV  Anticipate pt will require require further extension of antibiotic therapy per clinical course of left foot   and heel wound  Adequate fluids, daily probiotic  MRSA decolonization-nasal mupirocin, Hibiclens skin wipes  D/w podiatry- Dr Avalos will see heell wound     ID service to follow.      Abx  Zosyn-3/31-4/4  Vancomycin 3/31-    Extensive review of chart notes, labs, imaging, cultures done  Additionally review of done: Recent reports-Labs, cultures, imaging  D/w -hospitalist, RN    Patient seen today.  Awake, denies complaints.  D/w RN events of the day.    Past Medical History:   Diagnosis Date    Diabetes (HCC)     Hyperlipidemia     PAD (peripheral artery disease)        Past Surgical History:   Procedure Laterality Date    ANGIOPLASTY Left 4/9/2025    LEFT LEG ARTERIOGRAM, LEFT 
Infectious Disease Progress        Impression    MRSA bacteremia  Blood culture 3/31+ for MRSA 3/4  Repeat blood cultures 4/3-no growth  Echo cardiogram-negative.    Diabetic left foot infection  Cellulitis of left foot  Foreign body in left foot  CT of foot + for metallic foreign bodies in plantar soft tissue of L/foot near fibular hallux sesamoid  & in heel pad.  Linear metallic FB is adjacent to R/5th MT base and plantar to R/2nd , 3rd MT shafts  No fracture, OM or abscess.  Left foot I&D, removal of foreign body& debridement of deep tissue necrotic areas by podiatry 4/2  IntraOp culture+ for moderate MRSA.    Left heel eschar / gangrene  Surrounding pustules, macerated skin  Wound photos reviewed  S/p debridement by Vascular 4/9.  Wound culture-4/10 + for few MRSA  Wound photos reviewed  S/p debridement 4/18.  No exposed bone, bled well per op note.  Wound care notes and photos reviewed    Diabetes type 2 poorly controlled  Elevated blood sugars  A1c 9.    PAD moderately decreased L/TBI  S/p LLE arteriogram 4/9  S/p L/ SFA to TPT/ PT bypass with reversed GSV 4/14    Tobacco abuse  Cessation recommended    Hypertension  Continue home meds    Hypothyroidism  Continue home meds    Plan    Continue vancomycin IV  Pharmacy to dose per creatinine clearance  Adequate fluids, daily probiotic  MRSA decolonization-nasal mupirocin, Hibiclens skin wipes  Will see wound with next wound care.    ID service to follow.      Abx  Zosyn-3/31-4/4  Vancomycin 3/31-    Extensive review of chart notes, labs, imaging, cultures done  Additionally review of done: Recent reports-Labs, cultures, imaging  D/w -hospitalist, RN    Patient seen today.  Resting, appears comfortable.  Left foot dressing present.  Wound care notes and photos reviewed    Past Medical History:   Diagnosis Date    Diabetes (HCC)     Hyperlipidemia     PAD (peripheral artery disease)        Past Surgical History:   Procedure Laterality Date    ANGIOPLASTY Left 
Jay Better  Cr normal at baseline  Ok to place midline from renal  
Late Entry: Attempted to see pt for OT services.  Pt was transferring out of the ICU.  Will defer but continue to follow.   
Mr. Brandt stated that he had pain during wound care today; however, it was manageable without pain medication throughout the day.  
Nephrology Progress Note  IBAN [facility] / [place] Office  [address]  [place] [address]  Phone - [phone]  Fax - [phone]                   Patient: Errol Brandt                     YOB: 1945        Date- 5/8/2025                                     Admit Date: 3/31/2025   CC: Follow up for DANIELLE on CKD          IMPRESSION & PLAN:   DANIELLE stage III(ATN from vancomycin toxicity, urinary retention)  CKD stage III(baseline creatinine 1.1)  Nephrotic range proteinuria  Metabolic encephalopathy  Acute hypoxic respiratory failure  Volume overload(suspect diastolic heart failure)  Pulmonary edema      PLAN-  Creatinine stable  Restart Bumex 2 mg daily  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, serology negative  Continue midodrine for hypotension  Antibiotics per primary team  Check daily labs  Spoke to RN  He will need outpatient follow-up in our office     Subjective:  Interval History:   - Seen and examined today  - Much more awake and alert  - Blood pressure labile  - Chest ray shows pulmonary edema    Objective:   Vitals:    05/08/25 0007 05/08/25 0300 05/08/25 0354 05/08/25 0800   BP:  101/60  106/63   Pulse: 56 57 54 60   Resp: 15 16 20 25   Temp:  97.9 °F (36.6 °C)  98 °F (36.7 °C)   TempSrc:  Axillary  Axillary   SpO2: 96% 97%  97%   Weight:       Height:          I/O last 3 completed shifts:  In: 540 [P.O.:540]  Out: 1150 [Urine:1150]  No intake/output data recorded.      Physical exam:    GEN: NAD  NECK- no mass  RESP: No wheezing, decreased BS b/l  CVS: S1,S2  RRR  NEURO: Normal speech, Non focal  EXT: No Edema   PSYCH: Normal Mood    Chart reviewed.         Pertinent Notes reviewed.       Data Review :  Lab Results   Component Value Date/Time     05/08/2025 04:05 AM    K 5.3 05/08/2025 04:05 AM     05/08/2025 04:05 AM    CO2 21 05/08/2025 04:05 AM     05/08/2025 04:05 AM    
Nephrology Progress Note  IBAN [facility] / [place] Office  [address]  [place] [address]  Phone - [phone]  Fax - [phone]                   Patient: Errol Brandt                     YOB: 1945        Date- 5/13/2025                                     Admit Date: 3/31/2025   CC: Follow up for DANIELLE on CKD          IMPRESSION & PLAN:   DANIELLE stage III(ATN from vancomycin toxicity, urinary retention)  CKD stage III(baseline creatinine 1.1)  Nephrotic range proteinuria  Metabolic encephalopathy  Acute hypoxic respiratory failure  Volume overload(suspect diastolic heart failure)  Pulmonary edema  Hyperglycemia  Metab acidosis  Mild hyperK      PLAN-  Place permacath in am  Start hd tomorrow  Stop ivf  Keep curiel in place per urology  Consent obtained from son via phone-      Subjective:  Interval History:   Cr worse  Bun high  No improvement in cr with ivf    Objective:   Vitals:    05/13/25 0315 05/13/25 0400 05/13/25 0725 05/13/25 0800   BP: (!) 115/53 114/61 108/77 111/60   Pulse: 53 54 53 51   Resp: 14 15 18 14   Temp: 97.9 °F (36.6 °C)      TempSrc: Oral  Oral    SpO2: 97% 99% 94% 95%   Weight:       Height:          I/O last 3 completed shifts:  In: 1382.8 [P.O.:290; I.V.:976.7; IV Piggyback:116.1]  Out: 555 [Urine:555]  I/O this shift:  In: 79.2 [I.V.:79.2]  Out: -       Physical exam:    GEN: nad  NECK- no mass  RESP:no wheezing  NEURO: Can't eval due to patient's current condition   Curiel +  EXT: No Edema   PSYCH: Can't eval due to patient's current condition       Chart reviewed.         Pertinent Notes reviewed.       Data Review :  Lab Results   Component Value Date/Time     05/13/2025 03:11 AM    K 4.6 05/13/2025 05:18 AM     05/13/2025 03:11 AM    CO2 21 05/13/2025 03:11 AM     05/13/2025 03:11 AM    CREATININE 4.65 05/13/2025 03:11 AM    GLUCOSE 82 05/13/2025 03:11 AM    CALCIUM 7.3 
Nephrology Progress Note  IBAN [facility] / [place] Office  [address]  [place] [address]  Phone - [phone]  Fax - [phone]                   Patient: Errol Brandt                     YOB: 1945        Date- 5/15/2025                                     Admit Date: 3/31/2025   CC: Follow up for acute kidney injury          IMPRESSION & PLAN:   DANIELLE (ATN from vancomycin toxicity, urinary retention)  CKD stage III(baseline creatinine 1.1)  Nephrotic range proteinuria  Metabolic encephalopathy  Acute hypoxic respiratory failure  Volume overload(suspect diastolic heart failure)  Pulmonary edema  Hyperglycemia  Metab acidosis  Mild hyperK      PLAN-  Hemodialysis today  LASIX 80 mg iv  Keep curiel in place per urology  Poor prognosis   Subjective:  Interval History:   Patient was started on dialysis yesterday-I had discussed with the son yesterday via phone and he wants patient to be full code    Objective:   Vitals:    05/15/25 1458 05/15/25 1459 05/15/25 1500 05/15/25 1505   BP: (!) 98/41  (!) 87/44 (!) 103/44   Pulse: (!) 49 (!) 49 (!) 48 (!) 49   Resp: 23 12 24 15   Temp: (!) 96.1 °F (35.6 °C) (!) 96.1 °F (35.6 °C) (!) 96.1 °F (35.6 °C) (!) 96.3 °F (35.7 °C)   TempSrc:    Rectal   SpO2: 100% 100% 100% 100%   Weight:       Height:          I/O last 3 completed shifts:  In: 500   Out: 1050 [Urine:550]  I/O this shift:  In: 0   Out: 175 [Urine:175]      Physical exam:    GEN: NAD  NECK- no mass  RESP: No wheezing  NEURO: Can't eval due to patient's current condition   Curiel +  EXT: No Edema   PSYCH: Can't eval due to patient's current condition   Right IJ dialysis catheter present-permacath    Chart reviewed.         Pertinent Notes reviewed.       Data Review :  Lab Results   Component Value Date/Time     05/15/2025 05:18 AM    K 4.2 05/15/2025 05:18 AM     05/15/2025 05:18 AM    CO2 25 05/15/2025 05:18 AM    
Nephrology Progress Note  IBAN [facility] / [place] Office  [address]  [place] [address]  Phone - [phone]  Fax - [phone]                   Patient: Errol Brandt                     YOB: 1945        Date- 5/14/2025                                     Admit Date: 3/31/2025   CC: Follow up for acute kidney injury          IMPRESSION & PLAN:   Acute kidney injury (ATN from vancomycin toxicity, urinary retention)  CKD stage III(baseline creatinine 1.1)  Nephrotic range proteinuria  Metabolic encephalopathy  Acute hypoxic respiratory failure  Volume overload(suspect diastolic heart failure)  Pulmonary edema  Hyperglycemia  Metab acidosis  Mild hyperK      PLAN-  Placed permacath today   Start dialysis today   Keep curiel in place per urology  Consent for dialysis and permacath obtained from son via phone-      Subjective:  Interval History:   Patient is oliguric  Blood pressure low  He is confused    Objective:   Vitals:    05/13/25 2300 05/14/25 0305 05/14/25 0600 05/14/25 0701   BP: 109/80 104/60 123/62 111/61   Pulse: 54 55 55 56   Resp: 15 21 22 18   Temp: 97.6 °F (36.4 °C) 97.3 °F (36.3 °C)  97.4 °F (36.3 °C)   TempSrc: Oral Oral  Oral   SpO2: 90% 90%  90%   Weight:       Height:          I/O last 3 completed shifts:  In: 1248.5 [P.O.:410; I.V.:838.5]  Out: 250 [Urine:250]  I/O this shift:  In: -   Out: 200 [Urine:200]      Physical exam:    GEN: NAD  NECK- no mass  RESP: No wheezing  NEURO: Can't eval due to patient's current condition   Curiel +  EXT: No Edema   PSYCH: Can't eval due to patient's current condition       Chart reviewed.         Pertinent Notes reviewed.       Data Review :  Lab Results   Component Value Date/Time     05/14/2025 04:35 AM    K 5.2 05/14/2025 04:35 AM     05/14/2025 04:35 AM    CO2 24 05/14/2025 04:35 AM     05/14/2025 04:35 AM    CREATININE 5.50 05/14/2025 04:35 AM 
Nephrology Progress Note  IBAN [facility] / [place] Office  [address]  [place] [address]  Phone - [phone]  Fax - [phone]                   Patient: Errol Brandt                     YOB: 1945        Date- 2025                                     Admit Date: 3/31/2025   CC: Follow up for DANIELLE on CKD          IMPRESSION & PLAN:   DANIELLE stage III(ATN from vancomycin toxicity, urinary retention)  CKD stage III(baseline creatinine 1.1)  Nephrotic range proteinuria  Metabolic encephalopathy  Acute hypoxic respiratory failure  Volume overload(suspect diastolic heart failure)  Pulmonary edema  Hyperglycemia  Metab acidosis      PLAN-  Give IV albumin to go along w/ Bumex  Increase PO bcb dose  Increase Cr/BUN worsening but non oliguric; though electrolytes are stable and goo UOP, if not improving, may need to consider RRT if w/in pt's wishes  Continue Bumex 1 mg daily  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, serology negative  Continue midodrine 5mg TID to prevent hypotension  Antibiotics per primary team  Check daily labs  He will need outpatient follow-up in our office     Subjective:  Interval History:   - Awake and conversing, knows name and full . Cr / BUN increasing but UOP still good 1175cc. Sat 96% on RA     Objective:   Vitals:    25 0825 25 1001 25 1147 25 1148   BP: 127/65  138/62    Pulse: 56 56 56    Resp:   20    Temp:   (!) 96 °F (35.6 °C) 97.6 °F (36.4 °C)   TempSrc:   Axillary Oral   SpO2:   95%    Weight:       Height:          I/O last 3 completed shifts:  In: 905 [P.O.:905]  Out: 1625 [Urine:1625]  I/O this shift:  In: 125.1 [I.V.:125.1]  Out: -       Physical exam:    GEN: NAD  NECK- no mass  RESP: No wheezing, decreased BS b/l, sating well on RA  CVS: S1,S2  RRR  NEURO: Normal speech, Non focal  EXT: No Edema   PSYCH: Normal Mood    Chart reviewed.       
Nephrology Progress Note  IBAN [facility] / [place] Office  [address]  [place] [address]  Phone - [phone]  Fax - [phone]                   Patient: Errol Brandt                     YOB: 1945        Date- 5/6/2025                                     Admit Date: 3/31/2025   CC: Follow up for DANIELLE on CKD          IMPRESSION & PLAN:   DANIELLE stage III(ATN from vancomycin toxicity, urinary retention)  CKD stage III(baseline creatinine 1.1)  Nephrotic range proteinuria  Metabolic encephalopathy  Acute hypoxic respiratory failure  Volume overload  Pulmonary edema      PLAN-  Continue Lasix  Order labs for tomorrow  Anticipate labile creatinine secondary active diuresis  Noted nephrotic range proteinuria, ordered serologic workup, ANCA pending, SHAHNAZ negative  Chest x-ray today  Check daily labs  Spoke to RN     Subjective:  Interval History:   - Seen and examined today  - Confused, not much communicative  - No labs available today    Objective:   Vitals:    05/06/25 0000 05/06/25 0401 05/06/25 0801 05/06/25 1020   BP: (!) 118/105 92/65 120/68 99/63   Pulse: 63 63 61    Resp: 17 25 11    Temp: 97.3 °F (36.3 °C) 97.3 °F (36.3 °C) 98.1 °F (36.7 °C)    TempSrc: Axillary Oral Oral    SpO2: 98% 97% 100%    Weight:       Height:          I/O last 3 completed shifts:  In: 655.4 [P.O.:480; IV Piggyback:175.4]  Out: 1325 [Urine:1325]  No intake/output data recorded.      Physical exam:    GEN: NAD  NECK- no mass  RESP: No wheezing, decreased BS b/l  CVS: S1,S2  RRR  NEURO: Normal speech, Non focal  EXT: No Edema   PSYCH: Normal Mood    Chart reviewed.         Pertinent Notes reviewed.       Data Review :  Lab Results   Component Value Date/Time     05/05/2025 06:13 AM    K 4.7 05/05/2025 06:13 AM     05/05/2025 06:13 AM    CO2 20 05/05/2025 06:13 AM    BUN 98 05/05/2025 06:13 AM    CREATININE 2.71 05/05/2025 06:13 AM    
Nephrology Progress Note  IBAN [facility] / [place] Office  [address]  [place] [address]  Phone - [phone]  Fax - [phone]                   Patient: Errol Brandt                     YOB: 1945        Date- 5/22/2025                                     Admit Date: 3/31/2025   CC: Follow up for DANIELLE         IMPRESSION & PLAN:   DANIELLE (ATN from vancomycin toxicity, urinary retention)  CKD stage III(baseline creatinine 1.1)  Nephrotic range proteinuria  Metabolic encephalopathy  Acute hypoxic respiratory failure  Volume overload(suspect diastolic heart failure)  Pulmonary edema  Hyperglycemia  Metab acidosis  Mild hyper K      PLAN-  Plan for HD tomorrow  DC IV fluids  IR consult for PermCath placement tomorrow  Encourage p.o. intake  We will keep him on Monday Wednesday Fridays schedule for now  Check daily labs  Epogen for anemia  I called and spoke to the son about placing a PermCath and outpatient hemodialysis.  He is agreeable.     Subjective:  Interval History:   Seen and examined today  Confused  Not much urine output, oliguric  Improved p.o. intake    Objective:   Vitals:    05/22/25 0056 05/22/25 0254 05/22/25 0715 05/22/25 0800   BP:  109/62 (!) 106/56 120/61   Pulse: 52 51 52 51   Resp: 28 18 27 18   Temp:  97.5 °F (36.4 °C) 97.6 °F (36.4 °C)    TempSrc:  Oral     SpO2: 100%  97% 99%   Weight:       Height:          I/O last 3 completed shifts:  In: 1230.3 [P.O.:150; I.V.:480.3]  Out: 3100   No intake/output data recorded.      Physical exam:    GEN: Confused  NECK- no mass  RESP: no wheezing  NEURO:Can't eval due to patient's current condition   Soler +  EXT: No Edema   PSYCH: Can't eval due to patient's current condition   Right IJ dialysis catheter present-permacath    Chart reviewed.         Pertinent Notes reviewed.       Data Review :  Lab Results   Component Value Date/Time     05/22/2025 07:08 AM 
Nephrology Progress Note  IBAN [facility] / [place] Office  [address]  [place] [address]  Phone - [phone]  Fax - [phone]                   Patient: Errol Brandt                     YOB: 1945        Date- 5/7/2025                                     Admit Date: 3/31/2025   CC: Follow up for DANIELLE on CKD          IMPRESSION & PLAN:   DANIELLE stage III(ATN from vancomycin toxicity, urinary retention)  CKD stage III(baseline creatinine 1.1)  Nephrotic range proteinuria  Metabolic encephalopathy  Acute hypoxic respiratory failure  Volume overload  Pulmonary edema      PLAN-  DC diuretics  Creatinine stable  Noted nephrotic range proteinuria, ordered serologic workup all negative, suspect diabetic nephropathy.  Add midodrine for hypotension  Antibiotics per primary team  Check daily labs  Spoke to RN  He will need outpatient follow-up in our office     Subjective:  Interval History:   - Seen and examined today  - Much more awake and alert  - Blood pressure labile    Objective:   Vitals:    05/06/25 1841 05/06/25 2345 05/07/25 0245 05/07/25 0722   BP: (!) 97/47 112/62 102/61 (!) 103/58   Pulse:  52 52 52   Resp:  16 18 (!) 9   Temp:  98.5 °F (36.9 °C) 98.2 °F (36.8 °C) 97.8 °F (36.6 °C)   TempSrc:  Oral Axillary Axillary   SpO2:  94%  98%   Weight:       Height:          I/O last 3 completed shifts:  In: 415.4 [P.O.:240; IV Piggyback:175.4]  Out: 1150 [Urine:1150]  No intake/output data recorded.      Physical exam:    GEN: NAD  NECK- no mass  RESP: No wheezing, decreased BS b/l  CVS: S1,S2  RRR  NEURO: Normal speech, Non focal  EXT: No Edema   PSYCH: Normal Mood    Chart reviewed.         Pertinent Notes reviewed.       Data Review :  Lab Results   Component Value Date/Time     05/07/2025 04:26 AM    K 5.0 05/07/2025 04:26 AM     05/07/2025 04:26 AM    CO2 19 05/07/2025 04:26 AM     05/07/2025 04:26 AM    
Nephrology Progress Note  [facility] / [place] [facility]  [address], [address]  [place] [address]  Phone - [phone]  Fax - [phone]                   Patient: Errol Brandt                     YOB: 1945        Date- 5/21/2025                                     Admit Date: 3/31/2025   CC: Follow up for DANIELLE         IMPRESSION & PLAN:   DANIELLE (ATN from vancomycin toxicity, urinary retention)  CKD stage III(baseline creatinine 1.1)  Nephrotic range proteinuria  Metabolic encephalopathy  Acute hypoxic respiratory failure  Volume overload(suspect diastolic heart failure)  Pulmonary edema  Hyperglycemia  Metab acidosis  Mild hyper K      PLAN-  Plan for HD today  DC D5 normal saline  Placed him on D10 at 50 mL/h for hypoglycemia  Encourage p.o. intake  We will keep him on Monday Wednesday Fridays schedule for now  Check daily labs  Epogen for anemia     Subjective:  Interval History:   Seen and examined today  Confused  Not much urine output, oliguric  Minimal p.o. intake, hypoglycemic    Objective:   Vitals:    05/21/25 0257 05/21/25 0300 05/21/25 0824 05/21/25 0915   BP: (!) 110/58   119/64   Pulse: 51 52  57   Resp: 10 16  21   Temp: 97.2 °F (36.2 °C)   97.2 °F (36.2 °C)   TempSrc: Oral      SpO2: 95% 90% 100% 95%   Weight:       Height:          I/O last 3 completed shifts:  In: 1151 [P.O.:620; I.V.:521; Other:10]  Out: 35 [Urine:35]  No intake/output data recorded.      Physical exam:    GEN: Confused  NECK- no mass  RESP: no wheezing  NEURO:Can't eval due to patient's current condition   Soler +  EXT: No Edema   PSYCH: Can't eval due to patient's current condition   Right IJ dialysis catheter present-permacath    Chart reviewed.         Pertinent Notes reviewed.       Data Review :  Lab Results   Component Value Date/Time     05/21/2025 07:14 AM    K 4.3 05/21/2025 07:14 AM    CL 96 05/21/2025 07:14 AM    CO2 28 
Nephrology Progress Note  [facility] / [place] Office  [address]  [place] [address]  Phone - [phone]  Fax - [phone]                   Patient: Errol Brandt                     YOB: 1945        Date- 5/12/2025                                     Admit Date: 3/31/2025   CC: Follow up for DANIELLE on CKD          IMPRESSION & PLAN:   DANIELLE stage III(ATN from vancomycin toxicity, urinary retention)  CKD stage III(baseline creatinine 1.1)  Nephrotic range proteinuria  Metabolic encephalopathy  Acute hypoxic respiratory failure  Volume overload(suspect diastolic heart failure)  Pulmonary edema  Hyperglycemia  Metab acidosis  Mild hyperK      PLAN-  HOLD BUMEX  GIVE IVF TODAY  IF CR WORSE- HE WILL NEED RRT IN NEXT 24-49 HOURS  Check bmp in am  Check urine na,cr,cl     Subjective:  Interval History:   Bp low  Cr worse  Not following much command    Objective:   Vitals:    05/12/25 0745 05/12/25 0827 05/12/25 0905 05/12/25 1055   BP: 107/61 107/61 119/76 119/67   Pulse: 55 55  57   Resp: 19   19   Temp: 97.6 °F (36.4 °C)   97.3 °F (36.3 °C)   TempSrc: Oral   Oral   SpO2: 91%   98%   Weight:       Height:          I/O last 3 completed shifts:  In: 995.9 [P.O.:717; I.V.:162.7; IV Piggyback:116.1]  Out: 1275 [Urine:1275]  No intake/output data recorded.      Physical exam:    GEN: NAD  NECK- no mass  RESP:NO WHEEZING  NEURO: Can't eval due to patient's current condition   Soler +  EXT: No Edema   PSYCH: Can't eval due to patient's current condition       Chart reviewed.         Pertinent Notes reviewed.       Data Review :  Lab Results   Component Value Date/Time     05/12/2025 03:09 AM    K 4.6 05/12/2025 03:09 AM     05/12/2025 03:09 AM    CO2 26 05/12/2025 03:09 AM     05/12/2025 03:09 AM    CREATININE 3.87 05/12/2025 03:09 AM    GLUCOSE 127 05/12/2025 03:09 AM    CALCIUM 7.9 05/12/2025 03:09 AM       Lab 
Nephrology Progress Note  [facility] / [place] Office  [address]  [place] [address]  Phone - [phone]  Fax - [phone]                   Patient: Errol Brandt                     YOB: 1945        Date- 5/9/2025                                     Admit Date: 3/31/2025   CC: Follow up for DANIELLE on CKD          IMPRESSION & PLAN:   DANIELLE stage III(ATN from vancomycin toxicity, urinary retention)  CKD stage III(baseline creatinine 1.1)  Nephrotic range proteinuria  Metabolic encephalopathy  Acute hypoxic respiratory failure  Volume overload(suspect diastolic heart failure)  Pulmonary edema  Hyperglycemia      PLAN-  Creatinine stable, slightly better  Will continue Bumex 1 mg daily  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, serology negative  Continue midodrine for hypotension  Antibiotics per primary team  Check daily labs  Spoke to RN  He will need outpatient follow-up in our office     Subjective:  Interval History:   - Seen and examined today  - Much more awake and alert  - Blood pressure labile  - So far tolerating Bumex well    Objective:   Vitals:    05/08/25 2003 05/08/25 2337 05/09/25 0257 05/09/25 0831   BP: 116/64 127/61 128/69 131/70   Pulse: 56 56 57 55   Resp: 20 18 20    Temp: 97.2 °F (36.2 °C) 97.7 °F (36.5 °C) 98.2 °F (36.8 °C)    TempSrc: Oral Oral Oral    SpO2: 94% 94% 93%    Weight:       Height:          I/O last 3 completed shifts:  In: 300 [P.O.:300]  Out: 1475 [Urine:1475]  I/O this shift:  In: -   Out: 250 [Urine:250]      Physical exam:    GEN: NAD  NECK- no mass  RESP: No wheezing, decreased BS b/l  CVS: S1,S2  RRR  NEURO: Normal speech, Non focal  EXT: No Edema   PSYCH: Normal Mood    Chart reviewed.         Pertinent Notes reviewed.       Data Review :  Lab Results   Component Value Date/Time     05/09/2025 06:57 AM    K 4.5 05/09/2025 06:57 AM     05/09/2025 
Nephrology Progress Note  [facility] / [place] Office  [address]  [place] [address]  Phone - [phone]  Fax [phone]                   Patient: Errol Brandt                     YOB: 1945        Date- 5/10/2025                                     Admit Date: 3/31/2025   CC: Follow up for DANIELLE on CKD          IMPRESSION & PLAN:   DANIELLE stage III(ATN from vancomycin toxicity, urinary retention)  CKD stage III(baseline creatinine 1.1)  Nephrotic range proteinuria  Metabolic encephalopathy  Acute hypoxic respiratory failure  Volume overload(suspect diastolic heart failure)  Pulmonary edema  Hyperglycemia      PLAN-  Creatinine stable, non oliguric  Will continue Bumex 1 mg daily  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 Oriented to person.  - Blood pressure labile  - So far tolerating Bumex well  - sating well on RA, does endorse some SOB when eating.     Objective:   Vitals:    05/10/25 0315 05/10/25 0710 05/10/25 0846 05/10/25 0950   BP: 126/63 108/68 121/62    Pulse: 55 57  58   Resp: 15 18     Temp: 97.5 °F (36.4 °C) 98 °F (36.7 °C)     TempSrc: Oral Oral     SpO2: 93% 95%     Weight:       Height:          I/O last 3 completed shifts:  In: 940 [P.O.:940]  Out: 2350 [Urine:2350]  I/O this shift:  In: 240 [P.O.:240]  Out: 250 [Urine:250]      Physical exam:    GEN: NAD  NECK- no mass  RESP: No wheezing, decreased BS b/l, sating well on RA  CVS: S1,S2  RRR  NEURO: Normal speech, Non focal  EXT: No Edema   PSYCH: Normal Mood    Chart reviewed.         Pertinent Notes reviewed.       Data Review :  Lab Results   Component Value 
Nephrology Progress Note  [facility] / [place] Office  [address]  [place] [address]  Phone - [phone]  Fax - [phone]                   Patient: Errol Brandt                     YOB: 1945        Date- 5/17/2025                                     Admit Date: 3/31/2025   CC: Follow up for DANIELLE         IMPRESSION & PLAN:   DANIELLE (ATN from vancomycin toxicity, urinary retention)  CKD stage III(baseline creatinine 1.1)  Nephrotic range proteinuria  Metabolic encephalopathy  Acute hypoxic respiratory failure  Volume overload(suspect diastolic heart failure)  Pulmonary edema  Hyperglycemia  Metab acidosis  Mild hyper K      PLAN-  No HD needs today   Keep curiel in place per urology  Continue vasopressor support  Poor prognosis   Subjective:  Interval History:   5/17: UOP minimal despite Lasix. HD yesterday.   5/16/25--bp  low- he was moved to icu yesterday- on levophed gtt-- he is oliguric.  5/15/25---Patient was started on dialysis yesterday-I had discussed with the son yesterday via phone and he wants patient to be full code    Objective:   Vitals:    05/17/25 1145 05/17/25 1200 05/17/25 1300 05/17/25 1400   BP: (S) (!) 124/38 (!) 108/35 (!) 116/97 (!) 106/49   Pulse: 67 64 64 65   Resp: 10 17 20 18   Temp:  98.3 °F (36.8 °C)     TempSrc:  Oral     SpO2: 98% 97% 97% 99%   Weight:       Height:          I/O last 3 completed shifts:  In: 3504.9 [P.O.:1370; I.V.:1134.9]  Out: 1302 [Urine:302]  I/O this shift:  In: 880 [P.O.:850; I.V.:30]  Out: 55 [Urine:55]      Physical exam:    GEN: NAD  NECK- no mass  RESP: no wheezing  NEURO:Can't eval due to patient's current condition   Curiel +  EXT: No Edema   PSYCH: Can't eval due to patient's current condition   Right IJ dialysis catheter present-permacath    Chart reviewed.         Pertinent Notes reviewed.       Data Review :  Lab Results   Component Value Date/Time     
Nephrology Progress Note  [facility] / [place] Office  [address]  [place] [address]  Phone - [phone]  Fax - [phone]                   Patient: Errol Brandt                     YOB: 1945        Date- 5/18/2025                                     Admit Date: 3/31/2025   CC: Follow up for DANIELLE         IMPRESSION & PLAN:   DANIELLE (ATN from vancomycin toxicity, urinary retention)  CKD stage III(baseline creatinine 1.1)  Nephrotic range proteinuria  Metabolic encephalopathy  Acute hypoxic respiratory failure  Volume overload(suspect diastolic heart failure)  Pulmonary edema  Hyperglycemia  Metab acidosis  Mild hyper K      PLAN-  Ordered IV bumex 4 mg and stat CXR   If Pulm edema --> will do HD today   Continue BiPAP and vasopressor support  Poor prognosis   Subjective:  Interval History:   5/18: Lethargic, PCO 2 60s, needing BIPAP and back on Levophed   5/17: UOP minimal despite Lasix. HD yesterday.   5/16/25--bp  low- he was moved to icu yesterday- on levophed gtt-- he is oliguric.  5/15/25---Patient was started on dialysis yesterday-I had discussed with the son yesterday via phone and he wants patient to be full code    Objective:   Vitals:    05/18/25 0900 05/18/25 1000 05/18/25 1100 05/18/25 1200   BP: (!) 106/44 (!) 116/46 (!) 114/48 (!) 108/51   Pulse: 54 56 56 60   Resp: 25 14 11 16   Temp:       TempSrc:       SpO2:   100% 96%   Weight:       Height:          I/O last 3 completed shifts:  In: 1492 [P.O.:850; I.V.:42; Other:100]  Out: 700 [Urine:200]  I/O this shift:  In: 100 [Other:100]  Out: 60 [Urine:60]      Physical exam:    GEN: BIPAP  NECK- no mass  RESP: no wheezing  NEURO:Can't eval due to patient's current condition   Soler +  EXT: No Edema   PSYCH: Can't eval due to patient's current condition   Right IJ dialysis catheter present-permacath    Chart reviewed.         Pertinent Notes reviewed.   
New 22G PIV placed to L FA, routine labs drawn and sent.  
Nocturnist notified for increased hematuria. Output minimal and darkening in color currently maroon.   
Non billable note.  Patient already discharged on 5/23/2025.  Please refer to discharge summary from 5/23/2025.  Although patient was accepted for [facility], awaiting for [facility] to accept as per case management.  Glucose of 96. Medically stable.  
Non billable note.  Patient already discharged on 5/23/2025.  Please refer to discharge summary from 5/23/2025.  Although patient was accepted for [facility], awaiting for [facility] to accept as per case management.  Patient will continue getting hemodialysis MWF as scheduled by nephrology.  Blood glucose was as low as 50 this morning that was better with hypoglycemia protocol.  Repeat glucose was 91.  Will monitor for today.  If patient becomes hypoglycemic again, will reconsult DM management team.  I touch base with  and patient will likely be discharged tomorrow to resume hemodialysis on Wednesday.  
Non billable note.  Patient already discharged on 5/23/2025.  Please refer to discharge summary from 5/23/2025.  Although patient was accepted for [facility], awaiting for [facility] to accept as per case management.  Patient will continue getting hemodialysis MWF as scheduled by nephrology.  Patient had constipation yesterday and with bowel regimen, he had a bowel movement this morning.  
Obtained consent for dialysis catheter placement from son.  
Occupational Therapy       Per cursory review of medical chart, Pt currently on BEDREST Orders s/p 4/9/2025 LLE arteriogram, Left heel debridement. (See medical chart). OT HOLD per medical status and ongoing Orders.     Please provide new PT/OT Team Orders and OOB/WB and/or activity and bracing orders, as appropriate. Continued OT HOLD pending change in activity parameters or updated MD Team clarification. Thank you.  Ary Greenberg OTR/L        
Occupational Therapy    Chart reviewed. Attempted to see pt for therapy, however, wound care at bedside. Will defer and follow-up later as able and medically appropriate. Thanks.    Shelia Lawrence MS, OTR/L  
Occupational Therapy    Patient chart reviewed up to date. Note up-trending troponin with cardiology following. RN requests deferring therapy at this time until cleared by cardiology.     Will continue to follow.     Nat Aguilar OTR/L  
Occupational Therapy    Patient chart reviewed up to date. Per cardiology note, patient \"has experienced chest discomfort and marked troponin elevation, findings concerning for acute myocardial infarction\" with recommendation for left heart catheterization. Will hold at this time and continue to follow as medically appropriate.     Thank you.  Nat Aguilar OTR/L    
Occupational Therapy   Chart reviewed; note  patient with necrotic left heel wound and no L popliteal or pedal pulses. Vascular reports Will need revascularization to avoid limb loss. Vascular Plan for angio today. Will defer and continue to follow post op.  Will need post op weight bearing orders.   Ary Greenberg OTR/L  
Occupational Therapy   Chart reviewed; off unit for L LE procedure this morning; will retry later as able for OT re-eval. Will need post op WBS. Ary Greenberg OTR/L  
Occupational Therapy   Chart reviewed; patient pending I&D; need WBS post op. Ary Greenberg OTR/L  
Occupational Therapy   Chart reviewed; please clarify if MD wants therapy to remain on hold prior to surgery 4-14 at which time post op therapy re-eval is requested. Thank you. Ary Greenberg OTR/L  
Occupational Therapy   Perfect Serve message sent to Podiatry Team (PURA Avalos) by PT teammate to request clarification for WB and/or activity parameters s/p April 2nd I & D and foreign body removal LLE.  Will train patient for NWB in interim pending updated Orders; Ary Greenberg OTR/L  
Occupational Therapy  Medical record reviewed.  Nurse requests that therapy hold at this time, as pt continues to have bleeding at his permacath site and is expected to have dialysis this date.  Will continue to follow as appropriate.    
Occupational Therapy  Medical record reviewed.  Will defer OT as pt is receiving dialysis at this time.  Will continue to follow.    
Occupational Therapy note:    Chart reviewed and noted Code Stroke called this morning. Discussed with nursing who reported patient very lethargic and minimal responsiveness at this time. OT and RN in agreement to hold therapy at this time.     Erin Garcia, OTR/L, OTD  
Occupational Therapy note:    Chart reviewed and patient going ACE to IR for procedure when attempting to initiate OT tx session. Will defer and continue to follow.    Erin Garcia, OTR/L, OTD  
Palliative Medicine  Patient Name: Errol Brandt  YOB: 1945  MRN: [id number]  Age: 80 y.o.  Gender: male    Date of Initial Consult: 5/9/2025  Date of Service: 5/20/2025  Time: 9:58 AM  Provider: PAPO Lal - CNP  Hospital Day: 51  Admit Date: 3/31/2025  Referring Provider: Sujata Espitia MD      Reasons for Consultation:  Goals of Care    HISTORY OF PRESENT ILLNESS (HPI):   Errol Brandt is a 80 y.o. male with a past medical history of Diabetes mellitus type 2, Hypothyroidism, Hypertension, BPH, and Peripheral arterial disease, who was admitted on 3/31/2025 from home with complaints of multiple falls and left heel pain.     Psychosocial: Patient  to spouse, Chelsi x 44 years.  They have three adult sons, Gomez, Julio and Adryan.  He worked several years as a  and  before retiring.      PALLIATIVE DIAGNOSES:    Acute hypoxic respiratory failure  MRSA Bacteremia  DANIELLE - Cr. 4.24  Generalized weakness  Physical debility  Goals of care  Palliative care encounter    ASSESSMENT AND PLAN:   Today, I am following up on Errol Brandt to address goals of care.  Reviewed medical chart including admit H&P, consultant notes, MAR, and recent labs/imaging.  Mr. Brandt was seen and evaluated, son, Julio at bedside.   At time of my arrival, he was resting in bed in no acute distress. Patient discharged from CCU back to medical floor  Left foot remained in bandaged and right arm swelling present - Patient denies pain, chest pain or shortness of breath. Respirations noted to be fluctuating  - per assigned RN patient refused NC.    Met with patient's son, Julio, patient's wife via speaker phone, and GERRY Sanders.  Julio brought in \"General Power of \" documents naming him to make decisions - advised that this does not give him medical decision making authorities.   As patient is not currently decisional, his wife, Chelsi is LNOK - she asked that we allow Julio 
Palliative Medicine  Patient Name: Errol Brandt  YOB: 1945  MRN: [id number]  Age: 80 y.o.  Gender: male    Date of Initial Consult: 5/9/2025  Date of Service: 5/12/2025  Time: 2:19 PM  Provider: PAPO Lal CNP  Hospital Day: 43  Admit Date: 3/31/2025  Referring Provider: Sujata Espitia MD      Reasons for Consultation:  Goals of Care    HISTORY OF PRESENT ILLNESS (HPI):   Errol Brandt is a 80 y.o. male with a past medical history of Diabetes mellitus type 2, Hypothyroidism, Hypertension, BPH, and Peripheral arterial disease, who was admitted on 3/31/2025 from home with complaints of multiple falls and left heel pain.     Psychosocial: Patient  to spouse, Chelsi x 44 years.  They have three adult sons, Gomez, Julio and Adryan.  He worked several years as a  and  before retiring.      PALLIATIVE DIAGNOSES:    Left foot cellulitis  MRSA Bacteremia  DANIELLE - Cr. 3.87  Generalized weakness  Goals of care  Palliative care encounter    ASSESSMENT AND PLAN:   Today, I am following up with Errol Brandt to address goals of care.  Reviewed medical chart including admit H&P, consultant notes, MAR, and recent labs/imaging.  Mr. Brandt was seen and evaluated, no family at bedside. Assigned RN present  At time of my arrival, he was sleeping in bed in no acute distress.   Left foot remained in bandaged and right arm swelling present - nurse stated that patient had no acute complaint      Decisions/Goals:   Continue full restorative care  D/C to [facility] and rehab when medically stable    Code Status: Full Code     ACP: No AMD on file -   per Julio he has documents naming him as patient MPOA, he will provide a copy to scan to EMR.    Plan: continue current care    Initial consult note routed to primary continuity provider and/or primary health care team members  Thanks for consulting palliative in the care of Mr. Brandt.  Please call with any palliative questions or 
Palliative Medicine  Patient Name: Errol Brandt  YOB: 1945  MRN: [id number]  Age: 80 y.o.  Gender: male    Date of Initial Consult: 5/9/2025  Date of Service: 5/21/2025  Time: 3:42 PM  Provider: PAPO Lal CNP  Hospital Day: 52  Admit Date: 3/31/2025  Referring Provider: Sujata Espitia MD      Reasons for Consultation:  Goals of Care    HISTORY OF PRESENT ILLNESS (HPI):   Errol Brandt is a 80 y.o. male with a past medical history of Diabetes mellitus type 2, Hypothyroidism, Hypertension, BPH, and Peripheral arterial disease, who was admitted on 3/31/2025 from home with complaints of multiple falls and left heel pain.     Psychosocial: Patient  to spouse, Chelsi x 44 years.  They have three adult sons, Gomez, Julio and Adryan.  He worked several years as a  and  before retiring.      PALLIATIVE DIAGNOSES:    Acute hypoxic respiratory failure  MRSA Bacteremia  DANIELLE - Cr. 4.24  Generalized weakness  Physical debility  Goals of care  Palliative care encounter    ASSESSMENT AND PLAN:   Today, I am following up on Errol Brandt to address goals of care.  Reviewed medical chart including admit H&P, consultant notes, MAR, and recent labs/imaging.  Mr. Brandt  not seen - [facility] for dialysis.  Plan was to meet with son today, he did not attend but called in and spoke with this provider.    Per Julio he discussed patient's care with his mother and siblings and they want patient to remain full code at this time.    They wish for patient to continue receiving all care, including dialysis three days per week.    He noted that he spoke with  and agreed for patient to discharge to rehab when medically stable      Code Status: Full Code (we discussed in detail today)    ACP: No AMD on file      Initial consult note routed to primary continuity provider and/or primary health care team members  Thanks for consulting palliative in the care of Mr. Brandt.  We will 
Palliative Medicine  Per Pedro Blandon NP: Errol Brandt is a 80 y.o. male with a past medical history of Diabetes mellitus type 2, Hypothyroidism, Hypertension, BPH, and Peripheral arterial disease, who was admitted on 3/31/2025 from home with complaints of multiple falls and left heel pain.      Code Status: Full Code    [facility]/20/2025     4:00 PM   Demographics   Marital Status    No AMD. Spouse defers medical decisions to son Julio, who has financial POA. Julio is including his mother and brothers in decisions.    Patient / Family Encounter Documentation    Participants (names): Errol Brandt, (confused, unable to participate), son Julio, spouse Chelsi by phone, Pedro Blandon NP, Torrie Garcia, [facility]    Narrative:   --Palliative SW joined NP in encounter with patient and son.  --Arranged to meet with son in quiet place where we could include spouse by phone to discuss GOC/code status.  --Son reported to nurse, Melany, that patient was having some trouble breathing and asked if patient could have O2.  --Palliative team discussed patient's multiple organ systems are fragile and risk of decline is high.  --Encouraged family to reflect on what patient would say if he could participate in discussion and to consider what he considers QOL.  --Reflected that patient's actions, pulling off O2 and Bipap, may be his way of communicating what he does not want.  --NP explained code status and recommended DNR to protect patient and allow peaceful passing when that time comes.  --Emotional support was given in empathetic listening and acknowledging difficulty of the topic of EOL.   --Also clarified that DNR does not mean to stop treating and that HD and other treatments would continue.  --Julio asked if Palliative is the same as hospice and education was given.  --Palliative Medicine information/contact sheet was given to Julio along with a sample DDNR for reference.  --Julio plans to 
Palliative Medicine  Per Pedro Blandon NP: Errol Brandt is a 80 y.o. male with a past medical history of Diabetes mellitus type 2, Hypothyroidism, Hypertension, BPH, and Peripheral arterial disease, who was admitted on 3/31/2025 from home with complaints of multiple falls and left heel pain.   Code Status: Full Code    [facility]/16/2025    10:42 AM   Demographics   Marital Status    No AMD on file. , but spouse has medical issues and he has been caring for her. He has 3 sons.    Patient / Family Encounter Documentation    Participants (names): Errol Brandt, assigned nurse, Torrie Humphrey LCSW    Narrative:   --LCSW reviewed chart and joined nurse who was at bedside administering IV medication.  --Patient was received slightly restless, mittens in place for safety, alert with confusion, attempting to answer questions, speech difficult to understand.  --Patient is [place], per nurse report.  --SW provided calm, caring presence, comfort touch to wrist of right arm to distract him from trying to interfere with IV medication.  --No family present, spoke with Pedro Blandon Palliative NP.    Psychosocial Issues Identified/ Resilience Factors:  Patient  to spouse, Chelsi x 44 years.  They have three adult sons, Gomez, Julio and Adryan.  He worked several years as a  and  before retiring.    Caregiver [place]:   Does the caregiver feel confident administering medication?   Does the caregiver need any help connecting with community resources?   Does the caregiver feel confident assisting with activities of daily living?     Goals of Care / Plan: (Please see Pedro Blandon NP notes.)  Full code, full restorative measures.  Palliative team continuing to follow and attempting to arrange family meeting with spouse and sons to discuss GOC/ACP.    Thank you for including Palliative Medicine in the care of Mr. Errol Brandt.    Torrie Garcia LCSW  [phone] 
Palliative Medicine SW Note    LCSW arrived for meeting. Patient is at HD, no family present. Requested nurse, Naya to notify by Perfect Serve when son comes. TIFFANIE Pinto requested to meet with family also to discuss discharge plans.    Thank you for including Palliative team in the care of Mr. Errol Brandt.    Torrie Garcia, [facility]  Palliative Medicine [phone]  
Palliative Medicine SW Note    [facility]  requesting return call to schedule family meeting with Pedro Blandon NP Palliative team.    Thank you for including Palliative team in the care of Mr. Errol Brandt.    Torrie Garcia, [facility]  Palliative Medicine [phone]  
Patient critical glucose 51.  Report from  [place].  Information given to Patrick HONG.  
Patient discharge to SNF  report and discharge instruction sent with the transporter IV line removed pressure dressing applied .  
Patient just finished working with PT at attempt. Will follow up later today v tomorrow.    Thank you   Tiffany Purdy MS OTR/L   
Patient off the unit for HD  1240 patient returned back to the unit   
Perfect Serve message sent to Podiatry Team (PURA Avalos) to request clarification for WB and/or activity parameters s/p April 2nd I & D and foreign body removal LLE.  Will keep Pt NWB in interim pending updated Orders.    Thank you.    Britni Fine, PT, DPT      .   
Pharmacy Antimicrobial Kinetic Dosing    Indication for Antimicrobials: MRSA bacteremia; SSTI - cellulitis and abscess of toe of left foot     Current Regimen of Each Antimicrobial:  Vancomycin 1250mg IV q24h; Start Date 3/31; Day # 19     Previous Antimicrobial Therapy:  Ceftriaxone 3/31   Zosyn 3.375g Q8H; Start Date 3/31; Day # 5    Goal Level: Vancomycin -600    Date Dose & Interval Measured (mcg/mL) Predicted AUC 24-48 Predicted AUC 24,ss    1250 mg IV q24h 11.4 473 548   4 0610 1000 mg IV q12h 16.7      1000mg q 12  22.8 574 582     1000mg q 12  24.3 679 685   4/15  1250mg q 24  16.3 581 599     1000mg q 24  23.7 460 441     Significant Cultures:   3/31 Blood - MRSA 3/4   4/2 wound (left foot)- moderate MRSA  /3 blood - NG, final    wound (foot) - few MRSA      echo negative for vegetation     Labs:  Recent Labs     Units 25  0502 25  0442 25  0449   CREATININE MG/DL 1.17 1.12 1.13   BUN MG/DL 27* 24* 30*   WBC K/uL 7.3 7.7 7.8     Temp (24hrs), Av.5 °F (36.9 °C), Min:97.7 °F (36.5 °C), Max:99.5 °F (37.5 °C)    Conditions for Dosing Consideration: None    Creatinine Clearance (mL/min): Estimated Creatinine Clearance: 55 mL/min (based on SCr of 1.17 mg/dL).     Impression/Plan:   Continue vancomycin 1250mg q 24 for . Plan for next level on .    BMP ordered daily  Antimicrobial stop date per ID - anticipating discharge on IV vanco      Pharmacy will follow daily and adjust medications as appropriate for renal function and/or serum levels.    Thank you,  Arielle Toney, formerly [facility]      
Pharmacy Antimicrobial Kinetic Dosing    Indication for Antimicrobials: MRSA bacteremia; SSTI - cellulitis and abscess of toe of left foot     Current Regimen of Each Antimicrobial:  Vancomycin 1250mg IV q24h; Start Date 3/31; Day # 20    Previous Antimicrobial Therapy:  Ceftriaxone 3/31   Zosyn 3.375g Q8H; Start Date 3/31; Day # 5    Goal Level: Vancomycin -600    Date Dose & Interval Measured (mcg/mL) Predicted AUC 24-48 Predicted AUC 24,ss    1250 mg IV q24h 11.4 473 548    0610 1000 mg IV q12h 16.7      1000mg q 12  22.8 574 582     1000mg q 12  24.3 679 685   4/15  1250mg q 24  16.3 581 599     1000mg q 24  23.7 460 441            Significant Cultures:   3/31 Blood - MRSA 3/4   4/2 wound (left foot)- moderate MRSA  /3 blood - NG, final    wound (foot) - few MRSA      echo negative for vegetation     Labs:  Recent Labs     Units 25  0501 25  0502 25  0442   CREATININE MG/DL 1.23 1.17 1.12   BUN MG/DL 28* 27* 24*   WBC K/uL 6.6 7.3 7.7     Temp (24hrs), Av.7 °F (37.1 °C), Min:98.1 °F (36.7 °C), Max:99.4 °F (37.4 °C)    Conditions for Dosing Consideration: None    Creatinine Clearance (mL/min): Estimated Creatinine Clearance: 52 mL/min (based on SCr of 1.23 mg/dL).     Impression/Plan:   Continue vancomycin 1250mg q 24 for   BMP ordered daily  Antimicrobial stop date per ID - anticipating discharge on IV vanco      Pharmacy will follow daily and adjust medications as appropriate for renal function and/or serum levels.    Thank you,  Hilario Child [facility]            
Pharmacy Antimicrobial Kinetic Dosing    Indication for Antimicrobials: MRSA bacteremia; SSTI - cellulitis and abscess of toe of left foot     Current Regimen of Each Antimicrobial:  Vancomycin 1250mg IV q24h; Start Date 3/31; Day # 20    Previous Antimicrobial Therapy:  Ceftriaxone 3/31   Zosyn 3.375g Q8H; Start Date 3/31; Day # 5    Goal Level: Vancomycin -600    Date Dose & Interval Measured (mcg/mL) Predicted AUC 24-48 Predicted AUC 24,ss    1250 mg IV q24h 11.4 473 548    0610 1000 mg IV q12h 16.7      1000mg q 12  22.8 574 582     1000mg q 12  24.3 679 685   4/15  1250mg q 24  16.3 581 599     1000mg q 24  23.7 460 441            Significant Cultures:   3/31 Blood - MRSA 3/4   4/2 wound (left foot)- moderate MRSA  /3 blood - NG, final    wound (foot) - few MRSA      echo negative for vegetation     Labs:  Recent Labs     Units 25  0501 25  0502 25  0442   CREATININE MG/DL 1.23 1.17 1.12   BUN MG/DL 28* 27* 24*   WBC K/uL 6.6 7.3 7.7     Temp (24hrs), Av.7 °F (37.1 °C), Min:98.1 °F (36.7 °C), Max:99.4 °F (37.4 °C)    Conditions for Dosing Consideration: None    Creatinine Clearance (mL/min): Estimated Creatinine Clearance: 52 mL/min (based on SCr of 1.23 mg/dL).     Impression/Plan:   Continue vancomycin 1250mg q 24 for   Vancomycin random level with am labs on   BMP ordered daily  Antimicrobial stop date per ID - anticipating discharge on IV vanco      Pharmacy will follow daily and adjust medications as appropriate for renal function and/or serum levels.    Thank you,  Hilario Child [facility]              
Pharmacy Antimicrobial Kinetic Dosing    Indication for Antimicrobials: MRSA bacteremia; SSTI - cellulitis and abscess of toe of left foot     Current Regimen of Each Antimicrobial:  Vancomycin 1250mg IV q24h; Start Date 3/31; Day # 21    Previous Antimicrobial Therapy:  Ceftriaxone 3/31   Zosyn 3.375g Q8H; Start Date 3/31; Day # 5    Goal Level: Vancomycin -600    Date Dose & Interval Measured (mcg/mL) Predicted AUC 24-48 Predicted AUC 24,ss    1250 mg IV q24h 11.4 473 548    0610 1000 mg IV q12h 16.7      1000mg q 12  22.8 574 582     1000mg q 12  24.3 679 685   4/15  1250mg q 24  16.3 581 599     1000mg q 24  23.7 460 441    1250mg q24 14.3 539 546     Significant Cultures:   3/31 Blood - MRSA 3/4   4/2 wound (left foot)- moderate MRSA  /3 blood - NG, final    wound (foot) - few MRSA     / echo negative for vegetation     Labs:  Recent Labs     Units 25  0436 25  0501 25  0502   CREATININE MG/DL 1.29 1.23 1.17   BUN MG/DL 29* 28* 27*   WBC K/uL 7.0 6.6 7.3     Temp (24hrs), Av.4 °F (36.9 °C), Min:97.7 °F (36.5 °C), Max:98.8 °F (37.1 °C)    Conditions for Dosing Consideration: None    Creatinine Clearance (mL/min): Estimated Creatinine Clearance: 49 mL/min (based on SCr of 1.29 mg/dL).     Impression/Plan:   Continue vancomycin 1250mg q 24h  BMP ordered daily  Antimicrobial stop date per ID - anticipating discharge on IV vanco      Pharmacy will follow daily and adjust medications as appropriate for renal function and/or serum levels.    Thank you,  Hilario Child [facility]                
Pharmacy Antimicrobial Kinetic Dosing    Indication for Antimicrobials: MRSA bacteremia; SSTI - cellulitis and abscess of toe of left foot     Current Regimen of Each Antimicrobial:  Vancomycin Pharmacy to Dose; Start Date 3/31; Day # 23    Previous Antimicrobial Therapy:  Ceftriaxone 3/31   Zosyn 3/31-    Goal Level: Vancomycin -600    Date Dose & Interval Measured (mcg/mL) Predicted AUC 24-48 Predicted AUC 24,ss    1250 mg IV q24h 11.4 473 548   4 0610 1000 mg IV q12h 16.7      1000mg q 12  22.8 574 582     1000mg q 12  24.3 679 685   4/15  1250mg q 24  16.3 581 599     1000mg q 24  23.7 460 441    1250mg q24 14.3 539 546     Significant Cultures:   3/31 Blood, paired: MRSA 3/4 bottles  3/31 Blood PCR: MRSA  / Wound, foot: moderate MRSA  / Anaerobic, foot: negative  4/3 Blood, paired: negative   Wound, foot: few MRSA    / echo negative for vegetation     Labs:  Recent Labs     Units 25  0501 25  0436   CREATININE MG/DL 1.16 1.29   BUN MG/DL 26* 29*   WBC K/uL 7.0 7.0     Temp (24hrs), Av.3 °F (36.8 °C), Min:97.9 °F (36.6 °C), Max:98.6 °F (37 °C)    Conditions for Dosing Consideration: None    Creatinine Clearance (mL/min): Estimated Creatinine Clearance: 55 mL/min (based on SCr of 1.16 mg/dL).     Impression/Plan:   Continue vancomycin 1250mg IV Q24H  Predicted RMP95-16 = 502, Predicted AUC24,ss = 500  Level scheduled for  0300  BMP & CBC ordered per protocol  Antimicrobial stop date per ID - anticipating discharge on IV vanco      Pharmacy will follow daily and adjust medications as appropriate for renal function and/or serum levels.    Thank you,  VENTURA HO [facility]  
Pharmacy Antimicrobial Kinetic Dosing    Indication for Antimicrobials: MRSA bacteremia; SSTI - cellulitis and abscess of toe of left foot     Current Regimen of Each Antimicrobial:  Vancomycin Pharmacy to Dose; Start Date 3/31; Day # 24    Previous Antimicrobial Therapy:  Ceftriaxone 3/31   Zosyn 3/31-    Goal Level: Vancomycin -600    Date Dose & Interval Measured (mcg/mL) Predicted AUC 24-48 Predicted AUC 24,ss    1250mg q24h 11.4 473 548     1000mg q12h 16.7 N/A N/A    1000mg q12h 22.8 574 582     1000mg q12h  24.3 679 685   4/15  1250mg q24h  16.3 581 599     1000mg q24h   23.7 460 441    1250mg q24h  14.3 539 546    1250mg q24h 19.2 547 532     Significant Cultures:   3/31 Blood, paired: MRSA 3/4 bottles  3/31 Blood PCR: MRSA   Wound, foot: moderate MRSA  / Anaerobic, foot: negative  4/3 Blood, paired: negative   Wound, foot: few MRSA    / echo negative for vegetation     Labs:  Recent Labs     Units 25  0309 25  0501   CREATININE MG/DL 1.37* 1.16   BUN MG/DL 34* 26*   WBC K/uL  --  7.0     Temp (24hrs), Av.6 °F (37 °C), Min:98.1 °F (36.7 °C), Max:99.3 °F (37.4 °C)    Conditions for Dosing Consideration: None    Creatinine Clearance (mL/min): Estimated Creatinine Clearance: 47 mL/min (A) (based on SCr of 1.37 mg/dL (H)).     Impression/Plan:   Continue vancomycin 1250mg IV Q24H  Predicted MFI02-67 = 547, Predicted AUC24,ss = 532  BMP & CBC ordered per protocol  Antimicrobial stop date per ID - anticipating discharge on IV vanco      Pharmacy will follow daily and adjust medications as appropriate for renal function and/or serum levels.    Thank you,  VENTURA HO [facility]  
Pharmacy Antimicrobial Kinetic Dosing    Indication for Antimicrobials: MRSA bacteremia; SSTI - cellulitis and abscess of toe of left foot     Current Regimen of Each Antimicrobial:  Vancomycin Pharmacy to Dose; Start Date 3/31; Day # 25    Previous Antimicrobial Therapy:  Ceftriaxone 3/31   Zosyn 3/31-    Goal Level: Vancomycin -600    Date Dose & Interval Measured (mcg/mL) Predicted AUC 24-48 Predicted AUC 24,ss    1250mg q24h 11.4 473 548     1000mg q12h 16.7 N/A N/A    1000mg q12h 22.8 574 582     1000mg q12h  24.3 679 685   4/15  1250mg q24h  16.3 581 599     1000mg q24h   23.7 460 441    1250mg q24h  14.3 539 546    1250mg q24h 19.2 547 532     Significant Cultures:   3/31 Blood, paired: MRSA 3/4 bottles  3/31 Blood PCR: MRSA   Wound, foot: moderate MRSA  / Anaerobic, foot: negative  4/3 Blood, paired: negative   Wound, foot: few MRSA    / echo negative for vegetation     Labs:  Recent Labs     Units 25  0526 25  0309   CREATININE MG/DL 1.46* 1.37*   BUN MG/DL 48* 34*   WBC K/uL 7.9  --      Temp (24hrs), Av.2 °F (36.8 °C), Min:97.3 °F (36.3 °C), Max:98.8 °F (37.1 °C)    Conditions for Dosing Consideration: None    Creatinine Clearance (mL/min): Estimated Creatinine Clearance: 44 mL/min (A) (based on SCr of 1.46 mg/dL (H)).     Impression/Plan:   Continue vancomycin 1250mg IV Q24H  Predicted MSY41-27 = 566, Predicted AUC24,ss = 557  BMP & CBC ordered per protocol  Antimicrobial stop date per ID - anticipating discharge on IV vanco      Pharmacy will follow daily and adjust medications as appropriate for renal function and/or serum levels.    Thank you,  VENTURA HO [facility]  
Pharmacy Antimicrobial Kinetic Dosing    Indication for Antimicrobials: MRSA bacteremia; SSTI - cellulitis and abscess of toe of left foot     Current Regimen of Each Antimicrobial:  Vancomycin Pharmacy to Dose; Start Date 3/31; Day # 26    Previous Antimicrobial Therapy:  Ceftriaxone 3/31   Zosyn 3/31-    Goal Level: Vancomycin -600    Date Dose & Interval Measured (mcg/mL) Predicted AUC 24-48 Predicted AUC 24,ss    1250mg q24h 11.4 473 548     1000mg q12h 16.7 N/A N/A    1000mg q12h 22.8 574 582     1000mg q12h  24.3 679 685   4/15  1250mg q24h  16.3 581 599     1000mg q24h   23.7 460 441    1250mg q24h  14.3 539 546    1250mg q24h 19.2 547 532     Significant Cultures:   3/31 Blood, paired: MRSA 3/4 bottles  3/31 Blood PCR: MRSA   Wound, foot: moderate MRSA  / Anaerobic, foot: negative  4/3 Blood, paired: negative   Wound, foot: few MRSA    / echo negative for vegetation     Labs:  Recent Labs     Units 25  0424 25  0526 25  0309   CREATININE MG/DL 1.72* 1.46* 1.37*   BUN MG/DL 51* 48* 34*   WBC K/uL 6.5 7.9  --      Temp (24hrs), Av.2 °F (36.8 °C), Min:98.1 °F (36.7 °C), Max:98.4 °F (36.9 °C)    Conditions for Dosing Consideration: None    Creatinine Clearance (mL/min): Estimated Creatinine Clearance: 37 mL/min (A) (based on SCr of 1.72 mg/dL (H)).     Impression/Plan:   SCr rising - Adjusted vancomycin to 750mg IV Q24H  Predicted OKB95-27 = 549, Predicted AUC24,ss = 494  Will hold of on ordering vancomycin level due to expected discharge  BMP & CBC ordered per protocol  Antimicrobial stop date per ID:      Pharmacy will follow daily and adjust medications as appropriate for renal function and/or serum levels.    Thank you,  VENTURA HO [facility]  
Pharmacy Antimicrobial Kinetic Dosing    Indication for Antimicrobials: MRSA bacteremia; SSTI - cellulitis and abscess of toe of left foot     Current Regimen of Each Antimicrobial:  Vancomycin Pharmacy to Dose; Start Date 3/31; Day # 29    Previous Antimicrobial Therapy:  Ceftriaxone 3/31   Zosyn 3/31-4/4    Goal Level: Vancomycin -600  Creatinine Clearance (mL/min): Estimated Creatinine Clearance: 34 mL/min (A) (based on SCr of 1.89 mg/dL (H)).     Impression/Plan:   SCr slowly increasing, afebrile, WBC wnl, no new Cx  4/28 Trough 20.6 mcg/ml predicts a therapeutic AUC of 555 mg*hr/L  Continue Vanc 750mg iv q24h    BMP & CBC ordered per protocol  Antimicrobial stop date per ID: 5/7     Pharmacy will follow daily and adjust medications as appropriate for renal function and/or serum levels.    Thank you,  ERIK AMEZQUITA, [facility]        
Pharmacy Antimicrobial Kinetic Dosing    Indication for Antimicrobials: SSTI     Current Regimen of Each Antimicrobial:  Vancomycin Pharmacy to Dose; Start Date 3/31; Day # 1  Zosyn 4.5g IV load, then 3.375g Q8H; Start Date 3/31; Day # 1    Previous Antimicrobial Therapy:  Ceftriaxone 3/31     Goal Level: Vancomycin -600    Date Dose & Interval Measured (mcg/mL) Predicted AUC 24-48 Predicted AUC 24,ss                          Significant Cultures:   3/31 Blood, paired: pending    Labs:  Recent Labs     Units 25  1446   CREATININE MG/DL 1.30   BUN MG/DL 22*   WBC K/uL 11.1     Temp (24hrs), Av.9 °F (37.2 °C), Min:98.5 °F (36.9 °C), Max:99.2 °F (37.3 °C)    Conditions for Dosing Consideration: None    Creatinine Clearance (mL/min): Estimated Creatinine Clearance: 49 mL/min (based on SCr of 1.3 mg/dL).     Impression/Plan:   Vancomycin 2000mg IV loading dose administered   Ordered vancomycin 1250mg IV Q24H   Predicted UYL49-71 = 478, Predicted AUC24,ss = 519  BMP and CBC ordered per protocol  Antimicrobial stop date 7 days     Pharmacy will follow daily and adjust medications as appropriate for renal function and/or serum levels.    Thank you,  VENTURA HO [facility]  
Pharmacy Antimicrobial Kinetic Dosing    Indication for Antimicrobials: SSTI - cellulitis and abscess of toe of left foot     Current Regimen of Each Antimicrobial:  Vancomycin 1250mg IV q24h; Start Date 3/31; Day # 15 thru     Previous Antimicrobial Therapy:  Ceftriaxone 3/31   Zosyn 3.375g Q8H; Start Date 3/31; Day # 5    Goal Level: Vancomycin -600    Date Dose & Interval Measured (mcg/mL) Predicted AUC 24-48 Predicted AUC 24,ss    1250 mg IV q24h 11.4 473 548    0610 1000 mg IV q12h 16.7      1000mg q 12  22.8 574 582     1000mg q 12  24.3 679 685            Significant Cultures:   3/31 Blood, MRSA   wound, staph aureus  4/3 blood Ngtd - pending  Labs:  Recent Labs     Units 25  0416   CREATININE MG/DL 1.02   BUN MG/DL 30*   WBC K/uL 6.3     Temp (24hrs), Av.6 °F (37 °C), Min:97.7 °F (36.5 °C), Max:99.4 °F (37.4 °C)    Conditions for Dosing Consideration: None    Creatinine Clearance (mL/min): Estimated Creatinine Clearance: 63 mL/min (based on SCr of 1.02 mg/dL).     Impression/Plan:   ID consulted - Plan is for 2 weeks of antimicrobial therapy from negative blood culture end date ; Anticipate pt will require require further extension of antibiotic therapy per clinical course of left foot and heel wound  Continue Vanc   Vancomycin level tomorrow am  prior to 4am dose   Antimicrobial stop date per ID -       Pharmacy will follow daily and adjust medications as appropriate for renal function and/or serum levels.    Thank you,  Erin Salazar, [facility]          
Pharmacy Antimicrobial Kinetic Dosing    Indication for Antimicrobials: SSTI - cellulitis and abscess of toe of left foot     Current Regimen of Each Antimicrobial:  Vancomycin 1250mg IV q24h; Start Date 3/31; Day # 16 thru     Previous Antimicrobial Therapy:  Ceftriaxone 3/31   Zosyn 3.375g Q8H; Start Date 3/31; Day # 5    Goal Level: Vancomycin -600    Date Dose & Interval Measured (mcg/mL) Predicted AUC 24-48 Predicted AUC 24,ss    1250 mg IV q24h 11.4 473 548    0610 1000 mg IV q12h 16.7      1000mg q 12  22.8 574 582     1000mg q 12  24.3 679 685   4/15  1250mg q 24  16.3 581 599     Significant Cultures:   3/31 Blood, MRSA   wound, staph aureus  4/3 blood Ng, final    wound - few MRSA     Labs:  Recent Labs     Units 04/15/25  0303 25  0416   CREATININE MG/DL 1.51* 1.02   BUN MG/DL 40* 30*   WBC K/uL 9.9 6.3     Temp (24hrs), Av.2 °F (36.8 °C), Min:97.7 °F (36.5 °C), Max:99.4 °F (37.4 °C)    Conditions for Dosing Consideration: None    Creatinine Clearance (mL/min): Estimated Creatinine Clearance: 42 mL/min (A) (based on SCr of 1.51 mg/dL (H)).     Impression/Plan:   ID consulted - Plan is for 2 weeks of antimicrobial therapy from negative blood culture end date ; Anticipate pt will require require further extension of antibiotic therapy per clinical course of left foot and heel wound  Bump in Scr, level supratherapeutic  Adjust regimen to 1000mg q 24 for    Antimicrobial stop date per ID -       Pharmacy will follow daily and adjust medications as appropriate for renal function and/or serum levels.    Thank you,  Erin Salazar, [facility]            
Pharmacy Antimicrobial Kinetic Dosing    Indication for Antimicrobials: SSTI - cellulitis and abscess of toe of left foot     Current Regimen of Each Antimicrobial:  Vancomycin 1250mg IV q24h; Start Date 3/31; Day # 17 thru     Previous Antimicrobial Therapy:  Ceftriaxone 3/31   Zosyn 3.375g Q8H; Start Date 3/31; Day # 5    Goal Level: Vancomycin -600    Date Dose & Interval Measured (mcg/mL) Predicted AUC 24-48 Predicted AUC 24,ss    1250 mg IV q24h 11.4 473 548    0610 1000 mg IV q12h 16.7      1000mg q 12  22.8 574 582     1000mg q 12  24.3 679 685   4/15  1250mg q 24  16.3 581 599     Significant Cultures:   3/31 Blood, MRSA   wound, staph aureus  4/3 blood Ng, final    wound - few MRSA     Labs:  Recent Labs     Units 25  0449 04/15/25  0303   CREATININE MG/DL 1.13 1.51*   BUN MG/DL 30* 40*   WBC K/uL 7.8 9.9     Temp (24hrs), Av.3 °F (36.8 °C), Min:97.7 °F (36.5 °C), Max:99 °F (37.2 °C)    Conditions for Dosing Consideration: None    Creatinine Clearance (mL/min): Estimated Creatinine Clearance: 56 mL/min (based on SCr of 1.13 mg/dL).     Impression/Plan:   ID consulted - Plan is for 2 weeks of antimicrobial therapy from negative blood culture end date ; Anticipate pt will require require further extension of antibiotic therapy per clinical course of left foot and heel wound  Adjust regimen to 1000mg q 24 for    Random level with am labs   Antimicrobial stop date per ID - anticipating discharge on IV abx      Pharmacy will follow daily and adjust medications as appropriate for renal function and/or serum levels.    Thank you,  Erin Salazar [facility]              
Pharmacy Antimicrobial Kinetic Dosing    Indication for Antimicrobials: SSTI - cellulitis and abscess of toe of left foot     Current Regimen of Each Antimicrobial:  Vancomycin 1250mg IV q24h; Start Date 3/31; Day # 18     Previous Antimicrobial Therapy:  Ceftriaxone 3/31   Zosyn 3.375g Q8H; Start Date 3/31; Day # 5    Goal Level: Vancomycin -600    Date Dose & Interval Measured (mcg/mL) Predicted AUC 24-48 Predicted AUC 24,ss    1250 mg IV q24h 11.4 473 548    0610 1000 mg IV q12h 16.7      1000mg q 12  22.8 574 582     1000mg q 12  24.3 679 685   4/15  1250mg q 24  16.3 581 599     1000mg q 24  23.7 460 441     Significant Cultures:   3/31 Blood, MRSA   wound, staph aureus  4/3 blood Ng, final    wound - few MRSA     Labs:  Recent Labs     Units 25  0442 25  0449 04/15/25  0303   CREATININE MG/DL 1.12 1.13 1.51*   BUN MG/DL 24* 30* 40*   WBC K/uL 7.7 7.8 9.9     Temp (24hrs), Av.5 °F (36.9 °C), Min:97.9 °F (36.6 °C), Max:99.1 °F (37.3 °C)    Conditions for Dosing Consideration: None    Creatinine Clearance (mL/min): Estimated Creatinine Clearance: 57 mL/min (based on SCr of 1.12 mg/dL).     Impression/Plan:   ID consulted - Anticipate pt will require require further extension of antibiotic therapy per clinical course of left foot and heel wound  Projected AUC on 1000mg q 24 on lower end for bloodstream infection. Previously supratherapeutic on 1250mg q 24 but renal function has significantly improved so will resume 1250mg q 24 now for     Antimicrobial stop date per ID - anticipating discharge on IV vanco      Pharmacy will follow daily and adjust medications as appropriate for renal function and/or serum levels.    Thank you,  Erin Salazar [facility]                
Pharmacy Antimicrobial Kinetic Dosing    Indication for Antimicrobials: SSTI - cellulitis and abscess of toe of left foot     Current Regimen of Each Antimicrobial:  Vancomycin Pharmacy to Dose; Start Date 3/31; Day # 10    Previous Antimicrobial Therapy:  Ceftriaxone 3/31   Zosyn 3.375g Q8H; Start Date 3/31; Day # 5    Goal Level: Vancomycin -600    Date Dose & Interval Measured (mcg/mL) Predicted AUC 24-48 Predicted AUC 24,ss    1250 mg IV q24h 11.4 473 548    0610 1000 mg IV q12h 16.7     4 1000mg q 12  22.8 574 582     Significant Cultures:   3/31 Blood, MRSA   wound, staph aureus    Labs:  Recent Labs     Units 25  0513 25  1119 25  0639   CREATININE MG/DL 0.98 1.05 0.97   BUN MG/DL 13 15 12   WBC K/uL 8.6 9.0 9.4     Temp (24hrs), Av.5 °F (36.9 °C), Min:98.2 °F (36.8 °C), Max:99 °F (37.2 °C)    Conditions for Dosing Consideration: None    Creatinine Clearance (mL/min): Estimated Creatinine Clearance: 65 mL/min (based on SCr of 0.98 mg/dL).     Impression/Plan:   Will continue with Vancomycin 1000 mg IV Q12H  Predicted ENA48-37 = 574, Predicted AUC24,ss = 582  BMP and CBC  ID on board  Antimicrobial stop date tbd by ID      Pharmacy will follow daily and adjust medications as appropriate for renal function and/or serum levels.    Thank you,  Erin Salazar, [facility]            
Pharmacy Antimicrobial Kinetic Dosing    Indication for Antimicrobials: SSTI - cellulitis and abscess of toe of left foot     Current Regimen of Each Antimicrobial:  Vancomycin Pharmacy to Dose; Start Date 3/31; Day # 12    Previous Antimicrobial Therapy:  Ceftriaxone 3/31   Zosyn 3.375g Q8H; Start Date 3/31; Day # 5    Goal Level: Vancomycin -600    Date Dose & Interval Measured (mcg/mL) Predicted AUC 24-48 Predicted AUC 24,ss    1250 mg IV q24h 11.4 473 548    0610 1000 mg IV q12h 16.7      1000mg q 12  22.8 574 582     1000mg q 12  24.3 679 685     Significant Cultures:   3/31 Blood, MRSA   wound, staph aureus  4/3 blood Ngtd - pending  Labs:  Recent Labs     Units 25  0311 04/10/25  0425 25  0513 25  1119   CREATININE MG/DL 1.10 1.14 0.98 1.05   BUN MG/DL 21* 20 13 15   WBC K/uL 7.9 10.0 8.6 9.0     Temp (24hrs), Av.3 °F (36.8 °C), Min:97.9 °F (36.6 °C), Max:98.6 °F (37 °C)    Conditions for Dosing Consideration: None    Creatinine Clearance (mL/min): Estimated Creatinine Clearance: 58 mL/min (based on SCr of 1.1 mg/dL).     Impression/Plan:   Scr trending upward   Vancomycin level is supratherapeutic, adjust regimen to 1250mg q 24 for  ss  ID on board  Antimicrobial stop date tbd by ID -       Pharmacy will follow daily and adjust medications as appropriate for renal function and/or serum levels.    Thank you,  Erin Salazar [facility]                
Pharmacy Antimicrobial Kinetic Dosing    Indication for Antimicrobials: SSTI - cellulitis and abscess of toe of left foot     Current Regimen of Each Antimicrobial:  Vancomycin Pharmacy to Dose; Start Date 3/31; Day # 2  Zosyn 3.375g Q8H; Start Date 3/31; Day # 2    Previous Antimicrobial Therapy:  Ceftriaxone 3/31     Goal Level: Vancomycin -600    Date Dose & Interval Measured (mcg/mL) Predicted AUC 24-48 Predicted AUC 24,ss   4/2 1250 mg IV q24h                      Significant Cultures:   3/31 Blood, paired: ngtd, pending    Labs:  Recent Labs     Units 25  0413 25  1828 25  1446   CREATININE MG/DL 1.23  --  1.30   BUN MG/DL 22*  --  22*   PROCAL ng/mL 0.40 0.24  --    WBC K/uL 11.0  --  11.1   BANDS % 1  --   --      Temp (24hrs), Av.4 °F (36.9 °C), Min:97.9 °F (36.6 °C), Max:99.2 °F (37.3 °C)    Conditions for Dosing Consideration: None    Creatinine Clearance (mL/min): Estimated Creatinine Clearance: 52 mL/min (based on SCr of 1.23 mg/dL).     Impression/Plan:   Continue vancomycin 1250mg IV Q24H   Predicted JBS01-17 = 478, Predicted AUC24,ss = 519. Will check a random level tomorrow morning 0600.   Continue zosyn as above  BMP and CBC ordered per protocol  Podiatry consulted   Antimicrobial stop date 7 days     Pharmacy will follow daily and adjust medications as appropriate for renal function and/or serum levels.    Thank you,  Arielle Toney, [facility]    
Pharmacy Antimicrobial Kinetic Dosing    Indication for Antimicrobials: SSTI - cellulitis and abscess of toe of left foot     Current Regimen of Each Antimicrobial:  Vancomycin Pharmacy to Dose; Start Date 3/31; Day # 3  Zosyn 3.375g Q8H; Start Date 3/31; Day # 3    Previous Antimicrobial Therapy:  Ceftriaxone 3/31     Goal Level: Vancomycin -600    Date Dose & Interval Measured (mcg/mL) Predicted AUC 24-48 Predicted AUC 24,ss    1250 mg IV q24h 11.4 473 548                   Significant Cultures:   3/31 Blood, MRSA    Labs:  Recent Labs     Units 25  0401 25  0413 25  1828 25  1446   CREATININE MG/DL 1.09 1.23  --  1.30   BUN MG/DL 20 22*  --  22*   PROCAL ng/mL 0.61 0.40 0.24  --    WBC K/uL 10.1 11.0  --  11.1   BANDS %  --  1  --   --      Temp (24hrs), Av.7 °F (37.1 °C), Min:98.1 °F (36.7 °C), Max:99.7 °F (37.6 °C)    Conditions for Dosing Consideration: None    Creatinine Clearance (mL/min): Estimated Creatinine Clearance: 59 mL/min (based on SCr of 1.09 mg/dL).     Impression/Plan:   Adjust vanco to 1000 mg IV Q12H  Predicted IWE79-73 = 473, Predicted AUC24,ss = 548  Continue zosyn as above  BMP and CBC  Podiatry consulted   Antimicrobial stop date 7 days     Pharmacy will follow daily and adjust medications as appropriate for renal function and/or serum levels.    Thank you,  Maurizio Bernard, [facility]  
Pharmacy Antimicrobial Kinetic Dosing    Indication for Antimicrobials: SSTI - cellulitis and abscess of toe of left foot     Current Regimen of Each Antimicrobial:  Vancomycin Pharmacy to Dose; Start Date 3/31; Day # 6      Previous Antimicrobial Therapy:  Ceftriaxone 3/31   Zosyn 3.375g Q8H; Start Date 3/31; Day # 5    Goal Level: Vancomycin -600    Date Dose & Interval Measured (mcg/mL) Predicted AUC 24-48 Predicted AUC 24,ss    1250 mg IV q24h 11.4 473 548    0610 1000 mg IV q12h 16.7              Significant Cultures:   3/31 Blood, MRSA   wound, staph aureus    Labs:  Recent Labs     Units 25  0610 25  0519 25  0405   CREATININE MG/DL 1.00 1.21 1.11   BUN MG/DL 14 15 16   WBC K/uL 11.7* 8.6 7.9     Temp (24hrs), Av.9 °F (37.2 °C), Min:97.7 °F (36.5 °C), Max:100 °F (37.8 °C)    Conditions for Dosing Consideration: None    Creatinine Clearance (mL/min): Estimated Creatinine Clearance: 64 mL/min (based on SCr of 1 mg/dL).     Impression/Plan:   Will continue with Vancomycin 1000 mg IV Q12H  Predicted ATQ49-21 = 523, Predicted AUC24,ss = 548  BMP and CBC  ID MD on board  Antimicrobial stop date pending     Pharmacy will follow daily and adjust medications as appropriate for renal function and/or serum levels.    Thank you,  Juvenal Rodríguez, [facility]    
Pharmacy Antimicrobial Kinetic Dosing    Indication for Antimicrobials: SSTI - cellulitis and abscess of toe of left foot     Current Regimen of Each Antimicrobial:  Vancomycin Pharmacy to Dose; Start Date 3/31; Day # 8    Previous Antimicrobial Therapy:  Ceftriaxone 3/31   Zosyn 3.375g Q8H; Start Date 3/31; Day # 5    Goal Level: Vancomycin -600    Date Dose & Interval Measured (mcg/mL) Predicted AUC 24-48 Predicted AUC 24,ss    1250 mg IV q24h 11.4 473 548    0610 1000 mg IV q12h 16.7              Significant Cultures:   3/31 Blood, MRSA   wound, staph aureus    Labs:  Recent Labs     Units 25  0639 25  0610   CREATININE MG/DL 0.97 1.00   BUN MG/DL 12 14   WBC K/uL 9.4 11.7*     Temp (24hrs), Av.7 °F (37.1 °C), Min:97.7 °F (36.5 °C), Max:99.7 °F (37.6 °C)    Conditions for Dosing Consideration: None    Creatinine Clearance (mL/min): Estimated Creatinine Clearance: 66 mL/min (based on SCr of 0.97 mg/dL).     Impression/Plan:   Will continue with Vancomycin 1000 mg IV Q12H  Predicted PLZ60-74 = 523, Predicted AUC24,ss = 548  BMP and CBC  ID MD on board  Antimicrobial stop date tbd by ID      Pharmacy will follow daily and adjust medications as appropriate for renal function and/or serum levels.    Thank you,  Erin Salazar [facility]        
Pharmacy Antimicrobial Kinetic Dosing    Indication for Antimicrobials: SSTI - cellulitis and abscess of toe of left foot     Current Regimen of Each Antimicrobial:  Vancomycin Pharmacy to Dose; Start Date 3/31; Day # 9    Previous Antimicrobial Therapy:  Ceftriaxone 3/31   Zosyn 3.375g Q8H; Start Date 3/31; Day # 5    Goal Level: Vancomycin -600    Date Dose & Interval Measured (mcg/mL) Predicted AUC 24-48 Predicted AUC 24,ss    1250 mg IV q24h 11.4 473 548    0610 1000 mg IV q12h 16.7              Significant Cultures:   3/31 Blood, MRSA   wound, staph aureus    Labs:  Recent Labs     Units 25  0639   CREATININE MG/DL 0.97   BUN MG/DL 12   WBC K/uL 9.4     Temp (24hrs), Av.7 °F (37.1 °C), Min:98 °F (36.7 °C), Max:99.5 °F (37.5 °C)    Conditions for Dosing Consideration: None    Creatinine Clearance (mL/min): Estimated Creatinine Clearance: 66 mL/min (based on SCr of 0.97 mg/dL).     Impression/Plan:   Will continue with Vancomycin 1000 mg IV Q12H  Predicted SMP15-18 = 523, Predicted AUC24,ss = 548  Repeat level with am labs tomorrow   BMP and CBC  ID MD on board  Antimicrobial stop date tbd by ID      Pharmacy will follow daily and adjust medications as appropriate for renal function and/or serum levels.    Thank you,  Erin Salazar [facility]          
Physical Therapy    Chart reviewed, patient is going off the floor to IR then HD will be initiated.  Will defer and continue to follow.    BERTA Merino  
Physical Therapy    Patient off the floor for vascular procedure. Will follow up post-operatively once patient is medically stable and able to tolerate skilled PT.    Thank you,  Julio Argueta, PT, DPT  
Physical Therapy  Chart reviewed and patient discussed in IDRs. Patient to OR today for further debridement of L heel.  Continue to recommend SNF rehab following discharge.  Thank you,  Anita Lynne, PT    
Physical Therapy  Medical record reviewed.  Nurse requests that therapy hold at this time, as pt continues to have bleeding at his permacath site and is expected to have dialysis this date.  Will continue to follow as appropriate.    
Physical Therapy  Patient currently with elevating troponin and RN recommends we wait until cleared by cardio for further mobility today.  Will defer and continue to follow.  Nahomy Cook, PT      
Physical Therapy Note     Patient reports not feeling well and not feeling like getting up. Education provided on importance of mobility. Drop arm chair left in room and education is provided on safe transfers to and from chair without attempting to stand. Patient continues to decline.  
Physical therapy services attempted 11:48AM. Pt received in unsupported EOB sit with PCT Team. Reporting DPT reintroduced self and offered therapy services to include transport assist to bedside chair. Pt gently declined per comfortable in current position. Bed lowered for safety and Pt continuing to eat lunch when therapist departed. DCP already in place with recommended dc to SNF setting prior to return home.  PT Team will try to provide skilled services at a later date as time permits and as medically appropriate to patient tolerance.    Britni Fine, PT, DPT    
Physical therapy services attempted 1:59PM. Per cursory review of medical chart, Pt currently NPO for scheduled LLE I 7 D, heel foreign body removal. (See medical chart). PT HOLD. PT Team will try to provide skilled services at a later date as time permits and as medically appropriate to patient tolerance.    Please provide new PT/OT Team Orders and OOB/WB and/or activity parameters, when appropriate. Thank you.    Britni Fine, PT, DPT    
Physical therapy services attempted 8:30AM. Per cursory review of medical chart, Pt currently on BEDREST Orders s/p 4/9/2025 LLE arteriogram, Left heel debridement. (See medical chart). PT HOLD per medical status and ongoing Orders.    Please provide new PT/OT Team Orders and OOB/WB and/or activity and bracing orders, as appropriate. Continued PT HOLD pending change in activity parameters or updated MD Team clarification. Thank you.    Britni Fine, PT, DPT    
Physical therapy services attempted 9:30AM. Per cursory review of medical chart, Pt currently off unit in surgery. PT HOLD per medical status. PT Team will follow up tomorrow as time permits and as medically appropriate to patient tolerance.    Please provide new PT/OT Team Orders, WB/bracing and/or activity parameters, as appropriate. Thank you.    Britni Fine, PT, DPT    
Preop for LLE bypass Monday  
Restraint type: Secured mitt:   both  Reason for restraints: Poor safety judgment:  Impaired recall and forgetfulness to use of call light and Pulling out devices:  Pulling lines  Duration: 24 hours    Restraints must be removed when an alternative is available and effective and/or patient no longer meets criteria.    Orders must be renewed every calendar day or when discontinued.    The MD must conduct a face to face assessment within 1 calendar day of initiation when initial restraint order is verbal.   
Seem yesterday pm  Good graft pulse  Agree with Cornelia Dinh's wound care assessment and outpt plan.  Photos reviewed  I believe foot is salvageable    
Seen last night  Minimal pain - reluctant to get OOB  to chair  Leg dry  Foot dressed  Good AT and PT signals above ankle/dressing  Hgb stable at 10  Plan repeat foot/heel debridement Friday   
Spiritual Health History and Assessment/Progress Note  [facility]    Initial Encounter,  ,  ,      Name: Errol Brandt MRN: [id number]    Age: 80 y.o.     Sex: male   Language: English   Religious: [employer]ism   Cellulitis and abscess of toe of left foot     Date: 4/25/2025            Total Time Calculated: 12 min              Spiritual Assessment began in [facility] 3 SURG TELE            Encounter Overview/Reason: Initial Encounter  Service Provided For: Patient    Analisa, Belief, Meaning:   Patient has beliefs or practices that help with coping during difficult times  Family/Friends No family/friends present      Importance and Influence:  Patient has spiritual/personal beliefs that influence decisions regarding their health  Family/Friends No family/friends present    Community:  Patient feels well-supported. Support system includes: Spouse/Partner and Children  Family/Friends No family/friends present    Assessment and Plan of Care:     Patient Interventions include: Facilitated expression of thoughts and feelings, Explored spiritual coping/struggle/distress, and Affirmed coping skills/support systems. Asked patient if he would like a prayer. He responded that he would like a prayer. Prayed and provided listening presence. (He has a bit of difficulty hearing, and turned T.V. down so he could hear better.)   Family/Friends Interventions include: No family/friends present    Patient Plan of Care: Spiritual Care available upon further referral  Family/Friends Plan of Care: No family/friends present    Electronically signed by Chaplain MITZY on 4/25/2025 at 11:33 AM   
TRANSFER - IN REPORT:    Verbal report received from Raisa HONG on Errol R Brandt  being received from Surg [facility]tele for routine progression of patient care      Report consisted of patient's Situation, Background, Assessment and   Recommendations(SBAR).     Information from the following report(s) Nurse Handoff Report, MAR, and Recent Results was reviewed with the receiving nurse.    Opportunity for questions and clarification was provided.      Assessment completed upon patient's arrival to unit and care assumed.      Pt arrived to room 2123, A&O X4, 10/10 left foot pain, VSS, breathing is labored. Pt orient to room and surroundings.   Dual skin assessment completed with Deandra HONG,     
TRANSFER - OUT REPORT:    Verbal report given to Katy HONG on Errol Brandt  being transferred to [facility]U for change in patient condition (New SOB)       Report consisted of patient's Situation, Background, Assessment and   Recommendations(SBAR).     Information from the following report(s) Nurse Handoff Report was reviewed with the receiving nurse.           Lines:   Peripheral IV 05/04/25 Left;Anterior Wrist (Active)   Site Assessment Clean, dry & intact 05/04/25 1327   Line Status Capped;Flushed 05/04/25 1327   Phlebitis Assessment No symptoms 05/04/25 1327   Infiltration Assessment 0 05/04/25 1327   Alcohol Cap Used Yes 05/04/25 1327   Dressing Status Dry;Intact;New dressing applied 05/04/25 1327   Dressing Type Transparent 05/04/25 1327        Opportunity for questions and clarification was provided.      Patient transported with:  Registered Nurse        
U/S guided #20g 1.75\" PIV placed in the left basilic vein without complication for patient with poor vascular access and multiple failed PIV attempts.  Multiple sticks noted to BUE prior to PICC RN arrival.      Timeout performed at the bedside with GELY Villagomez.  Post-procedure hand-off given to GELY Villagomez.    Bed returned to low, locked position with call bell in reach.    
   CREATININE 3.42 05/19/2025 04:52 AM    GLUCOSE 112 05/19/2025 04:52 AM    CALCIUM 7.1 05/19/2025 04:52 AM       Lab Results   Component Value Date    WBC 8.0 05/19/2025    HGB 8.0 (L) 05/19/2025    HCT 26.7 (L) 05/19/2025    .4 (H) 05/19/2025     05/19/2025      Recent Labs     05/17/25  0412 05/18/25  0416 05/19/25  0452   * 133* 135*   K 4.1 4.5 4.1   CL 97 97 98   CO2 30 29 32   GLUCOSE 142* 118* 112*   BUN 48* 63* 44*   CREATININE 3.29* 4.24* 3.42*   CALCIUM 6.9* 6.8* 7.1*       Recent Labs     05/17/25  0412 05/18/25 0416 05/19/25  0452   WBC 8.6 10.1 8.0   RBC 2.52* 2.54* 2.44*   HGB 8.3* 8.4* 8.0*   HCT 27.3* 28.0* 26.7*   .3* 110.2* 109.4*   MCH 32.9 33.1 32.8   MCHC 30.4 30.0 30.0   RDW 15.9* 16.2* 16.0*    233 216   MPV 10.1 9.7 9.9     Lab Results   Component Value Date/Time    IRON 18 (L) 03/31/2025 06:28 PM    TIBC 184 (L) 03/31/2025 06:28 PM     No results found for: \"PTH\"  Lab Results   Component Value Date/Time    LABA1C 9.0 (H) 04/01/2025 04:13 AM     Lab Results   Component Value Date/Time    COLORU DARK YELLOW 04/30/2025 03:53 PM    CLARITYU CLEAR 06/28/2021 02:05 PM    GLUCOSEU Negative 04/30/2025 03:53 PM    GLUCOSEU 250 (A) 03/28/2024 12:59 AM    BILIRUBINUR Negative 04/30/2025 03:53 PM    KETUA TRACE (A) 04/30/2025 03:53 PM    BLOODU LARGE (A) 04/30/2025 03:53 PM    PHUR 5.5 04/30/2025 03:53 PM    PHUR 6.0 06/28/2021 02:05 PM    PROTEINU >300 (A) 04/30/2025 03:53 PM    NITRU Negative 04/30/2025 03:53 PM    LEUKOCYTESUR SMALL (A) 04/30/2025 03:53 PM       US Results (most recent):    Medication list  reviewed  Current Facility-Administered Medications   Medication Dose Route Frequency    epoetin shayy-epbx (RETACRIT) injection 10,000 Units  10,000 Units SubCUTAneous Once per day on Monday Wednesday Friday    [START ON 5/20/2025] levothyroxine (SYNTHROID) tablet 37.5 mcg  37.5 mcg Oral QAM    norepinephrine (LEVOPHED) 16 mg in sodium chloride 0.9 % 250 mL 
94 05/23/2025 04:57 AM    CO2 31 05/23/2025 04:57 AM    BUN 40 05/23/2025 04:57 AM    CREATININE 4.06 05/23/2025 04:57 AM    GLUCOSE 81 05/23/2025 04:57 AM    CALCIUM 7.2 05/23/2025 04:57 AM       Lab Results   Component Value Date    WBC 7.9 05/23/2025    HGB 8.6 (L) 05/23/2025    HCT 28.3 (L) 05/23/2025    .0 (H) 05/23/2025     05/23/2025      Recent Labs     05/21/25  0714 05/22/25  0708 05/23/25  0457   * 129* 131*   K 4.3 3.8 3.9   CL 96* 93* 94*   CO2 28 30 31   GLUCOSE 75 130* 81   BUN 47* 29* 40*   CREATININE 3.97* 3.16* 4.06*   CALCIUM 7.3* 7.2* 7.2*       Recent Labs     05/21/25  0714 05/22/25  0708 05/23/25  0457   WBC 7.9 6.9 7.9   RBC 2.63* 2.61* 2.62*   HGB 8.7* 8.4* 8.6*   HCT 27.9* 27.6* 28.3*   .1* 105.7* 108.0*   MCH 33.1 32.2 32.8   MCHC 31.2 30.4 30.4   RDW 16.6* 16.7* 16.7*    197 203   MPV 10.3 10.1 10.1     Lab Results   Component Value Date/Time    IRON 18 (L) 03/31/2025 06:28 PM    TIBC 184 (L) 03/31/2025 06:28 PM     No results found for: \"PTH\"  Lab Results   Component Value Date/Time    LABA1C 9.0 (H) 04/01/2025 04:13 AM     Lab Results   Component Value Date/Time    COLORU DARK YELLOW 04/30/2025 03:53 PM    CLARITYU CLEAR 06/28/2021 02:05 PM    GLUCOSEU Negative 04/30/2025 03:53 PM    GLUCOSEU 250 (A) 03/28/2024 12:59 AM    BILIRUBINUR Negative 04/30/2025 03:53 PM    KETUA TRACE (A) 04/30/2025 03:53 PM    BLOODU LARGE (A) 04/30/2025 03:53 PM    PHUR 5.5 04/30/2025 03:53 PM    PHUR 6.0 06/28/2021 02:05 PM    PROTEINU >300 (A) 04/30/2025 03:53 PM    NITRU Negative 04/30/2025 03:53 PM    LEUKOCYTESUR SMALL (A) 04/30/2025 03:53 PM       US Results (most recent):    Medication list  reviewed  Current Facility-Administered Medications   Medication Dose Route Frequency    dextrose 50 % IV solution  25 g IntraVENous PRN    insulin NPH (HumuLIN N;NovoLIN N) injection vial 10 Units  10 Units SubCUTAneous QAM    epoetin shayy-epbx (RETACRIT) injection 10,000 Units  
Daily    lubrifresh P.M. (artificial tears) ophthalmic ointment   Both Eyes QHS    glucose chewable tablet 16 g  4 tablet Oral PRN    dextrose bolus 10% 125 mL  125 mL IntraVENous PRN    Or    dextrose bolus 10% 250 mL  250 mL IntraVENous PRN    glucagon injection 1 mg  1 mg SubCUTAneous PRN    dextrose 10 % infusion   IntraVENous Continuous PRN    insulin lispro (HUMALOG,ADMELOG) injection vial 0-8 Units  0-8 Units SubCUTAneous 4x Daily AC & HS    sodium chloride flush 0.9 % injection 5-40 mL  5-40 mL IntraVENous 2 times per day    sodium chloride flush 0.9 % injection 5-40 mL  5-40 mL IntraVENous PRN    0.9 % sodium chloride infusion   IntraVENous PRN    potassium chloride (KLOR-CON M) extended release tablet 40 mEq  40 mEq Oral PRN    Or    potassium bicarb-citric acid (EFFER-K) effervescent tablet 40 mEq  40 mEq Oral PRN    Or    potassium chloride 10 mEq/100 mL IVPB (Peripheral Line)  10 mEq IntraVENous PRN    magnesium sulfate 2000 mg in 50 mL IVPB premix  2,000 mg IntraVENous PRN    ondansetron (ZOFRAN-ODT) disintegrating tablet 4 mg  4 mg Oral Q8H PRN    Or    ondansetron (ZOFRAN) injection 4 mg  4 mg IntraVENous Q6H PRN    polyethylene glycol (GLYCOLAX) packet 17 g  17 g Oral Daily PRN    acetaminophen (TYLENOL) tablet 650 mg  650 mg Oral Q6H PRN    Or    acetaminophen (TYLENOL) suppository 650 mg  650 mg Rectal Q6H PRN    potassium chloride 10 mEq/100 mL IVPB (Peripheral Line)  10 mEq IntraVENous PRN         Objective:      Physical Exam  Physical Exam  Constitutional:       General: He is not in acute distress.  HENT:      Head: Normocephalic and atraumatic.   Cardiovascular:      Rate and Rhythm: Normal rate and regular rhythm.   Pulmonary:      Effort: Pulmonary effort is normal.      Breath sounds: No wheezing.   Skin:     General: Skin is warm.   Neurological:      Mental Status: He is alert.            Data Review:   Recent Labs     04/26/25 1958 04/27/25  0615 04/28/25  0359   WBC 7.6 7.8 8.6   HGB 
TO UROLOGY  IP CONSULT TO INTERVENTIONAL RADIOLOGY  IP CONSULT TO INTENSIVIST  IP CONSULT TO PALLIATIVE CARE  IP CONSULT TO CARDIOLOGY  IP CONSULT TO DIABETES MANAGEMENT    Length of Stay:  Expected LOS: 53  Actual LOS: 51    Naya Santana RN      
Visual Checks: In bed, Awake  Fall Visual Posted: Fall sign posted  Room Door Open: Yes  Alarm On: Bed  Patient Moved Closer to Nursing Station: No    Active Consults:   PHARMACY TO DOSE VANCOMYCIN  IP CONSULT TO PODIATRY  IP CONSULT TO DIABETES MANAGEMENT  IP CONSULT TO INFECTIOUS DISEASES  IP CONSULT TO VASCULAR SURGERY  IP CONSULT TO CASE MANAGEMENT  IP CONSULT TO CARDIOLOGY  IP CONSULT TO NEPHROLOGY  IP CONSULT TO VASCULAR ACCESS TEAM    Length of Stay:  Expected LOS: 35  Actual LOS: 33    Dahiana Zendejas RN                            
anticipation for surgery, resume once cleared by podiatry             CODE STATUS   Full   Functional Status  Deconditioned/debility due to foot surg    Surrogate decision maker:  Spouse- Chelsi Brandt     Prophylaxis   Lovenox   Discharge Plan:  [facility]/Rehab,     Misc Inpatient  Payor: [employer] MEDICARE / Plan: [employer] GOLD PLUS HMO / Product Type: *No Product type* /   Contact MRSA   Query   None noted today    Prognosis   good   Social issues  4/7- no visitors at bedside during my visit   Medical Decision Making:    Relevant labs were reviewed:  Yes cbc, cmp   Imaging report reviewed  Yes XR left foot/ankle, CT left foot. CT head   EKG reviewed  NA   High risk/toxic drug monitoring  Yes IV abx   Care plan discussed with  Patient/Family , Nurse, and Consultant ID, podiatry, Dr. Davidson      Subjective and Objective Data      \"My left foot hurts a little\". Pain is manageable (he was resting on my arrival). No numbness or tingling. No fever/chills. No nausea/vomiting or diarrhea.     Review of Systems - Pertinent ROS completed as above.    Impression Continue IV Vanc. Follow up with ID tomorrow to determine duration of antibiotic. Rec for RODNEY at SNF.     Patient Vitals for the past 24 hrs:   BP   04/08/25 0300 (!) 109/59   04/07/25 2000 (!) 127/59   04/07/25 1523 110/60   04/07/25 0945 (!) 124/56      Patient Vitals for the past 24 hrs:   Pulse   04/08/25 0300 59   04/07/25 2000 77   04/07/25 1523 74   04/07/25 0945 51      Patient Vitals for the past 24 hrs:   Resp   04/08/25 0300 18   04/07/25 2000 20   04/07/25 0945 16      Patient Vitals for the past 24 hrs:   Temp   04/08/25 0300 98 °F (36.7 °C)   04/07/25 2000 99.5 °F (37.5 °C)   04/07/25 1523 98.8 °F (37.1 °C)   04/07/25 0945 98.4 °F (36.9 °C)        SpO2 Readings from Last 6 Encounters:   04/07/25 93%   10/07/24 96%   06/25/24 98%   06/05/24 96%   04/01/24 95%   03/26/24 98%            Body mass index is 25.8 kg/m². -  Wt Readings from Last 10 Encounters: 
bed  Fall Visual Posted: Armband, Fall sign posted  Room Door Open: Deferred to promote rest  Alarm On: Bed  Patient Moved Closer to Nursing Station: No    Active Consults:   PHARMACY TO DOSE VANCOMYCIN  IP CONSULT TO PODIATRY  IP CONSULT TO DIABETES MANAGEMENT  IP CONSULT TO INFECTIOUS DISEASES  IP CONSULT TO VASCULAR SURGERY    Length of Stay:  Expected LOS: 15  Actual LOS: 11    Lion Nunez RN                            
critically ill patient's bedside actively involved in patient care as well as the coordination of care and discussions with the patient's family.  This does not include any procedural time which has been billed separately.      Review of Systems:   Review of Systems   Unable to perform ROS: Acuity of condition            Vital Signs:   Patient Vitals for the past 4 hrs:   BP Temp Pulse Resp SpO2   05/16/25 0900 129/70 98.6 °F (37 °C) 65 24 93 %   05/16/25 0800 (!) 129/51 98.8 °F (37.1 °C) 65 15 98 %   05/16/25 0700 (!) 129/48 98.8 °F (37.1 °C) 61 16 99 %   05/16/25 0645 (!) 140/55 98.9 °F (37.2 °C) 64 21 100 %   05/16/25 0630 (!) 152/55 98.9 °F (37.2 °C) 64 24 100 %   05/16/25 0615 (!) 142/57 98.9 °F (37.2 °C) 65 28 100 %   05/16/25 0600 (!) 136/53 98.9 °F (37.2 °C) 63 25 100 %        Intake/Output Summary (Last 24 hours) at 5/16/2025 0949  Last data filed at 5/16/2025 0930  Gross per 24 hour   Intake 2255.08 ml   Output 895 ml   Net 1360.08 ml        Physical Examination:    Physical Exam  HENT:      Head: Normocephalic and atraumatic.      Mouth/Throat:      Mouth: Mucous membranes are moist.   Cardiovascular:      Rate and Rhythm: Normal rate and regular rhythm.   Pulmonary:      Effort: Pulmonary effort is normal. No respiratory distress.         Labs and Imaging:   Reviewed.      Medications:     Current Facility-Administered Medications   Medication Dose Route Frequency    [START ON 5/17/2025] furosemide (LASIX) injection 80 mg  80 mg IntraVENous Daily    levothyroxine (SYNTHROID) 50 mcg in sodium chloride (PF) 0.9 % 2.5 mL IV syringe  50 mcg IntraVENous Daily    dextrose 10 % infusion   IntraVENous Continuous    isoproterenol (ISUPREL) 1 mg in sodium chloride 0.9 % 250 mL infusion  0-10 mcg/min IntraVENous Continuous    hydrocortisone sodium succinate PF (SOLU-CORTEF) injection 50 mg  50 mg IntraVENous Q6H    etomidate (AMIDATE) injection 20 mg  20 mg IntraVENous Once    rocuronium 10 mg/mL syringe 100 mg  
result\". Left heel debridement performed. Plan for tibial artery bypass next Monday     Conjunctivitis, not POA  Start lobo/poly/dex gtts bilaterally                    CODE STATUS   Full   Functional Status  Deconditioned/debility due to foot surg    Surrogate decision maker:  Spouse- Chelsi Brandt     Prophylaxis   Lovenox   Discharge Plan:  [facility]/Rehab,     Misc Inpatient  Payor: HU[employer] MEDICARE / Plan: [employer] GOLD PLUS HMO / Product Type: *No Product type* /   Contact MRSA   Query   None noted today    Prognosis   good   Social issues  4/7- no visitors at bedside during my visit  4/8- no visitors  4/9- updated pt's wife Chelsi on POC. She asked me to call her child Julio- left VM.  4/10- I spoke to son Julio this morning over phone. Verbalized understanding of plan but did have some more questions about surgery- I asked Dr. Garcia to reach out to give details.   Medical Decision Making:    Relevant labs were reviewed:  Yes cbc, cmp   Imaging report reviewed  Yes XR left foot/ankle, CT left foot. CT head. Angio   EKG reviewed  NA   High risk/toxic drug monitoring  Yes IV abx   Care plan discussed with  Patient/Family , Nurse, and Consultant ID, podiatry, Dr. Davidson      Subjective and Objective Data      \"I'm doing fine. Dr. Garcia said I need another surgery\". Pain is better today.     Review of Systems - Pertinent ROS completed as above.    Impression Unsuccessful angio 4/9- needs bypass 4/14. Will need new PT/OT eval afterwards.     Patient Vitals for the past 24 hrs:   BP   04/10/25 0805 120/63   04/09/25 2015 110/61      Patient Vitals for the past 24 hrs:   Pulse   04/10/25 0805 67   04/09/25 2015 77      Patient Vitals for the past 24 hrs:   Resp   04/10/25 0805 17   04/09/25 2015 16      Patient Vitals for the past 24 hrs:   Temp   04/10/25 0805 97.9 °F (36.6 °C)   04/09/25 2015 98.6 °F (37 °C)        SpO2 Readings from Last 6 Encounters:   04/10/25 100%   10/07/24 96%   06/25/24 98%   06/05/24 96% 
   hydrocortisone sodium succinate PF (SOLU-CORTEF) injection 50 mg  50 mg IntraVENous Q6H    etomidate (AMIDATE) injection 20 mg  20 mg IntraVENous Once    rocuronium 10 mg/mL syringe 100 mg  100 mg IntraVENous Once    alcohol 62% (NOZIN) nasal  1 ampule  1 ampule Nasal Q12H    norepinephrine (LEVOPHED) 16 mg in sodium chloride 0.9 % 250 mL infusion (premix)  0.5-20 mcg/min IntraVENous Continuous    [Held by provider] sodium bicarbonate tablet 1,300 mg  1,300 mg Oral TID    [Held by provider] sevelamer (RENVELA) tablet 800 mg  800 mg Oral TID WC    [Held by provider] bumetanide (BUMEX) tablet 1 mg  1 mg Oral Daily    epoetin shayy-epbx (RETACRIT) injection 3,000 Units  3,000 Units SubCUTAneous Once per day on Tuesday Thursday Saturday    midodrine (PROAMATINE) tablet 5 mg  5 mg Oral TID    [Held by provider] insulin NPH (HumuLIN N;NovoLIN N) injection vial 4 Units  4 Units SubCUTAneous Nightly    balsum peru-castor oil (VENELEX) ointment   Topical BID    DAPTOmycin (CUBICIN) 650 mg in sodium chloride (PF) 0.9 % 13 mL IV syringe  8 mg/kg IntraVENous Q48H    tamsulosin (FLOMAX) capsule 0.4 mg  0.4 mg Oral Daily    [Held by provider] apixaban (ELIQUIS) tablet 5 mg  5 mg Oral BID    ipratropium 0.5 mg-albuterol 2.5 mg (DUONEB) nebulizer solution 1 Dose  1 Dose Inhalation Q4H PRN    clopidogrel (PLAVIX) tablet 75 mg  75 mg Oral Daily    [Held by provider] atenolol (TENORMIN) tablet 12.5 mg  12.5 mg Oral Daily    famotidine (PEPCID) tablet 20 mg  20 mg Oral Daily PRN    insulin NPH (HumuLIN N;NovoLIN N) injection vial 28 Units  28 Units SubCUTAneous QAM    GenTeal Tears 0.1-0.2-0.3 % SOLN 1 drop  1 drop Both Eyes Q4H PRN    aspirin chewable tablet 81 mg  81 mg Oral Daily    lactobacillus (CULTURELLE) capsule 1 capsule  1 capsule Oral Daily    lubrifresh P.M. (artificial tears) ophthalmic ointment   Both Eyes QHS    glucose chewable tablet 16 g  4 tablet Oral PRN    glucagon injection 1 mg  1 mg SubCUTAneous PRN 
(porcine) 1000 UNIT/ML injection 1,800 Units  1,800 Units IntraCATHeter PRN    And    heparin (porcine) 1000 UNIT/ML injection 1,800 Units  1,800 Units IntraCATHeter PRN    midodrine (PROAMATINE) tablet 15 mg  15 mg Oral TID    alcohol 62% (NOZIN) nasal  1 ampule  1 ampule Nasal Q12H    [Held by provider] insulin NPH (HumuLIN N;NovoLIN N) injection vial 4 Units  4 Units SubCUTAneous Nightly    balsum peru-castor oil (VENELEX) ointment   Topical BID    DAPTOmycin (CUBICIN) 650 mg in sodium chloride (PF) 0.9 % 13 mL IV syringe  8 mg/kg IntraVENous Q48H    tamsulosin (FLOMAX) capsule 0.4 mg  0.4 mg Oral Daily    apixaban (ELIQUIS) tablet 5 mg  5 mg Oral BID    ipratropium 0.5 mg-albuterol 2.5 mg (DUONEB) nebulizer solution 1 Dose  1 Dose Inhalation Q4H PRN    clopidogrel (PLAVIX) tablet 75 mg  75 mg Oral Daily    atenolol (TENORMIN) tablet 12.5 mg  12.5 mg Oral Daily    famotidine (PEPCID) tablet 20 mg  20 mg Oral Daily PRN    insulin NPH (HumuLIN N;NovoLIN N) injection vial 28 Units  28 Units SubCUTAneous QAM    GenTeal Tears 0.1-0.2-0.3 % SOLN 1 drop  1 drop Both Eyes Q4H PRN    aspirin chewable tablet 81 mg  81 mg Oral Daily    lactobacillus (CULTURELLE) capsule 1 capsule  1 capsule Oral Daily    lubrifresh P.M. (artificial tears) ophthalmic ointment   Both Eyes QHS    glucose chewable tablet 16 g  4 tablet Oral PRN    glucagon injection 1 mg  1 mg SubCUTAneous PRN    insulin lispro (HUMALOG,ADMELOG) injection vial 0-8 Units  0-8 Units SubCUTAneous 4x Daily AC & HS    sodium chloride flush 0.9 % injection 5-40 mL  5-40 mL IntraVENous 2 times per day    sodium chloride flush 0.9 % injection 5-40 mL  5-40 mL IntraVENous PRN    0.9 % sodium chloride infusion   IntraVENous PRN    ondansetron (ZOFRAN-ODT) disintegrating tablet 4 mg  4 mg Oral Q8H PRN    Or    ondansetron (ZOFRAN) injection 4 mg  4 mg IntraVENous Q6H PRN    polyethylene glycol (GLYCOLAX) packet 17 g  17 g Oral Daily PRN    acetaminophen (TYLENOL) 
75 mg Oral Daily    atenolol (TENORMIN) tablet 12.5 mg  12.5 mg Oral Daily    famotidine (PEPCID) tablet 20 mg  20 mg Oral Daily PRN    insulin NPH (HumuLIN N;NovoLIN N) injection vial 6 Units  6 Units SubCUTAneous Nightly    insulin NPH (HumuLIN N;NovoLIN N) injection vial 28 Units  28 Units SubCUTAneous QAM    GenTeal Tears 0.1-0.2-0.3 % SOLN 1 drop  1 drop Both Eyes Q4H PRN    aspirin chewable tablet 81 mg  81 mg Oral Daily    lactobacillus (CULTURELLE) capsule 1 capsule  1 capsule Oral Daily    lubrifresh P.M. (artificial tears) ophthalmic ointment   Both Eyes QHS    glucose chewable tablet 16 g  4 tablet Oral PRN    dextrose bolus 10% 125 mL  125 mL IntraVENous PRN    Or    dextrose bolus 10% 250 mL  250 mL IntraVENous PRN    glucagon injection 1 mg  1 mg SubCUTAneous PRN    dextrose 10 % infusion   IntraVENous Continuous PRN    insulin lispro (HUMALOG,ADMELOG) injection vial 0-8 Units  0-8 Units SubCUTAneous 4x Daily AC & HS    sodium chloride flush 0.9 % injection 5-40 mL  5-40 mL IntraVENous 2 times per day    sodium chloride flush 0.9 % injection 5-40 mL  5-40 mL IntraVENous PRN    0.9 % sodium chloride infusion   IntraVENous PRN    potassium chloride (KLOR-CON M) extended release tablet 40 mEq  40 mEq Oral PRN    Or    potassium bicarb-citric acid (EFFER-K) effervescent tablet 40 mEq  40 mEq Oral PRN    Or    potassium chloride 10 mEq/100 mL IVPB (Peripheral Line)  10 mEq IntraVENous PRN    magnesium sulfate 2000 mg in 50 mL IVPB premix  2,000 mg IntraVENous PRN    ondansetron (ZOFRAN-ODT) disintegrating tablet 4 mg  4 mg Oral Q8H PRN    Or    ondansetron (ZOFRAN) injection 4 mg  4 mg IntraVENous Q6H PRN    polyethylene glycol (GLYCOLAX) packet 17 g  17 g Oral Daily PRN    acetaminophen (TYLENOL) tablet 650 mg  650 mg Oral Q6H PRN    Or    acetaminophen (TYLENOL) suppository 650 mg  650 mg Rectal Q6H PRN    potassium chloride 10 mEq/100 mL IVPB (Peripheral Line)  10 mEq IntraVENous PRN        Sheng 
98.1 °F (36.7 °C) Oral 76 18 99 % 1.803 m (5' 11\") 84 kg (185 lb 3 oz)   04/13/25 1927 110/63 99 °F (37.2 °C) Oral 77 16 98 % -- --         Intake/Output Summary (Last 24 hours) at 4/14/2025 1422  Last data filed at 4/14/2025 1328  Gross per 24 hour   Intake 1206 ml   Output 1550 ml   Net -344 ml        I had a face to face encounter and independently examined this patient on 4/14/2025, as outlined below:  PHYSICAL EXAM:  General: Alert, cooperative  EENT:  EOMI. Anicteric sclerae.  Resp:  CTA bilaterally, no wheezing or rales.  No accessory muscle use  CV:  Regular  rhythm,  No edema  GI:  Soft, Non distended, Non tender.  +Bowel sounds  Neurologic:  Alert and awake,  Psych:   Pleasant  Skin:  No rashes.  No jaundice    Reviewed most current lab test results and cultures  YES  Reviewed most current radiology test results   YES  Review and summation of old records today    NO  Reviewed patient's current orders and MAR    YES  PMH/SH reviewed - no change compared to H&P    Procedures: see electronic medical records for all procedures/Xrays and details which were not copied into this note but were reviewed prior to creation of Plan.      LABS:  I reviewed today's most current labs and imaging studies.  Pertinent labs include:  Recent Labs     04/13/25  0416   WBC 6.3   HGB 12.6   HCT 39.0        Recent Labs     04/13/25  0416   *   K 4.0      CO2 26   GLUCOSE 97   BUN 30*   CREATININE 1.02   CALCIUM 8.6       Signed: Edi De La Rosa MD         
MD at [facility] ENDOSCOPY    ERCP N/A 12/21/2023    ERCP ENDOSCOPIC RETROGRADE CHOLANGIOPANCREATOGRAPHY performed by Adryan Armando MD at [facility] MAIN OR    ERCP N/A 12/22/2023    ERCP ENDOSCOPIC RETROGRADE CHOLANGIOPANCREATOGRAPHY performed by Ni Pa MD at [facility] ENDOSCOPY    FEMORAL-TIBIAL BYPASS GRAFT Left 4/14/2025    LEFT FEMORAL  TO TIBIAL BYPASS WITH VEIN performed by Foster Garcia MD at [facility] MAIN OR    FOOT DEBRIDEMENT Left 4/2/2025    LEFT FOOT  INCISION AND DRAINAGE WITH FOREIGN BODY REMOVAL performed by Joe Avalos DPM at [facility] MAIN OR    INVASIVE VASCULAR N/A 1/5/2024    Intravascular lithotripsy performed by Sae Garzon MD at [facility] CARDIAC CATH LAB    LEG SURGERY Left 4/18/2025    LEFT HEEL DEBRIDEMENT performed by Foster Garcia MD at [facility] MAIN OR    OTHER SURGICAL HISTORY  2011    right femoral tibial bypass    OTHER SURGICAL HISTORY      drained times two in back    VASCULAR SURGERY  2012    blood clot.right leg       No Known Allergies    Tobacco Use: Medium Risk (4/18/2025)    Patient History     Smoking Tobacco Use: Former     Smokeless Tobacco Use: Never     Passive Exposure: Not on file       Alcohol Use: Not At Risk (10/7/2024)    AUDIT-C     Frequency of Alcohol Consumption: Never     Average Number of Drinks: Patient does not drink     Frequency of Binge Drinking: Never         No family status information on file.       Prior to Admission Medications   Prescriptions Last Dose Informant Patient Reported? Taking?   Continuous Glucose  (DEXCOM G7 ) RENEA   No No   Sig: For checking blood sugar 4 to 5 times per day E11.65   Continuous Glucose Sensor (DEXCOM G7 SENSOR) MISC   No No   Sig: USED TO MEASURE BLOOD SUGAR 4 TO 5 TIMES PER DAY, E11.65   Diabetic Shoe MISC   No No   Sig: by Does not apply route * I am treating the patient under a comprehensive plan of care for diabetes and the patient would benefit from diabetic footwear to protect their feet, and this 
Ref Range    WBC 9.9 4.1 - 11.1 K/uL    RBC 2.73 (L) 4.10 - 5.70 M/uL    Hemoglobin 8.9 (L) 12.1 - 17.0 g/dL    Hematocrit 31.2 (L) 36.6 - 50.3 %    .3 (H) 80.0 - 99.0 FL    MCH 32.6 26.0 - 34.0 PG    MCHC 28.5 (L) 30.0 - 36.5 g/dL    RDW 17.0 (H) 11.5 - 14.5 %    Platelets 193 150 - 400 K/uL    MPV 10.3 8.9 - 12.9 FL    Nucleated RBCs 0.0 0  WBC    nRBC 0.00 0.00 - 0.01 K/uL   POCT Glucose    Collection Time: 05/12/25 11:37 AM   Result Value Ref Range    POC Glucose 139 (H) 65 - 117 mg/dL    Performed by: Ashlee Bustamante PCT        IMAGING:  === 03/31/25 ===    CT HEAD WO CONTRAST    - Narrative -  EXAM: CT CODE NEURO HEAD WO CONTRAST    INDICATION: CODE STROKE. Slow to awake. Speech altered.    COMPARISON: 3/31/2025.    CONTRAST: None.    TECHNIQUE: Unenhanced CT of the head was performed using 5 mm images. Brain and  bone windows were generated. Coronal and sagittal reformats. CT dose reduction  was achieved through use of a standardized protocol tailored for this  examination and automatic exposure control for dose modulation.    FINDINGS:  There is mild atrophy and compensatory dilatation of the ventricles. There is a  small area of chronic encephalomalacia in the right parietal lobe.. There is no  intracranial hemorrhage, extra-axial collection, or mass effect. The basilar  cisterns are open. No CT evidence of acute infarct.    The bone windows demonstrate no abnormalities. There is mucosal thickening in  the right maxillary sinus..    - Impression -  No evidence of acute process.        Electronically signed by HERNAN PALACIO  US Result (most recent):  US RETROPERITONEAL COMPLETE 04/29/2025    Narrative  EXAM: US RETROPERITONEAL COMPLETE    Clinical history: jennifer  INDICATION:   jennifer  FINDINGS:  Ultrasound examination of the kidneys.    RIGHT KIDNEY: measures 10.2 cm.  LEFT KIDNEY: measures 10.3 cm.  ABDOMINAL AORTA: Not visualized due to bowel gas  IVC: Not visualized due to bowel gas  BLADDER: 
diarrhea/constipation.     Review of Systems - Pertinent ROS completed as above.    Impression Clinically stable. Pending LLE bypass Monday Dr. Garcia     Patient Vitals for the past 24 hrs:   BP   04/12/25 0756 (!) 156/81   04/11/25 2020 121/63      Patient Vitals for the past 24 hrs:   Pulse   04/12/25 0756 83   04/11/25 2020 74      Patient Vitals for the past 24 hrs:   Resp   04/12/25 0756 18   04/11/25 2020 18      Patient Vitals for the past 24 hrs:   Temp   04/12/25 0756 97.7 °F (36.5 °C)   04/11/25 2020 97.7 °F (36.5 °C)        SpO2 Readings from Last 6 Encounters:   04/12/25 98%   10/07/24 96%   06/25/24 98%   06/05/24 96%   04/01/24 95%   03/26/24 98%            Body mass index is 25.83 kg/m². -  Wt Readings from Last 10 Encounters:   04/09/25 84 kg (185 lb 3 oz)   10/07/24 79.4 kg (175 lb)   06/24/24 78 kg (172 lb)   06/05/24 77.1 kg (170 lb)   04/01/24 88 kg (194 lb 0.1 oz)   03/22/24 84.1 kg (185 lb 6.4 oz)   03/20/24 81.3 kg (179 lb 3.2 oz)   03/18/24 86.2 kg (190 lb)   03/07/24 85.7 kg (189 lb)   01/27/24 90.7 kg (199 lb 15.3 oz)      I/O last 3 completed shifts:  In: -   Out: 1000 [Urine:1000]  I/O this shift:  In: -   Out: 925 [Urine:925]        Physical Exam:             General:  Awake, alert, NAD, conversant and responding appropriately  well nourished,   well developed,   appears stated age    Ears/Eyes:  Hearing impaired  Mild conjunctival injection bilateral eyes  PERRL   Mouth/Throat:  Mucous membranes normal pink and moist       Lungs:  No respiratory distress. Currently on RA. Symmetric chest rise.  Lungs CTAB   CVS:  Regular rate and rhythm  No  murmur. No click, rub or gallop    Palpable pedal pulses  bilaterally   Abdomen:  Soft, non-tender. No rebound or guarding.  Bowel sounds normal     Extremities:  No edema noted      Skin:  No rash. No noted wounds  No cyanosis, jaundice   Lymph nodes:  Not examined    Musculoskeletal Muscle bulk nml   Neuro Face symmetrical. No slurred speech. 
lubrifresh P.M. (artificial tears) ophthalmic ointment   Both Eyes QHS Foster Garcia MD   Given at 05/09/25 2100    glucose chewable tablet 16 g  4 tablet Oral PRN Foster Garcia MD   16 g at 05/09/25 0755    dextrose bolus 10% 125 mL  125 mL IntraVENous PRN Foster Garcia MD        Or    dextrose bolus 10% 250 mL  250 mL IntraVENous PRN Foster Garcia MD   Stopped at 04/09/25 0812    glucagon injection 1 mg  1 mg SubCUTAneous PRN Foster Garcia MD        dextrose 10 % infusion   IntraVENous Continuous PRN Foster Garcia MD        insulin lispro (HUMALOG,ADMELOG) injection vial 0-8 Units  0-8 Units SubCUTAneous 4x Daily AC & HS Foster Garcia MD   2 Units at 05/10/25 1346    sodium chloride flush 0.9 % injection 5-40 mL  5-40 mL IntraVENous 2 times per day Foster Garcia MD   10 mL at 05/10/25 0916    sodium chloride flush 0.9 % injection 5-40 mL  5-40 mL IntraVENous PRN Foster Garcia MD   10 mL at 05/02/25 1124    0.9 % sodium chloride infusion   IntraVENous PRN Foster Garcia MD 5 mL/hr at 04/23/25 0452 New Bag at 04/23/25 0452    ondansetron (ZOFRAN-ODT) disintegrating tablet 4 mg  4 mg Oral Q8H PRN Foster Garcia MD        Or    ondansetron (ZOFRAN) injection 4 mg  4 mg IntraVENous Q6H PRN Foster Garcia MD        polyethylene glycol (GLYCOLAX) packet 17 g  17 g Oral Daily PRN Foster Garcia MD   17 g at 05/07/25 1039    acetaminophen (TYLENOL) tablet 650 mg  650 mg Oral Q6H PRN Foster Garcia MD   650 mg at 05/08/25 0254    Or    acetaminophen (TYLENOL) suppository 650 mg  650 mg Rectal Q6H PRN Foster Garcia MD           Vital Signs:  /63   Pulse 57   Temp 97.8 °F (36.6 °C) (Oral)   Resp 21   Ht 1.803 m (5' 10.98\")   Wt 84 kg (185 lb 3 oz) Comment: per chart  SpO2 (!) 83%   BMI 25.84 kg/m²  O2 Device: None (Room air)      Physical exam:   General : Elderly male lying in bed, no apparent distress  Neck : Supple  Lungs: Diminished breath sounds in lung 
       Vital Signs:  BP (!) 111/49   Pulse 54   Temp 97 °F (36.1 °C) (Axillary)   Resp 26   Ht 1.778 m (5' 10\")   Wt 83.9 kg (185 lb)   SpO2 92%   BMI 26.54 kg/m²  O2 Device: None (Room air)      Physical exam:      General : Awake, confused  HEENT : Oral mucosa is moist  Neck : Supple, no JVD  Chest wall: Non-tender  Lungs: Normal breath sounds.   Heart : Regular with normal S1 and S2  Abdomen: Soft and non-tender  Extremities: Warm, no edema  Neuro: Alert, confused      Lab/Diagnostic Studies:    Recent Results (from the past 24 hours)   POCT Glucose    Collection Time: 05/18/25 11:27 AM   Result Value Ref Range    POC Glucose 144 (H) 65 - 117 mg/dL    Performed by: Mitch Og RN    POCT Glucose    Collection Time: 05/18/25  4:51 PM   Result Value Ref Range    POC Glucose 154 (H) 65 - 117 mg/dL    Performed by: Mitch Og RN    POCT Glucose    Collection Time: 05/18/25  7:52 PM   Result Value Ref Range    POC Glucose 169 (H) 65 - 117 mg/dL    Performed by: VICKEY Alonso RN    Basic Metabolic Panel    Collection Time: 05/19/25  4:52 AM   Result Value Ref Range    Sodium 135 (L) 136 - 145 mmol/L    Potassium 4.1 3.5 - 5.1 mmol/L    Chloride 98 97 - 108 mmol/L    CO2 32 21 - 32 mmol/L    Anion Gap 5 2 - 12 mmol/L    Glucose 112 (H) 65 - 100 mg/dL    BUN 44 (H) 6 - 20 MG/DL    Creatinine 3.42 (H) 0.70 - 1.30 MG/DL    BUN/Creatinine Ratio 13 12 - 20      Est, Glom Filt Rate 18 (L) >60 ml/min/1.73m2    Calcium 7.1 (L) 8.5 - 10.1 MG/DL   Magnesium    Collection Time: 05/19/25  4:52 AM   Result Value Ref Range    Magnesium 2.2 1.6 - 2.4 mg/dL   Phosphorus    Collection Time: 05/19/25  4:52 AM   Result Value Ref Range    Phosphorus 5.3 (H) 2.6 - 4.7 MG/DL   CBC    Collection Time: 05/19/25  4:52 AM   Result Value Ref Range    WBC 8.0 4.1 - 11.1 K/uL    RBC 2.44 (L) 4.10 - 5.70 M/uL    Hemoglobin 8.0 (L) 12.1 - 17.0 g/dL    Hematocrit 26.7 (L) 36.6 - 50.3 %    .4 (H) 80.0 - 99.0 FL    MCH 32.8 26.0 - 
  05/08/25 2337 127/61 97.7 °F (36.5 °C) Oral 56 18 94 %   05/08/25 2003 116/64 97.2 °F (36.2 °C) Oral 56 20 94 %   05/08/25 1336 111/69 97.6 °F (36.4 °C) Oral 56 25 92 %   05/08/25 1134 118/64 98 °F (36.7 °C) Oral 60 18 95 %   05/08/25 0800 106/63 98 °F (36.7 °C) Axillary 60 25 97 %         Intake/Output Summary (Last 24 hours) at 5/9/2025 0747  Last data filed at 5/9/2025 0545  Gross per 24 hour   Intake --   Output 975 ml   Net -975 ml        I had a face to face encounter and independently examined this patient on 5/9/2025, as outlined below:  PHYSICAL EXAM:  General: Alert, ill looking  EENT:  EOMI. dry mucosa  Resp:  Diminished breath sound  CV:  Regular  rhythm  GI:  Soft, Non distended, Non tender  Neurologic:  Alert and awake, hard to understand, poor historian  Psych:   Not anxious nor agitated  Extremities: Right foot bandaged    Reviewed most current lab test results and cultures  YES  Reviewed most current radiology test results   YES  Review and summation of old records today    NO  Reviewed patient's current orders and MAR    YES  PMH/SH reviewed - no change compared to H&P    Procedures: see electronic medical records for all procedures/Xrays and details which were not copied into this note but were reviewed prior to creation of Plan.      LABS:  I reviewed today's most current labs and imaging studies.  Pertinent labs include:  Recent Labs     05/07/25 0426 05/08/25 0405 05/09/25  0657   WBC 6.8 7.6 6.6   HGB 8.6* 8.9* 9.1*   HCT 30.1* 31.7* 30.9*    258 254     Recent Labs     05/07/25 0426 05/08/25 0405 05/09/25  0657    140 143   K 5.0 5.3* 4.5   * 112* 112*   CO2 19* 21 22   GLUCOSE 67 82 53*   * 118* 112*   CREATININE 2.95* 2.99* 2.78*   CALCIUM 7.7* 7.8* 7.7*   PHOS 5.4* 5.4* 5.2*       Signed: Sujata Espitia MD          
  atorvastatin (LIPITOR) 80 MG tablet   No No   Sig: Take 1 tablet by mouth nightly   levothyroxine (SYNTHROID) 125 MCG tablet   No No   Sig: Take 1 tablet by mouth every morning (before breakfast)   metFORMIN (GLUCOPHAGE-XR) 500 MG extended release tablet   No No   Sig: Take 2 tablets by mouth Daily with supper   metoprolol succinate (TOPROL XL) 25 MG extended release tablet   No No   Sig: Take 0.5 tablets by mouth daily   prasugrel (EFFIENT) 10 MG TABS   No No   Sig: Take 1 tablet by mouth daily   tamsulosin (FLOMAX) 0.4 MG capsule   No No   Sig: Take 1 capsule by mouth daily      Facility-Administered Medications: None             Review of Systems:     Gen: Negative for chills, fevers, weight loss, weight gain   HEENT: Negative for headache, vision changes, ear ache or discharge, tingling,  nasal discharge, swelling, lumps in neck, sores on tongue   CV:  Negative for chest pain, dyspnea on exertion, leg edema   Lungs: Negative for shortness of breath, cough, wheezing   Abdomen: Negative for abdominal pain, nausea, vomiting, diarrhea, constipation   Genitourinary: Negative for genital pain or genital discharge     Neuro: Negative for headache, numbness, tingling, extremity weakness,  syncope, seizures    Skin: Negative for rash, sores/open wounds   Musculoskeletal: Negative for joint pain, joint swelling, joint erythema    Endocrine: Negative for high or low blood sugars, heat or cold intolerance    Psych: Negative for manic behavior     Vitals:    04/30/25 1128   BP: 111/75   Pulse: 60   Resp:    Temp:    SpO2: 100%           PHYSICAL EXAM:  General:          WD, WN. Alert, cooperative, no acute distress    EENT:              EOMI. Anicteric sclerae. MMM  Resp:               CTA bilaterally, no wheezing or rales.  No accessory muscle use  CV:                  Regular  rhythm,  No edema  GI:                   Soft, Non distended, Non tender.  +Bowel sounds  Neurologic:      Alert and oriented X 3, normal speech, 
  atorvastatin (LIPITOR) 80 MG tablet   No No   Sig: Take 1 tablet by mouth nightly   levothyroxine (SYNTHROID) 125 MCG tablet   No No   Sig: Take 1 tablet by mouth every morning (before breakfast)   metFORMIN (GLUCOPHAGE-XR) 500 MG extended release tablet   No No   Sig: Take 2 tablets by mouth Daily with supper   metoprolol succinate (TOPROL XL) 25 MG extended release tablet   No No   Sig: Take 0.5 tablets by mouth daily   prasugrel (EFFIENT) 10 MG TABS   No No   Sig: Take 1 tablet by mouth daily   tamsulosin (FLOMAX) 0.4 MG capsule   No No   Sig: Take 1 capsule by mouth daily      Facility-Administered Medications: None             Review of Systems:     Gen: Negative for chills, fevers, weight loss, weight gain   HEENT: Negative for headache, vision changes, ear ache or discharge, tingling,  nasal discharge, swelling, lumps in neck, sores on tongue   CV:  Negative for chest pain, dyspnea on exertion, leg edema   Lungs: Negative for shortness of breath, cough, wheezing   Abdomen: Negative for abdominal pain, nausea, vomiting, diarrhea, constipation   Genitourinary: Negative for genital pain or genital discharge     Neuro: Negative for headache, numbness, tingling, extremity weakness,  syncope, seizures    Skin: Negative for rash, sores/open wounds   Musculoskeletal: Negative for joint pain, joint swelling, joint erythema    Endocrine: Negative for high or low blood sugars, heat or cold intolerance    Psych: Negative for manic behavior     Vitals:    05/02/25 1406   BP:    Pulse: 60   Resp: 18   Temp:    SpO2: 98%           PHYSICAL EXAM:  General:          WD, WN. Alert, cooperative, no acute distress    EENT:              EOMI. Anicteric sclerae. MMM  Resp:               CTA bilaterally, no wheezing or rales.  No accessory muscle use  CV:                  Regular  rhythm,  No edema  GI:                   Soft, Non distended, Non tender.  +Bowel sounds  Neurologic:      Alert and oriented X 3, normal speech, 
0846 121/62 -- -- -- -- --   05/10/25 0315 126/63 97.5 °F (36.4 °C) Oral 55 15 93 %   05/10/25 0009 (!) 118/53 98.1 °F (36.7 °C) Oral 59 17 98 %   05/09/25 1923 134/66 97.9 °F (36.6 °C) Oral 57 18 97 %   05/09/25 1628 130/64 -- -- 56 18 --   05/09/25 1439 -- -- -- 54 -- --   05/09/25 1425 124/66 98 °F (36.7 °C) Oral 54 20 95 %   05/09/25 1100 92/79 98.7 °F (37.1 °C) Oral 52 27 96 %         Intake/Output Summary (Last 24 hours) at 5/10/2025 0901  Last data filed at 5/10/2025 0846  Gross per 24 hour   Intake 930 ml   Output 1625 ml   Net -695 ml        I had a face to face encounter and independently examined this patient on 5/10/2025, as outlined below:  PHYSICAL EXAM:  General: Alert, ill looking  EENT:  EOMI. dry mucosa  Resp:  Diminished breath sound  CV:  Regular  rhythm  GI:  Soft, Non distended, Non tender  Neurologic:  AOx3  Psych:   Not anxious nor agitated  Extremities: Right foot bandaged    Reviewed most current lab test results and cultures  YES  Reviewed most current radiology test results   YES  Review and summation of old records today    NO  Reviewed patient's current orders and MAR    YES  PMH/SH reviewed - no change compared to H&P    Procedures: see electronic medical records for all procedures/Xrays and details which were not copied into this note but were reviewed prior to creation of Plan.      LABS:  I reviewed today's most current labs and imaging studies.  Pertinent labs include:  Recent Labs     05/08/25  0405 05/09/25  0657 05/10/25  0623   WBC 7.6 6.6 7.4   HGB 8.9* 9.1* 9.4*   HCT 31.7* 30.9* 31.7*    254 238     Recent Labs     05/08/25  0405 05/09/25  0657 05/10/25  0623    143 142   K 5.3* 4.5 4.8   * 112* 114*   CO2 21 22 24   GLUCOSE 82 53* 180*   * 112* 111*   CREATININE 2.99* 2.78* 3.02*   CALCIUM 7.8* 7.7* 7.8*   MG  --   --  3.3*   PHOS 5.4* 5.2* 5.7*       Signed: Sujata Espitia MD          
1445 (!) 118/58 -- -- 60 -- --   05/23/25 1430 130/60 -- -- 58 -- --   05/23/25 1419 (!) 108/52 -- -- 58 -- --   05/23/25 1415 112/64 97.9 °F (36.6 °C) -- 58 25 98 %   05/23/25 1200 132/63 -- -- 58 29 97 %         Intake/Output Summary (Last 24 hours) at 5/24/2025 1151  Last data filed at 5/24/2025 0241  Gross per 24 hour   Intake 500 ml   Output 500 ml   Net 0 ml        I had a face to face encounter and independently examined this patient on 5/24/2025, as outlined below:  PHYSICAL EXAM:  General: Alert, ill looking, cooperative  EENT:  EOMI. Anicteric sclerae.  Resp:  diminished breath sound  CV:  Regular  rhythm,  No edema  GI:  Soft, Non distended, Non tender.  +Bowel sounds  Neurologic:  Alert and awake, following commands  Psych:   Not anxious nor agitated  Skin:  Surgical site in left leg intact    Reviewed most current lab test results and cultures  YES  Reviewed most current radiology test results   YES  Review and summation of old records today    NO  Reviewed patient's current orders and MAR    YES  PMH/SH reviewed - no change compared to H&P    Procedures: see electronic medical records for all procedures/Xrays and details which were not copied into this note but were reviewed prior to creation of Plan.      LABS:  I reviewed today's most current labs and imaging studies.  Pertinent labs include:  Recent Labs     05/22/25  0708 05/23/25  0457 05/24/25  0601   WBC 6.9 7.9 6.5   HGB 8.4* 8.6* 8.6*   HCT 27.6* 28.3* 28.2*    203 200     Recent Labs     05/22/25  0708 05/23/25  0457 05/24/25  0601   * 131* 132*   K 3.8 3.9 3.7   CL 93* 94* 96*   CO2 30 31 28   GLUCOSE 130* 81 138*   BUN 29* 40* 25*   CREATININE 3.16* 4.06* 3.09*   CALCIUM 7.2* 7.2* 7.3*   MG 1.8 2.1 2.0   PHOS 3.6 4.6 3.3       Signed: Sujata Espitia MD          
28.2* 25.5*    175     Recent Labs     05/24/25  0601 05/26/25  0857   * 134*   K 3.7 4.2   CL 96* 100   CO2 28 28   BUN 25* 38*   CREATININE 3.09* 4.42*   MG 2.0  --    PHOS 3.3  --        Imaging:    Xray Result (most recent):  XR CHEST PORTABLE 05/20/2025    Narrative  EXAM:  XR CHEST PORTABLE    INDICATION: Acute respiratory failure.      Comparison to 5/18/2025. Portable exam obtained at 1922 demonstrates an interval  increase in pulmonary edema. Bilateral pleural effusions with underlying  atelectasis persists.    Impression  Increased edema. Stable bilateral effusions with underlying  atelectasis.        Electronically signed by ENEIDA Canseco MD  Pulmonology  [place], [place]  [phone]  5/26/2025     
footwear to protect their feet, and this includes shoes and insoles. E11.65   Glucagon (GVOKE HYPOPEN 2-PACK) 0.5 MG/0.1ML SOAJ   No No   Sig: Use as needed for hypoglycemia (one 2-pack)   NOVOLOG MIX 70/30 FLEXPEN injection   No No   Sig: Take 24 units before breakfast and 22 units before dinner (take 15 minutes before the meal time), please set up a follow up with Dr Navarro by calling [phone]   aspirin 81 MG EC tablet   No No   Sig: Take 1 tablet by mouth daily   atorvastatin (LIPITOR) 80 MG tablet   No No   Sig: Take 1 tablet by mouth nightly   levothyroxine (SYNTHROID) 125 MCG tablet   No No   Sig: Take 1 tablet by mouth every morning (before breakfast)   metFORMIN (GLUCOPHAGE-XR) 500 MG extended release tablet   No No   Sig: Take 2 tablets by mouth Daily with supper   metoprolol succinate (TOPROL XL) 25 MG extended release tablet   No No   Sig: Take 0.5 tablets by mouth daily   prasugrel (EFFIENT) 10 MG TABS   No No   Sig: Take 1 tablet by mouth daily   tamsulosin (FLOMAX) 0.4 MG capsule   No No   Sig: Take 1 capsule by mouth daily      Facility-Administered Medications: None             Review of Systems:     Gen: Negative for chills, fevers, weight loss, weight gain   HEENT: Negative for headache, vision changes, ear ache or discharge, tingling,  nasal discharge, swelling, lumps in neck, sores on tongue   CV:  Negative for chest pain, dyspnea on exertion, leg edema   Lungs: Negative for shortness of breath, cough, wheezing   Abdomen: Negative for abdominal pain, nausea, vomiting, diarrhea, constipation   Genitourinary: Negative for genital pain or genital discharge     Neuro: Negative for headache, numbness, tingling, extremity weakness,  syncope, seizures    Skin: Negative for rash, sores/open wounds   Musculoskeletal: Negative for joint pain, joint swelling, joint erythema    Endocrine: Negative for high or low blood sugars, heat or cold intolerance    Psych: Negative for manic behavior     Vitals:    
tablet by mouth daily   atorvastatin (LIPITOR) 80 MG tablet   No No   Sig: Take 1 tablet by mouth nightly   levothyroxine (SYNTHROID) 125 MCG tablet   No No   Sig: Take 1 tablet by mouth every morning (before breakfast)   metFORMIN (GLUCOPHAGE-XR) 500 MG extended release tablet   No No   Sig: Take 2 tablets by mouth Daily with supper   metoprolol succinate (TOPROL XL) 25 MG extended release tablet   No No   Sig: Take 0.5 tablets by mouth daily   prasugrel (EFFIENT) 10 MG TABS   No No   Sig: Take 1 tablet by mouth daily   tamsulosin (FLOMAX) 0.4 MG capsule   No No   Sig: Take 1 capsule by mouth daily      Facility-Administered Medications: None             Review of Systems:     Gen: Negative for chills, fevers, weight loss, weight gain   HEENT: Negative for headache, vision changes, ear ache or discharge, tingling,  nasal discharge, swelling, lumps in neck, sores on tongue   CV:  Negative for chest pain, dyspnea on exertion, leg edema   Lungs: Negative for shortness of breath, cough, wheezing   Abdomen: Negative for abdominal pain, nausea, vomiting, diarrhea, constipation   Genitourinary: Negative for genital pain or genital discharge     Neuro: Negative for headache, numbness, tingling, extremity weakness,  syncope, seizures    Skin: Negative for rash, sores/open wounds   Musculoskeletal: Negative for joint pain, joint swelling, joint erythema    Endocrine: Negative for high or low blood sugars, heat or cold intolerance    Psych: Negative for manic behavior     Vitals:    05/01/25 2148   BP: 111/64   Pulse: 68   Resp: 24   Temp: 98 °F (36.7 °C)   SpO2: 99%           PHYSICAL EXAM:  General:          WD, WN. Alert, cooperative, no acute distress    EENT:              EOMI. Anicteric sclerae. MMM  Resp:               CTA bilaterally, no wheezing or rales.  No accessory muscle use  CV:                  Regular  rhythm,  No edema  GI:                   Soft, Non distended, Non tender.  +Bowel sounds  Neurologic:      
128/56 (!) 96.5 °F (35.8 °C) -- 52 16 100 % -- --   05/15/25 1515 (!) 118/48 (!) 96.3 °F (35.7 °C) -- 50 19 100 % -- --   05/15/25 1505 (!) 103/44 (!) 96.3 °F (35.7 °C) Rectal (!) 49 15 100 % -- --   05/15/25 1500 (!) 87/44 (!) 96.1 °F (35.6 °C) -- (!) 48 24 100 % -- --   05/15/25 1459 -- (!) 96.1 °F (35.6 °C) -- (!) 49 12 100 % -- --   05/15/25 1458 (!) 98/41 (!) 96.1 °F (35.6 °C) -- (!) 49 23 100 % -- --   05/15/25 1445 (!) 93/41 (!) 96 °F (35.6 °C) -- 50 20 100 % -- --   05/15/25 1442 (!) 103/38 (!) 96 °F (35.6 °C) -- (!) 48 18 100 % -- --   05/15/25 1430 (!) 98/44 (!) 96.1 °F (35.6 °C) -- 50 16 100 % -- --   05/15/25 1415 (!) 92/43 (!) 96.1 °F (35.6 °C) -- 52 20 100 % -- --   05/15/25 1411 (!) 101/45 (!) 96.1 °F (35.6 °C) -- (!) 48 (!) 32 -- -- --   05/15/25 1410 (!) 101/45 (!) 96.1 °F (35.6 °C) -- 51 18 -- -- --   05/15/25 1400 -- (!) 96.1 °F (35.6 °C) -- 51 25 99 % -- --   05/15/25 1345 (!) 102/49 (!) 96.2 °F (35.7 °C) -- 50 17 100 % -- --   05/15/25 1330 111/62 (!) 96.1 °F (35.6 °C) Rectal 50 16 98 % -- --   05/15/25 1315 (!) 115/43 -- -- (!) 49 23 100 % -- --   05/15/25 1304 (!) 125/112 -- -- -- -- -- -- --   05/15/25 1245 -- -- -- (!) 48 (!) 9 98 % -- --   05/15/25 1215 -- -- -- (!) 49 (!) 9 100 % -- --   05/15/25 1152 110/73 -- -- (!) 49 (!) 6 97 % 1.778 m (5' 10\") 83.9 kg (185 lb)   05/15/25 1115 (!) 114/57 97.6 °F (36.4 °C) Axillary 54 21 96 % -- --   05/15/25 0745 (!) 128/59 97.3 °F (36.3 °C) Axillary 54 (!) 166 96 % -- --   05/15/25 0530 -- 97.2 °F (36.2 °C) Oral -- -- -- -- --   05/15/25 0430 -- (!) 95.8 °F (35.4 °C) Rectal -- -- -- -- --   05/15/25 0228 110/73 (!) 95.4 °F (35.2 °C) Rectal (!) 49 16 90 % -- --   05/15/25 0000 (!) 114/58 (!) 95 °F (35 °C) Rectal (!) 49 16 90 % -- --   05/14/25 2300 130/62 -- -- 51 -- -- -- --   05/14/25 2000 (!) 111/46 97.7 °F (36.5 °C) Axillary 52 18 91 % -- --   05/14/25 1900 121/63 -- -- 54 -- 91 % -- --   05/14/25 1712 (!) 102/50 97.7 °F (36.5 °C) -- 53 18 96 % -- 
bilaterally, no wheezing or rales.  No accessory muscle use  CV:                  Regular  rhythm,  No edema  GI:                   Soft, Non distended, Non tender.  +Bowel sounds  Neurologic:      Alert and oriented X 3, normal speech,   Psych:             Good insight. Not anxious nor agitated  Skin:                No rashes.  No jaundice.  Extremity: L/foot dressing+    Recent Results (from the past 24 hours)   POCT Glucose    Collection Time: 04/07/25  4:32 PM   Result Value Ref Range    POC Glucose 212 (H) 65 - 117 mg/dL    Performed by: Megan Aguilar PCT    POCT Glucose    Collection Time: 04/07/25  7:59 PM   Result Value Ref Range    POC Glucose 325 (H) 65 - 117 mg/dL    Performed by: Whitepagesa PCT    POCT Glucose    Collection Time: 04/08/25  6:45 AM   Result Value Ref Range    POC Glucose 76 65 - 117 mg/dL    Performed by: Whitepagesa PCT    CBC with Auto Differential    Collection Time: 04/08/25 11:19 AM   Result Value Ref Range    WBC 9.0 4.1 - 11.1 K/uL    RBC 3.61 (L) 4.10 - 5.70 M/uL    Hemoglobin 12.2 12.1 - 17.0 g/dL    Hematocrit 37.1 36.6 - 50.3 %    .8 (H) 80.0 - 99.0 FL    MCH 33.8 26.0 - 34.0 PG    MCHC 32.9 30.0 - 36.5 g/dL    RDW 12.8 11.5 - 14.5 %    Platelets 288 150 - 400 K/uL    MPV 9.8 8.9 - 12.9 FL    Nucleated RBCs 0.0 0  WBC    nRBC 0.00 0.00 - 0.01 K/uL    Neutrophils % 70.1 32.0 - 75.0 %    Lymphocytes % 19.7 12.0 - 49.0 %    Monocytes % 6.5 5.0 - 13.0 %    Eosinophils % 2.6 0.0 - 7.0 %    Basophils % 0.4 0.0 - 1.0 %    Immature Granulocytes % 0.7 (H) 0.0 - 0.5 %    Neutrophils Absolute 6.28 1.80 - 8.00 K/UL    Lymphocytes Absolute 1.76 0.80 - 3.50 K/UL    Monocytes Absolute 0.58 0.00 - 1.00 K/UL    Eosinophils Absolute 0.23 0.00 - 0.40 K/UL    Basophils Absolute 0.04 0.00 - 0.10 K/UL    Immature Granulocytes Absolute 0.06 (H) 0.00 - 0.04 K/UL    Differential Type AUTOMATED     Comprehensive Metabolic Panel    Collection Time: 04/08/25 11:19 AM   Result Value 
      [1]  Rifampin is not to be used for mono-therapy.                    Culture, Blood 1 [[id number]] Collected: 04/03/25 2110    Order Status: Completed Specimen: Blood Updated: 04/08/25 2332     Special Requests --        RIGHT  Antecubital       Culture NO GROWTH 5 DAYS       Culture, Blood 2 [[id number]] Collected: 04/03/25 2110    Order Status: Completed Specimen: Blood Updated: 04/08/25 2332     Special Requests --        LEFT  Antecubital       Culture NO GROWTH 5 DAYS       Culture, Wound (with Gram Stain) [[id number]]  (Abnormal)  (Susceptibility) Collected: 04/02/25 1830    Order Status: Completed Specimen: Foot, Left Updated: 04/07/25 1248     Special Requests NO SPECIAL REQUESTS        Gram Stain NO WBC'S SEEN         NO ORGANISMS SEEN        Culture       MODERATE Methicillin Resistant Staphylococcus aureus          Susceptibility        Methicillin-Resistant Staphylococcus aureus      BACTERIAL SUSCEPTIBILITY PANEL MELLISSA      ciprofloxacin <=0.5 ug/mL Sensitive      clindamycin >=4 ug/mL Resistant      DAPTOmycin 1 ug/mL Sensitive      doxycycline <=0.5 ug/mL Sensitive      erythromycin >=8 ug/mL Resistant      gentamicin <=0.5 ug/mL Sensitive      levofloxacin 0.25 ug/mL Sensitive      linezolid 2 ug/mL Sensitive      oxacillin >=4 ug/mL Resistant      rifampin <=0.5 ug/mL Sensitive  [1]       tetracycline <=1 ug/mL Sensitive      trimethoprim-sulfamethoxazole <=10 ug/mL Sensitive      vancomycin 1 ug/mL Sensitive                   [1]  Rifampin is not to be used for mono-therapy.                    Culture, Anaerobic [[id number]] Collected: 04/02/25 1830    Order Status: Completed Specimen: Foot, Left Updated: 04/04/25 1341     Special Requests NO SPECIAL REQUESTS        Culture NO ANAEROBES ISOLATED       Culture, Blood 1 [[id number]]  (Abnormal)  (Susceptibility) Collected: 03/31/25 1828    Order Status: Completed Specimen: Blood Updated: 04/04/25 1045     Special Requests --        NO SPECIAL 
 Listeria monocytogenes by PCR Not detected        STAPHYLOCOCCUS Detected        Staphylococcus Aureus Detected        Staphylococcus epidermidis by PCR Not detected        Staphylococcus lugdunensis by PCR Not detected        STREPTOCOCCUS Not detected        Streptococcus agalactiae (Group B) Not detected        Strep pneumoniae Not detected        Strep pyogenes,(Grp. A) Not detected        Acinetobacter calcoac baumannii complex by PCR Not detected        Bacteroides fragilis by PCR Not detected        Enterobacteriaceae by PCR Not detected        Enterobacter cloacae complex by PCR Not detected        Escherichia Coli Not detected        Klebsiella aerogenes by PCR Not detected        Klebsiella oxytoca by PCR Not detected        Klebsiella pneumoniae group by PCR Not detected        Proteus by PCR Not detected        Salmonella species by PCR Not detected        Serratia marcescens by PCR Not detected        Haemophilus Influenzae by PCR Not detected        Neisseria meningitidis by PCR Not detected        Pseudomonas aeruginosa Not detected        Stenotrophomonas maltophilia by PCR Not detected        Candida albicans by PCR Not detected        Candida auris by PCR Not detected        Candida glabrata Not detected        Candida krusei by PCR Not detected        Candida parapsilosis by PCR Not detected        Candida tropicalis by PCR Not detected        Cryptococcus neoformans/gattii by PCR Not detected        Resistant gene targets          Resistant gene meca/c & mrej by PCR Detected        [employer] test comment       False positive results may rarely occur. Correlate with clinical,epidemiologic, and other laboratory findings           Comment: Please see [facility] Interpretation Guide in [employer] Links               Recent Labs     04/11/25 0311 04/13/25  0416   WBC 7.9 6.3   HGB 12.1 12.6   HCT 37.1 39.0    271     Recent Labs     04/11/25 0311 04/13/25  0416    135*   K 4.4 4.0    106   CO2

## 2025-05-27 NOTE — PLAN OF CARE
Problem: Chronic Conditions and Co-morbidities  Goal: Patient's chronic conditions and co-morbidity symptoms are monitored and maintained or improved  Outcome: Progressing     Problem: Discharge Planning  Goal: Discharge to home or other facility with appropriate resources  Outcome: Progressing     Problem: Musculoskeletal - Adult  Goal: Return mobility to safest level of function  Outcome: Progressing  Goal: Maintain proper alignment of affected body part  Outcome: Progressing  Goal: Return ADL status to a safe level of function  Outcome: Progressing     Problem: Neurosensory - Adult  Goal: Achieves stable or improved neurological status  Outcome: Progressing  Goal: Absence of seizures  Outcome: Progressing  Goal: Remains free of injury related to seizures activity  Outcome: Progressing  Goal: Achieves maximal functionality and self care  Outcome: Progressing   ry with dme at baseline and resides with his son. Unknown home set up, stairs and/or support availability. Pt admitted 2/2 Left MCP abscess and LLE cellulitis.       
  Problem: Chronic Conditions and Co-morbidities  Goal: Patient's chronic conditions and co-morbidity symptoms are monitored and maintained or improved  Outcome: Progressing  Flowsheets (Taken 4/16/2025 2040)  Care Plan - Patient's Chronic Conditions and Co-Morbidity Symptoms are Monitored and Maintained or Improved: Monitor and assess patient's chronic conditions and comorbid symptoms for stability, deterioration, or improvement     Problem: Pain  Goal: Verbalizes/displays adequate comfort level or baseline comfort level  Outcome: Progressing     Problem: Discharge Planning  Goal: Discharge to home or other facility with appropriate resources  Outcome: Progressing  Flowsheets (Taken 4/16/2025 2040)  Discharge to home or other facility with appropriate resources:   Identify barriers to discharge with patient and caregiver   Arrange for needed discharge resources and transportation as appropriate   Identify discharge learning needs (meds, wound care, etc)     Problem: Musculoskeletal - Adult  Goal: Return mobility to safest level of function  Outcome: Progressing     Problem: Respiratory - Adult  Goal: Achieves optimal ventilation and oxygenation  Outcome: Progressing     Problem: Skin/Tissue Integrity - Adult  Goal: Skin integrity remains intact  Description: 1.  Monitor for areas of redness and/or skin breakdown  2.  Assess vascular access sites hourly  3.  Every 4-6 hours minimum:  Change oxygen saturation probe site  4.  Every 4-6 hours:  If on nasal continuous positive airway pressure, respiratory therapy assess nares and determine need for appliance change or resting period  Outcome: Progressing     Problem: Genitourinary - Adult  Goal: Absence of urinary retention  Outcome: Progressing     Problem: Metabolic/Fluid and Electrolytes - Adult  Goal: Electrolytes maintained within normal limits  Outcome: Progressing     Problem: Safety - Adult  Goal: Free from fall injury  4/16/2025 1587 by Rebeca Toro 
  Problem: Discharge Planning  Goal: Discharge to home or other facility with appropriate resources  Outcome: Progressing     Problem: Neurosensory - Adult  Goal: Absence of seizures  Outcome: Progressing     Problem: Respiratory - Adult  Goal: Achieves optimal ventilation and oxygenation  Outcome: Progressing     
  Problem: Occupational Therapy - Adult  Goal: By Discharge: Performs self-care activities at highest level of function for planned discharge setting.  See evaluation for individualized goals.  Description: FUNCTIONAL STATUS PRIOR TO ADMISSION:    Receives Help From: Family (Patient is caregiver to wife with cancer; son lives in the home), Prior Level of Assist for ADLs: Independent,  ,  ,  ,  ,  , Prior Level of Assist for Homemaking: Needs assistance,  , Prior Level of Assist for Transfers: Independent, Active : No     HOME SUPPORT: Patient lived with D wife/w cancer and son who works. Scheduled for I&D L foot evening of 4-1, post this eval; re-assess PRN/clarify WBS.    Occupational Therapy Goals:  Initiated 4/1/2025  1.  Patient will perform grooming with Stand by Assist within 7 day(s).  2.  Patient will perform upper body dressing with Set-up within 7 day(s).  3.  Patient will perform lower body dressing with Moderate Assist within 7 day(s).  4.  Patient will perform toilet transfers with Moderate Assist  within 7 day(s).  5.  Patient will perform all aspects of toileting with Moderate Assist within 7 day(s).  6.  Patient will participate in upper extremity therapeutic exercise/activities with Supervision for 5 minutes within 7 day(s).    7.  Patient will utilize energy conservation, consistent WBS L LE techniques during functional activities with verbal, visual, and tactile cues within 7 day(s).   Outcome: Not Progressing as anticipated    OCCUPATIONAL THERAPY TREATMENT  Patient: Errol Brandt (80 y.o. male)  Date: 4/7/2025  Primary Diagnosis: Cellulitis of left foot [L03.116]  Retained foreign body of foot [M79.5]  Diabetic foot infection (HCC) [E11.628, L08.9]  Cellulitis and abscess of toe of left foot [L03.032, L02.612]  Other mucopurulent conjunctivitis of both eyes [H10.023]  Procedure(s) (LRB):  LEFT FOOT  INCISION AND DRAINAGE WITH FOREIGN BODY REMOVAL (Left) 5 Days Post-Op   Precautions: Fall 
  Problem: Occupational Therapy - Adult  Goal: By Discharge: Performs self-care activities at highest level of function for planned discharge setting.  See evaluation for individualized goals.  Description: FUNCTIONAL STATUS PRIOR TO ADMISSION:    Receives Help From: Family (Patient is caregiver to wife with cancer; son lives in the home), Prior Level of Assist for ADLs: Independent,  ,  ,  ,  ,  , Prior Level of Assist for Homemaking: Needs assistance,  , Prior Level of Assist for Transfers: Independent, Active : No     HOME SUPPORT: Patient lived with D wife/w cancer and son who works. Scheduled for I&D L foot evening of 4-1, post this eval; re-assess PRN/clarify WBS.    Occupational Therapy Goals:  Initiated 4/1/2025  1.  Patient will perform grooming with Stand by Assist within 7 day(s).  2.  Patient will perform upper body dressing with Set-up within 7 day(s).  3.  Patient will perform lower body dressing with Moderate Assist within 7 day(s).  4.  Patient will perform toilet transfers with Moderate Assist  within 7 day(s).  5.  Patient will perform all aspects of toileting with Moderate Assist within 7 day(s).  6.  Patient will participate in upper extremity therapeutic exercise/activities with Supervision for 5 minutes within 7 day(s).    7.  Patient will utilize energy conservation, consistent WBS L LE techniques during functional activities with verbal, visual, and tactile cues within 7 day(s).   Outcome: Progressing  OCCUPATIONAL THERAPY TREATMENT  Patient: Errol Brandt (80 y.o. male)  Date: 4/4/2025  Primary Diagnosis: Cellulitis of left foot [L03.116]  Retained foreign body of foot [M79.5]  Diabetic foot infection (HCC) [E11.628, L08.9]  Cellulitis and abscess of toe of left foot [L03.032, L02.612]  Other mucopurulent conjunctivitis of both eyes [H10.023]  Procedure(s) (LRB):  LEFT FOOT  INCISION AND DRAINAGE WITH FOREIGN BODY REMOVAL (Left) 2 Days Post-Op   Precautions: Fall Risk, General 
  Problem: Occupational Therapy - Adult  Goal: By Discharge: Performs self-care activities at highest level of function for planned discharge setting.  See evaluation for individualized goals.  Description: FUNCTIONAL STATUS PRIOR TO ADMISSION:    Receives Help From: Family (Patient is caregiver to wife with cancer; son lives in the home), Prior Level of Assist for ADLs: Independent,  ,  ,  ,  ,  , Prior Level of Assist for Homemaking: Needs assistance,  , Prior Level of Assist for Transfers: Independent, Active : No     HOME SUPPORT: Patient lived with D wife/w cancer and son who works. Scheduled for I&D L foot evening of 4-1, post this eval; re-assess PRN/clarify WBS.    Occupational Therapy Goals:  Initiated 4/1/2025  1.  Patient will perform grooming with Stand by Assist within 7 day(s).  2.  Patient will perform upper body dressing with Set-up within 7 day(s).  3.  Patient will perform lower body dressing with Moderate Assist within 7 day(s).  4.  Patient will perform toilet transfers with Moderate Assist  within 7 day(s).  5.  Patient will perform all aspects of toileting with Moderate Assist within 7 day(s).  6.  Patient will participate in upper extremity therapeutic exercise/activities with Supervision for 5 minutes within 7 day(s).    7.  Patient will utilize energy conservation, consistent WBS L LE techniques during functional activities with verbal, visual, and tactile cues within 7 day(s).   Outcome: Progressing Slowly  OCCUPATIONAL THERAPY TREATMENT  Patient: Errol Brandt (80 y.o. male)  Date: 4/3/2025  Primary Diagnosis: Cellulitis of left foot [L03.116]  Retained foreign body of foot [M79.5]  Diabetic foot infection (HCC) [E11.628, L08.9]  Cellulitis and abscess of toe of left foot [L03.032, L02.612]  Other mucopurulent conjunctivitis of both eyes [H10.023]  Procedure(s) (LRB):  LEFT FOOT  INCISION AND DRAINAGE WITH FOREIGN BODY REMOVAL (Left) 1 Day Post-Op   Precautions: Fall Risk, General 
  Problem: Occupational Therapy - Adult  Goal: By Discharge: Performs self-care activities at highest level of function for planned discharge setting.  See evaluation for individualized goals.  Description: Problem: Occupational Therapy - Adult  Goal: By Discharge: Performs self-care activities at highest level of function for planned discharge setting.  See evaluation for individualized goals.    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 Scheduled for I&D L foot evening of 4-1, post this eval; Vascular surgery pending 4-14 for L foot as first surgery \"failed\"; currently NWB L LE                                                                     Occupational Therapy Goals:  Initiated 4/1/2025  goals reviewed and updated 4-11-25  1.  Patient will perform grooming with Stand by Assist within 7 day(s).  2.  Patient will perform upper body dressing with Set-up within 7 day(s).  3.  Patient will perform lower body dressing with Moderate Assist within 7 day(s).  4.  Patient will perform toilet transfers with Moderate Assist  within 7 day(s).  5.  Patient will perform all aspects of toileting with Moderate Assist within 7 day(s).  6.  Patient will participate in upper extremity therapeutic exercise/activities with Supervision for 5 minutes within 7 day(s).    7.  Patient will utilize energy conservation, consistent WBS L LE techniques during functional activities with verbal, visual, and tactile cues within 7 day(s).                                                                                                                                                                    
  Problem: Occupational Therapy - Adult  Goal: By Discharge: Performs self-care activities at highest level of function for planned discharge setting.  See evaluation for individualized goals.  Description: Problem: Occupational Therapy - Adult  Goal: By Discharge: Performs self-care activities at highest level of function for planned discharge setting.  See evaluation for individualized goals.    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 Scheduled for I&D L foot evening of 4-1, post this eval; Vascular surgery pending 4-14 for L foot as first surgery \"failed\"; currently NWB L LE                                                                     Occupational Therapy Goals:  OT Re-evaluation 4/15/2025  Continue all goals below    Initiated 4/1/2025  goals reviewed and updated 4-11-25  1.  Patient will perform grooming with Stand by Assist within 7 day(s).  2.  Patient will perform upper body dressing with Set-up within 7 day(s).  3.  Patient will perform lower body dressing with Moderate Assist within 7 day(s).  4.  Patient will perform toilet transfers with Moderate Assist  within 7 day(s).  5.  Patient will perform all aspects of toileting with Moderate Assist within 7 day(s).  6.  Patient will participate in upper extremity therapeutic exercise/activities with Supervision for 5 minutes within 7 day(s).    7.  Patient will utilize energy conservation, consistent WBS L LE techniques during functional activities with verbal, visual, and tactile cues within 7 day(s).                                                                                                Outcome: 
  Problem: Occupational Therapy - Adult  Goal: By Discharge: Performs self-care activities at highest level of function for planned discharge setting.  See evaluation for individualized goals.  Description: Problem: Occupational Therapy - Adult  Goal: By Discharge: Performs self-care activities at highest level of function for planned discharge setting.  See evaluation for individualized goals.    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 Scheduled for I&D L foot evening of 4-1, post this eval; Vascular surgery pending 4-14 for L foot as first surgery \"failed\"; currently NWB L LE                                                                     Occupational Therapy Goals:  OT Re-evaluation 4/15/2025  Continue all goals below    Initiated 4/1/2025  goals reviewed and updated 4-11-25  1.  Patient will perform grooming with Stand by Assist within 7 day(s).  2.  Patient will perform upper body dressing with Set-up within 7 day(s).  3.  Patient will perform lower body dressing with Moderate Assist within 7 day(s).  4.  Patient will perform toilet transfers with Moderate Assist  within 7 day(s).  5.  Patient will perform all aspects of toileting with Moderate Assist within 7 day(s).  6.  Patient will participate in upper extremity therapeutic exercise/activities with Supervision for 5 minutes within 7 day(s).    7.  Patient will utilize energy conservation, consistent WBS L LE techniques during functional activities with verbal, visual, and tactile cues within 7 day(s).                                                           Outcome: Progressing   OCCUPATIONAL THERAPY 
  Problem: Occupational Therapy - Adult  Goal: By Discharge: Performs self-care activities at highest level of function for planned discharge setting.  See evaluation for individualized goals.  Description: Problem: Occupational Therapy - Adult  Goal: By Discharge: Performs self-care activities at highest level of function for planned discharge setting.  See evaluation for individualized goals.    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 Scheduled for I&D L foot evening of 4-1, post this eval; Vascular surgery pending 4-14 for L foot as first surgery \"failed\"; currently NWB L LE                                                                     Occupational Therapy Goals:  OT Re-evaluation 4/21/2025  1.  Patient will perform grooming with Stand by Assist seated unsupported EOB within 7 day(s).  2.  Patient will perform upper body dressing with Set-up seated within 7 day(s).  3.  Patient will perform lower body dressing with Moderate Assist within 7 day(s).  4.  Patient will perform toilet transfers with Moderate Assist to BSC with RW within 7 day(s).  5.  Patient will perform all aspects of toileting with Moderate Assist within 7 day(s).  6.  Patient will participate in upper extremity therapeutic exercise/activities with Supervision for 5 minutes within 7 day(s).    7.  Patient will maintain NWB LLE with all functional transfers in prep for ADLs with min cues within 7 days.        OT Re-evaluation 4/15/2025  Continue all goals below    Initiated 4/1/2025  goals reviewed and updated 4-11-25  1.  Patient will perform grooming with Stand by Assist within 7 day(s).  2.  
  Problem: Occupational Therapy - Adult  Goal: By Discharge: Performs self-care activities at highest level of function for planned discharge setting.  See evaluation for individualized goals.  Description: Problem: Occupational Therapy - Adult  Goal: By Discharge: Performs self-care activities at highest level of function for planned discharge setting.  See evaluation for individualized goals.    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 Scheduled for I&D L foot evening of 4-1, post this eval; Vascular surgery pending 4-14 for L foot as first surgery \"failed\"; currently NWB L LE                                                                  Occupational Therapy Goals:  Continue all goals below 5/12/2025  OT Re-evaluation 4/21/2025; Continued as below 4/29/2025; Continue all goals 5/6/2025  1.  Patient will perform grooming with Stand-by Assist seated unsupported EOB within 7 day(s).  2.  Patient will perform upper body dressing with Set-up seated within 7 day(s).  3.  Patient will perform lower body dressing with Moderate Assist using AE prn within 7 day(s).  4.  Patient will perform toilet transfers with Moderate Assist to drop-arm BSC with RW within 7 day(s).  5.  Patient will perform all aspects of toileting with Moderate Assist within 7 day(s).  6.  Patient will participate in upper extremity therapeutic exercise/activities with Supervision for 5 minutes within 7 day(s).    7.  Patient will maintain NWB LLE with all functional transfers in prep for ADLs with min cues within 7 days.    OT Re-evaluation 4/15/2025  Continue all goals below  Initiated 4/1/2025  goals 
  Problem: Occupational Therapy - Adult  Goal: By Discharge: Performs self-care activities at highest level of function for planned discharge setting.  See evaluation for individualized goals.  Description: Problem: Occupational Therapy - Adult  Goal: By Discharge: Performs self-care activities at highest level of function for planned discharge setting.  See evaluation for individualized goals.    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 Scheduled for I&D L foot evening of 4-1, post this eval; Vascular surgery pending 4-14 for L foot as first surgery \"failed\"; currently NWB L LE                                                                  Occupational Therapy Goals:  OT Re-evaluation 4/21/2025; Continued as below 4/29/2025  1.  Patient will perform grooming with Stand-by Assist seated unsupported EOB within 7 day(s).  2.  Patient will perform upper body dressing with Set-up seated within 7 day(s).  3.  Patient will perform lower body dressing with Moderate Assist using AE prn within 7 day(s).  4.  Patient will perform toilet transfers with Moderate Assist to drop-arm BSC with RW within 7 day(s).  5.  Patient will perform all aspects of toileting with Moderate Assist within 7 day(s).  6.  Patient will participate in upper extremity therapeutic exercise/activities with Supervision for 5 minutes within 7 day(s).    7.  Patient will maintain NWB LLE with all functional transfers in prep for ADLs with min cues within 7 days.    OT Re-evaluation 4/15/2025  Continue all goals below  Initiated 4/1/2025  goals reviewed and updated 4-11-25  1.  Patient will perform grooming 
  Problem: Occupational Therapy - Adult  Goal: By Discharge: Performs self-care activities at highest level of function for planned discharge setting.  See evaluation for individualized goals.  Description: Problem: Occupational Therapy - Adult  Goal: By Discharge: Performs self-care activities at highest level of function for planned discharge setting.  See evaluation for individualized goals.    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 Scheduled for I&D L foot evening of 4-1, post this eval; Vascular surgery pending 4-14 for L foot as first surgery \"failed\"; currently NWB L LE                                                                  Occupational Therapy Goals:  OT Re-evaluation 4/21/2025; Continued as below 4/29/2025; Continue all goals 5/6/2025  1.  Patient will perform grooming with Stand-by Assist seated unsupported EOB within 7 day(s).  2.  Patient will perform upper body dressing with Set-up seated within 7 day(s).  3.  Patient will perform lower body dressing with Moderate Assist using AE prn within 7 day(s).  4.  Patient will perform toilet transfers with Moderate Assist to drop-arm BSC with RW within 7 day(s).  5.  Patient will perform all aspects of toileting with Moderate Assist within 7 day(s).  6.  Patient will participate in upper extremity therapeutic exercise/activities with Supervision for 5 minutes within 7 day(s).    7.  Patient will maintain NWB LLE with all functional transfers in prep for ADLs with min cues within 7 days.    OT Re-evaluation 4/15/2025  Continue all goals below  Initiated 4/1/2025  goals reviewed and updated 4-11-25  1.  
  Problem: Occupational Therapy - Adult  Goal: By Discharge: Performs self-care activities at highest level of function for planned discharge setting.  See evaluation for individualized goals.  Description: Problem: Occupational Therapy - Adult  Goal: By Discharge: Performs self-care activities at highest level of function for planned discharge setting.  See evaluation for individualized goals.    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 Scheduled for I&D L foot evening of 4-1, post this eval; Vascular surgery pending 4-14 for L foot as first surgery \"failed\"; currently NWB L LE                                                                  Occupational Therapy Goals:  Weekly Re-Assessment 5/20/2025 Continue below goals, no goals met  Continue all goals below 5/12/2025  OT Re-evaluation 4/21/2025; Continued as below 4/29/2025; Continue all goals 5/6/2025  1.  Patient will perform grooming with Stand-by Assist seated unsupported EOB within 7 day(s).  2.  Patient will perform upper body dressing with Set-up seated within 7 day(s).  3.  Patient will perform lower body dressing with Moderate Assist using AE prn within 7 day(s).  4.  Patient will perform toilet transfers with Moderate Assist to drop-arm BSC with RW within 7 day(s).  5.  Patient will perform all aspects of toileting with Moderate Assist within 7 day(s).  6.  Patient will participate in upper extremity therapeutic exercise/activities with Supervision for 5 minutes within 7 day(s).    7.  Patient will maintain NWB LLE with all functional transfers in prep for ADLs with min cues within 7 days.    OT 
  Problem: Occupational Therapy - Adult  Goal: By Discharge: Performs self-care activities at highest level of function for planned discharge setting.  See evaluation for individualized goals.  Description: Problem: Occupational Therapy - Adult  Goal: By Discharge: Performs self-care activities at highest level of function for planned discharge setting.  See evaluation for individualized goals.    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 Scheduled for I&D L foot evening of 4-1, post this eval; Vascular surgery pending 4-14 for L foot as first surgery \"failed\"; currently NWB L LE                                                                  Occupational Therapy Goals:  Weekly Re-Assessment 5/20/2025 Continue below goals, no goals met  Continue all goals below 5/12/2025  OT Re-evaluation 4/21/2025; Continued as below 4/29/2025; Continue all goals 5/6/2025  1.  Patient will perform grooming with Stand-by Assist seated unsupported EOB within 7 day(s).  2.  Patient will perform upper body dressing with Set-up seated within 7 day(s).  3.  Patient will perform lower body dressing with Moderate Assist using AE prn within 7 day(s).  4.  Patient will perform toilet transfers with Moderate Assist to drop-arm BSC with RW within 7 day(s).  5.  Patient will perform all aspects of toileting with Moderate Assist within 7 day(s).  6.  Patient will participate in upper extremity therapeutic exercise/activities with Supervision for 5 minutes within 7 day(s).    7.  Patient will maintain NWB LLE with all functional transfers in prep for ADLs with min cues within 7 days.    OT 
  Problem: Pain  Goal: Verbalizes/displays adequate comfort level or baseline comfort level  4/1/2025 1035 by Francesca Valencia RN  Flowsheets (Taken 4/1/2025 1035)  Verbalizes/displays adequate comfort level or baseline comfort level:   Encourage patient to monitor pain and request assistance   Assess pain using appropriate pain scale   Administer analgesics based on type and severity of pain and evaluate response  4/1/2025 0223 by Anabel Ramirez RN  Outcome: Progressing  Flowsheets (Taken 3/31/2025 2030)  Verbalizes/displays adequate comfort level or baseline comfort level: Encourage patient to monitor pain and request assistance     Problem: Chronic Conditions and Co-morbidities  Goal: Patient's chronic conditions and co-morbidity symptoms are monitored and maintained or improved  4/1/2025 0223 by Anabel Ramirez RN  Outcome: Progressing  Flowsheets (Taken 3/31/2025 2030)  Care Plan - Patient's Chronic Conditions and Co-Morbidity Symptoms are Monitored and Maintained or Improved: Monitor and assess patient's chronic conditions and comorbid symptoms for stability, deterioration, or improvement     Problem: Discharge Planning  Goal: Discharge to home or other facility with appropriate resources  4/1/2025 0223 by Anabel Ramirez RN  Outcome: Progressing  Flowsheets (Taken 3/31/2025 2030)  Discharge to home or other facility with appropriate resources:   Identify barriers to discharge with patient and caregiver   Arrange for needed discharge resources and transportation as appropriate     
  Problem: Pain  Goal: Verbalizes/displays adequate comfort level or baseline comfort level  4/10/2025 1116 by Parveen Marte RN  Outcome: Progressing  4/10/2025 0102 by Patrick Thomas RN  Outcome: Progressing     Problem: Chronic Conditions and Co-morbidities  Goal: Patient's chronic conditions and co-morbidity symptoms are monitored and maintained or improved  4/10/2025 1116 by Parveen Marte RN  Outcome: Progressing  4/10/2025 0102 by Patrick Thomas RN  Outcome: Progressing     Problem: Discharge Planning  Goal: Discharge to home or other facility with appropriate resources  4/10/2025 1116 by Parveen Marte RN  Outcome: Progressing  4/10/2025 0102 by Patrick Thomas RN  Outcome: Progressing     Problem: Musculoskeletal - Adult  Goal: Return mobility to safest level of function  4/10/2025 1116 by Parveen Marte RN  Outcome: Progressing  4/10/2025 0102 by Patrick Thomas RN  Outcome: Progressing  Goal: Maintain proper alignment of affected body part  4/10/2025 0102 by Patrick Thomas RN  Outcome: Progressing  Goal: Return ADL status to a safe level of function  4/10/2025 0102 by Patrick Thomas RN  Outcome: Progressing     Problem: Safety - Adult  Goal: Free from fall injury  4/10/2025 1116 by Parveen Marte RN  Outcome: Progressing  4/10/2025 0102 by Patrick Thomas RN  Outcome: Progressing     Problem: Skin/Tissue Integrity  Goal: Skin integrity remains intact  Description: 1.  Monitor for areas of redness and/or skin breakdown  2.  Assess vascular access sites hourly  3.  Every 4-6 hours minimum:  Change oxygen saturation probe site  4.  Every 4-6 hours:  If on nasal continuous positive airway pressure, respiratory therapy assess nares and determine need for appliance change or resting period  4/10/2025 1116 by Parveen Marte RN  Outcome: Progressing  Flowsheets (Taken 4/10/2025 0829)  Skin Integrity Remains Intact: Monitor for areas of redness and/or skin breakdown  4/10/2025 0102 
  Problem: Pain  Goal: Verbalizes/displays adequate comfort level or baseline comfort level  4/10/2025 2240 by Brittany Navas LPN  Outcome: Progressing  4/10/2025 1116 by Parveen Marte RN  Outcome: Progressing     Problem: Chronic Conditions and Co-morbidities  Goal: Patient's chronic conditions and co-morbidity symptoms are monitored and maintained or improved  4/10/2025 2240 by Brittany Navas LPN  Outcome: Progressing  4/10/2025 1116 by Parveen Marte RN  Outcome: Progressing     Problem: Discharge Planning  Goal: Discharge to home or other facility with appropriate resources  4/10/2025 2240 by Brittany Navas LPN  Outcome: Progressing  4/10/2025 1116 by Parveen Marte RN  Outcome: Progressing     Problem: Musculoskeletal - Adult  Goal: Return mobility to safest level of function  4/10/2025 2240 by Brittany Navas LPN  Outcome: Progressing  4/10/2025 1116 by Parveen Marte RN  Outcome: Progressing  Goal: Maintain proper alignment of affected body part  Outcome: Progressing  Goal: Return ADL status to a safe level of function  Outcome: Progressing     Problem: Safety - Adult  Goal: Free from fall injury  4/10/2025 2240 by Brittany Navas LPN  Outcome: Progressing  4/10/2025 1116 by Parveen Marte RN  Outcome: Progressing     Problem: Skin/Tissue Integrity  Goal: Skin integrity remains intact  Description: 1.  Monitor for areas of redness and/or skin breakdown  2.  Assess vascular access sites hourly  3.  Every 4-6 hours minimum:  Change oxygen saturation probe site  4.  Every 4-6 hours:  If on nasal continuous positive airway pressure, respiratory therapy assess nares and determine need for appliance change or resting period  4/10/2025 2240 by Brittnay Navas LPN  Outcome: Progressing  Flowsheets (Taken 4/10/2025 2240)  Skin Integrity Remains Intact: Monitor for areas of redness and/or skin breakdown  4/10/2025 1116 by Parveen Marte RN  Outcome: Progressing  Flowsheets (Taken 4/10/2025 0855)  Skin Integrity Remains 
  Problem: Pain  Goal: Verbalizes/displays adequate comfort level or baseline comfort level  4/12/2025 1025 by Francesca Valencia RN  Flowsheets (Taken 4/12/2025 1025)  Verbalizes/displays adequate comfort level or baseline comfort level:   Encourage patient to monitor pain and request assistance   Assess pain using appropriate pain scale   Administer analgesics based on type and severity of pain and evaluate response   Implement non-pharmacological measures as appropriate and evaluate response   Consider cultural and social influences on pain and pain management   Notify Licensed Independent Practitioner if interventions unsuccessful or patient reports new pain  4/12/2025 0029 by Brittany Navas LPN  Outcome: Progressing     Problem: Chronic Conditions and Co-morbidities  Goal: Patient's chronic conditions and co-morbidity symptoms are monitored and maintained or improved  4/12/2025 1025 by Francesca Valencia RN  Flowsheets (Taken 4/12/2025 1025)  Care Plan - Patient's Chronic Conditions and Co-Morbidity Symptoms are Monitored and Maintained or Improved:   Monitor and assess patient's chronic conditions and comorbid symptoms for stability, deterioration, or improvement   Collaborate with multidisciplinary team to address chronic and comorbid conditions and prevent exacerbation or deterioration  4/12/2025 0029 by Brittany Navas LPN  Outcome: Progressing     Problem: Discharge Planning  Goal: Discharge to home or other facility with appropriate resources  4/12/2025 1025 by Francesca Valencia RN  Flowsheets (Taken 4/12/2025 1025)  Discharge to home or other facility with appropriate resources:   Identify barriers to discharge with patient and caregiver   Arrange for needed discharge resources and transportation as appropriate   Identify discharge learning needs (meds, wound care, etc)  4/12/2025 0029 by Brittany Navas LPN  Outcome: Progressing     
  Problem: Pain  Goal: Verbalizes/displays adequate comfort level or baseline comfort level  4/17/2025 1137 by Don Deal RN  Outcome: Progressing  4/16/2025 2337 by Rebeca Toro RN  Outcome: Progressing     Problem: Chronic Conditions and Co-morbidities  Goal: Patient's chronic conditions and co-morbidity symptoms are monitored and maintained or improved  4/17/2025 1137 by Don Deal RN  Outcome: Progressing  4/16/2025 2337 by Rebeca Toro RN  Outcome: Progressing  Flowsheets (Taken 4/16/2025 2040)  Care Plan - Patient's Chronic Conditions and Co-Morbidity Symptoms are Monitored and Maintained or Improved: Monitor and assess patient's chronic conditions and comorbid symptoms for stability, deterioration, or improvement     Problem: Discharge Planning  Goal: Discharge to home or other facility with appropriate resources  4/17/2025 1137 by Don Deal RN  Outcome: Progressing  4/16/2025 2337 by Rebeca Toro RN  Outcome: Progressing  Flowsheets (Taken 4/16/2025 2040)  Discharge to home or other facility with appropriate resources:   Identify barriers to discharge with patient and caregiver   Arrange for needed discharge resources and transportation as appropriate   Identify discharge learning needs (meds, wound care, etc)     Problem: Musculoskeletal - Adult  Goal: Return mobility to safest level of function  4/17/2025 1137 by Don Deal RN  Outcome: Progressing  4/16/2025 2337 by Rebeca Toro RN  Outcome: Progressing  Goal: Maintain proper alignment of affected body part  Outcome: Progressing  Goal: Return ADL status to a safe level of function  Outcome: Progressing     Problem: Safety - Adult  Goal: Free from fall injury  4/17/2025 1137 by Don Deal RN  Outcome: Progressing  4/16/2025 2337 by Rebeca Toro RN  Outcome: Progressing     Problem: Skin/Tissue Integrity  Goal: Skin integrity remains intact  Description: 1.  Monitor for areas of redness 
  Problem: Pain  Goal: Verbalizes/displays adequate comfort level or baseline comfort level  4/2/2025 1544 by Francesca Valencia RN  Flowsheets (Taken 4/2/2025 1544)  Verbalizes/displays adequate comfort level or baseline comfort level:   Encourage patient to monitor pain and request assistance   Assess pain using appropriate pain scale   Administer analgesics based on type and severity of pain and evaluate response   Implement non-pharmacological measures as appropriate and evaluate response   Consider cultural and social influences on pain and pain management   Notify Licensed Independent Practitioner if interventions unsuccessful or patient reports new pain  4/2/2025 0234 by Anabel Ramirez RN  Outcome: Progressing     Problem: Chronic Conditions and Co-morbidities  Goal: Patient's chronic conditions and co-morbidity symptoms are monitored and maintained or improved  4/2/2025 1544 by Francesca Valencia RN  Flowsheets  Taken 4/2/2025 1544  Care Plan - Patient's Chronic Conditions and Co-Morbidity Symptoms are Monitored and Maintained or Improved:   Monitor and assess patient's chronic conditions and comorbid symptoms for stability, deterioration, or improvement   Collaborate with multidisciplinary team to address chronic and comorbid conditions and prevent exacerbation or deterioration   Update acute care plan with appropriate goals if chronic or comorbid symptoms are exacerbated and prevent overall improvement and discharge  Taken 4/2/2025 1029  Care Plan - Patient's Chronic Conditions and Co-Morbidity Symptoms are Monitored and Maintained or Improved: Monitor and assess patient's chronic conditions and comorbid symptoms for stability, deterioration, or improvement  4/2/2025 0234 by Anabel Ramirez RN  Outcome: Progressing  Flowsheets (Taken 4/1/2025 2000)  Care Plan - Patient's Chronic Conditions and Co-Morbidity Symptoms are Monitored and Maintained or Improved: Monitor and assess patient's chronic conditions and 
  Problem: Pain  Goal: Verbalizes/displays adequate comfort level or baseline comfort level  4/2/2025 2354 by Brittany Navas LPN  Outcome: Progressing  4/2/2025 1544 by Francesca Valencia RN  Flowsheets (Taken 4/2/2025 1544)  Verbalizes/displays adequate comfort level or baseline comfort level:   Encourage patient to monitor pain and request assistance   Assess pain using appropriate pain scale   Administer analgesics based on type and severity of pain and evaluate response   Implement non-pharmacological measures as appropriate and evaluate response   Consider cultural and social influences on pain and pain management   Notify Licensed Independent Practitioner if interventions unsuccessful or patient reports new pain     Problem: Chronic Conditions and Co-morbidities  Goal: Patient's chronic conditions and co-morbidity symptoms are monitored and maintained or improved  4/2/2025 2354 by Brittany Navas LPN  Outcome: Progressing  4/2/2025 1544 by Francesca Valencia RN  Flowsheets  Taken 4/2/2025 1544  Care Plan - Patient's Chronic Conditions and Co-Morbidity Symptoms are Monitored and Maintained or Improved:   Monitor and assess patient's chronic conditions and comorbid symptoms for stability, deterioration, or improvement   Collaborate with multidisciplinary team to address chronic and comorbid conditions and prevent exacerbation or deterioration   Update acute care plan with appropriate goals if chronic or comorbid symptoms are exacerbated and prevent overall improvement and discharge  Taken 4/2/2025 1029  Care Plan - Patient's Chronic Conditions and Co-Morbidity Symptoms are Monitored and Maintained or Improved: Monitor and assess patient's chronic conditions and comorbid symptoms for stability, deterioration, or improvement     Problem: Discharge Planning  Goal: Discharge to home or other facility with appropriate resources  4/2/2025 2354 by Brittany Navas LPN  Outcome: Progressing  4/2/2025 1544 by Francesca Valencia 
  Problem: Pain  Goal: Verbalizes/displays adequate comfort level or baseline comfort level  4/21/2025 0032 by Frances Lester, RN  Outcome: Progressing  4/20/2025 1221 by Daria Kim RN  Outcome: Progressing     Problem: Chronic Conditions and Co-morbidities  Goal: Patient's chronic conditions and co-morbidity symptoms are monitored and maintained or improved  Outcome: Progressing     Problem: Discharge Planning  Goal: Discharge to home or other facility with appropriate resources  Outcome: Progressing     Problem: Musculoskeletal - Adult  Goal: Return mobility to safest level of function  Outcome: Progressing  Goal: Maintain proper alignment of affected body part  Outcome: Progressing  Goal: Return ADL status to a safe level of function  Outcome: Progressing     Problem: Neurosensory - Adult  Goal: Achieves stable or improved neurological status  Outcome: Progressing  Goal: Absence of seizures  Outcome: Progressing  Goal: Remains free of injury related to seizures activity  Outcome: Progressing  Goal: Achieves maximal functionality and self care  Outcome: Progressing     Problem: Genitourinary - Adult  Goal: Absence of urinary retention  Outcome: Progressing  Goal: Urinary catheter remains patent  Outcome: Progressing     
  Problem: Pain  Goal: Verbalizes/displays adequate comfort level or baseline comfort level  4/27/2025 0943 by Dorothy Randhawa RN  Outcome: Progressing  Flowsheets (Taken 4/27/2025 0943)  Verbalizes/displays adequate comfort level or baseline comfort level:   Encourage patient to monitor pain and request assistance   Assess pain using appropriate pain scale   Administer analgesics based on type and severity of pain and evaluate response     
  Problem: Pain  Goal: Verbalizes/displays adequate comfort level or baseline comfort level  4/29/2025 2228 by Ivelisse Zavala RN  Outcome: Progressing  4/29/2025 2228 by Ivelisse Zavala RN  Outcome: Progressing  4/29/2025 1337 by Juana Oshea RN  Outcome: Progressing     Problem: Chronic Conditions and Co-morbidities  Goal: Patient's chronic conditions and co-morbidity symptoms are monitored and maintained or improved  4/29/2025 2228 by Ivelisse Zavala RN  Outcome: Progressing  4/29/2025 2228 by Ivelisse Zavala RN  Outcome: Progressing  4/29/2025 1337 by Juana Oshea RN  Outcome: Progressing     Problem: Discharge Planning  Goal: Discharge to home or other facility with appropriate resources  4/29/2025 2228 by Ivelisse Zavala RN  Outcome: Progressing  4/29/2025 2228 by Ivelsise Zavala RN  Outcome: Progressing     Problem: Musculoskeletal - Adult  Goal: Return mobility to safest level of function  4/29/2025 2228 by Ivelisse Zavala RN  Outcome: Progressing  4/29/2025 2228 by Ivelisse Zavala RN  Outcome: Progressing  4/29/2025 1337 by Juana Oshea RN  Outcome: Progressing  Goal: Maintain proper alignment of affected body part  4/29/2025 2228 by Ivelisse Zavala RN  Outcome: Progressing  4/29/2025 2228 by Ivelisse Zavala RN  Outcome: Progressing  Goal: Return ADL status to a safe level of function  4/29/2025 2228 by Ivelisse Zavala RN  Outcome: Progressing  4/29/2025 2228 by Ivelisse Zavala RN  Outcome: Progressing     Problem: Safety - Adult  Goal: Free from fall injury  4/29/2025 2228 by Ivelisse Zavala RN  Outcome: Progressing  4/29/2025 2228 by Ivelisse Zavala RN  Outcome: Progressing     Problem: Physical Therapy - Adult  Goal: By Discharge: Performs mobility at highest level of function for planned discharge setting.  See evaluation for individualized goals.  Description: FUNCTIONAL STATUS PRIOR TO ADMISSION: Pt presents as AO to name and poor historian at time of PT IE. Upon belief, Pt ambulatory 
  Problem: Pain  Goal: Verbalizes/displays adequate comfort level or baseline comfort level  4/3/2025 1058 by Elsy Andrade RN  Outcome: Progressing  4/2/2025 2354 by Brittany Navas LPN  Outcome: Progressing     Problem: Chronic Conditions and Co-morbidities  Goal: Patient's chronic conditions and co-morbidity symptoms are monitored and maintained or improved  4/3/2025 1058 by Elsy Andrade RN  Outcome: Progressing  4/2/2025 2354 by Brittany Navas LPN  Outcome: Progressing  Flowsheets (Taken 4/2/2025 2137)  Care Plan - Patient's Chronic Conditions and Co-Morbidity Symptoms are Monitored and Maintained or Improved:   Monitor and assess patient's chronic conditions and comorbid symptoms for stability, deterioration, or improvement   Collaborate with multidisciplinary team to address chronic and comorbid conditions and prevent exacerbation or deterioration   Update acute care plan with appropriate goals if chronic or comorbid symptoms are exacerbated and prevent overall improvement and discharge     Problem: Discharge Planning  Goal: Discharge to home or other facility with appropriate resources  4/3/2025 1058 by Elsy Andrade RN  Outcome: Progressing  4/2/2025 2354 by Brittany Navas LPN  Outcome: Progressing  Flowsheets (Taken 4/2/2025 2137)  Discharge to home or other facility with appropriate resources: Identify barriers to discharge with patient and caregiver     Problem: Musculoskeletal - Adult  Goal: Return mobility to safest level of function  4/3/2025 1058 by Elsy Andrade RN  Outcome: Progressing  4/2/2025 2354 by Brittany Navas LPN  Outcome: Progressing  Flowsheets (Taken 4/2/2025 1029 by Francesca Valencia, RN)  Return Mobility to Safest Level of Function: Assess patient stability and activity tolerance for standing, transferring and ambulating with or without assistive devices  Goal: Maintain proper alignment of affected body part  4/3/2025 1058 by Elsy Andrade RN  Outcome: 
  Problem: Pain  Goal: Verbalizes/displays adequate comfort level or baseline comfort level  4/3/2025 1101 by Elsy Andrade RN  Outcome: Progressing  4/3/2025 1058 by Elsy Andrade RN  Outcome: Progressing  4/2/2025 2354 by Brittany Navas LPN  Outcome: Progressing     Problem: Chronic Conditions and Co-morbidities  Goal: Patient's chronic conditions and co-morbidity symptoms are monitored and maintained or improved  4/3/2025 1101 by Elsy Andrade RN  Outcome: Progressing  4/3/2025 1058 by Elsy Andrade RN  Outcome: Progressing  4/2/2025 2354 by Brittany Navas LPN  Outcome: Progressing  Flowsheets (Taken 4/2/2025 2137)  Care Plan - Patient's Chronic Conditions and Co-Morbidity Symptoms are Monitored and Maintained or Improved:   Monitor and assess patient's chronic conditions and comorbid symptoms for stability, deterioration, or improvement   Collaborate with multidisciplinary team to address chronic and comorbid conditions and prevent exacerbation or deterioration   Update acute care plan with appropriate goals if chronic or comorbid symptoms are exacerbated and prevent overall improvement and discharge     Problem: Discharge Planning  Goal: Discharge to home or other facility with appropriate resources  4/3/2025 1101 by Elsy Andrade RN  Outcome: Progressing  4/3/2025 1058 by Elsy Andrade RN  Outcome: Progressing  4/2/2025 2354 by Brittany Navas LPN  Outcome: Progressing  Flowsheets (Taken 4/2/2025 2137)  Discharge to home or other facility with appropriate resources: Identify barriers to discharge with patient and caregiver     Problem: Musculoskeletal - Adult  Goal: Return mobility to safest level of function  4/3/2025 1101 by Elsy Andrade RN  Outcome: Progressing  4/3/2025 1058 by Elsy Andrade RN  Outcome: Progressing  4/2/2025 2354 by Brittany Navas LPN  Outcome: Progressing  Flowsheets (Taken 4/2/2025 1029 by Francesca Valencia, RN)  Return Mobility to 
  Problem: Pain  Goal: Verbalizes/displays adequate comfort level or baseline comfort level  4/3/2025 2105 by Brittany Navas LPN  Outcome: Progressing  4/3/2025 1101 by Elsy Andrade RN  Outcome: Progressing  4/3/2025 1058 by Elsy Andrade RN  Outcome: Progressing     Problem: Chronic Conditions and Co-morbidities  Goal: Patient's chronic conditions and co-morbidity symptoms are monitored and maintained or improved  4/3/2025 2105 by Brittany Navas LPN  Outcome: Progressing  4/3/2025 1101 by Elsy Andrade RN  Outcome: Progressing  4/3/2025 1058 by Elsy Andrade RN  Outcome: Progressing     Problem: Discharge Planning  Goal: Discharge to home or other facility with appropriate resources  4/3/2025 2105 by Brittany Navas LPN  Outcome: Progressing  4/3/2025 1101 by Elsy Andrade RN  Outcome: Progressing  4/3/2025 1058 by Elsy Andrade RN  Outcome: Progressing     Problem: Musculoskeletal - Adult  Goal: Return mobility to safest level of function  4/3/2025 2105 by Brittany Navas LPN  Outcome: Progressing  4/3/2025 1101 by Elsy Andrade RN  Outcome: Progressing  4/3/2025 1058 by Elsy Andrade RN  Outcome: Progressing  Goal: Maintain proper alignment of affected body part  4/3/2025 2105 by Brittany Navas LPN  Outcome: Progressing  4/3/2025 1101 by Elsy Andrade RN  Outcome: Progressing  4/3/2025 1058 by Elsy Andrade RN  Outcome: Progressing  Goal: Return ADL status to a safe level of function  4/3/2025 2105 by Brittany Navas LPN  Outcome: Progressing  4/3/2025 1101 by Elsy Andrade RN  Outcome: Progressing  4/3/2025 1058 by Elsy Andrade RN  Outcome: Progressing     Problem: Safety - Adult  Goal: Free from fall injury  4/3/2025 2105 by Brittany Navas LPN  Outcome: Progressing  4/3/2025 1101 by Elsy Andrade, RN  Outcome: Progressing  4/3/2025 1058 by Elsy Andrade, RN  Outcome: Progressing     Problem: Physical Therapy - 
  Problem: Pain  Goal: Verbalizes/displays adequate comfort level or baseline comfort level  4/4/2025 1034 by Elsy Andrade RN  Outcome: Progressing  4/3/2025 2105 by Brittany Navas LPN  Outcome: Progressing     Problem: Chronic Conditions and Co-morbidities  Goal: Patient's chronic conditions and co-morbidity symptoms are monitored and maintained or improved  4/4/2025 1034 by Elsy Andrade RN  Outcome: Progressing  4/3/2025 2105 by Brittany Navas LPN  Outcome: Progressing     Problem: Discharge Planning  Goal: Discharge to home or other facility with appropriate resources  4/4/2025 1034 by Elsy Andrade RN  Outcome: Progressing  4/3/2025 2105 by Brittany Navas LPN  Outcome: Progressing     Problem: Musculoskeletal - Adult  Goal: Return mobility to safest level of function  4/4/2025 1034 by Elsy Andrade RN  Outcome: Progressing  4/3/2025 2105 by Brittany Navas LPN  Outcome: Progressing  Flowsheets (Taken 4/3/2025 1927)  Return Mobility to Safest Level of Function: Assess patient stability and activity tolerance for standing, transferring and ambulating with or without assistive devices  Goal: Maintain proper alignment of affected body part  4/4/2025 1034 by Elsy Andrade RN  Outcome: Progressing  4/3/2025 2105 by Brittany Navas LPN  Outcome: Progressing  Flowsheets (Taken 4/3/2025 1927)  Maintain proper alignment of affected body part: Support and protect limb and body alignment per provider's orders  Goal: Return ADL status to a safe level of function  4/4/2025 1034 by Elsy Andrade RN  Outcome: Progressing  4/3/2025 2105 by Brittany Navas LPN  Outcome: Progressing  Flowsheets (Taken 4/3/2025 1927)  Return ADL Status to a Safe Level of Function: Administer medication as ordered     Problem: Safety - Adult  Goal: Free from fall injury  4/4/2025 1034 by Elsy Andrade RN  Outcome: Progressing  4/3/2025 2105 by Brittany Navas LPN  Outcome: Progressing     Problem: 
  Problem: Pain  Goal: Verbalizes/displays adequate comfort level or baseline comfort level  4/9/2025 0959 by Parveen Marte RN  Outcome: Progressing  4/9/2025 0321 by Patrick Thomas RN  Outcome: Progressing     Problem: Chronic Conditions and Co-morbidities  Goal: Patient's chronic conditions and co-morbidity symptoms are monitored and maintained or improved  4/9/2025 0959 by Parveen Marte RN  Outcome: Progressing  4/9/2025 0321 by Patrick Thomas RN  Outcome: Progressing  Flowsheets (Taken 4/8/2025 2000)  Care Plan - Patient's Chronic Conditions and Co-Morbidity Symptoms are Monitored and Maintained or Improved: Monitor and assess patient's chronic conditions and comorbid symptoms for stability, deterioration, or improvement     Problem: Discharge Planning  Goal: Discharge to home or other facility with appropriate resources  4/9/2025 0959 by Parveen Marte RN  Outcome: Progressing  4/9/2025 0321 by Patrick Thomas RN  Outcome: Progressing  Flowsheets (Taken 4/8/2025 2000)  Discharge to home or other facility with appropriate resources: Identify barriers to discharge with patient and caregiver     Problem: Musculoskeletal - Adult  Goal: Return mobility to safest level of function  4/9/2025 0959 by Parveen Marte RN  Outcome: Progressing  4/9/2025 0321 by Patrick Thomas RN  Outcome: Progressing  Goal: Maintain proper alignment of affected body part  4/9/2025 0321 by Patrick Thomas RN  Outcome: Progressing  Goal: Return ADL status to a safe level of function  4/9/2025 0321 by Patrick Thomas RN  Outcome: Progressing     Problem: Safety - Adult  Goal: Free from fall injury  4/9/2025 0959 by Parveen Marte RN  Outcome: Progressing  4/9/2025 0321 by Patrick Thomas RN  Outcome: Progressing     Problem: Skin/Tissue Integrity  Goal: Skin integrity remains intact  Description: 1.  Monitor for areas of redness and/or skin breakdown  2.  Assess vascular access sites hourly  3.  Every 4-6 hours 
  Problem: Pain  Goal: Verbalizes/displays adequate comfort level or baseline comfort level  5/1/2025 0447 by Aida Mcknight RN  Outcome: Adequate for Discharge  4/30/2025 1759 by Tresa Batista RN  Outcome: Progressing  Flowsheets (Taken 4/30/2025 0821)  Verbalizes/displays adequate comfort level or baseline comfort level:   Encourage patient to monitor pain and request assistance   Assess pain using appropriate pain scale     Problem: Safety - Adult  Goal: Free from fall injury  5/1/2025 0447 by Aida Mcknight, RN  Outcome: Progressing  4/30/2025 1759 by Tresa Batista RN  Outcome: Progressing     
  Problem: Pain  Goal: Verbalizes/displays adequate comfort level or baseline comfort level  5/19/2025 0128 by Jose Miguel Cortés, RN  Outcome: Progressing  Flowsheets (Taken 5/18/2025 1930)  Verbalizes/displays adequate comfort level or baseline comfort level:   Encourage patient to monitor pain and request assistance   Assess pain using appropriate pain scale   Implement non-pharmacological measures as appropriate and evaluate response   Administer analgesics based on type and severity of pain and evaluate response   Consider cultural and social influences on pain and pain management  5/18/2025 1236 by Lenka Meyer, RN  Outcome: Progressing     Problem: Chronic Conditions and Co-morbidities  Goal: Patient's chronic conditions and co-morbidity symptoms are monitored and maintained or improved  5/19/2025 0128 by Jose Miguel Cortés, RN  Outcome: Progressing  Flowsheets (Taken 5/18/2025 1930)  Care Plan - Patient's Chronic Conditions and Co-Morbidity Symptoms are Monitored and Maintained or Improved:   Monitor and assess patient's chronic conditions and comorbid symptoms for stability, deterioration, or improvement   Update acute care plan with appropriate goals if chronic or comorbid symptoms are exacerbated and prevent overall improvement and discharge   Collaborate with multidisciplinary team to address chronic and comorbid conditions and prevent exacerbation or deterioration  5/18/2025 1236 by Lenka Meyer, RN  Outcome: Progressing  Flowsheets (Taken 5/18/2025 1200)  Care Plan - Patient's Chronic Conditions and Co-Morbidity Symptoms are Monitored and Maintained or Improved:   Monitor and assess patient's chronic conditions and comorbid symptoms for stability, deterioration, or improvement   Collaborate with multidisciplinary team to address chronic and comorbid conditions and prevent exacerbation or deterioration   Update acute care plan with appropriate goals if chronic or comorbid symptoms are exacerbated and 
  Problem: Pain  Goal: Verbalizes/displays adequate comfort level or baseline comfort level  5/21/2025 0930 by Naya Santana RN  Outcome: Progressing  5/21/2025 0020 by Geronimo Schultz RN  Outcome: Progressing  Flowsheets (Taken 5/20/2025 1237 by Lauren Hardy, RN)  Verbalizes/displays adequate comfort level or baseline comfort level:   Encourage patient to monitor pain and request assistance   Assess pain using appropriate pain scale   Administer analgesics based on type and severity of pain and evaluate response     Problem: Chronic Conditions and Co-morbidities  Goal: Patient's chronic conditions and co-morbidity symptoms are monitored and maintained or improved  5/21/2025 0930 by Naya Santana RN  Outcome: Progressing  5/21/2025 0020 by Geronimo Schultz RN  Outcome: Progressing     Problem: Discharge Planning  Goal: Discharge to home or other facility with appropriate resources  5/21/2025 0930 by Naya Santana RN  Outcome: Progressing  5/21/2025 0020 by Geronimo Schultz RN  Outcome: Progressing     Problem: Musculoskeletal - Adult  Goal: Return mobility to safest level of function  5/21/2025 0930 by Naya Santana RN  Outcome: Progressing  5/21/2025 0020 by Geronimo Schultz RN  Outcome: Progressing  Goal: Maintain proper alignment of affected body part  Outcome: Progressing  Goal: Return ADL status to a safe level of function  Outcome: Progressing     Problem: Safety - Adult  Goal: Free from fall injury  Outcome: Progressing     Problem: Skin/Tissue Integrity  Goal: Skin integrity remains intact  Description: 1.  Monitor for areas of redness and/or skin breakdown  2.  Assess vascular access sites hourly  3.  Every 4-6 hours minimum:  Change oxygen saturation probe site  4.  Every 4-6 hours:  If on nasal continuous positive airway pressure, respiratory therapy assess nares and determine need for appliance change or resting period  Outcome: Progressing     Problem: ABCDS Injury 
  Problem: Pain  Goal: Verbalizes/displays adequate comfort level or baseline comfort level  5/26/2025 1135 by Milli Newman RN  Outcome: Progressing  5/25/2025 2244 by Sri Bledsoe RN  Outcome: Progressing     Problem: Chronic Conditions and Co-morbidities  Goal: Patient's chronic conditions and co-morbidity symptoms are monitored and maintained or improved  5/26/2025 1135 by Milli Newman RN  Outcome: Progressing  5/25/2025 2244 by Sri Bledsoe RN  Outcome: Progressing     Problem: Discharge Planning  Goal: Discharge to home or other facility with appropriate resources  5/25/2025 2244 by Sri Bledsoe RN  Outcome: Progressing     Problem: Musculoskeletal - Adult  Goal: Return mobility to safest level of function  5/25/2025 2244 by Sri Bledsoe RN  Outcome: Progressing  Goal: Maintain proper alignment of affected body part  5/25/2025 2244 by Sri Bledsoe RN  Outcome: Progressing  Goal: Return ADL status to a safe level of function  5/25/2025 2244 by Sri Bledsoe RN  Outcome: Progressing     Problem: Safety - Adult  Goal: Free from fall injury  5/25/2025 2244 by Sri Bledsoe RN  Outcome: Progressing     Problem: Skin/Tissue Integrity  Goal: Skin integrity remains intact  Description: 1.  Monitor for areas of redness and/or skin breakdown  2.  Assess vascular access sites hourly  3.  Every 4-6 hours minimum:  Change oxygen saturation probe site  4.  Every 4-6 hours:  If on nasal continuous positive airway pressure, respiratory therapy assess nares and determine need for appliance change or resting period  5/26/2025 1135 by Milli Newman RN  Outcome: Progressing  Flowsheets (Taken 5/26/2025 0756)  Skin Integrity Remains Intact: Monitor for areas of redness and/or skin breakdown  5/25/2025 2244 by Sri Bledsoe RN  Outcome: Progressing     Problem: ABCDS Injury Assessment  Goal: Absence of physical injury  5/25/2025 2244 by Sri Bledsoe RN  Outcome: Progressing   
  Problem: Pain  Goal: Verbalizes/displays adequate comfort level or baseline comfort level  5/26/2025 2036 by Sri Bledsoe RN  Outcome: Progressing  5/26/2025 1135 by Milli Newman RN  Outcome: Progressing     Problem: Chronic Conditions and Co-morbidities  Goal: Patient's chronic conditions and co-morbidity symptoms are monitored and maintained or improved  5/26/2025 2036 by Sri Bledsoe RN  Outcome: Progressing  5/26/2025 1135 by Milli Newman RN  Outcome: Progressing     Problem: Discharge Planning  Goal: Discharge to home or other facility with appropriate resources  Outcome: Progressing     Problem: Musculoskeletal - Adult  Goal: Return mobility to safest level of function  Outcome: Progressing  Goal: Maintain proper alignment of affected body part  Outcome: Progressing  Goal: Return ADL status to a safe level of function  Outcome: Progressing     Problem: Safety - Adult  Goal: Free from fall injury  Outcome: Progressing     Problem: Skin/Tissue Integrity  Goal: Skin integrity remains intact  Description: 1.  Monitor for areas of redness and/or skin breakdown  2.  Assess vascular access sites hourly  3.  Every 4-6 hours minimum:  Change oxygen saturation probe site  4.  Every 4-6 hours:  If on nasal continuous positive airway pressure, respiratory therapy assess nares and determine need for appliance change or resting period  5/26/2025 2036 by Sri Bledsoe RN  Outcome: Progressing  5/26/2025 1135 by Milli Newman RN  Outcome: Progressing  Flowsheets (Taken 5/26/2025 0756)  Skin Integrity Remains Intact: Monitor for areas of redness and/or skin breakdown     Problem: ABCDS Injury Assessment  Goal: Absence of physical injury  Outcome: Progressing     Problem: Nutrition Deficit:  Goal: Optimize nutritional status  Outcome: Progressing     Problem: Neurosensory - Adult  Goal: Achieves stable or improved neurological status  Outcome: Progressing  Goal: Absence of 
  Problem: Pain  Goal: Verbalizes/displays adequate comfort level or baseline comfort level  5/3/2025 2058 by Michael Pruett RN  Outcome: Progressing  Flowsheets (Taken 5/3/2025 2041)  Verbalizes/displays adequate comfort level or baseline comfort level:   Encourage patient to monitor pain and request assistance   Assess pain using appropriate pain scale   Administer analgesics based on type and severity of pain and evaluate response   Implement non-pharmacological measures as appropriate and evaluate response   Consider cultural and social influences on pain and pain management   Notify Licensed Independent Practitioner if interventions unsuccessful or patient reports new pain     Problem: Chronic Conditions and Co-morbidities  Goal: Patient's chronic conditions and co-morbidity symptoms are monitored and maintained or improved  5/3/2025 2058 by Michael Pruett RN  Outcome: Progressing  Flowsheets (Taken 5/3/2025 2041)  Care Plan - Patient's Chronic Conditions and Co-Morbidity Symptoms are Monitored and Maintained or Improved:   Monitor and assess patient's chronic conditions and comorbid symptoms for stability, deterioration, or improvement   Collaborate with multidisciplinary team to address chronic and comorbid conditions and prevent exacerbation or deterioration   Update acute care plan with appropriate goals if chronic or comorbid symptoms are exacerbated and prevent overall improvement and discharge     Problem: Discharge Planning  Goal: Discharge to home or other facility with appropriate resources  5/3/2025 2058 by Michael Pruett RN  Outcome: Progressing  Flowsheets (Taken 5/3/2025 2041)  Discharge to home or other facility with appropriate resources:   Identify barriers to discharge with patient and caregiver   Arrange for needed discharge resources and transportation as appropriate   Identify discharge learning needs (meds, wound care, etc)   Refer to discharge planning if patient needs 
  Problem: Pain  Goal: Verbalizes/displays adequate comfort level or baseline comfort level  5/7/2025 1122 by Sugey Ruffin RN  Outcome: Progressing  5/7/2025 0216 by Cristina Song RN  Outcome: Progressing  5/7/2025 0215 by Cirstina Song RN  Outcome: Progressing     Problem: Chronic Conditions and Co-morbidities  Goal: Patient's chronic conditions and co-morbidity symptoms are monitored and maintained or improved  5/7/2025 1122 by Sugey Ruffin RN  Outcome: Progressing  5/7/2025 0216 by Cristina Song RN  Outcome: Progressing  5/7/2025 0215 by Cristina Song RN  Outcome: Progressing     Problem: Discharge Planning  Goal: Discharge to home or other facility with appropriate resources  5/7/2025 1122 by Sugey Ruffin RN  Outcome: Progressing  5/7/2025 0216 by Cristina Song RN  Outcome: Progressing  5/7/2025 0215 by Cristina Song RN  Outcome: Progressing     Problem: Musculoskeletal - Adult  Goal: Return mobility to safest level of function  5/7/2025 1122 by Sugey Ruffin RN  Outcome: Progressing  5/7/2025 0216 by Cristina Song RN  Outcome: Progressing  5/7/2025 0215 by Cristina Song RN  Outcome: Progressing  Goal: Maintain proper alignment of affected body part  5/7/2025 1122 by Sugey Ruffin RN  Outcome: Progressing  5/7/2025 0216 by Cristina Song RN  Outcome: Progressing  5/7/2025 0215 by Cristina Song RN  Outcome: Progressing  Goal: Return ADL status to a safe level of function  5/7/2025 1122 by Sugey Ruffin RN  Outcome: Progressing  5/7/2025 0216 by Cristina Song RN  Outcome: Progressing  5/7/2025 0215 by Cristina Song RN  Outcome: Progressing     Problem: Safety - Adult  Goal: Free from fall injury  5/7/2025 1122 by Sugey Ruffin RN  Outcome: Progressing  5/7/2025 0216 by Cristina Song RN  Outcome: Progressing  5/7/2025 0215 by Cristina Song, RN  Outcome: Progressing     Problem: Skin/Tissue Integrity  Goal: Skin integrity remains intact  Description: 1.  Monitor for areas 
  Problem: Pain  Goal: Verbalizes/displays adequate comfort level or baseline comfort level  5/9/2025 1443 by Veronica Jennings RN  Outcome: Progressing  5/9/2025 0210 by German Forrester RN  Outcome: Progressing     Problem: Chronic Conditions and Co-morbidities  Goal: Patient's chronic conditions and co-morbidity symptoms are monitored and maintained or improved  5/9/2025 1443 by Veronica Jennings RN  Outcome: Progressing  5/9/2025 0210 by German Forrester RN  Outcome: Progressing     Problem: Discharge Planning  Goal: Discharge to home or other facility with appropriate resources  5/9/2025 1443 by Veronica Jennings RN  Outcome: Progressing  5/9/2025 0210 by German Forrester RN  Outcome: Progressing     Problem: Musculoskeletal - Adult  Goal: Return mobility to safest level of function  5/9/2025 1443 by Veronica Jennings RN  Outcome: Progressing  5/9/2025 0210 by German Forrester RN  Outcome: Progressing  Goal: Maintain proper alignment of affected body part  5/9/2025 1443 by Veronica Jennings RN  Outcome: Progressing  5/9/2025 0210 by German Forrester RN  Outcome: Progressing  Goal: Return ADL status to a safe level of function  5/9/2025 1443 by Veronica Jennings RN  Outcome: Progressing  5/9/2025 0210 by German Forrester RN  Outcome: Progressing     Problem: Neurosensory - Adult  Goal: Achieves stable or improved neurological status  5/9/2025 1443 by Veronica Jennings RN  Outcome: Progressing  5/9/2025 0210 by German Forrester RN  Outcome: Progressing  Goal: Absence of seizures  5/9/2025 1443 by Veronica Jennings RN  Outcome: Progressing  5/9/2025 0210 by German Forrester RN  Outcome: Progressing  Goal: Remains free of injury related to seizures activity  5/9/2025 1443 by Veronica Jennings RN  Outcome: Progressing  5/9/2025 0210 by German Forrester RN  Outcome: Progressing  Goal: Achieves maximal functionality and self care  5/9/2025 1443 by Veronica Jennings RN  Outcome: Progressing  5/9/2025 0210 by German Forrester 
  Problem: Pain  Goal: Verbalizes/displays adequate comfort level or baseline comfort level  5/9/2025 2226 by German Forrester RN  Outcome: Progressing  5/9/2025 1443 by Veronica Jennings RN  Outcome: Progressing     Problem: Chronic Conditions and Co-morbidities  Goal: Patient's chronic conditions and co-morbidity symptoms are monitored and maintained or improved  5/9/2025 2226 by German Forrester RN  Outcome: Progressing  5/9/2025 1443 by Veronica Jennings RN  Outcome: Progressing     Problem: Discharge Planning  Goal: Discharge to home or other facility with appropriate resources  5/9/2025 2226 by German Forrester RN  Outcome: Progressing  5/9/2025 1443 by Veronica Jennings RN  Outcome: Progressing     Problem: Musculoskeletal - Adult  Goal: Return mobility to safest level of function  5/9/2025 2226 by German Forrester RN  Outcome: Progressing  5/9/2025 1443 by Veronica Jennings RN  Outcome: Progressing  Goal: Maintain proper alignment of affected body part  5/9/2025 2226 by German Forrester RN  Outcome: Progressing  5/9/2025 1443 by Veronica Jennings RN  Outcome: Progressing  Goal: Return ADL status to a safe level of function  5/9/2025 2226 by German Forrester RN  Outcome: Progressing  5/9/2025 1443 by Veronica Jennings RN  Outcome: Progressing     Problem: Neurosensory - Adult  Goal: Achieves stable or improved neurological status  5/9/2025 2226 by German Forrester RN  Outcome: Progressing  5/9/2025 1443 by Veronica Jennings RN  Outcome: Progressing  Goal: Absence of seizures  5/9/2025 2226 by German Forrester RN  Outcome: Progressing  5/9/2025 1443 by Veronica Jennings RN  Outcome: Progressing  Goal: Remains free of injury related to seizures activity  5/9/2025 2226 by German Forrester RN  Outcome: Progressing  5/9/2025 1443 by Veronica Jennings RN  Outcome: Progressing  Goal: Achieves maximal functionality and self care  5/9/2025 2226 by German Forrester RN  Outcome: Progressing  5/9/2025 1443 by Veronica Jennings 
  Problem: Pain  Goal: Verbalizes/displays adequate comfort level or baseline comfort level  Flowsheets (Taken 4/12/2025 1025)  Verbalizes/displays adequate comfort level or baseline comfort level:   Encourage patient to monitor pain and request assistance   Assess pain using appropriate pain scale   Administer analgesics based on type and severity of pain and evaluate response   Implement non-pharmacological measures as appropriate and evaluate response   Consider cultural and social influences on pain and pain management   Notify Licensed Independent Practitioner if interventions unsuccessful or patient reports new pain     Problem: Chronic Conditions and Co-morbidities  Goal: Patient's chronic conditions and co-morbidity symptoms are monitored and maintained or improved  Flowsheets (Taken 4/12/2025 1025)  Care Plan - Patient's Chronic Conditions and Co-Morbidity Symptoms are Monitored and Maintained or Improved:   Monitor and assess patient's chronic conditions and comorbid symptoms for stability, deterioration, or improvement   Collaborate with multidisciplinary team to address chronic and comorbid conditions and prevent exacerbation or deterioration     Problem: Discharge Planning  Goal: Discharge to home or other facility with appropriate resources  Flowsheets (Taken 4/12/2025 1025)  Discharge to home or other facility with appropriate resources:   Identify barriers to discharge with patient and caregiver   Arrange for needed discharge resources and transportation as appropriate   Identify discharge learning needs (meds, wound care, etc)     
  Problem: Pain  Goal: Verbalizes/displays adequate comfort level or baseline comfort level  Outcome: Progressing     Problem: Chronic Conditions and Co-morbidities  Goal: Patient's chronic conditions and co-morbidity symptoms are monitored and maintained or improved  Outcome: Progressing     Problem: Discharge Planning  Goal: Discharge to home or other facility with appropriate resources  5/11/2025 1836 by Manolo Bustos RN  Outcome: Progressing  5/11/2025 1615 by Delfina Patrick RN  Outcome: Progressing     Problem: Musculoskeletal - Adult  Goal: Return mobility to safest level of function  Outcome: Progressing  Goal: Maintain proper alignment of affected body part  Outcome: Progressing  Goal: Return ADL status to a safe level of function  Outcome: Progressing     Problem: Safety - Adult  Goal: Free from fall injury  Outcome: Progressing     Problem: Skin/Tissue Integrity  Goal: Skin integrity remains intact  Description: 1.  Monitor for areas of redness and/or skin breakdown  2.  Assess vascular access sites hourly  3.  Every 4-6 hours minimum:  Change oxygen saturation probe site  4.  Every 4-6 hours:  If on nasal continuous positive airway pressure, respiratory therapy assess nares and determine need for appliance change or resting period  Outcome: Progressing     Problem: ABCDS Injury Assessment  Goal: Absence of physical injury  Outcome: Progressing     Problem: Nutrition Deficit:  Goal: Optimize nutritional status  Outcome: Progressing     Problem: Neurosensory - Adult  Goal: Achieves stable or improved neurological status  Outcome: Progressing  Goal: Absence of seizures  5/11/2025 1836 by Manolo Bustos RN  Outcome: Progressing  5/11/2025 1615 by Delfina Patrick RN  Outcome: Progressing  Goal: Remains free of injury related to seizures activity  Outcome: Progressing  Goal: Achieves maximal functionality and self care  Outcome: Progressing     Problem: Respiratory - Adult  Goal: Achieves optimal 
  Problem: Pain  Goal: Verbalizes/displays adequate comfort level or baseline comfort level  Outcome: Progressing     Problem: Chronic Conditions and Co-morbidities  Goal: Patient's chronic conditions and co-morbidity symptoms are monitored and maintained or improved  Outcome: Progressing     Problem: Discharge Planning  Goal: Discharge to home or other facility with appropriate resources  Outcome: Progressing     Problem: Musculoskeletal - Adult  Goal: Return mobility to safest level of function  Outcome: Progressing  Goal: Maintain proper alignment of affected body part  Outcome: Progressing  Goal: Return ADL status to a safe level of function  Outcome: Progressing     Problem: Safety - Adult  Goal: Free from fall injury  Outcome: Progressing     Problem: Physical Therapy - Adult  Goal: By Discharge: Performs mobility at highest level of function for planned discharge setting.  See evaluation for individualized goals.  Description: FUNCTIONAL STATUS PRIOR TO ADMISSION: Pt presents as AO to name and poor historian at time of PT IE. Upon belief, Pt ambulatory with dme at baseline and resides with his son. Unknown home set up, stairs and/or support availability. Pt admitted 2/2 Left MCP abscess and LLE cellulitis.     HOME SUPPORT PRIOR TO ADMISSION: Cares for spouse who has cancer.    Physical Therapy Goals  Initiated 4/11/2025, patient assumed to be [facility] while awaiting procedure 4/14/25:  1.  Patient will move from supine to sit and sit to supine, scoot up and down, and roll side to side in bed with modified independence within 7 day(s).    2.  Patient will perform sit to stand with contact guard assist within 7 day(s).  3.  Patient will transfer from bed to chair and chair to bed with contact guard assist using the least restrictive device within 7 day(s).  4.  Patient will ambulate with Min A for 15 feet with the least restrictive device within 7 day(s).   5.  Patient will ascend/descend 3-5 stairs with 1 
  Problem: Pain  Goal: Verbalizes/displays adequate comfort level or baseline comfort level  Outcome: Progressing     Problem: Chronic Conditions and Co-morbidities  Goal: Patient's chronic conditions and co-morbidity symptoms are monitored and maintained or improved  Outcome: Progressing     Problem: Discharge Planning  Goal: Discharge to home or other facility with appropriate resources  Outcome: Progressing     Problem: Musculoskeletal - Adult  Goal: Return mobility to safest level of function  Outcome: Progressing  Goal: Maintain proper alignment of affected body part  Outcome: Progressing  Goal: Return ADL status to a safe level of function  Outcome: Progressing     Problem: Safety - Adult  Goal: Free from fall injury  Outcome: Progressing     Problem: Skin/Tissue Integrity  Goal: Skin integrity remains intact  Description: 1.  Monitor for areas of redness and/or skin breakdown  2.  Assess vascular access sites hourly  3.  Every 4-6 hours minimum:  Change oxygen saturation probe site  4.  Every 4-6 hours:  If on nasal continuous positive airway pressure, respiratory therapy assess nares and determine need for appliance change or resting period  Outcome: Progressing     
  Problem: Pain  Goal: Verbalizes/displays adequate comfort level or baseline comfort level  Outcome: Progressing     Problem: Chronic Conditions and Co-morbidities  Goal: Patient's chronic conditions and co-morbidity symptoms are monitored and maintained or improved  Outcome: Progressing     Problem: Discharge Planning  Goal: Discharge to home or other facility with appropriate resources  Outcome: Progressing     Problem: Musculoskeletal - Adult  Goal: Return mobility to safest level of function  Outcome: Progressing  Goal: Maintain proper alignment of affected body part  Outcome: Progressing  Goal: Return ADL status to a safe level of function  Outcome: Progressing     Problem: Safety - Adult  Goal: Free from fall injury  Outcome: Progressing     Problem: Skin/Tissue Integrity  Goal: Skin integrity remains intact  Description: 1.  Monitor for areas of redness and/or skin breakdown  2.  Assess vascular access sites hourly  3.  Every 4-6 hours minimum:  Change oxygen saturation probe site  4.  Every 4-6 hours:  If on nasal continuous positive airway pressure, respiratory therapy assess nares and determine need for appliance change or resting period  Outcome: Progressing     Problem: ABCDS Injury Assessment  Goal: Absence of physical injury  Outcome: Progressing     Problem: Nutrition Deficit:  Goal: Optimize nutritional status  Outcome: Progressing     Problem: Neurosensory - Adult  Goal: Achieves stable or improved neurological status  Outcome: Progressing  Goal: Absence of seizures  Outcome: Progressing  Goal: Remains free of injury related to seizures activity  Outcome: Progressing  Goal: Achieves maximal functionality and self care  Outcome: Progressing     Problem: Respiratory - Adult  Goal: Achieves optimal ventilation and oxygenation  Outcome: Progressing     Problem: Skin/Tissue Integrity - Adult  Goal: Skin integrity remains intact  Description: 1.  Monitor for areas of redness and/or skin breakdown  2.  
  Problem: Pain  Goal: Verbalizes/displays adequate comfort level or baseline comfort level  Outcome: Progressing     Problem: Chronic Conditions and Co-morbidities  Goal: Patient's chronic conditions and co-morbidity symptoms are monitored and maintained or improved  Outcome: Progressing     Problem: Discharge Planning  Goal: Discharge to home or other facility with appropriate resources  Outcome: Progressing     Problem: Musculoskeletal - Adult  Goal: Return mobility to safest level of function  Outcome: Progressing  Goal: Maintain proper alignment of affected body part  Outcome: Progressing  Goal: Return ADL status to a safe level of function  Outcome: Progressing     Problem: Safety - Adult  Goal: Free from fall injury  Outcome: Progressing     Problem: Skin/Tissue Integrity  Goal: Skin integrity remains intact  Description: 1.  Monitor for areas of redness and/or skin breakdown  2.  Assess vascular access sites hourly  3.  Every 4-6 hours minimum:  Change oxygen saturation probe site  4.  Every 4-6 hours:  If on nasal continuous positive airway pressure, respiratory therapy assess nares and determine need for appliance change or resting period  Outcome: Progressing  Flowsheets (Taken 4/27/2025 2155)  Skin Integrity Remains Intact: Monitor for areas of redness and/or skin breakdown     Problem: ABCDS Injury Assessment  Goal: Absence of physical injury  Outcome: Progressing     Problem: Nutrition Deficit:  Goal: Optimize nutritional status  Outcome: Progressing     Problem: Neurosensory - Adult  Goal: Achieves stable or improved neurological status  Outcome: Progressing  Goal: Absence of seizures  Outcome: Progressing  Goal: Remains free of injury related to seizures activity  Outcome: Progressing  Goal: Achieves maximal functionality and self care  Outcome: Progressing     Problem: Respiratory - Adult  Goal: Achieves optimal ventilation and oxygenation  Outcome: Progressing     Problem: Skin/Tissue Integrity - 
  Problem: Pain  Goal: Verbalizes/displays adequate comfort level or baseline comfort level  Outcome: Progressing     Problem: Chronic Conditions and Co-morbidities  Goal: Patient's chronic conditions and co-morbidity symptoms are monitored and maintained or improved  Outcome: Progressing     Problem: Discharge Planning  Goal: Discharge to home or other facility with appropriate resources  Outcome: Progressing     Problem: Musculoskeletal - Adult  Goal: Return mobility to safest level of function  Outcome: Progressing  Goal: Maintain proper alignment of affected body part  Outcome: Progressing  Goal: Return ADL status to a safe level of function  Outcome: Progressing     Problem: Safety - Adult  Goal: Free from fall injury  Outcome: Progressing     Problem: Skin/Tissue Integrity  Goal: Skin integrity remains intact  Description: 1.  Monitor for areas of redness and/or skin breakdown  2.  Assess vascular access sites hourly  3.  Every 4-6 hours minimum:  Change oxygen saturation probe site  4.  Every 4-6 hours:  If on nasal continuous positive airway pressure, respiratory therapy assess nares and determine need for appliance change or resting period  Outcome: Progressing  Flowsheets (Taken 4/9/2025 2145)  Skin Integrity Remains Intact: Monitor for areas of redness and/or skin breakdown     Problem: ABCDS Injury Assessment  Goal: Absence of physical injury  Outcome: Progressing     
  Problem: Pain  Goal: Verbalizes/displays adequate comfort level or baseline comfort level  Outcome: Progressing     Problem: Chronic Conditions and Co-morbidities  Goal: Patient's chronic conditions and co-morbidity symptoms are monitored and maintained or improved  Outcome: Progressing     Problem: Musculoskeletal - Adult  Goal: Return mobility to safest level of function  Outcome: Progressing     
  Problem: Pain  Goal: Verbalizes/displays adequate comfort level or baseline comfort level  Outcome: Progressing     Problem: Chronic Conditions and Co-morbidities  Goal: Patient's chronic conditions and co-morbidity symptoms are monitored and maintained or improved  Outcome: Progressing  Flowsheets (Taken 4/1/2025 2000)  Care Plan - Patient's Chronic Conditions and Co-Morbidity Symptoms are Monitored and Maintained or Improved: Monitor and assess patient's chronic conditions and comorbid symptoms for stability, deterioration, or improvement     Problem: Discharge Planning  Goal: Discharge to home or other facility with appropriate resources  Outcome: Progressing  Flowsheets (Taken 4/1/2025 2000)  Discharge to home or other facility with appropriate resources: Identify barriers to discharge with patient and caregiver     Problem: Musculoskeletal - Adult  Goal: Return mobility to safest level of function  Outcome: Progressing  Flowsheets (Taken 4/1/2025 2000)  Return Mobility to Safest Level of Function:   Assess patient stability and activity tolerance for standing, transferring and ambulating with or without assistive devices   Assist with transfers and ambulation using safe patient handling equipment as needed  Goal: Maintain proper alignment of affected body part  Outcome: Progressing  Flowsheets (Taken 4/1/2025 2000)  Maintain proper alignment of affected body part: Support and protect limb and body alignment per provider's orders  Goal: Return ADL status to a safe level of function  Outcome: Progressing  Flowsheets (Taken 4/1/2025 2000)  Return ADL Status to a Safe Level of Function: Administer medication as ordered     Problem: Safety - Adult  Goal: Free from fall injury  Outcome: Progressing     Problem: Occupational Therapy - Adult  Goal: By Discharge: Performs self-care activities at highest level of function for planned discharge setting.  See evaluation for individualized goals.  Description: FUNCTIONAL 
  Problem: Pain  Goal: Verbalizes/displays adequate comfort level or baseline comfort level  Outcome: Progressing     Problem: Chronic Conditions and Co-morbidities  Goal: Patient's chronic conditions and co-morbidity symptoms are monitored and maintained or improved  Outcome: Progressing  Flowsheets (Taken 4/19/2025 2040)  Care Plan - Patient's Chronic Conditions and Co-Morbidity Symptoms are Monitored and Maintained or Improved: Monitor and assess patient's chronic conditions and comorbid symptoms for stability, deterioration, or improvement     Problem: Discharge Planning  Goal: Discharge to home or other facility with appropriate resources  Outcome: Progressing  Flowsheets (Taken 4/19/2025 2040)  Discharge to home or other facility with appropriate resources:   Identify barriers to discharge with patient and caregiver   Arrange for needed discharge resources and transportation as appropriate   Identify discharge learning needs (meds, wound care, etc)     Problem: Musculoskeletal - Adult  Goal: Return mobility to safest level of function  Outcome: Progressing     Problem: Neurosensory - Adult  Goal: Achieves stable or improved neurological status  Outcome: Progressing     Problem: Respiratory - Adult  Goal: Achieves optimal ventilation and oxygenation  Outcome: Progressing     Problem: Skin/Tissue Integrity - Adult  Goal: Skin integrity remains intact  Description: 1.  Monitor for areas of redness and/or skin breakdown  2.  Assess vascular access sites hourly  3.  Every 4-6 hours minimum:  Change oxygen saturation probe site  4.  Every 4-6 hours:  If on nasal continuous positive airway pressure, respiratory therapy assess nares and determine need for appliance change or resting period  Outcome: Progressing     Problem: Genitourinary - Adult  Goal: Absence of urinary retention  Outcome: Progressing     Problem: Metabolic/Fluid and Electrolytes - Adult  Goal: Electrolytes maintained within normal limits  Outcome: 
  Problem: Pain  Goal: Verbalizes/displays adequate comfort level or baseline comfort level  Outcome: Progressing     Problem: Chronic Conditions and Co-morbidities  Goal: Patient's chronic conditions and co-morbidity symptoms are monitored and maintained or improved  Outcome: Progressing  Flowsheets (Taken 4/23/2025 2010)  Care Plan - Patient's Chronic Conditions and Co-Morbidity Symptoms are Monitored and Maintained or Improved: Monitor and assess patient's chronic conditions and comorbid symptoms for stability, deterioration, or improvement     Problem: Discharge Planning  Goal: Discharge to home or other facility with appropriate resources  Outcome: Progressing  Flowsheets (Taken 4/23/2025 2010)  Discharge to home or other facility with appropriate resources:   Identify barriers to discharge with patient and caregiver   Arrange for needed discharge resources and transportation as appropriate   Identify discharge learning needs (meds, wound care, etc)     Problem: Musculoskeletal - Adult  Goal: Return mobility to safest level of function  Outcome: Progressing     Problem: Respiratory - Adult  Goal: Achieves optimal ventilation and oxygenation  Outcome: Progressing     Problem: Skin/Tissue Integrity - Adult  Goal: Incisions, wounds, or drain sites healing without S/S of infection  Outcome: Progressing     Problem: Skin/Tissue Integrity - Adult  Goal: Skin integrity remains intact  Description: 1.  Monitor for areas of redness and/or skin breakdown  2.  Assess vascular access sites hourly  3.  Every 4-6 hours minimum:  Change oxygen saturation probe site  4.  Every 4-6 hours:  If on nasal continuous positive airway pressure, respiratory therapy assess nares and determine need for appliance change or resting period  Outcome: Progressing     Problem: Metabolic/Fluid and Electrolytes - Adult  Goal: Electrolytes maintained within normal limits  Outcome: Progressing  Flowsheets (Taken 4/23/2025 2010)  Electrolytes 
  Problem: Pain  Goal: Verbalizes/displays adequate comfort level or baseline comfort level  Outcome: Progressing     Problem: Chronic Conditions and Co-morbidities  Goal: Patient's chronic conditions and co-morbidity symptoms are monitored and maintained or improved  Outcome: Progressing  Flowsheets (Taken 4/8/2025 2000)  Care Plan - Patient's Chronic Conditions and Co-Morbidity Symptoms are Monitored and Maintained or Improved: Monitor and assess patient's chronic conditions and comorbid symptoms for stability, deterioration, or improvement     Problem: Discharge Planning  Goal: Discharge to home or other facility with appropriate resources  Outcome: Progressing  Flowsheets (Taken 4/8/2025 2000)  Discharge to home or other facility with appropriate resources: Identify barriers to discharge with patient and caregiver     Problem: Musculoskeletal - Adult  Goal: Return mobility to safest level of function  Outcome: Progressing  Goal: Maintain proper alignment of affected body part  Outcome: Progressing  Goal: Return ADL status to a safe level of function  Outcome: Progressing     Problem: Safety - Adult  Goal: Free from fall injury  Outcome: Progressing     Problem: Physical Therapy - Adult  Goal: By Discharge: Performs mobility at highest level of function for planned discharge setting.  See evaluation for individualized goals.  Description: FUNCTIONAL STATUS PRIOR TO ADMISSION: Pt presents as AO to name and poor historian at time of PT IE. Upon belief, Pt ambulatory with dme at baseline and resides with his son. Unknown home set up, stairs and/or support availability. Observed bilateral eye crusting, redness at time of PT IE. RN Team aware and following. Pt admitted 2/2 Left MCP abscess and LLE cellulitis. Unknown WB at time of therapy services. Pt kept at [facility] at time of PT Evaluation and Perfect [employer] message sent to Podiatry Team requesting clarification. Pt referred for therapy services per resulting 
  Problem: Pain  Goal: Verbalizes/displays adequate comfort level or baseline comfort level  Outcome: Progressing     Problem: Chronic Conditions and Co-morbidities  Goal: Patient's chronic conditions and co-morbidity symptoms are monitored and maintained or improved  Outcome: Progressing  Flowsheets (Taken 5/14/2025 2000)  Care Plan - Patient's Chronic Conditions and Co-Morbidity Symptoms are Monitored and Maintained or Improved: Monitor and assess patient's chronic conditions and comorbid symptoms for stability, deterioration, or improvement     Problem: Discharge Planning  Goal: Discharge to home or other facility with appropriate resources  Outcome: Progressing  Flowsheets (Taken 5/14/2025 2000)  Discharge to home or other facility with appropriate resources: Identify barriers to discharge with patient and caregiver     Problem: Musculoskeletal - Adult  Goal: Return mobility to safest level of function  Outcome: Progressing  Flowsheets (Taken 5/14/2025 2000)  Return Mobility to Safest Level of Function: Assess patient stability and activity tolerance for standing, transferring and ambulating with or without assistive devices  Goal: Maintain proper alignment of affected body part  Outcome: Progressing  Flowsheets (Taken 5/14/2025 2000)  Maintain proper alignment of affected body part: Support and protect limb and body alignment per provider's orders  Goal: Return ADL status to a safe level of function  Outcome: Progressing  Flowsheets (Taken 5/14/2025 2000)  Return ADL Status to a Safe Level of Function: Administer medication as ordered     Problem: Safety - Adult  Goal: Free from fall injury  Outcome: Progressing  Flowsheets (Taken 5/14/2025 2147)  Free From Fall Injury: Instruct family/caregiver on patient safety     Problem: Skin/Tissue Integrity  Goal: Skin integrity remains intact  Description: 1.  Monitor for areas of redness and/or skin breakdown  2.  Assess vascular access sites hourly  3.  Every 4-6 
  Problem: Pain  Goal: Verbalizes/displays adequate comfort level or baseline comfort level  Outcome: Progressing  Flowsheets  Taken 5/17/2025 1930 by Jose Miguel Cortés RN  Verbalizes/displays adequate comfort level or baseline comfort level:   Encourage patient to monitor pain and request assistance   Assess pain using appropriate pain scale   Administer analgesics based on type and severity of pain and evaluate response   Implement non-pharmacological measures as appropriate and evaluate response   Consider cultural and social influences on pain and pain management   Notify Licensed Independent Practitioner if interventions unsuccessful or patient reports new pain  Taken 5/17/2025 1600 by Lenka Meyer RN  Verbalizes/displays adequate comfort level or baseline comfort level:   Encourage patient to monitor pain and request assistance   Assess pain using appropriate pain scale     Problem: Chronic Conditions and Co-morbidities  Goal: Patient's chronic conditions and co-morbidity symptoms are monitored and maintained or improved  Outcome: Progressing  Flowsheets (Taken 5/17/2025 1930)  Care Plan - Patient's Chronic Conditions and Co-Morbidity Symptoms are Monitored and Maintained or Improved:   Monitor and assess patient's chronic conditions and comorbid symptoms for stability, deterioration, or improvement   Collaborate with multidisciplinary team to address chronic and comorbid conditions and prevent exacerbation or deterioration   Update acute care plan with appropriate goals if chronic or comorbid symptoms are exacerbated and prevent overall improvement and discharge       Problem: Musculoskeletal - Adult  Goal: Return mobility to safest level of function  Outcome: Progressing  Flowsheets (Taken 5/17/2025 1930)  Return Mobility to Safest Level of Function:   Assess patient stability and activity tolerance for standing, transferring and ambulating with or without assistive devices   Assist with transfers and 
  Problem: Pain  Goal: Verbalizes/displays adequate comfort level or baseline comfort level  Outcome: Progressing  Flowsheets (Taken 3/31/2025 2030)  Verbalizes/displays adequate comfort level or baseline comfort level: Encourage patient to monitor pain and request assistance     Problem: Chronic Conditions and Co-morbidities  Goal: Patient's chronic conditions and co-morbidity symptoms are monitored and maintained or improved  Outcome: Progressing  Flowsheets (Taken 3/31/2025 2030)  Care Plan - Patient's Chronic Conditions and Co-Morbidity Symptoms are Monitored and Maintained or Improved: Monitor and assess patient's chronic conditions and comorbid symptoms for stability, deterioration, or improvement     Problem: Discharge Planning  Goal: Discharge to home or other facility with appropriate resources  Outcome: Progressing  Flowsheets (Taken 3/31/2025 2030)  Discharge to home or other facility with appropriate resources:   Identify barriers to discharge with patient and caregiver   Arrange for needed discharge resources and transportation as appropriate     Problem: Musculoskeletal - Adult  Goal: Return mobility to safest level of function  Outcome: Progressing  Goal: Maintain proper alignment of affected body part  Outcome: Progressing  Goal: Return ADL status to a safe level of function  Outcome: Progressing     
  Problem: Pain  Goal: Verbalizes/displays adequate comfort level or baseline comfort level  Outcome: Progressing  Flowsheets (Taken 4/30/2025 0521)  Verbalizes/displays adequate comfort level or baseline comfort level:   Encourage patient to monitor pain and request assistance   Assess pain using appropriate pain scale     Problem: Chronic Conditions and Co-morbidities  Goal: Patient's chronic conditions and co-morbidity symptoms are monitored and maintained or improved  Outcome: Progressing     Problem: Discharge Planning  Goal: Discharge to home or other facility with appropriate resources  Outcome: Progressing     Problem: Musculoskeletal - Adult  Goal: Return mobility to safest level of function  Outcome: Progressing  Goal: Maintain proper alignment of affected body part  Outcome: Progressing  Goal: Return ADL status to a safe level of function  Outcome: Progressing     Problem: Skin/Tissue Integrity - Adult  Goal: Skin integrity remains intact  Description: 1.  Monitor for areas of redness and/or skin breakdown  2.  Assess vascular access sites hourly  3.  Every 4-6 hours minimum:  Change oxygen saturation probe site  4.  Every 4-6 hours:  If on nasal continuous positive airway pressure, respiratory therapy assess nares and determine need for appliance change or resting period  Outcome: Progressing  Goal: Incisions, wounds, or drain sites healing without S/S of infection  Outcome: Progressing  Goal: Oral mucous membranes remain intact  Outcome: Progressing     Problem: Genitourinary - Adult  Goal: Absence of urinary retention  Outcome: Progressing  Goal: Urinary catheter remains patent  Outcome: Progressing     Problem: Infection - Adult  Goal: Absence of infection at discharge  Outcome: Progressing  Goal: Absence of infection during hospitalization  Outcome: Progressing  Goal: Absence of fever/infection during anticipated neutropenic period  Outcome: Progressing     
  Problem: Pain  Goal: Verbalizes/displays adequate comfort level or baseline comfort level  Outcome: Progressing  Flowsheets (Taken 5/1/2025 2148)  Verbalizes/displays adequate comfort level or baseline comfort level:   Encourage patient to monitor pain and request assistance   Assess pain using appropriate pain scale   Administer analgesics based on type and severity of pain and evaluate response   Implement non-pharmacological measures as appropriate and evaluate response   Consider cultural and social influences on pain and pain management   Notify Licensed Independent Practitioner if interventions unsuccessful or patient reports new pain     Problem: Chronic Conditions and Co-morbidities  Goal: Patient's chronic conditions and co-morbidity symptoms are monitored and maintained or improved  Outcome: Progressing  Flowsheets (Taken 5/1/2025 2148)  Care Plan - Patient's Chronic Conditions and Co-Morbidity Symptoms are Monitored and Maintained or Improved:   Monitor and assess patient's chronic conditions and comorbid symptoms for stability, deterioration, or improvement   Collaborate with multidisciplinary team to address chronic and comorbid conditions and prevent exacerbation or deterioration   Update acute care plan with appropriate goals if chronic or comorbid symptoms are exacerbated and prevent overall improvement and discharge     Problem: Discharge Planning  Goal: Discharge to home or other facility with appropriate resources  Outcome: Progressing  Flowsheets (Taken 5/1/2025 2148)  Discharge to home or other facility with appropriate resources:   Identify barriers to discharge with patient and caregiver   Arrange for needed discharge resources and transportation as appropriate   Identify discharge learning needs (meds, wound care, etc)   Refer to discharge planning if patient needs post-hospital services based on physician order or complex needs related to functional status, cognitive ability or social 
  Problem: Pain  Goal: Verbalizes/displays adequate comfort level or baseline comfort level  Outcome: Progressing  Flowsheets (Taken 5/13/2025 1533 by Delfina Patrick, RN)  Verbalizes/displays adequate comfort level or baseline comfort level: Encourage patient to monitor pain and request assistance     Problem: Chronic Conditions and Co-morbidities  Goal: Patient's chronic conditions and co-morbidity symptoms are monitored and maintained or improved  Outcome: Progressing     Problem: Discharge Planning  Goal: Discharge to home or other facility with appropriate resources  Outcome: Progressing     Problem: Musculoskeletal - Adult  Goal: Return mobility to safest level of function  5/14/2025 0208 by Maikel Blackwell RN  Outcome: Progressing  5/13/2025 1248 by Delfina Patrick, RN  Outcome: Not Progressing  Flowsheets (Taken 5/13/2025 2077)  Return Mobility to Safest Level of Function: Assess patient stability and activity tolerance for standing, transferring and ambulating with or without assistive devices  Goal: Maintain proper alignment of affected body part  Outcome: Progressing  Goal: Return ADL status to a safe level of function  Outcome: Progressing     Problem: Safety - Adult  Goal: Free from fall injury  Outcome: Progressing     Problem: Physical Therapy - Adult  Goal: By Discharge: Performs mobility at highest level of function for planned discharge setting.  See evaluation for individualized goals.  Description: FUNCTIONAL STATUS PRIOR TO ADMISSION: Pt presents as AO to name and poor historian at time of PT IE. Upon belief, Pt ambulatory with dme at baseline and resides with his son. Unknown home set up, stairs and/or support availability. Pt admitted 2/2 Left MCP abscess and LLE cellulitis.     HOME SUPPORT PRIOR TO ADMISSION: Cares for spouse who has cancer.    Reassessment 5/12/25  1.  Patient will move from supine to sit and sit to supine in bed with minimal assistance within 7 day(s).    2.  Patient 
  Problem: Pain  Goal: Verbalizes/displays adequate comfort level or baseline comfort level  Outcome: Progressing  Flowsheets (Taken 5/2/2025 1933)  Verbalizes/displays adequate comfort level or baseline comfort level:   Encourage patient to monitor pain and request assistance   Assess pain using appropriate pain scale   Administer analgesics based on type and severity of pain and evaluate response   Implement non-pharmacological measures as appropriate and evaluate response   Consider cultural and social influences on pain and pain management   Notify Licensed Independent Practitioner if interventions unsuccessful or patient reports new pain     Problem: Chronic Conditions and Co-morbidities  Goal: Patient's chronic conditions and co-morbidity symptoms are monitored and maintained or improved  Outcome: Progressing  Flowsheets (Taken 5/2/2025 1933)  Care Plan - Patient's Chronic Conditions and Co-Morbidity Symptoms are Monitored and Maintained or Improved:   Monitor and assess patient's chronic conditions and comorbid symptoms for stability, deterioration, or improvement   Collaborate with multidisciplinary team to address chronic and comorbid conditions and prevent exacerbation or deterioration   Update acute care plan with appropriate goals if chronic or comorbid symptoms are exacerbated and prevent overall improvement and discharge     Problem: Discharge Planning  Goal: Discharge to home or other facility with appropriate resources  Outcome: Progressing  Flowsheets (Taken 5/2/2025 1933)  Discharge to home or other facility with appropriate resources:   Identify barriers to discharge with patient and caregiver   Arrange for needed discharge resources and transportation as appropriate   Identify discharge learning needs (meds, wound care, etc)   Refer to discharge planning if patient needs post-hospital services based on physician order or complex needs related to functional status, cognitive ability or social 
  Problem: Physical Therapy - Adult  Goal: By Discharge: Performs mobility at highest level of function for planned discharge setting.  See evaluation for individualized goals.  Description: FUNCTIONAL STATUS PRIOR TO ADMISSION: Pt presents as AO to name and poor historian at time of PT IE. Upon belief, Pt ambulatory with dme at baseline and resides with his son. Unknown home set up, stairs and/or support availability. Observed bilateral eye crusting, redness at time of PT IE. RN Team aware and following. Pt admitted 2/2 Left MCP abscess and LLE cellulitis. Unknown WB at time of therapy services. Pt kept at E NWB at time of PT Evaluation and Perfect Serve message sent to Podiatry Team requesting clarification. Pt referred for therapy services per resulting functional mobility decline.     HOME SUPPORT PRIOR TO ADMISSION:     Physical Therapy Goals  Initiated 4/1/2025, Reviewed 4/8/2025  1.  Patient will move from supine to sit and sit to supine in bed with supervision/set-up within 7 day(s).    2.  Patient will perform sit to stand with supervision/set-up within 7 day(s).  3.  Patient will transfer from bed to chair and chair to bed with supervision/set-up using the least restrictive device within 7 day(s).  4.  Patient will ambulate with supervision/set-up for 150 feet with the least restrictive device within 7 day(s).   5.  Patient will ascend/descend 3-5 stairs with R/L handrail(s) with supervision/set-up within 7 day(s).    Outcome: Progressing     PHYSICAL THERAPY TREATMENT: WEEKLY REASSESSMENT    Patient: Errol Brandt (80 y.o. male)  Date: 4/8/2025  Primary Diagnosis: Cellulitis of left foot [L03.116]  Retained foreign body of foot [M79.5]  Diabetic foot infection (HCC) [E11.628, L08.9]  Cellulitis and abscess of toe of left foot [L03.032, L02.612]  Other mucopurulent conjunctivitis of both eyes [H10.023]  Procedure(s) (LRB):  LEFT FOOT  INCISION AND DRAINAGE WITH FOREIGN BODY REMOVAL (Left) 6 Days Post-Op 
  Problem: Physical Therapy - Adult  Goal: By Discharge: Performs mobility at highest level of function for planned discharge setting.  See evaluation for individualized goals.  Description: FUNCTIONAL STATUS PRIOR TO ADMISSION: Pt presents as AO to name and poor historian at time of PT IE. Upon belief, Pt ambulatory with dme at baseline and resides with his son. Unknown home set up, stairs and/or support availability. Observed bilateral eye crusting, redness at time of PT IE. RN Team aware and following. Pt admitted 2/2 Left MCP abscess and LLE cellulitis. Unknown WB at time of therapy services. Pt kept at LLE NWB at time of PT Evaluation and Perfect Serve message sent to Podiatry Team requesting clarification. Pt referred for therapy services per resulting functional mobility decline.     HOME SUPPORT PRIOR TO ADMISSION:     Physical Therapy Goals  Initiated 4/1/2025  1.  Patient will move from supine to sit and sit to supine in bed with supervision/set-up within 7 day(s).    2.  Patient will perform sit to stand with supervision/set-up within 7 day(s).  3.  Patient will transfer from bed to chair and chair to bed with supervision/set-up using the least restrictive device within 7 day(s).  4.  Patient will ambulate with supervision/set-up for 150 feet with the least restrictive device within 7 day(s).   5.  Patient will ascend/descend 3-5 stairs with R/L handrail(s) with supervision/set-up within 7 day(s).    Outcome: Progressing     PHYSICAL THERAPY EVALUATION    Patient: Errol Brandt (80 y.o. male)  Date: 4/1/2025  Primary Diagnosis: Cellulitis of left foot [L03.116]  Retained foreign body of foot [M79.5]  Diabetic foot infection (HCC) [E11.628, L08.9]  Cellulitis and abscess of toe of left foot [L03.032, L02.612]  Other mucopurulent conjunctivitis of both eyes [H10.023]       Precautions: Restrictions/Precautions: Fall Risk, General Precautions, Bed Alarm, NPO (falls, L foot/LLE cellulitis ?WB?; 
  Problem: Physical Therapy - Adult  Goal: By Discharge: Performs mobility at highest level of function for planned discharge setting.  See evaluation for individualized goals.  Description: FUNCTIONAL STATUS PRIOR TO ADMISSION: Pt presents as AO to name and poor historian at time of PT IE. Upon belief, Pt ambulatory with dme at baseline and resides with his son. Unknown home set up, stairs and/or support availability. Observed bilateral eye crusting, redness at time of PT IE. RN Team aware and following. Pt admitted 2/2 Left MCP abscess and LLE cellulitis. Unknown WB at time of therapy services. Pt kept at LLE NWB at time of PT Evaluation and Perfect Serve message sent to Podiatry Team requesting clarification. Pt referred for therapy services per resulting functional mobility decline.     HOME SUPPORT PRIOR TO ADMISSION:     Physical Therapy Goals  Initiated 4/1/2025  1.  Patient will move from supine to sit and sit to supine in bed with supervision/set-up within 7 day(s).    2.  Patient will perform sit to stand with supervision/set-up within 7 day(s).  3.  Patient will transfer from bed to chair and chair to bed with supervision/set-up using the least restrictive device within 7 day(s).  4.  Patient will ambulate with supervision/set-up for 150 feet with the least restrictive device within 7 day(s).   5.  Patient will ascend/descend 3-5 stairs with R/L handrail(s) with supervision/set-up within 7 day(s).    Outcome: Progressing     PHYSICAL THERAPY TREATMENT    Patient: Errol Brandt (80 y.o. male)  Date: 4/3/2025  Diagnosis:   Cellulitis of left foot [L03.116]  Retained foreign body of foot [M79.5]  Diabetic foot infection (HCC) [E11.628, L08.9]  Cellulitis and abscess of toe of left foot [L03.032, L02.612]  Other mucopurulent conjunctivitis of both eyes [H10.023] Cellulitis and abscess of toe of left foot  Procedure(s) (LRB):  LEFT FOOT  INCISION AND DRAINAGE WITH FOREIGN BODY REMOVAL (Left) 1 Day 
  Problem: Physical Therapy - Adult  Goal: By Discharge: Performs mobility at highest level of function for planned discharge setting.  See evaluation for individualized goals.  Description: FUNCTIONAL STATUS PRIOR TO ADMISSION: Pt presents as AO to name and poor historian at time of PT IE. Upon belief, Pt ambulatory with dme at baseline and resides with his son. Unknown home set up, stairs and/or support availability. Observed bilateral eye crusting, redness at time of PT IE. RN Team aware and following. Pt admitted 2/2 Left MCP abscess and LLE cellulitis. Unknown WB at time of therapy services. Pt kept at LLE NWB at time of PT Evaluation and Perfect Serve message sent to Podiatry Team requesting clarification. Pt referred for therapy services per resulting functional mobility decline.     HOME SUPPORT PRIOR TO ADMISSION:     Physical Therapy Goals  Initiated 4/1/2025  1.  Patient will move from supine to sit and sit to supine in bed with supervision/set-up within 7 day(s).    2.  Patient will perform sit to stand with supervision/set-up within 7 day(s).  3.  Patient will transfer from bed to chair and chair to bed with supervision/set-up using the least restrictive device within 7 day(s).  4.  Patient will ambulate with supervision/set-up for 150 feet with the least restrictive device within 7 day(s).   5.  Patient will ascend/descend 3-5 stairs with R/L handrail(s) with supervision/set-up within 7 day(s).    Outcome: Progressing     PHYSICAL THERAPY TREATMENT    Patient: Errol Brandt (80 y.o. male)  Date: 4/4/2025  Diagnosis:   Cellulitis of left foot [L03.116]  Retained foreign body of foot [M79.5]  Diabetic foot infection (HCC) [E11.628, L08.9]  Cellulitis and abscess of toe of left foot [L03.032, L02.612]  Other mucopurulent conjunctivitis of both eyes [H10.023] Cellulitis and abscess of toe of left foot  Procedure(s) (LRB):  LEFT FOOT  INCISION AND DRAINAGE WITH FOREIGN BODY REMOVAL (Left) 2 Days 
  Problem: Physical Therapy - Adult  Goal: By Discharge: Performs mobility at highest level of function for planned discharge setting.  See evaluation for individualized goals.  Description: FUNCTIONAL STATUS PRIOR TO ADMISSION: Pt presents as AO to name and poor historian at time of PT IE. Upon belief, Pt ambulatory with dme at baseline and resides with his son. Unknown home set up, stairs and/or support availability. Observed bilateral eye crusting, redness at time of PT IE. RN Team aware and following. Pt admitted 2/2 Left MCP abscess and LLE cellulitis. Unknown WB at time of therapy services. Pt kept at LLE NWB at time of PT Evaluation and Perfect Serve message sent to Podiatry Team requesting clarification. Pt referred for therapy services per resulting functional mobility decline.     HOME SUPPORT PRIOR TO ADMISSION:     Physical Therapy Goals  Initiated 4/1/2025  1.  Patient will move from supine to sit and sit to supine in bed with supervision/set-up within 7 day(s).    2.  Patient will perform sit to stand with supervision/set-up within 7 day(s).  3.  Patient will transfer from bed to chair and chair to bed with supervision/set-up using the least restrictive device within 7 day(s).  4.  Patient will ambulate with supervision/set-up for 150 feet with the least restrictive device within 7 day(s).   5.  Patient will ascend/descend 3-5 stairs with R/L handrail(s) with supervision/set-up within 7 day(s).    Outcome: Progressing     PHYSICAL THERAPY TREATMENT    Patient: Errol Brandt (80 y.o. male)  Date: 4/7/2025  Diagnosis:   Cellulitis of left foot [L03.116]  Retained foreign body of foot [M79.5]  Diabetic foot infection (HCC) [E11.628, L08.9]  Cellulitis and abscess of toe of left foot [L03.032, L02.612]  Other mucopurulent conjunctivitis of both eyes [H10.023] Cellulitis and abscess of toe of left foot  Procedure(s) (LRB):  LEFT FOOT  INCISION AND DRAINAGE WITH FOREIGN BODY REMOVAL (Left) 5 Days 
  Problem: Physical Therapy - Adult  Goal: By Discharge: Performs mobility at highest level of function for planned discharge setting.  See evaluation for individualized goals.  Description: FUNCTIONAL STATUS PRIOR TO ADMISSION: Pt presents as AO to name and poor historian at time of PT IE. Upon belief, Pt ambulatory with dme at baseline and resides with his son. Unknown home set up, stairs and/or support availability. Pt admitted 2/2 Left MCP abscess and LLE cellulitis.     HOME SUPPORT PRIOR TO ADMISSION: Cares for spouse who has cancer.    Physical Therapy Goals  Initiated 4/11/2025, patient assumed to be NWB while awaiting procedure 4/14/25:  1.  Patient will move from supine to sit and sit to supine, scoot up and down, and roll side to side in bed with modified independence within 7 day(s).    2.  Patient will perform sit to stand with contact guard assist within 7 day(s).  3.  Patient will transfer from bed to chair and chair to bed with contact guard assist using the least restrictive device within 7 day(s).  4.  Patient will ambulate with Min A for 15 feet with the least restrictive device within 7 day(s).   5.  Patient will ascend/descend 3-5 stairs with 1 handrail(s) with moderate assistance within 7 day(s).     Physical Therapy Goals  Initiated 4/1/2025, Reviewed 4/8/2025  1.  Patient will move from supine to sit and sit to supine in bed with supervision/set-up within 7 day(s).    2.  Patient will perform sit to stand with supervision/set-up within 7 day(s).  3.  Patient will transfer from bed to chair and chair to bed with supervision/set-up using the least restrictive device within 7 day(s).  4.  Patient will ambulate with supervision/set-up for 150 feet with the least restrictive device within 7 day(s).   5.  Patient will ascend/descend 3-5 stairs with R/L handrail(s) with supervision/set-up within 7 day(s).    Outcome: Progressing     PHYSICAL THERAPY RE-EVALUATION    Patient: Errol Brandt (80 y.o. 
  Problem: Physical Therapy - Adult  Goal: By Discharge: Performs mobility at highest level of function for planned discharge setting.  See evaluation for individualized goals.  Description: FUNCTIONAL STATUS PRIOR TO ADMISSION: Pt presents as AO to name and poor historian at time of PT IE. Upon belief, Pt ambulatory with dme at baseline and resides with his son. Unknown home set up, stairs and/or support availability. Pt admitted 2/2 Left MCP abscess and LLE cellulitis.     HOME SUPPORT PRIOR TO ADMISSION: Cares for spouse who has cancer.    Physical Therapy Goals  Re-eval 4/21/2025  1.  Patient will move from supine to sit and sit to supine in bed with modified independence within 7 day(s).    2.  Patient will perform sit to stand with moderate assistance within 7 day(s).  3.  Patient will transfer from bed to chair and chair to bed with moderate assistance using the least restrictive device within 7 day(s).  4.  Patient will ambulate with moderate assistance for 5 feet with the least restrictive device within 7 day(s).      Physical Therapy Goals  Revised 4/15/2025  1.  Patient will move from supine to sit and sit to supine in bed with supervision/set-up within 7 day(s).    2.  Patient will perform sit to stand with minimal assistance within 7 day(s).  3.  Patient will transfer from bed to chair and chair to bed with minimal assistance using the least restrictive device within 7 day(s).  4.  Patient will ambulate with minimal assistance for 10 feet with the least restrictive device within 7 day(s).     Physical Therapy Goals  Initiated 4/11/2025, patient assumed to be NWB while awaiting procedure 4/14/25:  1.  Patient will move from supine to sit and sit to supine, scoot up and down, and roll side to side in bed with modified independence within 7 day(s).    2.  Patient will perform sit to stand with contact guard assist within 7 day(s).  3.  Patient will transfer from bed to chair and chair to bed with contact 
  Problem: Physical Therapy - Adult  Goal: By Discharge: Performs mobility at highest level of function for planned discharge setting.  See evaluation for individualized goals.  Description: FUNCTIONAL STATUS PRIOR TO ADMISSION: Pt presents as AO to name and poor historian at time of PT IE. Upon belief, Pt ambulatory with dme at baseline and resides with his son. Unknown home set up, stairs and/or support availability. Pt admitted 2/2 Left MCP abscess and LLE cellulitis.     HOME SUPPORT PRIOR TO ADMISSION: Cares for spouse who has cancer.    Physical Therapy Goals  Re-eval 4/21/2025  1.  Patient will move from supine to sit and sit to supine in bed with modified independence within 7 day(s).    2.  Patient will perform sit to stand with moderate assistance within 7 day(s).  3.  Patient will transfer from bed to chair and chair to bed with moderate assistance using the least restrictive device within 7 day(s).  4.  Patient will ambulate with moderate assistance for 5 feet with the least restrictive device within 7 day(s).      Physical Therapy Goals  Revised 4/15/2025  1.  Patient will move from supine to sit and sit to supine in bed with supervision/set-up within 7 day(s).    2.  Patient will perform sit to stand with minimal assistance within 7 day(s).  3.  Patient will transfer from bed to chair and chair to bed with minimal assistance using the least restrictive device within 7 day(s).  4.  Patient will ambulate with minimal assistance for 10 feet with the least restrictive device within 7 day(s).     Physical Therapy Goals  Initiated 4/11/2025, patient assumed to be NWB while awaiting procedure 4/14/25:  1.  Patient will move from supine to sit and sit to supine, scoot up and down, and roll side to side in bed with modified independence within 7 day(s).    2.  Patient will perform sit to stand with contact guard assist within 7 day(s).  3.  Patient will transfer from bed to chair and chair to bed with contact 
  Problem: Physical Therapy - Adult  Goal: By Discharge: Performs mobility at highest level of function for planned discharge setting.  See evaluation for individualized goals.  Description: FUNCTIONAL STATUS PRIOR TO ADMISSION: Pt presents as AO to name and poor historian at time of PT IE. Upon belief, Pt ambulatory with dme at baseline and resides with his son. Unknown home set up, stairs and/or support availability. Pt admitted 2/2 Left MCP abscess and LLE cellulitis.     HOME SUPPORT PRIOR TO ADMISSION: Cares for spouse who has cancer.    Physical Therapy Goals  Re-eval 4/21/2025, carryover on reassessment 4/29/25 with goal#3 updated to Min A:  1.  Patient will move from supine to sit and sit to supine in bed with modified independence within 7 day(s).    2.  Patient will perform sit to stand with moderate assistance within 7 day(s).  3.  Patient will transfer from bed to chair and chair to bed with moderate assistance using the least restrictive device within 7 day(s).  4.  Patient will ambulate with moderate assistance for 5 feet with the least restrictive device within 7 day(s).      Physical Therapy Goals  Revised 4/15/2025  1.  Patient will move from supine to sit and sit to supine in bed with supervision/set-up within 7 day(s).    2.  Patient will perform sit to stand with minimal assistance within 7 day(s).  3.  Patient will transfer from bed to chair and chair to bed with minimal assistance using the least restrictive device within 7 day(s).  4.  Patient will ambulate with minimal assistance for 10 feet with the least restrictive device within 7 day(s).     Physical Therapy Goals  Initiated 4/11/2025, patient assumed to be NWB while awaiting procedure 4/14/25:  1.  Patient will move from supine to sit and sit to supine, scoot up and down, and roll side to side in bed with modified independence within 7 day(s).    2.  Patient will perform sit to stand with contact guard assist within 7 day(s).  3.  
  Problem: Physical Therapy - Adult  Goal: By Discharge: Performs mobility at highest level of function for planned discharge setting.  See evaluation for individualized goals.  Description: FUNCTIONAL STATUS PRIOR TO ADMISSION: Pt presents as AO to name and poor historian at time of PT IE. Upon belief, Pt ambulatory with dme at baseline and resides with his son. Unknown home set up, stairs and/or support availability. Pt admitted 2/2 Left MCP abscess and LLE cellulitis.     HOME SUPPORT PRIOR TO ADMISSION: Cares for spouse who has cancer.    Physical Therapy Goals  Revised 4/15/2025  1.  Patient will move from supine to sit and sit to supine in bed with supervision/set-up within 7 day(s).    2.  Patient will perform sit to stand with minimal assistance within 7 day(s).  3.  Patient will transfer from bed to chair and chair to bed with minimal assistance using the least restrictive device within 7 day(s).  4.  Patient will ambulate with minimal assistance for 10 feet with the least restrictive device within 7 day(s).     Physical Therapy Goals  Initiated 4/11/2025, patient assumed to be NWB while awaiting procedure 4/14/25:  1.  Patient will move from supine to sit and sit to supine, scoot up and down, and roll side to side in bed with modified independence within 7 day(s).    2.  Patient will perform sit to stand with contact guard assist within 7 day(s).  3.  Patient will transfer from bed to chair and chair to bed with contact guard assist using the least restrictive device within 7 day(s).  4.  Patient will ambulate with Min A for 15 feet with the least restrictive device within 7 day(s).   5.  Patient will ascend/descend 3-5 stairs with 1 handrail(s) with moderate assistance within 7 day(s).     Physical Therapy Goals  Initiated 4/1/2025, Reviewed 4/8/2025  1.  Patient will move from supine to sit and sit to supine in bed with supervision/set-up within 7 day(s).    2.  Patient will perform sit to stand with 
  Problem: Physical Therapy - Adult  Goal: By Discharge: Performs mobility at highest level of function for planned discharge setting.  See evaluation for individualized goals.  Description: FUNCTIONAL STATUS PRIOR TO ADMISSION: Pt presents as AO to name and poor historian at time of PT IE. Upon belief, Pt ambulatory with dme at baseline and resides with his son. Unknown home set up, stairs and/or support availability. Pt admitted 2/2 Left MCP abscess and LLE cellulitis.     HOME SUPPORT PRIOR TO ADMISSION: Cares for spouse who has cancer.    Physical Therapy Goals  Revised 4/15/2025  1.  Patient will move from supine to sit and sit to supine in bed with supervision/set-up within 7 day(s).    2.  Patient will perform sit to stand with minimal assistance within 7 day(s).  3.  Patient will transfer from bed to chair and chair to bed with minimal assistance using the least restrictive device within 7 day(s).  4.  Patient will ambulate with minimal assistance for 10 feet with the least restrictive device within 7 day(s).     Physical Therapy Goals  Initiated 4/11/2025, patient assumed to be NWB while awaiting procedure 4/14/25:  1.  Patient will move from supine to sit and sit to supine, scoot up and down, and roll side to side in bed with modified independence within 7 day(s).    2.  Patient will perform sit to stand with contact guard assist within 7 day(s).  3.  Patient will transfer from bed to chair and chair to bed with contact guard assist using the least restrictive device within 7 day(s).  4.  Patient will ambulate with Min A for 15 feet with the least restrictive device within 7 day(s).   5.  Patient will ascend/descend 3-5 stairs with 1 handrail(s) with moderate assistance within 7 day(s).     Physical Therapy Goals  Initiated 4/1/2025, Reviewed 4/8/2025  1.  Patient will move from supine to sit and sit to supine in bed with supervision/set-up within 7 day(s).    2.  Patient will perform sit to stand with 
  Problem: Physical Therapy - Adult  Goal: By Discharge: Performs mobility at highest level of function for planned discharge setting.  See evaluation for individualized goals.  Description: FUNCTIONAL STATUS PRIOR TO ADMISSION: Pt presents as AO to name and poor historian at time of PT IE. Upon belief, Pt ambulatory with dme at baseline and resides with his son. Unknown home set up, stairs and/or support availability. Pt admitted 2/2 Left MCP abscess and LLE cellulitis.     HOME SUPPORT PRIOR TO ADMISSION: Cares for spouse who has cancer.    Reassessment 5/12/25  1.  Patient will move from supine to sit and sit to supine in bed with minimal assistance within 7 day(s).    2.  Patient will perform sit to stand with moderate assistance within 7 day(s).  3.  Patient will transfer from bed to chair and chair to bed with moderate assistance using the least restrictive device within 7 day(s).  4.  Patient will ambulate with moderate assistance for 5 feet with the least restrictive device within 7 day(s).     Reviewed 5/6- goals remain appropriate     Physical Therapy Goals  Re-eval 4/21/2025, carryover on reassessment 4/29/25 with goal#3 updated to Min A:  1.  Patient will move from supine to sit and sit to supine in bed with modified independence within 7 day(s).    2.  Patient will perform sit to stand with moderate assistance within 7 day(s).  3.  Patient will transfer from bed to chair and chair to bed with moderate assistance using the least restrictive device within 7 day(s).  4.  Patient will ambulate with moderate assistance for 5 feet with the least restrictive device within 7 day(s).      Physical Therapy Goals  Revised 4/15/2025  1.  Patient will move from supine to sit and sit to supine in bed with supervision/set-up within 7 day(s).    2.  Patient will perform sit to stand with minimal assistance within 7 day(s).  3.  Patient will transfer from bed to chair and chair to bed with minimal assistance using the 
  Problem: Physical Therapy - Adult  Goal: By Discharge: Performs mobility at highest level of function for planned discharge setting.  See evaluation for individualized goals.  Description: FUNCTIONAL STATUS PRIOR TO ADMISSION: Pt presents as AO to name and poor historian at time of PT IE. Upon belief, Pt ambulatory with dme at baseline and resides with his son. Unknown home set up, stairs and/or support availability. Pt admitted 2/2 Left MCP abscess and LLE cellulitis.     HOME SUPPORT PRIOR TO ADMISSION: Cares for spouse who has cancer.    Reviewed 5/20/25- goals remain appropriate    Reassessment 5/12/25  1.  Patient will move from supine to sit and sit to supine in bed with minimal assistance within 7 day(s).    2.  Patient will perform sit to stand with moderate assistance within 7 day(s).  3.  Patient will transfer from bed to chair and chair to bed with moderate assistance using the least restrictive device within 7 day(s).  4.  Patient will ambulate with moderate assistance for 5 feet with the least restrictive device within 7 day(s).     Reviewed 5/6- goals remain appropriate     Physical Therapy Goals  Re-eval 4/21/2025, carryover on reassessment 4/29/25 with goal#3 updated to Min A:  1.  Patient will move from supine to sit and sit to supine in bed with modified independence within 7 day(s).    2.  Patient will perform sit to stand with moderate assistance within 7 day(s).  3.  Patient will transfer from bed to chair and chair to bed with moderate assistance using the least restrictive device within 7 day(s).  4.  Patient will ambulate with moderate assistance for 5 feet with the least restrictive device within 7 day(s).      Physical Therapy Goals  Revised 4/15/2025  1.  Patient will move from supine to sit and sit to supine in bed with supervision/set-up within 7 day(s).    2.  Patient will perform sit to stand with minimal assistance within 7 day(s).  3.  Patient will transfer from bed to chair and 
  Problem: Physical Therapy - Adult  Goal: By Discharge: Performs mobility at highest level of function for planned discharge setting.  See evaluation for individualized goals.  Description: FUNCTIONAL STATUS PRIOR TO ADMISSION: Pt presents as AO to name and poor historian at time of PT IE. Upon belief, Pt ambulatory with dme at baseline and resides with his son. Unknown home set up, stairs and/or support availability. Pt admitted 2/2 Left MCP abscess and LLE cellulitis.     HOME SUPPORT PRIOR TO ADMISSION: Cares for spouse who has cancer.    Reviewed 5/6- goals remain appropriate     Physical Therapy Goals  Re-eval 4/21/2025, carryover on reassessment 4/29/25 with goal#3 updated to Min A:  1.  Patient will move from supine to sit and sit to supine in bed with modified independence within 7 day(s).    2.  Patient will perform sit to stand with moderate assistance within 7 day(s).  3.  Patient will transfer from bed to chair and chair to bed with moderate assistance using the least restrictive device within 7 day(s).  4.  Patient will ambulate with moderate assistance for 5 feet with the least restrictive device within 7 day(s).      Physical Therapy Goals  Revised 4/15/2025  1.  Patient will move from supine to sit and sit to supine in bed with supervision/set-up within 7 day(s).    2.  Patient will perform sit to stand with minimal assistance within 7 day(s).  3.  Patient will transfer from bed to chair and chair to bed with minimal assistance using the least restrictive device within 7 day(s).  4.  Patient will ambulate with minimal assistance for 10 feet with the least restrictive device within 7 day(s).     Physical Therapy Goals  Initiated 4/11/2025, patient assumed to be NWB while awaiting procedure 4/14/25:  1.  Patient will move from supine to sit and sit to supine, scoot up and down, and roll side to side in bed with modified independence within 7 day(s).    2.  Patient will perform sit to stand with 
  Problem: Physical Therapy - Adult  Goal: By Discharge: Performs mobility at highest level of function for planned discharge setting.  See evaluation for individualized goals.  Description: FUNCTIONAL STATUS PRIOR TO ADMISSION: Pt presents as AO to name and poor historian at time of PT IE. Upon belief, Pt ambulatory with dme at baseline and resides with his son. Unknown home set up, stairs and/or support availability. Pt admitted 2/2 Left MCP abscess and LLE cellulitis.     HOME SUPPORT PRIOR TO ADMISSION: Cares for spouse who has cancer.    Reviewed 5/6- goals remain appropriate     Physical Therapy Goals  Re-eval 4/21/2025, carryover on reassessment 4/29/25 with goal#3 updated to Min A:  1.  Patient will move from supine to sit and sit to supine in bed with modified independence within 7 day(s).    2.  Patient will perform sit to stand with moderate assistance within 7 day(s).  3.  Patient will transfer from bed to chair and chair to bed with moderate assistance using the least restrictive device within 7 day(s).  4.  Patient will ambulate with moderate assistance for 5 feet with the least restrictive device within 7 day(s).      Physical Therapy Goals  Revised 4/15/2025  1.  Patient will move from supine to sit and sit to supine in bed with supervision/set-up within 7 day(s).    2.  Patient will perform sit to stand with minimal assistance within 7 day(s).  3.  Patient will transfer from bed to chair and chair to bed with minimal assistance using the least restrictive device within 7 day(s).  4.  Patient will ambulate with minimal assistance for 10 feet with the least restrictive device within 7 day(s).     Physical Therapy Goals  Initiated 4/11/2025, patient assumed to be NWB while awaiting procedure 4/14/25:  1.  Patient will move from supine to sit and sit to supine, scoot up and down, and roll side to side in bed with modified independence within 7 day(s).    2.  Patient will perform sit to stand with 
  Problem: Skin/Tissue Integrity  Goal: Skin integrity remains intact  Description: 1.  Monitor for areas of redness and/or skin breakdown  2.  Assess vascular access sites hourly  3.  Every 4-6 hours minimum:  Change oxygen saturation probe site  4.  Every 4-6 hours:  If on nasal continuous positive airway pressure, respiratory therapy assess nares and determine need for appliance change or resting period  5/13/2025 1248 by Delfina Patrick RN  Outcome: Progressing  5/12/2025 2328 by Maikel Blackwell RN  Outcome: Progressing  Flowsheets (Taken 5/12/2025 2000)  Skin Integrity Remains Intact:   Monitor for areas of redness and/or skin breakdown   Assess vascular access sites hourly     Problem: Musculoskeletal - Adult  Goal: Return mobility to safest level of function  5/13/2025 1248 by Delfina Patrick RN  Outcome: Not Progressing  Flowsheets (Taken 5/13/2025 0725)  Return Mobility to Safest Level of Function: Assess patient stability and activity tolerance for standing, transferring and ambulating with or without assistive devices  5/12/2025 2328 by Maikel Blackwell RN  Outcome: Progressing     Problem: Respiratory - Adult  Goal: Achieves optimal ventilation and oxygenation  5/13/2025 1248 by Delfina Patrick RN  Outcome: Not Progressing  5/12/2025 2328 by Maikel Blackwell RN  Outcome: Progressing     
Problem: Occupational Therapy - Adult  Goal: By Discharge: Performs self-care activities at highest level of function for planned discharge setting.  See evaluation for individualized goals.  Description: Problem: Occupational Therapy - Adult  Goal: By Discharge: Performs self-care activities at highest level of function for planned discharge setting.  See evaluation for individualized goals.    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 Scheduled for I&D L foot evening of 4-1, post this eval; Vascular surgery pending 4-14 for L foot as first surgery \"failed\"; currently NWB L LE                                                                  Occupational Therapy Goals:  Weekly Re-Assessment 5/20/2025 Continue below goals, no goals met  Continue all goals below 5/12/2025  OT Re-evaluation 4/21/2025; Continued as below 4/29/2025; Continue all goals 5/6/2025  1.  Patient will perform grooming with Stand-by Assist seated unsupported EOB within 7 day(s).  2.  Patient will perform upper body dressing with Set-up seated within 7 day(s).  3.  Patient will perform lower body dressing with Moderate Assist using AE prn within 7 day(s).  4.  Patient will perform toilet transfers with Moderate Assist to drop-arm BSC with RW within 7 day(s).  5.  Patient will perform all aspects of toileting with Moderate Assist within 7 day(s).  6.  Patient will participate in upper extremity therapeutic exercise/activities with Supervision for 5 minutes within 7 day(s).    7.  Patient will maintain NWB LLE with all functional transfers in prep for ADLs with min cues within 7 days.    OT 
Problem: Physical Therapy - Adult  Goal: By Discharge: Performs mobility at highest level of function for planned discharge setting.  See evaluation for individualized goals.  Description: FUNCTIONAL STATUS PRIOR TO ADMISSION: Pt presents as AO to name and poor historian at time of PT IE. Upon belief, Pt ambulatory with dme at baseline and resides with his son. Unknown home set up, stairs and/or support availability. Pt admitted 2/2 Left MCP abscess and LLE cellulitis.     HOME SUPPORT PRIOR TO ADMISSION: Cares for spouse who has cancer.    Reassessment 5/12/25  1.  Patient will move from supine to sit and sit to supine in bed with minimal assistance within 7 day(s).    2.  Patient will perform sit to stand with moderate assistance within 7 day(s).  3.  Patient will transfer from bed to chair and chair to bed with moderate assistance using the least restrictive device within 7 day(s).  4.  Patient will ambulate with moderate assistance for 5 feet with the least restrictive device within 7 day(s).     Reviewed 5/6- goals remain appropriate     Physical Therapy Goals  Re-eval 4/21/2025, carryover on reassessment 4/29/25 with goal#3 updated to Min A:  1.  Patient will move from supine to sit and sit to supine in bed with modified independence within 7 day(s).    2.  Patient will perform sit to stand with moderate assistance within 7 day(s).  3.  Patient will transfer from bed to chair and chair to bed with moderate assistance using the least restrictive device within 7 day(s).  4.  Patient will ambulate with moderate assistance for 5 feet with the least restrictive device within 7 day(s).      Physical Therapy Goals  Revised 4/15/2025  1.  Patient will move from supine to sit and sit to supine in bed with supervision/set-up within 7 day(s).    2.  Patient will perform sit to stand with minimal assistance within 7 day(s).  3.  Patient will transfer from bed to chair and chair to bed with minimal assistance using the 
Problem: Physical Therapy - Adult  Goal: By Discharge: Performs mobility at highest level of function for planned discharge setting.  See evaluation for individualized goals.  Description: FUNCTIONAL STATUS PRIOR TO ADMISSION: Pt presents as AO to name and poor historian at time of PT IE. Upon belief, Pt ambulatory with dme at baseline and resides with his son. Unknown home set up, stairs and/or support availability. Pt admitted 2/2 Left MCP abscess and LLE cellulitis.     HOME SUPPORT PRIOR TO ADMISSION: Cares for spouse who has cancer.    Reassessment 5/12/25  1.  Patient will move from supine to sit and sit to supine in bed with minimal assistance within 7 day(s).    2.  Patient will perform sit to stand with moderate assistance within 7 day(s).  3.  Patient will transfer from bed to chair and chair to bed with moderate assistance using the least restrictive device within 7 day(s).  4.  Patient will ambulate with moderate assistance for 5 feet with the least restrictive device within 7 day(s).     Reviewed 5/6- goals remain appropriate     Physical Therapy Goals  Re-eval 4/21/2025, carryover on reassessment 4/29/25 with goal#3 updated to Min A:  1.  Patient will move from supine to sit and sit to supine in bed with modified independence within 7 day(s).    2.  Patient will perform sit to stand with moderate assistance within 7 day(s).  3.  Patient will transfer from bed to chair and chair to bed with moderate assistance using the least restrictive device within 7 day(s).  4.  Patient will ambulate with moderate assistance for 5 feet with the least restrictive device within 7 day(s).      Physical Therapy Goals  Revised 4/15/2025  1.  Patient will move from supine to sit and sit to supine in bed with supervision/set-up within 7 day(s).    2.  Patient will perform sit to stand with minimal assistance within 7 day(s).  3.  Patient will transfer from bed to chair and chair to bed with minimal assistance using the 
    Precautions: Fall Risk, General Precautions, Bed Alarm, NPO (falls, L foot/LLE cellulitis ?WB?; conjuctivitis)                  ASSESSMENT :  The patient is limited by decreased functional mobility, independence in ADLs, high-level IADLs, ROM, strength, sensation, body mechanics, activity tolerance, endurance, safety awareness, cognition, command following, attention/concentration, coordination, balance, posture, fine-motor control, increased pain levels in L foot, admitted 3-31 post multiple falls related to severity of L LE pain. Assessment indicates cellulitis and possible foreign bodies in L foot with I&D scheduled for 6PM today; need to reassess and clarify WBS post op. Cognition/confusion and [place] limiting factors with ADL and functional mobility assessment; Patient fully unable to maintain NWB L LE but in too much pain to safely step on it as noted by multiple falls PTA. Continue to await WBS recommendations.    Based on the impairments listed above Set up -CGA UE ADLs, grooming limited by severity of B eye discharge with conjunctivitis. Max -mood A LE ADLs and functional mobility    Functional Outcome Measure:  The patient scored 25/100 on the Barthel Index outcome measure which is indicative of total dependence self care and functional mobility.         PLAN :  Recommendations and Planned Interventions:   self care training, therapeutic activities, functional mobility training, balance training, therapeutic exercise, endurance activities, cognitive retraining, patient education, home safety training, and family training/education    Frequency/Duration: OT Plan of Care: 5 times/week    Recommendation for discharge: (in order for the patient to meet his/her long term goals):   rehab    Other factors to consider for discharge: poor safety awareness, impaired cognition, high risk for falls, not safe to be alone, and not safe to fulfill current role of caregiver 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 
Arrange for needed discharge resources and transportation as appropriate   Identify discharge learning needs (meds, wound care, etc)  4/15/2025 1454 by Nisha Viramontes LPN  Outcome: Progressing  Flowsheets (Taken 4/15/2025 0746)  Discharge to home or other facility with appropriate resources:   Identify barriers to discharge with patient and caregiver   Arrange for needed discharge resources and transportation as appropriate   Identify discharge learning needs (meds, wound care, etc)     Problem: Musculoskeletal - Adult  Goal: Return mobility to safest level of function  4/16/2025 0341 by Rebeca Toro RN  Outcome: Progressing  4/15/2025 1454 by Nisha Viramontes LPN  Outcome: Progressing  Flowsheets (Taken 4/15/2025 0746)  Return Mobility to Safest Level of Function:   Assess patient stability and activity tolerance for standing, transferring and ambulating with or without assistive devices   Assist with transfers and ambulation using safe patient handling equipment as needed   Ensure adequate protection for wounds/incisions during mobilization     Problem: Musculoskeletal - Adult  Goal: Maintain proper alignment of affected body part  4/15/2025 1454 by Nisha Viramontes LPN  Outcome: Progressing     Problem: Musculoskeletal - Adult  Goal: Return ADL status to a safe level of function  4/15/2025 1454 by Nisha Viramontes LPN  Outcome: Progressing     Problem: Safety - Adult  Goal: Free from fall injury  4/16/2025 0341 by Rebeca Toro RN  Outcome: Progressing  4/15/2025 1454 by Nisha Viramontes LPN  Outcome: Progressing     Problem: Skin/Tissue Integrity  Goal: Skin integrity remains intact  Description: 1.  Monitor for areas of redness and/or skin breakdown  2.  Assess vascular access sites hourly  3.  Every 4-6 hours minimum:  Change oxygen saturation probe site  4.  Every 4-6 hours:  If on nasal continuous positive airway pressure, respiratory therapy assess nares and determine need for appliance change or 
Progressing  5/18/2025 0011 by Jose Miguel Cortés RN  Outcome: Progressing     Problem: Musculoskeletal - Adult  Goal: Return mobility to safest level of function  5/18/2025 1236 by Lenka Meyer RN  Outcome: Progressing  5/18/2025 0011 by Jose Miguel Cortés RN  Outcome: Progressing  Flowsheets (Taken 5/17/2025 1930)  Return Mobility to Safest Level of Function:   Assess patient stability and activity tolerance for standing, transferring and ambulating with or without assistive devices   Assist with transfers and ambulation using safe patient handling equipment as needed   Apply continuous passive motion per provider or physical therapy orders to increase flexion toward goal  Goal: Maintain proper alignment of affected body part  5/18/2025 1236 by Lenka Meyer RN  Outcome: Progressing  5/18/2025 0011 by Jose Miguel Cortés RN  Outcome: Progressing  Goal: Return ADL status to a safe level of function  5/18/2025 1236 by Lenka Meyer RN  Outcome: Progressing  5/18/2025 0011 by Jose Miguel Cortés RN  Outcome: Progressing  Flowsheets (Taken 5/17/2025 1930)  Return ADL Status to a Safe Level of Function:   Administer medication as ordered   Assess activities of daily living deficits and provide assistive devices as needed     Problem: Safety - Adult  Goal: Free from fall injury  5/18/2025 1236 by Lenka Meyer RN  Outcome: Progressing  5/18/2025 0011 by Jose Miguel Cortés RN  Outcome: Progressing  Flowsheets (Taken 5/17/2025 1930)  Free From Fall Injury: Instruct family/caregiver on patient safety     Problem: Skin/Tissue Integrity  Goal: Skin integrity remains intact  Description: 1.  Monitor for areas of redness and/or skin breakdown  2.  Assess vascular access sites hourly  3.  Every 4-6 hours minimum:  Change oxygen saturation probe site  4.  Every 4-6 hours:  If on nasal continuous positive airway pressure, respiratory therapy assess nares and determine need for appliance change or resting 
Able to sit edge of bed and scoot laterally along edge of bed with overall moderate assist. Needs cues throughout for L LE precautions/positioning. Anticipate slow progress towards goals.     PLAN:  Patient continues to benefit from skilled intervention to address the above impairments.  Continue treatment per established plan of care.        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 I feel sick.\" \"Hand me my remote so I put the TV on\"    OBJECTIVE DATA SUMMARY:   Critical Behavior:  Orientation  Overall Orientation Status: Within Functional Limits  Cognition  Overall Cognitive Status: Exceptions  Arousal/Alertness: Appears intact  Following Commands: Follows one step commands with increased time (hard of hearing poses additional limits)  Attention Span: Attends with cues to redirect  Safety Judgement: Decreased awareness of need for assistance;Decreased awareness of need for safety  Insights: Decreased awareness of deficits  Initiation: Requires cues for some  Cognition Comment: Remains with impaired carryover and recall of NWB, impaired motor planning and execution    Functional Mobility Training:  Bed Mobility:  Bed Mobility Training  Bed Mobility Training: Yes  Interventions: Safety awareness training;Manual cues;Demonstration;Tactile cues;Verbal cues  Rolling: Partial/Moderate assistance (assist to maintain L LE NWB)  Supine to Sit: Minimal assistance  Sit to Supine: Stand by 
Adult  Goal: Free from fall injury  Outcome: Progressing     Problem: Skin/Tissue Integrity  Goal: Skin integrity remains intact  Description: 1.  Monitor for areas of redness and/or skin breakdown  2.  Assess vascular access sites hourly  3.  Every 4-6 hours minimum:  Change oxygen saturation probe site  4.  Every 4-6 hours:  If on nasal continuous positive airway pressure, respiratory therapy assess nares and determine need for appliance change or resting period  Outcome: Progressing     Problem: ABCDS Injury Assessment  Goal: Absence of physical injury  Outcome: Progressing     Problem: Nutrition Deficit:  Goal: Optimize nutritional status  Outcome: Progressing     Problem: Confusion  Goal: Confusion, delirium, dementia, or psychosis is improved or at baseline  Description: INTERVENTIONS:1. Assess for possible contributors to thought disturbance, including medications, impaired vision or hearing, underlying metabolic abnormalities, dehydration, psychiatric diagnoses, and notify attending LIP2. [place] high risk fall precautions, as indicated3. Provide frequent short contacts to provide reality reorientation, refocusing and direction4. Decrease environmental stimuli, including noise as appropriate5. Monitor and intervene to maintain adequate nutrition, hydration, elimination, sleep and activity6. If unable to ensure safety without constant attention obtain sitter and review sitter guidelines with assigned personnel7. Initiate Psychosocial CNS and Spiritual Care consult, as indicated  Outcome: Progressing     
GELY Evans  Outcome: Progressing  5/7/2025 1122 by Sugey Ruffin RN  Outcome: Progressing  Goal: Oral mucous membranes remain intact  5/8/2025 0023 by Cristina Song RN  Outcome: Progressing  5/7/2025 1122 by Sugey Ruffin RN  Outcome: Progressing     Problem: Genitourinary - Adult  Goal: Absence of urinary retention  5/8/2025 0023 by Cristina Song RN  Outcome: Progressing  5/7/2025 1122 by Sugey Ruffin RN  Outcome: Progressing  Goal: Urinary catheter remains patent  5/8/2025 0023 by Cristina Song RN  Outcome: Progressing  5/7/2025 1122 by Sugey Ruffin RN  Outcome: Progressing     Problem: Infection - Adult  Goal: Absence of infection at discharge  5/8/2025 0023 by Cristina Song RN  Outcome: Progressing  5/7/2025 1122 by Sugey Ruffin RN  Outcome: Progressing  Goal: Absence of infection during hospitalization  5/8/2025 0023 by Cristina Song RN  Outcome: Progressing  5/7/2025 1122 by Sugey Ruffin RN  Outcome: Progressing  Goal: Absence of fever/infection during anticipated neutropenic period  5/8/2025 0023 by Cristina Song RN  Outcome: Progressing  5/7/2025 1122 by Sugey Ruffin RN  Outcome: Progressing     Problem: Metabolic/Fluid and Electrolytes - Adult  Goal: Electrolytes maintained within normal limits  5/8/2025 0023 by Cristina Song RN  Outcome: Progressing  5/7/2025 1122 by Sugey Ruffin RN  Outcome: Progressing  Goal: Hemodynamic stability and optimal renal function maintained  5/8/2025 0023 by Cristina Song RN  Outcome: Progressing  5/7/2025 1122 by Sugey Ruffin RN  Outcome: Progressing  Goal: Glucose maintained within prescribed range  5/8/2025 0023 by Cristina Song RN  Outcome: Progressing  5/7/2025 1122 by Sugey Ruffin RN  Outcome: Progressing     Problem: Gastrointestinal - Adult  Goal: Minimal or absence of nausea and vomiting  5/8/2025 0023 by Cristina Song RN  Outcome: Progressing  5/7/2025 1122 by Sugey Ruffin RN  Outcome: Progressing  Goal: 
Progressing  Goal: Incisions, wounds, or drain sites healing without S/S of infection  5/25/2025 2244 by Sri Bledsoe RN  Outcome: Progressing  5/25/2025 1857 by Joe Herrera RN  Outcome: Progressing  Goal: Oral mucous membranes remain intact  5/25/2025 2244 by Sri Bledsoe RN  Outcome: Progressing  5/25/2025 1857 by Joe Herrera RN  Outcome: Progressing     Problem: Genitourinary - Adult  Goal: Absence of urinary retention  5/25/2025 2244 by Sri Bledsoe RN  Outcome: Progressing  5/25/2025 1857 by Joe Herrera RN  Outcome: Progressing  Goal: Urinary catheter remains patent  5/25/2025 2244 by Sri Bledsoe RN  Outcome: Progressing  5/25/2025 1857 by Joe Herrera RN  Outcome: Progressing     Problem: Infection - Adult  Goal: Absence of infection at discharge  5/25/2025 2244 by Sri Bledsoe RN  Outcome: Progressing  5/25/2025 1857 by Joe Herrera RN  Outcome: Progressing  Goal: Absence of infection during hospitalization  5/25/2025 2244 by Sri Bledsoe RN  Outcome: Progressing  5/25/2025 1857 by Joe Herrera RN  Outcome: Progressing  Goal: Absence of fever/infection during anticipated neutropenic period  5/25/2025 2244 by Sri Bledsoe RN  Outcome: Progressing  5/25/2025 1857 by Joe Herrera RN  Outcome: Progressing     Problem: Metabolic/Fluid and Electrolytes - Adult  Goal: Electrolytes maintained within normal limits  5/25/2025 2244 by Sri Bledsoe RN  Outcome: Progressing  5/25/2025 1857 by Joe Herrera RN  Outcome: Progressing  Goal: Hemodynamic stability and optimal renal function maintained  5/25/2025 2244 by Sri Bledsoe RN  Outcome: Progressing  5/25/2025 1857 by Joe Herrera RN  Outcome: Progressing  Goal: Glucose maintained within prescribed range  5/25/2025 2244 by Sri Bledsoe RN  Outcome: Progressing  5/25/2025 1857 by Winner, Christopher, RN  Outcome: Progressing     Problem: Gastrointestinal - 
Progressing  Goal: Glucose maintained within prescribed range  4/21/2025 1213 by Lisa Ovalle, RN  Outcome: Progressing  4/21/2025 0032 by Frances Lester, RN  Outcome: Progressing     
resting period  Outcome: Progressing     Problem: ABCDS Injury Assessment  Goal: Absence of physical injury  Outcome: Progressing     Problem: Nutrition Deficit:  Goal: Optimize nutritional status  Outcome: Progressing     Problem: Confusion  Goal: Confusion, delirium, dementia, or psychosis is improved or at baseline  Description: INTERVENTIONS:1. Assess for possible contributors to thought disturbance, including medications, impaired vision or hearing, underlying metabolic abnormalities, dehydration, psychiatric diagnoses, and notify attending LIP2. [place] high risk fall precautions, as indicated3. Provide frequent short contacts to provide reality reorientation, refocusing and direction4. Decrease environmental stimuli, including noise as appropriate5. Monitor and intervene to maintain adequate nutrition, hydration, elimination, sleep and activity6. If unable to ensure safety without constant attention obtain sitter and review sitter guidelines with assigned personnel7. Initiate Psychosocial CNS and Spiritual Care consult, as indicated  Outcome: Progressing  Flowsheets (Taken 5/15/2025 2000)  Effect of thought disturbance (confusion, delirium, dementia, or psychosis) are managed with adequate functional status:   Assess for contributors to thought disturbance, including medications, impaired vision or hearing, underlying metabolic abnormalities, dehydration, psychiatric diagnoses, notify LIP   [place] high risk fall precautions, as indicated   Provide frequent short contacts to provide reality reorientation, refocusing and direction   Decrease environmental stimuli, including noise as appropriate   Monitor and intervene to maintain adequate nutrition, hydration, elimination, sleep and activity   If unable to ensure safety without constant attention obtain sitter and review sitter guidelines with assigned personnel     Problem: Safety - Medical Restraint  Goal: Remains free of injury from restraints 
supine in bed with minimal assistance within 7 day(s).    2.  Patient will perform sit to stand with moderate assistance within 7 day(s).  3.  Patient will transfer from bed to chair and chair to bed with moderate assistance using the least restrictive device within 7 day(s).  4.  Patient will ambulate with moderate assistance for 5 feet with the least restrictive device within 7 day(s).     Reviewed 5/6- goals remain appropriate     Physical Therapy Goals  Re-eval 4/21/2025, carryover on reassessment 4/29/25 with goal#3 updated to Min A:  1.  Patient will move from supine to sit and sit to supine in bed with modified independence within 7 day(s).    2.  Patient will perform sit to stand with moderate assistance within 7 day(s).  3.  Patient will transfer from bed to chair and chair to bed with moderate assistance using the least restrictive device within 7 day(s).  4.  Patient will ambulate with moderate assistance for 5 feet with the least restrictive device within 7 day(s).      Physical Therapy Goals  Revised 4/15/2025  1.  Patient will move from supine to sit and sit to supine in bed with supervision/set-up within 7 day(s).    2.  Patient will perform sit to stand with minimal assistance within 7 day(s).  3.  Patient will transfer from bed to chair and chair to bed with minimal assistance using the least restrictive device within 7 day(s).  4.  Patient will ambulate with minimal assistance for 10 feet with the least restrictive device within 7 day(s).     Physical Therapy Goals  Initiated 4/11/2025, patient assumed to be NWB while awaiting procedure 4/14/25:  1.  Patient will move from supine to sit and sit to supine, scoot up and down, and roll side to side in bed with modified independence within 7 day(s).    2.  Patient will perform sit to stand with contact guard assist within 7 day(s).  3.  Patient will transfer from bed to chair and chair to bed with contact guard assist using the least restrictive device 
the flowsheet for vital signs taken during this treatment.    After treatment:   Patient left in no apparent distress in bed, Call bell within reach, Bed/ chair alarm activated, Caregiver / family present, and Side rails x3    COMMUNICATION/EDUCATION:   The patient's plan of care was discussed with: physical therapist and registered nurse    Patient Education  Education Given To: Patient  Education Provided: Role of Therapy;Plan of Care;Orientation  Education Method: Demonstration;Verbal  Barriers to Learning: Cognition;Hearing  Education Outcome: Continued education needed    Thank you for this referral.  ARDEN DAVENPORT, OT  Minutes: 19    Problem: Physical Therapy - Adult  Goal: By Discharge: Performs mobility at highest level of function for planned discharge setting.  See evaluation for individualized goals.  Description: FUNCTIONAL STATUS PRIOR TO ADMISSION: Pt presents as AO to name and poor historian at time of PT IE. Upon belief, Pt ambulatory with dme at baseline and resides with his son. Unknown home set up, stairs and/or support availability. Pt admitted 2/2 Left MCP abscess and LLE cellulitis.     HOME SUPPORT PRIOR TO ADMISSION: Cares for spouse who has cancer.    Reassessment 5/12/25  1.  Patient will move from supine to sit and sit to supine in bed with minimal assistance within 7 day(s).    2.  Patient will perform sit to stand with moderate assistance within 7 day(s).  3.  Patient will transfer from bed to chair and chair to bed with moderate assistance using the least restrictive device within 7 day(s).  4.  Patient will ambulate with moderate assistance for 5 feet with the least restrictive device within 7 day(s).     Reviewed 5/6- goals remain appropriate     Physical Therapy Goals  Re-eval 4/21/2025, carryover on reassessment 4/29/25 with goal#3 updated to Min A:  1.  Patient will move from supine to sit and sit to supine in bed with modified independence within 7 day(s).    2.  Patient will perform 
recommendations    COMMUNICATION/EDUCATION:   The patient's plan of care was discussed with: occupational therapist and registered nurse         Thank you for this referral.  Milly Mason, PT

## 2025-05-27 NOTE — H&P
Patient will transfer to [facility] report given to the nurse Charla HONG .  
packs/day: 1.00     Types: Cigarettes    Smokeless tobacco: Never   Substance Use Topics    Alcohol use: No        He denies sign FHX    No Known Allergies     Prior to Admission medications    Medication Sig Start Date End Date Taking? Authorizing Provider   NOVOLOG MIX 70/30 FLEXPEN injection Take 24 units before breakfast and 22 units before dinner (take 15 minutes before the meal time), please set up a follow up with Dr Navarro by calling [phone] 11/20/24   Tran Navarro MD   Continuous Glucose Sensor (DEXCOM G7 SENSOR) MISC USED TO MEASURE BLOOD SUGAR 4 TO 5 TIMES PER DAY, E11.65 6/6/24   Tran Navarro MD   Continuous Glucose  (DEXCOM G7 ) RENEA For checking blood sugar 4 to 5 times per day E11.65 6/5/24   Tran Navarro MD   metFORMIN (GLUCOPHAGE-XR) 500 MG extended release tablet Take 2 tablets by mouth Daily with supper 6/5/24   Tran Navarro MD   Glucagon (GVOKE HYPOPEN 2-PACK) 0.5 MG/0.1ML SOAJ Use as needed for hypoglycemia (one 2-pack) 6/5/24   Tran Navarro MD   Diabetic Shoe MISC by Does not apply route * I am treating the patient under a comprehensive plan of care for diabetes and the patient would benefit from diabetic footwear to protect their feet, and this includes shoes and insoles. E11.65 6/5/24   Tran Navarro MD   aspirin 81 MG EC tablet Take 1 tablet by mouth daily 3/22/24   Erin Concepcion APRN - NP   atorvastatin (LIPITOR) 80 MG tablet Take 1 tablet by mouth nightly 3/22/24   Erin Concepcion APRN - NP   levothyroxine (SYNTHROID) 125 MCG tablet Take 1 tablet by mouth every morning (before breakfast) 3/22/24   Erin Concepcion APRN - NP   metoprolol succinate (TOPROL XL) 25 MG extended release tablet Take 0.5 tablets by mouth daily 3/22/24   Erin Concepcion APRN - NP   prasugrel (EFFIENT) 10 MG TABS Take 1 tablet by mouth daily 3/22/24 6/5/24  Concepcion, Erin E, APRN - NP   tamsulosin (FLOMAX) 0.4 MG capsule Take 1 capsule by mouth daily 3/22/24   Erin Concepcion, APRN - NP

## 2025-05-27 NOTE — CONSULTS
[facility]  [address] [place] [address]  [phone]  [email or web address]  CARDIOLOGY PROGRESS NOTE    4/28/2025 6:36 PM    Admit Date: 3/31/2025    Admit Diagnosis:   Cellulitis of left foot [L03.116]  Retained foreign body of foot [M79.5]  Diabetic foot infection (HCC) [E11.628, L08.9]  Cellulitis and abscess of toe of left foot [L03.032, L02.612]  Other mucopurulent conjunctivitis of both eyes [H10.023]    Subjective:     Errol Brandt was seen and examined at the bedside.  Reports an episode of chest pain.  Had a little bit of shortness of breath.    /64   Pulse 100   Temp 97.7 °F (36.5 °C) (Oral)   Resp 18   Ht 1.803 m (5' 10.98\")   Wt 84 kg (185 lb 3 oz) Comment: per chart  SpO2 96%   BMI 25.84 kg/m²     Current Facility-Administered Medications   Medication Dose Route Frequency    apixaban (ELIQUIS) tablet 5 mg  5 mg Oral BID    ipratropium 0.5 mg-albuterol 2.5 mg (DUONEB) nebulizer solution 1 Dose  1 Dose Inhalation Q4H PRN    famotidine (PEPCID) tablet 20 mg  20 mg Oral Daily PRN    vancomycin (VANCOCIN) 750 mg in sodium chloride 0.9 % 250 mL IVPB ([id number])  750 mg IntraVENous Q24H    insulin NPH (HumuLIN N;NovoLIN N) injection vial 6 Units  6 Units SubCUTAneous Nightly    insulin NPH (HumuLIN N;NovoLIN N) injection vial 28 Units  28 Units SubCUTAneous QAM    GenTeal Tears 0.1-0.2-0.3 % SOLN 1 drop  1 drop Both Eyes Q4H PRN    morphine (PF) injection 2 mg  2 mg IntraVENous Q3H PRN    aspirin chewable tablet 81 mg  81 mg Oral Daily    collagenase ointment   Topical Daily    lactobacillus (CULTURELLE) capsule 1 capsule  1 capsule Oral Daily    lubrifresh P.M. (artificial tears) ophthalmic ointment   Both Eyes QHS    glucose chewable tablet 16 g  4 tablet Oral PRN    dextrose bolus 10% 125 mL  125 mL IntraVENous PRN    Or    dextrose bolus 10% 250 mL  250 mL IntraVENous PRN    glucagon injection 1 mg  1 mg SubCUTAneous PRN    dextrose 
      Hospitalist Progress Note    NAME:   Errol Brandt   : 1945   MRN: [id number]     Date/Time: 2025 3:51 PM  Patient PCP: Gary Motley MD    Estimated discharge date:   Barriers: heart rate control, nephrology clearance      Assessment / Plan:      Cellulitis of left foot  MRSA bacteremia  TTE negative  Plan for daptomycin till   Blood culture 3/31 + MRSA, repeat culture 4/3 negative  Continue probiotics    DANIELLE on CKD stage III   ATN from vancomycin toxicity, urinary retention  Anemia of chronic disease  Nephrotic range proteinuria  -HD as per nephrology on schedule for MWF  -continue epogen for anemia      Acute metabolic encephalopathy  S/s uremia, hypercapnea  BIPAP at night  Volume removal with HD as tolerated    Acute hypoxic respiratory failure  Volume overload  Suspected diastolic heart failure  Pulmonary edema  Fluid removal as per HD      Acute hypercapneic respiratory failure  S/p BIPAP with improved mentation      Myxedema:  Suspected severe hypothyroid state  Bradycardia, hypothermia, hypotension and hypoglycemia  Resolved  Hypothyroidism  S/p  iv synthyroid,   Continue po levothyroxine  S/p  hydrocortisone  Continue midodrine 15 mg po TID    Atrial fibrillation with RVR  Atrial fibrillation:  Eliquis was held due to ongoing bleeding issues from tunneled HD catheter site  Resume eliquis  Was given albumin and metoprolol 2.5 mg iv without any response  If continues to be in afib with RVR- will start diltiazem drip and consult cardiology        NSTEMI  Continue aspirin, plavix, and atenolol      Diabetes mellitus:  Continue NPH 28 units sc daily  Continue lispor sliding scale    Acute hyperkalemia  resolved    Acute urinary retention s/p curiel  Continue tamsulosin    Medical Decision Making:   I personally reviewed labs:cbc, BMP, wound culture, blood culture  I personally reviewed imaging: CT head report, CXR report  I personally reviewed EKG:  Toxic drug monitoring:   Discussed 
     [facility]  [address] [place] [address]  [phone]  [email or web address]       CARDIOLOGY CONSULTATION       Date of  Admission: 3/31/2025  1:33 PM     Admission type:Emergency   Primary Care Physician:Gary Motley MD     Attending Provider: Aylin Antoine MD  Cardiology Provider:   CC/REASON FOR CONSULT: Atrial fibrillation     Subjective:     Errol Brandt is a 80 y.o. male admitted for Cellulitis of left foot [L03.116]  Retained foreign body of foot [M79.5]  Diabetic foot infection (HCC) [E11.628, L08.9]  Cellulitis and abscess of toe of left foot [L03.032, L02.612]  Other mucopurulent conjunctivitis of both eyes [H10.023]. The patient was seen and examined at the bedside. The patient reports shortness of breath.     coronary atherosclerosis  s/p SARAHY to RCA with rotational atherectomy for NSTEMI on 1/10/24. Has diffuse/distal LAD disease and severely diffuse disease of a small LCx.  Not felt to be a good candidate for CTS previously    essential hypertension    peripheral vascular disease  Remote hx of RIGHT fem-anterior tibial bypass in 11/2011 by Dr. Stiles  Prev ROSIE done at the time of CTS eval in 1/2024 -- ROSIE 0.73, with mod decreased TBI on the right; normal on the left        Patient Active Problem List    Diagnosis Date Noted    Generalized weakness 05/16/2025    Palliative care encounter 05/16/2025    Eschar of heel 04/17/2025    Ischemic ulcer of left foot, limited to breakdown of skin (HCC) 04/10/2025    MRSA bacteremia 04/04/2025    Cellulitis of left foot 04/04/2025    MRSA infection 04/04/2025    Poorly controlled diabetes mellitus (HCC) 04/04/2025    Hypothyroidism 04/04/2025    Retained foreign body of foot 04/01/2025    Cellulitis and abscess of toe of left foot 03/31/2025    Diabetic foot infection (HCC) 03/31/2025    COVID-19 03/29/2024    Unproductive cough 03/29/2024    Goals of care, counseling/discussion 03/29/2024    DNR (do 
    [facility]  [address] [place] [address]  [phone]  [email or web address]       CARDIOLOGY CONSULTATION       Date of  Admission: 3/31/2025  1:33 PM     Admission type:Emergency   Primary Care Physician:Gary Motley MD     Attending Provider: Edi De La Rosa MD  Cardiology Provider: Ryann     PLEASE NOTE THAT WE CONFIRMED WITH THE REFERRING PHYSICIAN PRIOR TO SEEING THE PATIENT THAT THE PATIENT IS BEING REFERRED FOR INITIAL HOSPITAL EVALUATION AND FOR LONG-TERM ONGOING CARDIAC CARE    CC/REASON FOR CONSULT: Troponin elevation      Subjective:     Errol Brandt is a 80 y.o. male admitted for Cellulitis of left foot [L03.116]  Retained foreign body of foot [M79.5]  Diabetic foot infection (HCC) [E11.628, L08.9]  Cellulitis and abscess of toe of left foot [L03.032, L02.612]  Other mucopurulent conjunctivitis of both eyes [H10.023].    Patient is a 80-year-old male we have seen previously in January 2024 non-STEMI, PTCA stenting of a heavily calcified RCA.  He has been admitted for cellulitis.  Yesterday he reports having right-sided chest pain while sitting in bed after eating.  He denies radiation of the pain.  Cardiology has been consulted for troponin elevation.  Evaluated at bedside this morning denies any chest pain overnight or today.  Denies dyspnea orthopnea or PND.  He feels that his discomfort is heartburn indigestion related however he is not the best of historian.    Patient Active Problem List    Diagnosis Date Noted    Eschar of heel 04/17/2025    Ischemic ulcer of left foot, limited to breakdown of skin (HCC) 04/10/2025    MRSA bacteremia 04/04/2025    Cellulitis of left foot 04/04/2025    MRSA infection 04/04/2025    Poorly controlled diabetes mellitus (HCC) 04/04/2025    Hypothyroidism 04/04/2025    Retained foreign body of foot 04/01/2025    Cellulitis and abscess of toe of left foot 03/31/2025    Diabetic foot infection (HCC) 03/31/2025    
  [facility]         NAME:Errol Brandt  MRN:[id number]   :1945     Patient seen    Jay - ATN  CONTINUE IVF  CHECK RENAL PANEL    Vitals:    25 1419 25 2148 25 0243   BP: 111/61  111/64 125/66   Pulse: 61 62 68 60   Resp: 20 20 24 14   Temp:   98 °F (36.7 °C) 97.5 °F (36.4 °C)   TempSrc:   Oral Axillary   SpO2: 94% 98% 99% 96%   Weight:       Height:          Cellulitis of left foot [L03.116]  Retained foreign body of foot [M79.5]  Diabetic foot infection (HCC) [E11.628, L08.9]  Cellulitis and abscess of toe of left foot [L03.032, L02.612]  Other mucopurulent conjunctivitis of both eyes [H10.023]    has a past medical history of Diabetes (HCC), Hyperlipidemia, and PAD (peripheral artery disease).    has a past surgical history that includes vascular surgery (); other surgical history (); other surgical history; Colonoscopy (N/A, 2022); ERCP (N/A, 2023); Cholecystectomy, laparoscopic (N/A, 2023); ERCP (N/A, 2023); Cardiac procedure (N/A, 2024); Cardiac procedure (N/A, 2024); Cardiac procedure (N/A, 2024); Cardiac procedure (N/A, 2024); invasive vascular (N/A, 2024); Cardiac procedure (N/A, 2024); Foot Debridement (Left, 2025); angioplasty (Left, 2025); Femoral-tibial Bypass Graft (Left, 2025); and Leg Surgery (Left, 2025).   Liu Zendejas MD  [facility]  [facility]  [address], [address] [address]  Phone - [phone]         Fax - [phone] [place] [address]  Phone - [phone]        Fax - [phone]                                                                           
  Vascular Surgery Consult Note      Subjective:     Mr. Errol Brandt is a 80 y.o, male with a pmhx significant for IDDM, thyroid disease, CAD, HTN, HLD, and BPH.  He is a current smoker.  He has a history of carotid artery disease and deep venous thrombosis.  He was taking aspirin and Effient prior to presenting.    He was admitted to the hospital on 3/31/25 with a diabetic left foot infection and MRSA bacteremia.  He is status post left foot debridement with Dr. Avalos on April 2.  He had an ROSIE in January of 2024 that was right 0.99 and left 0.73.  He has a remote hx of a right femoral to tibial artery bypass in 2011.  He was last seen in our clinic in 8/2023. He is a former patient of Dr. Garcia's.    Past medical history  Peripheral arterial disease  Carotid artery disease  Insulin-dependent diabetes mellitus  Hypothyroidism  Coronary artery disease  Hypertension  Hyperlipidemia  Tobacco use disorder  History of deep venous thrombosis 2012  Benign prostatic hyperplasia    Past procedural history  Left foot debridement 4/1/2025  Coronary atherectomy, intravascular lithotripsy, and PCI 1/2024  Lithotripsy  Laparoscopic cholecystectomy 12/2023  Endoscopic retrograde cholangiopancreatography 12/2023  Right femoral to tibial bypass 2011  I&D of back x 2    Family history  Unknown    Social history  He is a current 1 pack/day smoker.    Home medications   NOVOLOG MIX 70/30 FLEXPEN injection Take 24 units before breakfast and 22 units before dinner (take 15 minutes before the meal time), please set up a follow up with Dr Navarro by calling [phone]   metFORMIN (GLUCOPHAGE-XR) 500 MG extended release tablet Take 2 tablets by mouth Daily with supper   Glucagon (GVOKE HYPOPEN 2-PACK) 0.5 MG/0.1ML SOAJ Use as needed for hypoglycemia (one 2-pack)   aspirin 81 MG EC tablet Take 1 tablet by mouth daily   atorvastatin (LIPITOR) 80 MG tablet Take 1 tablet by mouth nightly   levothyroxine (SYNTHROID) 125 MCG tablet Take 1 
Infectious Disease Consult    Date of Consultation:  April 4, 2025  Reason for Consultation: Staph aureus bacteremia 2/2  Referring Physician: Dr Ivey  Date of Admission:3/31/2025      Impression    MRSA bacteremia  Blood culture 3/31+ for MRSA 3/4  Repeat blood cultures-pending  Echo cardiogram-pending    Diabetic left foot infection  Cellulitis of left foot  Foreign body in left foot  CT of foot + for metallic foreign bodies in plantar soft tissue of L/foot near fibular hallux sesamoid  & in heel pad.  Linear metallic FB is adjacent to R/5th MT base and plantar to R/2nd , 3rd MT shafts  No fracture, OM or abscess.  Left foot I&D, removal of foreign body& debridement of deep tissue necrotic areas by podiatry 4/2  IntraOp culture+ for moderate MRSA      Diabetes type 2 poorly controlled  A1c 9    PAD moderately decreased L/TBI  Consider vascular consult    Tobacco abuse  Cessation recommended    Hypertension  Continue home meds    Hypothyroidism  Continue home meds    Plan    Continue vancomycin IV  DC Zosyn  Echocardiogram-pending  Adequate fluids, daily probiotic  MRSA decolonization-nasal mupirocin, Hibiclens skin wipes  Recommend vascular evaluation  ID service to follow.    Please call ID on-call with questions, concerns    Abx  Zosyn-3/31-4/4  Vancomycin 3/31-    Extensive review of chart notes, labs, imaging, cultures done  Additionally review of done: Recent reports-Labs, cultures, imaging  D/w -hospitalist, RN    Errol Brandt is seen for -Staph aureus bacteremia.  Blood culture have  now resulted as MRSA 3/4-no site  Errol Brandt is a 80 y.o. male who presents to the emergency department today with complaint of left heel pain and multiple falls over the last several days.  Patient states he has had pain in his left heel for several days and then because it was so painful he fell yesterday and then fell again earlier today.  Patient denies any fevers chills body aches or swelling in the extremity.  
Nephrology Consult Note     [facility]AN [facility]                Phone - [phone]   Patient: Errol Brandt   YOB: 1945    Date- 5/1/2025  MRN: [id number]             CONSULTING PHYSICIAN: Dr. De La Rosa    REASON FOR CONSULTATION: DANIELLE  ADMIT DATE:3/31/2025 PATIENT PCP:Gary Motley MD     IMPRESSION & PLAN:   DANIELLE on CKD:  -Suspect multifactorial due to vancomycin toxicity and urinary retention  -Vanc level 20.6, switched to dapto on 4/30 by ID  -IVF saline for hydration    CKD3:  -Not followed by Nephrologist  -Increased renal echogenicity seen on renal ultrasound consistent with medical renal disease  -Baseline Cr appears to be 1.1    Proteinuria:  -suspect worsening renal function and diabetic nephropathy  -need PCR      Left foot cellulitis due to MRSA  Diabetes   Hypertension  Hypothyroidism  BPH        PLAN-  -Continue IVF for now  -Continue curiel catheter  -Daily labs  -Order PCR  -Continue dapto per ID recommendations  -Avoid nephrotoxins and renally dose meds               Subjective:   5-1-25: Pt seen and examined at bedside. Hard of hearing and appears to be a poor historian. Curiel with minimal UOP. Bladder scan performed at bedside with RN. No urine in bladder.     HPI: Errol Brandt is a 80 y.o. male with PMHx significant for poorly controlled DM  who presented to the emergency department today with complaint of left heel pain and multiple falls over the last several days.  Patient states he has had pain in his left heel for several days and then because it was so painful he fell yesterday and then fell again earlier today.  Patient denies any fevers chills body aches or swelling in the extremity.  States his right lower extremity is not affected        Review of Systems:    A 11 point review of system was performed today. Pertinent positives and negatives are mentioned in the HPI. The reminder of the ROS is negative and noncontributory.    Past Medical History: 
Palliative Medicine  Patient Name: Errol Brandt  YOB: 1945  MRN: [id number]  Age: 80 y.o.  Gender: male    Date of Initial Consult: 5/9/2025  Date of Service: 5/9/2025  Time: 3:14 PM  Provider: PAPO Lal CNP  Hospital Day: 40  Admit Date: 3/31/2025  Referring Provider: Sujata Espitia MD      Reasons for Consultation:  Goals of Care    HISTORY OF PRESENT ILLNESS (HPI):   Errol Brandt is a 80 y.o. male with a past medical history of Diabetes mellitus type 2, Hypothyroidism, Hypertension, BPH, and Peripheral arterial disease, who was admitted on 3/31/2025 from home with complaints of multiple falls and left heel pain.     Psychosocial: Patient  to spouse, Chelsi hernandez 44 years.  They have three adult sons, Gomez, Julio and Adryan.  He worked several years as a  and  before retiring.      PALLIATIVE DIAGNOSES:    Left foot cellulitis  MRSA Bacteremia  DANIELEL - Cr. 2.93  Generalized weakness  Goals of care  Palliative care encounter    ASSESSMENT AND PLAN:   Palliative team has been asked to meet with Errol Brandt to address goals of care.  Reviewed medical chart including admit H&P, consultant notes, MAR, and recent labs/imaging.  Mr. Brandt was seen and evaluated, no family at bedside. Introduced self and role of palliative.   At time of my arrival, he was sleeping in bed in no acute distress.   On exam the patient is drowsy, but arousable to voice and when awake is AAOx x.  Pt states that he feels tired, but denies any pain or acute complaints.   Patient is hard of hearing, he is able to answer some simple questions, but some of his responses are not clear.  Left foot noted to be bandaged and right arm swelling.      Understanding of Illness/Discussion: We discussed the patient's condition in detail.   Son verbalized a clear understanding of hospital events including issues leading to admission. The pt was unable to verbalize an understanding due to cognitive 
Palliative Medicine  Patient Name: Errol Brandt  YOB: 1945  MRN: [id number]  Age: 80 y.o.  Gender: male    Date of Initial Consult: 5/9/2025  Date of Service: 5/16/2025  Time: 12:15 PM  Provider: PAPO Lal - CNP  Hospital Day: 47  Admit Date: 3/31/2025  Referring Provider: Sujata Espitia MD      Reasons for Consultation:  Goals of Care    HISTORY OF PRESENT ILLNESS (HPI):   Errol Brandt is a 80 y.o. male with a past medical history of Diabetes mellitus type 2, Hypothyroidism, Hypertension, BPH, and Peripheral arterial disease, who was admitted on 3/31/2025 from home with complaints of multiple falls and left heel pain.     Psychosocial: Patient  to spouse, Chelsi x 44 years.  They have three adult sons, Gomez, Julio and Adryan.  He worked several years as a  and  before retiring.      PALLIATIVE DIAGNOSES:    Acute hypoxic respiratory failure  Left foot cellulitis  MRSA Bacteremia  DANIELLE - Cr. 3.81  Generalized weakness  Goals of care  Palliative care encounter    ASSESSMENT AND PLAN:   Today, I was re-consulted to see Errol Brandt to address goals of care.  Reviewed medical chart including admit H&P, consultant notes, MAR, and recent labs/imaging.  Mr. Brandt was seen and evaluated, no family at bedside. Intensvisit, Dr. Foster at bedside in the process of suturing dialysis catheter dressing.  At time of my arrival, he was sleeping in bed in no acute distress, mittens noted on bilateral hands due to restlessness.  Patient was admitted to CCU overnight due to acute hercapnia  Left foot remained in bandaged and right arm swelling present -     Outgoing call placed to patient's son - YUE for return call    Code Status: Full Code     ACP: No AMD on file -   per Julio he has documents naming him as patient MPOA, he will provide a copy to scan to EMR.    Plan: continue current care    Initial consult note routed to primary continuity provider and/or primary 
Pulmonary and Critical Care  Consult Note    Requesting Provider: Dr. Hollis    Reason for Consult: Acute hypercapnia    HPI: The patient is a 80/M who we are seeing in consultation at the request of Dr. Hollis for evaluation of acute hypercapnia.     Briefly, patient has been admitted for left foot cellulitis due to MRSA. He has a long and complicated hospital stay with DANIELLE, hypoxia, lethargy, hypoglycemia, hypothermia. He has been progressively more lethargic, ABG this morning showed hypercapnia hence ICU was consulted.     On my arrival, he was obtunded with bradycardia, hypothermia and hypoglycemia. Plan was made to transfer to ICU and intubate.     However, as soon as patient came to ICU he woke up and started to follow commands.     Past Medical History:  Past Medical History:   Diagnosis Date    Diabetes (HCC)     Hyperlipidemia     PAD (peripheral artery disease)        Social History:   reports that he has quit smoking. His smoking use included cigarettes. He has never used smokeless tobacco. He reports that he does not currently use drugs. He reports that he does not drink alcohol.    Family History:  History reviewed. No pertinent family history.    Allergies:  No Known Allergies    Medications:  Current Facility-Administered Medications   Medication Dose Route Frequency Provider Last Rate Last Admin    levothyroxine (SYNTHROID) 50 mcg in sodium chloride (PF) 0.9 % 2.5 mL IV syringe  50 mcg IntraVENous Daily Raman Edwards MD   50 mcg at 05/15/25 1153    dextrose 10 % infusion   IntraVENous Continuous Raman Edwards MD 50 mL/hr at 05/15/25 1220 New Bag at 05/15/25 1220    isoproterenol (ISUPREL) 1 mg in sodium chloride 0.9 % 250 mL infusion  0-10 mcg/min IntraVENous Continuous Didier Topete MD        hydrocortisone sodium succinate PF (SOLU-CORTEF) injection 50 mg  50 mg IntraVENous Q6H Didier Topete MD        etomidate (AMIDATE) injection 20 mg  20 mg IntraVENous Once Didier Topete MD     
Received consult reviewed the chart and images  Needs I&D of the left foot  Spoke to patient over the phone and advised surgical treatment and removal of the Foreign body in the heel  Not coherent in communication  Was told by the floor RN that his son will be there and will sing the consent for the procedure  Orders placed for NPO and consent   Hold the anticoagulants please   Scheduled surgery for this evening     
This is a 80-year-old gentleman with a history of type 2 diabetes mellitus, left lower extremity cellulitis related to a metallic foreign body in the soft tissue of the left foot., chronic kidney disease stage III, and intermittent altered mental status who was admitted to [facility] after a prolonged admission in March and early April.  The foreign body was removed and he has undergone vascular reconstruction    Regarding his diabetes, he has had labile blood sugars throughout his hospitalization.  He had been on 26 units of NPH in the morning and 6 units at night.  Because of hypoglycemia on 28 units of NPH insulin once daily, this has been decreased to 10 units once daily.  Continue 10 units NPH daily as BG's are stable.   5/27 Hypoglycemia over the weekend. NPH on hold for now. Continue with corrective insulin only for now. Nurse notified.         Impression  1.  Type 2 diabetes mellitus now requiring minimal insulin with satisfactory glucose control  2.  Chronic kidney disease stage III  3.  Chronic left wound followed by vascular surgery, wound care and ID    Plan:  1.  Given the decreasing insulin requirements, we would continue with corrective insulin only for now.   2.  We will follow loosely with you    Before making these care recommendations, I personally reviewed the hospitalization record, including notes, laboratory & diagnostic data and current medications, and examined the patient at the bedside. Total time   25  minutes,.      Please note that this dictation was completed with NextWidgets, the computer voice recognition software.  Quite often unanticipated grammatical, syntax, homophones, and other interpretive errors are inadvertently transcribed by the computer software.  Please disregard these errors.  Please excuse any errors that have escaped final proofreading.    
This is a 80-year-old gentleman with a history of type 2 diabetes mellitus, left lower extremity cellulitis related to a metallic foreign body in the soft tissue of the left foot., chronic kidney disease stage III, and intermittent altered mental status who was admitted to [facility] after a prolonged admission in March and early April.  The foreign body was removed and he has undergone vascular reconstruction    Regarding his diabetes, he has had labile blood sugars throughout his hospitalization.  He had been on 26 units of NPH in the morning and 6 units at night.  Because of hypoglycemia on 28 units of NPH insulin once daily, this has been decreased to 10 units once daily.    Examination this is a disheveled male alert but not oriented  Blood pressure 120/61  Pulse 51  Afebrile  99% on room air    Recent laboratory data  Glucose 130 (range 117-175)  Creatinine 3.16  Bicarb 30    Impression  1.  Type 2 diabetes mellitus now requiring minimal insulin with satisfactory glucose control  2.  Chronic kidney disease stage III  3.  Chronic left wound followed by vascular surgery, wound care and ID    Plan:  1.  Given the decreasing insulin requirements, we would continue the 10 units of NPH insulin once in the morning as currently ordered  2.  We will follow loosely with you    Before making these care recommendations, I personally reviewed the hospitalization record, including notes, laboratory & diagnostic data and current medications, and examined the patient at the bedside. Total time   40  minutes,.      Please note that this dictation was completed with [employer], the computer voice recognition software.  Quite often unanticipated grammatical, syntax, homophones, and other interpretive errors are inadvertently transcribed by the computer software.  Please disregard these errors.  Please excuse any errors that have escaped final proofreading.    
This is a 80-year-old gentleman with a history of type 2 diabetes mellitus, left lower extremity cellulitis related to a metallic foreign body in the soft tissue of the left foot., chronic kidney disease stage III, and intermittent altered mental status who was admitted to [facility] after a prolonged admission in March and early April.  The foreign body was removed and he has undergone vascular reconstruction    Regarding his diabetes, he has had labile blood sugars throughout his hospitalization.  He had been on 26 units of NPH in the morning and 6 units at night.  Because of hypoglycemia on 28 units of NPH insulin once daily, this has been decreased to 10 units once daily.  Continue 10 units NPH daily as BG's are stable.       Impression  1.  Type 2 diabetes mellitus now requiring minimal insulin with satisfactory glucose control  2.  Chronic kidney disease stage III  3.  Chronic left wound followed by vascular surgery, wound care and ID    Plan:  1.  Given the decreasing insulin requirements, we would continue the 10 units of NPH insulin once in the morning as currently ordered  2.  We will follow loosely with you    Before making these care recommendations, I personally reviewed the hospitalization record, including notes, laboratory & diagnostic data and current medications, and examined the patient at the bedside. Total time   25  minutes,.      Please note that this dictation was completed with [employer], the computer voice recognition software.  Quite often unanticipated grammatical, syntax, homophones, and other interpretive errors are inadvertently transcribed by the computer software.  Please disregard these errors.  Please excuse any errors that have escaped final proofreading.    
   LEG SURGERY Left 04/18/2025    LEFT HEEL DEBRIDEMENT performed by Foster Garcia MD at [facility] MAIN OR    OTHER SURGICAL HISTORY  2011    right femoral tibial bypass    OTHER SURGICAL HISTORY      drained times two in back    VASCULAR SURGERY  2012    blood clot.right leg      Prior to Admission medications    Medication Sig Start Date End Date Taking? Authorizing Provider   insulin NPH (HUMULIN N;NOVOLIN N) 100 UNIT/ML injection vial Inject 28 Units into the skin every morning 4/24/25  Yes Barber Jaeger MD   insulin NPH (HUMULIN N;NOVOLIN N) 100 UNIT/ML injection vial Inject 6 Units into the skin nightly 4/24/25  Yes Barber Jaeger MD   insulin lispro (HUMALOG,ADMELOG) 100 UNIT/ML SOLN injection vial Inject 0-8 Units into the skin 4 times daily (before meals and nightly) **Medium Dose Corrective Algorithm**  Glucose: Dose:    No Insulin  180-249 2 Units  250-299 4 Units  300-349 6 Units  Over 349 8 Units and notify physician    Administer as soon as possible within 60 minutes of last blood glucose check 4/24/25  Yes Barber Jaeger MD   lactobacillus (CULTURELLE) capsule Take 1 capsule by mouth daily 4/24/25  Yes Barber Jaeger MD   lubrifresh P.M. (ARTIFICIAL TEARS) ophthalmic ointment Place into both eyes nightly 4/24/25  Yes Barber Jaeger MD   Artificial Tear Solution (GENTEAL TEARS) 0.1-0.2-0.3 % SOLN Place 1 drop into both eyes every 4 hours as needed (Dry eyes) 4/24/25  Yes Barber Jaeger MD   enoxaparin (LOVENOX) 80 MG/0.8ML Inject 0.8 mLs into the skin in the morning and 0.8 mLs in the evening. 4/24/25  Yes Barber Jaeger MD   Continuous Glucose Sensor (DEXCOM G7 SENSOR) MISC USED TO MEASURE BLOOD SUGAR 4 TO 5 TIMES PER DAY, E11.65 6/6/24   Tran Navarro MD   Continuous Glucose  (DEXCOM G7 ) RENEA For checking blood sugar 4 to 5 times per day E11.65 6/5/24   Tran Navarro MD   Glucagon (GVOKE HYPOPEN 2-PACK) 0.5 MG/0.1ML SOAJ Use as needed for hypoglycemia (one 2-pack) 6/5/24   Ramon, 
ENEIDA LOPEZ      Vascular duplex vein mapping lower left   Final Result      CT FOOT LEFT W CONTRAST   Final Result      1. No fracture or osteomyelitis.   2. No abscess.   3. Bilateral metallic foreign bodies in the soft tissues, detailed above.                     Electronically signed by Jadiel Gomez      CT HEAD WO CONTRAST   Final Result   No acute intracranial hemorrhage, mass or infarct.             Electronically signed by Abraham Bonner MD      XR ANKLE LEFT (2 VIEWS)   Final Result   1. No acute Bony abnormality.   2. Linear foreign bodies are suggested in the soft tissues of the left heel and   at the plantar aspect of the MTPs on lateral views only. Correlate with clinical   history for retained foreign body/puncture wounds.   3. Bony deformity of the left second toe proximal phalanx poorly visualized on   this two-view foot examination. If there is concern for acute injury of the   second toe, standard dedicated 3 view examination of the left second toe would   be recommended.      Electronically signed by CA SALINAS      XR FOOT LEFT (2 VIEWS)   Final Result   1. No acute Bony abnormality.   2. Linear foreign bodies are suggested in the soft tissues of the left heel and   at the plantar aspect of the MTPs on lateral views only. Correlate with clinical   history for retained foreign body/puncture wounds.   3. Bony deformity of the left second toe proximal phalanx poorly visualized on   this two-view foot examination. If there is concern for acute injury of the   second toe, standard dedicated 3 view examination of the left second toe would   be recommended.      Electronically signed by CA SALINAS      FL LESS THAN 1 HOUR    (Results Pending)   XR FEMUR LEFT 1 VW    (Results Pending)   XR CHEST PORTABLE    (Results Pending)       Assessment     The patient is a 80/M who we are seeing in consultation at the request of Dr. Zendejas for evaluation of Hypoxia.     Hypoxia - likely due to 
Bleeding (Heme)      Objective:    Vitals:    Vitals:    05/08/25 2003 05/08/25 2337 05/09/25 0257 05/09/25 0831   BP: 116/64 127/61 128/69 131/70   Pulse: 56 56 57 55   Resp: 20 18 20    Temp: 97.2 °F (36.2 °C) 97.7 °F (36.5 °C) 98.2 °F (36.8 °C)    TempSrc: Oral Oral Oral    SpO2: 94% 94% 93%    Weight:       Height:         I&O's:  05/08 0701 - 05/09 0700  In: -   Out: 975 [Urine:975]  /70   Pulse 55   Temp 98.2 °F (36.8 °C) (Oral)   Resp 20   Ht 1.803 m (5' 10.98\")   Wt 84 kg (185 lb 3 oz) Comment: per chart  SpO2 93%   BMI 25.84 kg/m²     LABS:  Recent Labs     05/09/25  0657 05/08/25  0405 05/07/25  0426 05/06/25  1039 05/05/25  0613    140 140   < > 140   K 4.5 5.3* 5.0   < > 4.7   * 112* 112*   < > 112*   CO2 22 21 19*   < > 20*   * 118* 108*   < > 98*   CREATININE 2.78* 2.99* 2.95*   < > 2.71*   CALCIUM 7.7* 7.8* 7.7*   < > 7.7*   PHOS 5.2* 5.4* 5.4*  --  4.7   MG  --   --   --   --  2.9*    < > = values in this interval not displayed.     Recent Labs     05/09/25  0657 05/08/25 0405 05/07/25  0426   WBC 6.6 7.6 6.8   HGB 9.1* 8.9* 8.6*   HCT 30.9* 31.7* 30.1*    258 287       IMAGING  === 03/31/25 ===    CT HEAD WO CONTRAST    - Narrative -  EXAM: CT CODE NEURO HEAD WO CONTRAST    INDICATION: CODE STROKE. Slow to awake. Speech altered.    COMPARISON: 3/31/2025.    CONTRAST: None.    TECHNIQUE: Unenhanced CT of the head was performed using 5 mm images. Brain and  bone windows were generated. Coronal and sagittal reformats. CT dose reduction  was achieved through use of a standardized protocol tailored for this  examination and automatic exposure control for dose modulation.    FINDINGS:  There is mild atrophy and compensatory dilatation of the ventricles. There is a  small area of chronic encephalomalacia in the right parietal lobe.. There is no  intracranial hemorrhage, extra-axial collection, or mass effect. The basilar  cisterns are open. No CT evidence of acute

## 2025-05-27 NOTE — CARE COORDINATION
05/27/25 1048   Services At/After Discharge   Transition of Care Consult (CM Consult) SNF   Partner SNF Yes   Mode of Transport at Discharge BLS   Confirm Follow Up Transport Family   Condition of Participation: Discharge Planning   The Plan for Transition of Care is related to the following treatment goals: PCP and specialist   The Patient and/or Patient Representative was provided with a Choice of Provider? Patient   The Patient and/Or Patient Representative agree with the Discharge Plan? Yes   Freedom of Choice list was provided with basic dialogue that supports the patient's individualized plan of care/goals, treatment preferences, and shares the quality data associated with the providers?  Yes     Discharge order placed. Patient to go to [facility] today with transport by [facility] at 2:00 pm  Son is aware of transportation and DC    Transportation benefits discussed with Matthew who said he does not have benefits. Last week when I called they said there was an out of pocket of about $350.00. The call reference # for today is [id number]  I have discussed transportation with his son and he is aware we do not know the cost of the transportation and there are no benefits. Patient has to have stretcher transport.     Matthew let me know that they think he has Medicaid. I found a # on a document from prior SNF and  it is [phone] but it only covers contraceptive benefits. I let the son know he can probably contact DSS to get them to open the application to cover more benefits.     English Sharee HONGCM   [id number],

## 2025-05-27 NOTE — CARE COORDINATION
Transition of Care Plan to SNF/Rehab    Communication to Patient/Family:  Met with patient and family and they are agreeable to the transition plan. The Plan for Transition of Care is related to the following treatment goals: SNF    The Patient and/or patient representative was provided with a choice of provider and agrees  with the discharge plan.      Yes [x] No []    A Freedom of choice list was provided with basic dialogue that supports the patient's individualized plan of care/goals and shares the quality data associated with the providers.       Yes [x] No []    SNF/Rehab Transition:  Patient has been accepted to [facility] SNF/Rehab and meets criteria for admission.   Date of Inpatient Status Order:   Patient will transported by [facility] and expected to leave at 2:00 pm.    Communication to SNF/Rehab:  Bedside RN, , has been notified to update the transition plan to the facility and call report (phone number).  Discharge information has been updated on the AVS. And communicated to facility via [employer], or CC link.     Discharge instructions to be sent to facility with patient along with MAR  RN to call report to [phone]  Room [facility].  [facility] to transport 2:00 pm     Nursing Please include all hard scripts for controlled substances, med rec and dc summary, and AVS in packet.     Reviewed and confirmed with facility, They, can manage the patient care needs for the following:     Cristiano with (X) only those applicable:  Medication:  [x]Medications are available at the facility  []IV Antibiotics    []Controlled Substance - hard copies available sent.  []Weekly Labs    Equipment:  []CPAP/BiPAP  []Wound Vacuum  []Soler or Urinary Device  []PICC/Central Line  []Nebulizer  []Ventilator He will be provided a BIPAP at [facility].  When he is discharged from [facility] he will need to have sleep study through [facility] to arrange for Home BIPAP       Treatment:  [x]Isolation (for MRSA, VRE, etc.)  []Surgical

## 2025-05-28 ENCOUNTER — OFFICE VISIT (OUTPATIENT)
Facility: CLINIC | Age: 80
End: 2025-05-28
Payer: MEDICARE

## 2025-05-28 VITALS
DIASTOLIC BLOOD PRESSURE: 76 MMHG | RESPIRATION RATE: 22 BRPM | SYSTOLIC BLOOD PRESSURE: 142 MMHG | HEART RATE: 76 BPM | TEMPERATURE: 97.5 F

## 2025-05-28 DIAGNOSIS — I73.9 PAD (PERIPHERAL ARTERY DISEASE): Chronic | ICD-10-CM

## 2025-05-28 DIAGNOSIS — N18.31 STAGE 3A CHRONIC KIDNEY DISEASE (HCC): Chronic | ICD-10-CM

## 2025-05-28 DIAGNOSIS — E03.9 ACQUIRED HYPOTHYROIDISM: Chronic | ICD-10-CM

## 2025-05-28 DIAGNOSIS — E78.2 MIXED HYPERLIPIDEMIA: Chronic | ICD-10-CM

## 2025-05-28 DIAGNOSIS — E11.21 CONTROLLED TYPE 2 DIABETES MELLITUS WITH DIABETIC NEPHROPATHY, WITH LONG-TERM CURRENT USE OF INSULIN (HCC): Chronic | ICD-10-CM

## 2025-05-28 DIAGNOSIS — L02.612 CELLULITIS AND ABSCESS OF TOE OF LEFT FOOT: Primary | ICD-10-CM

## 2025-05-28 DIAGNOSIS — Z79.4 CONTROLLED TYPE 2 DIABETES MELLITUS WITH DIABETIC NEPHROPATHY, WITH LONG-TERM CURRENT USE OF INSULIN (HCC): Chronic | ICD-10-CM

## 2025-05-28 DIAGNOSIS — I48.0 PAROXYSMAL ATRIAL FIBRILLATION (HCC): Chronic | ICD-10-CM

## 2025-05-28 DIAGNOSIS — E66.9 OBESITY (BMI 30-39.9): Chronic | ICD-10-CM

## 2025-05-28 DIAGNOSIS — I25.2 HISTORY OF NON-ST ELEVATION MYOCARDIAL INFARCTION (NSTEMI): Chronic | ICD-10-CM

## 2025-05-28 DIAGNOSIS — R53.1 GENERALIZED WEAKNESS: Chronic | ICD-10-CM

## 2025-05-28 DIAGNOSIS — I10 ESSENTIAL HYPERTENSION: Chronic | ICD-10-CM

## 2025-05-28 DIAGNOSIS — I25.10 CORONARY ARTERY DISEASE INVOLVING NATIVE CORONARY ARTERY OF NATIVE HEART WITHOUT ANGINA PECTORIS: Chronic | ICD-10-CM

## 2025-05-28 DIAGNOSIS — L03.032 CELLULITIS AND ABSCESS OF TOE OF LEFT FOOT: Primary | ICD-10-CM

## 2025-05-28 PROBLEM — N17.9 AKI (ACUTE KIDNEY INJURY): Status: RESOLVED | Noted: 2021-05-18 | Resolved: 2025-05-28

## 2025-05-28 PROBLEM — L03.116 CELLULITIS OF LEFT FOOT: Status: RESOLVED | Noted: 2025-04-04 | Resolved: 2025-05-28

## 2025-05-28 PROBLEM — I65.23 BILATERAL CAROTID ARTERY STENOSIS: Chronic | Status: ACTIVE | Noted: 2024-01-04

## 2025-05-28 PROBLEM — R05.8: Status: RESOLVED | Noted: 2024-03-29 | Resolved: 2025-05-28

## 2025-05-28 PROBLEM — J96.01 ACUTE RESPIRATORY FAILURE WITH HYPOXIA (HCC): Status: RESOLVED | Noted: 2021-05-18 | Resolved: 2025-05-28

## 2025-05-28 PROBLEM — U07.1 COVID-19: Status: RESOLVED | Noted: 2024-03-29 | Resolved: 2025-05-28

## 2025-05-28 PROBLEM — U07.1 COVID: Status: RESOLVED | Noted: 2024-03-28 | Resolved: 2025-05-28

## 2025-05-28 PROBLEM — K83.1 BILIARY STRICTURE (HCC): Chronic | Status: ACTIVE | Noted: 2023-12-22

## 2025-05-28 PROBLEM — N18.30 CKD (CHRONIC KIDNEY DISEASE) STAGE 3, GFR 30-59 ML/MIN (HCC): Chronic | Status: ACTIVE | Noted: 2017-12-24

## 2025-05-28 PROBLEM — E11.628 DIABETIC FOOT INFECTION (HCC): Status: RESOLVED | Noted: 2025-03-31 | Resolved: 2025-05-28

## 2025-05-28 PROBLEM — L08.9 DIABETIC FOOT INFECTION (HCC): Status: RESOLVED | Noted: 2025-03-31 | Resolved: 2025-05-28

## 2025-05-28 PROCEDURE — 99306 1ST NF CARE HIGH MDM 50: CPT | Performed by: FAMILY MEDICINE

## 2025-05-28 PROCEDURE — 3052F HG A1C>EQUAL 8.0%<EQUAL 9.0%: CPT | Performed by: FAMILY MEDICINE

## 2025-05-28 PROCEDURE — 1123F ACP DISCUSS/DSCN MKR DOCD: CPT | Performed by: FAMILY MEDICINE

## 2025-05-28 NOTE — PROGRESS NOTES
[facility] CARE SERVICES      Skilled Nursing Facility Admission History and Physical Examination      [facility]      Errol Brandt        Room: G 122B  YOB: 1945  MRN: [id number]      ASSESSMENT:     Diagnosis Orders   1. Cellulitis and abscess of toe of left foot        2. PAD (peripheral artery disease)        3. Coronary artery disease involving native coronary artery of native heart without angina pectoris        4. History of non-ST elevation myocardial infarction (NSTEMI)        5. Paroxysmal atrial fibrillation (HCC)        6. Essential hypertension        7. Controlled type 2 diabetes mellitus with diabetic nephropathy, with long-term current use of insulin (HCC)        8. Stage 3a chronic kidney disease (HCC)        9. Obesity (BMI 30-39.9)        10. Mixed hyperlipidemia        11. Acquired hypothyroidism        12. Generalized weakness              PLAN:    CELLULITIS L FOOT: Will continue IV Daptamycin. Conservative measures and precautions were reviewed with the patient.    PERIPHERAL ARTERIAL DISEASE: This problem has deteriorated. Will avoid smoking and encourage regular aerobic exercise. Will continue to monitor for complications.     CAD/ PAF: This problem is stable. Will continue close surveillance.    DIABETES MELLITUS/ CKD: This problem is stable. Will continue current routine including diet, exercise and medication. Will continue close observation.    HYPERLIPIDEMIA/ OBESITY: This problem is stable. Will continue current treatment including diet, exercise and medication.    HYPOTHYROIDISM: This problem is not stable. Will continue current treatment and close observation.    WEAKNESS: Will consult PT and OT.       SUBJECTIVE:    Chief Complaint:  Chief Complaint   Patient presents with    Other     SNF ADMISSION: CELLULITIS L FOOT/ PAD/ DM       History of Present Illness:    Errol Brandt is a 80 y.o. male who presents for

## (undated) DEVICE — PROVE COVER: Brand: UNBRANDED

## (undated) DEVICE — RADIFOCUS GLIDEWIRE: Brand: GLIDEWIRE

## (undated) DEVICE — APPLICATOR MEDICATED 26 CC SOLUTION HI LT ORNG CHLORAPREP

## (undated) DEVICE — 450 ML BOTTLE OF 0.05% CHLORHEXIDINE GLUCONATE IN 99.95% STERILE WATER FOR IRRIGATION, USP AND APPLICATOR.: Brand: IRRISEPT ANTIMICROBIAL WOUND LAVAGE

## (undated) DEVICE — INFLATION DEVICE: Brand: ENCORE™ 26

## (undated) DEVICE — SYRINGE ANGIO 10 CC BRL STD PRNT POLYCARB LT BLU MEDALLION

## (undated) DEVICE — Device: Brand: GRAND SLAM

## (undated) DEVICE — CATH BLLN ANGIO 2X30MM SC EUPHORA RX

## (undated) DEVICE — 3M™ IOBAN™ 2 ANTIMICROBIAL INCISE DRAPE 6650EZ: Brand: IOBAN™ 2

## (undated) DEVICE — Device

## (undated) DEVICE — BLADE CLIPPER GEN PURP NS

## (undated) DEVICE — GLOVE ORANGE PI 7   MSG9070

## (undated) DEVICE — SWAB CULT LIQ STUART AGR AERB MOD IN BRK SGL RAYON TIP PLAS

## (undated) DEVICE — DESTINATION RENAL GUIDING SHEATH: Brand: DESTINATION

## (undated) DEVICE — TUBING PRSS MON L6IN PVC M FEM CONN

## (undated) DEVICE — GARMENT,MEDLINE,DVT,INT,CALF,LG, GEN2: Brand: MEDLINE

## (undated) DEVICE — GAUZE,SPONGE,4"X4",16PLY,STRL,LF,10/TRAY: Brand: MEDLINE

## (undated) DEVICE — SOLIDIFIER FLD 2OZ 1500CC N DISINF IN BTL DISP SAFESORB

## (undated) DEVICE — AGENT HEMSTAT W4XL4IN OXIDIZED REGENERATED CELOS ABSRB SFT

## (undated) DEVICE — EXTREMITY-MRMC: Brand: MEDLINE INDUSTRIES, INC.

## (undated) DEVICE — DISPOSABLE PULLBACK SLED FOR MOTORDRIVE

## (undated) DEVICE — STAPLER SKIN H3.9MM WIRE DIA0.58MM CRWN 6.9MM 35 STPL ROT

## (undated) DEVICE — HEART CATH-MRMC: Brand: MEDLINE INDUSTRIES, INC.

## (undated) DEVICE — DRESSING STERILE PETRO W3XL8IN N ADH OIL EMUL GZ CURAD

## (undated) DEVICE — CATH IV AUTOGRD BC PNK 20GA 25 -- INSYTE

## (undated) DEVICE — GLIDESHEATH SLENDER STAINLESS STEEL KIT: Brand: GLIDESHEATH SLENDER

## (undated) DEVICE — SHEET,DRAPE,UNDERBUTTOCK,GRAD POUCH,PORT: Brand: MEDLINE

## (undated) DEVICE — TAPE SURG W4INXL10YD SFT CLTH H2O RESIST MEDIPORE H

## (undated) DEVICE — CATHETER COR DIAG TRANSFORMER 3.5 5FR L100CM 0 SIDE H

## (undated) DEVICE — SNARE ENDOSCP M L240CM W27MM SHTH DIA2.4MM CHN 2.8MM OVL

## (undated) DEVICE — CATHETER ANGIO 5FR L100CM GWIRE 0.038IN BERN NONBRAIDED HI

## (undated) DEVICE — TRIPLE LUMEN NEEDLE KNIFE: Brand: RX NEEDLE KNIFE XL

## (undated) DEVICE — BANDAGE,GAUZE,BULKEE II,4.5"X4.1YD,STRL: Brand: MEDLINE

## (undated) DEVICE — NEONATAL-ADULT SPO2 SENSOR: Brand: NELLCOR

## (undated) DEVICE — GLOVE SURG SZ 8 CRM LTX FREE POLYISOPRENE POLYMER BEAD ANTI

## (undated) DEVICE — CORONARY IMAGING CATHETER: Brand: OPTICROSS 6

## (undated) DEVICE — DRESSING FOAM 4X6 DISP POSTOP MEPILEX BORD AG

## (undated) DEVICE — COPILOT BLEEDBACK CONTROL VALVE: Brand: COPILOT

## (undated) DEVICE — PRESSURE MONITORING SET: Brand: TRUWAVE

## (undated) DEVICE — SOLUTION IV 500 ML 0.9 NACL INJ EXCEL CONTAINER USP LF

## (undated) DEVICE — SUTURE PERMA-HAND SZ 2-0 L30IN NONABSORBABLE BLK L26MM SH K833H

## (undated) DEVICE — LOOP,VESSEL,MAXI,BLUE,2/PK,STERILE: Brand: MEDLINE

## (undated) DEVICE — CATH BLLN ANGIO 3.50X27MM NC EUPHORIA RX

## (undated) DEVICE — EVACUATOR SMOKE L 10 FT SMOKE MANAGEMENT EXTENDED EDGE ELECTRODE STERILE DISP

## (undated) DEVICE — CATHETER GUID 6FR L150CM RAP EXCHG L25CM TIP 5.1FR PUSHROD

## (undated) DEVICE — SYRINGE ANGIO CNTRST DEL 20 CC POLYCARB LIGHT GRN MEDALLION

## (undated) DEVICE — RADIFOCUS OPTITORQUE ANGIOGRAPHIC CATHETER: Brand: OPTITORQUE

## (undated) DEVICE — PTA BALLOON DILATATION CATHETER: Brand: COYOTE™

## (undated) DEVICE — RUNTHROUGH NS EXTRA FLOPPY PTCA GUIDEWIRE: Brand: RUNTHROUGH

## (undated) DEVICE — PACK PROCEDURE SURG HRT CATH

## (undated) DEVICE — SPHINCTEROTOME: Brand: DREAMTOME™ RX 49

## (undated) DEVICE — STRIP WND PK W0.25INXL5YD IODO GZ TIGHTLY WVN CURAD

## (undated) DEVICE — FOGARTY SPRING CLIPS 6MM: Brand: FOGARTY SOFTJAW

## (undated) DEVICE — SET ADMIN 16ML TBNG L100IN 2 Y INJ SITE IV PIGGY BK DISP

## (undated) DEVICE — SUTURE ETHILON SZ 3-0 L18IN NONABSORBABLE BLK PS-2 L19MM 3/8 1669H

## (undated) DEVICE — SYR 10ML LUER LOK 1/5ML GRAD --

## (undated) DEVICE — FOGARTY ARTERIAL EMBOLECTOMY CATHETER 3F 80CM: Brand: FOGARTY

## (undated) DEVICE — MINI TREK CORONARY DILATATION CATHETER 2.0 MM X 30 MM / RAPID-EXCHANGE: Brand: MINI TREK

## (undated) DEVICE — DISPOSABLE TOURNIQUET CUFF SINGLE BLADDER, DUAL PORT AND QUICK CONNECT CONNECTOR: Brand: COLOR CUFF

## (undated) DEVICE — DEVICE VASC CLSR MYNX CONTROL 6FR 7FR 10ML LOK SYR W BLLN CATHETER INTEGR (ORDER MUTLIPLES OF 10 EACH)

## (undated) DEVICE — 1LYRTR 16FR10ML100%SIL UMS SNP: Brand: MEDLINE INDUSTRIES, INC.

## (undated) DEVICE — INTRODUCER SHTH 6FR CANN L11CM DIL TIP 35MM GRN TUNGSTEN

## (undated) DEVICE — RADIFOCUS GLIDECATH: Brand: GLIDECATH

## (undated) DEVICE — SUTURE PROL SZ 5-0 L24IN NONABSORBABLE BLU RB-2 L13IN 1/2 8554H

## (undated) DEVICE — TR BAND RADIAL ARTERY COMPRESSION DEVICE: Brand: TR BAND

## (undated) DEVICE — RETRIEVAL BALLOON CATHETER: Brand: EXTRACTOR™ PRO RX

## (undated) DEVICE — FOGARTY ARTERIAL EMBOLECTOMY CATHETER 4F 80CM: Brand: FOGARTY

## (undated) DEVICE — GUIDEWIRE VASC 014X300 CM 30 GM VICTORY 14

## (undated) DEVICE — SUTURE VICRYL SZ 2-0 L36IN ABSRB UD L36MM CT-1 1/2 CIR J945H

## (undated) DEVICE — DEVICE DRIVE ROTAWIRE FLOPPY

## (undated) DEVICE — CATH BLLN ANGIO 4.50X20MM NC EUPHORIA RX

## (undated) DEVICE — CATHETER GUID 6FR 0.071IN COR AMPLATZ L 0.75 MID

## (undated) DEVICE — MINI TREK CORONARY DILATATION CATHETER 1.20 MM X 20 MM / RAPID-EXCHANGE: Brand: MINI TREK

## (undated) DEVICE — LIQUIBAND RAPID ADHESIVE 36/CS 0.8ML: Brand: MEDLINE

## (undated) DEVICE — CANNULATING SPHINCTEROTOME: Brand: JAGTOME™ REVOLUTION RX

## (undated) DEVICE — OR HYBRID-MRMC: Brand: MEDLINE INDUSTRIES, INC.

## (undated) DEVICE — CATH BLLN ANGIO 2.25X20MM NC EUPHORIA RX

## (undated) DEVICE — RADIFOCUS TORQUE DEVICE MULTI-TORQUE VISE: Brand: RADIFOCUS TORQUE DEVICE

## (undated) DEVICE — MICROPUNCTURE INTRODUCER SET SILHOUETTE TRANSITIONLESS WITH STAINLESS STEEL WIRE GUIDE: Brand: MICROPUNCTURE

## (undated) DEVICE — GUIDEWIRE ANGIO 0.038INX260CM 3MM J TIP FIX COR PTFE PERIPH

## (undated) DEVICE — SPONGE GZ W4XL4IN COT RADPQ HIGHLY ABSRB STERILE

## (undated) DEVICE — CONTAINER SPEC 20 ML LID NEUT BUFF FORMALIN 10 % POLYPR STS

## (undated) DEVICE — NON-REM POLYHESIVE PATIENT RETURN ELECTRODE: Brand: VALLEYLAB

## (undated) DEVICE — GOWN,SIRUS,NONRNF,SETINSLV,2XL,18/CS: Brand: MEDLINE

## (undated) DEVICE — HYPODERMIC SAFETY NEEDLE: Brand: MAGELLAN

## (undated) DEVICE — VASCULAR-MRMC: Brand: MEDLINE INDUSTRIES, INC.

## (undated) DEVICE — SPONGE GZ W4XL4IN COT 12 PLY TYP VII WVN C FLD DSGN STERILE

## (undated) DEVICE — SYR 3ML LL TIP 1/10ML GRAD --

## (undated) DEVICE — RETURN SOLID PADS DISPOSABLE BX/50: Brand: BOVIE

## (undated) DEVICE — CATH LITHOPLSTY 3X12MM SHOCKWAVE

## (undated) DEVICE — SUTURE VICRYL + SZ 3-0 L27IN ABSRB UD L26MM SH 1/2 CIR VCP416H

## (undated) DEVICE — ERCP CANNULA - STANDARD TIP: Brand: CONTOUR ERCP CANNULA

## (undated) DEVICE — 3M™ TEGADERM™ TRANSPARENT FILM DRESSING FRAME STYLE, 1626W, 4 IN X 4-3/4 IN (10 CM X 12 CM), 50/CT 4CT/CASE: Brand: 3M™ TEGADERM™

## (undated) DEVICE — SPLINT WR VELC FOAM NEUT POS DISP FOR RAD ART ACC SFT STRP

## (undated) DEVICE — SPHINCTEROTOME: Brand: DREAMTOME™ RX 44

## (undated) DEVICE — BASIN EMSIS 16OZ GRAPHITE PLAS KID SHP MOLD GRAD FOR ORAL

## (undated) DEVICE — GUIDEWIRE VASC L145CM DIA0.035IN TIP L71CM PTFE S STL STR

## (undated) DEVICE — STRAINER URIN CALC RNL MSH -- CONVERT TO ITEM 357634

## (undated) DEVICE — CATH BLLN ANGIO 3X27MM NC EUPHORIA RX

## (undated) DEVICE — CUSTOM KT PTCA INFL DEV K05 00053H

## (undated) DEVICE — GUIDEWIRE VASC L260CM 0.035IN J TIP L3MM PTFE FIX COR NAMIC

## (undated) DEVICE — Device: Brand: QUICK-CROSS SUPPORT CATHETER

## (undated) DEVICE — LOOP VES W13MM THK09MM MINI RED SIL FLD REPELLENT

## (undated) DEVICE — CATHETER ANGIO 5FR L140CM GWIRE 0.035IN BERN BRAID ULT TORQ

## (undated) DEVICE — CATHETER DIAG 5FR L65CM ID0.046IN GWIRE 0.035IN IMPRESS RIM

## (undated) DEVICE — SOLUTION IV 1000 ML 0.9 NACL INJ USP EXCEL PLAS CONTAINER

## (undated) DEVICE — BAND COMPR L24CM REG CLR PLAS HEMSTAT EXT HK AND LOOP RETEN

## (undated) DEVICE — PRE-CONNECTED EXCHANGEABLE BURR CATHETER AND BURR ADVANCING DEVICE: Brand: ROTAPRO™

## (undated) DEVICE — 1200 GUARD II KIT W/5MM TUBE W/O VAC TUBE: Brand: GUARDIAN

## (undated) DEVICE — PAD,ABDOMINAL,5"X9",ST,LF,25/BX: Brand: MEDLINE INDUSTRIES, INC.

## (undated) DEVICE — ELECTRODE,RADIOTRANSLUCENT,FOAM,5PK: Brand: MEDLINE

## (undated) DEVICE — TRAP,MUCUS SPECIMEN, 80CC: Brand: MEDLINE

## (undated) DEVICE — STRIP,CLOSURE,WOUND,MEDI-STRIP,1/2X4: Brand: MEDLINE

## (undated) DEVICE — HI-TORQUE VERSACORE FLOPPY GUIDE WIRE SYSTEM 145 CM: Brand: HI-TORQUE VERSACORE

## (undated) DEVICE — TOWEL 4 PLY TISS 19X30 SUE WHT

## (undated) DEVICE — SUTURE PROL SZ 6-0 L24IN NONABSORBABLE BLU L9.3MM BV-1 3/8 8805H

## (undated) DEVICE — PROBE VASC 8MHZ WTRPRF

## (undated) DEVICE — Z DISCONTINUED PER MEDLINE LINE GAS SAMPLING O2/CO2 LNG AD 13 FT NSL W/ TBNG FILTERLINE

## (undated) DEVICE — SOLUTION IRRIG 1000ML 09% SOD CHL USP PIC PLAS CONTAINER